# Patient Record
Sex: FEMALE | Race: OTHER | NOT HISPANIC OR LATINO | Employment: OTHER | URBAN - METROPOLITAN AREA
[De-identification: names, ages, dates, MRNs, and addresses within clinical notes are randomized per-mention and may not be internally consistent; named-entity substitution may affect disease eponyms.]

---

## 2018-05-04 LAB
ABSOL LYMPHOCYTES (HISTORICAL): 1.9 K/UL (ref 0.5–4)
ALBUMIN SERPL BCP-MCNC: 3.7 G/DL (ref 3–5.2)
ALP SERPL-CCNC: 59 U/L (ref 43–122)
ALT SERPL W P-5'-P-CCNC: 22 U/L (ref 9–52)
ANION GAP SERPL CALCULATED.3IONS-SCNC: 10 MMOL/L (ref 5–14)
AST SERPL W P-5'-P-CCNC: 21 U/L (ref 14–36)
BASOPHILS # BLD AUTO: 0 K/UL (ref 0–0.1)
BASOPHILS # BLD AUTO: 1 % (ref 0–1)
BILIRUB SERPL-MCNC: 0.4 MG/DL
BUN SERPL-MCNC: 28 MG/DL (ref 5–25)
CALCIUM SERPL-MCNC: 9.3 MG/DL (ref 8.4–10.2)
CHLORIDE SERPL-SCNC: 106 MEQ/L (ref 97–108)
CO2 SERPL-SCNC: 27 MMOL/L (ref 22–30)
CREATINE, SERUM (HISTORICAL): 1.06 MG/DL (ref 0.6–1.2)
DEPRECATED RDW RBC AUTO: 14.8 %
EGFR (HISTORICAL): 51 ML/MIN/1.73 M2
EOSINOPHIL # BLD AUTO: 0.2 K/UL (ref 0–0.4)
EOSINOPHIL NFR BLD AUTO: 3 % (ref 0–6)
GLUCOSE FASTING (HISTORICAL): 78 MG/DL (ref 70–99)
HCT VFR BLD AUTO: 35 % (ref 36–46)
HGB BLD-MCNC: 11.6 G/DL (ref 12–16)
LYMPHOCYTES NFR BLD AUTO: 32 % (ref 25–45)
MAGNESIUM SERPL-MCNC: 1.6 MG/DL (ref 1.6–2.3)
MCH RBC QN AUTO: 30.2 PG (ref 26–34)
MCHC RBC AUTO-ENTMCNC: 33.1 % (ref 31–36)
MCV RBC AUTO: 91 FL (ref 80–100)
MONOCYTES # BLD AUTO: 0.5 K/UL (ref 0.2–0.9)
MONOCYTES NFR BLD AUTO: 8 % (ref 1–10)
NEUTROPHILS ABS COUNT (HISTORICAL): 3.5 K/UL (ref 1.8–7.8)
NEUTS SEG NFR BLD AUTO: 56 % (ref 45–65)
PLATELET # BLD AUTO: 291 K/MCL (ref 150–450)
POTASSIUM SERPL-SCNC: 4.3 MEQ/L (ref 3.6–5)
RBC # BLD AUTO: 3.83 M/MCL (ref 4–5.2)
SODIUM SERPL-SCNC: 143 MEQ/L (ref 137–147)
TOTAL PROTEIN (HISTORICAL): 6.8 G/DL (ref 5.9–8.4)
WBC # BLD AUTO: 6.1 K/MCL (ref 4.5–11)

## 2018-05-05 LAB — CA 27.29 (HISTORICAL): 64.5

## 2018-05-06 LAB — CA 15-3 (HISTORICAL): 38

## 2018-05-14 LAB — GLUCOSE SERPL-MCNC: 78 MG/DL (ref 70–99)

## 2018-06-18 LAB
ABSOL LYMPHOCYTES (HISTORICAL): 1.6 K/UL (ref 0.5–4)
ALBUMIN SERPL BCP-MCNC: 3.6 G/DL (ref 3–5.2)
ALP SERPL-CCNC: 74 U/L (ref 43–122)
ALT SERPL W P-5'-P-CCNC: 20 U/L (ref 9–52)
ANION GAP SERPL CALCULATED.3IONS-SCNC: 8 MMOL/L (ref 5–14)
AST SERPL W P-5'-P-CCNC: 23 U/L (ref 14–36)
BASOPHILS # BLD AUTO: 0 K/UL (ref 0–0.1)
BASOPHILS # BLD AUTO: 1 % (ref 0–1)
BILIRUB SERPL-MCNC: 0.3 MG/DL
BUN SERPL-MCNC: 24 MG/DL (ref 5–25)
CALCIUM SERPL-MCNC: 8.4 MG/DL (ref 8.4–10.2)
CHLORIDE SERPL-SCNC: 105 MEQ/L (ref 97–108)
CO2 SERPL-SCNC: 28 MMOL/L (ref 22–30)
CREATINE, SERUM (HISTORICAL): 0.9 MG/DL (ref 0.6–1.2)
DEPRECATED RDW RBC AUTO: 14.3 %
EGFR (HISTORICAL): >60 ML/MIN/1.73 M2
EOSINOPHIL # BLD AUTO: 0.1 K/UL (ref 0–0.4)
EOSINOPHIL NFR BLD AUTO: 2 % (ref 0–6)
GLUCOSE FASTING (HISTORICAL): 92 MG/DL (ref 70–99)
HCT VFR BLD AUTO: 34.1 % (ref 36–46)
HGB BLD-MCNC: 11.2 G/DL (ref 12–16)
LYMPHOCYTES NFR BLD AUTO: 33 % (ref 25–45)
MCH RBC QN AUTO: 30.5 PG (ref 26–34)
MCHC RBC AUTO-ENTMCNC: 32.9 % (ref 31–36)
MCV RBC AUTO: 93 FL (ref 80–100)
MONOCYTES # BLD AUTO: 0.5 K/UL (ref 0.2–0.9)
MONOCYTES NFR BLD AUTO: 11 % (ref 1–10)
NEUTROPHILS ABS COUNT (HISTORICAL): 2.6 K/UL (ref 1.8–7.8)
NEUTS SEG NFR BLD AUTO: 53 % (ref 45–65)
PLATELET # BLD AUTO: 324 K/MCL (ref 150–450)
POTASSIUM SERPL-SCNC: 5.4 MEQ/L (ref 3.6–5)
RBC # BLD AUTO: 3.67 M/MCL (ref 4–5.2)
SODIUM SERPL-SCNC: 141 MEQ/L (ref 137–147)
TOTAL PROTEIN (HISTORICAL): 7.1 G/DL (ref 5.9–8.4)
WBC # BLD AUTO: 4.8 K/MCL (ref 4.5–11)

## 2018-07-02 ENCOUNTER — APPOINTMENT (OUTPATIENT)
Dept: LAB | Facility: HOSPITAL | Age: 73
End: 2018-07-02
Payer: MEDICARE

## 2018-07-02 ENCOUNTER — TRANSCRIBE ORDERS (OUTPATIENT)
Dept: ADMINISTRATIVE | Facility: HOSPITAL | Age: 73
End: 2018-07-02

## 2018-07-02 DIAGNOSIS — C50.911 MALIGNANT NEOPLASM OF RIGHT FEMALE BREAST, UNSPECIFIED ESTROGEN RECEPTOR STATUS, UNSPECIFIED SITE OF BREAST (HCC): ICD-10-CM

## 2018-07-02 DIAGNOSIS — C50.911 MALIGNANT NEOPLASM OF RIGHT FEMALE BREAST, UNSPECIFIED ESTROGEN RECEPTOR STATUS, UNSPECIFIED SITE OF BREAST (HCC): Primary | ICD-10-CM

## 2018-07-02 LAB
ALBUMIN SERPL BCP-MCNC: 3.5 G/DL (ref 3–5.2)
ALP SERPL-CCNC: 85 U/L (ref 43–122)
ALT SERPL W P-5'-P-CCNC: 15 U/L (ref 9–52)
ANION GAP SERPL CALCULATED.3IONS-SCNC: 8 MMOL/L (ref 5–14)
AST SERPL W P-5'-P-CCNC: 20 U/L (ref 14–36)
BASOPHILS # BLD AUTO: 0 THOUSANDS/ΜL (ref 0–0.1)
BASOPHILS NFR BLD AUTO: 1 % (ref 0–1)
BILIRUB SERPL-MCNC: 0.4 MG/DL
BUN SERPL-MCNC: 28 MG/DL (ref 5–25)
CALCIUM SERPL-MCNC: 8 MG/DL (ref 8.4–10.2)
CHLORIDE SERPL-SCNC: 105 MMOL/L (ref 97–108)
CO2 SERPL-SCNC: 28 MMOL/L (ref 22–30)
CREAT SERPL-MCNC: 0.92 MG/DL (ref 0.6–1.2)
EOSINOPHIL # BLD AUTO: 0.1 THOUSAND/ΜL (ref 0–0.4)
EOSINOPHIL NFR BLD AUTO: 2 % (ref 0–6)
ERYTHROCYTE [DISTWIDTH] IN BLOOD BY AUTOMATED COUNT: 15.1 %
GFR SERPL CREATININE-BSD FRML MDRD: 62 ML/MIN/1.73SQ M
GLUCOSE P FAST SERPL-MCNC: 82 MG/DL (ref 70–99)
HCT VFR BLD AUTO: 35.4 % (ref 36–46)
HGB BLD-MCNC: 11.5 G/DL (ref 12–16)
LYMPHOCYTES # BLD AUTO: 1.3 THOUSANDS/ΜL (ref 0.5–4)
LYMPHOCYTES NFR BLD AUTO: 27 % (ref 20–50)
MCH RBC QN AUTO: 30.4 PG (ref 26–34)
MCHC RBC AUTO-ENTMCNC: 32.5 G/DL (ref 31–36)
MCV RBC AUTO: 94 FL (ref 80–100)
MONOCYTES # BLD AUTO: 0.5 THOUSAND/ΜL (ref 0.2–0.9)
MONOCYTES NFR BLD AUTO: 10 % (ref 1–10)
NEUTROPHILS # BLD AUTO: 3 THOUSANDS/ΜL (ref 1.8–7.8)
NEUTS SEG NFR BLD AUTO: 60 % (ref 45–65)
PLATELET # BLD AUTO: 308 THOUSANDS/UL (ref 150–450)
PMV BLD AUTO: 7.3 FL (ref 8.9–12.7)
POTASSIUM SERPL-SCNC: 4.5 MMOL/L (ref 3.6–5)
PROT SERPL-MCNC: 7.2 G/DL (ref 5.9–8.4)
RBC # BLD AUTO: 3.79 MILLION/UL (ref 4–5.2)
SODIUM SERPL-SCNC: 141 MMOL/L (ref 137–147)
WBC # BLD AUTO: 4.9 THOUSAND/UL (ref 4.5–11)

## 2018-07-02 PROCEDURE — 85025 COMPLETE CBC W/AUTO DIFF WBC: CPT

## 2018-07-02 PROCEDURE — 80053 COMPREHEN METABOLIC PANEL: CPT

## 2018-07-02 PROCEDURE — 36415 COLL VENOUS BLD VENIPUNCTURE: CPT

## 2018-07-03 ENCOUNTER — HOSPITAL ENCOUNTER (OUTPATIENT)
Dept: INFUSION CENTER | Facility: HOSPITAL | Age: 73
Discharge: HOME | End: 2018-07-03
Payer: MEDICARE

## 2018-07-03 VITALS
SYSTOLIC BLOOD PRESSURE: 122 MMHG | HEART RATE: 68 BPM | DIASTOLIC BLOOD PRESSURE: 61 MMHG | WEIGHT: 169.31 LBS | TEMPERATURE: 97 F | HEIGHT: 63 IN | RESPIRATION RATE: 16 BRPM | BODY MASS INDEX: 30 KG/M2

## 2018-07-03 PROCEDURE — 96413 CHEMO IV INFUSION 1 HR: CPT

## 2018-07-03 PROCEDURE — 96367 TX/PROPH/DG ADDL SEQ IV INF: CPT

## 2018-07-03 RX ORDER — ANASTROZOLE 1 MG/1
TABLET ORAL EVERY 24 HOURS
COMMUNITY
End: 2019-01-31 | Stop reason: SDUPTHER

## 2018-07-03 RX ORDER — SODIUM CHLORIDE 9 MG/ML
20 INJECTION, SOLUTION INTRAVENOUS CONTINUOUS
Status: DISCONTINUED | OUTPATIENT
Start: 2018-07-03 | End: 2018-07-07 | Stop reason: HOSPADM

## 2018-07-03 RX ORDER — NEBIVOLOL 5 MG/1
TABLET ORAL
COMMUNITY
End: 2020-01-17 | Stop reason: SDUPTHER

## 2018-07-03 RX ORDER — HEPARIN SODIUM (PORCINE) LOCK FLUSH IV SOLN 100 UNIT/ML 100 UNIT/ML
300 SOLUTION INTRAVENOUS ONCE
Status: COMPLETED | OUTPATIENT
Start: 2018-07-03 | End: 2018-07-03

## 2018-07-03 RX ADMIN — FAMOTIDINE 20 MG: 10 INJECTION INTRAVENOUS at 11:12

## 2018-07-03 RX ADMIN — SODIUM CHLORIDE 20 ML/HR: 9 INJECTION, SOLUTION INTRAVENOUS at 10:09

## 2018-07-03 RX ADMIN — Medication 300 UNITS: at 12:58

## 2018-07-03 RX ADMIN — ONDANSETRON HYDROCHLORIDE: 2 INJECTION, SOLUTION INTRAMUSCULAR; INTRAVENOUS at 10:46

## 2018-07-03 RX ADMIN — PACLITAXEL 144 MG: 6 INJECTION, SOLUTION, CONCENTRATE INTRAVENOUS at 11:49

## 2018-07-03 NOTE — PLAN OF CARE
Problem: Potential for Falls  Goal: Patient will remain free of falls  INTERVENTIONS:  - Assess patient frequently for physical needs  -  Identify cognitive and physical deficits and behaviors that affect risk of falls    -  [place] fall precautions as indicated by assessment   - Educate patient/family on patient safety including physical limitations  - Instruct patient to call for assistance with activity based on assessment  - Modify environment to reduce risk of injury  - Consider OT/PT consult to assist with strengthening/mobility   Outcome: Progressing

## 2018-07-03 NOTE — PROGRESS NOTES
Patient arrives with no complaints  Tolerated chemo today well without complications  Confirmed next appts and AVS provided

## 2018-07-06 ENCOUNTER — APPOINTMENT (EMERGENCY)
Dept: CT IMAGING | Facility: HOSPITAL | Age: 73
End: 2018-07-06
Payer: MEDICARE

## 2018-07-06 ENCOUNTER — HOSPITAL ENCOUNTER (EMERGENCY)
Facility: HOSPITAL | Age: 73
Discharge: HOME | End: 2018-07-06
Attending: EMERGENCY MEDICINE | Admitting: EMERGENCY MEDICINE
Payer: MEDICARE

## 2018-07-06 VITALS
BODY MASS INDEX: 29.61 KG/M2 | HEART RATE: 82 BPM | DIASTOLIC BLOOD PRESSURE: 73 MMHG | WEIGHT: 167.11 LBS | OXYGEN SATURATION: 97 % | TEMPERATURE: 97.4 F | RESPIRATION RATE: 15 BRPM | SYSTOLIC BLOOD PRESSURE: 133 MMHG

## 2018-07-06 DIAGNOSIS — R93.89 ABNORMAL FINDING ON CT SCAN: ICD-10-CM

## 2018-07-06 DIAGNOSIS — N12 PYELONEPHRITIS: Primary | ICD-10-CM

## 2018-07-06 LAB
ALBUMIN SERPL BCP-MCNC: 4 G/DL (ref 3–5.2)
ALP SERPL-CCNC: 77 U/L (ref 43–122)
ALT SERPL W P-5'-P-CCNC: 21 U/L (ref 9–52)
ANION GAP SERPL CALCULATED.3IONS-SCNC: 8 MMOL/L (ref 5–14)
AST SERPL W P-5'-P-CCNC: 22 U/L (ref 14–36)
BACTERIA UR QL AUTO: ABNORMAL /HPF
BASOPHILS # BLD AUTO: 0 THOUSANDS/ΜL (ref 0–0.1)
BASOPHILS NFR BLD AUTO: 0 % (ref 0–1)
BILIRUB SERPL-MCNC: 1 MG/DL
BILIRUB UR QL STRIP: NEGATIVE
BUN SERPL-MCNC: 22 MG/DL (ref 5–25)
CALCIUM SERPL-MCNC: 8.5 MG/DL (ref 8.4–10.2)
CHLORIDE SERPL-SCNC: 102 MMOL/L (ref 97–108)
CLARITY UR: ABNORMAL
CO2 SERPL-SCNC: 28 MMOL/L (ref 22–30)
COLOR UR: ABNORMAL
CREAT SERPL-MCNC: 0.83 MG/DL (ref 0.6–1.2)
EOSINOPHIL # BLD AUTO: 0 THOUSAND/ΜL (ref 0–0.4)
EOSINOPHIL NFR BLD AUTO: 0 % (ref 0–6)
ERYTHROCYTE [DISTWIDTH] IN BLOOD BY AUTOMATED COUNT: 15 %
GFR SERPL CREATININE-BSD FRML MDRD: 71 ML/MIN/1.73SQ M
GLUCOSE SERPL-MCNC: 98 MG/DL (ref 70–99)
GLUCOSE UR STRIP-MCNC: NEGATIVE MG/DL
HCT VFR BLD AUTO: 36.8 % (ref 36–46)
HGB BLD-MCNC: 12.3 G/DL (ref 12–16)
HGB UR QL STRIP.AUTO: 150
KETONES UR STRIP-MCNC: NEGATIVE MG/DL
LACTATE SERPL-SCNC: 1.3 MMOL/L (ref 0.7–2)
LEUKOCYTE ESTERASE UR QL STRIP: 500
LIPASE SERPL-CCNC: 86 U/L (ref 23–300)
LYMPHOCYTES # BLD AUTO: 1.4 THOUSANDS/ΜL (ref 0.5–4)
LYMPHOCYTES NFR BLD AUTO: 13 % (ref 20–50)
MCH RBC QN AUTO: 30.6 PG (ref 26–34)
MCHC RBC AUTO-ENTMCNC: 33.5 G/DL (ref 31–36)
MCV RBC AUTO: 91 FL (ref 80–100)
MONOCYTES # BLD AUTO: 0.4 THOUSAND/ΜL (ref 0.2–0.9)
MONOCYTES NFR BLD AUTO: 3 % (ref 1–10)
NEUTROPHILS # BLD AUTO: 8.9 THOUSANDS/ΜL (ref 1.8–7.8)
NEUTS SEG NFR BLD AUTO: 83 % (ref 45–65)
NITRITE UR QL STRIP: POSITIVE
NON-SQ EPI CELLS URNS QL MICRO: ABNORMAL /HPF
PH UR STRIP.AUTO: 7 [PH] (ref 4.5–8)
PLATELET # BLD AUTO: 288 THOUSANDS/UL (ref 150–450)
PMV BLD AUTO: 7.9 FL (ref 8.9–12.7)
POTASSIUM SERPL-SCNC: 4 MMOL/L (ref 3.6–5)
PROT SERPL-MCNC: 8 G/DL (ref 5.9–8.4)
PROT UR STRIP-MCNC: ABNORMAL MG/DL
RBC # BLD AUTO: 4.02 MILLION/UL (ref 4–5.2)
RBC #/AREA URNS AUTO: ABNORMAL /HPF
SODIUM SERPL-SCNC: 138 MMOL/L (ref 137–147)
SP GR UR STRIP.AUTO: 1.01 (ref 1–1.04)
TROPONIN I SERPL-MCNC: <0.01 NG/ML (ref 0–0.03)
UROBILINOGEN UA: NEGATIVE MG/DL
WBC # BLD AUTO: 10.7 THOUSAND/UL (ref 4.5–11)
WBC #/AREA URNS AUTO: ABNORMAL /HPF

## 2018-07-06 PROCEDURE — 83605 ASSAY OF LACTIC ACID: CPT | Performed by: EMERGENCY MEDICINE

## 2018-07-06 PROCEDURE — 81001 URINALYSIS AUTO W/SCOPE: CPT | Performed by: EMERGENCY MEDICINE

## 2018-07-06 PROCEDURE — 84484 ASSAY OF TROPONIN QUANT: CPT | Performed by: EMERGENCY MEDICINE

## 2018-07-06 PROCEDURE — 87086 URINE CULTURE/COLONY COUNT: CPT | Performed by: EMERGENCY MEDICINE

## 2018-07-06 PROCEDURE — 96375 TX/PRO/DX INJ NEW DRUG ADDON: CPT

## 2018-07-06 PROCEDURE — 74177 CT ABD & PELVIS W/CONTRAST: CPT

## 2018-07-06 PROCEDURE — 36415 COLL VENOUS BLD VENIPUNCTURE: CPT | Performed by: EMERGENCY MEDICINE

## 2018-07-06 PROCEDURE — 81003 URINALYSIS AUTO W/O SCOPE: CPT | Performed by: EMERGENCY MEDICINE

## 2018-07-06 PROCEDURE — 96361 HYDRATE IV INFUSION ADD-ON: CPT

## 2018-07-06 PROCEDURE — 85025 COMPLETE CBC W/AUTO DIFF WBC: CPT | Performed by: EMERGENCY MEDICINE

## 2018-07-06 PROCEDURE — 99284 EMERGENCY DEPT VISIT MOD MDM: CPT

## 2018-07-06 PROCEDURE — 80053 COMPREHEN METABOLIC PANEL: CPT | Performed by: EMERGENCY MEDICINE

## 2018-07-06 PROCEDURE — 96365 THER/PROPH/DIAG IV INF INIT: CPT

## 2018-07-06 PROCEDURE — 83690 ASSAY OF LIPASE: CPT | Performed by: EMERGENCY MEDICINE

## 2018-07-06 RX ORDER — CEPHALEXIN 250 MG/1
250 CAPSULE ORAL EVERY 6 HOURS SCHEDULED
Qty: 28 CAPSULE | Refills: 0 | Status: SHIPPED | OUTPATIENT
Start: 2018-07-06 | End: 2018-07-13

## 2018-07-06 RX ORDER — ONDANSETRON 4 MG/1
4 TABLET, FILM COATED ORAL EVERY 6 HOURS
Qty: 12 TABLET | Refills: 0 | Status: ON HOLD | OUTPATIENT
Start: 2018-07-06 | End: 2020-06-05 | Stop reason: SDUPTHER

## 2018-07-06 RX ORDER — MORPHINE SULFATE 4 MG/ML
4 INJECTION, SOLUTION INTRAMUSCULAR; INTRAVENOUS ONCE
Status: DISCONTINUED | OUTPATIENT
Start: 2018-07-06 | End: 2018-07-06 | Stop reason: HOSPADM

## 2018-07-06 RX ORDER — MORPHINE SULFATE 4 MG/ML
INJECTION, SOLUTION INTRAMUSCULAR; INTRAVENOUS
Status: DISPENSED
Start: 2018-07-06 | End: 2018-07-07

## 2018-07-06 RX ORDER — ONDANSETRON 2 MG/ML
4 INJECTION INTRAMUSCULAR; INTRAVENOUS ONCE
Status: COMPLETED | OUTPATIENT
Start: 2018-07-06 | End: 2018-07-06

## 2018-07-06 RX ORDER — ONDANSETRON 2 MG/ML
INJECTION INTRAMUSCULAR; INTRAVENOUS
Status: DISPENSED
Start: 2018-07-06 | End: 2018-07-07

## 2018-07-06 RX ADMIN — IOHEXOL 100 ML: 350 INJECTION, SOLUTION INTRAVENOUS at 16:17

## 2018-07-06 RX ADMIN — ONDANSETRON 4 MG: 2 INJECTION, SOLUTION INTRAMUSCULAR; INTRAVENOUS at 15:55

## 2018-07-06 RX ADMIN — CEFTRIAXONE 1000 MG: 1 INJECTION, SOLUTION INTRAVENOUS at 17:41

## 2018-07-06 RX ADMIN — SODIUM CHLORIDE 500 ML: 9 INJECTION, SOLUTION INTRAVENOUS at 15:38

## 2018-07-06 NOTE — ED PROVIDER NOTES
Pt Name: Dayami Foss  MRN: [id number]  [place] 1945  Age/Sex: 68 y o  female  Date of evaluation: 7/6/2018  PCP: Griselda Shipman MD    [address] [place] [address]    Chief Complaint   Patient presents with    Abdominal Pain     pt states that she has been having lower bad pain since Wed night  pt denies N/V/D and denies urinary symptoms  HPI    68 y o  female presenting with 3 days of abdominal pain  Pain is dull, moderate-to-severe in the left lower abdomen and radiating to the right lower abdomen, better at rest and worse with movement  Patient notes that she has had a decreased appetite over this time, only able to drink a little bit of Tea today not able to eat anything  She complains of nausea and constipation but denies vomiting or urinary symptoms  Of note, patient is currently being treated for breast cancer with chemotherapy, patient states the cancer has not spread outside of the breast at this time  HPI      Past Medical and Surgical History    Past Medical History:   Diagnosis Date    Breast cancer ([facility] [address] )     Hypertension     Lymphedema of right arm     Vitamin B12 deficiency        Past Surgical History:   Procedure Laterality Date    APPENDECTOMY      BREAST SURGERY      bilat mastectomy    KNEE SURGERY      MASTECTOMY Bilateral        History reviewed  No pertinent family history  Social History   Substance Use Topics    Smoking status: Never Smoker    Smokeless tobacco: Never Used    Alcohol use No           Allergies    Allergies   Allergen Reactions    Penicillins Rash       Home Medications    Prior to Admission medications    Medication Sig Start Date End Date Taking?  Authorizing Provider   anastrozole (ARIMIDEX) 1 mg tablet every 24 hours    Historical Provider, MD   Cyanocobalamin 1000 MCG/ML KIT Inject as directed every 6 (six) months    Historical Provider, MD   nebivolol (BYSTOLIC) 5 mg tablet take 1/2 tablet  tablet by oral route  qhs    Historical Provider, MD Review of Systems    Review of Systems   Constitutional: Positive for appetite change  Negative for activity change, chills and fever  HENT: Negative for drooling and facial swelling  Eyes: Negative for pain, discharge and visual disturbance  Respiratory: Negative for apnea, cough, chest tightness, shortness of breath and wheezing  Cardiovascular: Negative for chest pain and leg swelling  Gastrointestinal: Positive for abdominal distention, abdominal pain, constipation and nausea  Negative for diarrhea and vomiting  Genitourinary: Negative for difficulty urinating, dysuria and urgency  Musculoskeletal: Negative for arthralgias, back pain and gait problem  Skin: Negative for color change and rash  Neurological: Negative for dizziness, speech difficulty, weakness and headaches  Psychiatric/Behavioral: Negative for agitation, behavioral problems and confusion  All other systems reviewed and negative  Physical Exam      ED Triage Vitals [07/06/18 1448]   Temperature Pulse Respirations Blood Pressure SpO2   (!) 96 9 °F (36 1 °C) 102 20 140/90 97 %      Temp Source Heart Rate Source Patient Position - Orthostatic VS BP Location FiO2 (%)   Temporal Monitor Sitting Left arm --      Pain Score       --               Physical Exam   Constitutional: She is oriented to person, place, and time  She appears well-developed and well-nourished  HENT:   Head: Normocephalic and atraumatic  Eyes: Conjunctivae and EOM are normal  Pupils are equal, round, and reactive to light  Neck: Normal range of motion  Neck supple  Cardiovascular: Normal rate, regular rhythm, normal heart sounds and intact distal pulses  Pulmonary/Chest: Effort normal and breath sounds normal  No respiratory distress  She has no wheezes  She has no rales  Abdominal: Soft  She exhibits distension  There is tenderness  There is no rebound and no guarding     Maximal tenderness palpation the left lower quadrant, no rebound or guarding  Also tender palpation the right lower quadrant although not as severe is left  Negative Leon sign  Musculoskeletal: Normal range of motion  She exhibits no edema or deformity  Neurological: She is alert and oriented to person, place, and time  Skin: Skin is warm and dry  No rash noted  No erythema  Psychiatric: She has a normal mood and affect   Her behavior is normal  Judgment and thought content normal             Diagnostic Results      Labs:    Results for orders placed or performed during the hospital encounter of 07/06/18   CBC and differential   Result Value Ref Range    WBC 10 70 4 50 - 11 00 Thousand/uL    RBC 4 02 4 00 - 5 20 Million/uL    Hemoglobin 12 3 12 0 - 16 0 g/dL    Hematocrit 36 8 36 0 - 46 0 %    MCV 91 80 - 100 fL    MCH 30 6 26 0 - 34 0 pg    MCHC 33 5 31 0 - 36 0 g/dL    RDW 15 0 <15 3 %    MPV 7 9 (L) 8 9 - 12 7 fL    Platelets 302 531 - 033 Thousands/uL    Neutrophils Relative 83 (H) 45 - 65 %    Lymphocytes Relative 13 (L) 20 - 50 %    Monocytes Relative 3 1 - 10 %    Eosinophils Relative 0 0 - 6 %    Basophils Relative 0 0 - 1 %    Neutrophils Absolute 8 90 (H) 1 80 - 7 80 Thousands/µL    Lymphocytes Absolute 1 40 0 50 - 4 00 Thousands/µL    Monocytes Absolute 0 40 0 20 - 0 90 Thousand/µL    Eosinophils Absolute 0 00 0 00 - 0 40 Thousand/µL    Basophils Absolute 0 00 0 00 - 0 10 Thousands/µL   CMP   Result Value Ref Range    Sodium 138 137 - 147 mmol/L    Potassium 4 0 3 6 - 5 0 mmol/L    Chloride 102 97 - 108 mmol/L    CO2 28 22 - 30 mmol/L    Anion Gap 8 5 - 14 mmol/L    BUN 22 5 - 25 mg/dL    Creatinine 0 83 0 60 - 1 20 mg/dL    Glucose 98 70 - 99 mg/dL    Calcium 8 5 8 4 - 10 2 mg/dL    AST 22 14 - 36 U/L    ALT 21 9 - 52 U/L    Alkaline Phosphatase 77 43 - 122 U/L    Total Protein 8 0 5 9 - 8 4 g/dL    Albumin 4 0 3 0 - 5 2 g/dL    Total Bilirubin 1 00 <1 30 mg/dL    eGFR 71 >60 ml/min/1 73sq m   Lipase   Result Value Ref Range    Lipase 86 23 - 300 u/L   UA w Reflex to Microscopic w Reflex to Culture   Result Value Ref Range    Color, UA Straw Straw, Yellow, Pale Yellow    Clarity, UA Slightly Cloudy (A) Clear, Other    Specific [place], UA 1 010 1 003 - 1 040    pH, UA 7 0 4 5 - 8 0    Leukocytes,  0 (A) Negative    Nitrite, UA Positive (A) Negative    Protein, UA 15 (Trace) (A) Negative mg/dl    Glucose, UA Negative Negative mg/dl    Ketones, UA Negative Negative mg/dl    Bilirubin, UA Negative Negative    Blood,  0 (A) Negative    UROBILINOGEN UA Negative 1 0, Negative mg/dL   Troponin I   Result Value Ref Range    Troponin I <0 01 0 00 - 0 03 ng/mL   Lactic acid, plasma   Result Value Ref Range    LACTIC ACID 1 3 0 7 - 2 0 mmol/L   Urine Microscopic   Result Value Ref Range    RBC, UA 4-10 (A) None Seen, 0-5 /hpf    WBC, UA 30-50 (A) None Seen, 0-5, 5-55, 5-65 /hpf    Epithelial Cells Moderate (A) None Seen, Occasional /hpf    Bacteria, UA Occasional None Seen, Occasional /hpf       All labs reviewed and utilized in the medical decision making process    Radiology:    CT abdomen pelvis with contrast   Final Result      1  Mild enhancement of the bilateral ureters and collecting systems without hydronephrosis or obstructing lesion identified  Given the irregularity of the bladder with perivesical stranding, this is suspicious for ascending urinary tract infection with    ureteritis/pyelitis  2   Abnormal appearing uterus likely secondary to replacement by multiple fibroids  However, this is not well visualized and the possibility of an endometrial mass is not excluded  Follow-up ultrasound is recommended for further evaluation  3   Diverticulosis coli  Workstation performed: [id number]             All radiology studies independently viewed by me and interpreted by the radiologist     Procedure    Procedures    CritCare Time      ED Course of Care and Re-Assessments      Patient offered pain medication but declined  Given Zofran, IV fluids  Ceftriaxone administered ER as well to treat urinary tract infection with concern for pyelonephritis  Medications   morphine (PF) 4 mg/mL injection 4 mg (4 mg Intravenous Not Given 7/6/18 1557)   cefTRIAXone (ROCEPHIN) IVPB (premix) 1,000 mg (1,000 mg Intravenous New Bag 7/6/18 1741)   sodium chloride 0 9 % bolus 500 mL (0 mL Intravenous Stopped 7/6/18 1717)   ondansetron (ZOFRAN) injection 4 mg (4 mg Intravenous Given 7/6/18 1555)   iohexol (OMNIPAQUE) 350 MG/ML injection (MULTI-DOSE) 100 mL (100 mL Intravenous Given 7/6/18 1617)           FINAL IMPRESSION    Final diagnoses:   Pyelonephritis   Abnormal finding on CT scan         DISPOSITION/PLAN    81-year-old female with history and symptoms above  Vital signs initially remarkable for tachycardia that improved with fluids, examination nonspecific but her concerning for abdominal pain in lower quadrants  Lab sent returned reassuring for the most part with mild anemia likely at baseline and urinalysis concerning for urinary tract infection due to leukocytes and nitrite positive  CT scan performed with concern for possible diverticulitis based on left lower quadrant pain versus possible small bowel obstruction or partial small bowel obstruction given history of decreased eating and having only fluids today  Scanreturned as above, most consistent with pyelonephritis but no evidence of stone or other intra-abdominal infection at this time  Incidental findings of left renal cyst and likely but not confirmed fibroids needing further follow-up noted and conveyed patient and patient's daughter, noted in discharge paperwork  Discharged with strict return precautions, Keflex and, Zofran, follow up with primary care doctor  Patient hemodynamically stable and comfortable at time of discharge    Time reflects when diagnosis was documented in both MDM as applicable and the Disposition within this note     Time User Action Codes Description Comment    7/6/2018  5:40 PM Maninder John Add [N12] Pyelonephritis     7/6/2018  5:40 PM Claudia NORMAN Add [R93 [id number]] Abnormal finding on CT scan       ED Disposition     ED Disposition Condition Comment    Discharge  [address] [facility] discharge to home/self care  Condition at discharge: Good        Follow-up Information     Follow up With Specialties Details Why Contact Info    Chanell Jansen MD Family Medicine Schedule an appointment as soon as possible for a visit in 3 days To recheck your symptoms and discuss the abnormal findings on your CT scan [address] [facility]  [address]  [facility]              PATIENT REFERRED TO:    Chanell Jansen MD  [address] [facility]  [address]  [facility]  [address] [place]    Schedule an appointment as soon as possible for a visit in 3 days  To recheck your symptoms and discuss the abnormal findings on your CT scan      DISCHARGE MEDICATIONS:    Patient's Medications   Discharge Prescriptions    CEPHALEXIN (KEFLEX) 250 MG CAPSULE    Take 1 capsule (250 mg total) by mouth every 6 (six) hours for 7 days       Start Date: 7/6/2018  End Date: 7/13/2018       Order Dose: 250 mg       Quantity: 28 capsule    Refills: 0    ONDANSETRON (ZOFRAN) 4 MG TABLET    Take 1 tablet (4 mg total) by mouth every 6 (six) hours       Start Date: 7/6/2018  End Date: --       Order Dose: 4 mg       Quantity: 12 tablet    Refills: 0       No discharge procedures on file           MD Marta Cooper MD  07/06/18 Silvana Rowe

## 2018-07-06 NOTE — DISCHARGE INSTRUCTIONS
The most likely cause of your symptoms today is a kidney infection  Please see the instructions below for more information about that condition  On the CT scan, we also found a cyst (fluid collection) on your left kidney and your uterus appeared abnormal   The findings on the uterus are most likely due to fibroids, although the radiologist recommends getting an ultrasound of the uterus to get a better look as we cannot be sure what they are from the CT scan  Please follow up with your primary care doctor and discuss further follow-up to recheck the uterus  Kidney Infection   WHAT YOU NEED TO KNOW:   A kidney infection, or pyelonephritis, is a bacterial infection  The infection usually starts in your bladder or urethra and moves into your kidney  One or both kidneys may be infected  DISCHARGE INSTRUCTIONS:   Return to the emergency department if:   · You have a fever and chills  · You cannot stop vomiting  · You have severe pain in your abdomen, lower back, or sides  Contact your healthcare provider if:   · You continue to have a fever after you take antibiotics for 3 days  · You have pain when you urinate, even after treatment  · Your signs and symptoms return  · You have questions or concerns about your condition or care  Medicines: You may  need any of the following:  · Antibiotics  treat your bacterial infection  · Acetaminophen  decreases pain and fever  It is available without a doctor's order  Ask how much to take and how often to take it  Follow directions  Read the labels of all other medicines you are using to see if they also contain acetaminophen, or ask your doctor or pharmacist  Acetaminophen can cause liver damage if not taken correctly  Do not use more than 4 grams (4,000 milligrams) total of acetaminophen in one day  · NSAIDs , such as ibuprofen, help decrease swelling, pain, and fever  This medicine is available with or without a doctor's order   NSAIDs can cause stomach bleeding or kidney problems in certain people  If you take blood thinner medicine, always ask if NSAIDs are safe for you  Always read the medicine label and follow directions  Do not give these medicines to children under 10months of age without direction from your child's healthcare provider  · Prescription pain medicine  may be given  Ask how to take this medicine safely  · Take your medicine as directed  Contact your healthcare provider if you think your medicine is not helping or if you have side effects  Tell him of her if you are allergic to any medicine  Keep a list of the medicines, vitamins, and herbs you take  Include the amounts, and when and why you take them  Bring the list or the pill bottles to follow-up visits  Carry your medicine list with you in case of an emergency  Drink liquids as directed: You may need to drink extra liquids to help flush your kidneys and urinary system  Water is the best liquid to drink  Ask your healthcare provider how much liquid to drink each day and which liquids are best for you  Urinate as soon as you feel the urge: This will help flush bacteria from your urinary system  Do not wait or hold your urine for too long  Clean your genital area every day with soap and water:  Wipe from front to back after you urinate or have a bowel movement  Wear cotton underwear  Fabrics such as nylon and polyester can stay damp  This can increase your risk for infection  Urinate within 15 minutes after you have sex  Follow up with your healthcare provider as directed:  Write down your questions so you remember to ask them during your visits  © 2017 [address] [employer] Information is for End User's use only and may not be sold, redistributed or otherwise used for commercial purposes  All illustrations and images included in CareNotes® are the copyrighted property of A D A M , Inc  or [facility]  The above information is an  only  It is not intended as medical advice for individual conditions or treatments  Talk to your doctor, nurse or pharmacist before following any medical regimen to see if it is safe and effective for you

## 2018-07-07 LAB — BACTERIA UR CULT: NORMAL

## 2018-07-11 ENCOUNTER — TELEPHONE (OUTPATIENT)
Dept: HEMATOLOGY ONCOLOGY | Facility: CLINIC | Age: 73
End: 2018-07-11

## 2018-07-11 NOTE — TELEPHONE ENCOUNTER
Sister called Yolanda Angel will be having dental work she cx her appt and treatment she will call back to rs when she is ready to return

## 2018-07-13 ENCOUNTER — TELEPHONE (OUTPATIENT)
Dept: FAMILY MEDICINE CLINIC | Facility: CLINIC | Age: 73
End: 2018-07-13

## 2018-07-17 NOTE — TELEPHONE ENCOUNTER
Called pt and left message for her to give the office a call back when she gets a chance in regards to her Er visit   Need to know if she's still a pt of Dr Farhan Manrique

## 2019-01-21 DIAGNOSIS — C50.911 MALIGNANT NEOPLASM OF RIGHT FEMALE BREAST, UNSPECIFIED ESTROGEN RECEPTOR STATUS, UNSPECIFIED SITE OF BREAST (HCC): Primary | ICD-10-CM

## 2019-01-30 ENCOUNTER — APPOINTMENT (OUTPATIENT)
Dept: LAB | Facility: HOSPITAL | Age: 74
End: 2019-01-30
Attending: INTERNAL MEDICINE
Payer: MEDICARE

## 2019-01-30 LAB
ALBUMIN SERPL BCP-MCNC: 3.7 G/DL (ref 3–5.2)
ALP SERPL-CCNC: 56 U/L (ref 43–122)
ALT SERPL W P-5'-P-CCNC: 21 U/L (ref 9–52)
ANION GAP SERPL CALCULATED.3IONS-SCNC: 4 MMOL/L (ref 5–14)
AST SERPL W P-5'-P-CCNC: 24 U/L (ref 14–36)
BASOPHILS # BLD AUTO: 0 THOUSANDS/ΜL (ref 0–0.1)
BASOPHILS NFR BLD AUTO: 0 % (ref 0–1)
BILIRUB SERPL-MCNC: 0.4 MG/DL
BUN SERPL-MCNC: 22 MG/DL (ref 5–25)
CALCIUM SERPL-MCNC: 9.3 MG/DL (ref 8.4–10.2)
CHLORIDE SERPL-SCNC: 104 MMOL/L (ref 97–108)
CO2 SERPL-SCNC: 31 MMOL/L (ref 22–30)
CREAT SERPL-MCNC: 0.96 MG/DL (ref 0.6–1.2)
EOSINOPHIL # BLD AUTO: 0.1 THOUSAND/ΜL (ref 0–0.4)
EOSINOPHIL NFR BLD AUTO: 2 % (ref 0–6)
ERYTHROCYTE [DISTWIDTH] IN BLOOD BY AUTOMATED COUNT: 15.2 %
GFR SERPL CREATININE-BSD FRML MDRD: 59 ML/MIN/1.73SQ M
GLUCOSE P FAST SERPL-MCNC: 90 MG/DL (ref 70–99)
HCT VFR BLD AUTO: 35.6 % (ref 36–46)
HGB BLD-MCNC: 11.9 G/DL (ref 12–16)
LYMPHOCYTES # BLD AUTO: 1.7 THOUSANDS/ΜL (ref 0.5–4)
LYMPHOCYTES NFR BLD AUTO: 31 % (ref 25–45)
MCH RBC QN AUTO: 30.9 PG (ref 26–34)
MCHC RBC AUTO-ENTMCNC: 33.4 G/DL (ref 31–36)
MCV RBC AUTO: 93 FL (ref 80–100)
MONOCYTES # BLD AUTO: 0.5 THOUSAND/ΜL (ref 0.2–0.9)
MONOCYTES NFR BLD AUTO: 9 % (ref 1–10)
NEUTROPHILS # BLD AUTO: 3.1 THOUSANDS/ΜL (ref 1.8–7.8)
NEUTS SEG NFR BLD AUTO: 58 % (ref 45–65)
PLATELET # BLD AUTO: 297 THOUSANDS/UL (ref 150–450)
PMV BLD AUTO: 7.5 FL (ref 8.9–12.7)
POTASSIUM SERPL-SCNC: 4.3 MMOL/L (ref 3.6–5)
PROT SERPL-MCNC: 7.3 G/DL (ref 5.9–8.4)
RBC # BLD AUTO: 3.85 MILLION/UL (ref 4–5.2)
SODIUM SERPL-SCNC: 139 MMOL/L (ref 137–147)
WBC # BLD AUTO: 5.4 THOUSAND/UL (ref 4.5–11)

## 2019-01-30 PROCEDURE — 85025 COMPLETE CBC W/AUTO DIFF WBC: CPT

## 2019-01-30 PROCEDURE — 80053 COMPREHEN METABOLIC PANEL: CPT

## 2019-01-30 PROCEDURE — 36415 COLL VENOUS BLD VENIPUNCTURE: CPT

## 2019-01-31 ENCOUNTER — OFFICE VISIT (OUTPATIENT)
Dept: HEMATOLOGY ONCOLOGY | Facility: CLINIC | Age: 74
End: 2019-01-31
Payer: MEDICARE

## 2019-01-31 VITALS
RESPIRATION RATE: 18 BRPM | BODY MASS INDEX: 30.79 KG/M2 | DIASTOLIC BLOOD PRESSURE: 82 MMHG | SYSTOLIC BLOOD PRESSURE: 140 MMHG | HEIGHT: 63 IN | OXYGEN SATURATION: 96 % | TEMPERATURE: 98.1 F | WEIGHT: 173.8 LBS | HEART RATE: 83 BPM

## 2019-01-31 DIAGNOSIS — C50.919 METASTATIC BREAST CANCER (HCC): Primary | ICD-10-CM

## 2019-01-31 PROCEDURE — 99215 OFFICE O/P EST HI 40 MIN: CPT | Performed by: INTERNAL MEDICINE

## 2019-01-31 RX ORDER — ANASTROZOLE 1 MG/1
1 TABLET ORAL EVERY 24 HOURS
Qty: 90 TABLET | Refills: 5 | Status: SHIPPED | OUTPATIENT
Start: 2019-01-31 | End: 2019-11-06 | Stop reason: SDUPTHER

## 2019-01-31 NOTE — PROGRESS NOTES
Hematology/Oncology Outpatient Follow-up  Cece Chiu 69 y o  female 1945 [id number]    Date:  1/31/2019        Assessment and Plan:  1  Metastatic breast cancer [facility])  The patient is doing amazingly well on the current palliative chemotherapy with low dose Taxol on every other week basis  We will have to pursue imaging studies with a PET-CT scan to evaluate the status of her metastatic breast cancer  We will also check her echocardiogram to evaluate her ejection fraction in the strength of her heart since she was exposed to her Herceptin and Perjeta in the past   Will get her tumor markers checked and discuss treatment options accordingly     - CBC and differential; Future  - Comprehensive metabolic panel; Future  - NM PET CT skull base to mid thigh; Future  - Echo complete with contrast if indicated; Future  - Cancer antigen 15-3  - Cancer antigen 27 29  - anastrozole (ARIMIDEX) 1 mg tablet; Take 1 tablet (1 mg total) by mouth every 24 hours  Dispense: 90 tablet; Refill: 5        HPI:  The patient came in today for a follow-up visit  She came back from [facility] where she is from couple weeks ago  She stated that she continue the palliative low-dose Taxol on every other week basis during the time she was in liver known until last treatment on in the middle of January 2019  She stated that she is staying here for a while and would like to continue her oncological care in our clinic  The patient had no problems with the chemotherapy during the last 6-8 months  She did however for fall down in had trauma to the right elbow which increased the lymphedema in the right upper extremity  Otherwise, she has great energy without any constitutional symptoms      Oncology History    The patient has a remote history of right breast cancer diagnosed in [place] in February of 2005 with infiltrating ductal carcinoma grade 2 status post right total mastectomy and left prophylactic total mastectomy as well at that time   Her pathology revealed 8/17 positive lymph node for metastatic disease from the right axilla  ER status positive at 5-10%, progesterone negative, her 2 Gregory positive by FISH  She was then treated with 6 cycles of adjuvant chemotherapy with 5 fluorouracil, Adriamycin, and cyclophosphamide followed by tamoxifen and adjuvant radiation to the right axilla and chest wall  The patient was then switched from tamoxifen to Arimidex which was then stopped in 2012  She was then diagnosed with recurrence of her breast cancer with metastatic disease mainly involving the right anterior chest wall, skin, right axilla pectoralis muscle, left axilla and other areas of the body in May 2015  The biopsy from the scan showed triple positive malignant process compatible with recurrence of her breast cancer  She was then started on palliative chemotherapy and received 6 cycles of Taxotere along with trastuzumab and pertuzumab  After 6 cycles she was continued mainly on trastuzumab and pertuzumab alone  The patient then went to [facility] which she continued her oncological care with trastuzumab, pertuzumab and anastrozole  That treatment had to be put on hold after she developed decline of her ejection fraction to about 23% around June 2017  A PET scan in July of 2017 is showed mild progression of her disease and for that reason low-dose weekly Taxol was started on the 18th July 2017  The frequency of Taxol was decreased to 1 treatment every other week  She then received the rest of her treatment and live unknown from September 2017 until March 2018  Her echocardiogram in May 2018 showed ejection fraction of 43%  The patient then went to live alone again in July of 2018 where she continued her Taxol treatment on every other week basis until the middle of January 2019          Metastatic breast cancer ([facility] [address] )    5/28/2015 Initial Diagnosis     Metastatic breast cancer ([facility] [address] )         6/30/2015 -  Chemotherapy     1-Taxotere, Herceptin, Perjeta started in [place] 2015 x6 cycles  2-Herceptin and Perjeta alone were continued since the patient was not interested in continue the Taxotere  3-Herceptin and Perjeta were put on hold due to decline of her ejection fraction around May 2017  4-low dose weekly Taxol was started in July 2017  5-Taxol was switched to every other week basis            Interval history:    ROS: Review of Systems   Constitutional: Negative for activity change, appetite change, chills, diaphoresis, fatigue, fever and unexpected weight change  HENT: Negative for congestion, dental problem, ear discharge, ear pain, facial swelling, hearing loss, mouth sores, nosebleeds, postnasal drip, sore throat, tinnitus and trouble swallowing  Eyes: Negative for discharge, redness, itching and visual disturbance  Respiratory: Negative for cough, chest tightness, shortness of breath and wheezing  Cardiovascular: Negative for chest pain, palpitations and leg swelling  Gastrointestinal: Negative for abdominal distention, abdominal pain, anal bleeding, blood in stool, constipation, diarrhea, nausea and vomiting  Genitourinary: Negative for difficulty urinating, dysuria, flank pain, frequency, hematuria and urgency  Musculoskeletal: Positive for arthralgias (In the right elbow status post a fall)  Negative for back pain, gait problem, joint swelling, myalgias and neck pain  Lymphedema of the right upper extremity   Skin: Negative for color change, pallor, rash and wound  Neurological: Negative for dizziness, syncope, speech difficulty, weakness, light-headedness, numbness and headaches  Hematological: Negative for adenopathy  Does not bruise/bleed easily  Psychiatric/Behavioral: Negative for agitation, behavioral problems, confusion and sleep disturbance         Past Medical History:   Diagnosis Date    Breast cancer ([facility] [address] )     Hypertension     Lymphedema of right arm     Vitamin B12 deficiency        Past Surgical History:   Procedure Laterality Date    APPENDECTOMY      BREAST SURGERY      bilat mastectomy-right total mastectomy and prophylactic left total mastectomy-2005    KNEE SURGERY      right knee replacement 2014 in [address] Bilateral        Social History     Social History    Marital status: Single     Spouse name: N/A    Number of children: N/A    Years of education: N/A     Social History Main Topics    Smoking status: Never Smoker    Smokeless tobacco: Never Used      Comment: no passive smoke exposure    Alcohol use No    Drug use: No    Sexual activity: Not Asked     Other Topics Concern    None     Social History Narrative    None       Family History   Problem Relation Age of Onset    Asthma Mother     Osteoarthritis Father     Bone cancer Family        Allergies   Allergen Reactions    Penicillins Rash         Current Outpatient Prescriptions:     anastrozole (ARIMIDEX) 1 mg tablet, Take 1 tablet (1 mg total) by mouth every 24 hours, Disp: 90 tablet, Rfl: 5    nebivolol (BYSTOLIC) 5 mg tablet, take 1/2 tablet  tablet by oral route  qhs, Disp: , Rfl:     ondansetron (ZOFRAN) 4 mg tablet, Take 1 tablet (4 mg total) by mouth every 6 (six) hours, Disp: 12 tablet, Rfl: 0    Cyanocobalamin 1000 MCG/ML KIT, Inject as directed every 6 (six) months, Disp: , Rfl:       Physical Exam:  /82 (BP Location: Left arm, Cuff Size: Standard)   Pulse 83   Temp 98 1 °F (36 7 °C) (Tympanic)   Resp 18   Ht 5' 2 99" (1 6 m)   Wt 78 8 kg (173 lb 12 8 oz)   SpO2 96%   BMI 30 80 kg/m²     Physical Exam   Constitutional: She is oriented to person, place, and time  She appears well-developed and well-nourished  No distress  HENT:   Head: Normocephalic and atraumatic  Nose: Nose normal    Mouth/Throat: Oropharynx is clear and moist    Eyes: Pupils are equal, round, and reactive to light  Conjunctivae and EOM are normal  Right eye exhibits no discharge   Left eye exhibits no discharge  No scleral icterus  Neck: Normal range of motion  Neck supple  No JVD present  No tracheal deviation present  No thyromegaly present  Cardiovascular: Normal rate, regular rhythm and normal heart sounds  Exam reveals no friction rub  No murmur heard  Pulmonary/Chest: Effort normal and breath sounds normal  No stridor  No respiratory distress  She has no wheezes  She has no rales  She exhibits no tenderness  Abdominal: Soft  Bowel sounds are normal  She exhibits no distension and no mass  There is no hepatosplenomegaly, splenomegaly or hepatomegaly  There is no tenderness  There is no rebound and no guarding  Musculoskeletal: She exhibits edema (Lymphedema of the right upper extremity with decreased range of motion due to recent fall)  She exhibits no tenderness or deformity  Lymphadenopathy:     She has no cervical adenopathy  Neurological: She is alert and oriented to person, place, and time  She has normal reflexes  No cranial nerve deficit  Coordination normal    Skin: Skin is warm and dry  No rash noted  She is not diaphoretic  No erythema  No pallor  Psychiatric: She has a normal mood and affect  Her behavior is normal  Judgment and thought content normal          Labs:  Lab Results   Component Value Date    WBC 5 40 01/30/2019    HGB 11 9 (L) 01/30/2019    HCT 35 6 (L) 01/30/2019    MCV 93 01/30/2019     01/30/2019     Lab Results   Component Value Date     06/18/2018    K 4 3 01/30/2019     01/30/2019    CO2 31 (H) 01/30/2019    ANIONGAP 8 06/18/2018    BUN 22 01/30/2019    CREATININE 0 96 01/30/2019    GLUF 90 01/30/2019    CALCIUM 9 3 01/30/2019    AST 24 01/30/2019    ALT 21 01/30/2019    ALKPHOS 56 01/30/2019    PROT 7 1 06/18/2018    BILITOT 0 3 06/18/2018    EGFR 59 (L) 01/30/2019     No results found for: TSH    Patient voiced understanding and agreement in the above discussion  Aware to contact our office with questions/symptoms in the interim

## 2019-02-11 ENCOUNTER — HOSPITAL ENCOUNTER (OUTPATIENT)
Dept: NUCLEAR MEDICINE | Facility: HOSPITAL | Age: 74
Discharge: HOME | End: 2019-02-11
Attending: INTERNAL MEDICINE
Payer: MEDICARE

## 2019-02-11 DIAGNOSIS — C50.919 METASTATIC BREAST CANCER (HCC): ICD-10-CM

## 2019-02-11 LAB — GLUCOSE SERPL-MCNC: 79 MG/DL (ref 65–140)

## 2019-02-11 PROCEDURE — 82948 REAGENT STRIP/BLOOD GLUCOSE: CPT

## 2019-02-11 PROCEDURE — A9552 F18 FDG: HCPCS

## 2019-02-11 PROCEDURE — 78815 PET IMAGE W/CT SKULL-THIGH: CPT

## 2019-02-13 ENCOUNTER — HOSPITAL ENCOUNTER (OUTPATIENT)
Dept: NON INVASIVE DIAGNOSTICS | Facility: HOSPITAL | Age: 74
Discharge: HOME | End: 2019-02-13
Attending: INTERNAL MEDICINE
Payer: MEDICARE

## 2019-02-13 ENCOUNTER — APPOINTMENT (OUTPATIENT)
Dept: NUCLEAR MEDICINE | Facility: HOSPITAL | Age: 74
End: 2019-02-13
Attending: INTERNAL MEDICINE
Payer: MEDICARE

## 2019-02-13 DIAGNOSIS — C50.919 METASTATIC BREAST CANCER (HCC): ICD-10-CM

## 2019-02-13 PROCEDURE — 93306 TTE W/DOPPLER COMPLETE: CPT

## 2019-02-13 PROCEDURE — 93306 TTE W/DOPPLER COMPLETE: CPT | Performed by: INTERNAL MEDICINE

## 2019-02-14 ENCOUNTER — APPOINTMENT (OUTPATIENT)
Dept: LAB | Age: 74
End: 2019-02-14
Payer: MEDICARE

## 2019-02-14 DIAGNOSIS — C50.919 METASTATIC BREAST CANCER (HCC): ICD-10-CM

## 2019-02-14 LAB
ALBUMIN SERPL BCP-MCNC: 3.4 G/DL (ref 3.5–5)
ALP SERPL-CCNC: 65 U/L (ref 46–116)
ALT SERPL W P-5'-P-CCNC: 14 U/L (ref 12–78)
ANION GAP SERPL CALCULATED.3IONS-SCNC: 2 MMOL/L (ref 4–13)
AST SERPL W P-5'-P-CCNC: 18 U/L (ref 5–45)
BASOPHILS # BLD AUTO: 0.03 THOUSANDS/ΜL (ref 0–0.1)
BASOPHILS NFR BLD AUTO: 1 % (ref 0–1)
BILIRUB SERPL-MCNC: 0.65 MG/DL (ref 0.2–1)
BUN SERPL-MCNC: 16 MG/DL (ref 5–25)
CALCIUM SERPL-MCNC: 9.2 MG/DL (ref 8.3–10.1)
CANCER AG27-29 SERPL-ACNC: 66.6 U/ML (ref 0–42.3)
CHLORIDE SERPL-SCNC: 103 MMOL/L (ref 100–108)
CO2 SERPL-SCNC: 33 MMOL/L (ref 21–32)
CREAT SERPL-MCNC: 0.96 MG/DL (ref 0.6–1.3)
EOSINOPHIL # BLD AUTO: 0.16 THOUSAND/ΜL (ref 0–0.61)
EOSINOPHIL NFR BLD AUTO: 3 % (ref 0–6)
ERYTHROCYTE [DISTWIDTH] IN BLOOD BY AUTOMATED COUNT: 14.6 % (ref 11.6–15.1)
GFR SERPL CREATININE-BSD FRML MDRD: 59 ML/MIN/1.73SQ M
GLUCOSE P FAST SERPL-MCNC: 83 MG/DL (ref 65–99)
HCT VFR BLD AUTO: 39.8 % (ref 34.8–46.1)
HGB BLD-MCNC: 12.4 G/DL (ref 11.5–15.4)
IMM GRANULOCYTES # BLD AUTO: 0.02 THOUSAND/UL (ref 0–0.2)
IMM GRANULOCYTES NFR BLD AUTO: 0 % (ref 0–2)
LYMPHOCYTES # BLD AUTO: 1.56 THOUSANDS/ΜL (ref 0.6–4.47)
LYMPHOCYTES NFR BLD AUTO: 24 % (ref 14–44)
MCH RBC QN AUTO: 30.2 PG (ref 26.8–34.3)
MCHC RBC AUTO-ENTMCNC: 31.2 G/DL (ref 31.4–37.4)
MCV RBC AUTO: 97 FL (ref 82–98)
MONOCYTES # BLD AUTO: 0.52 THOUSAND/ΜL (ref 0.17–1.22)
MONOCYTES NFR BLD AUTO: 8 % (ref 4–12)
NEUTROPHILS # BLD AUTO: 4.14 THOUSANDS/ΜL (ref 1.85–7.62)
NEUTS SEG NFR BLD AUTO: 64 % (ref 43–75)
NRBC BLD AUTO-RTO: 0 /100 WBCS
PLATELET # BLD AUTO: 268 THOUSANDS/UL (ref 149–390)
PMV BLD AUTO: 10.2 FL (ref 8.9–12.7)
POTASSIUM SERPL-SCNC: 4.7 MMOL/L (ref 3.5–5.3)
PROT SERPL-MCNC: 7.9 G/DL (ref 6.4–8.2)
RBC # BLD AUTO: 4.1 MILLION/UL (ref 3.81–5.12)
SODIUM SERPL-SCNC: 138 MMOL/L (ref 136–145)
WBC # BLD AUTO: 6.43 THOUSAND/UL (ref 4.31–10.16)

## 2019-02-14 PROCEDURE — 80053 COMPREHEN METABOLIC PANEL: CPT

## 2019-02-14 PROCEDURE — 36415 COLL VENOUS BLD VENIPUNCTURE: CPT | Performed by: INTERNAL MEDICINE

## 2019-02-14 PROCEDURE — 85025 COMPLETE CBC W/AUTO DIFF WBC: CPT

## 2019-02-14 PROCEDURE — 86300 IMMUNOASSAY TUMOR CA 15-3: CPT | Performed by: INTERNAL MEDICINE

## 2019-02-15 ENCOUNTER — OFFICE VISIT (OUTPATIENT)
Dept: HEMATOLOGY ONCOLOGY | Facility: CLINIC | Age: 74
End: 2019-02-15
Payer: MEDICARE

## 2019-02-15 ENCOUNTER — TELEPHONE (OUTPATIENT)
Dept: HEMATOLOGY ONCOLOGY | Facility: CLINIC | Age: 74
End: 2019-02-15

## 2019-02-15 VITALS
HEART RATE: 75 BPM | BODY MASS INDEX: 30.65 KG/M2 | WEIGHT: 173 LBS | HEIGHT: 63 IN | SYSTOLIC BLOOD PRESSURE: 138 MMHG | TEMPERATURE: 97.3 F | OXYGEN SATURATION: 98 % | RESPIRATION RATE: 18 BRPM | DIASTOLIC BLOOD PRESSURE: 90 MMHG

## 2019-02-15 DIAGNOSIS — C50.919 METASTATIC BREAST CANCER (HCC): Primary | ICD-10-CM

## 2019-02-15 LAB — CANCER AG15-3 SERPL-ACNC: 44.7 U/ML (ref 0–25)

## 2019-02-15 PROCEDURE — 99215 OFFICE O/P EST HI 40 MIN: CPT | Performed by: INTERNAL MEDICINE

## 2019-02-15 NOTE — TELEPHONE ENCOUNTER
Madhu Lou spoke to Soha sched a appt for  Wed Feb 20th @ 1:30 at the [facility]   Faxed the last chart note to [phone]

## 2019-02-15 NOTE — PROGRESS NOTES
Hematology/Oncology Outpatient Follow-up  Tomer Gil 69 y o  female 1945 [id number]    Date:  2/15/2019        Assessment and Plan:  1  Metastatic breast cancer [facility])  The patient and her sister were both educated about the PET-CT scan findings and the tumor markers which show indicates stable disease with only hypermetabolic lymph node in the right axilla  The patient will be continued on the current palliative the line of chemotherapy with Taxol at 80 milligram/meter subcutaneously on every other week basis  We will continue to avoid at the Herceptin and Perjeta for now even though her ejection fraction and left ventricular function seems to be better  - CBC and differential; Future  - Comprehensive metabolic panel; Future  - Magnesium; Future        HPI:  The patient came in today for a follow-up visit  She had a PET-CT scan on the 11th of February which was compared to the PET-CT scan from May 2018 that revealed a hypermetabolic right axillary lymph node measuring 8 mm in greatest dimension which seems to be a larger by 1 or 2 mm only 5 in comparison to the prior PET scan  The activity in that lymph node seems to be slightly more intense  No other metastatic disease was reported in the PET scan  She does have new mild pneumobilia of unknown clinical significance  Her tumor marker seem to be also stable with CA 27-29 up from 64 to 66 her CA 15-3 went also up from 38 back in May to 44 7 on the 14th of February 2019  Her echocardiogram showed IMPRESSIONS:  1  Low-normal to mildly reduced left ventricular systolic function with global hypokinesis, EF around 50%  Normal diastolic function  2  No significant chamber hypertrophy or enlargement  3  No aortic valve stenosis or regurgitation  4  Trace mitral and tricuspid valve regurgitation  5  No obvious pulmonary hypertension  6  No pericardial effusion    The patient continues to do well with the chronic lymphedema of the right upper extremity  Oncology History    The patient has a remote history of right breast cancer diagnosed in [facility] in February of 2005 with infiltrating ductal carcinoma grade 2 status post right total mastectomy and left prophylactic total mastectomy as well at that time  Her pathology revealed 8/17 positive lymph node for metastatic disease from the right axilla  ER status positive at 5-10%, progesterone negative, her 2 Gregory positive by FISH  She was then treated with 6 cycles of adjuvant chemotherapy with 5 fluorouracil, Adriamycin, and cyclophosphamide followed by tamoxifen and adjuvant radiation to the right axilla and chest wall  The patient was then switched from tamoxifen to Arimidex which was then stopped in 2012  She was then diagnosed with recurrence of her breast cancer with metastatic disease mainly involving the right anterior chest wall, skin, right axilla pectoralis muscle, left axilla and other areas of the body in May 2015  The biopsy from the scan showed triple positive malignant process compatible with recurrence of her breast cancer  She was then started on palliative chemotherapy and received 6 cycles of Taxotere along with trastuzumab and pertuzumab  After 6 cycles she was continued mainly on trastuzumab and pertuzumab alone  The patient then went to [facility] which she continued her oncological care with trastuzumab, pertuzumab and anastrozole  That treatment had to be put on hold after she developed decline of her ejection fraction to about 23% around June 2017  A PET scan in July of 2017 is showed mild progression of her disease and for that reason low-dose weekly Taxol was started on the 18th July 2017  The frequency of Taxol was decreased to 1 treatment every other week  She then received the rest of her treatment and live unknown from September 2017 until March 2018  Her echocardiogram in May 2018 showed ejection fraction of 43%    The patient then went to live alone again in July of 2018 where she continued her Taxol treatment on every other week basis until the middle of January 2019  Metastatic breast cancer ([facility] [address] )    5/28/2015 Initial Diagnosis     Metastatic breast cancer ([facility] [address] )         6/30/2015 -  Chemotherapy     1-Taxotere, Herceptin, Perjeta started in [place] 2015 x6 cycles  2-Herceptin and Perjeta alone were continued since the patient was not interested in continue the Taxotere  3-Herceptin and Perjeta were put on hold due to decline of her ejection fraction around May 2017  4-low dose weekly Taxol was started in July 2017  5-Taxol was switched to every other week basis            Interval history:    ROS: Review of Systems   Constitutional: Negative for activity change, appetite change, chills, diaphoresis, fatigue, fever and unexpected weight change  HENT: Negative for congestion, dental problem, ear discharge, ear pain, facial swelling, hearing loss, mouth sores, nosebleeds, postnasal drip, sore throat, tinnitus and trouble swallowing  Eyes: Negative for discharge, redness, itching and visual disturbance  Respiratory: Negative for cough, chest tightness, shortness of breath and wheezing  Cardiovascular: Negative for chest pain, palpitations and leg swelling  Gastrointestinal: Negative for abdominal distention, abdominal pain, anal bleeding, blood in stool, constipation, diarrhea, nausea and vomiting  Genitourinary: Negative for difficulty urinating, dysuria, flank pain, frequency, hematuria and urgency  Musculoskeletal: Negative for arthralgias, back pain, gait problem, joint swelling, myalgias and neck pain  Swelling of the right upper extremity due to chronic lymphedema   Skin: Negative for color change, pallor, rash and wound  Neurological: Negative for dizziness, syncope, speech difficulty, weakness, light-headedness, numbness and headaches  Hematological: Negative for adenopathy  Does not bruise/bleed easily     Psychiatric/Behavioral: Negative for agitation, behavioral problems, confusion and sleep disturbance         Past Medical History:   Diagnosis Date    Breast cancer ([address] )     Hypertension     Lymphedema of right arm     Vitamin B12 deficiency        Past Surgical History:   Procedure Laterality Date    APPENDECTOMY      BREAST SURGERY      bilat mastectomy-right total mastectomy and prophylactic left total mastectomy-2005    KNEE SURGERY      right knee replacement 2014 in [address] Bilateral        Social History     Socioeconomic History    Marital status: Single     Spouse name: None    Number of children: None    Years of education: None    Highest education level: None   Occupational History    None   Social Needs    Financial resource strain: None    Food insecurity:     Worry: None     Inability: None    Transportation needs:     Medical: None     Non-medical: None   Tobacco Use    Smoking status: Never Smoker    Smokeless tobacco: Never Used    Tobacco comment: no passive smoke exposure   Substance and Sexual Activity    Alcohol use: No    Drug use: No    Sexual activity: None   Lifestyle    Physical activity:     Days per week: None     Minutes per session: None    Stress: None   Relationships    Social connections:     Talks on phone: None     Gets together: None     Attends Denominational service: None     Active member of club or organization: None     Attends meetings of clubs or organizations: None     Relationship status: None    Intimate partner violence:     Fear of current or ex partner: None     Emotionally abused: None     Physically abused: None     Forced sexual activity: None   Other Topics Concern    None   Social History Narrative    None       Family History   Problem Relation Age of Onset    Asthma Mother     Osteoarthritis Father     Bone cancer Family        Allergies   Allergen Reactions    Penicillins Rash         Current Outpatient Medications:     anastrozole (ARIMIDEX) 1 mg tablet, Take 1 tablet (1 mg total) by mouth every 24 hours, Disp: 90 tablet, Rfl: 5    Cyanocobalamin 1000 MCG/ML KIT, Inject as directed every 6 (six) months, Disp: , Rfl:     nebivolol (BYSTOLIC) 5 mg tablet, take 1/2 tablet  tablet by oral route  qhs, Disp: , Rfl:     ondansetron (ZOFRAN) 4 mg tablet, Take 1 tablet (4 mg total) by mouth every 6 (six) hours, Disp: 12 tablet, Rfl: 0      Physical Exam:  /90 (BP Location: Left arm, Cuff Size: Adult)   Pulse 75   Temp (!) 97 3 °F (36 3 °C) (Tympanic)   Resp 18   Ht 5' 2 99" (1 6 m)   Wt 78 5 kg (173 lb)   SpO2 98%   BMI 30 66 kg/m²     Physical Exam   Constitutional: She is oriented to person, place, and time  She appears well-developed and well-nourished  No distress  HENT:   Head: Normocephalic and atraumatic  Nose: Nose normal    Mouth/Throat: Oropharynx is clear and moist    Eyes: Pupils are equal, round, and reactive to light  Conjunctivae and EOM are normal  Right eye exhibits no discharge  Left eye exhibits no discharge  No scleral icterus  Neck: Normal range of motion  Neck supple  No JVD present  No tracheal deviation present  No thyromegaly present  Cardiovascular: Normal rate, regular rhythm and normal heart sounds  Exam reveals no friction rub  No murmur heard  Pulmonary/Chest: Effort normal and breath sounds normal  No stridor  No respiratory distress  She has no wheezes  She has no rales  She exhibits no tenderness  Abdominal: Soft  Bowel sounds are normal  She exhibits no distension and no mass  There is no hepatosplenomegaly, splenomegaly or hepatomegaly  There is no tenderness  There is no rebound and no guarding  Musculoskeletal: Normal range of motion  She exhibits edema (Chronic lymphedema of the right upper extremity)  She exhibits no tenderness or deformity  Lymphadenopathy:     She has no cervical adenopathy  Neurological: She is alert and oriented to person, place, and time  She has normal reflexes   No cranial nerve deficit  Coordination normal    Skin: Skin is warm and dry  No rash noted  She is not diaphoretic  No erythema  No pallor  Psychiatric: She has a normal mood and affect  Her behavior is normal  Judgment and thought content normal          Labs:  Lab Results   Component Value Date    WBC 6 43 02/14/2019    HGB 12 4 02/14/2019    HCT 39 8 02/14/2019    MCV 97 02/14/2019     02/14/2019     Lab Results   Component Value Date     06/18/2018    K 4 7 02/14/2019     02/14/2019    CO2 33 (H) 02/14/2019    ANIONGAP 8 06/18/2018    BUN 16 02/14/2019    CREATININE 0 96 02/14/2019    GLUF 83 02/14/2019    CALCIUM 9 2 02/14/2019    AST 18 02/14/2019    ALT 14 02/14/2019    ALKPHOS 65 02/14/2019    PROT 7 1 06/18/2018    BILITOT 0 3 06/18/2018    EGFR 59 02/14/2019     No results found for: TSH    Patient voiced understanding and agreement in the above discussion  Aware to contact our office with questions/symptoms in the interim

## 2019-02-18 RX ORDER — SODIUM CHLORIDE 9 MG/ML
20 INJECTION, SOLUTION INTRAVENOUS ONCE
Status: COMPLETED | OUTPATIENT
Start: 2019-02-19 | End: 2019-02-19

## 2019-02-18 RX ORDER — SODIUM CHLORIDE 9 MG/ML
20 INJECTION, SOLUTION INTRAVENOUS ONCE
Status: DISCONTINUED | OUTPATIENT
Start: 2019-02-18 | End: 2019-02-18

## 2019-02-19 ENCOUNTER — HOSPITAL ENCOUNTER (OUTPATIENT)
Dept: INFUSION CENTER | Facility: HOSPITAL | Age: 74
Discharge: HOME | End: 2019-02-19
Payer: MEDICARE

## 2019-02-19 VITALS
TEMPERATURE: 97 F | HEIGHT: 63 IN | WEIGHT: 173.72 LBS | DIASTOLIC BLOOD PRESSURE: 72 MMHG | BODY MASS INDEX: 30.78 KG/M2 | HEART RATE: 77 BPM | SYSTOLIC BLOOD PRESSURE: 152 MMHG | RESPIRATION RATE: 16 BRPM

## 2019-02-19 PROCEDURE — 96401 CHEMO ANTI-NEOPL SQ/IM: CPT

## 2019-02-19 PROCEDURE — 96367 TX/PROPH/DG ADDL SEQ IV INF: CPT

## 2019-02-19 PROCEDURE — 96413 CHEMO IV INFUSION 1 HR: CPT

## 2019-02-19 RX ADMIN — SODIUM CHLORIDE 20 ML/HR: 9 INJECTION, SOLUTION INTRAVENOUS at 09:02

## 2019-02-19 RX ADMIN — FAMOTIDINE 20 MG: 10 INJECTION, SOLUTION INTRAVENOUS at 09:33

## 2019-02-19 RX ADMIN — DEXAMETHASONE SODIUM PHOSPHATE: 10 INJECTION, SOLUTION INTRAMUSCULAR; INTRAVENOUS at 09:10

## 2019-02-19 RX ADMIN — DENOSUMAB 60 MG: 60 INJECTION SUBCUTANEOUS at 10:50

## 2019-02-19 RX ADMIN — PACLITAXEL 146 MG: 6 INJECTION, SOLUTION, CONCENTRATE INTRAVENOUS at 09:59

## 2019-02-19 NOTE — PLAN OF CARE
Problem: Potential for Falls  Goal: Patient will remain free of falls  Description  INTERVENTIONS:  - Assess patient frequently for physical needs  -  Identify cognitive and physical deficits and behaviors that affect risk of falls    -  [place] fall precautions as indicated by assessment   - Educate patient/family on patient safety including physical limitations  - Instruct patient to call for assistance with activity based on assessment  - Modify environment to reduce risk of injury  - Consider OT/PT consult to assist with strengthening/mobility  Outcome: Progressing

## 2019-02-19 NOTE — PROGRESS NOTES
Pt states she has been receiving Taxol in [place] and is back in the [place]  Patient offers no complaints today  Tolerated treatment well without complications  Also prolia given today too  AVS provided and confirmed next appts

## 2019-03-02 ENCOUNTER — APPOINTMENT (OUTPATIENT)
Dept: LAB | Age: 74
End: 2019-03-02
Payer: MEDICARE

## 2019-03-02 DIAGNOSIS — C50.919 METASTATIC BREAST CANCER (HCC): ICD-10-CM

## 2019-03-02 PROCEDURE — 80053 COMPREHEN METABOLIC PANEL: CPT

## 2019-03-02 PROCEDURE — 83735 ASSAY OF MAGNESIUM: CPT

## 2019-03-02 PROCEDURE — 36415 COLL VENOUS BLD VENIPUNCTURE: CPT

## 2019-03-02 PROCEDURE — 85025 COMPLETE CBC W/AUTO DIFF WBC: CPT

## 2019-03-03 LAB
ALBUMIN SERPL BCP-MCNC: 3.3 G/DL (ref 3.5–5)
ALP SERPL-CCNC: 66 U/L (ref 46–116)
ALT SERPL W P-5'-P-CCNC: 13 U/L (ref 12–78)
ANION GAP SERPL CALCULATED.3IONS-SCNC: 5 MMOL/L (ref 4–13)
AST SERPL W P-5'-P-CCNC: 17 U/L (ref 5–45)
BASOPHILS # BLD AUTO: 0.03 THOUSANDS/ΜL (ref 0–0.1)
BASOPHILS NFR BLD AUTO: 1 % (ref 0–1)
BILIRUB SERPL-MCNC: 0.44 MG/DL (ref 0.2–1)
BUN SERPL-MCNC: 24 MG/DL (ref 5–25)
CALCIUM SERPL-MCNC: 8.1 MG/DL (ref 8.3–10.1)
CHLORIDE SERPL-SCNC: 106 MMOL/L (ref 100–108)
CO2 SERPL-SCNC: 28 MMOL/L (ref 21–32)
CREAT SERPL-MCNC: 0.85 MG/DL (ref 0.6–1.3)
EOSINOPHIL # BLD AUTO: 0.1 THOUSAND/ΜL (ref 0–0.61)
EOSINOPHIL NFR BLD AUTO: 2 % (ref 0–6)
ERYTHROCYTE [DISTWIDTH] IN BLOOD BY AUTOMATED COUNT: 13.9 % (ref 11.6–15.1)
GFR SERPL CREATININE-BSD FRML MDRD: 68 ML/MIN/1.73SQ M
GLUCOSE P FAST SERPL-MCNC: 84 MG/DL (ref 65–99)
HCT VFR BLD AUTO: 36.7 % (ref 34.8–46.1)
HGB BLD-MCNC: 11.4 G/DL (ref 11.5–15.4)
IMM GRANULOCYTES # BLD AUTO: 0.02 THOUSAND/UL (ref 0–0.2)
IMM GRANULOCYTES NFR BLD AUTO: 0 % (ref 0–2)
LYMPHOCYTES # BLD AUTO: 1.84 THOUSANDS/ΜL (ref 0.6–4.47)
LYMPHOCYTES NFR BLD AUTO: 38 % (ref 14–44)
MAGNESIUM SERPL-MCNC: 2.1 MG/DL (ref 1.6–2.6)
MCH RBC QN AUTO: 30.3 PG (ref 26.8–34.3)
MCHC RBC AUTO-ENTMCNC: 31.1 G/DL (ref 31.4–37.4)
MCV RBC AUTO: 98 FL (ref 82–98)
MONOCYTES # BLD AUTO: 0.38 THOUSAND/ΜL (ref 0.17–1.22)
MONOCYTES NFR BLD AUTO: 8 % (ref 4–12)
NEUTROPHILS # BLD AUTO: 2.43 THOUSANDS/ΜL (ref 1.85–7.62)
NEUTS SEG NFR BLD AUTO: 51 % (ref 43–75)
NRBC BLD AUTO-RTO: 0 /100 WBCS
PLATELET # BLD AUTO: 333 THOUSANDS/UL (ref 149–390)
PMV BLD AUTO: 9.8 FL (ref 8.9–12.7)
POTASSIUM SERPL-SCNC: 4.8 MMOL/L (ref 3.5–5.3)
PROT SERPL-MCNC: 7.5 G/DL (ref 6.4–8.2)
RBC # BLD AUTO: 3.76 MILLION/UL (ref 3.81–5.12)
SODIUM SERPL-SCNC: 139 MMOL/L (ref 136–145)
WBC # BLD AUTO: 4.8 THOUSAND/UL (ref 4.31–10.16)

## 2019-03-04 ENCOUNTER — HOSPITAL ENCOUNTER (OUTPATIENT)
Dept: INFUSION CENTER | Facility: HOSPITAL | Age: 74
Discharge: HOME | End: 2019-03-04
Payer: MEDICARE

## 2019-03-04 ENCOUNTER — OFFICE VISIT (OUTPATIENT)
Dept: HEMATOLOGY ONCOLOGY | Facility: CLINIC | Age: 74
End: 2019-03-04
Payer: MEDICARE

## 2019-03-04 VITALS
HEIGHT: 63 IN | WEIGHT: 170 LBS | SYSTOLIC BLOOD PRESSURE: 138 MMHG | TEMPERATURE: 98 F | BODY MASS INDEX: 30.12 KG/M2 | OXYGEN SATURATION: 97 % | DIASTOLIC BLOOD PRESSURE: 88 MMHG | RESPIRATION RATE: 18 BRPM | HEART RATE: 76 BPM

## 2019-03-04 VITALS — HEIGHT: 63 IN | WEIGHT: 169.97 LBS | BODY MASS INDEX: 30.12 KG/M2

## 2019-03-04 DIAGNOSIS — Z79.811 USE OF AROMATASE INHIBITORS: ICD-10-CM

## 2019-03-04 DIAGNOSIS — C50.919 METASTATIC BREAST CANCER (HCC): Primary | ICD-10-CM

## 2019-03-04 PROCEDURE — 96367 TX/PROPH/DG ADDL SEQ IV INF: CPT

## 2019-03-04 PROCEDURE — 96413 CHEMO IV INFUSION 1 HR: CPT

## 2019-03-04 PROCEDURE — 99214 OFFICE O/P EST MOD 30 MIN: CPT | Performed by: NURSE PRACTITIONER

## 2019-03-04 RX ORDER — SODIUM CHLORIDE 9 MG/ML
20 INJECTION, SOLUTION INTRAVENOUS ONCE
Status: COMPLETED | OUTPATIENT
Start: 2019-03-04 | End: 2019-03-04

## 2019-03-04 RX ADMIN — FAMOTIDINE 20 MG: 10 INJECTION, SOLUTION INTRAVENOUS at 10:22

## 2019-03-04 RX ADMIN — DEXAMETHASONE SODIUM PHOSPHATE: 10 INJECTION, SOLUTION INTRAMUSCULAR; INTRAVENOUS at 10:03

## 2019-03-04 RX ADMIN — SODIUM CHLORIDE 20 ML/HR: 9 INJECTION, SOLUTION INTRAVENOUS at 09:52

## 2019-03-04 RX ADMIN — PACLITAXEL 146 MG: 6 INJECTION, SOLUTION, CONCENTRATE INTRAVENOUS at 10:44

## 2019-03-04 NOTE — PROGRESS NOTES
Hematology/Oncology Outpatient Follow-up  Carmel Torres 69 y o  female 1945 [id number]    Date:  3/4/2019      Assessment and Plan:  1  Metastatic breast cancer [facility])  Patient is tolerating her Taxol on an every other week basis well  She is also taking her anastrozole on a daily basis  She will proceed with treatment as scheduled  Patient and her sister are requesting to switch chemo and follow-up appointments to Mondays due to transportation issues  Infusions is able to accommodate patient today so she will get her treatment today  Patient will be back in 2 weeks for follow-up with lab work prior     - CBC and differential; Future  - Comprehensive metabolic panel; Future  - Cancer antigen 15-3; Future  - Cancer antigen 27 29; Future    2  Use of aromatase inhibitors  Patient will continue her anastrozole on a daily basis  She did resume her Prolia injections 2 weeks ago  She will be due for her next 1 August 2019  Patient is also past due for DEXA scan her last DEXA scan was January of 2017  We will order a repeat DEXA scan to be done prior to her next appointment  - DXA bone density spine hip and pelvis; Future    HPI:  Patient presents today for a 2 week follow-up accompanied by her sister Jeannine William  She is tolerating her Taxol treatment on an every other week basis well  She reports no side effects, good appetite, and denies fatigue  Patient's labs drawn 03/02/2019 are within normal limits  Oncology History    The patient has a remote history of right breast cancer diagnosed in [place] in February of 2005 with infiltrating ductal carcinoma grade 2 status post right total mastectomy and left prophylactic total mastectomy as well at that time  Her pathology revealed 8/17 positive lymph node for metastatic disease from the right axilla  ER status positive at 5-10%, progesterone negative, her 2 Gregory positive by FISH    She was then treated with 6 cycles of adjuvant chemotherapy with 5 fluorouracil, Adriamycin, and cyclophosphamide followed by tamoxifen and adjuvant radiation to the right axilla and chest wall  The patient was then switched from tamoxifen to Arimidex which was then stopped in 2012  She was then diagnosed with recurrence of her breast cancer with metastatic disease mainly involving the right anterior chest wall, skin, right axilla pectoralis muscle, left axilla and other areas of the body in May 2015  The biopsy from the scan showed triple positive malignant process compatible with recurrence of her breast cancer  She was then started on palliative chemotherapy and received 6 cycles of Taxotere along with trastuzumab and pertuzumab  After 6 cycles she was continued mainly on trastuzumab and pertuzumab alone  The patient then went to [facility] which she continued her oncological care with trastuzumab, pertuzumab and anastrozole  That treatment had to be put on hold after she developed decline of her ejection fraction to about 23% around June 2017  A PET scan in July of 2017 is showed mild progression of her disease and for that reason low-dose weekly Taxol was started on the 18th July 2017  The frequency of Taxol was decreased to 1 treatment every other week  She then received the rest of her treatment and live unknown from September 2017 until March 2018  Her echocardiogram in May 2018 showed ejection fraction of 43%  The patient then went to live alone again in July of 2018 where she continued her Taxol treatment on every other week basis until the middle of January 2019  Metastatic breast cancer ([facility] [address] )    5/28/2015 Initial Diagnosis     Metastatic breast cancer ([facility] [address] )  (recurrence)         2015 -  Hormone Therapy     Anastrozole         6/30/2015 -  Chemotherapy     1-Taxotere, Herceptin, Perjeta started in [place] 2015 x6 cycles  2-Herceptin and Perjeta alone were continued since the patient was not interested in continue the Taxotere    3-Herceptin and Perjeta were put on hold due to decline of her ejection fraction around May 2017  4-low dose weekly Taxol was started in July 2017  5-Taxol was switched to every other week basis            Interval history:    ROS: Review of Systems   Constitutional: Negative for activity change, appetite change, chills, fatigue, fever and unexpected weight change  HENT: Negative for congestion, mouth sores, nosebleeds, sore throat and trouble swallowing  Eyes: Negative  Respiratory: Negative for cough, chest tightness and shortness of breath  Cardiovascular: Negative for chest pain, palpitations and leg swelling  Gastrointestinal: Negative for abdominal distention, abdominal pain, blood in stool, constipation, diarrhea, nausea and vomiting  Genitourinary: Negative for difficulty urinating, dysuria, frequency, hematuria and urgency  Musculoskeletal: Negative for arthralgias, back pain, gait problem, joint swelling and myalgias  Skin: Negative for color change, pallor and rash  Neurological: Negative for dizziness, weakness, light-headedness, numbness and headaches  Hematological: Negative for adenopathy  Does not bruise/bleed easily  Psychiatric/Behavioral: Negative for dysphoric mood and sleep disturbance  The patient is not nervous/anxious          Past Medical History:   Diagnosis Date    Breast cancer ([facility] [address] )     Hypertension     Lymphedema of right arm     Vitamin B12 deficiency        Past Surgical History:   Procedure Laterality Date    APPENDECTOMY      BREAST SURGERY      bilat mastectomy-right total mastectomy and prophylactic left total mastectomy-2005    KNEE SURGERY      right knee replacement 2014 in [address] [place] [address] Bilateral        Social History     Socioeconomic History    Marital status: Single     Spouse name: None    Number of children: None    Years of education: None    Highest education level: None   Occupational History    None   Social Needs    Financial resource strain: None  Food insecurity:     Worry: None     Inability: None    Transportation needs:     Medical: None     Non-medical: None   Tobacco Use    Smoking status: Never Smoker    Smokeless tobacco: Never Used    Tobacco comment: no passive smoke exposure   Substance and Sexual Activity    Alcohol use: No    Drug use: No    Sexual activity: None   Lifestyle    Physical activity:     Days per week: None     Minutes per session: None    Stress: None   Relationships    Social connections:     Talks on phone: None     Gets together: None     Attends [employer] service: None     Active member of club or organization: None     Attends meetings of clubs or organizations: None     Relationship status: None    Intimate partner violence:     Fear of current or ex partner: None     Emotionally abused: None     Physically abused: None     Forced sexual activity: None   Other Topics Concern    None   Social History Narrative    None       Family History   Problem Relation Age of Onset    Asthma Mother     Osteoarthritis Father     Bone cancer Family        Allergies   Allergen Reactions    Penicillins Rash         Current Outpatient Medications:     anastrozole (ARIMIDEX) 1 mg tablet, Take 1 tablet (1 mg total) by mouth every 24 hours, Disp: 90 tablet, Rfl: 5    Cyanocobalamin 1000 MCG/ML KIT, Inject as directed every 6 (six) months, Disp: , Rfl:     nebivolol (BYSTOLIC) 5 mg tablet, take 1/2 tablet  tablet by oral route  qhs, Disp: , Rfl:     ondansetron (ZOFRAN) 4 mg tablet, Take 1 tablet (4 mg total) by mouth every 6 (six) hours, Disp: 12 tablet, Rfl: 0  No current facility-administered medications for this visit       Facility-Administered Medications Ordered in Other Visits:     famotidine (PEPCID) 20 mg in sodium chloride 0 9 % 52 mL IVPB, 20 mg, Intravenous, Once, Luz Richey MD    ondansetron (ZOFRAN) 16 mg, dexamethasone (DECADRON) 8 mg in sodium chloride 0 9 % 50 mL IVPB, , Intravenous, Once, Jinx Barr, MD    PACLitaxel (TAXOL) 146 mg in sodium chloride 0 9 % 250 mL chemo IVPB, 146 mg, Intravenous, Once, Mariella Guadarrama MD    sodium chloride 0 9 % infusion, 20 mL/hr, Intravenous, Once, Mariella Guadarrama MD      Physical Exam:  /88 (BP Location: Left arm, Cuff Size: Adult)   Pulse 76   Temp 98 °F (36 7 °C) (Tympanic)   Resp 18   Ht 5' 2 6" (1 59 m)   Wt 77 1 kg (170 lb)   SpO2 97%   BMI 30 50 kg/m²     Physical Exam   Constitutional: She is oriented to person, place, and time  She appears well-developed and well-nourished  No distress  HENT:   Head: Normocephalic and atraumatic  Mouth/Throat: Oropharynx is clear and moist  No oropharyngeal exudate  Eyes: Pupils are equal, round, and reactive to light  Conjunctivae are normal  No scleral icterus  Neck: Normal range of motion  Neck supple  No thyromegaly present  Cardiovascular: Normal rate, regular rhythm, normal heart sounds and intact distal pulses  No murmur heard  Pulmonary/Chest: Effort normal and breath sounds normal  No respiratory distress  Right total mastectomy   Abdominal: Soft  Bowel sounds are normal  She exhibits no distension  There is no hepatosplenomegaly  There is no tenderness  Musculoskeletal: Normal range of motion  She exhibits edema (Right upper extremity lymphedema)  Lymphadenopathy:     She has no cervical adenopathy  Neurological: She is alert and oriented to person, place, and time  Skin: Skin is warm and dry  No rash noted  She is not diaphoretic  No erythema  No pallor  Psychiatric: She has a normal mood and affect  Her behavior is normal  Judgment and thought content normal    Vitals reviewed          Labs:  Lab Results   Component Value Date    WBC 4 80 03/02/2019    HGB 11 4 (L) 03/02/2019    HCT 36 7 03/02/2019    MCV 98 03/02/2019     03/02/2019     Lab Results   Component Value Date     06/18/2018    K 4 8 03/02/2019     03/02/2019    CO2 28 03/02/2019    ANIONGAP 8 06/18/2018    BUN 24 03/02/2019    CREATININE 0 85 03/02/2019    GLUF 84 03/02/2019    CALCIUM 8 1 (L) 03/02/2019    AST 17 03/02/2019    ALT 13 03/02/2019    ALKPHOS 66 03/02/2019    PROT 7 1 06/18/2018    BILITOT 0 3 06/18/2018    EGFR 68 03/02/2019         Patient voiced understanding and agreement in the above discussion  Aware to contact our office with questions/symptoms in the interim

## 2019-03-04 NOTE — PLAN OF CARE
Problem: Potential for Falls  Goal: Patient will remain free of falls  Description  INTERVENTIONS:  - Assess patient frequently for physical needs  -  Identify cognitive and physical deficits and behaviors that affect risk of falls    -  [place] fall precautions as indicated by assessment   - Educate patient/family on patient safety including physical limitations  - Instruct patient to call for assistance with activity based on assessment  - Modify environment to reduce risk of injury  - Consider OT/PT consult to assist with strengthening/mobility  Outcome: Progressing

## 2019-03-05 ENCOUNTER — HOSPITAL ENCOUNTER (OUTPATIENT)
Dept: INFUSION CENTER | Facility: HOSPITAL | Age: 74
Discharge: HOME | End: 2019-03-05

## 2019-03-07 PROBLEM — C50.812 MALIGNANT NEOPLASM OF OVERLAPPING SITES OF LEFT BREAST IN FEMALE, ESTROGEN RECEPTOR POSITIVE (HCC): Status: ACTIVE | Noted: 2019-01-31

## 2019-03-07 PROBLEM — C50.811 MALIGNANT NEOPLASM OF OVERLAPPING SITES OF RIGHT BREAST IN FEMALE, ESTROGEN RECEPTOR POSITIVE (HCC): Status: ACTIVE | Noted: 2019-01-31

## 2019-03-07 PROBLEM — Z17.0 MALIGNANT NEOPLASM OF OVERLAPPING SITES OF LEFT BREAST IN FEMALE, ESTROGEN RECEPTOR POSITIVE (HCC): Status: ACTIVE | Noted: 2019-01-31

## 2019-03-14 ENCOUNTER — APPOINTMENT (OUTPATIENT)
Dept: LAB | Age: 74
End: 2019-03-14
Payer: MEDICARE

## 2019-03-14 DIAGNOSIS — C50.919 METASTATIC BREAST CANCER (HCC): ICD-10-CM

## 2019-03-14 LAB
ALBUMIN SERPL BCP-MCNC: 3.3 G/DL (ref 3.5–5)
ALP SERPL-CCNC: 67 U/L (ref 46–116)
ALT SERPL W P-5'-P-CCNC: 14 U/L (ref 12–78)
ANION GAP SERPL CALCULATED.3IONS-SCNC: 3 MMOL/L (ref 4–13)
AST SERPL W P-5'-P-CCNC: 16 U/L (ref 5–45)
BASOPHILS # BLD AUTO: 0.02 THOUSANDS/ΜL (ref 0–0.1)
BASOPHILS NFR BLD AUTO: 0 % (ref 0–1)
BILIRUB SERPL-MCNC: 0.39 MG/DL (ref 0.2–1)
BUN SERPL-MCNC: 27 MG/DL (ref 5–25)
CALCIUM SERPL-MCNC: 8.6 MG/DL (ref 8.3–10.1)
CANCER AG27-29 SERPL-ACNC: 68.5 U/ML (ref 0–42.3)
CHLORIDE SERPL-SCNC: 105 MMOL/L (ref 100–108)
CO2 SERPL-SCNC: 30 MMOL/L (ref 21–32)
CREAT SERPL-MCNC: 1.01 MG/DL (ref 0.6–1.3)
EOSINOPHIL # BLD AUTO: 0.05 THOUSAND/ΜL (ref 0–0.61)
EOSINOPHIL NFR BLD AUTO: 1 % (ref 0–6)
ERYTHROCYTE [DISTWIDTH] IN BLOOD BY AUTOMATED COUNT: 14.3 % (ref 11.6–15.1)
GFR SERPL CREATININE-BSD FRML MDRD: 55 ML/MIN/1.73SQ M
GLUCOSE P FAST SERPL-MCNC: 87 MG/DL (ref 65–99)
HCT VFR BLD AUTO: 36.9 % (ref 34.8–46.1)
HGB BLD-MCNC: 11.5 G/DL (ref 11.5–15.4)
IMM GRANULOCYTES # BLD AUTO: 0.04 THOUSAND/UL (ref 0–0.2)
IMM GRANULOCYTES NFR BLD AUTO: 1 % (ref 0–2)
LYMPHOCYTES # BLD AUTO: 1.46 THOUSANDS/ΜL (ref 0.6–4.47)
LYMPHOCYTES NFR BLD AUTO: 27 % (ref 14–44)
MCH RBC QN AUTO: 30.3 PG (ref 26.8–34.3)
MCHC RBC AUTO-ENTMCNC: 31.2 G/DL (ref 31.4–37.4)
MCV RBC AUTO: 97 FL (ref 82–98)
MONOCYTES # BLD AUTO: 0.35 THOUSAND/ΜL (ref 0.17–1.22)
MONOCYTES NFR BLD AUTO: 6 % (ref 4–12)
NEUTROPHILS # BLD AUTO: 3.51 THOUSANDS/ΜL (ref 1.85–7.62)
NEUTS SEG NFR BLD AUTO: 65 % (ref 43–75)
NRBC BLD AUTO-RTO: 0 /100 WBCS
PLATELET # BLD AUTO: 303 THOUSANDS/UL (ref 149–390)
PMV BLD AUTO: 9.9 FL (ref 8.9–12.7)
POTASSIUM SERPL-SCNC: 4.5 MMOL/L (ref 3.5–5.3)
PROT SERPL-MCNC: 7.5 G/DL (ref 6.4–8.2)
RBC # BLD AUTO: 3.79 MILLION/UL (ref 3.81–5.12)
SODIUM SERPL-SCNC: 138 MMOL/L (ref 136–145)
WBC # BLD AUTO: 5.43 THOUSAND/UL (ref 4.31–10.16)

## 2019-03-14 PROCEDURE — 80053 COMPREHEN METABOLIC PANEL: CPT

## 2019-03-14 PROCEDURE — 85025 COMPLETE CBC W/AUTO DIFF WBC: CPT

## 2019-03-14 PROCEDURE — 86300 IMMUNOASSAY TUMOR CA 15-3: CPT

## 2019-03-14 PROCEDURE — 36415 COLL VENOUS BLD VENIPUNCTURE: CPT

## 2019-03-15 LAB — CANCER AG15-3 SERPL-ACNC: 48.4 U/ML (ref 0–25)

## 2019-03-15 RX ORDER — SODIUM CHLORIDE 9 MG/ML
20 INJECTION, SOLUTION INTRAVENOUS ONCE
Status: COMPLETED | OUTPATIENT
Start: 2019-03-18 | End: 2019-03-18

## 2019-03-18 ENCOUNTER — OFFICE VISIT (OUTPATIENT)
Dept: HEMATOLOGY ONCOLOGY | Facility: CLINIC | Age: 74
End: 2019-03-18
Payer: MEDICARE

## 2019-03-18 ENCOUNTER — HOSPITAL ENCOUNTER (OUTPATIENT)
Dept: INFUSION CENTER | Facility: HOSPITAL | Age: 74
Discharge: HOME | End: 2019-03-18
Payer: MEDICARE

## 2019-03-18 ENCOUNTER — DOCUMENTATION (OUTPATIENT)
Dept: HEMATOLOGY ONCOLOGY | Facility: CLINIC | Age: 74
End: 2019-03-18

## 2019-03-18 VITALS
HEART RATE: 76 BPM | BODY MASS INDEX: 30.16 KG/M2 | HEIGHT: 63 IN | RESPIRATION RATE: 18 BRPM | OXYGEN SATURATION: 97 % | TEMPERATURE: 98.5 F | WEIGHT: 170.2 LBS | SYSTOLIC BLOOD PRESSURE: 122 MMHG | DIASTOLIC BLOOD PRESSURE: 78 MMHG

## 2019-03-18 VITALS
TEMPERATURE: 97.2 F | HEIGHT: 63 IN | SYSTOLIC BLOOD PRESSURE: 127 MMHG | RESPIRATION RATE: 18 BRPM | DIASTOLIC BLOOD PRESSURE: 69 MMHG | BODY MASS INDEX: 30.16 KG/M2 | WEIGHT: 170.19 LBS | HEART RATE: 77 BPM

## 2019-03-18 DIAGNOSIS — C50.811 MALIGNANT NEOPLASM OF OVERLAPPING SITES OF RIGHT BREAST IN FEMALE, ESTROGEN RECEPTOR POSITIVE (HCC): Primary | ICD-10-CM

## 2019-03-18 DIAGNOSIS — Z79.811 USE OF AROMATASE INHIBITORS: ICD-10-CM

## 2019-03-18 DIAGNOSIS — Z17.0 MALIGNANT NEOPLASM OF OVERLAPPING SITES OF RIGHT BREAST IN FEMALE, ESTROGEN RECEPTOR POSITIVE (HCC): Primary | ICD-10-CM

## 2019-03-18 PROCEDURE — 96367 TX/PROPH/DG ADDL SEQ IV INF: CPT

## 2019-03-18 PROCEDURE — 99214 OFFICE O/P EST MOD 30 MIN: CPT | Performed by: INTERNAL MEDICINE

## 2019-03-18 PROCEDURE — 96413 CHEMO IV INFUSION 1 HR: CPT

## 2019-03-18 RX ADMIN — SODIUM CHLORIDE 20 ML/HR: 9 INJECTION, SOLUTION INTRAVENOUS at 13:01

## 2019-03-18 RX ADMIN — DEXAMETHASONE SODIUM PHOSPHATE: 10 INJECTION, SOLUTION INTRAMUSCULAR; INTRAVENOUS at 13:01

## 2019-03-18 RX ADMIN — PACLITAXEL 144 MG: 6 INJECTION, SOLUTION, CONCENTRATE INTRAVENOUS at 13:53

## 2019-03-18 RX ADMIN — FAMOTIDINE 20 MG: 10 INJECTION, SOLUTION INTRAVENOUS at 13:28

## 2019-03-18 NOTE — PROGRESS NOTES
Phoned Val Torres in infusion to instruct them that IV decadron being decreased to 4 mg , new order sent

## 2019-03-18 NOTE — PROGRESS NOTES
Hematology/Oncology Outpatient Follow-up  Rachael Ag 69 y o  female 1945 [id number]    Date:  3/18/2019        Assessment and Plan:  1  Malignant neoplasm of overlapping 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, estrogen receptor positive ([facility] [address] )  The patient will be continue on the current line of palliative chemotherapy with Taxol on every other week basis for her triple positive metastatic breast cancer  She did have significant decline of her ejection fraction while she was on Herceptin and Perjeta  Her most recent ejection fraction was around 45-50%  We will continue to monitor her closely on every other week before her each treatment  - CBC and differential; Future  - Comprehensive metabolic panel; Future  - LD,Blood; Future  - Vitamin D 25 hydroxy; Future    2  Use of aromatase inhibitors  She has been on the anastrozole without interruption  She will get a bone density scan before her next visit  I did discuss with her vitamin D supplements and the need for exercising on a daily basis  - CBC and differential; Future  - Comprehensive metabolic panel; Future  - LD,Blood; Future  - Vitamin D 25 hydroxy; Future        HPI:  The patient came in today for a follow-up visit  She is doing relatively well on the current palliative chemotherapy with Taxol on every other week basis  She is also on anastrozole as endocrine therapy which she is taking on a daily basis  Her tumor markers seem to be stable  Oncology History    The patient has a remote history of right breast cancer diagnosed in [place] in February of 2005 with infiltrating ductal carcinoma grade 2 status post right total mastectomy and left prophylactic total mastectomy as well at that time  Her pathology revealed 8/17 positive lymph node for metastatic disease from the right axilla  ER status positive at 5-10%, progesterone negative, her 2 Gregory positive by FISH    She was then treated with 6 cycles of adjuvant chemotherapy with 5 fluorouracil, Adriamycin, and cyclophosphamide followed by tamoxifen and adjuvant radiation to the right axilla and chest wall  The patient was then switched from tamoxifen to Arimidex which was then stopped in 2012  She was then diagnosed with recurrence of her breast cancer with metastatic disease mainly involving the right anterior chest wall, skin, right axilla pectoralis muscle, left axilla and other areas of the body in May 2015  The biopsy from the scan showed triple positive malignant process compatible with recurrence of her breast cancer  She was then started on palliative chemotherapy and received 6 cycles of Taxotere along with trastuzumab and pertuzumab  After 6 cycles she was continued mainly on trastuzumab and pertuzumab alone  The patient then went to [facility] which she continued her oncological care with trastuzumab, pertuzumab and anastrozole  That treatment had to be put on hold after she developed decline of her ejection fraction to about 23% around June 2017  A PET scan in July of 2017 is showed mild progression of her disease and for that reason low-dose weekly Taxol was started on the 18th July 2017  The frequency of Taxol was decreased to 1 treatment every other week  She then received the rest of her treatment and live unknown from September 2017 until March 2018  Her echocardiogram in May 2018 showed ejection fraction of 43%  The patient then went to live alone again in July of 2018 where she continued her Taxol treatment on every other week basis until the middle of January 2019          Malignant neoplasm of overlapping 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, estrogen receptor positive ([facility] [address] )    5/28/2015 Initial Diagnosis     Metastatic breast cancer ([facility] [address] )  (recurrence)         2015 -  Hormone Therapy     Anastrozole         6/30/2015 -  Chemotherapy     1-Taxotere, Herceptin, Perjeta started in [place] 2015 x6 cycles  2-Herceptin and Perjeta alone were continued since the patient was not interested in continue the Taxotere  3-Herceptin and Perjeta were put on hold due to decline of her ejection fraction around May 2017  4-low dose weekly Taxol was started in July 2017  5-Taxol was switched to every other week basis            Interval history:    ROS: Review of Systems   Constitutional: Negative for activity change, appetite change, chills, diaphoresis, fatigue, fever and unexpected weight change  HENT: Negative for congestion, dental problem, ear discharge, ear pain, facial swelling, hearing loss, mouth sores, nosebleeds, postnasal drip, sore throat, tinnitus and trouble swallowing  Eyes: Negative for discharge, redness, itching and visual disturbance  Respiratory: Negative for cough, chest tightness, shortness of breath and wheezing  Cardiovascular: Negative for chest pain, palpitations and leg swelling  Gastrointestinal: Negative for abdominal distention, abdominal pain, anal bleeding, blood in stool, constipation, diarrhea, nausea and vomiting  Genitourinary: Negative for difficulty urinating, dysuria, flank pain, frequency, hematuria and urgency  Musculoskeletal: Negative for arthralgias, back pain, gait problem, joint swelling, myalgias and neck pain  Skin: Negative for color change, pallor, rash and wound  Neurological: Negative for dizziness, syncope, speech difficulty, weakness, light-headedness, numbness and headaches  Hematological: Negative for adenopathy  Does not bruise/bleed easily  Psychiatric/Behavioral: Negative for agitation, behavioral problems, confusion and sleep disturbance         Past Medical History:   Diagnosis Date    Breast cancer ([facility] [address] )     Hypertension     Lymphedema of right arm     Vitamin B12 deficiency        Past Surgical History:   Procedure Laterality Date    APPENDECTOMY      BREAST SURGERY      bilat mastectomy-right total mastectomy and prophylactic left total mastectomy-2005    KNEE SURGERY      right knee replacement 2014 in [facility] MASTECTOMY Bilateral        Social History     Socioeconomic History    Marital status: Single     Spouse name: None    Number of children: None    Years of education: None    Highest education level: None   Occupational History    None   Social Needs    Financial resource strain: None    Food insecurity:     Worry: None     Inability: None    Transportation needs:     Medical: None     Non-medical: None   Tobacco Use    Smoking status: Never Smoker    Smokeless tobacco: Never Used    Tobacco comment: no passive smoke exposure   Substance and Sexual Activity    Alcohol use: No    Drug use: No    Sexual activity: None   Lifestyle    Physical activity:     Days per week: None     Minutes per session: None    Stress: None   Relationships    Social connections:     Talks on phone: None     Gets together: None     Attends Jew service: None     Active member of club or organization: None     Attends meetings of clubs or organizations: None     Relationship status: None    Intimate partner violence:     Fear of current or ex partner: None     Emotionally abused: None     Physically abused: None     Forced sexual activity: None   Other Topics Concern    None   Social History Narrative    None       Family History   Problem Relation Age of Onset    Asthma Mother     Osteoarthritis Father     Bone cancer Family        Allergies   Allergen Reactions    Penicillins Rash         Current Outpatient Medications:     anastrozole (ARIMIDEX) 1 mg tablet, Take 1 tablet (1 mg total) by mouth every 24 hours, Disp: 90 tablet, Rfl: 5    Cyanocobalamin 1000 MCG/ML KIT, Inject as directed every 6 (six) months, Disp: , Rfl:     nebivolol (BYSTOLIC) 5 mg tablet, take 1/2 tablet  tablet by oral route  qhs, Disp: , Rfl:     ondansetron (ZOFRAN) 4 mg tablet, Take 1 tablet (4 mg total) by mouth every 6 (six) hours, Disp: 12 tablet, Rfl: 0  No current facility-administered medications for this visit  Facility-Administered Medications Ordered in Other Visits:     famotidine (PEPCID) 20 mg in sodium chloride 0 9 % 52 mL IVPB, 20 mg, Intravenous, Once, Bella Potts MD    ondansetron (ZOFRAN) 16 mg, dexamethasone (DECADRON) 8 mg in sodium chloride 0 9 % 50 mL IVPB, , Intravenous, Once, Bella Potts MD    PACLitaxel (TAXOL) 144 mg in sodium chloride 0 9 % 250 mL chemo IVPB, 80 mg/m2, Intravenous, Once, Bella Potts MD    sodium chloride 0 9 % infusion, 20 mL/hr, Intravenous, Once, Bella Potts MD      Physical Exam:  /78 (BP Location: Left arm, Patient Position: Sitting, Cuff Size: Adult)   Pulse 76   Temp 98 5 °F (36 9 °C)   Resp 18   Ht 5' 2 6" (1 59 m)   Wt 77 2 kg (170 lb 3 2 oz)   SpO2 97%   BMI 30 54 kg/m²     Physical Exam   Constitutional: She is oriented to person, place, and time  She appears well-developed and well-nourished  No distress  HENT:   Head: Normocephalic and atraumatic  Nose: Nose normal    Mouth/Throat: Oropharynx is clear and moist    Eyes: Pupils are equal, round, and reactive to light  Conjunctivae and EOM are normal  Right eye exhibits no discharge  Left eye exhibits no discharge  No scleral icterus  Neck: Normal range of motion  Neck supple  No JVD present  No tracheal deviation present  No thyromegaly present  Cardiovascular: Normal rate, regular rhythm and normal heart sounds  Exam reveals no friction rub  No murmur heard  Pulmonary/Chest: Effort normal and breath sounds normal  No stridor  No respiratory distress  She has no wheezes  She has no rales  She exhibits no tenderness  Abdominal: Soft  Bowel sounds are normal  She exhibits no distension and no mass  There is no hepatosplenomegaly, splenomegaly or hepatomegaly  There is no tenderness  There is no rebound and no guarding  Musculoskeletal: Normal range of motion  She exhibits no edema, tenderness or deformity     Lymphedema of the right upper extremity   Lymphadenopathy:     She has no cervical adenopathy  Neurological: She is alert and oriented to person, place, and time  She has normal reflexes  No cranial nerve deficit  Coordination normal    Skin: Skin is warm and dry  No rash noted  She is not diaphoretic  No erythema  No pallor  Psychiatric: She has a normal mood and affect  Her behavior is normal  Judgment and thought content normal          Labs:  Lab Results   Component Value Date    WBC 5 43 03/14/2019    HGB 11 5 03/14/2019    HCT 36 9 03/14/2019    MCV 97 03/14/2019     03/14/2019     Lab Results   Component Value Date     06/18/2018    K 4 5 03/14/2019     03/14/2019    CO2 30 03/14/2019    ANIONGAP 8 06/18/2018    BUN 27 (H) 03/14/2019    CREATININE 1 01 03/14/2019    GLUF 87 03/14/2019    CALCIUM 8 6 03/14/2019    AST 16 03/14/2019    ALT 14 03/14/2019    ALKPHOS 67 03/14/2019    PROT 7 1 06/18/2018    BILITOT 0 3 06/18/2018    EGFR 55 03/14/2019     No results found for: TSH    Patient voiced understanding and agreement in the above discussion  Aware to contact our office with questions/symptoms in the interim

## 2019-03-18 NOTE — PROGRESS NOTES
Time Out  Time out from Immanuel Cheung RN to decrease decadron to 4mg iv for todays chemo tx, new order received and faxed to pharmacy  [place]ake [place] in Pharmacy made aware of change

## 2019-03-18 NOTE — PROGRESS NOTES
Pt tolerated taxol without adverse reaction  Port flushed and de-accessed per routine   discharged ambulatory with avs

## 2019-03-25 ENCOUNTER — HOSPITAL ENCOUNTER (OUTPATIENT)
Dept: BONE DENSITY | Facility: CLINIC | Age: 74
Discharge: HOME | End: 2019-03-25
Payer: MEDICARE

## 2019-03-25 DIAGNOSIS — Z79.811 USE OF AROMATASE INHIBITORS: ICD-10-CM

## 2019-03-25 PROCEDURE — 77080 DXA BONE DENSITY AXIAL: CPT

## 2019-03-29 RX ORDER — SODIUM CHLORIDE 9 MG/ML
20 INJECTION, SOLUTION INTRAVENOUS ONCE
Status: COMPLETED | OUTPATIENT
Start: 2019-04-01 | End: 2019-04-01

## 2019-03-30 ENCOUNTER — APPOINTMENT (OUTPATIENT)
Dept: LAB | Age: 74
End: 2019-03-30
Payer: MEDICARE

## 2019-03-30 DIAGNOSIS — Z79.811 USE OF AROMATASE INHIBITORS: ICD-10-CM

## 2019-03-30 DIAGNOSIS — Z17.0 MALIGNANT NEOPLASM OF OVERLAPPING SITES OF RIGHT BREAST IN FEMALE, ESTROGEN RECEPTOR POSITIVE (HCC): ICD-10-CM

## 2019-03-30 DIAGNOSIS — C50.811 MALIGNANT NEOPLASM OF OVERLAPPING SITES OF RIGHT BREAST IN FEMALE, ESTROGEN RECEPTOR POSITIVE (HCC): ICD-10-CM

## 2019-03-30 LAB
25(OH)D3 SERPL-MCNC: 21.4 NG/ML (ref 30–100)
ALBUMIN SERPL BCP-MCNC: 3.3 G/DL (ref 3.5–5)
ALP SERPL-CCNC: 57 U/L (ref 46–116)
ALT SERPL W P-5'-P-CCNC: 16 U/L (ref 12–78)
ANION GAP SERPL CALCULATED.3IONS-SCNC: 1 MMOL/L (ref 4–13)
AST SERPL W P-5'-P-CCNC: 14 U/L (ref 5–45)
BASOPHILS # BLD AUTO: 0.04 THOUSANDS/ΜL (ref 0–0.1)
BASOPHILS NFR BLD AUTO: 1 % (ref 0–1)
BILIRUB SERPL-MCNC: 0.42 MG/DL (ref 0.2–1)
BUN SERPL-MCNC: 23 MG/DL (ref 5–25)
CALCIUM SERPL-MCNC: 8.5 MG/DL (ref 8.3–10.1)
CHLORIDE SERPL-SCNC: 106 MMOL/L (ref 100–108)
CO2 SERPL-SCNC: 29 MMOL/L (ref 21–32)
CREAT SERPL-MCNC: 0.87 MG/DL (ref 0.6–1.3)
EOSINOPHIL # BLD AUTO: 0.1 THOUSAND/ΜL (ref 0–0.61)
EOSINOPHIL NFR BLD AUTO: 2 % (ref 0–6)
ERYTHROCYTE [DISTWIDTH] IN BLOOD BY AUTOMATED COUNT: 14.9 % (ref 11.6–15.1)
GFR SERPL CREATININE-BSD FRML MDRD: 66 ML/MIN/1.73SQ M
GLUCOSE P FAST SERPL-MCNC: 83 MG/DL (ref 65–99)
HCT VFR BLD AUTO: 35.6 % (ref 34.8–46.1)
HGB BLD-MCNC: 10.9 G/DL (ref 11.5–15.4)
IMM GRANULOCYTES # BLD AUTO: 0.03 THOUSAND/UL (ref 0–0.2)
IMM GRANULOCYTES NFR BLD AUTO: 1 % (ref 0–2)
LDH SERPL-CCNC: 179 U/L (ref 81–234)
LYMPHOCYTES # BLD AUTO: 1.57 THOUSANDS/ΜL (ref 0.6–4.47)
LYMPHOCYTES NFR BLD AUTO: 32 % (ref 14–44)
MCH RBC QN AUTO: 29.7 PG (ref 26.8–34.3)
MCHC RBC AUTO-ENTMCNC: 30.6 G/DL (ref 31.4–37.4)
MCV RBC AUTO: 97 FL (ref 82–98)
MONOCYTES # BLD AUTO: 0.47 THOUSAND/ΜL (ref 0.17–1.22)
MONOCYTES NFR BLD AUTO: 10 % (ref 4–12)
NEUTROPHILS # BLD AUTO: 2.64 THOUSANDS/ΜL (ref 1.85–7.62)
NEUTS SEG NFR BLD AUTO: 54 % (ref 43–75)
NRBC BLD AUTO-RTO: 0 /100 WBCS
PLATELET # BLD AUTO: 312 THOUSANDS/UL (ref 149–390)
PMV BLD AUTO: 9.8 FL (ref 8.9–12.7)
POTASSIUM SERPL-SCNC: 4.1 MMOL/L (ref 3.5–5.3)
PROT SERPL-MCNC: 7.3 G/DL (ref 6.4–8.2)
RBC # BLD AUTO: 3.67 MILLION/UL (ref 3.81–5.12)
SODIUM SERPL-SCNC: 136 MMOL/L (ref 136–145)
WBC # BLD AUTO: 4.85 THOUSAND/UL (ref 4.31–10.16)

## 2019-03-30 PROCEDURE — 36415 COLL VENOUS BLD VENIPUNCTURE: CPT

## 2019-03-30 PROCEDURE — 83615 LACTATE (LD) (LDH) ENZYME: CPT

## 2019-03-30 PROCEDURE — 82306 VITAMIN D 25 HYDROXY: CPT

## 2019-03-30 PROCEDURE — 85025 COMPLETE CBC W/AUTO DIFF WBC: CPT

## 2019-03-30 PROCEDURE — 80053 COMPREHEN METABOLIC PANEL: CPT

## 2019-04-01 ENCOUNTER — OFFICE VISIT (OUTPATIENT)
Dept: HEMATOLOGY ONCOLOGY | Facility: CLINIC | Age: 74
End: 2019-04-01
Payer: MEDICARE

## 2019-04-01 ENCOUNTER — HOSPITAL ENCOUNTER (OUTPATIENT)
Dept: INFUSION CENTER | Facility: HOSPITAL | Age: 74
Discharge: HOME | End: 2019-04-01
Payer: MEDICARE

## 2019-04-01 VITALS
TEMPERATURE: 98.4 F | SYSTOLIC BLOOD PRESSURE: 118 MMHG | BODY MASS INDEX: 30.76 KG/M2 | OXYGEN SATURATION: 96 % | HEART RATE: 79 BPM | DIASTOLIC BLOOD PRESSURE: 78 MMHG | WEIGHT: 173.6 LBS | HEIGHT: 63 IN | RESPIRATION RATE: 16 BRPM

## 2019-04-01 VITALS
HEIGHT: 63 IN | DIASTOLIC BLOOD PRESSURE: 78 MMHG | SYSTOLIC BLOOD PRESSURE: 118 MMHG | RESPIRATION RATE: 16 BRPM | HEART RATE: 79 BPM | BODY MASS INDEX: 30.74 KG/M2 | WEIGHT: 173.5 LBS | TEMPERATURE: 98.4 F

## 2019-04-01 DIAGNOSIS — C50.811 MALIGNANT NEOPLASM OF OVERLAPPING SITES OF RIGHT BREAST IN FEMALE, ESTROGEN RECEPTOR POSITIVE (HCC): Primary | ICD-10-CM

## 2019-04-01 DIAGNOSIS — Z17.0 MALIGNANT NEOPLASM OF OVERLAPPING SITES OF RIGHT BREAST IN FEMALE, ESTROGEN RECEPTOR POSITIVE (HCC): Primary | ICD-10-CM

## 2019-04-01 DIAGNOSIS — E55.9 VITAMIN D DEFICIENCY: ICD-10-CM

## 2019-04-01 DIAGNOSIS — Z79.811 USE OF AROMATASE INHIBITORS: ICD-10-CM

## 2019-04-01 PROCEDURE — 96367 TX/PROPH/DG ADDL SEQ IV INF: CPT

## 2019-04-01 PROCEDURE — 96413 CHEMO IV INFUSION 1 HR: CPT

## 2019-04-01 PROCEDURE — 99214 OFFICE O/P EST MOD 30 MIN: CPT | Performed by: NURSE PRACTITIONER

## 2019-04-01 RX ORDER — ERGOCALCIFEROL 1.25 MG/1
50000 CAPSULE ORAL WEEKLY
Qty: 8 CAPSULE | Refills: 0 | Status: SHIPPED | OUTPATIENT
Start: 2019-04-01 | End: 2020-03-26

## 2019-04-01 RX ADMIN — SODIUM CHLORIDE 20 ML/HR: 9 INJECTION, SOLUTION INTRAVENOUS at 09:45

## 2019-04-01 RX ADMIN — FAMOTIDINE 20 MG: 10 INJECTION, SOLUTION INTRAVENOUS at 10:15

## 2019-04-01 RX ADMIN — PACLITAXEL 150 MG: 6 INJECTION, SOLUTION, CONCENTRATE INTRAVENOUS at 10:48

## 2019-04-01 RX ADMIN — DEXAMETHASONE SODIUM PHOSPHATE: 10 INJECTION, SOLUTION INTRAMUSCULAR; INTRAVENOUS at 09:52

## 2019-04-01 NOTE — PROGRESS NOTES
Pt here for taxol  Tolerated well without complications  Patient and sister went down to Dr Whit Diaz office to make next chemo and Dr Mady Salazar  AVS will be given by them

## 2019-04-01 NOTE — PLAN OF CARE
Problem: Potential for Falls  Goal: Patient will remain free of falls  Description  INTERVENTIONS:  - Assess patient frequently for physical needs  -  Identify cognitive and physical deficits and behaviors that affect risk of falls    -  [place] fall precautions as indicated by assessment   - Educate patient/family on patient safety including physical limitations  - Instruct patient to call for assistance with activity based on assessment  - Modify environment to reduce risk of injury  - Consider OT/PT consult to assist with strengthening/mobility  Outcome: Progressing

## 2019-04-22 DIAGNOSIS — Z79.811 USE OF AROMATASE INHIBITORS: ICD-10-CM

## 2019-04-22 DIAGNOSIS — C50.811 MALIGNANT NEOPLASM OF OVERLAPPING SITES OF RIGHT BREAST IN FEMALE, ESTROGEN RECEPTOR POSITIVE (HCC): ICD-10-CM

## 2019-04-22 DIAGNOSIS — Z17.0 MALIGNANT NEOPLASM OF OVERLAPPING SITES OF RIGHT BREAST IN FEMALE, ESTROGEN RECEPTOR POSITIVE (HCC): ICD-10-CM

## 2019-04-22 RX ORDER — SODIUM CHLORIDE 9 MG/ML
20 INJECTION, SOLUTION INTRAVENOUS ONCE
Status: CANCELLED | OUTPATIENT
Start: 2019-06-24

## 2019-04-22 RX ORDER — SODIUM CHLORIDE 9 MG/ML
20 INJECTION, SOLUTION INTRAVENOUS ONCE
Status: CANCELLED | OUTPATIENT
Start: 2019-07-08

## 2019-04-24 DIAGNOSIS — C50.811 MALIGNANT NEOPLASM OF OVERLAPPING SITES OF RIGHT BREAST IN FEMALE, ESTROGEN RECEPTOR POSITIVE (HCC): Primary | ICD-10-CM

## 2019-04-24 DIAGNOSIS — Z17.0 MALIGNANT NEOPLASM OF OVERLAPPING SITES OF RIGHT BREAST IN FEMALE, ESTROGEN RECEPTOR POSITIVE (HCC): Primary | ICD-10-CM

## 2019-06-10 ENCOUNTER — HOSPITAL ENCOUNTER (OUTPATIENT)
Dept: INFUSION CENTER | Facility: HOSPITAL | Age: 74
Discharge: HOME | End: 2019-06-10

## 2019-06-24 ENCOUNTER — HOSPITAL ENCOUNTER (OUTPATIENT)
Dept: INFUSION CENTER | Facility: HOSPITAL | Age: 74
Discharge: HOME | End: 2019-06-24
Attending: INTERNAL MEDICINE

## 2019-08-06 ENCOUNTER — HOSPITAL ENCOUNTER (OUTPATIENT)
Dept: INFUSION CENTER | Facility: HOSPITAL | Age: 74
Discharge: HOME | End: 2019-08-06
Attending: INTERNAL MEDICINE

## 2019-10-30 ENCOUNTER — TELEPHONE (OUTPATIENT)
Dept: HEMATOLOGY ONCOLOGY | Facility: CLINIC | Age: 74
End: 2019-10-30

## 2019-10-30 NOTE — TELEPHONE ENCOUNTER
Left message with patients sister reminding her to have her lab work done prior to her appt on 11/6/19

## 2019-11-01 ENCOUNTER — APPOINTMENT (OUTPATIENT)
Dept: LAB | Age: 74
End: 2019-11-01
Payer: MEDICARE

## 2019-11-01 DIAGNOSIS — Z17.0 MALIGNANT NEOPLASM OF OVERLAPPING SITES OF RIGHT BREAST IN FEMALE, ESTROGEN RECEPTOR POSITIVE (HCC): ICD-10-CM

## 2019-11-01 DIAGNOSIS — C50.811 MALIGNANT NEOPLASM OF OVERLAPPING SITES OF RIGHT BREAST IN FEMALE, ESTROGEN RECEPTOR POSITIVE (HCC): ICD-10-CM

## 2019-11-01 LAB
ALBUMIN SERPL BCP-MCNC: 3.2 G/DL (ref 3.5–5)
ALP SERPL-CCNC: 49 U/L (ref 46–116)
ALT SERPL W P-5'-P-CCNC: 18 U/L (ref 12–78)
ANION GAP SERPL CALCULATED.3IONS-SCNC: 4 MMOL/L (ref 4–13)
AST SERPL W P-5'-P-CCNC: 19 U/L (ref 5–45)
BASOPHILS # BLD AUTO: 0.04 THOUSANDS/ΜL (ref 0–0.1)
BASOPHILS NFR BLD AUTO: 1 % (ref 0–1)
BILIRUB SERPL-MCNC: 0.49 MG/DL (ref 0.2–1)
BUN SERPL-MCNC: 15 MG/DL (ref 5–25)
CALCIUM SERPL-MCNC: 9 MG/DL (ref 8.3–10.1)
CHLORIDE SERPL-SCNC: 108 MMOL/L (ref 100–108)
CO2 SERPL-SCNC: 28 MMOL/L (ref 21–32)
CREAT SERPL-MCNC: 0.94 MG/DL (ref 0.6–1.3)
EOSINOPHIL # BLD AUTO: 0.19 THOUSAND/ΜL (ref 0–0.61)
EOSINOPHIL NFR BLD AUTO: 4 % (ref 0–6)
ERYTHROCYTE [DISTWIDTH] IN BLOOD BY AUTOMATED COUNT: 15.4 % (ref 11.6–15.1)
GFR SERPL CREATININE-BSD FRML MDRD: 60 ML/MIN/1.73SQ M
GLUCOSE P FAST SERPL-MCNC: 78 MG/DL (ref 65–99)
HCT VFR BLD AUTO: 35.1 % (ref 34.8–46.1)
HGB BLD-MCNC: 11.1 G/DL (ref 11.5–15.4)
IMM GRANULOCYTES # BLD AUTO: 0.03 THOUSAND/UL (ref 0–0.2)
IMM GRANULOCYTES NFR BLD AUTO: 1 % (ref 0–2)
LYMPHOCYTES # BLD AUTO: 1.78 THOUSANDS/ΜL (ref 0.6–4.47)
LYMPHOCYTES NFR BLD AUTO: 36 % (ref 14–44)
MCH RBC QN AUTO: 30.4 PG (ref 26.8–34.3)
MCHC RBC AUTO-ENTMCNC: 31.6 G/DL (ref 31.4–37.4)
MCV RBC AUTO: 96 FL (ref 82–98)
MONOCYTES # BLD AUTO: 0.49 THOUSAND/ΜL (ref 0.17–1.22)
MONOCYTES NFR BLD AUTO: 10 % (ref 4–12)
NEUTROPHILS # BLD AUTO: 2.43 THOUSANDS/ΜL (ref 1.85–7.62)
NEUTS SEG NFR BLD AUTO: 48 % (ref 43–75)
NRBC BLD AUTO-RTO: 0 /100 WBCS
PLATELET # BLD AUTO: 297 THOUSANDS/UL (ref 149–390)
PMV BLD AUTO: 10.1 FL (ref 8.9–12.7)
POTASSIUM SERPL-SCNC: 3.4 MMOL/L (ref 3.5–5.3)
PROT SERPL-MCNC: 7.3 G/DL (ref 6.4–8.2)
RBC # BLD AUTO: 3.65 MILLION/UL (ref 3.81–5.12)
SODIUM SERPL-SCNC: 140 MMOL/L (ref 136–145)
WBC # BLD AUTO: 4.96 THOUSAND/UL (ref 4.31–10.16)

## 2019-11-01 PROCEDURE — 85025 COMPLETE CBC W/AUTO DIFF WBC: CPT

## 2019-11-01 PROCEDURE — 36415 COLL VENOUS BLD VENIPUNCTURE: CPT

## 2019-11-01 PROCEDURE — 80053 COMPREHEN METABOLIC PANEL: CPT

## 2019-11-06 ENCOUNTER — OFFICE VISIT (OUTPATIENT)
Dept: HEMATOLOGY ONCOLOGY | Facility: CLINIC | Age: 74
End: 2019-11-06
Payer: MEDICARE

## 2019-11-06 VITALS
RESPIRATION RATE: 14 BRPM | BODY MASS INDEX: 29.62 KG/M2 | WEIGHT: 167.2 LBS | SYSTOLIC BLOOD PRESSURE: 160 MMHG | OXYGEN SATURATION: 98 % | DIASTOLIC BLOOD PRESSURE: 98 MMHG | HEIGHT: 63 IN | TEMPERATURE: 99.1 F | HEART RATE: 72 BPM

## 2019-11-06 DIAGNOSIS — Z17.0 MALIGNANT NEOPLASM OF OVERLAPPING SITES OF RIGHT BREAST IN FEMALE, ESTROGEN RECEPTOR POSITIVE (HCC): Primary | ICD-10-CM

## 2019-11-06 DIAGNOSIS — R05.8 PRODUCTIVE COUGH: ICD-10-CM

## 2019-11-06 DIAGNOSIS — C50.811 MALIGNANT NEOPLASM OF OVERLAPPING SITES OF RIGHT BREAST IN FEMALE, ESTROGEN RECEPTOR POSITIVE (HCC): Primary | ICD-10-CM

## 2019-11-06 DIAGNOSIS — C50.919 METASTATIC BREAST CANCER (HCC): ICD-10-CM

## 2019-11-06 DIAGNOSIS — Z79.811 USE OF AROMATASE INHIBITORS: ICD-10-CM

## 2019-11-06 DIAGNOSIS — E55.9 VITAMIN D DEFICIENCY: ICD-10-CM

## 2019-11-06 PROCEDURE — 99214 OFFICE O/P EST MOD 30 MIN: CPT | Performed by: INTERNAL MEDICINE

## 2019-11-06 RX ORDER — ESOMEPRAZOLE MAGNESIUM 40 MG/1
40 CAPSULE, DELAYED RELEASE ORAL DAILY
Qty: 30 CAPSULE | Refills: 5 | Status: SHIPPED | OUTPATIENT
Start: 2019-11-06 | End: 2020-01-29 | Stop reason: SDUPTHER

## 2019-11-06 RX ORDER — LEVOFLOXACIN 500 MG/1
500 TABLET, FILM COATED ORAL EVERY 24 HOURS
Qty: 7 TABLET | Refills: 0 | Status: SHIPPED | OUTPATIENT
Start: 2019-11-06 | End: 2019-11-13

## 2019-11-06 RX ORDER — ANASTROZOLE 1 MG/1
1 TABLET ORAL EVERY 24 HOURS
Qty: 90 TABLET | Refills: 4 | Status: SHIPPED | OUTPATIENT
Start: 2019-11-06 | End: 2020-11-16

## 2019-11-06 RX ORDER — ANASTROZOLE 1 MG/1
1 TABLET ORAL EVERY 24 HOURS
Qty: 90 TABLET | Refills: 5 | Status: SHIPPED | OUTPATIENT
Start: 2019-11-06 | End: 2019-11-06 | Stop reason: SDUPTHER

## 2019-11-06 NOTE — PROGRESS NOTES
Hematology/Oncology Outpatient Follow-up  Susannah Pulliam 69 y o  female 1945 [id number]    Date:  11/6/2019        Assessment and Plan:  1  Malignant neoplasm of overlapping 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, estrogen receptor positive ([facility] [address] )  The patient stated that she is interested in continuing her palliative chemotherapy [place]   She was told that we will need to check her echocardiogram since she did have low ejection fraction in the past most likely related to the previous treated with Herceptin and Perjeta  The patient was told that we will check her tumor markers in the near future and resume her palliative chemotherapy after she has recovered from the upper respiratory airway infection     - Echo complete with contrast if indicated; Future  - esomeprazole (NexIUM) 40 MG capsule; Take 1 capsule (40 mg total) by mouth daily  Dispense: 30 capsule; Refill: 5    2  Use of aromatase inhibitors  She was advised to continue with the anastrozole on a daily basis as endocrine therapy  3  Vitamin D deficiency  We will check her vitamin-D  Level in the near future  4  Productive cough  She will be started on Levaquin on a daily basis for a week will then see her again about 2 weeks from now for close follow-up  - levofloxacin (LEVAQUIN) 500 mg tablet; Take 1 tablet (500 mg total) by mouth every 24 hours for 7 days  Dispense: 7 tablet; Refill: 0    5  Metastatic breast cancer ([facility] [address] )  As above  - anastrozole (ARIMIDEX) 1 mg tablet; Take 1 tablet (1 mg total) by mouth every 24 hours  Dispense: 90 tablet; Refill: 4        HPI:  The patient came today for follow-up visit  She just came back from [facility] about 2 weeks ago where she spent the lost 6 months or more  The patient stated that she has been getting adjusted dose Taxol on every other week basis [place]  She seems to have stable condition and normal tumor markers    The patient stated that she started to notice significant upper respiratory airway infection with productive cough about a week ago  She denied any other symptoms  Oncology History    The patient has a remote history of right breast cancer diagnosed in [facility] in February of 2005 with infiltrating ductal carcinoma grade 2 status post right total mastectomy and left prophylactic total mastectomy as well at that time  Her pathology revealed 8/17 positive lymph node for metastatic disease from the right axilla  ER status positive at 5-10%, progesterone negative, her 2 Gregory positive by FISH  She was then treated with 6 cycles of adjuvant chemotherapy with 5 fluorouracil, Adriamycin, and cyclophosphamide followed by tamoxifen and adjuvant radiation to the right axilla and chest wall  The patient was then switched from tamoxifen to Arimidex which was then stopped in 2012  She was then diagnosed with recurrence of her breast cancer with metastatic disease mainly involving the right anterior chest wall, skin, right axilla pectoralis muscle, left axilla and other areas of the body in May 2015  The biopsy from the scan showed triple positive malignant process compatible with recurrence of her breast cancer  She was then started on palliative chemotherapy and received 6 cycles of Taxotere along with trastuzumab and pertuzumab  After 6 cycles she was continued mainly on trastuzumab and pertuzumab alone  The patient then went to [facility] which she continued her oncological care with trastuzumab, pertuzumab and anastrozole  That treatment had to be put on hold after she developed decline of her ejection fraction to about 23% around June 2017  A PET scan in July of 2017 is showed mild progression of her disease and for that reason low-dose weekly Taxol was started on the 18th July 2017  The frequency of Taxol was decreased to 1 treatment every other week    She then received the rest of her treatment and live unknown from September 2017 until March 2018     Her echocardiogram in May 2018 showed ejection fraction of 43%  The patient then went to live alone again in July of 2018 where she continued her Taxol treatment on every other week basis until the middle of January 2019  Malignant neoplasm of overlapping 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, estrogen receptor positive ([facility] [address] )    2015 -  Hormone Therapy     Anastrozole      5/28/2015 Initial Diagnosis     Metastatic breast cancer ([facility] [address] )  (recurrence)      6/30/2015 -  Chemotherapy     1-Taxotere, Herceptin, Perjeta started in [place] 2015 x6 cycles  2-Herceptin and Perjeta alone were continued since the patient was not interested in continue the Taxotere  3-Herceptin and Perjeta were put on hold due to decline of her ejection fraction around May 2017  4-low dose weekly Taxol was started in July 2017  5-Taxol was switched to every other week basis      4/14/2019 - 6/9/2019 Chemotherapy     PACLitaxel (TAXOL) 144 6 mg in sodium chloride 0 9 % 250 mL chemo IVPB, 80 mg/m2, Intravenous, Once, 2 of 6 cycles         Interval history:    ROS: Review of Systems   Constitutional: Negative for activity change, appetite change, chills, diaphoresis, fatigue, fever and unexpected weight change  HENT: Negative for congestion, dental problem, ear discharge, ear pain, facial swelling, hearing loss, mouth sores, nosebleeds, postnasal drip, sore throat, tinnitus and trouble swallowing  Eyes: Negative for discharge, redness, itching and visual disturbance  Respiratory: Negative for cough, chest tightness, shortness of breath and wheezing  Cardiovascular: Negative for chest pain, palpitations and leg swelling  Gastrointestinal: Negative for abdominal distention, abdominal pain, anal bleeding, blood in stool, constipation, diarrhea, nausea and vomiting  Genitourinary: Negative for difficulty urinating, dysuria, flank pain, frequency, hematuria and urgency     Musculoskeletal: Negative for arthralgias, back pain, gait problem, joint swelling, myalgias and neck pain  Skin: Negative for color change, pallor, rash and wound  Neurological: Negative for dizziness, syncope, speech difficulty, weakness, light-headedness, numbness and headaches  Hematological: Negative for adenopathy  Does not bruise/bleed easily  Psychiatric/Behavioral: Negative for agitation, behavioral problems, confusion and sleep disturbance         Past Medical History:   Diagnosis Date    Breast cancer ([facility] [address] )     Hypertension     Lymphedema of right arm     Vitamin B12 deficiency        Past Surgical History:   Procedure Laterality Date    APPENDECTOMY      BREAST SURGERY      bilat mastectomy-right total mastectomy and prophylactic left total mastectomy-2005    KNEE SURGERY      right knee replacement 2014 in [facility] Bilateral        Social History     Socioeconomic History    Marital status: Single     Spouse name: None    Number of children: None    Years of education: None    Highest education level: None   Occupational History    None   Social Needs    Financial resource strain: None    Food insecurity:     Worry: None     Inability: None    Transportation needs:     Medical: None     Non-medical: None   Tobacco Use    Smoking status: Never Smoker    Smokeless tobacco: Never Used    Tobacco comment: no passive smoke exposure   Substance and Sexual Activity    Alcohol use: No    Drug use: No    Sexual activity: None   Lifestyle    Physical activity:     Days per week: None     Minutes per session: None    Stress: None   Relationships    Social connections:     Talks on phone: None     Gets together: None     Attends Protestant service: None     Active member of club or organization: None     Attends meetings of clubs or organizations: None     Relationship status: None    Intimate partner violence:     Fear of current or ex partner: None     Emotionally abused: None     Physically abused: None     Forced sexual activity: None   Other Topics Concern    None   Social History Narrative    None       Family History   Problem Relation Age of Onset    Asthma Mother     Osteoarthritis Father     Bone cancer Family        Allergies   Allergen Reactions    Penicillins Rash         Current Outpatient Medications:     anastrozole (ARIMIDEX) 1 mg tablet, Take 1 tablet (1 mg total) by mouth every 24 hours, Disp: 90 tablet, Rfl: 4    Cyanocobalamin 1000 MCG/ML KIT, Inject as directed every 6 (six) months, Disp: , Rfl:     nebivolol (BYSTOLIC) 5 mg tablet, take 1/2 tablet  tablet by oral route  qhs, Disp: , Rfl:     ondansetron (ZOFRAN) 4 mg tablet, Take 1 tablet (4 mg total) by mouth every 6 (six) hours, Disp: 12 tablet, Rfl: 0    ergocalciferol (VITAMIN D2) 50,000 units, Take 1 capsule (50,000 Units total) by mouth once a week for 8 doses, Disp: 8 capsule, Rfl: 0    esomeprazole (NexIUM) 40 MG capsule, Take 1 capsule (40 mg total) by mouth daily, Disp: 30 capsule, Rfl: 5    levofloxacin (LEVAQUIN) 500 mg tablet, Take 1 tablet (500 mg total) by mouth every 24 hours for 7 days, Disp: 7 tablet, Rfl: 0      Physical Exam:  /98 (BP Location: Left arm, Patient Position: Sitting, Cuff Size: Adult)   Pulse 72   Temp 99 1 °F (37 3 °C) (Tympanic)   Resp 14   Ht 5' 2 6" (1 59 m)   Wt 75 8 kg (167 lb 3 2 oz)   SpO2 98%   BMI 30 00 kg/m²     Physical Exam   Constitutional: She is oriented to person, place, and time  She appears well-developed and well-nourished  No distress  HENT:   Head: Normocephalic and atraumatic  Nose: Nose normal    Mouth/Throat: Oropharynx is clear and moist    Eyes: Pupils are equal, round, and reactive to light  Conjunctivae and EOM are normal  Right eye exhibits no discharge  Left eye exhibits no discharge  No scleral icterus  Neck: Normal range of motion  Neck supple  No JVD present  No tracheal deviation present  No thyromegaly present     Cardiovascular: Normal rate, regular rhythm and normal heart sounds  Exam reveals no friction rub  No murmur heard  Pulmonary/Chest: Effort normal and breath sounds normal  No stridor  No respiratory distress  She has no wheezes  She has no rales  She exhibits no tenderness  Abdominal: Soft  Bowel sounds are normal  She exhibits no distension and no mass  There is no hepatosplenomegaly, splenomegaly or hepatomegaly  There is no tenderness  There is no rebound and no guarding  Musculoskeletal: Normal range of motion  She exhibits no edema, tenderness or deformity  Lymphadenopathy:     She has no cervical adenopathy  Neurological: She is alert and oriented to person, place, and time  She has normal reflexes  No cranial nerve deficit  Coordination normal    Skin: Skin is warm and dry  No rash noted  She is not diaphoretic  No erythema  No pallor  Psychiatric: She has a normal mood and affect  Her behavior is normal  Judgment and thought content normal          Labs:  Lab Results   Component Value Date    WBC 4 96 11/01/2019    HGB 11 1 (L) 11/01/2019    HCT 35 1 11/01/2019    MCV 96 11/01/2019     11/01/2019     Lab Results   Component Value Date     06/18/2018    K 3 4 (L) 11/01/2019     11/01/2019    CO2 28 11/01/2019    ANIONGAP 8 06/18/2018    BUN 15 11/01/2019    CREATININE 0 94 11/01/2019    GLUF 78 11/01/2019    CALCIUM 9 0 11/01/2019    AST 19 11/01/2019    ALT 18 11/01/2019    ALKPHOS 49 11/01/2019    PROT 7 1 06/18/2018    BILITOT 0 3 06/18/2018    EGFR 60 11/01/2019     No results found for: TSH    Patient voiced understanding and agreement in the above discussion  Aware to contact our office with questions/symptoms in the interim

## 2019-11-07 ENCOUNTER — HOSPITAL ENCOUNTER (OUTPATIENT)
Dept: NON INVASIVE DIAGNOSTICS | Facility: HOSPITAL | Age: 74
Discharge: HOME | End: 2019-11-07
Attending: INTERNAL MEDICINE
Payer: MEDICARE

## 2019-11-07 DIAGNOSIS — C50.811 MALIGNANT NEOPLASM OF OVERLAPPING SITES OF RIGHT BREAST IN FEMALE, ESTROGEN RECEPTOR POSITIVE (HCC): ICD-10-CM

## 2019-11-07 DIAGNOSIS — Z17.0 MALIGNANT NEOPLASM OF OVERLAPPING SITES OF RIGHT BREAST IN FEMALE, ESTROGEN RECEPTOR POSITIVE (HCC): ICD-10-CM

## 2019-11-07 PROCEDURE — 93306 TTE W/DOPPLER COMPLETE: CPT

## 2019-11-08 PROCEDURE — 93306 TTE W/DOPPLER COMPLETE: CPT | Performed by: INTERNAL MEDICINE

## 2019-11-20 ENCOUNTER — OFFICE VISIT (OUTPATIENT)
Dept: HEMATOLOGY ONCOLOGY | Facility: CLINIC | Age: 74
End: 2019-11-20
Payer: MEDICARE

## 2019-11-20 VITALS
HEIGHT: 62 IN | RESPIRATION RATE: 16 BRPM | OXYGEN SATURATION: 98 % | WEIGHT: 168.8 LBS | SYSTOLIC BLOOD PRESSURE: 155 MMHG | BODY MASS INDEX: 31.06 KG/M2 | TEMPERATURE: 98.6 F | HEART RATE: 88 BPM | DIASTOLIC BLOOD PRESSURE: 92 MMHG

## 2019-11-20 DIAGNOSIS — C50.811 MALIGNANT NEOPLASM OF OVERLAPPING SITES OF RIGHT BREAST IN FEMALE, ESTROGEN RECEPTOR POSITIVE (HCC): Primary | ICD-10-CM

## 2019-11-20 DIAGNOSIS — Z17.0 MALIGNANT NEOPLASM OF OVERLAPPING SITES OF RIGHT BREAST IN FEMALE, ESTROGEN RECEPTOR POSITIVE (HCC): Primary | ICD-10-CM

## 2019-11-20 PROCEDURE — 99215 OFFICE O/P EST HI 40 MIN: CPT | Performed by: INTERNAL MEDICINE

## 2019-11-20 RX ORDER — SODIUM CHLORIDE 9 MG/ML
20 INJECTION, SOLUTION INTRAVENOUS ONCE
Status: CANCELLED | OUTPATIENT
Start: 2019-12-04

## 2019-11-20 RX ORDER — SODIUM CHLORIDE 9 MG/ML
20 INJECTION, SOLUTION INTRAVENOUS ONCE
Status: CANCELLED | OUTPATIENT
Start: 2020-01-15

## 2019-11-20 RX ORDER — SODIUM CHLORIDE 9 MG/ML
20 INJECTION, SOLUTION INTRAVENOUS ONCE
Status: CANCELLED | OUTPATIENT
Start: 2019-12-18

## 2019-11-20 RX ORDER — SODIUM CHLORIDE 9 MG/ML
20 INJECTION, SOLUTION INTRAVENOUS ONCE
Status: CANCELLED | OUTPATIENT
Start: 2020-01-01

## 2019-11-20 NOTE — PROGRESS NOTES
Hematology/Oncology Outpatient Follow-up  Dave Isidro 69 y o  female 1945 [id number]    Date:  11/20/2019        Assessment and Plan:  1  Malignant neoplasm of overlapping 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, estrogen receptor positive ([facility] [address] )  The patient seems to be interested in getting her back on the palliative chemotherapy which she was getting on regular basis while she was in [place]  She will be restarted on Taxol 80 mg/m2 on every other week basis  We will also restart Prolia on every 6 months basis  She will be continuing the anastrozole as endocrine therapy on a daily basis  The patient will get a PET-CT scan done in the next couple of weeks and tumor markers prior to initiating the chemotherapy  - NM PET CT skull base to mid thigh; Future  - CBC and differential; Future  - Comprehensive metabolic panel; Future  - Cancer antigen 15-3; Future  - Cancer antigen 27 29; Future  - C-reactive protein; Future  - Sedimentation rate, automated; Future        HPI:  The patient came today for a follow-up visit  She is doing much better after 1 week worth of the Levaquin  Her breathing has improved and the coughing also is getting much better  He also had an echocardiogram on the 7th of November which showed further improvement of her ejection fraction up to 57%  Oncology History    The patient has a remote history of right breast cancer diagnosed in [place] in February of 2005 with infiltrating ductal carcinoma grade 2 status post right total mastectomy and left prophylactic total mastectomy as well at that time  Her pathology revealed 8/17 positive lymph node for metastatic disease from the right axilla  ER status positive at 5-10%, progesterone negative, her 2 Gregory positive by FISH  She was then treated with 6 cycles of adjuvant chemotherapy with 5 fluorouracil, Adriamycin, and cyclophosphamide followed by tamoxifen and adjuvant radiation to the right axilla and chest wall    The patient was then switched from tamoxifen to Arimidex which was then stopped in 2012  She was then diagnosed with recurrence of her breast cancer with metastatic disease mainly involving the right anterior chest wall, skin, right axilla pectoralis muscle, left axilla and other areas of the body in May 2015  The biopsy from the scan showed triple positive malignant process compatible with recurrence of her breast cancer  She was then started on palliative chemotherapy and received 6 cycles of Taxotere along with trastuzumab and pertuzumab  After 6 cycles she was continued mainly on trastuzumab and pertuzumab alone  The patient then went to [facility] which she continued her oncological care with trastuzumab, pertuzumab and anastrozole  That treatment had to be put on hold after she developed decline of her ejection fraction to about 23% around June 2017  A PET scan in July of 2017 is showed mild progression of her disease and for that reason low-dose weekly Taxol was started on the 18th July 2017  The frequency of Taxol was decreased to 1 treatment every other week  She then received the rest of her treatment and live unknown from September 2017 until March 2018  Her echocardiogram in May 2018 showed ejection fraction of 43%  The patient then went to live alone again in July of 2018 where she continued her Taxol treatment on every other week basis until the middle of January 2019  Malignant neoplasm of overlapping 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, estrogen receptor positive ([address] )    2015 -  Hormone Therapy     Anastrozole      5/28/2015 Initial Diagnosis     Metastatic breast cancer ([address] )  (recurrence)      6/30/2015 -  Chemotherapy     1-Taxotere, Herceptin, Perjeta started in [place] 2015 x6 cycles  2-Herceptin and Perjeta alone were continued since the patient was not interested in continue the Taxotere  3-Herceptin and Perjeta were put on hold due to decline of her ejection fraction around May 2017    4-low dose weekly Taxol was started in July 2017  5-Taxol was switched to every other week basis      4/14/2019 - 6/9/2019 Chemotherapy     PACLitaxel (TAXOL) 144 6 mg in sodium chloride 0 9 % 250 mL chemo IVPB, 80 mg/m2, Intravenous, Once, 2 of 6 cycles         Interval history:    ROS: Review of Systems   Constitutional: Negative for activity change, appetite change, chills, diaphoresis, fatigue, fever and unexpected weight change  HENT: Negative for congestion, dental problem, ear discharge, ear pain, facial swelling, hearing loss, mouth sores, nosebleeds, postnasal drip, sore throat, tinnitus and trouble swallowing  Eyes: Negative for discharge, redness, itching and visual disturbance  Respiratory: Negative for cough, chest tightness, shortness of breath and wheezing  Cardiovascular: Negative for chest pain, palpitations and leg swelling  Gastrointestinal: Negative for abdominal distention, abdominal pain, anal bleeding, blood in stool, constipation, diarrhea, nausea and vomiting  Genitourinary: Negative for difficulty urinating, dysuria, flank pain, frequency, hematuria and urgency  Musculoskeletal: Negative for arthralgias, back pain, gait problem, joint swelling, myalgias and neck pain  Skin: Negative for color change, pallor, rash and wound  Neurological: Negative for dizziness, syncope, speech difficulty, weakness, light-headedness, numbness and headaches  Hematological: Negative for adenopathy  Does not bruise/bleed easily  Psychiatric/Behavioral: Negative for agitation, behavioral problems, confusion and sleep disturbance         Past Medical History:   Diagnosis Date    Breast cancer ([facility] [address] )     Hypertension     Lymphedema of right arm     Vitamin B12 deficiency        Past Surgical History:   Procedure Laterality Date    APPENDECTOMY      BREAST SURGERY      bilat mastectomy-right total mastectomy and prophylactic left total mastectomy-2005    KNEE SURGERY      right knee replacement 2014 in [facility] Bilateral        Social History     Socioeconomic History    Marital status: Single     Spouse name: None    Number of children: None    Years of education: None    Highest education level: None   Occupational History    None   Social Needs    Financial resource strain: None    Food insecurity:     Worry: None     Inability: None    Transportation needs:     Medical: None     Non-medical: None   Tobacco Use    Smoking status: Never Smoker    Smokeless tobacco: Never Used    Tobacco comment: no passive smoke exposure   Substance and Sexual Activity    Alcohol use: No    Drug use: No    Sexual activity: None   Lifestyle    Physical activity:     Days per week: None     Minutes per session: None    Stress: None   Relationships    Social connections:     Talks on phone: None     Gets together: None     Attends Hoahaoism service: None     Active member of club or organization: None     Attends meetings of clubs or organizations: None     Relationship status: None    Intimate partner violence:     Fear of current or ex partner: None     Emotionally abused: None     Physically abused: None     Forced sexual activity: None   Other Topics Concern    None   Social History Narrative    None       Family History   Problem Relation Age of Onset    Asthma Mother     Osteoarthritis Father     Bone cancer Family        Allergies   Allergen Reactions    Penicillins Rash         Current Outpatient Medications:     anastrozole (ARIMIDEX) 1 mg tablet, Take 1 tablet (1 mg total) by mouth every 24 hours, Disp: 90 tablet, Rfl: 4    Cyanocobalamin 1000 MCG/ML KIT, Inject as directed every 6 (six) months, Disp: , Rfl:     esomeprazole (NexIUM) 40 MG capsule, Take 1 capsule (40 mg total) by mouth daily, Disp: 30 capsule, Rfl: 5    nebivolol (BYSTOLIC) 5 mg tablet, take 1/2 tablet  tablet by oral route  qhs, Disp: , Rfl:     ondansetron (ZOFRAN) 4 mg tablet, Take 1 tablet (4 mg total) by mouth every 6 (six) hours, Disp: 12 tablet, Rfl: 0    ergocalciferol (VITAMIN D2) 50,000 units, Take 1 capsule (50,000 Units total) by mouth once a week for 8 doses, Disp: 8 capsule, Rfl: 0      Physical Exam:  /92 (BP Location: Left arm, Cuff Size: Adult)   Pulse 88   Temp 98 6 °F (37 °C) (Tympanic)   Resp 16   Ht 5' 2" (1 575 m)   Wt 76 6 kg (168 lb 12 8 oz)   SpO2 98%   BMI 30 87 kg/m²     Physical Exam   Constitutional: She is oriented to person, place, and time  She appears well-developed and well-nourished  No distress  HENT:   Head: Normocephalic and atraumatic  Nose: Nose normal    Mouth/Throat: Oropharynx is clear and moist    Eyes: Pupils are equal, round, and reactive to light  Conjunctivae and EOM are normal  Right eye exhibits no discharge  Left eye exhibits no discharge  No scleral icterus  Neck: Normal range of motion  Neck supple  No JVD present  No tracheal deviation present  No thyromegaly present  Cardiovascular: Normal rate, regular rhythm and normal heart sounds  Exam reveals no friction rub  No murmur heard  Pulmonary/Chest: Effort normal and breath sounds normal  No stridor  No respiratory distress  She has no wheezes  She has no rales  She exhibits no tenderness  Abdominal: Soft  Bowel sounds are normal  She exhibits no distension and no mass  There is no hepatosplenomegaly, splenomegaly or hepatomegaly  There is no tenderness  There is no rebound and no guarding  Musculoskeletal: Normal range of motion  She exhibits no edema, tenderness or deformity  Lymphadenopathy:     She has no cervical adenopathy  Neurological: She is alert and oriented to person, place, and time  She has normal reflexes  No cranial nerve deficit  Coordination normal    Skin: Skin is warm and dry  No rash noted  She is not diaphoretic  No erythema  No pallor  Psychiatric: She has a normal mood and affect   Her behavior is normal  Judgment and thought content normal  Labs:  Lab Results   Component Value Date    WBC 4 96 11/01/2019    HGB 11 1 (L) 11/01/2019    HCT 35 1 11/01/2019    MCV 96 11/01/2019     11/01/2019     Lab Results   Component Value Date     06/18/2018    K 3 4 (L) 11/01/2019     11/01/2019    CO2 28 11/01/2019    ANIONGAP 8 06/18/2018    BUN 15 11/01/2019    CREATININE 0 94 11/01/2019    GLUF 78 11/01/2019    CALCIUM 9 0 11/01/2019    AST 19 11/01/2019    ALT 18 11/01/2019    ALKPHOS 49 11/01/2019    PROT 7 1 06/18/2018    BILITOT 0 3 06/18/2018    EGFR 60 11/01/2019     No results found for: TSH    Patient voiced understanding and agreement in the above discussion  Aware to contact our office with questions/symptoms in the interim

## 2019-11-29 ENCOUNTER — TELEPHONE (OUTPATIENT)
Dept: HEMATOLOGY ONCOLOGY | Facility: CLINIC | Age: 74
End: 2019-11-29

## 2019-11-30 ENCOUNTER — APPOINTMENT (OUTPATIENT)
Dept: LAB | Age: 74
End: 2019-11-30
Payer: MEDICARE

## 2019-11-30 DIAGNOSIS — Z17.0 MALIGNANT NEOPLASM OF OVERLAPPING SITES OF RIGHT BREAST IN FEMALE, ESTROGEN RECEPTOR POSITIVE (HCC): ICD-10-CM

## 2019-11-30 DIAGNOSIS — C50.811 MALIGNANT NEOPLASM OF OVERLAPPING SITES OF RIGHT BREAST IN FEMALE, ESTROGEN RECEPTOR POSITIVE (HCC): ICD-10-CM

## 2019-11-30 LAB
ALBUMIN SERPL BCP-MCNC: 3.6 G/DL (ref 3.5–5)
ALP SERPL-CCNC: 63 U/L (ref 46–116)
ALT SERPL W P-5'-P-CCNC: 14 U/L (ref 12–78)
ANION GAP SERPL CALCULATED.3IONS-SCNC: 1 MMOL/L (ref 4–13)
AST SERPL W P-5'-P-CCNC: 16 U/L (ref 5–45)
BASOPHILS # BLD AUTO: 0.03 THOUSANDS/ΜL (ref 0–0.1)
BASOPHILS NFR BLD AUTO: 1 % (ref 0–1)
BILIRUB SERPL-MCNC: 0.56 MG/DL (ref 0.2–1)
BUN SERPL-MCNC: 20 MG/DL (ref 5–25)
CALCIUM SERPL-MCNC: 9.6 MG/DL (ref 8.3–10.1)
CANCER AG27-29 SERPL-ACNC: 66.7 U/ML (ref 0–42.3)
CHLORIDE SERPL-SCNC: 107 MMOL/L (ref 100–108)
CO2 SERPL-SCNC: 32 MMOL/L (ref 21–32)
CREAT SERPL-MCNC: 0.93 MG/DL (ref 0.6–1.3)
CRP SERPL QL: <3 MG/L
EOSINOPHIL # BLD AUTO: 0.26 THOUSAND/ΜL (ref 0–0.61)
EOSINOPHIL NFR BLD AUTO: 4 % (ref 0–6)
ERYTHROCYTE [DISTWIDTH] IN BLOOD BY AUTOMATED COUNT: 14.6 % (ref 11.6–15.1)
ERYTHROCYTE [SEDIMENTATION RATE] IN BLOOD: 49 MM/HOUR (ref 0–20)
GFR SERPL CREATININE-BSD FRML MDRD: 61 ML/MIN/1.73SQ M
GLUCOSE P FAST SERPL-MCNC: 85 MG/DL (ref 65–99)
HCT VFR BLD AUTO: 39.2 % (ref 34.8–46.1)
HGB BLD-MCNC: 11.9 G/DL (ref 11.5–15.4)
IMM GRANULOCYTES # BLD AUTO: 0.02 THOUSAND/UL (ref 0–0.2)
IMM GRANULOCYTES NFR BLD AUTO: 0 % (ref 0–2)
LYMPHOCYTES # BLD AUTO: 1.79 THOUSANDS/ΜL (ref 0.6–4.47)
LYMPHOCYTES NFR BLD AUTO: 30 % (ref 14–44)
MCH RBC QN AUTO: 29.7 PG (ref 26.8–34.3)
MCHC RBC AUTO-ENTMCNC: 30.4 G/DL (ref 31.4–37.4)
MCV RBC AUTO: 98 FL (ref 82–98)
MONOCYTES # BLD AUTO: 0.45 THOUSAND/ΜL (ref 0.17–1.22)
MONOCYTES NFR BLD AUTO: 7 % (ref 4–12)
NEUTROPHILS # BLD AUTO: 3.52 THOUSANDS/ΜL (ref 1.85–7.62)
NEUTS SEG NFR BLD AUTO: 58 % (ref 43–75)
NRBC BLD AUTO-RTO: 0 /100 WBCS
PLATELET # BLD AUTO: 297 THOUSANDS/UL (ref 149–390)
PMV BLD AUTO: 10 FL (ref 8.9–12.7)
POTASSIUM SERPL-SCNC: 4.2 MMOL/L (ref 3.5–5.3)
PROT SERPL-MCNC: 8 G/DL (ref 6.4–8.2)
RBC # BLD AUTO: 4.01 MILLION/UL (ref 3.81–5.12)
SODIUM SERPL-SCNC: 140 MMOL/L (ref 136–145)
WBC # BLD AUTO: 6.07 THOUSAND/UL (ref 4.31–10.16)

## 2019-11-30 PROCEDURE — 86300 IMMUNOASSAY TUMOR CA 15-3: CPT

## 2019-11-30 PROCEDURE — 85652 RBC SED RATE AUTOMATED: CPT

## 2019-11-30 PROCEDURE — 86140 C-REACTIVE PROTEIN: CPT

## 2019-11-30 PROCEDURE — 85025 COMPLETE CBC W/AUTO DIFF WBC: CPT

## 2019-11-30 PROCEDURE — 80053 COMPREHEN METABOLIC PANEL: CPT

## 2019-11-30 PROCEDURE — 36415 COLL VENOUS BLD VENIPUNCTURE: CPT

## 2019-12-03 LAB — CANCER AG15-3 SERPL-ACNC: 44.8 U/ML (ref 0–25)

## 2019-12-04 ENCOUNTER — HOSPITAL ENCOUNTER (OUTPATIENT)
Dept: INFUSION CENTER | Facility: HOSPITAL | Age: 74
Discharge: HOME | End: 2019-12-04
Attending: INTERNAL MEDICINE
Payer: MEDICARE

## 2019-12-04 ENCOUNTER — TELEPHONE (OUTPATIENT)
Dept: HEMATOLOGY ONCOLOGY | Facility: CLINIC | Age: 74
End: 2019-12-04

## 2019-12-04 ENCOUNTER — OFFICE VISIT (OUTPATIENT)
Dept: HEMATOLOGY ONCOLOGY | Facility: CLINIC | Age: 74
End: 2019-12-04
Payer: MEDICARE

## 2019-12-04 VITALS
BODY MASS INDEX: 31 KG/M2 | DIASTOLIC BLOOD PRESSURE: 67 MMHG | HEART RATE: 74 BPM | HEIGHT: 62 IN | RESPIRATION RATE: 16 BRPM | WEIGHT: 168.43 LBS | TEMPERATURE: 97.2 F | SYSTOLIC BLOOD PRESSURE: 134 MMHG

## 2019-12-04 VITALS
RESPIRATION RATE: 16 BRPM | WEIGHT: 168.6 LBS | OXYGEN SATURATION: 98 % | TEMPERATURE: 97.2 F | SYSTOLIC BLOOD PRESSURE: 162 MMHG | BODY MASS INDEX: 31.03 KG/M2 | HEIGHT: 62 IN | DIASTOLIC BLOOD PRESSURE: 67 MMHG

## 2019-12-04 DIAGNOSIS — C50.811 MALIGNANT NEOPLASM OF OVERLAPPING SITES OF RIGHT BREAST IN FEMALE, ESTROGEN RECEPTOR POSITIVE (HCC): Primary | ICD-10-CM

## 2019-12-04 DIAGNOSIS — Z79.811 USE OF AROMATASE INHIBITORS: ICD-10-CM

## 2019-12-04 DIAGNOSIS — Z17.0 MALIGNANT NEOPLASM OF OVERLAPPING SITES OF RIGHT BREAST IN FEMALE, ESTROGEN RECEPTOR POSITIVE (HCC): ICD-10-CM

## 2019-12-04 DIAGNOSIS — C50.811 MALIGNANT NEOPLASM OF OVERLAPPING SITES OF RIGHT BREAST IN FEMALE, ESTROGEN RECEPTOR POSITIVE (HCC): ICD-10-CM

## 2019-12-04 DIAGNOSIS — Z79.811 USE OF AROMATASE INHIBITORS: Primary | ICD-10-CM

## 2019-12-04 DIAGNOSIS — Z17.0 MALIGNANT NEOPLASM OF OVERLAPPING SITES OF RIGHT BREAST IN FEMALE, ESTROGEN RECEPTOR POSITIVE (HCC): Primary | ICD-10-CM

## 2019-12-04 PROCEDURE — 96367 TX/PROPH/DG ADDL SEQ IV INF: CPT

## 2019-12-04 PROCEDURE — 99214 OFFICE O/P EST MOD 30 MIN: CPT | Performed by: INTERNAL MEDICINE

## 2019-12-04 PROCEDURE — 96375 TX/PRO/DX INJ NEW DRUG ADDON: CPT

## 2019-12-04 PROCEDURE — 96401 CHEMO ANTI-NEOPL SQ/IM: CPT

## 2019-12-04 PROCEDURE — 96413 CHEMO IV INFUSION 1 HR: CPT

## 2019-12-04 RX ORDER — SODIUM CHLORIDE 9 MG/ML
20 INJECTION, SOLUTION INTRAVENOUS ONCE
Status: COMPLETED | OUTPATIENT
Start: 2019-12-04 | End: 2019-12-04

## 2019-12-04 RX ADMIN — DEXAMETHASONE SODIUM PHOSPHATE 4 MG: 4 INJECTION, SOLUTION INTRAMUSCULAR; INTRAVENOUS at 11:11

## 2019-12-04 RX ADMIN — FAMOTIDINE 20 MG: 10 INJECTION, SOLUTION INTRAVENOUS at 10:50

## 2019-12-04 RX ADMIN — SODIUM CHLORIDE 20 ML/HR: 0.9 INJECTION, SOLUTION INTRAVENOUS at 10:47

## 2019-12-04 RX ADMIN — ONDANSETRON HYDROCHLORIDE 16 MG: 2 INJECTION, SOLUTION INTRAMUSCULAR; INTRAVENOUS at 11:33

## 2019-12-04 RX ADMIN — DENOSUMAB 60 MG: 60 INJECTION SUBCUTANEOUS at 12:24

## 2019-12-04 RX ADMIN — PACLITAXEL 142.2 MG: 6 INJECTION, SOLUTION, CONCENTRATE INTRAVENOUS at 11:59

## 2019-12-04 NOTE — PROGRESS NOTES
Pt here for taxol today  Tolerated well withou complications  Confirmed next appts and AVS declined

## 2019-12-04 NOTE — TELEPHONE ENCOUNTER
Sukumar Oleary from infusion called  She wanted to let you aware that patient does not want to take her benadryl today with treatment

## 2019-12-04 NOTE — PLAN OF CARE
Problem: Potential for Falls  Goal: Patient will remain free of falls  Description  INTERVENTIONS:  - Assess patient frequently for physical needs  -  Identify cognitive and physical deficits and behaviors that affect risk of falls    -  [place] fall precautions as indicated by assessment   - Educate patient/family on patient safety including physical limitations  - Instruct patient to call for assistance with activity based on assessment  - Modify environment to reduce risk of injury  - Consider OT/PT consult to assist with strengthening/mobility  Outcome: Progressing

## 2019-12-04 NOTE — PROGRESS NOTES
Hematology/Oncology Outpatient Follow-up  Rayne Ashby 69 y o  female 1945 [id number]    Date:  12/4/2019        Assessment and Plan:  1  Malignant neoplasm of overlapping 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, estrogen receptor positive ([address] )  The patient will be restarted on the palliative chemotherapy with Taxol 80 mg/m2 on every other week basis  She also was encouraged to continue with the anastrozole as endocrine therapy on a daily basis without interruption  We will aim to pursue a PET-CT scan next week for close monitoring     - CBC and differential; Future  - Comprehensive metabolic panel; Future  - Magnesium; Future    2  Use of aromatase inhibitors  She was encouraged to take calcium vitamin-D supplements on a daily basis  We will check her vitamin-D level prior to her next visit  She will be restarted on Prolia injection every 6 months basis  - Vitamin D 25 hydroxy; Future        HPI:  The patient came today for a follow-up visit  She is scheduled to get restarted on Taxol as palliative chemotherapy on every other week basis  She is also taking anastrozole as endocrine therapy on a daily basis without interruption  Her most recent tumor markers continues to show stable values in comparison to the prior multiple measurements from this year and last year  Her white cell count was 6 0 with hemoglobin of 11 9 and platelet count of 280  Creatinine was 0 9 and calcium of 9 6 with normal liver enzymes on 11/30/2019  Oncology History    The patient has a remote history of right breast cancer diagnosed in [place] in February of 2005 with infiltrating ductal carcinoma grade 2 status post right total mastectomy and left prophylactic total mastectomy as well at that time  Her pathology revealed 8/17 positive lymph node for metastatic disease from the right axilla  ER status positive at 5-10%, progesterone negative, her 2 Gregory positive by FISH    She was then treated with 6 cycles of adjuvant chemotherapy with 5 fluorouracil, Adriamycin, and cyclophosphamide followed by tamoxifen and adjuvant radiation to the right axilla and chest wall  The patient was then switched from tamoxifen to Arimidex which was then stopped in 2012  She was then diagnosed with recurrence of her breast cancer with metastatic disease mainly involving the right anterior chest wall, skin, right axilla pectoralis muscle, left axilla and other areas of the body in May 2015  The biopsy from the scan showed triple positive malignant process compatible with recurrence of her breast cancer  She was then started on palliative chemotherapy and received 6 cycles of Taxotere along with trastuzumab and pertuzumab  After 6 cycles she was continued mainly on trastuzumab and pertuzumab alone  The patient then went to [facility] which she continued her oncological care with trastuzumab, pertuzumab and anastrozole  That treatment had to be put on hold after she developed decline of her ejection fraction to about 23% around June 2017  A PET scan in July of 2017 is showed mild progression of her disease and for that reason low-dose weekly Taxol was started on the 18th July 2017  The frequency of Taxol was decreased to 1 treatment every other week  She then received the rest of her treatment and live unknown from September 2017 until March 2018  Her echocardiogram in May 2018 showed ejection fraction of 43%  The patient then went to live alone again in July of 2018 where she continued her Taxol treatment on every other week basis until the middle of January 2019          Malignant neoplasm of overlapping 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, estrogen receptor positive ([address] )    2015 -  Hormone Therapy     Anastrozole      5/28/2015 Initial Diagnosis     Metastatic breast cancer ([address] )  (recurrence)      6/30/2015 -  Chemotherapy     1-Taxotere, Herceptin, Perjeta started in [place] 2015 x6 cycles  2-Herceptin and Perjeta alone were continued since the patient was not interested in continue the Taxotere  3-Herceptin and Perjeta were put on hold due to decline of her ejection fraction around May 2017  4-low dose weekly Taxol was started in July 2017  5-Taxol was switched to every other week basis      4/14/2019 - 6/9/2019 Chemotherapy     PACLitaxel (TAXOL) 144 6 mg in sodium chloride 0 9 % 250 mL chemo IVPB, 80 mg/m2, Intravenous, Once, 2 of 6 cycles      12/4/2019 -  Chemotherapy     PACLitaxel (TAXOL) 142 2 mg in sodium chloride 0 9 % 250 mL chemo IVPB, 80 mg/m2 = 142 2 mg, Intravenous, Once, 0 of 6 cycles         Interval history:    ROS: Review of Systems   Constitutional: Negative for activity change, appetite change, chills, diaphoresis, fatigue, fever and unexpected weight change  HENT: Negative for congestion, dental problem, ear discharge, ear pain, facial swelling, hearing loss, mouth sores, nosebleeds, postnasal drip, sore throat, tinnitus and trouble swallowing  Eyes: Negative for discharge, redness, itching and visual disturbance  Respiratory: Negative for cough, chest tightness, shortness of breath and wheezing  Cardiovascular: Negative for chest pain, palpitations and leg swelling  Gastrointestinal: Negative for abdominal distention, abdominal pain, anal bleeding, blood in stool, constipation, diarrhea, nausea and vomiting  Genitourinary: Negative for difficulty urinating, dysuria, flank pain, frequency, hematuria and urgency  Musculoskeletal: Negative for arthralgias, back pain, gait problem, joint swelling, myalgias and neck pain  Skin: Negative for color change, pallor, rash and wound  Neurological: Negative for dizziness, syncope, speech difficulty, weakness, light-headedness, numbness and headaches  Hematological: Negative for adenopathy  Does not bruise/bleed easily  Psychiatric/Behavioral: Negative for agitation, behavioral problems, confusion and sleep disturbance         Past Medical History:   Diagnosis Date    [facility] )     Hypertension     Lymphedema of right arm     Vitamin B12 deficiency        Past Surgical History:   Procedure Laterality Date    APPENDECTOMY      BREAST SURGERY      bilat mastectomy-right total mastectomy and prophylactic left total mastectomy-2005    KNEE SURGERY      right knee replacement 2014 in [address] Bilateral        Social History     Socioeconomic History    Marital status: Single     Spouse name: Not on file    Number of children: Not on file    Years of education: Not on file    Highest education level: Not on file   Occupational History    Not on file   Social Needs    Financial resource strain: Not on file    Food insecurity:     Worry: Not on file     Inability: Not on file    Transportation needs:     Medical: Not on file     Non-medical: Not on file   Tobacco Use    Smoking status: Never Smoker    Smokeless tobacco: Never Used    Tobacco comment: no passive smoke exposure   Substance and Sexual Activity    Alcohol use: No    Drug use: No    Sexual activity: Not on file   Lifestyle    Physical activity:     Days per week: Not on file     Minutes per session: Not on file    Stress: Not on file   Relationships    Social connections:     Talks on phone: Not on file     Gets together: Not on file     Attends Church service: Not on file     Active member of club or organization: Not on file     Attends meetings of clubs or organizations: Not on file     Relationship status: Not on file    Intimate partner violence:     Fear of current or ex partner: Not on file     Emotionally abused: Not on file     Physically abused: Not on file     Forced sexual activity: Not on file   Other Topics Concern    Not on file   Social History Narrative    Not on file       Family History   Problem Relation Age of Onset    Asthma Mother     Osteoarthritis Father     Bone cancer Family        Allergies   Allergen Reactions    Penicillins Rash         Current Outpatient Medications:     anastrozole (ARIMIDEX) 1 mg tablet, Take 1 tablet (1 mg total) by mouth every 24 hours, Disp: 90 tablet, Rfl: 4    Cyanocobalamin 1000 MCG/ML KIT, Inject as directed every 6 (six) months, Disp: , Rfl:     esomeprazole (NexIUM) 40 MG capsule, Take 1 capsule (40 mg total) by mouth daily, Disp: 30 capsule, Rfl: 5    nebivolol (BYSTOLIC) 5 mg tablet, take 1/2 tablet  tablet by oral route  qhs, Disp: , Rfl:     ondansetron (ZOFRAN) 4 mg tablet, Take 1 tablet (4 mg total) by mouth every 6 (six) hours, Disp: 12 tablet, Rfl: 0    ergocalciferol (VITAMIN D2) 50,000 units, Take 1 capsule (50,000 Units total) by mouth once a week for 8 doses, Disp: 8 capsule, Rfl: 0      Physical Exam:  /67 (BP Location: Left arm, Cuff Size: Adult)   Temp (!) 97 2 °F (36 2 °C) (Tympanic)   Resp 16   Ht 5' 2" (1 575 m)   Wt 76 5 kg (168 lb 9 6 oz)   SpO2 98%   BMI 30 84 kg/m²     Physical Exam   Constitutional: She is oriented to person, place, and time  She appears well-developed and well-nourished  No distress  HENT:   Head: Normocephalic and atraumatic  Nose: Nose normal    Mouth/Throat: Oropharynx is clear and moist    Eyes: Pupils are equal, round, and reactive to light  Conjunctivae and EOM are normal  Right eye exhibits no discharge  Left eye exhibits no discharge  No scleral icterus  Neck: Normal range of motion  Neck supple  No JVD present  No tracheal deviation present  No thyromegaly present  Cardiovascular: Normal rate, regular rhythm and normal heart sounds  Exam reveals no friction rub  No murmur heard  Pulmonary/Chest: Effort normal and breath sounds normal  No stridor  No respiratory distress  She has no wheezes  She has no rales  She exhibits no tenderness  Abdominal: Soft  Bowel sounds are normal  She exhibits no distension and no mass  There is no hepatosplenomegaly, splenomegaly or hepatomegaly  There is no tenderness  There is no rebound and no guarding  Musculoskeletal: Normal range of motion  She exhibits no edema, tenderness or deformity  Lymphadenopathy:     She has no cervical adenopathy  Neurological: She is alert and oriented to person, place, and time  She has normal reflexes  No cranial nerve deficit  Coordination normal    Skin: Skin is warm and dry  No rash noted  She is not diaphoretic  No erythema  No pallor  Psychiatric: She has a normal mood and affect  Her behavior is normal  Judgment and thought content normal          Labs:  Lab Results   Component Value Date    WBC 6 07 11/30/2019    HGB 11 9 11/30/2019    HCT 39 2 11/30/2019    MCV 98 11/30/2019     11/30/2019     Lab Results   Component Value Date     06/18/2018    K 4 2 11/30/2019     11/30/2019    CO2 32 11/30/2019    ANIONGAP 8 06/18/2018    BUN 20 11/30/2019    CREATININE 0 93 11/30/2019    GLUF 85 11/30/2019    CALCIUM 9 6 11/30/2019    AST 16 11/30/2019    ALT 14 11/30/2019    ALKPHOS 63 11/30/2019    PROT 7 1 06/18/2018    BILITOT 0 3 06/18/2018    EGFR 61 11/30/2019     No results found for: TSH    Patient voiced understanding and agreement in the above discussion  Aware to contact our office with questions/symptoms in the interim

## 2019-12-14 ENCOUNTER — APPOINTMENT (OUTPATIENT)
Dept: LAB | Age: 74
End: 2019-12-14
Payer: MEDICARE

## 2019-12-14 DIAGNOSIS — Z17.0 MALIGNANT NEOPLASM OF OVERLAPPING SITES OF RIGHT BREAST IN FEMALE, ESTROGEN RECEPTOR POSITIVE (HCC): ICD-10-CM

## 2019-12-14 DIAGNOSIS — Z79.811 USE OF AROMATASE INHIBITORS: ICD-10-CM

## 2019-12-14 DIAGNOSIS — C50.811 MALIGNANT NEOPLASM OF OVERLAPPING SITES OF RIGHT BREAST IN FEMALE, ESTROGEN RECEPTOR POSITIVE (HCC): ICD-10-CM

## 2019-12-14 LAB
25(OH)D3 SERPL-MCNC: 39.8 NG/ML (ref 30–100)
ALBUMIN SERPL BCP-MCNC: 3.8 G/DL (ref 3.5–5)
ALP SERPL-CCNC: 59 U/L (ref 46–116)
ALT SERPL W P-5'-P-CCNC: 17 U/L (ref 12–78)
ANION GAP SERPL CALCULATED.3IONS-SCNC: 2 MMOL/L (ref 4–13)
AST SERPL W P-5'-P-CCNC: 15 U/L (ref 5–45)
BASOPHILS # BLD AUTO: 0.03 THOUSANDS/ΜL (ref 0–0.1)
BASOPHILS NFR BLD AUTO: 1 % (ref 0–1)
BILIRUB SERPL-MCNC: 0.36 MG/DL (ref 0.2–1)
BUN SERPL-MCNC: 24 MG/DL (ref 5–25)
CALCIUM SERPL-MCNC: 9.2 MG/DL (ref 8.3–10.1)
CHLORIDE SERPL-SCNC: 105 MMOL/L (ref 100–108)
CO2 SERPL-SCNC: 31 MMOL/L (ref 21–32)
CREAT SERPL-MCNC: 0.99 MG/DL (ref 0.6–1.3)
EOSINOPHIL # BLD AUTO: 0.09 THOUSAND/ΜL (ref 0–0.61)
EOSINOPHIL NFR BLD AUTO: 2 % (ref 0–6)
ERYTHROCYTE [DISTWIDTH] IN BLOOD BY AUTOMATED COUNT: 14.5 % (ref 11.6–15.1)
GFR SERPL CREATININE-BSD FRML MDRD: 57 ML/MIN/1.73SQ M
GLUCOSE P FAST SERPL-MCNC: 77 MG/DL (ref 65–99)
HCT VFR BLD AUTO: 37.7 % (ref 34.8–46.1)
HGB BLD-MCNC: 12 G/DL (ref 11.5–15.4)
IMM GRANULOCYTES # BLD AUTO: 0.01 THOUSAND/UL (ref 0–0.2)
IMM GRANULOCYTES NFR BLD AUTO: 0 % (ref 0–2)
LYMPHOCYTES # BLD AUTO: 1.86 THOUSANDS/ΜL (ref 0.6–4.47)
LYMPHOCYTES NFR BLD AUTO: 38 % (ref 14–44)
MAGNESIUM SERPL-MCNC: 1.9 MG/DL (ref 1.6–2.6)
MCH RBC QN AUTO: 31.2 PG (ref 26.8–34.3)
MCHC RBC AUTO-ENTMCNC: 31.8 G/DL (ref 31.4–37.4)
MCV RBC AUTO: 98 FL (ref 82–98)
MONOCYTES # BLD AUTO: 0.31 THOUSAND/ΜL (ref 0.17–1.22)
MONOCYTES NFR BLD AUTO: 6 % (ref 4–12)
NEUTROPHILS # BLD AUTO: 2.59 THOUSANDS/ΜL (ref 1.85–7.62)
NEUTS SEG NFR BLD AUTO: 53 % (ref 43–75)
NRBC BLD AUTO-RTO: 0 /100 WBCS
PLATELET # BLD AUTO: 307 THOUSANDS/UL (ref 149–390)
PMV BLD AUTO: 10.1 FL (ref 8.9–12.7)
POTASSIUM SERPL-SCNC: 4.5 MMOL/L (ref 3.5–5.3)
PROT SERPL-MCNC: 7.5 G/DL (ref 6.4–8.2)
RBC # BLD AUTO: 3.85 MILLION/UL (ref 3.81–5.12)
SODIUM SERPL-SCNC: 138 MMOL/L (ref 136–145)
WBC # BLD AUTO: 4.89 THOUSAND/UL (ref 4.31–10.16)

## 2019-12-14 PROCEDURE — 85025 COMPLETE CBC W/AUTO DIFF WBC: CPT

## 2019-12-14 PROCEDURE — 80053 COMPREHEN METABOLIC PANEL: CPT

## 2019-12-14 PROCEDURE — 82306 VITAMIN D 25 HYDROXY: CPT

## 2019-12-14 PROCEDURE — 83735 ASSAY OF MAGNESIUM: CPT

## 2019-12-14 PROCEDURE — 36415 COLL VENOUS BLD VENIPUNCTURE: CPT

## 2019-12-16 ENCOUNTER — TELEPHONE (OUTPATIENT)
Dept: HEMATOLOGY ONCOLOGY | Facility: CLINIC | Age: 74
End: 2019-12-16

## 2019-12-16 ENCOUNTER — HOSPITAL ENCOUNTER (OUTPATIENT)
Dept: NUCLEAR MEDICINE | Facility: HOSPITAL | Age: 74
Discharge: HOME | End: 2019-12-16
Attending: INTERNAL MEDICINE
Payer: MEDICARE

## 2019-12-16 DIAGNOSIS — Z17.0 MALIGNANT NEOPLASM OF OVERLAPPING SITES OF RIGHT BREAST IN FEMALE, ESTROGEN RECEPTOR POSITIVE (HCC): ICD-10-CM

## 2019-12-16 DIAGNOSIS — C50.811 MALIGNANT NEOPLASM OF OVERLAPPING SITES OF RIGHT BREAST IN FEMALE, ESTROGEN RECEPTOR POSITIVE (HCC): ICD-10-CM

## 2019-12-16 LAB — GLUCOSE SERPL-MCNC: 81 MG/DL (ref 65–140)

## 2019-12-16 PROCEDURE — A9552 F18 FDG: HCPCS

## 2019-12-16 PROCEDURE — 82948 REAGENT STRIP/BLOOD GLUCOSE: CPT

## 2019-12-16 PROCEDURE — 78815 PET IMAGE W/CT SKULL-THIGH: CPT

## 2019-12-16 NOTE — TELEPHONE ENCOUNTER
Sunitha from [facility] called stating there is a significant finding on pt's PET CT

## 2019-12-18 ENCOUNTER — HOSPITAL ENCOUNTER (OUTPATIENT)
Dept: INFUSION CENTER | Facility: HOSPITAL | Age: 74
Discharge: HOME | End: 2019-12-18
Attending: INTERNAL MEDICINE
Payer: MEDICARE

## 2019-12-18 ENCOUNTER — OFFICE VISIT (OUTPATIENT)
Dept: HEMATOLOGY ONCOLOGY | Facility: CLINIC | Age: 74
End: 2019-12-18
Payer: MEDICARE

## 2019-12-18 VITALS
HEIGHT: 62 IN | WEIGHT: 167.8 LBS | RESPIRATION RATE: 16 BRPM | BODY MASS INDEX: 30.88 KG/M2 | TEMPERATURE: 98.2 F | HEART RATE: 86 BPM | OXYGEN SATURATION: 95 % | DIASTOLIC BLOOD PRESSURE: 90 MMHG | SYSTOLIC BLOOD PRESSURE: 162 MMHG

## 2019-12-18 VITALS — HEIGHT: 62 IN | WEIGHT: 167.77 LBS | BODY MASS INDEX: 30.87 KG/M2

## 2019-12-18 DIAGNOSIS — C50.811 MALIGNANT NEOPLASM OF OVERLAPPING SITES OF RIGHT BREAST IN FEMALE, ESTROGEN RECEPTOR POSITIVE (HCC): Primary | ICD-10-CM

## 2019-12-18 DIAGNOSIS — Z17.0 MALIGNANT NEOPLASM OF OVERLAPPING SITES OF RIGHT BREAST IN FEMALE, ESTROGEN RECEPTOR POSITIVE (HCC): Primary | ICD-10-CM

## 2019-12-18 DIAGNOSIS — Z79.811 USE OF AROMATASE INHIBITORS: ICD-10-CM

## 2019-12-18 PROCEDURE — 96413 CHEMO IV INFUSION 1 HR: CPT

## 2019-12-18 PROCEDURE — 99215 OFFICE O/P EST HI 40 MIN: CPT | Performed by: INTERNAL MEDICINE

## 2019-12-18 PROCEDURE — 96367 TX/PROPH/DG ADDL SEQ IV INF: CPT

## 2019-12-18 RX ORDER — SODIUM CHLORIDE 9 MG/ML
20 INJECTION, SOLUTION INTRAVENOUS ONCE
Status: CANCELLED | OUTPATIENT
Start: 2020-01-08

## 2019-12-18 RX ORDER — SODIUM CHLORIDE 9 MG/ML
20 INJECTION, SOLUTION INTRAVENOUS ONCE
Status: COMPLETED | OUTPATIENT
Start: 2019-12-18 | End: 2019-12-18

## 2019-12-18 RX ADMIN — FAMOTIDINE 20 MG: 10 INJECTION, SOLUTION INTRAVENOUS at 12:05

## 2019-12-18 RX ADMIN — DEXAMETHASONE SODIUM PHOSPHATE 4 MG: 10 INJECTION, SOLUTION INTRAMUSCULAR; INTRAVENOUS at 11:13

## 2019-12-18 RX ADMIN — ONDANSETRON HYDROCHLORIDE 16 MG: 2 INJECTION, SOLUTION INTRAMUSCULAR; INTRAVENOUS at 11:36

## 2019-12-18 RX ADMIN — PACLITAXEL 142.2 MG: 6 INJECTION, SOLUTION, CONCENTRATE INTRAVENOUS at 12:23

## 2019-12-18 RX ADMIN — SODIUM CHLORIDE 20 ML/HR: 9 INJECTION, SOLUTION INTRAVENOUS at 11:13

## 2019-12-18 NOTE — PROGRESS NOTES
Pt toelrated taxol well today  No adverse reactions noted  Port flushed and deaccessed per routine  Pt will have a new treatment plan next cycle  Awaiting appt request for same  Pt is aware   discahrged ambulatory with avs

## 2019-12-18 NOTE — PROGRESS NOTES
Hematology/Oncology Outpatient Follow-up  Conor Rush 69 y o  female 1945 [id number]    Date:  12/18/2019        Assessment and Plan:  1  Malignant neoplasm of overlapping 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, estrogen receptor positive ([address] )  I had a lengthy discussion with the patient and her sister regarding the PET-CT scan findings which showing progression of her metastatic breast cancer  We discussed different treatment strategies  The decision was to pursue a biopsy of the enlarged lymph node from the right axilla and to add Herceptin to the current chemotherapy with Taxol  The treatment will be given on every 3 week basis  She will be also continue on the anastrozole  We will then adjust the treatment according to the final new pathology from the right axilla  2  Use of aromatase inhibitors  As above          HPI:  The patient came today for a follow-up visit  She continues to be on the anastrozole as endocrine therapy on a daily basis without interruption  She was recently restarted on the Taxol which she has been on for a long time as a palliative chemotherapy on every other week basis  She also had a PET-CT scan on 12/16/2019 which showed:  IMPRESSION:     1  Further increased FDG uptake in the enlarging right axillary lymph node compatible with disease progression  2   Now noted is mild focal FDG uptake in the right perihilar region, metastasis is not excluded  This can be reassessed on follow-up  3  New focal FDG uptake suggested at the left adrenal gland suspicious for metastasis  This could also be reassessed on follow-up  Oncology History    The patient has a remote history of right breast cancer diagnosed in [place] in February of 2005 with infiltrating ductal carcinoma grade 2 status post right total mastectomy and left prophylactic total mastectomy as well at that time  Her pathology revealed 8/17 positive lymph node for metastatic disease from the right axilla    ER status positive at 5-10%, progesterone negative, her 2 Gregory positive by FISH  She was then treated with 6 cycles of adjuvant chemotherapy with 5 fluorouracil, Adriamycin, and cyclophosphamide followed by tamoxifen and adjuvant radiation to the right axilla and chest wall  The patient was then switched from tamoxifen to Arimidex which was then stopped in 2012  She was then diagnosed with recurrence of her breast cancer with metastatic disease mainly involving the right anterior chest wall, skin, right axilla pectoralis muscle, left axilla and other areas of the body in May 2015  The biopsy from the scan showed triple positive malignant process compatible with recurrence of her breast cancer  She was then started on palliative chemotherapy and received 6 cycles of Taxotere along with trastuzumab and pertuzumab  After 6 cycles she was continued mainly on trastuzumab and pertuzumab alone  The patient then went to [facility] which she continued her oncological care with trastuzumab, pertuzumab and anastrozole  That treatment had to be put on hold after she developed decline of her ejection fraction to about 23% around June 2017  A PET scan in July of 2017 is showed mild progression of her disease and for that reason low-dose weekly Taxol was started on the 18th July 2017  The frequency of Taxol was decreased to 1 treatment every other week  She then received the rest of her treatment and live unknown from September 2017 until March 2018  Her echocardiogram in May 2018 showed ejection fraction of 43%  The patient then went to live alone again in July of 2018 where she continued her Taxol treatment on every other week basis until the middle of January 2019          Malignant neoplasm of overlapping 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, estrogen receptor positive ([address] )    2015 -  Hormone Therapy     Anastrozole      5/28/2015 Initial Diagnosis     Metastatic breast cancer ([address] )  (recurrence)      6/30/2015 -  Chemotherapy 1-Taxotere, Herceptin, Perjeta started in [place] 2015 x6 cycles  2-Herceptin and Perjeta alone were continued since the patient was not interested in continue the Taxotere  3-Herceptin and Perjeta were put on hold due to decline of her ejection fraction around May 2017  4-low dose weekly Taxol was started in July 2017  5-Taxol was switched to every other week basis      4/14/2019 - 6/9/2019 Chemotherapy     PACLitaxel (TAXOL) 144 6 mg in sodium chloride 0 9 % 250 mL chemo IVPB, 80 mg/m2, Intravenous, Once, 2 of 6 cycles      12/4/2019 -  Chemotherapy     PACLitaxel (TAXOL) 142 2 mg in sodium chloride 0 9 % 250 mL chemo IVPB, 80 mg/m2 = 142 2 mg, Intravenous, Once, 1 of 6 cycles  Administration: 142 2 mg (12/4/2019)         Interval history:    ROS: Review of Systems   Constitutional: Negative for activity change, appetite change, chills, diaphoresis, fatigue, fever and unexpected weight change  HENT: Negative for congestion, dental problem, ear discharge, ear pain, facial swelling, hearing loss, mouth sores, nosebleeds, postnasal drip, sore throat, tinnitus and trouble swallowing  Eyes: Negative for discharge, redness, itching and visual disturbance  Respiratory: Negative for cough, chest tightness, shortness of breath and wheezing  Cardiovascular: Negative for chest pain, palpitations and leg swelling  Gastrointestinal: Negative for abdominal distention, abdominal pain, anal bleeding, blood in stool, constipation, diarrhea, nausea and vomiting  Genitourinary: Negative for difficulty urinating, dysuria, flank pain, frequency, hematuria and urgency  Musculoskeletal: Negative for arthralgias, back pain, gait problem, joint swelling, myalgias and neck pain  Skin: Negative for color change, pallor, rash and wound  Neurological: Negative for dizziness, syncope, speech difficulty, weakness, light-headedness, numbness and headaches  Hematological: Negative for adenopathy  Does not bruise/bleed easily  Psychiatric/Behavioral: Negative for agitation, behavioral problems, confusion and sleep disturbance         Past Medical History:   Diagnosis Date    [facility] [address] [address] )     Hypertension     Lymphedema of right arm     Vitamin B12 deficiency        Past Surgical History:   Procedure Laterality Date    APPENDECTOMY      BREAST SURGERY      bilat mastectomy-right total mastectomy and prophylactic left total mastectomy-2005    KNEE SURGERY      right knee replacement 2014 in [facility] Bilateral        Social History     Socioeconomic History    Marital status: Single     Spouse name: None    Number of children: None    Years of education: None    Highest education level: None   Occupational History    None   Social Needs    Financial resource strain: None    Food insecurity:     Worry: None     Inability: None    Transportation needs:     Medical: None     Non-medical: None   Tobacco Use    Smoking status: Never Smoker    Smokeless tobacco: Never Used    Tobacco comment: no passive smoke exposure   Substance and Sexual Activity    Alcohol use: No    Drug use: No    Sexual activity: None   Lifestyle    Physical activity:     Days per week: None     Minutes per session: None    Stress: None   Relationships    Social connections:     Talks on phone: None     Gets together: None     Attends Jew service: None     Active member of club or organization: None     Attends meetings of clubs or organizations: None     Relationship status: None    Intimate partner violence:     Fear of current or ex partner: None     Emotionally abused: None     Physically abused: None     Forced sexual activity: None   Other Topics Concern    None   Social History Narrative    None       Family History   Problem Relation Age of Onset    Asthma Mother     Osteoarthritis Father     Bone cancer Family        Allergies   Allergen Reactions    Penicillins Rash         Current Outpatient Medications:   anastrozole (ARIMIDEX) 1 mg tablet, Take 1 tablet (1 mg total) by mouth every 24 hours, Disp: 90 tablet, Rfl: 4    Cyanocobalamin 1000 MCG/ML KIT, Inject as directed every 6 (six) months, Disp: , Rfl:     esomeprazole (NexIUM) 40 MG capsule, Take 1 capsule (40 mg total) by mouth daily, Disp: 30 capsule, Rfl: 5    nebivolol (BYSTOLIC) 5 mg tablet, take 1/2 tablet  tablet by oral route  qhs, Disp: , Rfl:     ondansetron (ZOFRAN) 4 mg tablet, Take 1 tablet (4 mg total) by mouth every 6 (six) hours, Disp: 12 tablet, Rfl: 0    ergocalciferol (VITAMIN D2) 50,000 units, Take 1 capsule (50,000 Units total) by mouth once a week for 8 doses, Disp: 8 capsule, Rfl: 0  No current facility-administered medications for this visit  Facility-Administered Medications Ordered in Other Visits:     diphenhydrAMINE (BENADRYL) 25 mg in sodium chloride 0 9 % 50 mL IVPB, 25 mg, Intravenous, Once, Bibiana Pulido MD    famotidine (PEPCID) 20 mg in sodium chloride 0 9 % 52 mL IVPB, 20 mg, Intravenous, Once, Bibiana Pulido MD, Last Rate: 156 mL/hr at 12/18/19 1205, 20 mg at 12/18/19 1205    PACLitaxel (TAXOL) 142 2 mg in sodium chloride 0 9 % 250 mL chemo IVPB, 80 mg/m2 (Treatment Plan Recorded), Intravenous, Once, Bibiana Pulido MD      Physical Exam:  /90 (BP Location: Left arm, Cuff Size: Adult)   Pulse 86   Temp 98 2 °F (36 8 °C) (Tympanic)   Resp 16   Ht 5' 2" (1 575 m)   Wt 76 1 kg (167 lb 12 8 oz)   SpO2 95%   BMI 30 69 kg/m²     Physical Exam   Constitutional: She is oriented to person, place, and time  She appears well-developed and well-nourished  No distress  HENT:   Head: Normocephalic and atraumatic  Nose: Nose normal    Mouth/Throat: Oropharynx is clear and moist    Eyes: Pupils are equal, round, and reactive to light  Conjunctivae and EOM are normal  Right eye exhibits no discharge  Left eye exhibits no discharge  No scleral icterus  Neck: Normal range of motion  Neck supple  No JVD present   No tracheal deviation present  No thyromegaly present  Cardiovascular: Normal rate, regular rhythm and normal heart sounds  Exam reveals no friction rub  No murmur heard  Pulmonary/Chest: Effort normal and breath sounds normal  No stridor  No respiratory distress  She has no wheezes  She has no rales  She exhibits no tenderness  Palpable lymph node in the right axilla   Abdominal: Soft  Bowel sounds are normal  She exhibits no distension and no mass  There is no hepatosplenomegaly, splenomegaly or hepatomegaly  There is no tenderness  There is no rebound and no guarding  Musculoskeletal: Normal range of motion  She exhibits no edema, tenderness or deformity  Lymphadenopathy:     She has no cervical adenopathy  Neurological: She is alert and oriented to person, place, and time  She has normal reflexes  No cranial nerve deficit  Coordination normal    Skin: Skin is warm and dry  No rash noted  She is not diaphoretic  No erythema  No pallor  Psychiatric: She has a normal mood and affect  Her behavior is normal  Judgment and thought content normal          Labs:  Lab Results   Component Value Date    WBC 4 89 12/14/2019    HGB 12 0 12/14/2019    HCT 37 7 12/14/2019    MCV 98 12/14/2019     12/14/2019     Lab Results   Component Value Date     06/18/2018    K 4 5 12/14/2019     12/14/2019    CO2 31 12/14/2019    ANIONGAP 8 06/18/2018    BUN 24 12/14/2019    CREATININE 0 99 12/14/2019    GLUF 77 12/14/2019    CALCIUM 9 2 12/14/2019    AST 15 12/14/2019    ALT 17 12/14/2019    ALKPHOS 59 12/14/2019    PROT 7 1 06/18/2018    BILITOT 0 3 06/18/2018    EGFR 57 12/14/2019     No results found for: TSH    Patient voiced understanding and agreement in the above discussion  Aware to contact our office with questions/symptoms in the interim

## 2019-12-23 DIAGNOSIS — C50.811 MALIGNANT NEOPLASM OF OVERLAPPING SITES OF RIGHT BREAST IN FEMALE, ESTROGEN RECEPTOR POSITIVE (HCC): ICD-10-CM

## 2019-12-23 DIAGNOSIS — R92.8 ABNORMAL MAMMOGRAM: Primary | ICD-10-CM

## 2019-12-23 DIAGNOSIS — Z17.0 MALIGNANT NEOPLASM OF OVERLAPPING SITES OF RIGHT BREAST IN FEMALE, ESTROGEN RECEPTOR POSITIVE (HCC): ICD-10-CM

## 2019-12-26 ENCOUNTER — APPOINTMENT (OUTPATIENT)
Dept: LAB | Age: 74
End: 2019-12-26
Payer: MEDICARE

## 2019-12-26 ENCOUNTER — TELEPHONE (OUTPATIENT)
Dept: HEMATOLOGY ONCOLOGY | Facility: CLINIC | Age: 74
End: 2019-12-26

## 2019-12-26 DIAGNOSIS — R30.0 DYSURIA: Primary | ICD-10-CM

## 2019-12-26 DIAGNOSIS — R30.0 DYSURIA: ICD-10-CM

## 2019-12-26 LAB
BACTERIA UR QL AUTO: ABNORMAL /HPF
BILIRUB UR QL STRIP: NEGATIVE
CLARITY UR: CLEAR
COLOR UR: YELLOW
GLUCOSE UR STRIP-MCNC: NEGATIVE MG/DL
HGB UR QL STRIP.AUTO: NEGATIVE
HYALINE CASTS #/AREA URNS LPF: ABNORMAL /LPF
KETONES UR STRIP-MCNC: NEGATIVE MG/DL
LEUKOCYTE ESTERASE UR QL STRIP: ABNORMAL
NITRITE UR QL STRIP: NEGATIVE
NON-SQ EPI CELLS URNS QL MICRO: ABNORMAL /HPF
PH UR STRIP.AUTO: 5.5 [PH]
PROT UR STRIP-MCNC: NEGATIVE MG/DL
RBC #/AREA URNS AUTO: ABNORMAL /HPF
SP GR UR STRIP.AUTO: 1.02 (ref 1–1.03)
UROBILINOGEN UR QL STRIP.AUTO: 0.2 E.U./DL
WBC #/AREA URNS AUTO: ABNORMAL /HPF

## 2019-12-26 PROCEDURE — 81001 URINALYSIS AUTO W/SCOPE: CPT

## 2019-12-26 RX ORDER — CIPROFLOXACIN 500 MG/1
500 TABLET, FILM COATED ORAL EVERY 12 HOURS SCHEDULED
Qty: 6 TABLET | Refills: 0 | Status: SHIPPED | OUTPATIENT
Start: 2019-12-26 | End: 2019-12-29

## 2019-12-26 RX ORDER — CIPROFLOXACIN 500 MG/1
500 TABLET, FILM COATED ORAL EVERY 12 HOURS SCHEDULED
Qty: 6 TABLET | Refills: 0 | Status: SHIPPED | OUTPATIENT
Start: 2019-12-26 | End: 2019-12-26 | Stop reason: SDUPTHER

## 2019-12-26 NOTE — TELEPHONE ENCOUNTER
Received a phone call from patient's sister to report that patient was having urinary frequency and burning and she was requesting some medication to treat this  I explained to sister that I would speak to Vicente Valiente in between patients and phone her back

## 2020-01-02 DIAGNOSIS — Z17.0 MALIGNANT NEOPLASM OF OVERLAPPING SITES OF RIGHT BREAST IN FEMALE, ESTROGEN RECEPTOR POSITIVE (HCC): Primary | ICD-10-CM

## 2020-01-02 DIAGNOSIS — C50.811 MALIGNANT NEOPLASM OF OVERLAPPING SITES OF RIGHT BREAST IN FEMALE, ESTROGEN RECEPTOR POSITIVE (HCC): Primary | ICD-10-CM

## 2020-01-06 ENCOUNTER — APPOINTMENT (OUTPATIENT)
Dept: LAB | Age: 75
End: 2020-01-06
Payer: MEDICARE

## 2020-01-06 DIAGNOSIS — C50.811 MALIGNANT NEOPLASM OF OVERLAPPING SITES OF RIGHT BREAST IN FEMALE, ESTROGEN RECEPTOR POSITIVE (HCC): ICD-10-CM

## 2020-01-06 DIAGNOSIS — Z17.0 MALIGNANT NEOPLASM OF OVERLAPPING SITES OF RIGHT BREAST IN FEMALE, ESTROGEN RECEPTOR POSITIVE (HCC): ICD-10-CM

## 2020-01-06 LAB
25(OH)D3 SERPL-MCNC: 42.6 NG/ML (ref 30–100)
ALBUMIN SERPL BCP-MCNC: 3.4 G/DL (ref 3.5–5)
ALP SERPL-CCNC: 59 U/L (ref 46–116)
ALT SERPL W P-5'-P-CCNC: 16 U/L (ref 12–78)
ANION GAP SERPL CALCULATED.3IONS-SCNC: 2 MMOL/L (ref 4–13)
AST SERPL W P-5'-P-CCNC: 15 U/L (ref 5–45)
BASOPHILS # BLD AUTO: 0.04 THOUSANDS/ΜL (ref 0–0.1)
BASOPHILS NFR BLD AUTO: 1 % (ref 0–1)
BILIRUB SERPL-MCNC: 0.5 MG/DL (ref 0.2–1)
BUN SERPL-MCNC: 22 MG/DL (ref 5–25)
CALCIUM SERPL-MCNC: 8.9 MG/DL (ref 8.3–10.1)
CHLORIDE SERPL-SCNC: 108 MMOL/L (ref 100–108)
CO2 SERPL-SCNC: 31 MMOL/L (ref 21–32)
CREAT SERPL-MCNC: 0.9 MG/DL (ref 0.6–1.3)
EOSINOPHIL # BLD AUTO: 0.11 THOUSAND/ΜL (ref 0–0.61)
EOSINOPHIL NFR BLD AUTO: 2 % (ref 0–6)
ERYTHROCYTE [DISTWIDTH] IN BLOOD BY AUTOMATED COUNT: 14.5 % (ref 11.6–15.1)
GFR SERPL CREATININE-BSD FRML MDRD: 64 ML/MIN/1.73SQ M
GLUCOSE P FAST SERPL-MCNC: 76 MG/DL (ref 65–99)
HCT VFR BLD AUTO: 36.5 % (ref 34.8–46.1)
HGB BLD-MCNC: 11.5 G/DL (ref 11.5–15.4)
IMM GRANULOCYTES # BLD AUTO: 0.03 THOUSAND/UL (ref 0–0.2)
IMM GRANULOCYTES NFR BLD AUTO: 1 % (ref 0–2)
LYMPHOCYTES # BLD AUTO: 1.82 THOUSANDS/ΜL (ref 0.6–4.47)
LYMPHOCYTES NFR BLD AUTO: 32 % (ref 14–44)
MAGNESIUM SERPL-MCNC: 1.7 MG/DL (ref 1.6–2.6)
MCH RBC QN AUTO: 30.5 PG (ref 26.8–34.3)
MCHC RBC AUTO-ENTMCNC: 31.5 G/DL (ref 31.4–37.4)
MCV RBC AUTO: 97 FL (ref 82–98)
MONOCYTES # BLD AUTO: 0.52 THOUSAND/ΜL (ref 0.17–1.22)
MONOCYTES NFR BLD AUTO: 9 % (ref 4–12)
NEUTROPHILS # BLD AUTO: 3.13 THOUSANDS/ΜL (ref 1.85–7.62)
NEUTS SEG NFR BLD AUTO: 55 % (ref 43–75)
NRBC BLD AUTO-RTO: 0 /100 WBCS
PLATELET # BLD AUTO: 281 THOUSANDS/UL (ref 149–390)
PMV BLD AUTO: 9.5 FL (ref 8.9–12.7)
POTASSIUM SERPL-SCNC: 4.2 MMOL/L (ref 3.5–5.3)
PROT SERPL-MCNC: 7.4 G/DL (ref 6.4–8.2)
RBC # BLD AUTO: 3.77 MILLION/UL (ref 3.81–5.12)
SODIUM SERPL-SCNC: 141 MMOL/L (ref 136–145)
WBC # BLD AUTO: 5.65 THOUSAND/UL (ref 4.31–10.16)

## 2020-01-06 PROCEDURE — 85025 COMPLETE CBC W/AUTO DIFF WBC: CPT

## 2020-01-06 PROCEDURE — 82306 VITAMIN D 25 HYDROXY: CPT

## 2020-01-06 PROCEDURE — 36415 COLL VENOUS BLD VENIPUNCTURE: CPT

## 2020-01-06 PROCEDURE — 80053 COMPREHEN METABOLIC PANEL: CPT

## 2020-01-06 PROCEDURE — 83735 ASSAY OF MAGNESIUM: CPT

## 2020-01-08 ENCOUNTER — OFFICE VISIT (OUTPATIENT)
Dept: HEMATOLOGY ONCOLOGY | Facility: CLINIC | Age: 75
End: 2020-01-08
Payer: MEDICARE

## 2020-01-08 ENCOUNTER — HOSPITAL ENCOUNTER (OUTPATIENT)
Dept: INFUSION CENTER | Facility: HOSPITAL | Age: 75
Discharge: HOME | End: 2020-01-08
Attending: INTERNAL MEDICINE
Payer: MEDICARE

## 2020-01-08 VITALS
SYSTOLIC BLOOD PRESSURE: 150 MMHG | TEMPERATURE: 97.8 F | BODY MASS INDEX: 31.39 KG/M2 | RESPIRATION RATE: 18 BRPM | HEIGHT: 62 IN | HEART RATE: 86 BPM | DIASTOLIC BLOOD PRESSURE: 94 MMHG | WEIGHT: 170.6 LBS | OXYGEN SATURATION: 96 %

## 2020-01-08 VITALS — BODY MASS INDEX: 31.4 KG/M2 | WEIGHT: 170.64 LBS | HEIGHT: 62 IN

## 2020-01-08 DIAGNOSIS — E55.9 VITAMIN D DEFICIENCY: ICD-10-CM

## 2020-01-08 DIAGNOSIS — C50.811 MALIGNANT NEOPLASM OF OVERLAPPING SITES OF RIGHT BREAST IN FEMALE, ESTROGEN RECEPTOR POSITIVE (HCC): Primary | ICD-10-CM

## 2020-01-08 DIAGNOSIS — Z79.811 USE OF AROMATASE INHIBITORS: ICD-10-CM

## 2020-01-08 DIAGNOSIS — Z17.0 MALIGNANT NEOPLASM OF OVERLAPPING SITES OF RIGHT BREAST IN FEMALE, ESTROGEN RECEPTOR POSITIVE (HCC): Primary | ICD-10-CM

## 2020-01-08 PROCEDURE — 96413 CHEMO IV INFUSION 1 HR: CPT

## 2020-01-08 PROCEDURE — 96367 TX/PROPH/DG ADDL SEQ IV INF: CPT

## 2020-01-08 PROCEDURE — 99214 OFFICE O/P EST MOD 30 MIN: CPT | Performed by: INTERNAL MEDICINE

## 2020-01-08 PROCEDURE — 96417 CHEMO IV INFUS EACH ADDL SEQ: CPT

## 2020-01-08 RX ORDER — SODIUM CHLORIDE 9 MG/ML
20 INJECTION, SOLUTION INTRAVENOUS ONCE
Status: CANCELLED | OUTPATIENT
Start: 2020-01-29

## 2020-01-08 RX ORDER — SODIUM CHLORIDE 9 MG/ML
20 INJECTION, SOLUTION INTRAVENOUS ONCE
Status: COMPLETED | OUTPATIENT
Start: 2020-01-08 | End: 2020-01-08

## 2020-01-08 RX ADMIN — SODIUM CHLORIDE 20 ML/HR: 0.9 INJECTION, SOLUTION INTRAVENOUS at 12:05

## 2020-01-08 RX ADMIN — PACLITAXEL 141.6 MG: 6 INJECTION, SOLUTION, CONCENTRATE INTRAVENOUS at 12:23

## 2020-01-08 RX ADMIN — TRASTUZUMAB 600 MG: 150 INJECTION, POWDER, LYOPHILIZED, FOR SOLUTION INTRAVENOUS at 13:23

## 2020-01-08 RX ADMIN — ONDANSETRON HYDROCHLORIDE: 2 INJECTION, SOLUTION INTRAMUSCULAR; INTRAVENOUS at 12:06

## 2020-01-08 NOTE — PROGRESS NOTES
Hematology/Oncology Outpatient Follow-up  Melissa Monk 69 y o  female 1945 [id number]    Date:  1/8/2020        Assessment and Plan:  1  Malignant neoplasm of overlapping 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, estrogen receptor positive ([facility] [address] )  The patient has expected has the same pathology from the recent right axillary biopsy  We discussed the results of the biopsy and then made a decision of getting her is started on Taxol and Herceptin today  She will be continued on the current treatment on every 3 week basis  I did also encouraged her to continue with the anastrozole as endocrine therapy  - Infusion Calculated Appointment Request; Future  - CBC and differential; Future  - Comprehensive metabolic panel; Future  - Magnesium; Future    2  Use of aromatase inhibitors  She is on Prolia injections every 6 months  3  Vitamin D deficiency  She does not seem to be vitamin-D deficient at least at this point in time  HPI:  The patient came today for a follow-up visit  She was recently seen by Dr Judge Riojas who did a biopsy from the right axilla the biopsy result on the 20/6 of December 2019 revealed invasive ductal carcinoma ER 90% positive IL 0% negative and her 2 Gregory positive 3+  The patient had urine tract infection which was recently treated  She denied any urinary symptoms  Her blood work on 01/06/2020 showed normal vitamin-D level of 42 her magnesium was normal   Her white cell count was 5 6 with hemoglobin of 11 5 and platelet count of 613  Creatinine of 0 9 with normal liver enzymes  Oncology History    The patient has a remote history of right breast cancer diagnosed in [place] in February of 2005 with infiltrating ductal carcinoma grade 2 status post right total mastectomy and left prophylactic total mastectomy as well at that time  Her pathology revealed 8/17 positive lymph node for metastatic disease from the right axilla    ER status positive at 5-10%, progesterone negative, her 2 Gregory positive by FISH  She was then treated with 6 cycles of adjuvant chemotherapy with 5 fluorouracil, Adriamycin, and cyclophosphamide followed by tamoxifen and adjuvant radiation to the right axilla and chest wall  The patient was then switched from tamoxifen to Arimidex which was then stopped in 2012  She was then diagnosed with recurrence of her breast cancer with metastatic disease mainly involving the right anterior chest wall, skin, right axilla pectoralis muscle, left axilla and other areas of the body in May 2015  The biopsy from the scan showed triple positive malignant process compatible with recurrence of her breast cancer  She was then started on palliative chemotherapy and received 6 cycles of Taxotere along with trastuzumab and pertuzumab  After 6 cycles she was continued mainly on trastuzumab and pertuzumab alone  The patient then went to [facility] which she continued her oncological care with trastuzumab, pertuzumab and anastrozole  That treatment had to be put on hold after she developed decline of her ejection fraction to about 23% around June 2017  A PET scan in July of 2017 is showed mild progression of her disease and for that reason low-dose weekly Taxol was started on the 18th July 2017  The frequency of Taxol was decreased to 1 treatment every other week  She then received the rest of her treatment and live unknown from September 2017 until March 2018  Her echocardiogram in May 2018 showed ejection fraction of 43%  The patient then went to live alone again in July of 2018 where she continued her Taxol treatment on every other week basis until the middle of January 2019          Malignant neoplasm of overlapping 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, estrogen receptor positive ([facility] [address] )    2015 -  Hormone Therapy     Anastrozole      5/28/2015 Initial Diagnosis     Metastatic breast cancer ([facility] [address] )  (recurrence)      6/30/2015 -  Chemotherapy     1-Taxotere, Herceptin, Perjeta started in June 2015 x6 cycles  2-Herceptin and Perjeta alone were continued since the patient was not interested in continue the Taxotere  3-Herceptin and Perjeta were put on hold due to decline of her ejection fraction around May 2017  4-low dose weekly Taxol was started in July 2017  5-Taxol was switched to every other week basis      4/14/2019 - 6/9/2019 Chemotherapy     PACLitaxel (TAXOL) 144 6 mg in sodium chloride 0 9 % 250 mL chemo IVPB, 80 mg/m2, Intravenous, Once, 2 of 6 cycles      12/4/2019 - 12/31/2019 Chemotherapy     PACLitaxel (TAXOL) 142 2 mg in sodium chloride 0 9 % 250 mL chemo IVPB, 80 mg/m2 = 142 2 mg, Intravenous, Once, 1 of 6 cycles  Administration: 142 2 mg (12/4/2019), 142 2 mg (12/18/2019)      1/8/2020 -  Chemotherapy     PACLitaxel (TAXOL) 141 6 mg in sodium chloride 0 9 % 250 mL chemo IVPB, 80 mg/m2 = 141 6 mg, Intravenous, Once, 0 of 6 cycles  trastuzumab (HERCEPTIN) 600 mg in sodium chloride 0 9 % 250 mL chemo infusion, 609 mg, Intravenous, Once, 0 of 6 cycles         Interval history:    ROS: Review of Systems   Constitutional: Negative for activity change, appetite change, chills, diaphoresis, fatigue, fever and unexpected weight change  HENT: Negative for congestion, dental problem, ear discharge, ear pain, facial swelling, hearing loss, mouth sores, nosebleeds, postnasal drip, sore throat, tinnitus and trouble swallowing  Eyes: Negative for discharge, redness, itching and visual disturbance  Respiratory: Negative for cough, chest tightness, shortness of breath and wheezing  Cardiovascular: Negative for chest pain, palpitations and leg swelling  Gastrointestinal: Negative for abdominal distention, abdominal pain, anal bleeding, blood in stool, constipation, diarrhea, nausea and vomiting  Genitourinary: Positive for dysuria  Negative for difficulty urinating, flank pain, frequency, hematuria and urgency     Musculoskeletal: Negative for arthralgias, back pain, gait problem, joint swelling, myalgias and neck pain  Skin: Negative for color change, pallor, rash and wound  Neurological: Positive for headaches  Negative for dizziness, syncope, speech difficulty, weakness, light-headedness and numbness  Hematological: Negative for adenopathy  Does not bruise/bleed easily  Psychiatric/Behavioral: Negative for agitation, behavioral problems, confusion and sleep disturbance         Past Medical History:   Diagnosis Date    Breast cancer ([address] [address] )     Hypertension     Lymphedema of right arm     Vitamin B12 deficiency        Past Surgical History:   Procedure Laterality Date    APPENDECTOMY      BREAST SURGERY      bilat mastectomy-right total mastectomy and prophylactic left total mastectomy-2005    KNEE SURGERY      right knee replacement 2014 in [facility] Bilateral        Social History     Socioeconomic History    Marital status: Single     Spouse name: None    Number of children: None    Years of education: None    Highest education level: None   Occupational History    None   Social Needs    Financial resource strain: None    Food insecurity:     Worry: None     Inability: None    Transportation needs:     Medical: None     Non-medical: None   Tobacco Use    Smoking status: Never Smoker    Smokeless tobacco: Never Used    Tobacco comment: no passive smoke exposure   Substance and Sexual Activity    Alcohol use: No    Drug use: No    Sexual activity: None   Lifestyle    Physical activity:     Days per week: None     Minutes per session: None    Stress: None   Relationships    Social connections:     Talks on phone: None     Gets together: None     Attends Mandaen service: None     Active member of club or organization: None     Attends meetings of clubs or organizations: None     Relationship status: None    Intimate partner violence:     Fear of current or ex partner: None     Emotionally abused: None     Physically abused: None     Forced sexual activity: None Other Topics Concern    None   Social History Narrative    None       Family History   Problem Relation Age of Onset    Asthma Mother     Osteoarthritis Father     Bone cancer Family        Allergies   Allergen Reactions    Penicillins Rash         Current Outpatient Medications:     anastrozole (ARIMIDEX) 1 mg tablet, Take 1 tablet (1 mg total) by mouth every 24 hours, Disp: 90 tablet, Rfl: 4    Cyanocobalamin 1000 MCG/ML KIT, Inject as directed every 6 (six) months, Disp: , Rfl:     esomeprazole (NexIUM) 40 MG capsule, Take 1 capsule (40 mg total) by mouth daily, Disp: 30 capsule, Rfl: 5    nebivolol (BYSTOLIC) 5 mg tablet, take 1/2 tablet  tablet by oral route  qhs, Disp: , Rfl:     ondansetron (ZOFRAN) 4 mg tablet, Take 1 tablet (4 mg total) by mouth every 6 (six) hours, Disp: 12 tablet, Rfl: 0    ergocalciferol (VITAMIN D2) 50,000 units, Take 1 capsule (50,000 Units total) by mouth once a week for 8 doses, Disp: 8 capsule, Rfl: 0      Physical Exam:  /94 (BP Location: Left arm, Cuff Size: Adult)   Pulse 86   Temp 97 8 °F (36 6 °C) (Tympanic)   Resp 18   Ht 5' 2" (1 575 m)   Wt 77 4 kg (170 lb 9 6 oz)   SpO2 96%   BMI 31 20 kg/m²     Physical Exam   Constitutional: She is oriented to person, place, and time  She appears well-developed and well-nourished  No distress  HENT:   Head: Normocephalic and atraumatic  Nose: Nose normal    Mouth/Throat: Oropharynx is clear and moist    Eyes: Pupils are equal, round, and reactive to light  Conjunctivae and EOM are normal  Right eye exhibits no discharge  Left eye exhibits no discharge  No scleral icterus  Neck: Normal range of motion  Neck supple  No JVD present  No tracheal deviation present  No thyromegaly present  Cardiovascular: Normal rate, regular rhythm and normal heart sounds  Exam reveals no friction rub  No murmur heard  Pulmonary/Chest: Effort normal and breath sounds normal  No stridor  No respiratory distress   She has no wheezes  She has no rales  She exhibits no tenderness  Abdominal: Soft  Bowel sounds are normal  She exhibits no distension and no mass  There is no hepatosplenomegaly, splenomegaly or hepatomegaly  There is no tenderness  There is no rebound and no guarding  Musculoskeletal: Normal range of motion  She exhibits no edema, tenderness or deformity  Lymphadenopathy:     She has no cervical adenopathy  Neurological: She is alert and oriented to person, place, and time  She has normal reflexes  No cranial nerve deficit  Coordination normal    Skin: Skin is warm and dry  No rash noted  She is not diaphoretic  No erythema  No pallor  Psychiatric: She has a normal mood and affect  Her behavior is normal  Judgment and thought content normal          Labs:  Lab Results   Component Value Date    WBC 5 65 01/06/2020    HGB 11 5 01/06/2020    HCT 36 5 01/06/2020    MCV 97 01/06/2020     01/06/2020     Lab Results   Component Value Date     06/18/2018    K 4 2 01/06/2020     01/06/2020    CO2 31 01/06/2020    ANIONGAP 8 06/18/2018    BUN 22 01/06/2020    CREATININE 0 90 01/06/2020    GLUF 76 01/06/2020    CALCIUM 8 9 01/06/2020    AST 15 01/06/2020    ALT 16 01/06/2020    ALKPHOS 59 01/06/2020    PROT 7 1 06/18/2018    BILITOT 0 3 06/18/2018    EGFR 64 01/06/2020     No results found for: TSH    Patient voiced understanding and agreement in the above discussion  Aware to contact our office with questions/symptoms in the interim

## 2020-01-08 NOTE — PLAN OF CARE
Problem: PAIN - ADULT  Goal: Verbalizes/displays adequate comfort level or baseline comfort level  Description  Interventions:  - Encourage patient to monitor pain and request assistance  - Assess pain using appropriate pain scale  - Administer analgesics based on type and severity of pain and evaluate response  - Implement non-pharmacological measures as appropriate and evaluate response  - Consider cultural and social influences on pain and pain management  - Notify physician/advanced practitioner if interventions unsuccessful or patient reports new pain  Outcome: Progressing     Problem: INFECTION - ADULT  Goal: Absence or prevention of progression during hospitalization  Description  INTERVENTIONS:  - Assess and monitor for signs and symptoms of infection  - Monitor lab/diagnostic results  - Monitor all insertion sites, i e  indwelling lines, tubes, and drains  - Monitor endotracheal if appropriate and nasal secretions for changes in amount and color  - [place] appropriate cooling/warming therapies per order  - Administer medications as ordered  - Instruct and encourage patient and family to use good hand hygiene technique  - Identify and instruct in appropriate isolation precautions for identified infection/condition  Outcome: Progressing  Goal: Absence of fever/infection during neutropenic period  Description  INTERVENTIONS:  - Monitor WBC    Outcome: Progressing     Problem: Knowledge Deficit  Goal: Patient/family/caregiver demonstrates understanding of disease process, treatment plan, medications, and discharge instructions  Description  Complete learning assessment and assess knowledge base    Interventions:  - Provide teaching at level of understanding  - Provide teaching via preferred learning methods  Outcome: Progressing

## 2020-01-08 NOTE — PROGRESS NOTES
Pt  Denied new symptoms or concerns  Labs reviewed  Taxol and Herceptin completed without adverse reaction  Future appointments reviewed    Declined AVS

## 2020-01-17 ENCOUNTER — OFFICE VISIT (OUTPATIENT)
Dept: FAMILY MEDICINE CLINIC | Facility: CLINIC | Age: 75
End: 2020-01-17
Payer: MEDICARE

## 2020-01-17 VITALS
HEIGHT: 62 IN | DIASTOLIC BLOOD PRESSURE: 78 MMHG | WEIGHT: 169.6 LBS | SYSTOLIC BLOOD PRESSURE: 120 MMHG | BODY MASS INDEX: 31.21 KG/M2 | TEMPERATURE: 97.9 F | OXYGEN SATURATION: 99 % | HEART RATE: 75 BPM

## 2020-01-17 DIAGNOSIS — R94.8 ABNORMAL GASTROINTESTINAL PET SCAN: ICD-10-CM

## 2020-01-17 DIAGNOSIS — I10 ESSENTIAL HYPERTENSION: ICD-10-CM

## 2020-01-17 DIAGNOSIS — Z00.00 MEDICARE ANNUAL WELLNESS VISIT, INITIAL: Primary | ICD-10-CM

## 2020-01-17 DIAGNOSIS — R12 HEART BURN: ICD-10-CM

## 2020-01-17 DIAGNOSIS — E55.9 VITAMIN D DEFICIENCY: ICD-10-CM

## 2020-01-17 DIAGNOSIS — C50.811 MALIGNANT NEOPLASM OF OVERLAPPING SITES OF RIGHT BREAST IN FEMALE, ESTROGEN RECEPTOR POSITIVE (HCC): ICD-10-CM

## 2020-01-17 DIAGNOSIS — Z17.0 MALIGNANT NEOPLASM OF OVERLAPPING SITES OF RIGHT BREAST IN FEMALE, ESTROGEN RECEPTOR POSITIVE (HCC): ICD-10-CM

## 2020-01-17 DIAGNOSIS — M85.80 OSTEOPENIA, UNSPECIFIED LOCATION: ICD-10-CM

## 2020-01-17 DIAGNOSIS — Z11.59 NEED FOR HEPATITIS C SCREENING TEST: ICD-10-CM

## 2020-01-17 DIAGNOSIS — Z53.20 COLONOSCOPY REFUSED: ICD-10-CM

## 2020-01-17 DIAGNOSIS — Z28.20 IMMUNIZATION NOT CARRIED OUT BECAUSE OF PATIENT DECISION: ICD-10-CM

## 2020-01-17 DIAGNOSIS — R79.9 ABNORMAL BLOOD CHEMISTRY: ICD-10-CM

## 2020-01-17 DIAGNOSIS — I51.9 LEFT VENTRICULAR SYSTOLIC DYSFUNCTION: ICD-10-CM

## 2020-01-17 DIAGNOSIS — R30.0 DYSURIA: ICD-10-CM

## 2020-01-17 PROBLEM — R93.1 ABNORMAL ECHOCARDIOGRAPHY: Status: ACTIVE | Noted: 2017-02-16

## 2020-01-17 PROBLEM — C50.919 MALIGNANT NEOPLASM OF BREAST (HCC): Status: ACTIVE | Noted: 2017-02-16

## 2020-01-17 PROCEDURE — G0438 PPPS, INITIAL VISIT: HCPCS | Performed by: FAMILY MEDICINE

## 2020-01-17 PROCEDURE — 99204 OFFICE O/P NEW MOD 45 MIN: CPT | Performed by: FAMILY MEDICINE

## 2020-01-17 RX ORDER — LISINOPRIL 5 MG/1
5 TABLET ORAL DAILY
Qty: 30 TABLET | Refills: 3 | Status: SHIPPED | OUTPATIENT
Start: 2020-01-17 | End: 2020-01-17 | Stop reason: SDUPTHER

## 2020-01-17 RX ORDER — NEBIVOLOL 5 MG/1
2.5 TABLET ORAL DAILY
Qty: 90 TABLET | Refills: 0 | Status: SHIPPED | OUTPATIENT
Start: 2020-01-17 | End: 2020-04-09 | Stop reason: SDUPTHER

## 2020-01-17 RX ORDER — LISINOPRIL 5 MG/1
5 TABLET ORAL DAILY
Qty: 90 TABLET | Refills: 0 | Status: SHIPPED | OUTPATIENT
Start: 2020-01-17 | End: 2020-04-09 | Stop reason: SDUPTHER

## 2020-01-17 NOTE — PROGRESS NOTES
Assessment and Plan:     Problem List Items Addressed This Visit        Cardiovascular and Mediastinum    Essential hypertension    Relevant Medications    nebivolol (BYSTOLIC) 5 mg tablet    lisinopril (ZESTRIL) 5 mg tablet    Other Relevant Orders    Lipid Panel with Direct LDL reflex    Left ventricular systolic dysfunction    Relevant Medications    nebivolol (BYSTOLIC) 5 mg tablet       Musculoskeletal and Integument    Osteopenia    Relevant Orders    Protein electrophoresis, urine    Protein electrophoresis, serum    Cortisol Level, AM Specimen    PTH, intact       Other    Abnormal gastrointestinal PET scan    Colonoscopy refused    Heart burn    Immunization not carried out because of patient decision    Malignant neoplasm of overlapping 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, estrogen receptor positive ([facility] [address] )    Vitamin D deficiency      Other Visit Diagnoses     Medicare annual wellness visit, initial    -  Primary    Dysuria        Recommend for patient to return gyn exam    Relevant Orders    Urine culture    UA (URINE) with reflex to Scope    Abnormal blood chemistry        Low anion gap    Relevant Orders    Protein electrophoresis, urine    Protein electrophoresis, serum    Need for hepatitis C screening test        Relevant Orders    Hepatitis C antibody        BMI Counseling: Body mass index is 31 01 kg/m²  The BMI is above normal  Nutrition recommendations include decreasing portion sizes  Preventive health issues were discussed with patient, and age appropriate screening tests were ordered as noted in patient's After Visit Summary  Personalized health advice and appropriate referrals for health education or preventive services given if needed, as noted in patient's After Visit Summary       History of Present Illness:     Patient presents for Medicare Annual Wellness visit    Patient Care Team:  Mago Gardner MD as PCP - General (Family Medicine)  Eliza Bhakta MD (Hematology and Oncology) Problem List:     Patient Active Problem List   Diagnosis    Malignant neoplasm of overlapping 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, estrogen receptor positive ([facility] )    Use of aromatase inhibitors    Vitamin D deficiency    Benign essential hypertension    Abnormal echocardiography    Left ventricular systolic dysfunction    [facility]    Colonoscopy refused    Immunization not carried out because of patient decision    Heart burn    Abnormal gastrointestinal PET scan    Osteopenia    Essential hypertension      Past Medical and Surgical History:     Past Medical History:   Diagnosis Date    Breast cancer ([facility] )     Hypertension     Lymphedema of right arm     Vitamin B12 deficiency      Past Surgical History:   Procedure Laterality Date    APPENDECTOMY      BREAST SURGERY      bilat mastectomy-right total mastectomy and prophylactic left total mastectomy-2005    KNEE SURGERY      right knee replacement 2014 in [address] [place] [address] Bilateral       Family History:     Family History   Problem Relation Age of Onset    Asthma Mother     Osteoarthritis Father     Bone cancer Family       Social History:     Social History     Socioeconomic History    Marital status: Single     Spouse name: None    Number of children: None    Years of education: None    Highest education level: None   Occupational History    None   Social Needs    Financial resource strain: None    Food insecurity:     Worry: None     Inability: None    Transportation needs:     Medical: None     Non-medical: None   Tobacco Use    Smoking status: Never Smoker    Smokeless tobacco: Never Used    Tobacco comment: no passive smoke exposure   Substance and Sexual Activity    Alcohol use: No    Drug use: No    Sexual activity: None   Lifestyle    Physical activity:     Days per week: None     Minutes per session: None    Stress: None   Relationships    Social connections:     Talks on phone: None Gets together: None     Attends [employer] service: None     Active member of club or organization: None     Attends meetings of clubs or organizations: None     Relationship status: None    Intimate partner violence:     Fear of current or ex partner: None     Emotionally abused: None     Physically abused: None     Forced sexual activity: None   Other Topics Concern    None   Social History Narrative    None       Medications and Allergies:     Current Outpatient Medications   Medication Sig Dispense Refill    anastrozole (ARIMIDEX) 1 mg tablet Take 1 tablet (1 mg total) by mouth every 24 hours 90 tablet 4    Cyanocobalamin 1000 MCG/ML KIT Inject as directed every 6 (six) months      esomeprazole (NexIUM) 40 MG capsule Take 1 capsule (40 mg total) by mouth daily 30 capsule 5    ergocalciferol (VITAMIN D2) 50,000 units Take 1 capsule (50,000 Units total) by mouth once a week for 8 doses (Patient not taking: Reported on 1/17/2020) 8 capsule 0    lisinopril (ZESTRIL) 5 mg tablet Take 1 tablet (5 mg total) by mouth daily 90 tablet 0    nebivolol (BYSTOLIC) 5 mg tablet Take 0 5 tablets (2 5 mg total) by mouth daily 90 tablet 0    ondansetron (ZOFRAN) 4 mg tablet Take 1 tablet (4 mg total) by mouth every 6 (six) hours (Patient not taking: Reported on 1/17/2020) 12 tablet 0     No current facility-administered medications for this visit  Allergies   Allergen Reactions    Penicillins Rash      Immunizations: There is no immunization history on file for this patient     Health Maintenance:         Topic Date Due    Hepatitis C Screening  1945    CRC Screening: Colonoscopy  1945         Topic Date Due    Pneumococcal Vaccine: 65+ Years (1 of 2 - PCV13) 01/13/2011      Medicare Health Risk Assessment:     /78 (BP Location: Left arm, Patient Position: Sitting, Cuff Size: Standard)   Pulse 75   Temp 97 9 °F (36 6 °C) (Tympanic)   Ht 5' 2 01" (1 575 m)   Wt 76 9 kg (169 lb 9 6 oz) LMP  (Approximate)   SpO2 99%   BMI 31 01 kg/m²      Glenda Weston is here for her Initial Wellness visit  Health Risk Assessment:   Patient rates overall health as good  Patient feels that their physical health rating is slightly better  Eyesight was rated as same  Hearing was rated as same  Patient feels that their emotional and mental health rating is much better  Pain experienced in the last 7 days has been none  Patient states that she has experienced no weight loss or gain in last 6 months  Depression Screening:   PHQ-2 Score: 0      Fall Risk Screening: In the past year, patient has experienced: no history of falling in past year      Urinary Incontinence Screening:   Patient has not leaked urine accidently in the last six months  Home Safety:  Patient does not have trouble with stairs inside or outside of their home  Patient has working smoke alarms and has working carbon monoxide detector  Home safety hazards include: none  Nutrition:   Current diet is Regular and No Added Salt  Medications:   Patient is currently taking over-the-counter supplements  OTC medications include: see medication list  Patient is able to manage medications  Activities of Daily Living (ADLs)/Instrumental Activities of Daily Living (IADLs):   Walk and transfer into and out of bed and chair?: Yes  Dress and groom yourself?: Yes    Bathe or shower yourself?: Yes    Feed yourself?  Yes  Do your laundry/housekeeping?: Yes  Manage your money, pay your bills and track your expenses?: Yes  Make your own meals?: Yes    Do your own shopping?: Yes    Previous Hospitalizations:   Any hospitalizations or ED visits within the last 12 months?: No      Advance Care Planning:   Living will: No    Durable POA for healthcare: No    Advanced directive: No    Advanced directive counseling given: Yes      Cognitive Screening:   Provider or family/friend/caregiver concerned regarding cognition?: No    PREVENTIVE SCREENINGS Cardiovascular Screening:    General: Risks and Benefits Discussed    Due for: Lipid Panel      Diabetes Screening:     General: Screening Current      Colorectal Cancer Screening:     General: Risks and Benefits Discussed and Patient Declines      Breast Cancer Screening:     General: History Breast Cancer      Cervical Cancer Screening:    General: Screening Not Indicated      Osteoporosis Screening:    General: Screening Current      Abdominal Aortic Aneurysm (AAA) Screening:        General: Risks and Benefits Discussed and Patient Declines      Lung Cancer Screening:     General: Screening Not Indicated      Hepatitis C Screening:    General: Risks and Benefits Discussed    Hep C Screening Accepted: Yes      Other Counseling Topics:   Alcohol use counseling, car/seat belt/driving safety, skin self-exam, sunscreen and regular weightbearing exercise and calcium and vitamin D intake         Gloria Loza MD

## 2020-01-17 NOTE — PROGRESS NOTES
Assessment/Plan:       No problem-specific Assessment & Plan notes found for this encounter  Diagnoses and all orders for this visit:    Medicare annual wellness visit, initial    Dysuria  Comments:  Recommend for patient to return gyn exam  Orders:  -     Urine culture; Future  -     UA (URINE) with reflex to Scope    Vitamin D deficiency  Comments:  Compensated    Abnormal blood chemistry  Comments:  Low anion gap  Orders:  -     Protein electrophoresis, urine  -     Protein electrophoresis, serum; Future    Essential hypertension  Comments:  Controlled  To follow with the dash diet  Patient is going to have CMP  Ordered by Hematology  Orders:  -     Discontinue: lisinopril (ZESTRIL) 5 mg tablet; Take 1 tablet (5 mg total) by mouth daily  -     Lipid Panel with Direct LDL reflex; Future  -     nebivolol (BYSTOLIC) 5 mg tablet; Take 0 5 tablets (2 5 mg total) by mouth daily  -     lisinopril (ZESTRIL) 5 mg tablet; Take 1 tablet (5 mg total) by mouth daily    Osteopenia, unspecified location  Comments:  Patient taking Prolia injection  Fall prevention , calcium and vitamin-D d discussed  Orders:  -     Protein electrophoresis, urine  -     Protein electrophoresis, serum; Future  -     Cortisol Level, AM Specimen; Future  -     PTH, intact; Future    Abnormal gastrointestinal PET scan  Comments:  abnormal pet scan of esophagus , recommend UGI  or EGD   pt declined     Malignant neoplasm of overlapping 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, estrogen receptor positive ([facility] [address] )  Comments:  hem ov on 1-8-20 noted    Left ventricular systolic dysfunction  Comments:  improved     Heart burn  Comments:  check UGI , pt declined     Immunization not carried out because of patient decision    Colonoscopy refused    Need for hepatitis C screening test  -     Hepatitis C antibody;  Future        Patient Instructions       Medicare Preventive Visit Patient Instructions  Thank you for completing your Welcome to Medicare Visit or Medicare Annual Wellness Visit today  Your next wellness visit will be due in one year (1/17/2021)  The screening/preventive services that you may require over the next 5-10 years are detailed below  Some tests may not apply to you based off risk factors and/or age  Screening tests ordered at today's visit but not completed yet may show as past due  Also, please note that scanned in results may not display below  Preventive Screenings:  Service Recommendations Previous Testing/Comments   Colorectal Cancer Screening  * Colonoscopy    * Fecal Occult Blood Test (FOBT)/Fecal Immunochemical Test (FIT)  * Fecal DNA/Cologuard Test  * Flexible Sigmoidoscopy Age: 54-65 years old   Colonoscopy: every 10 years (may be performed more frequently if at higher risk)  OR  FOBT/FIT: every 1 year  OR  Cologuard: every 3 years  OR  Sigmoidoscopy: every 5 years  Screening may be recommended earlier than age 48 if at higher risk for colorectal cancer  Also, an individualized decision between you and your healthcare provider will decide whether screening between the ages of 74-80 would be appropriate  Colonoscopy: Not on file  FOBT/FIT: Not on file  Cologuard: Not on file  Sigmoidoscopy: Not on file         Breast Cancer Screening Age: 36 years old  Frequency: every 1-2 years  Not required if history of left and right mastectomy Mammogram: Not on file       Cervical Cancer Screening Between the ages of 21-29, pap smear recommended once every 3 years  Between the ages of 33-67, can perform pap smear with HPV co-testing every 5 years     Recommendations may differ for women with a history of total hysterectomy, cervical cancer, or abnormal pap smears in past  Pap Smear: Not on file       Hepatitis C Screening Once for adults born between [facility]  More frequently in patients at high risk for Hepatitis C Hep C Antibody: Not on file       Diabetes Screening 1-2 times per year if you're at risk for diabetes or have pre-diabetes Fasting glucose: 76 mg/dL   A1C: No results in last 5 years       Cholesterol Screening Once every 5 years if you don't have a lipid disorder  May order more often based on risk factors  Lipid panel: Not on file         Other Preventive Screenings Covered by Medicare:  1  Abdominal Aortic Aneurysm (AAA) Screening: covered once if your at risk  You're considered to be at risk if you have a family history of AAA  2  Lung Cancer Screening: covers low dose CT scan once per year if you meet all of the following conditions: (1) Age 50-69; (2) No signs or symptoms of lung cancer; (3) Current smoker or have quit smoking within the last 15 years; (4) You have a tobacco smoking history of at least 30 pack years (packs per day multiplied by number of years you smoked); (5) You get a written order from a healthcare provider  3  Glaucoma Screening: covered annually if you're considered high risk: (1) You have diabetes OR (2) Family history of glaucoma OR (3)  aged 48 and older OR (3)  American aged 72 and older  3  Osteoporosis Screening: covered every 2 years if you meet one of the following conditions: (1) You're estrogen deficient and at risk for osteoporosis based off medical history and other findings; (2) Have a vertebral abnormality; (3) On glucocorticoid therapy for more than 3 months; (4) Have primary hyperparathyroidism; (5) On osteoporosis medications and need to assess response to drug therapy  · Last bone density test (DXA Scan): 03/25/2019   5  HIV Screening: covered annually if you're between the age of 15-65  Also covered annually if you are younger than 13 and older than 72 with risk factors for HIV infection  For pregnant patients, it is covered up to 3 times per pregnancy      Immunizations:  Immunization Recommendations   Influenza Vaccine Annual influenza vaccination during flu season is recommended for all persons aged >= 6 months who do not have contraindications   Pneumococcal Vaccine (Prevnar and Pneumovax)  * Prevnar = PCV13  * Pneumovax = PPSV23   Adults 25-60 years old: 1-3 doses may be recommended based on certain risk factors  Adults 72 years old: Prevnar (PCV13) vaccine recommended followed by Pneumovax (PPSV23) vaccine  If already received PPSV23 since turning 65, then PCV13 recommended at least one year after PPSV23 dose  Hepatitis B Vaccine 3 dose series if at intermediate or high risk (ex: diabetes, end stage renal disease, liver disease)   Tetanus (Td) Vaccine - COST NOT COVERED BY MEDICARE PART B Following completion of primary series, a booster dose should be given every 10 years to maintain immunity against tetanus  Td may also be given as tetanus wound prophylaxis  Tdap Vaccine - COST NOT COVERED BY MEDICARE PART B Recommended at least once for all adults  For pregnant patients, recommended with each pregnancy  Shingles Vaccine (Shingrix) - COST NOT COVERED BY MEDICARE PART B  2 shot series recommended in those aged 48 and above     Health Maintenance Due:      Topic Date Due    Hepatitis C Screening  1945    CRC Screening: Colonoscopy  1945     Immunizations Due:      Topic Date Due    Pneumococcal Vaccine: 65+ Years (1 of 2 - PCV13) 01/13/2011     Advance Directives   What are advance directives? Advance directives are legal documents that state your wishes and plans for medical care  These plans are made ahead of time in case you lose your ability to make decisions for yourself  Advance directives can apply to any medical decision, such as the treatments you want, and if you want to donate organs  What are the types of advance directives? There are many types of advance directives, and each state has rules about how to use them  You may choose a combination of any of the following:  · Living will: This is a written record of the treatment you want   You can also choose which treatments you do not want, which to limit, and which to stop at a certain time  This includes surgery, medicine, IV fluid, and tube feedings  · Durable power of  for healthcare [facility]): This is a written record that states who you want to make healthcare choices for you when you are unable to make them for yourself  This person, called a proxy, is usually a family member or a friend  You may choose more than 1 proxy  · Do not resuscitate (DNR) order:  A DNR order is used in case your heart stops beating or you stop breathing  It is a request not to have certain forms of treatment, such as CPR  A DNR order may be included in other types of advance directives  · Medical directive: This covers the care that you want if you are in a coma, near death, or unable to make decisions for yourself  You can list the treatments you want for each condition  Treatment may include pain medicine, surgery, blood transfusions, dialysis, IV or tube feedings, and a ventilator (breathing machine)  · Values history: This document has questions about your views, beliefs, and how you feel and think about life  This information can help others choose the care that you would choose  Why are advance directives important? An advance directive helps you control your care  Although spoken wishes may be used, it is better to have your wishes written down  Spoken wishes can be misunderstood, or not followed  Treatments may be given even if you do not want them  An advance directive may make it easier for your family to make difficult choices about your care  Weight Management   Why it is important to manage your weight:  Being overweight increases your risk of health conditions such as heart disease, high blood pressure, type 2 diabetes, and certain types of cancer  It can also increase your risk for osteoarthritis, sleep apnea, and other respiratory problems  Aim for a slow, steady weight loss  Even a small amount of weight loss can lower your risk of health problems    How to lose weight safely:  A safe and healthy way to lose weight is to eat fewer calories and get regular exercise  You can lose up about 1 pound a week by decreasing the number of calories you eat by 500 calories each day  Healthy meal plan for weight management:  A healthy meal plan includes a variety of foods, contains fewer calories, and helps you stay healthy  A healthy meal plan includes the following:  · Eat whole-grain foods more often  A healthy meal plan should contain fiber  Fiber is the part of grains, fruits, and vegetables that is not broken down by your body  Whole-grain foods are healthy and provide extra fiber in your diet  Some examples of whole-grain foods are whole-wheat breads and pastas, oatmeal, brown rice, and bulgur  · Eat a variety of vegetables every day  Include dark, leafy greens such as spinach, kale, sadia greens, and mustard greens  Eat yellow and orange vegetables such as carrots, sweet potatoes, and winter squash  · Eat a variety of fruits every day  Choose fresh or canned fruit (canned in its own juice or light syrup) instead of juice  Fruit juice has very little or no fiber  · Eat low-fat dairy foods  Drink fat-free (skim) milk or 1% milk  Eat fat-free yogurt and low-fat cottage cheese  Try low-fat cheeses such as mozzarella and other reduced-fat cheeses  · Choose meat and other protein foods that are low in fat  Choose beans or other legumes such as split peas or lentils  Choose fish, skinless poultry (chicken or turkey), or lean cuts of red meat (beef or pork)  Before you cook meat or poultry, cut off any visible fat  · Use less fat and oil  Try baking foods instead of frying them  Add less fat, such as margarine, sour cream, regular salad dressing and mayonnaise to foods  Eat fewer high-fat foods  Some examples of high-fat foods include french fries, doughnuts, ice cream, and cakes  · Eat fewer sweets  Limit foods and drinks that are high in sugar   This includes candy, cookies, regular soda, and sweetened drinks  Exercise:  Exercise at least 30 minutes per day on most days of the week  Some examples of exercise include walking, biking, dancing, and swimming  You can also fit in more physical activity by taking the stairs instead of the elevator or parking farther away from stores  Ask your healthcare provider about the best exercise plan for you  © Copyright [employer] 2018 Information is for End User's use only and may not be sold, redistributed or otherwise used for commercial purposes  All illustrations and images included in CareNotes® are the copyrighted property of [employer]  or [employer]   Follow up with test results      Orders Placed This Encounter   Procedures    Urine culture     Standing Status:   Future     Standing Expiration Date:   1/17/2021    Lipid Panel with Direct LDL reflex     This is a patient instruction: This test requires patient fasting for 10-12 hours or longer  Drinking of black coffee or black tea is acceptable  Standing Status:   Future     Standing Expiration Date:   1/17/2021    UA (URINE) with reflex to Scope    Protein electrophoresis, urine    Protein electrophoresis, serum     Standing Status:   Future     Standing Expiration Date:   1/17/2021    Cortisol Level, AM Specimen     This is a patient instruction: This test must be collected between 7-9 AM  This test may exhibit interference when sample is collected from a person who is consuming a supplement with a high dose of biotin (also termed as vitamin B7 or B8, vitamin H or coenzyme R)  Patient should stop biotin consumption at least 72 hours prior to the collection of the sample       Standing Status:   Future     Standing Expiration Date:   1/17/2021    PTH, intact     Standing Status:   Future     Standing Expiration Date:   1/17/2021    Hepatitis C antibody     Standing Status:   Future     Standing Expiration Date:   1/19/2021         Subjective: Patient ID: Aurelio Mueller is a 77 y o  female      New complaint   Dysuria  Started 1 month ago  Burning with urination  Mild  Intermittent  Patient stated Dr Doak Peabody give her a course of Cipro for 3 days  Did not help much  Denied vaginal discharge or external genitalia lesion  Denied hematuria, denied pelvic pain, denied fever or chills  Chronic medical problems  Breast cancer  Recently had a PET scan  Is following with Dr Doak Peabody  Doing well  Low vitamin-D  Denied myalgia, depression or falling  Hypertension patient stated she has been taking Bystolic  And a medication a combination of ACE and lozol  ( perindopril/indapamide 2,5mg /0 625 mg   Patient refill on her medication  Heart burn , patient admit to having heartburn with certain foods  She takes Nexium p r n  Husam Hurtado Denied epigastric pain  Test results  PET scan done recent  Echo-doppler   Lab done on January 1020  Discussed result with patient        Review of Systems   Constitutional: Negative for activity change, appetite change, chills, diaphoresis, fatigue, fever and unexpected weight change  HENT: Negative for congestion, ear discharge, ear pain, hearing loss, nosebleeds, rhinorrhea, sinus pressure, sore throat, tinnitus, trouble swallowing and voice change  Eyes: Negative for photophobia, pain and visual disturbance  Respiratory: Negative for cough, chest tightness, shortness of breath and wheezing  Cardiovascular: Negative for chest pain, palpitations and leg swelling  Gastrointestinal: Negative for abdominal distention, abdominal pain, anal bleeding, blood in stool, constipation, diarrhea, nausea, rectal pain and vomiting  Endocrine: Negative for cold intolerance, heat intolerance, polydipsia and polyuria  Genitourinary: Positive for dysuria  Negative for decreased urine volume, difficulty urinating, flank pain, frequency, hematuria, urgency, vaginal bleeding, vaginal discharge and vaginal pain  Musculoskeletal: Negative for arthralgias, back pain, gait problem, joint swelling, myalgias and neck pain  Skin: Negative for rash  Neurological: Negative for dizziness, tremors, seizures, syncope, facial asymmetry, speech difficulty, weakness, light-headedness, numbness and headaches  Hematological: Negative for adenopathy  Does not bruise/bleed easily  Psychiatric/Behavioral: Negative for agitation, behavioral problems, confusion, dysphoric mood, hallucinations, self-injury, sleep disturbance and suicidal ideas  The patient is not nervous/anxious and is not hyperactive  Objective:     Physical Exam   Constitutional: She is oriented to person, place, and time  She appears well-developed and well-nourished  No distress  HENT:   Head: Normocephalic and atraumatic  Eyes: Pupils are equal, round, and reactive to light  Conjunctivae and EOM are normal  Right eye exhibits no discharge  Left eye exhibits no discharge  No scleral icterus  Neck: No JVD present  No thyromegaly present  Cardiovascular: Normal rate, regular rhythm and normal heart sounds  No murmur heard  Pulses:       Carotid pulses are 3+ on the right side, and 3+ on the left side  Extremities  No edema   Pulmonary/Chest: Effort normal and breath sounds normal    Abdominal: Soft  Bowel sounds are normal  She exhibits no distension and no mass  There is no tenderness  There is no rebound and no guarding  No hernia  Genitourinary:   Genitourinary Comments: Advised patient to return for gyn exam soon   Musculoskeletal: She exhibits no edema, tenderness or deformity  Lymphadenopathy:     She has no cervical adenopathy  Neurological: She is alert and oriented to person, place, and time  She displays normal reflexes  No cranial nerve deficit or sensory deficit  She exhibits normal muscle tone  Coordination normal    Skin: No rash noted  She is not diaphoretic  No pallor  Psychiatric: She has a normal mood and affect   Her behavior is normal  Judgment and thought content normal

## 2020-01-17 NOTE — PATIENT INSTRUCTIONS
Medicare Preventive Visit Patient Instructions  Thank you for completing your Welcome to Medicare Visit or Medicare Annual Wellness Visit today  Your next wellness visit will be due in one year (1/17/2021)  The screening/preventive services that you may require over the next 5-10 years are detailed below  Some tests may not apply to you based off risk factors and/or age  Screening tests ordered at today's visit but not completed yet may show as past due  Also, please note that scanned in results may not display below  Preventive Screenings:  Service Recommendations Previous Testing/Comments   Colorectal Cancer Screening  * Colonoscopy    * Fecal Occult Blood Test (FOBT)/Fecal Immunochemical Test (FIT)  * Fecal DNA/Cologuard Test  * Flexible Sigmoidoscopy Age: 54-65 years old   Colonoscopy: every 10 years (may be performed more frequently if at higher risk)  OR  FOBT/FIT: every 1 year  OR  Cologuard: every 3 years  OR  Sigmoidoscopy: every 5 years  Screening may be recommended earlier than age 48 if at higher risk for colorectal cancer  Also, an individualized decision between you and your healthcare provider will decide whether screening between the ages of 74-80 would be appropriate  Colonoscopy: Not on file  FOBT/FIT: Not on file  Cologuard: Not on file  Sigmoidoscopy: Not on file         Breast Cancer Screening Age: 36 years old  Frequency: every 1-2 years  Not required if history of left and right mastectomy Mammogram: Not on file       Cervical Cancer Screening Between the ages of 21-29, pap smear recommended once every 3 years  Between the ages of 33-67, can perform pap smear with HPV co-testing every 5 years     Recommendations may differ for women with a history of total hysterectomy, cervical cancer, or abnormal pap smears in past  Pap Smear: Not on file       Hepatitis C Screening Once for adults born between [facility]  More frequently in patients at high risk for Hepatitis C Hep C Antibody: Not on file       Diabetes Screening 1-2 times per year if you're at risk for diabetes or have pre-diabetes Fasting glucose: 76 mg/dL   A1C: No results in last 5 years       Cholesterol Screening Once every 5 years if you don't have a lipid disorder  May order more often based on risk factors  Lipid panel: Not on file         Other Preventive Screenings Covered by Medicare:  1  Abdominal Aortic Aneurysm (AAA) Screening: covered once if your at risk  You're considered to be at risk if you have a family history of AAA  2  Lung Cancer Screening: covers low dose CT scan once per year if you meet all of the following conditions: (1) Age 50-69; (2) No signs or symptoms of lung cancer; (3) Current smoker or have quit smoking within the last 15 years; (4) You have a tobacco smoking history of at least 30 pack years (packs per day multiplied by number of years you smoked); (5) You get a written order from a healthcare provider  3  Glaucoma Screening: covered annually if you're considered high risk: (1) You have diabetes OR (2) Family history of glaucoma OR (3)  aged 48 and older OR (3)  American aged 72 and older  3  Osteoporosis Screening: covered every 2 years if you meet one of the following conditions: (1) You're estrogen deficient and at risk for osteoporosis based off medical history and other findings; (2) Have a vertebral abnormality; (3) On glucocorticoid therapy for more than 3 months; (4) Have primary hyperparathyroidism; (5) On osteoporosis medications and need to assess response to drug therapy  · Last bone density test (DXA Scan): 03/25/2019   5  HIV Screening: covered annually if you're between the age of 15-65  Also covered annually if you are younger than 13 and older than 72 with risk factors for HIV infection  For pregnant patients, it is covered up to 3 times per pregnancy      Immunizations:  Immunization Recommendations   Influenza Vaccine Annual influenza vaccination during flu season is recommended for all persons aged >= 6 months who do not have contraindications   Pneumococcal Vaccine (Prevnar and Pneumovax)  * Prevnar = PCV13  * Pneumovax = PPSV23   Adults 25-60 years old: 1-3 doses may be recommended based on certain risk factors  Adults 72 years old: Prevnar (PCV13) vaccine recommended followed by Pneumovax (PPSV23) vaccine  If already received PPSV23 since turning 65, then PCV13 recommended at least one year after PPSV23 dose  Hepatitis B Vaccine 3 dose series if at intermediate or high risk (ex: diabetes, end stage renal disease, liver disease)   Tetanus (Td) Vaccine - COST NOT COVERED BY MEDICARE PART B Following completion of primary series, a booster dose should be given every 10 years to maintain immunity against tetanus  Td may also be given as tetanus wound prophylaxis  Tdap Vaccine - COST NOT COVERED BY MEDICARE PART B Recommended at least once for all adults  For pregnant patients, recommended with each pregnancy  Shingles Vaccine (Shingrix) - COST NOT COVERED BY MEDICARE PART B  2 shot series recommended in those aged 48 and above     Health Maintenance Due:      Topic Date Due    Hepatitis C Screening  1945    CRC Screening: Colonoscopy  1945     Immunizations Due:      Topic Date Due    Pneumococcal Vaccine: 65+ Years (1 of 2 - PCV13) 01/13/2011     Advance Directives   What are advance directives? Advance directives are legal documents that state your wishes and plans for medical care  These plans are made ahead of time in case you lose your ability to make decisions for yourself  Advance directives can apply to any medical decision, such as the treatments you want, and if you want to donate organs  What are the types of advance directives? There are many types of advance directives, and each state has rules about how to use them  You may choose a combination of any of the following:  · Living will:   This is a written record of the treatment you want  You can also choose which treatments you do not want, which to limit, and which to stop at a certain time  This includes surgery, medicine, IV fluid, and tube feedings  · Durable power of  for healthcare [place] [facility]): This is a written record that states who you want to make healthcare choices for you when you are unable to make them for yourself  This person, called a proxy, is usually a family member or a friend  You may choose more than 1 proxy  · Do not resuscitate (DNR) order:  A DNR order is used in case your heart stops beating or you stop breathing  It is a request not to have certain forms of treatment, such as CPR  A DNR order may be included in other types of advance directives  · Medical directive: This covers the care that you want if you are in a coma, near death, or unable to make decisions for yourself  You can list the treatments you want for each condition  Treatment may include pain medicine, surgery, blood transfusions, dialysis, IV or tube feedings, and a ventilator (breathing machine)  · Values history: This document has questions about your views, beliefs, and how you feel and think about life  This information can help others choose the care that you would choose  Why are advance directives important? An advance directive helps you control your care  Although spoken wishes may be used, it is better to have your wishes written down  Spoken wishes can be misunderstood, or not followed  Treatments may be given even if you do not want them  An advance directive may make it easier for your family to make difficult choices about your care  Weight Management   Why it is important to manage your weight:  Being overweight increases your risk of health conditions such as heart disease, high blood pressure, type 2 diabetes, and certain types of cancer  It can also increase your risk for osteoarthritis, sleep apnea, and other respiratory problems   Aim for a slow, steady weight loss  Even a small amount of weight loss can lower your risk of health problems  How to lose weight safely:  A safe and healthy way to lose weight is to eat fewer calories and get regular exercise  You can lose up about 1 pound a week by decreasing the number of calories you eat by 500 calories each day  Healthy meal plan for weight management:  A healthy meal plan includes a variety of foods, contains fewer calories, and helps you stay healthy  A healthy meal plan includes the following:  · Eat whole-grain foods more often  A healthy meal plan should contain fiber  Fiber is the part of grains, fruits, and vegetables that is not broken down by your body  Whole-grain foods are healthy and provide extra fiber in your diet  Some examples of whole-grain foods are whole-wheat breads and pastas, oatmeal, brown rice, and bulgur  · Eat a variety of vegetables every day  Include dark, leafy greens such as spinach, kale, sadia greens, and mustard greens  Eat yellow and orange vegetables such as carrots, sweet potatoes, and winter squash  · Eat a variety of fruits every day  Choose fresh or canned fruit (canned in its own juice or light syrup) instead of juice  Fruit juice has very little or no fiber  · Eat low-fat dairy foods  Drink fat-free (skim) milk or 1% milk  Eat fat-free yogurt and low-fat cottage cheese  Try low-fat cheeses such as mozzarella and other reduced-fat cheeses  · Choose meat and other protein foods that are low in fat  Choose beans or other legumes such as split peas or lentils  Choose fish, skinless poultry (chicken or turkey), or lean cuts of red meat (beef or pork)  Before you cook meat or poultry, cut off any visible fat  · Use less fat and oil  Try baking foods instead of frying them  Add less fat, such as margarine, sour cream, regular salad dressing and mayonnaise to foods  Eat fewer high-fat foods   Some examples of high-fat foods include french fries, doughnuts, ice cream, and cakes   · Eat fewer sweets  Limit foods and drinks that are high in sugar  This includes candy, cookies, regular soda, and sweetened drinks  Exercise:  Exercise at least 30 minutes per day on most days of the week  Some examples of exercise include walking, biking, dancing, and swimming  You can also fit in more physical activity by taking the stairs instead of the elevator or parking farther away from stores  Ask your healthcare provider about the best exercise plan for you  © Copyright [employer] 2018 Information is for End User's use only and may not be sold, redistributed or otherwise used for commercial purposes   All illustrations and images included in CareNotes® are the copyrighted property of [employer]  or [facility] Linda Avila  Follow up with test results

## 2020-01-19 PROBLEM — I10 ESSENTIAL HYPERTENSION: Status: ACTIVE | Noted: 2020-01-19

## 2020-01-19 PROBLEM — R12 HEART BURN: Status: ACTIVE | Noted: 2020-01-19

## 2020-01-19 PROBLEM — Z53.20 COLONOSCOPY REFUSED: Status: ACTIVE | Noted: 2020-01-19

## 2020-01-19 PROBLEM — M85.80 OSTEOPENIA: Status: ACTIVE | Noted: 2020-01-19

## 2020-01-19 PROBLEM — Z28.20 IMMUNIZATION NOT CARRIED OUT BECAUSE OF PATIENT DECISION: Status: ACTIVE | Noted: 2020-01-19

## 2020-01-19 PROBLEM — R94.8 ABNORMAL GASTROINTESTINAL PET SCAN: Status: ACTIVE | Noted: 2020-01-19

## 2020-01-27 ENCOUNTER — APPOINTMENT (OUTPATIENT)
Dept: LAB | Age: 75
End: 2020-01-27
Payer: MEDICARE

## 2020-01-27 DIAGNOSIS — R79.9 ABNORMAL BLOOD CHEMISTRY: ICD-10-CM

## 2020-01-27 DIAGNOSIS — R30.0 DYSURIA: ICD-10-CM

## 2020-01-27 DIAGNOSIS — M85.80 OSTEOPENIA, UNSPECIFIED LOCATION: ICD-10-CM

## 2020-01-27 DIAGNOSIS — C50.811 MALIGNANT NEOPLASM OF OVERLAPPING SITES OF RIGHT BREAST IN FEMALE, ESTROGEN RECEPTOR POSITIVE (HCC): ICD-10-CM

## 2020-01-27 DIAGNOSIS — I10 ESSENTIAL HYPERTENSION: ICD-10-CM

## 2020-01-27 DIAGNOSIS — Z11.59 NEED FOR HEPATITIS C SCREENING TEST: ICD-10-CM

## 2020-01-27 DIAGNOSIS — Z17.0 MALIGNANT NEOPLASM OF OVERLAPPING SITES OF RIGHT BREAST IN FEMALE, ESTROGEN RECEPTOR POSITIVE (HCC): ICD-10-CM

## 2020-01-27 LAB
ALBUMIN SERPL BCP-MCNC: 3.4 G/DL (ref 3.5–5)
ALP SERPL-CCNC: 59 U/L (ref 46–116)
ALT SERPL W P-5'-P-CCNC: 15 U/L (ref 12–78)
ANION GAP SERPL CALCULATED.3IONS-SCNC: 3 MMOL/L (ref 4–13)
AST SERPL W P-5'-P-CCNC: 15 U/L (ref 5–45)
BACTERIA UR QL AUTO: ABNORMAL /HPF
BASOPHILS # BLD AUTO: 0.04 THOUSANDS/ΜL (ref 0–0.1)
BASOPHILS NFR BLD AUTO: 1 % (ref 0–1)
BILIRUB SERPL-MCNC: 0.51 MG/DL (ref 0.2–1)
BILIRUB UR QL STRIP: NEGATIVE
BUN SERPL-MCNC: 22 MG/DL (ref 5–25)
CALCIUM SERPL-MCNC: 9.1 MG/DL (ref 8.3–10.1)
CHLORIDE SERPL-SCNC: 106 MMOL/L (ref 100–108)
CHOLEST SERPL-MCNC: 175 MG/DL (ref 50–200)
CLARITY UR: CLEAR
CO2 SERPL-SCNC: 30 MMOL/L (ref 21–32)
COLOR UR: YELLOW
CORTIS AM PEAK SERPL-MCNC: 10.8 UG/DL (ref 4.2–22.4)
CREAT SERPL-MCNC: 0.98 MG/DL (ref 0.6–1.3)
EOSINOPHIL # BLD AUTO: 0.16 THOUSAND/ΜL (ref 0–0.61)
EOSINOPHIL NFR BLD AUTO: 3 % (ref 0–6)
ERYTHROCYTE [DISTWIDTH] IN BLOOD BY AUTOMATED COUNT: 14.6 % (ref 11.6–15.1)
GFR SERPL CREATININE-BSD FRML MDRD: 57 ML/MIN/1.73SQ M
GLUCOSE P FAST SERPL-MCNC: 79 MG/DL (ref 65–99)
GLUCOSE UR STRIP-MCNC: NEGATIVE MG/DL
HCT VFR BLD AUTO: 36.9 % (ref 34.8–46.1)
HCV AB SER QL: NORMAL
HDLC SERPL-MCNC: 41 MG/DL
HGB BLD-MCNC: 11.6 G/DL (ref 11.5–15.4)
HGB UR QL STRIP.AUTO: ABNORMAL
HYALINE CASTS #/AREA URNS LPF: ABNORMAL /LPF
IMM GRANULOCYTES # BLD AUTO: 0.04 THOUSAND/UL (ref 0–0.2)
IMM GRANULOCYTES NFR BLD AUTO: 1 % (ref 0–2)
KETONES UR STRIP-MCNC: NEGATIVE MG/DL
LDLC SERPL CALC-MCNC: 108 MG/DL (ref 0–100)
LEUKOCYTE ESTERASE UR QL STRIP: ABNORMAL
LYMPHOCYTES # BLD AUTO: 1.69 THOUSANDS/ΜL (ref 0.6–4.47)
LYMPHOCYTES NFR BLD AUTO: 29 % (ref 14–44)
MAGNESIUM SERPL-MCNC: 2 MG/DL (ref 1.6–2.6)
MCH RBC QN AUTO: 30.5 PG (ref 26.8–34.3)
MCHC RBC AUTO-ENTMCNC: 31.4 G/DL (ref 31.4–37.4)
MCV RBC AUTO: 97 FL (ref 82–98)
MONOCYTES # BLD AUTO: 0.51 THOUSAND/ΜL (ref 0.17–1.22)
MONOCYTES NFR BLD AUTO: 9 % (ref 4–12)
NEUTROPHILS # BLD AUTO: 3.47 THOUSANDS/ΜL (ref 1.85–7.62)
NEUTS SEG NFR BLD AUTO: 57 % (ref 43–75)
NITRITE UR QL STRIP: POSITIVE
NON-SQ EPI CELLS URNS QL MICRO: ABNORMAL /HPF
NRBC BLD AUTO-RTO: 0 /100 WBCS
PH UR STRIP.AUTO: 6 [PH]
PLATELET # BLD AUTO: 331 THOUSANDS/UL (ref 149–390)
PMV BLD AUTO: 9.9 FL (ref 8.9–12.7)
POTASSIUM SERPL-SCNC: 4.4 MMOL/L (ref 3.5–5.3)
PROT SERPL-MCNC: 7.6 G/DL (ref 6.4–8.2)
PROT UR STRIP-MCNC: NEGATIVE MG/DL
PTH-INTACT SERPL-MCNC: 99.9 PG/ML (ref 18.4–80.1)
RBC # BLD AUTO: 3.8 MILLION/UL (ref 3.81–5.12)
RBC #/AREA URNS AUTO: ABNORMAL /HPF
SODIUM SERPL-SCNC: 139 MMOL/L (ref 136–145)
SP GR UR STRIP.AUTO: 1.01 (ref 1–1.03)
TRIGL SERPL-MCNC: 132 MG/DL
UROBILINOGEN UR QL STRIP.AUTO: 0.2 E.U./DL
WBC # BLD AUTO: 5.91 THOUSAND/UL (ref 4.31–10.16)
WBC #/AREA URNS AUTO: ABNORMAL /HPF

## 2020-01-27 PROCEDURE — 85025 COMPLETE CBC W/AUTO DIFF WBC: CPT

## 2020-01-27 PROCEDURE — 80053 COMPREHEN METABOLIC PANEL: CPT

## 2020-01-27 PROCEDURE — 87181 SC STD AGAR DILUTION PER AGT: CPT

## 2020-01-27 PROCEDURE — 82533 TOTAL CORTISOL: CPT

## 2020-01-27 PROCEDURE — 83970 ASSAY OF PARATHORMONE: CPT

## 2020-01-27 PROCEDURE — 86334 IMMUNOFIX E-PHORESIS SERUM: CPT

## 2020-01-27 PROCEDURE — 80061 LIPID PANEL: CPT

## 2020-01-27 PROCEDURE — 36415 COLL VENOUS BLD VENIPUNCTURE: CPT

## 2020-01-27 PROCEDURE — 81001 URINALYSIS AUTO W/SCOPE: CPT | Performed by: FAMILY MEDICINE

## 2020-01-27 PROCEDURE — 87086 URINE CULTURE/COLONY COUNT: CPT

## 2020-01-27 PROCEDURE — 87077 CULTURE AEROBIC IDENTIFY: CPT

## 2020-01-27 PROCEDURE — 84166 PROTEIN E-PHORESIS/URINE/CSF: CPT | Performed by: PATHOLOGY

## 2020-01-27 PROCEDURE — 86803 HEPATITIS C AB TEST: CPT

## 2020-01-27 PROCEDURE — 87186 SC STD MICRODIL/AGAR DIL: CPT

## 2020-01-27 PROCEDURE — 84166 PROTEIN E-PHORESIS/URINE/CSF: CPT | Performed by: FAMILY MEDICINE

## 2020-01-27 PROCEDURE — 84165 PROTEIN E-PHORESIS SERUM: CPT

## 2020-01-27 PROCEDURE — 86334 IMMUNOFIX E-PHORESIS SERUM: CPT | Performed by: PATHOLOGY

## 2020-01-27 PROCEDURE — 84165 PROTEIN E-PHORESIS SERUM: CPT | Performed by: PATHOLOGY

## 2020-01-27 PROCEDURE — 83735 ASSAY OF MAGNESIUM: CPT

## 2020-01-28 LAB
ALBUMIN SERPL ELPH-MCNC: 3.82 G/DL (ref 3.5–5)
ALBUMIN SERPL ELPH-MCNC: 53.8 % (ref 52–65)
ALBUMIN UR ELPH-MCNC: 100 %
ALPHA1 GLOB MFR UR ELPH: 0 %
ALPHA1 GLOB SERPL ELPH-MCNC: 0.31 G/DL (ref 0.1–0.4)
ALPHA1 GLOB SERPL ELPH-MCNC: 4.3 % (ref 2.5–5)
ALPHA2 GLOB MFR UR ELPH: 0 %
ALPHA2 GLOB SERPL ELPH-MCNC: 0.76 G/DL (ref 0.4–1.2)
ALPHA2 GLOB SERPL ELPH-MCNC: 10.7 % (ref 7–13)
B-GLOBULIN MFR UR ELPH: 0 %
BETA GLOB ABNORMAL SERPL ELPH-MCNC: 0.44 G/DL (ref 0.4–0.8)
BETA1 GLOB SERPL ELPH-MCNC: 6.2 % (ref 5–13)
BETA2 GLOB SERPL ELPH-MCNC: 5.3 % (ref 2–8)
BETA2+GAMMA GLOB SERPL ELPH-MCNC: 0.38 G/DL (ref 0.2–0.5)
GAMMA GLOB ABNORMAL SERPL ELPH-MCNC: 1.4 G/DL (ref 0.5–1.6)
GAMMA GLOB MFR UR ELPH: 0 %
GAMMA GLOB SERPL ELPH-MCNC: 19.7 % (ref 12–22)
IGG/ALB SER: 1.16 {RATIO} (ref 1.1–1.8)
INTERPRETATION UR IFE-IMP: NORMAL
PROT PATTERN SERPL ELPH-IMP: NORMAL
PROT PATTERN UR ELPH-IMP: NORMAL
PROT SERPL-MCNC: 7.1 G/DL (ref 6.4–8.2)
PROT UR-MCNC: 8 MG/DL

## 2020-01-29 ENCOUNTER — HOSPITAL ENCOUNTER (OUTPATIENT)
Dept: INFUSION CENTER | Facility: HOSPITAL | Age: 75
Discharge: HOME | End: 2020-01-29
Attending: INTERNAL MEDICINE
Payer: MEDICARE

## 2020-01-29 ENCOUNTER — OFFICE VISIT (OUTPATIENT)
Dept: HEMATOLOGY ONCOLOGY | Facility: CLINIC | Age: 75
End: 2020-01-29
Payer: MEDICARE

## 2020-01-29 VITALS
TEMPERATURE: 96.9 F | HEIGHT: 62 IN | RESPIRATION RATE: 18 BRPM | HEART RATE: 76 BPM | SYSTOLIC BLOOD PRESSURE: 141 MMHG | WEIGHT: 170.19 LBS | DIASTOLIC BLOOD PRESSURE: 71 MMHG | BODY MASS INDEX: 31.32 KG/M2

## 2020-01-29 VITALS
HEART RATE: 83 BPM | DIASTOLIC BLOOD PRESSURE: 84 MMHG | SYSTOLIC BLOOD PRESSURE: 158 MMHG | BODY MASS INDEX: 31.03 KG/M2 | WEIGHT: 168.6 LBS | HEIGHT: 62 IN | TEMPERATURE: 96.8 F | RESPIRATION RATE: 18 BRPM | OXYGEN SATURATION: 97 %

## 2020-01-29 DIAGNOSIS — C50.811 MALIGNANT NEOPLASM OF OVERLAPPING SITES OF RIGHT BREAST IN FEMALE, ESTROGEN RECEPTOR POSITIVE (HCC): ICD-10-CM

## 2020-01-29 DIAGNOSIS — Z17.0 MALIGNANT NEOPLASM OF OVERLAPPING SITES OF RIGHT BREAST IN FEMALE, ESTROGEN RECEPTOR POSITIVE (HCC): ICD-10-CM

## 2020-01-29 DIAGNOSIS — Z17.0 MALIGNANT NEOPLASM OF OVERLAPPING SITES OF RIGHT BREAST IN FEMALE, ESTROGEN RECEPTOR POSITIVE (HCC): Primary | ICD-10-CM

## 2020-01-29 DIAGNOSIS — N39.0 URINARY TRACT INFECTION WITHOUT HEMATURIA, SITE UNSPECIFIED: Primary | ICD-10-CM

## 2020-01-29 DIAGNOSIS — C50.811 MALIGNANT NEOPLASM OF OVERLAPPING SITES OF RIGHT BREAST IN FEMALE, ESTROGEN RECEPTOR POSITIVE (HCC): Primary | ICD-10-CM

## 2020-01-29 PROCEDURE — 96367 TX/PROPH/DG ADDL SEQ IV INF: CPT

## 2020-01-29 PROCEDURE — 96413 CHEMO IV INFUSION 1 HR: CPT

## 2020-01-29 PROCEDURE — 99214 OFFICE O/P EST MOD 30 MIN: CPT | Performed by: INTERNAL MEDICINE

## 2020-01-29 PROCEDURE — 96417 CHEMO IV INFUS EACH ADDL SEQ: CPT

## 2020-01-29 RX ORDER — CIPROFLOXACIN 500 MG/1
500 TABLET, FILM COATED ORAL EVERY 12 HOURS SCHEDULED
Qty: 10 TABLET | Refills: 1 | Status: SHIPPED | OUTPATIENT
Start: 2020-01-29 | End: 2020-01-31

## 2020-01-29 RX ORDER — SODIUM CHLORIDE 9 MG/ML
20 INJECTION, SOLUTION INTRAVENOUS ONCE
Status: COMPLETED | OUTPATIENT
Start: 2020-01-29 | End: 2020-01-29

## 2020-01-29 RX ORDER — SODIUM CHLORIDE 9 MG/ML
20 INJECTION, SOLUTION INTRAVENOUS ONCE
Status: CANCELLED | OUTPATIENT
Start: 2020-02-19

## 2020-01-29 RX ORDER — ESOMEPRAZOLE MAGNESIUM 40 MG/1
40 CAPSULE, DELAYED RELEASE ORAL DAILY
Qty: 90 CAPSULE | Refills: 3 | Status: ON HOLD | OUTPATIENT
Start: 2020-01-29 | End: 2020-06-05 | Stop reason: SDUPTHER

## 2020-01-29 RX ADMIN — PACLITAXEL 141.6 MG: 6 INJECTION, SOLUTION INTRAVENOUS at 11:14

## 2020-01-29 RX ADMIN — SODIUM CHLORIDE 20 ML/HR: 0.9 INJECTION, SOLUTION INTRAVENOUS at 10:45

## 2020-01-29 RX ADMIN — TRASTUZUMAB 450 MG: 150 INJECTION, POWDER, LYOPHILIZED, FOR SOLUTION INTRAVENOUS at 12:19

## 2020-01-29 RX ADMIN — ONDANSETRON HYDROCHLORIDE: 2 INJECTION, SOLUTION INTRAMUSCULAR; INTRAVENOUS at 10:46

## 2020-01-29 NOTE — PLAN OF CARE
Problem: Potential for Falls  Goal: Patient will remain free of falls  Description  INTERVENTIONS:  - Assess patient frequently for physical needs  -  Identify cognitive and physical deficits and behaviors that affect risk of falls  -  [place] fall precautions as indicated by assessment   - Educate patient/family on patient safety including physical limitations  - Instruct patient to call for assistance with activity based on assessment  - Modify environment to reduce risk of injury  - Consider OT/PT consult to assist with strengthening/mobility  Outcome: Progressing     Problem: Knowledge Deficit  Goal: Patient/family/caregiver demonstrates understanding of disease process, treatment plan, medications, and discharge instructions  Description  Complete learning assessment and assess knowledge base    Interventions:  - Provide teaching at level of understanding  - Provide teaching via preferred learning methods  Outcome: Progressing

## 2020-01-29 NOTE — PROGRESS NOTES
Hematology/Oncology Outpatient Follow-up  [facility] 77 y o  female 1945 [id number]    Date:  1/29/2020        Assessment and Plan:  1  Malignant neoplasm of overlapping 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, estrogen receptor positive ([facility] [address] )  Patient will be continue on the current line of palliative treatment with the Taxol and Herceptin on every 3 week basis  She is also taking the anastrozole as endocrine therapy on a daily basis without interruption  We will see her again about 3 weeks from now for her next treatment  We will also check the tumor markers prior to the next visit  - Infusion Calculated Appointment Request; Future  - CBC and differential; Future  - Comprehensive metabolic panel; Future  - Magnesium; Future  - Cancer antigen 15-3; Future  - Cancer antigen 27 29; Future  - CBC and differential; Future  - Comprehensive metabolic panel; Future  - Magnesium; Future  - esomeprazole (NexIUM) 40 MG capsule; Take 1 capsule (40 mg total) by mouth daily  Dispense: 90 capsule; Refill: 3    2  Urinary tract infection without hematuria, site unspecified  She seems to have a urinary tract infection will which will be treated with the Cipro for about 3-5 days  - ciprofloxacin (CIPRO) 500 mg tablet; Take 1 tablet (500 mg total) by mouth every 12 (twelve) hours for 5 days  Dispense: 10 tablet; Refill: 1        HPI:  Patient came today for a follow-up visit  She was recently seen by her primary care physician who pursued workup including urine analysis which showed E coli  She complains about dysuria  She tolerated the Herceptin and Taxol  on the 8th of January relatively well  Her most recent blood work on 01/27/2020 showed normal hemoglobin of 11 6 with normal white cells and platelets    Her CMP was entirely normal   Oncology History    The patient has a remote history of right breast cancer diagnosed in [place] in February of 2005 with infiltrating ductal carcinoma grade 2 status post right total mastectomy and left prophylactic total mastectomy as well at that time  Her pathology revealed 8/17 positive lymph node for metastatic disease from the right axilla  ER status positive at 5-10%, progesterone negative, her 2 Gregory positive by FISH  She was then treated with 6 cycles of adjuvant chemotherapy with 5 fluorouracil, Adriamycin, and cyclophosphamide followed by tamoxifen and adjuvant radiation to the right axilla and chest wall  The patient was then switched from tamoxifen to Arimidex which was then stopped in 2012  She was then diagnosed with recurrence of her breast cancer with metastatic disease mainly involving the right anterior chest wall, skin, right axilla pectoralis muscle, left axilla and other areas of the body in May 2015  The biopsy from the scan showed triple positive malignant process compatible with recurrence of her breast cancer  She was then started on palliative chemotherapy and received 6 cycles of Taxotere along with trastuzumab and pertuzumab  After 6 cycles she was continued mainly on trastuzumab and pertuzumab alone  The patient then went to [facility] which she continued her oncological care with trastuzumab, pertuzumab and anastrozole  That treatment had to be put on hold after she developed decline of her ejection fraction to about 23% around June 2017  A PET scan in July of 2017 is showed mild progression of her disease and for that reason low-dose weekly Taxol was started on the 18th July 2017  The frequency of Taxol was decreased to 1 treatment every other week  She then received the rest of her treatment and live unknown from September 2017 until March 2018  Her echocardiogram in May 2018 showed ejection fraction of 43%  The patient then went to live alone again in July of 2018 where she continued her Taxol treatment on every other week basis until the middle of January 2019          Malignant neoplasm of overlapping 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, estrogen receptor positive ([facility] [address] )    2015 -  Hormone Therapy     Anastrozole      5/28/2015 Initial Diagnosis     Metastatic breast cancer ([facility] [address] )  (recurrence)      6/30/2015 -  Chemotherapy     1-Taxotere, Herceptin, Perjeta started in Shay[place] 2015 x6 cycles  2-Herceptin and Perjeta alone were continued since the patient was not interested in continue the Taxotere  3-Herceptin and Perjeta were put on hold due to decline of her ejection fraction around May 2017  4-low dose weekly Taxol was started in July 2017  5-Taxol was switched to every other week basis      4/14/2019 - 6/9/2019 Chemotherapy     PACLitaxel (TAXOL) 144 6 mg in sodium chloride 0 9 % 250 mL chemo IVPB, 80 mg/m2, Intravenous, Once, 2 of 6 cycles      12/4/2019 - 12/31/2019 Chemotherapy     PACLitaxel (TAXOL) 142 2 mg in sodium chloride 0 9 % 250 mL chemo IVPB, 80 mg/m2 = 142 2 mg, Intravenous, Once, 1 of 6 cycles  Administration: 142 2 mg (12/4/2019), 142 2 mg (12/18/2019)      1/8/2020 -  Chemotherapy     PACLitaxel (TAXOL) 141 6 mg in sodium chloride 0 9 % 250 mL chemo IVPB, 80 mg/m2 = 141 6 mg, Intravenous, Once, 1 of 6 cycles  Administration: 141 6 mg (1/8/2020)  trastuzumab (HERCEPTIN) 600 mg in sodium chloride 0 9 % 250 mL chemo infusion, 609 mg, Intravenous, Once, 1 of 6 cycles  Administration: 600 mg (1/8/2020)         Interval history:    ROS: Review of Systems   Constitutional: Negative for activity change, appetite change, chills, diaphoresis, fatigue, fever and unexpected weight change  HENT: Negative for congestion, dental problem, ear discharge, ear pain, facial swelling, hearing loss, mouth sores, nosebleeds, postnasal drip, sore throat, tinnitus and trouble swallowing  Eyes: Negative for discharge, redness, itching and visual disturbance  Respiratory: Negative for cough, chest tightness, shortness of breath and wheezing  Cardiovascular: Negative for chest pain, palpitations and leg swelling     Gastrointestinal: Negative for abdominal distention, abdominal pain, anal bleeding, blood in stool, constipation, diarrhea, nausea and vomiting  Genitourinary: Positive for dysuria  Negative for difficulty urinating, flank pain, frequency, hematuria and urgency  Musculoskeletal: Negative for arthralgias, back pain, gait problem, joint swelling, myalgias and neck pain  Skin: Negative for color change, pallor, rash and wound  Neurological: Negative for dizziness, syncope, speech difficulty, weakness, light-headedness, numbness and headaches  Hematological: Negative for adenopathy  Does not bruise/bleed easily  Psychiatric/Behavioral: Negative for agitation, behavioral problems, confusion and sleep disturbance         Past Medical History:   Diagnosis Date    Breast cancer ([facility] [address] )     Hypertension     Lymphedema of right arm     Vitamin B12 deficiency        Past Surgical History:   Procedure Laterality Date    APPENDECTOMY      BREAST SURGERY      bilat mastectomy-right total mastectomy and prophylactic left total mastectomy-2005    KNEE SURGERY      right knee replacement 2014 in [address] [place] [address] Bilateral        Social History     Socioeconomic History    Marital status: Single     Spouse name: None    Number of children: None    Years of education: None    Highest education level: None   Occupational History    None   Social Needs    Financial resource strain: None    Food insecurity:     Worry: None     Inability: None    Transportation needs:     Medical: None     Non-medical: None   Tobacco Use    Smoking status: Never Smoker    Smokeless tobacco: Never Used    Tobacco comment: no passive smoke exposure   Substance and Sexual Activity    Alcohol use: No    Drug use: No    Sexual activity: None   Lifestyle    Physical activity:     Days per week: None     Minutes per session: None    Stress: None   Relationships    Social connections:     Talks on phone: None     Gets together: None     Attends Hindu service: None     Active member of club or organization: None     Attends meetings of clubs or organizations: None     Relationship status: None    Intimate partner violence:     Fear of current or ex partner: None     Emotionally abused: None     Physically abused: None     Forced sexual activity: None   Other Topics Concern    None   Social History Narrative    None       Family History   Problem Relation Age of Onset    Asthma Mother     Osteoarthritis Father     Bone cancer Family        Allergies   Allergen Reactions    Penicillins Rash         Current Outpatient Medications:     anastrozole (ARIMIDEX) 1 mg tablet, Take 1 tablet (1 mg total) by mouth every 24 hours, Disp: 90 tablet, Rfl: 4    Cyanocobalamin 1000 MCG/ML KIT, Inject as directed every 6 (six) months, Disp: , Rfl:     esomeprazole (NexIUM) 40 MG capsule, Take 1 capsule (40 mg total) by mouth daily, Disp: 90 capsule, Rfl: 3    lisinopril (ZESTRIL) 5 mg tablet, Take 1 tablet (5 mg total) by mouth daily, Disp: 90 tablet, Rfl: 0    nebivolol (BYSTOLIC) 5 mg tablet, Take 0 5 tablets (2 5 mg total) by mouth daily, Disp: 90 tablet, Rfl: 0    ciprofloxacin (CIPRO) 500 mg tablet, Take 1 tablet (500 mg total) by mouth every 12 (twelve) hours for 5 days, Disp: 10 tablet, Rfl: 1    ergocalciferol (VITAMIN D2) 50,000 units, Take 1 capsule (50,000 Units total) by mouth once a week for 8 doses (Patient not taking: Reported on 1/17/2020), Disp: 8 capsule, Rfl: 0    ondansetron (ZOFRAN) 4 mg tablet, Take 1 tablet (4 mg total) by mouth every 6 (six) hours (Patient not taking: Reported on 1/17/2020), Disp: 12 tablet, Rfl: 0      Physical Exam:  /84 (BP Location: Left arm, Cuff Size: Adult)   Pulse 83   Temp (!) 96 8 °F (36 °C) (Tympanic)   Resp 18   Ht 5' 2" (1 575 m)   Wt 76 5 kg (168 lb 9 6 oz)   SpO2 97%   BMI 30 84 kg/m²     Physical Exam   Constitutional: She is oriented to person, place, and time   She appears well-developed and well-nourished  No distress  HENT:   Head: Normocephalic and atraumatic  Nose: Nose normal    Mouth/Throat: Oropharynx is clear and moist    Eyes: Pupils are equal, round, and reactive to light  Conjunctivae and EOM are normal  Right eye exhibits no discharge  Left eye exhibits no discharge  No scleral icterus  Neck: Normal range of motion  Neck supple  No JVD present  No tracheal deviation present  No thyromegaly present  Cardiovascular: Normal rate, regular rhythm and normal heart sounds  Exam reveals no friction rub  No murmur heard  Pulmonary/Chest: Effort normal and breath sounds normal  No stridor  No respiratory distress  She has no wheezes  She has no rales  She exhibits no tenderness  Abdominal: Soft  Bowel sounds are normal  She exhibits no distension and no mass  There is no hepatosplenomegaly, splenomegaly or hepatomegaly  There is no tenderness  There is no rebound and no guarding  Musculoskeletal: Normal range of motion  She exhibits no edema, tenderness or deformity  Lymphadenopathy:     She has no cervical adenopathy  Neurological: She is alert and oriented to person, place, and time  She has normal reflexes  No cranial nerve deficit  Coordination normal    Skin: Skin is warm and dry  No rash noted  She is not diaphoretic  No erythema  No pallor  Psychiatric: She has a normal mood and affect   Her behavior is normal  Judgment and thought content normal          Labs:  Lab Results   Component Value Date    WBC 5 91 01/27/2020    HGB 11 6 01/27/2020    HCT 36 9 01/27/2020    MCV 97 01/27/2020     01/27/2020     Lab Results   Component Value Date     06/18/2018    K 4 4 01/27/2020     01/27/2020    CO2 30 01/27/2020    ANIONGAP 8 06/18/2018    BUN 22 01/27/2020    CREATININE 0 98 01/27/2020    GLUF 79 01/27/2020    CALCIUM 9 1 01/27/2020    AST 15 01/27/2020    ALT 15 01/27/2020    ALKPHOS 59 01/27/2020    PROT 7 1 06/18/2018    BILITOT 0 3 06/18/2018    EGFR 57 01/27/2020     No results found for: TSH    Patient voiced understanding and agreement in the above discussion  Aware to contact our office with questions/symptoms in the interim

## 2020-01-31 ENCOUNTER — TELEPHONE (OUTPATIENT)
Dept: FAMILY MEDICINE CLINIC | Facility: CLINIC | Age: 75
End: 2020-01-31

## 2020-01-31 DIAGNOSIS — N39.0 URINARY TRACT INFECTION WITHOUT HEMATURIA, SITE UNSPECIFIED: Primary | ICD-10-CM

## 2020-01-31 RX ORDER — LEVOFLOXACIN 250 MG/1
250 TABLET ORAL DAILY
Qty: 7 TABLET | Refills: 0 | Status: SHIPPED | OUTPATIENT
Start: 2020-01-31 | End: 2020-02-07

## 2020-01-31 NOTE — TELEPHONE ENCOUNTER
Please call patient advised her her urine culture is positive will start Levaquin 250 mg 1 daily for 7 days recheck UA and urine culture in 2 weeks    Patient to call if she still has dysuria

## 2020-02-04 LAB — BACTERIA UR CULT: ABNORMAL

## 2020-02-11 ENCOUNTER — DOCUMENTATION (OUTPATIENT)
Dept: HEMATOLOGY ONCOLOGY | Facility: CLINIC | Age: 75
End: 2020-02-11

## 2020-02-11 ENCOUNTER — TELEPHONE (OUTPATIENT)
Dept: HEMATOLOGY ONCOLOGY | Facility: CLINIC | Age: 75
End: 2020-02-11

## 2020-02-11 DIAGNOSIS — Z17.0 MALIGNANT NEOPLASM OF OVERLAPPING SITES OF RIGHT BREAST IN FEMALE, ESTROGEN RECEPTOR POSITIVE (HCC): Primary | ICD-10-CM

## 2020-02-11 DIAGNOSIS — C50.811 MALIGNANT NEOPLASM OF OVERLAPPING SITES OF RIGHT BREAST IN FEMALE, ESTROGEN RECEPTOR POSITIVE (HCC): Primary | ICD-10-CM

## 2020-02-11 NOTE — TELEPHONE ENCOUNTER
Alex Leal from [address] [address] called for a prescription for 8 Mastectomy bras and 1 prosthesis and last office note to be faxed to [phone]

## 2020-02-13 RX ORDER — PANTOPRAZOLE SODIUM 40 MG/1
TABLET, DELAYED RELEASE ORAL
COMMUNITY
Start: 2020-02-04 | End: 2020-06-05 | Stop reason: HOSPADM

## 2020-02-15 ENCOUNTER — APPOINTMENT (OUTPATIENT)
Dept: LAB | Age: 75
End: 2020-02-15
Payer: MEDICARE

## 2020-02-15 DIAGNOSIS — Z79.811 USE OF AROMATASE INHIBITORS: ICD-10-CM

## 2020-02-15 DIAGNOSIS — C50.811 MALIGNANT NEOPLASM OF OVERLAPPING SITES OF RIGHT BREAST IN FEMALE, ESTROGEN RECEPTOR POSITIVE (HCC): ICD-10-CM

## 2020-02-15 DIAGNOSIS — Z17.0 MALIGNANT NEOPLASM OF OVERLAPPING SITES OF RIGHT BREAST IN FEMALE, ESTROGEN RECEPTOR POSITIVE (HCC): ICD-10-CM

## 2020-02-15 LAB
ALBUMIN SERPL BCP-MCNC: 3.4 G/DL (ref 3.5–5)
ALP SERPL-CCNC: 54 U/L (ref 46–116)
ALT SERPL W P-5'-P-CCNC: 15 U/L (ref 12–78)
ANION GAP SERPL CALCULATED.3IONS-SCNC: 4 MMOL/L (ref 4–13)
AST SERPL W P-5'-P-CCNC: 17 U/L (ref 5–45)
BASOPHILS # BLD AUTO: 0.04 THOUSANDS/ΜL (ref 0–0.1)
BASOPHILS NFR BLD AUTO: 1 % (ref 0–1)
BILIRUB SERPL-MCNC: 0.55 MG/DL (ref 0.2–1)
BUN SERPL-MCNC: 15 MG/DL (ref 5–25)
CALCIUM SERPL-MCNC: 9 MG/DL (ref 8.3–10.1)
CHLORIDE SERPL-SCNC: 106 MMOL/L (ref 100–108)
CO2 SERPL-SCNC: 30 MMOL/L (ref 21–32)
CREAT SERPL-MCNC: 0.86 MG/DL (ref 0.6–1.3)
EOSINOPHIL # BLD AUTO: 0.21 THOUSAND/ΜL (ref 0–0.61)
EOSINOPHIL NFR BLD AUTO: 4 % (ref 0–6)
ERYTHROCYTE [DISTWIDTH] IN BLOOD BY AUTOMATED COUNT: 14.9 % (ref 11.6–15.1)
GFR SERPL CREATININE-BSD FRML MDRD: 67 ML/MIN/1.73SQ M
GLUCOSE P FAST SERPL-MCNC: 86 MG/DL (ref 65–99)
HCT VFR BLD AUTO: 38.9 % (ref 34.8–46.1)
HGB BLD-MCNC: 12.2 G/DL (ref 11.5–15.4)
IMM GRANULOCYTES # BLD AUTO: 0.01 THOUSAND/UL (ref 0–0.2)
IMM GRANULOCYTES NFR BLD AUTO: 0 % (ref 0–2)
LYMPHOCYTES # BLD AUTO: 1.51 THOUSANDS/ΜL (ref 0.6–4.47)
LYMPHOCYTES NFR BLD AUTO: 29 % (ref 14–44)
MAGNESIUM SERPL-MCNC: 1.9 MG/DL (ref 1.6–2.6)
MCH RBC QN AUTO: 30.4 PG (ref 26.8–34.3)
MCHC RBC AUTO-ENTMCNC: 31.4 G/DL (ref 31.4–37.4)
MCV RBC AUTO: 97 FL (ref 82–98)
MONOCYTES # BLD AUTO: 0.47 THOUSAND/ΜL (ref 0.17–1.22)
MONOCYTES NFR BLD AUTO: 9 % (ref 4–12)
NEUTROPHILS # BLD AUTO: 3.01 THOUSANDS/ΜL (ref 1.85–7.62)
NEUTS SEG NFR BLD AUTO: 57 % (ref 43–75)
NRBC BLD AUTO-RTO: 0 /100 WBCS
PLATELET # BLD AUTO: 329 THOUSANDS/UL (ref 149–390)
PMV BLD AUTO: 10 FL (ref 8.9–12.7)
POTASSIUM SERPL-SCNC: 4.1 MMOL/L (ref 3.5–5.3)
PROT SERPL-MCNC: 8 G/DL (ref 6.4–8.2)
RBC # BLD AUTO: 4.01 MILLION/UL (ref 3.81–5.12)
SODIUM SERPL-SCNC: 140 MMOL/L (ref 136–145)
WBC # BLD AUTO: 5.25 THOUSAND/UL (ref 4.31–10.16)

## 2020-02-15 PROCEDURE — 85025 COMPLETE CBC W/AUTO DIFF WBC: CPT

## 2020-02-15 PROCEDURE — 86300 IMMUNOASSAY TUMOR CA 15-3: CPT

## 2020-02-15 PROCEDURE — 36415 COLL VENOUS BLD VENIPUNCTURE: CPT

## 2020-02-15 PROCEDURE — 83735 ASSAY OF MAGNESIUM: CPT

## 2020-02-15 PROCEDURE — 80053 COMPREHEN METABOLIC PANEL: CPT

## 2020-02-16 LAB — CANCER AG27-29 SERPL-ACNC: 65 U/ML (ref 0–42.3)

## 2020-02-17 ENCOUNTER — OFFICE VISIT (OUTPATIENT)
Dept: FAMILY MEDICINE CLINIC | Facility: CLINIC | Age: 75
End: 2020-02-17
Payer: MEDICARE

## 2020-02-17 VITALS
WEIGHT: 169.2 LBS | TEMPERATURE: 97.2 F | DIASTOLIC BLOOD PRESSURE: 84 MMHG | SYSTOLIC BLOOD PRESSURE: 130 MMHG | HEIGHT: 63 IN | BODY MASS INDEX: 29.98 KG/M2 | HEART RATE: 72 BPM

## 2020-02-17 DIAGNOSIS — Z71.85 IMMUNIZATION COUNSELING: ICD-10-CM

## 2020-02-17 DIAGNOSIS — R79.89 HIGH SERUM PARATHYROID HORMONE (PTH): Primary | ICD-10-CM

## 2020-02-17 DIAGNOSIS — R12 HEART BURN: ICD-10-CM

## 2020-02-17 DIAGNOSIS — N39.0 URINARY TRACT INFECTION WITHOUT HEMATURIA, SITE UNSPECIFIED: ICD-10-CM

## 2020-02-17 DIAGNOSIS — M85.80 OSTEOPENIA, UNSPECIFIED LOCATION: ICD-10-CM

## 2020-02-17 DIAGNOSIS — I51.9 LEFT VENTRICULAR SYSTOLIC DYSFUNCTION: ICD-10-CM

## 2020-02-17 DIAGNOSIS — R79.9 ABNORMAL BLOOD CHEMISTRY: ICD-10-CM

## 2020-02-17 PROCEDURE — 1160F RVW MEDS BY RX/DR IN RCRD: CPT | Performed by: FAMILY MEDICINE

## 2020-02-17 PROCEDURE — 1036F TOBACCO NON-USER: CPT | Performed by: FAMILY MEDICINE

## 2020-02-17 PROCEDURE — 3075F SYST BP GE 130 - 139MM HG: CPT | Performed by: FAMILY MEDICINE

## 2020-02-17 PROCEDURE — 3079F DIAST BP 80-89 MM HG: CPT | Performed by: FAMILY MEDICINE

## 2020-02-17 PROCEDURE — 3008F BODY MASS INDEX DOCD: CPT | Performed by: FAMILY MEDICINE

## 2020-02-17 PROCEDURE — 99214 OFFICE O/P EST MOD 30 MIN: CPT | Performed by: FAMILY MEDICINE

## 2020-02-17 NOTE — PROGRESS NOTES
Assessment/Plan:       No problem-specific Assessment & Plan notes found for this encounter  Diagnoses and all orders for this visit:    High serum parathyroid hormone (PTH)  -     PTH, intact; Future  -     PTH, intact; Future    Urinary tract infection without hematuria, site unspecified  Comments:  Clinically improved  Orders:  -     Urine culture; Future  -     UA (URINE) with reflex to Scope    Left ventricular systolic dysfunction  Comments:  Advised to follow with Cardiology    Osteopenia, unspecified location  Comments:    PTH is abnormal await repeat    Abnormal blood chemistry  Comments:  Low anion gap  Improved    Heart burn  Comments:  Improved  Patient decline upper GI    Immunization counseling  Comments:  Advised patient to check with Dr Ciara Roblero if it is okay to take flu shot and pneumonia vaccine  If it is okay patient to return for them    Other orders  -     Cholecalciferol 50 MCG (2000 UT) CAPS; take 1 capsule by oral route  every week        Patient Instructions   Patient to follow up with test results      Orders Placed This Encounter   Procedures    Urine culture     Standing Status:   Future     Standing Expiration Date:   2/17/2021    PTH, intact     Standing Status:   Future     Standing Expiration Date:   2/17/2021    UA (URINE) with reflex to Scope    PTH, intact     Standing Status:   Future     Standing Expiration Date:   2/17/2021         Subjective:     Patient ID: Gwynne Cheadle is a 77 y o  female      Patient is here for follow-up  Dysuria  Patient stated her dysuria resolved  She took Cipro for only 4 days  Denied urinary frequency, hematuria  Or abdominal pain  Osteopenia  Denied bone fracture  Patient does not smoke  Patient does not exercise  Breast cancer still following with Dr Caira Roblero had blood work recently  Heartburn resolved  Denied nausea, vomiting or epigastric pain  Left ventricular dysfunction  Denied shortness of breath, fatigue, chest pain  Did not follow with Cardiology        Test results  Lab done on January 27, February 25, 2020   discussed result with patient      Review of Systems   Constitutional: Negative for appetite change and fatigue  HENT: Negative for ear pain, tinnitus, trouble swallowing and voice change  Eyes: Negative for photophobia, pain and visual disturbance  Respiratory: Negative for cough, chest tightness and wheezing  Cardiovascular: Negative for chest pain, palpitations and leg swelling  Gastrointestinal: Negative for abdominal distention, abdominal pain, anal bleeding, constipation, diarrhea, nausea and rectal pain  Endocrine: Negative for cold intolerance, heat intolerance, polydipsia and polyuria  Genitourinary: Negative for decreased urine volume, difficulty urinating, dysuria, flank pain, frequency, hematuria and urgency  Musculoskeletal: Negative for arthralgias, back pain, gait problem, myalgias and neck pain  Skin: Negative for pallor and rash  Allergic/Immunologic: Negative for immunocompromised state  Neurological: Negative for dizziness, seizures, syncope and speech difficulty  Hematological: Negative for adenopathy  Does not bruise/bleed easily  Psychiatric/Behavioral: Negative for agitation, confusion and hallucinations  The patient is not nervous/anxious  Objective:     Physical Exam   Constitutional: She is oriented to person, place, and time  She appears well-developed and well-nourished  No distress  HENT:   Head: Normocephalic and atraumatic  Eyes: Pupils are equal, round, and reactive to light  Conjunctivae and EOM are normal  No scleral icterus  Neck: No JVD present  No thyromegaly present  Cardiovascular: Normal rate, regular rhythm and normal heart sounds  No murmur heard  Extremities  No edema   Pulmonary/Chest: Effort normal and breath sounds normal    Abdominal: Soft  She exhibits no distension and no mass  There is no tenderness   There is no rebound and no guarding  No hernia  Lymphadenopathy:     She has no cervical adenopathy  Neurological: She is alert and oriented to person, place, and time  No cranial nerve deficit  She exhibits normal muscle tone  Coordination normal    Gait is normal   Skin: No rash noted  Psychiatric: She has a normal mood and affect   Her behavior is normal  Judgment and thought content normal

## 2020-02-18 ENCOUNTER — TELEPHONE (OUTPATIENT)
Dept: HEMATOLOGY ONCOLOGY | Facility: CLINIC | Age: 75
End: 2020-02-18

## 2020-02-18 LAB — CANCER AG15-3 SERPL-ACNC: 44.3 U/ML (ref 0–25)

## 2020-02-18 NOTE — TELEPHONE ENCOUNTER
Patient called to confirm their appointment  Appointment confirmed for Dr Roland               Appointment date and time:2-19-20 @ 9:40am                [place] location: [facility]                Patient verbalized understanding of above

## 2020-02-19 ENCOUNTER — HOSPITAL ENCOUNTER (OUTPATIENT)
Dept: INFUSION CENTER | Facility: HOSPITAL | Age: 75
Discharge: HOME | End: 2020-02-19
Attending: INTERNAL MEDICINE
Payer: MEDICARE

## 2020-02-19 ENCOUNTER — OFFICE VISIT (OUTPATIENT)
Dept: HEMATOLOGY ONCOLOGY | Facility: CLINIC | Age: 75
End: 2020-02-19
Payer: MEDICARE

## 2020-02-19 VITALS
WEIGHT: 171.52 LBS | DIASTOLIC BLOOD PRESSURE: 85 MMHG | BODY MASS INDEX: 31.56 KG/M2 | SYSTOLIC BLOOD PRESSURE: 158 MMHG | HEART RATE: 74 BPM | RESPIRATION RATE: 16 BRPM | TEMPERATURE: 97 F | HEIGHT: 62 IN

## 2020-02-19 VITALS
HEIGHT: 62 IN | HEART RATE: 99 BPM | OXYGEN SATURATION: 96 % | WEIGHT: 171.6 LBS | BODY MASS INDEX: 31.58 KG/M2 | DIASTOLIC BLOOD PRESSURE: 92 MMHG | SYSTOLIC BLOOD PRESSURE: 145 MMHG | RESPIRATION RATE: 18 BRPM | TEMPERATURE: 97.3 F

## 2020-02-19 DIAGNOSIS — C50.811 MALIGNANT NEOPLASM OF OVERLAPPING SITES OF RIGHT BREAST IN FEMALE, ESTROGEN RECEPTOR POSITIVE (HCC): Primary | ICD-10-CM

## 2020-02-19 DIAGNOSIS — Z79.811 USE OF AROMATASE INHIBITORS: ICD-10-CM

## 2020-02-19 DIAGNOSIS — Z17.0 MALIGNANT NEOPLASM OF OVERLAPPING SITES OF RIGHT BREAST IN FEMALE, ESTROGEN RECEPTOR POSITIVE (HCC): Primary | ICD-10-CM

## 2020-02-19 PROCEDURE — 1160F RVW MEDS BY RX/DR IN RCRD: CPT | Performed by: INTERNAL MEDICINE

## 2020-02-19 PROCEDURE — 96367 TX/PROPH/DG ADDL SEQ IV INF: CPT

## 2020-02-19 PROCEDURE — 3077F SYST BP >= 140 MM HG: CPT | Performed by: INTERNAL MEDICINE

## 2020-02-19 PROCEDURE — 3079F DIAST BP 80-89 MM HG: CPT | Performed by: INTERNAL MEDICINE

## 2020-02-19 PROCEDURE — 96417 CHEMO IV INFUS EACH ADDL SEQ: CPT

## 2020-02-19 PROCEDURE — 3008F BODY MASS INDEX DOCD: CPT | Performed by: INTERNAL MEDICINE

## 2020-02-19 PROCEDURE — 96413 CHEMO IV INFUSION 1 HR: CPT

## 2020-02-19 PROCEDURE — 1036F TOBACCO NON-USER: CPT | Performed by: INTERNAL MEDICINE

## 2020-02-19 PROCEDURE — 99214 OFFICE O/P EST MOD 30 MIN: CPT | Performed by: INTERNAL MEDICINE

## 2020-02-19 RX ORDER — SODIUM CHLORIDE 9 MG/ML
20 INJECTION, SOLUTION INTRAVENOUS ONCE
Status: COMPLETED | OUTPATIENT
Start: 2020-02-19 | End: 2020-02-19

## 2020-02-19 RX ADMIN — SODIUM CHLORIDE 20 ML/HR: 9 INJECTION, SOLUTION INTRAVENOUS at 10:41

## 2020-02-19 RX ADMIN — TRASTUZUMAB 450 MG: 150 INJECTION, POWDER, LYOPHILIZED, FOR SOLUTION INTRAVENOUS at 11:08

## 2020-02-19 RX ADMIN — PACLITAXEL 141.6 MG: 6 INJECTION, SOLUTION INTRAVENOUS at 11:47

## 2020-02-19 RX ADMIN — ONDANSETRON HYDROCHLORIDE: 2 INJECTION, SOLUTION INTRAMUSCULAR; INTRAVENOUS at 10:43

## 2020-02-19 NOTE — PROGRESS NOTES
Hematology/Oncology Outpatient Follow-up  Catrachito Hebert 77 y o  female 1945 [id number]    Date:  2/19/2020        Assessment and Plan:  1  Malignant neoplasm of overlapping 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, estrogen receptor positive ([facility] [address] )  The patient will be continue on the current treatment with Herceptin and Taxol on every 3 week basis along with the anastrozole as endocrine therapy  We will aim to see her again about 3 weeks from now  She will get a PET-CT scan the end of March for further evaluation of her metastatic breast cancer on the current treatment  She will be also due for an echocardiogram in March  - CBC and differential; Future  - Comprehensive metabolic panel; Future  - Magnesium; Future    2  Use of aromatase inhibitors  She is encouraged to continue with the anastrozole as endocrine therapy on a daily basis along with calcium vitamin-D supplements daily  She is also on Prolia injections every 6 months  HPI:  The patient came today for a follow-up visit  She continues to be on the current line of treatment with the Herceptin and Taxol on every 3 week basis which she is tolerating relatively well  Her most recent blood work on 02/15/2020 showed a stable tumor markers  Her white cell count was 5 2 with normal hemoglobin hematocrit and platelet count  Her CMP was normal   The patient continues to complain about the lymphedema of the right upper extremity  Oncology History    The patient has a remote history of right breast cancer diagnosed in [place] in February of 2005 with infiltrating ductal carcinoma grade 2 status post right total mastectomy and left prophylactic total mastectomy as well at that time  Her pathology revealed 8/17 positive lymph node for metastatic disease from the right axilla  ER status positive at 5-10%, progesterone negative, her 2 Gregory positive by FISH    She was then treated with 6 cycles of adjuvant chemotherapy with 5 fluorouracil, Adriamycin, and cyclophosphamide followed by tamoxifen and adjuvant radiation to the right axilla and chest wall  The patient was then switched from tamoxifen to Arimidex which was then stopped in 2012  She was then diagnosed with recurrence of her breast cancer with metastatic disease mainly involving the right anterior chest wall, skin, right axilla pectoralis muscle, left axilla and other areas of the body in May 2015  The biopsy from the scan showed triple positive malignant process compatible with recurrence of her breast cancer  She was then started on palliative chemotherapy and received 6 cycles of Taxotere along with trastuzumab and pertuzumab  After 6 cycles she was continued mainly on trastuzumab and pertuzumab alone  The patient then went to [facility] which she continued her oncological care with trastuzumab, pertuzumab and anastrozole  That treatment had to be put on hold after she developed decline of her ejection fraction to about 23% around June 2017  A PET scan in July of 2017 is showed mild progression of her disease and for that reason low-dose weekly Taxol was started on the 18th July 2017  The frequency of Taxol was decreased to 1 treatment every other week  She then received the rest of her treatment and live unknown from September 2017 until March 2018  Her echocardiogram in May 2018 showed ejection fraction of 43%  The patient then went to live alone again in July of 2018 where she continued her Taxol treatment on every other week basis until the middle of January 2019          Malignant neoplasm of overlapping 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, estrogen receptor positive ([address] )    2015 -  Hormone Therapy     Anastrozole      5/28/2015 Initial Diagnosis     Metastatic breast cancer ([address] )  (recurrence)      6/30/2015 -  Chemotherapy     1-Taxotere, Herceptin, Perjeta started in Shay[place] 2015 x6 cycles  2-Herceptin and Perjeta alone were continued since the patient was not interested in continue the Taxotere  3-Herceptin and Perjeta were put on hold due to decline of her ejection fraction around May 2017  4-low dose weekly Taxol was started in July 2017  5-Taxol was switched to every other week basis      4/14/2019 - 6/9/2019 Chemotherapy     PACLitaxel (TAXOL) 144 6 mg in sodium chloride 0 9 % 250 mL chemo IVPB, 80 mg/m2, Intravenous, Once, 2 of 6 cycles      12/4/2019 - 12/31/2019 Chemotherapy     PACLitaxel (TAXOL) 142 2 mg in sodium chloride 0 9 % 250 mL chemo IVPB, 80 mg/m2 = 142 2 mg, Intravenous, Once, 1 of 6 cycles  Administration: 142 2 mg (12/4/2019), 142 2 mg (12/18/2019)      1/8/2020 -  Chemotherapy     PACLitaxel (TAXOL) 141 6 mg in sodium chloride 0 9 % 250 mL chemo IVPB, 80 mg/m2 = 141 6 mg, Intravenous, Once, 2 of 6 cycles  Administration: 141 6 mg (1/8/2020), 141 6 mg (1/29/2020)  trastuzumab (HERCEPTIN) 600 mg in sodium chloride 0 9 % 250 mL chemo infusion, 609 mg, Intravenous, Once, 2 of 6 cycles  Administration: 600 mg (1/8/2020), 450 mg (1/29/2020)         Interval history:    ROS: Review of Systems   Constitutional: Negative for activity change, appetite change, chills, diaphoresis, fatigue, fever and unexpected weight change  HENT: Negative for congestion, dental problem, ear discharge, ear pain, facial swelling, hearing loss, mouth sores, nosebleeds, postnasal drip, sore throat, tinnitus and trouble swallowing  Eyes: Negative for discharge, redness, itching and visual disturbance  Respiratory: Negative for cough, chest tightness, shortness of breath and wheezing  Cardiovascular: Negative for chest pain, palpitations and leg swelling  Gastrointestinal: Negative for abdominal distention, abdominal pain, anal bleeding, blood in stool, constipation, diarrhea, nausea and vomiting  Genitourinary: Negative for difficulty urinating, dysuria, flank pain, frequency, hematuria and urgency     Musculoskeletal: Negative for arthralgias, back pain, gait problem, joint swelling, myalgias and neck pain  Skin: Negative for color change, pallor, rash and wound  Neurological: Negative for dizziness, syncope, speech difficulty, weakness, light-headedness, numbness and headaches  Hematological: Negative for adenopathy  Does not bruise/bleed easily  Psychiatric/Behavioral: Negative for agitation, behavioral problems, confusion and sleep disturbance         Past Medical History:   Diagnosis Date    Breast cancer ([address] )     Hypertension     Lymphedema of right arm     Vitamin B12 deficiency        Past Surgical History:   Procedure Laterality Date    APPENDECTOMY      BREAST SURGERY      bilat mastectomy-right total mastectomy and prophylactic left total mastectomy-2005    KNEE SURGERY      right knee replacement 2014 in [facility] Bilateral        Social History     Socioeconomic History    Marital status: Single     Spouse name: None    Number of children: None    Years of education: None    Highest education level: None   Occupational History    None   Social Needs    Financial resource strain: None    Food insecurity:     Worry: None     Inability: None    Transportation needs:     Medical: None     Non-medical: None   Tobacco Use    Smoking status: Never Smoker    Smokeless tobacco: Never Used    Tobacco comment: no passive smoke exposure   Substance and Sexual Activity    Alcohol use: No    Drug use: No    Sexual activity: None   Lifestyle    Physical activity:     Days per week: None     Minutes per session: None    Stress: None   Relationships    Social connections:     Talks on phone: None     Gets together: None     Attends Sikh service: None     Active member of club or organization: None     Attends meetings of clubs or organizations: None     Relationship status: None    Intimate partner violence:     Fear of current or ex partner: None     Emotionally abused: None     Physically abused: None     Forced sexual activity: None   Other Topics Concern    None   Social History Narrative    None       Family History   Problem Relation Age of Onset    Asthma Mother     Osteoarthritis Father     Bone cancer Family        Allergies   Allergen Reactions    Penicillins Rash         Current Outpatient Medications:     anastrozole (ARIMIDEX) 1 mg tablet, Take 1 tablet (1 mg total) by mouth every 24 hours, Disp: 90 tablet, Rfl: 4    Cholecalciferol 50 MCG (2000 UT) CAPS, take 1 capsule by oral route  every week, Disp: , Rfl:     lisinopril (ZESTRIL) 5 mg tablet, Take 1 tablet (5 mg total) by mouth daily, Disp: 90 tablet, Rfl: 0    Misc  Devices (ILLUSIONS C BREAST PROSTHESIS) MISC, 1 breast prosthesis, Disp: 1 each, Rfl: 0    Misc  Devices (REFLECTIONS C BREAST PROSTHES) MISC, Dispense 1 breast prosthesis, Disp: 1 each, Rfl: 0    nebivolol (BYSTOLIC) 5 mg tablet, Take 0 5 tablets (2 5 mg total) by mouth daily, Disp: 90 tablet, Rfl: 0    ondansetron (ZOFRAN) 4 mg tablet, Take 1 tablet (4 mg total) by mouth every 6 (six) hours, Disp: 12 tablet, Rfl: 0    pantoprazole (PROTONIX) 40 mg tablet, , Disp: , Rfl:     Cyanocobalamin 1000 MCG/ML KIT, Inject as directed every 6 (six) months, Disp: , Rfl:     ergocalciferol (VITAMIN D2) 50,000 units, Take 1 capsule (50,000 Units total) by mouth once a week for 8 doses (Patient not taking: Reported on 1/17/2020), Disp: 8 capsule, Rfl: 0    esomeprazole (NexIUM) 40 MG capsule, Take 1 capsule (40 mg total) by mouth daily (Patient not taking: Reported on 2/17/2020), Disp: 90 capsule, Rfl: 3      Physical Exam:  /92 (BP Location: Left arm, Cuff Size: Adult)   Pulse 99   Temp (!) 97 3 °F (36 3 °C) (Tympanic)   Resp 18   Ht 5' 2" (1 575 m)   Wt 77 8 kg (171 lb 9 6 oz)   SpO2 96%   BMI 31 39 kg/m²     Physical Exam   Constitutional: She is oriented to person, place, and time  She appears well-developed and well-nourished  No distress  HENT:   Head: Normocephalic and atraumatic     Nose: Nose normal  Mouth/Throat: Oropharynx is clear and moist    Eyes: Pupils are equal, round, and reactive to light  Conjunctivae and EOM are normal  Right eye exhibits no discharge  Left eye exhibits no discharge  No scleral icterus  Neck: Normal range of motion  Neck supple  No JVD present  No tracheal deviation present  No thyromegaly present  Cardiovascular: Normal rate, regular rhythm and normal heart sounds  Exam reveals no friction rub  No murmur heard  Pulmonary/Chest: Effort normal and breath sounds normal  No stridor  No respiratory distress  She has no wheezes  She has no rales  She exhibits no tenderness  Abdominal: Soft  Bowel sounds are normal  She exhibits no distension and no mass  There is no hepatosplenomegaly, splenomegaly or hepatomegaly  There is no tenderness  There is no rebound and no guarding  Musculoskeletal: Normal range of motion  She exhibits edema (Lymphedema of the right upper extremity)  She exhibits no tenderness or deformity  Lymphadenopathy:     She has no cervical adenopathy  Neurological: She is alert and oriented to person, place, and time  She has normal reflexes  No cranial nerve deficit  Coordination normal    Skin: Skin is warm and dry  No rash noted  She is not diaphoretic  No erythema  No pallor  Psychiatric: She has a normal mood and affect   Her behavior is normal  Judgment and thought content normal          Labs:  Lab Results   Component Value Date    WBC 5 25 02/15/2020    HGB 12 2 02/15/2020    HCT 38 9 02/15/2020    MCV 97 02/15/2020     02/15/2020     Lab Results   Component Value Date     06/18/2018    K 4 1 02/15/2020     02/15/2020    CO2 30 02/15/2020    ANIONGAP 8 06/18/2018    BUN 15 02/15/2020    CREATININE 0 86 02/15/2020    GLUF 86 02/15/2020    CALCIUM 9 0 02/15/2020    AST 17 02/15/2020    ALT 15 02/15/2020    ALKPHOS 54 02/15/2020    PROT 7 1 06/18/2018    BILITOT 0 3 06/18/2018    EGFR 67 02/15/2020     No results found for: TSH    Patient voiced understanding and agreement in the above discussion  Aware to contact our office with questions/symptoms in the interim

## 2020-02-19 NOTE — PROGRESS NOTES
Pt tolerated treatment today with no adverse reactions  Declined AVS  Left unit ambulatory with a steady gait

## 2020-03-03 ENCOUNTER — TELEPHONE (OUTPATIENT)
Dept: HEMATOLOGY ONCOLOGY | Facility: CLINIC | Age: 75
End: 2020-03-03

## 2020-03-03 NOTE — TELEPHONE ENCOUNTER
Patient called to confirm their appointment  Appointment confirmed for Dr Roland               Appointment date and time:  3-11-20 @ 9:20am              [place] location: [facility]                Patient verbalized understanding of above

## 2020-03-06 RX ORDER — SODIUM CHLORIDE 9 MG/ML
20 INJECTION, SOLUTION INTRAVENOUS ONCE
Status: CANCELLED | OUTPATIENT
Start: 2020-03-18

## 2020-03-07 ENCOUNTER — APPOINTMENT (OUTPATIENT)
Dept: LAB | Age: 75
End: 2020-03-07
Payer: MEDICARE

## 2020-03-07 DIAGNOSIS — R79.89 HIGH SERUM PARATHYROID HORMONE (PTH): ICD-10-CM

## 2020-03-07 DIAGNOSIS — C50.811 MALIGNANT NEOPLASM OF OVERLAPPING SITES OF RIGHT BREAST IN FEMALE, ESTROGEN RECEPTOR POSITIVE (HCC): ICD-10-CM

## 2020-03-07 DIAGNOSIS — N39.0 URINARY TRACT INFECTION WITHOUT HEMATURIA, SITE UNSPECIFIED: ICD-10-CM

## 2020-03-07 DIAGNOSIS — Z17.0 MALIGNANT NEOPLASM OF OVERLAPPING SITES OF RIGHT BREAST IN FEMALE, ESTROGEN RECEPTOR POSITIVE (HCC): ICD-10-CM

## 2020-03-07 LAB
ALBUMIN SERPL BCP-MCNC: 3.5 G/DL (ref 3.5–5)
ALP SERPL-CCNC: 50 U/L (ref 46–116)
ALT SERPL W P-5'-P-CCNC: 15 U/L (ref 12–78)
ANION GAP SERPL CALCULATED.3IONS-SCNC: 3 MMOL/L (ref 4–13)
AST SERPL W P-5'-P-CCNC: 14 U/L (ref 5–45)
BACTERIA UR QL AUTO: NORMAL /HPF
BASOPHILS # BLD AUTO: 0.04 THOUSANDS/ΜL (ref 0–0.1)
BASOPHILS NFR BLD AUTO: 1 % (ref 0–1)
BILIRUB SERPL-MCNC: 0.49 MG/DL (ref 0.2–1)
BILIRUB UR QL STRIP: NEGATIVE
BUN SERPL-MCNC: 15 MG/DL (ref 5–25)
CALCIUM SERPL-MCNC: 8.6 MG/DL (ref 8.3–10.1)
CHLORIDE SERPL-SCNC: 107 MMOL/L (ref 100–108)
CLARITY UR: CLEAR
CO2 SERPL-SCNC: 29 MMOL/L (ref 21–32)
COLOR UR: YELLOW
CREAT SERPL-MCNC: 0.82 MG/DL (ref 0.6–1.3)
EOSINOPHIL # BLD AUTO: 0.16 THOUSAND/ΜL (ref 0–0.61)
EOSINOPHIL NFR BLD AUTO: 3 % (ref 0–6)
ERYTHROCYTE [DISTWIDTH] IN BLOOD BY AUTOMATED COUNT: 15 % (ref 11.6–15.1)
GFR SERPL CREATININE-BSD FRML MDRD: 71 ML/MIN/1.73SQ M
GLUCOSE P FAST SERPL-MCNC: 97 MG/DL (ref 65–99)
GLUCOSE UR STRIP-MCNC: NEGATIVE MG/DL
HCT VFR BLD AUTO: 38.4 % (ref 34.8–46.1)
HGB BLD-MCNC: 11.9 G/DL (ref 11.5–15.4)
HGB UR QL STRIP.AUTO: ABNORMAL
HYALINE CASTS #/AREA URNS LPF: NORMAL /LPF
IMM GRANULOCYTES # BLD AUTO: 0.01 THOUSAND/UL (ref 0–0.2)
IMM GRANULOCYTES NFR BLD AUTO: 0 % (ref 0–2)
KETONES UR STRIP-MCNC: NEGATIVE MG/DL
LEUKOCYTE ESTERASE UR QL STRIP: NEGATIVE
LYMPHOCYTES # BLD AUTO: 1.61 THOUSANDS/ΜL (ref 0.6–4.47)
LYMPHOCYTES NFR BLD AUTO: 31 % (ref 14–44)
MAGNESIUM SERPL-MCNC: 2.1 MG/DL (ref 1.6–2.6)
MCH RBC QN AUTO: 30.6 PG (ref 26.8–34.3)
MCHC RBC AUTO-ENTMCNC: 31 G/DL (ref 31.4–37.4)
MCV RBC AUTO: 99 FL (ref 82–98)
MONOCYTES # BLD AUTO: 0.53 THOUSAND/ΜL (ref 0.17–1.22)
MONOCYTES NFR BLD AUTO: 10 % (ref 4–12)
NEUTROPHILS # BLD AUTO: 2.81 THOUSANDS/ΜL (ref 1.85–7.62)
NEUTS SEG NFR BLD AUTO: 55 % (ref 43–75)
NITRITE UR QL STRIP: NEGATIVE
NON-SQ EPI CELLS URNS QL MICRO: NORMAL /HPF
NRBC BLD AUTO-RTO: 0 /100 WBCS
PH UR STRIP.AUTO: 7 [PH]
PLATELET # BLD AUTO: 288 THOUSANDS/UL (ref 149–390)
PMV BLD AUTO: 10 FL (ref 8.9–12.7)
POTASSIUM SERPL-SCNC: 4.4 MMOL/L (ref 3.5–5.3)
PROT SERPL-MCNC: 7.5 G/DL (ref 6.4–8.2)
PROT UR STRIP-MCNC: NEGATIVE MG/DL
PTH-INTACT SERPL-MCNC: 87.6 PG/ML (ref 18.4–80.1)
RBC # BLD AUTO: 3.89 MILLION/UL (ref 3.81–5.12)
RBC #/AREA URNS AUTO: NORMAL /HPF
SODIUM SERPL-SCNC: 139 MMOL/L (ref 136–145)
SP GR UR STRIP.AUTO: 1.01 (ref 1–1.03)
UROBILINOGEN UR QL STRIP.AUTO: 0.2 E.U./DL
WBC # BLD AUTO: 5.16 THOUSAND/UL (ref 4.31–10.16)
WBC #/AREA URNS AUTO: NORMAL /HPF

## 2020-03-07 PROCEDURE — 83735 ASSAY OF MAGNESIUM: CPT

## 2020-03-07 PROCEDURE — 36415 COLL VENOUS BLD VENIPUNCTURE: CPT

## 2020-03-07 PROCEDURE — 80053 COMPREHEN METABOLIC PANEL: CPT

## 2020-03-07 PROCEDURE — 87086 URINE CULTURE/COLONY COUNT: CPT

## 2020-03-07 PROCEDURE — 85025 COMPLETE CBC W/AUTO DIFF WBC: CPT

## 2020-03-07 PROCEDURE — 81001 URINALYSIS AUTO W/SCOPE: CPT | Performed by: FAMILY MEDICINE

## 2020-03-07 PROCEDURE — 83970 ASSAY OF PARATHORMONE: CPT

## 2020-03-08 LAB — BACTERIA UR CULT: NORMAL

## 2020-03-10 ENCOUNTER — TELEPHONE (OUTPATIENT)
Dept: FAMILY MEDICINE CLINIC | Facility: CLINIC | Age: 75
End: 2020-03-10

## 2020-03-10 DIAGNOSIS — N39.0 URINARY TRACT INFECTION WITHOUT HEMATURIA, SITE UNSPECIFIED: Primary | ICD-10-CM

## 2020-03-10 NOTE — TELEPHONE ENCOUNTER
Patients sister called and states that they have a question on the patients blood pressure medication because her right hand is swollen and she thinks that she needs a water pill [phone]

## 2020-03-10 NOTE — TELEPHONE ENCOUNTER
----- Message from Cookie Thomas MD sent at 3/9/2020  1:17 PM EDT -----  Urine culture is okay and urinalysis is improving, only has a trace of blood    Recheck urinalysis in 2 week

## 2020-03-11 ENCOUNTER — TELEPHONE (OUTPATIENT)
Dept: FAMILY MEDICINE CLINIC | Facility: CLINIC | Age: 75
End: 2020-03-11

## 2020-03-11 ENCOUNTER — HOSPITAL ENCOUNTER (OUTPATIENT)
Dept: INFUSION CENTER | Facility: HOSPITAL | Age: 75
Discharge: HOME | End: 2020-03-11
Attending: INTERNAL MEDICINE

## 2020-03-11 ENCOUNTER — TELEPHONE (OUTPATIENT)
Dept: HEMATOLOGY ONCOLOGY | Facility: CLINIC | Age: 75
End: 2020-03-11

## 2020-03-11 ENCOUNTER — DOCUMENTATION (OUTPATIENT)
Dept: INFUSION CENTER | Facility: HOSPITAL | Age: 75
End: 2020-03-11

## 2020-03-11 ENCOUNTER — OFFICE VISIT (OUTPATIENT)
Dept: HEMATOLOGY ONCOLOGY | Facility: CLINIC | Age: 75
End: 2020-03-11
Payer: MEDICARE

## 2020-03-11 VITALS
DIASTOLIC BLOOD PRESSURE: 86 MMHG | BODY MASS INDEX: 31.28 KG/M2 | OXYGEN SATURATION: 98 % | HEART RATE: 82 BPM | RESPIRATION RATE: 16 BRPM | WEIGHT: 170 LBS | SYSTOLIC BLOOD PRESSURE: 140 MMHG | TEMPERATURE: 97.6 F | HEIGHT: 62 IN

## 2020-03-11 DIAGNOSIS — C50.811 MALIGNANT NEOPLASM OF OVERLAPPING SITES OF RIGHT BREAST IN FEMALE, ESTROGEN RECEPTOR POSITIVE (HCC): Primary | ICD-10-CM

## 2020-03-11 DIAGNOSIS — C50.811 MALIGNANT NEOPLASM OF OVERLAPPING SITES OF RIGHT BREAST IN FEMALE, ESTROGEN RECEPTOR POSITIVE (HCC): ICD-10-CM

## 2020-03-11 DIAGNOSIS — I10 ESSENTIAL HYPERTENSION: Primary | ICD-10-CM

## 2020-03-11 DIAGNOSIS — Z17.0 MALIGNANT NEOPLASM OF OVERLAPPING SITES OF RIGHT BREAST IN FEMALE, ESTROGEN RECEPTOR POSITIVE (HCC): ICD-10-CM

## 2020-03-11 DIAGNOSIS — Z17.0 MALIGNANT NEOPLASM OF OVERLAPPING SITES OF RIGHT BREAST IN FEMALE, ESTROGEN RECEPTOR POSITIVE (HCC): Primary | ICD-10-CM

## 2020-03-11 PROCEDURE — 99214 OFFICE O/P EST MOD 30 MIN: CPT | Performed by: INTERNAL MEDICINE

## 2020-03-11 PROCEDURE — 1036F TOBACCO NON-USER: CPT | Performed by: INTERNAL MEDICINE

## 2020-03-11 PROCEDURE — 3008F BODY MASS INDEX DOCD: CPT | Performed by: INTERNAL MEDICINE

## 2020-03-11 PROCEDURE — 1160F RVW MEDS BY RX/DR IN RCRD: CPT | Performed by: INTERNAL MEDICINE

## 2020-03-11 PROCEDURE — 3077F SYST BP >= 140 MM HG: CPT | Performed by: INTERNAL MEDICINE

## 2020-03-11 PROCEDURE — 3079F DIAST BP 80-89 MM HG: CPT | Performed by: INTERNAL MEDICINE

## 2020-03-11 RX ORDER — INDAPAMIDE 1.25 MG/1
1.25 TABLET, FILM COATED ORAL EVERY MORNING
Qty: 30 TABLET | Refills: 1 | Status: SHIPPED | OUTPATIENT
Start: 2020-03-11 | End: 2022-03-08 | Stop reason: HOSPADM

## 2020-03-11 NOTE — TELEPHONE ENCOUNTER
I spoke to patient patient stated when she was 12 on she was taking blood pressure medication has diuretic  She would like to go back on diuretic because when she stopped her diuretic the edema of her arm got worse secondary to her mastectomy  Patient was taking Lozol in [place]  Will restart Lozol advised patient to check blood pressure at home and to call if it is worse lower than 110/60  Presently her blood pressure is 140 over 80s  Check BMP in 1 week

## 2020-03-11 NOTE — TELEPHONE ENCOUNTER
Phoned Carmelita RN at [address] [place] [address] to notify that treatment being delayed one week  Patient is to have Echocardiogram and doppler or RUE before next treatment

## 2020-03-11 NOTE — TELEPHONE ENCOUNTER
Please call the patient she is requesting to speak to you, it is in regards to her blood pressure medication

## 2020-03-11 NOTE — PROGRESS NOTES
Hematology/Oncology Outpatient Follow-up  Melissa Monk 77 y o  female 1945 [id number]    Date:  3/11/2020        Assessment and Plan:  1  Malignant neoplasm of overlapping 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, estrogen receptor positive ([facility] [address] )  The patient seems to be concerned about the right upper extremity worsening lymphedema  I did explain to the patient that this could be to the fact that she is putting a lot of pressure on the right arm  However, other etiologies need to be considered  We will pursue a Doppler study of the right upper extremity to rule out DVT  She will also get an echocardiogram to rule out worsening of her ejection fraction since she was restarted on Herceptin with previous history of decline of ejection fraction on treatment  We will delay the chemotherapy treatment by a week re-evaluate her next week  We will also send her for evaluation by the lymphedema clinic once we rule out deep vein thrombosis  We will aim for a PET-CT scan around the beginning of April   - Echo complete with contrast if indicated; Future  - VAS upper limb venous duplex scan, unilateral/limited; Future        HPI:  The patient came in today for a follow-up visit  She tolerated her last treatment with Herceptin and Taxol which is being given on every 3 week basis  She is also taking the anastrozole daily as endocrine therapy  The patient stated that she started to notice significant swelling of the right upper extremity with tenderness in the shoulder area  The swelling seems to be intermittent  Her most recent blood work from 03/07/2020 showed normal CBC and CMP  Her tumor markers from 02/15/2020 showed stable level slightly above normal   Oncology History    The patient has a remote history of right breast cancer diagnosed in [place] in February of 2005 with infiltrating ductal carcinoma grade 2 status post right total mastectomy and left prophylactic total mastectomy as well at that time    Her pathology revealed 8/17 positive lymph node for metastatic disease from the right axilla  ER status positive at 5-10%, progesterone negative, her 2 Gregory positive by FISH  She was then treated with 6 cycles of adjuvant chemotherapy with 5 fluorouracil, Adriamycin, and cyclophosphamide followed by tamoxifen and adjuvant radiation to the right axilla and chest wall  The patient was then switched from tamoxifen to Arimidex which was then stopped in 2012  She was then diagnosed with recurrence of her breast cancer with metastatic disease mainly involving the right anterior chest wall, skin, right axilla pectoralis muscle, left axilla and other areas of the body in May 2015  The biopsy from the scan showed triple positive malignant process compatible with recurrence of her breast cancer  She was then started on palliative chemotherapy and received 6 cycles of Taxotere along with trastuzumab and pertuzumab  After 6 cycles she was continued mainly on trastuzumab and pertuzumab alone  The patient then went to [facility] which she continued her oncological care with trastuzumab, pertuzumab and anastrozole  That treatment had to be put on hold after she developed decline of her ejection fraction to about 23% around June 2017  A PET scan in July of 2017 is showed mild progression of her disease and for that reason low-dose weekly Taxol was started on the 18th July 2017  The frequency of Taxol was decreased to 1 treatment every other week  She then received the rest of her treatment and live unknown from September 2017 until March 2018  Her echocardiogram in May 2018 showed ejection fraction of 43%  The patient then went to live alone again in July of 2018 where she continued her Taxol treatment on every other week basis until the middle of January 2019          Malignant neoplasm of overlapping 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, estrogen receptor positive ([facility] [address] )    2015 -  Hormone Therapy     Anastrozole      5/28/2015 distention, abdominal pain, anal bleeding, blood in stool, constipation, diarrhea, nausea and vomiting  Genitourinary: Negative for difficulty urinating, dysuria, flank pain, frequency, hematuria and urgency  Musculoskeletal: Negative for arthralgias, back pain, gait problem, joint swelling, myalgias and neck pain  She has significant swelling and mild pain in the right upper extremity   Skin: Negative for color change, pallor, rash and wound  Neurological: Negative for dizziness, syncope, speech difficulty, weakness, light-headedness, numbness and headaches  Hematological: Negative for adenopathy  Does not bruise/bleed easily  Psychiatric/Behavioral: Negative for agitation, behavioral problems, confusion and sleep disturbance         Past Medical History:   Diagnosis Date    Breast cancer ([facility] [address] )     Hypertension     Lymphedema of right arm     Vitamin B12 deficiency        Past Surgical History:   Procedure Laterality Date    APPENDECTOMY      BREAST SURGERY      bilat mastectomy-right total mastectomy and prophylactic left total mastectomy-2005    KNEE SURGERY      right knee replacement 2014 in [address] [place] [address] Bilateral        Social History     Socioeconomic History    Marital status: Single     Spouse name: None    Number of children: None    Years of education: None    Highest education level: None   Occupational History    None   Social Needs    Financial resource strain: None    Food insecurity:     Worry: None     Inability: None    Transportation needs:     Medical: None     Non-medical: None   Tobacco Use    Smoking status: Never Smoker    Smokeless tobacco: Never Used    Tobacco comment: no passive smoke exposure   Substance and Sexual Activity    Alcohol use: No    Drug use: No    Sexual activity: None   Lifestyle    Physical activity:     Days per week: None     Minutes per session: None    Stress: None   Relationships    Social connections:     Talks on phone: None     Gets together: None     Attends Sikh service: None     Active member of club or organization: None     Attends meetings of clubs or organizations: None     Relationship status: None    Intimate partner violence:     Fear of current or ex partner: None     Emotionally abused: None     Physically abused: None     Forced sexual activity: None   Other Topics Concern    None   Social History Narrative    None       Family History   Problem Relation Age of Onset    Asthma Mother     Osteoarthritis Father     Bone cancer Family        Allergies   Allergen Reactions    Penicillins Rash         Current Outpatient Medications:     anastrozole (ARIMIDEX) 1 mg tablet, Take 1 tablet (1 mg total) by mouth every 24 hours, Disp: 90 tablet, Rfl: 4    Cholecalciferol 50 MCG (2000 UT) CAPS, take 1 capsule by oral route  every week, Disp: , Rfl:     Cyanocobalamin 1000 MCG/ML KIT, Inject as directed every 6 (six) months, Disp: , Rfl:     lisinopril (ZESTRIL) 5 mg tablet, Take 1 tablet (5 mg total) by mouth daily, Disp: 90 tablet, Rfl: 0    Misc  Devices (ILLUSIONS C BREAST PROSTHESIS) MISC, 1 breast prosthesis, Disp: 1 each, Rfl: 0    Misc   Devices (REFLECTIONS C BREAST PROSTHES) MISC, Dispense 1 breast prosthesis, Disp: 1 each, Rfl: 0    nebivolol (BYSTOLIC) 5 mg tablet, Take 0 5 tablets (2 5 mg total) by mouth daily, Disp: 90 tablet, Rfl: 0    ondansetron (ZOFRAN) 4 mg tablet, Take 1 tablet (4 mg total) by mouth every 6 (six) hours, Disp: 12 tablet, Rfl: 0    pantoprazole (PROTONIX) 40 mg tablet, , Disp: , Rfl:     ergocalciferol (VITAMIN D2) 50,000 units, Take 1 capsule (50,000 Units total) by mouth once a week for 8 doses (Patient not taking: Reported on 1/17/2020), Disp: 8 capsule, Rfl: 0    esomeprazole (NexIUM) 40 MG capsule, Take 1 capsule (40 mg total) by mouth daily (Patient not taking: Reported on 2/17/2020), Disp: 90 capsule, Rfl: 3      Physical Exam:  /86 (BP Location: Left arm, Patient Position: Sitting, Cuff Size: Adult)   Pulse 82   Temp 97 6 °F (36 4 °C) (Tympanic)   Resp 16   Ht 5' 2" (1 575 m)   Wt 77 1 kg (170 lb)   SpO2 98%   BMI 31 09 kg/m²     Physical Exam   Constitutional: She is oriented to person, place, and time  She appears well-developed and well-nourished  No distress  HENT:   Head: Normocephalic and atraumatic  Nose: Nose normal    Mouth/Throat: Oropharynx is clear and moist    Eyes: Pupils are equal, round, and reactive to light  Conjunctivae and EOM are normal  Right eye exhibits no discharge  Left eye exhibits no discharge  No scleral icterus  Neck: Normal range of motion  Neck supple  No JVD present  No tracheal deviation present  No thyromegaly present  Cardiovascular: Normal rate, regular rhythm and normal heart sounds  Exam reveals no friction rub  No murmur heard  Pulmonary/Chest: Effort normal and breath sounds normal  No stridor  No respiratory distress  She has no wheezes  She has no rales  She exhibits no tenderness  Abdominal: Soft  Bowel sounds are normal  She exhibits no distension and no mass  There is no hepatosplenomegaly, splenomegaly or hepatomegaly  There is no tenderness  There is no rebound and no guarding  Musculoskeletal: Normal range of motion  She exhibits edema (Worsening of the lymphedema in the right upper extremity with tenderness) and tenderness ( right upper extremity)  She exhibits no deformity  Lymphadenopathy:     She has no cervical adenopathy  Neurological: She is alert and oriented to person, place, and time  She has normal reflexes  No cranial nerve deficit  Coordination normal    Skin: Skin is warm and dry  No rash noted  She is not diaphoretic  No erythema  No pallor  Psychiatric: She has a normal mood and affect   Her behavior is normal  Judgment and thought content normal          Labs:  Lab Results   Component Value Date    WBC 5 16 03/07/2020    HGB 11 9 03/07/2020    HCT 38 4 03/07/2020    MCV 99 (H) 03/07/2020     03/07/2020     Lab Results   Component Value Date     06/18/2018    K 4 4 03/07/2020     03/07/2020    CO2 29 03/07/2020    ANIONGAP 8 06/18/2018    BUN 15 03/07/2020    CREATININE 0 82 03/07/2020    GLUF 97 03/07/2020    CALCIUM 8 6 03/07/2020    AST 14 03/07/2020    ALT 15 03/07/2020    ALKPHOS 50 03/07/2020    PROT 7 1 06/18/2018    BILITOT 0 3 06/18/2018    EGFR 71 03/07/2020     No results found for: TSH    Patient voiced understanding and agreement in the above discussion  Aware to contact our office with questions/symptoms in the interim

## 2020-03-12 ENCOUNTER — TELEPHONE (OUTPATIENT)
Dept: FAMILY MEDICINE CLINIC | Facility: CLINIC | Age: 75
End: 2020-03-12

## 2020-03-13 ENCOUNTER — HOSPITAL ENCOUNTER (OUTPATIENT)
Dept: NON INVASIVE DIAGNOSTICS | Facility: HOSPITAL | Age: 75
Discharge: HOME | End: 2020-03-13
Attending: INTERNAL MEDICINE
Payer: MEDICARE

## 2020-03-13 DIAGNOSIS — C50.811 MALIGNANT NEOPLASM OF OVERLAPPING SITES OF RIGHT BREAST IN FEMALE, ESTROGEN RECEPTOR POSITIVE (HCC): ICD-10-CM

## 2020-03-13 DIAGNOSIS — Z17.0 MALIGNANT NEOPLASM OF OVERLAPPING SITES OF RIGHT BREAST IN FEMALE, ESTROGEN RECEPTOR POSITIVE (HCC): ICD-10-CM

## 2020-03-13 PROCEDURE — 93306 TTE W/DOPPLER COMPLETE: CPT

## 2020-03-16 ENCOUNTER — HOSPITAL ENCOUNTER (OUTPATIENT)
Dept: NON INVASIVE DIAGNOSTICS | Facility: HOSPITAL | Age: 75
Discharge: HOME | End: 2020-03-16
Attending: INTERNAL MEDICINE
Payer: MEDICARE

## 2020-03-16 DIAGNOSIS — Z17.0 MALIGNANT NEOPLASM OF OVERLAPPING SITES OF RIGHT BREAST IN FEMALE, ESTROGEN RECEPTOR POSITIVE (HCC): ICD-10-CM

## 2020-03-16 DIAGNOSIS — C50.811 MALIGNANT NEOPLASM OF OVERLAPPING SITES OF RIGHT BREAST IN FEMALE, ESTROGEN RECEPTOR POSITIVE (HCC): ICD-10-CM

## 2020-03-16 PROCEDURE — 93971 EXTREMITY STUDY: CPT

## 2020-03-16 PROCEDURE — 93971 EXTREMITY STUDY: CPT | Performed by: SURGERY

## 2020-03-17 ENCOUNTER — TELEPHONE (OUTPATIENT)
Dept: HEMATOLOGY ONCOLOGY | Facility: CLINIC | Age: 75
End: 2020-03-17

## 2020-03-17 NOTE — TELEPHONE ENCOUNTER
The patient was called to complete the blood work prior to the visit on 3/18/2020  the patient was informed if there is any symptoms including coughing, shortness of breath or fever to make the office aware  The limit of visitors was mentioned

## 2020-03-18 ENCOUNTER — APPOINTMENT (EMERGENCY)
Dept: RADIOLOGY | Facility: HOSPITAL | Age: 75
End: 2020-03-18
Payer: MEDICARE

## 2020-03-18 ENCOUNTER — HOSPITAL ENCOUNTER (OUTPATIENT)
Dept: INFUSION CENTER | Facility: HOSPITAL | Age: 75
Discharge: HOME | End: 2020-03-18
Attending: INTERNAL MEDICINE

## 2020-03-18 ENCOUNTER — APPOINTMENT (EMERGENCY)
Dept: CT IMAGING | Facility: HOSPITAL | Age: 75
End: 2020-03-18
Payer: MEDICARE

## 2020-03-18 ENCOUNTER — OFFICE VISIT (OUTPATIENT)
Dept: HEMATOLOGY ONCOLOGY | Facility: CLINIC | Age: 75
End: 2020-03-18
Payer: MEDICARE

## 2020-03-18 ENCOUNTER — HOSPITAL ENCOUNTER (EMERGENCY)
Facility: HOSPITAL | Age: 75
Discharge: HOME | End: 2020-03-19
Attending: EMERGENCY MEDICINE
Payer: MEDICARE

## 2020-03-18 VITALS
WEIGHT: 171 LBS | HEART RATE: 86 BPM | TEMPERATURE: 97.2 F | RESPIRATION RATE: 18 BRPM | OXYGEN SATURATION: 99 % | HEIGHT: 62 IN | SYSTOLIC BLOOD PRESSURE: 172 MMHG | DIASTOLIC BLOOD PRESSURE: 94 MMHG | BODY MASS INDEX: 31.47 KG/M2

## 2020-03-18 DIAGNOSIS — I10 HYPERTENSION: Primary | ICD-10-CM

## 2020-03-18 DIAGNOSIS — R90.89 ABNORMAL CT OF BRAIN: ICD-10-CM

## 2020-03-18 DIAGNOSIS — R11.2 NAUSEA AND VOMITING, INTRACTABILITY OF VOMITING NOT SPECIFIED, UNSPECIFIED VOMITING TYPE: ICD-10-CM

## 2020-03-18 DIAGNOSIS — C50.811 MALIGNANT NEOPLASM OF OVERLAPPING SITES OF RIGHT BREAST IN FEMALE, ESTROGEN RECEPTOR POSITIVE (HCC): Primary | ICD-10-CM

## 2020-03-18 DIAGNOSIS — Z17.0 MALIGNANT NEOPLASM OF OVERLAPPING SITES OF RIGHT BREAST IN FEMALE, ESTROGEN RECEPTOR POSITIVE (HCC): Primary | ICD-10-CM

## 2020-03-18 LAB
BASOPHILS # BLD AUTO: 0 THOUSANDS/ΜL (ref 0–0.1)
BASOPHILS NFR BLD AUTO: 0 % (ref 0–1)
EOSINOPHIL # BLD AUTO: 0.1 THOUSAND/ΜL (ref 0–0.4)
EOSINOPHIL NFR BLD AUTO: 1 % (ref 0–6)
ERYTHROCYTE [DISTWIDTH] IN BLOOD BY AUTOMATED COUNT: 15.7 %
HCT VFR BLD AUTO: 35 % (ref 36–46)
HGB BLD-MCNC: 11.8 G/DL (ref 12–16)
LYMPHOCYTES # BLD AUTO: 1.4 THOUSANDS/ΜL (ref 0.5–4)
LYMPHOCYTES NFR BLD AUTO: 14 % (ref 25–45)
MCH RBC QN AUTO: 31.2 PG (ref 26–34)
MCHC RBC AUTO-ENTMCNC: 33.7 G/DL (ref 31–36)
MCV RBC AUTO: 93 FL (ref 80–100)
MONOCYTES # BLD AUTO: 0.5 THOUSAND/ΜL (ref 0.2–0.9)
MONOCYTES NFR BLD AUTO: 5 % (ref 1–10)
NEUTROPHILS # BLD AUTO: 7.6 THOUSANDS/ΜL (ref 1.8–7.8)
NEUTS SEG NFR BLD AUTO: 79 % (ref 45–65)
PLATELET # BLD AUTO: 298 THOUSANDS/UL (ref 150–450)
PMV BLD AUTO: 7.7 FL (ref 8.9–12.7)
RBC # BLD AUTO: 3.77 MILLION/UL (ref 4–5.2)
WBC # BLD AUTO: 9.6 THOUSAND/UL (ref 4.5–11)

## 2020-03-18 PROCEDURE — 93005 ELECTROCARDIOGRAM TRACING: CPT

## 2020-03-18 PROCEDURE — 99284 EMERGENCY DEPT VISIT MOD MDM: CPT

## 2020-03-18 PROCEDURE — 80053 COMPREHEN METABOLIC PANEL: CPT | Performed by: EMERGENCY MEDICINE

## 2020-03-18 PROCEDURE — 3008F BODY MASS INDEX DOCD: CPT | Performed by: INTERNAL MEDICINE

## 2020-03-18 PROCEDURE — 99285 EMERGENCY DEPT VISIT HI MDM: CPT | Performed by: EMERGENCY MEDICINE

## 2020-03-18 PROCEDURE — NC001 PR NO CHARGE: Performed by: EMERGENCY MEDICINE

## 2020-03-18 PROCEDURE — 84484 ASSAY OF TROPONIN QUANT: CPT | Performed by: EMERGENCY MEDICINE

## 2020-03-18 PROCEDURE — 3077F SYST BP >= 140 MM HG: CPT | Performed by: INTERNAL MEDICINE

## 2020-03-18 PROCEDURE — 3080F DIAST BP >= 90 MM HG: CPT | Performed by: INTERNAL MEDICINE

## 2020-03-18 PROCEDURE — 81001 URINALYSIS AUTO W/SCOPE: CPT | Performed by: EMERGENCY MEDICINE

## 2020-03-18 PROCEDURE — 71046 X-RAY EXAM CHEST 2 VIEWS: CPT

## 2020-03-18 PROCEDURE — 36415 COLL VENOUS BLD VENIPUNCTURE: CPT | Performed by: EMERGENCY MEDICINE

## 2020-03-18 PROCEDURE — 99215 OFFICE O/P EST HI 40 MIN: CPT | Performed by: INTERNAL MEDICINE

## 2020-03-18 PROCEDURE — 1160F RVW MEDS BY RX/DR IN RCRD: CPT | Performed by: INTERNAL MEDICINE

## 2020-03-18 PROCEDURE — 1036F TOBACCO NON-USER: CPT | Performed by: INTERNAL MEDICINE

## 2020-03-18 PROCEDURE — 85025 COMPLETE CBC W/AUTO DIFF WBC: CPT | Performed by: EMERGENCY MEDICINE

## 2020-03-18 PROCEDURE — 70450 CT HEAD/BRAIN W/O DYE: CPT

## 2020-03-18 RX ORDER — ONDANSETRON 2 MG/ML
4 INJECTION INTRAMUSCULAR; INTRAVENOUS ONCE
Status: DISCONTINUED | OUTPATIENT
Start: 2020-03-18 | End: 2020-03-19 | Stop reason: HOSPADM

## 2020-03-18 RX ORDER — HYDRALAZINE HYDROCHLORIDE 20 MG/ML
10 INJECTION INTRAMUSCULAR; INTRAVENOUS ONCE
Status: DISCONTINUED | OUTPATIENT
Start: 2020-03-18 | End: 2020-03-19 | Stop reason: HOSPADM

## 2020-03-18 RX ORDER — ONDANSETRON 4 MG/1
4 TABLET, ORALLY DISINTEGRATING ORAL ONCE
Status: COMPLETED | OUTPATIENT
Start: 2020-03-19 | End: 2020-03-19

## 2020-03-18 NOTE — PROGRESS NOTES
Hematology/Oncology Outpatient Follow-up  Katelynn Perez 77 y o  female 1945 [id number]    Date:  3/18/2020        Assessment and Plan:  1  Malignant neoplasm of overlapping 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, estrogen receptor positive ([facility] [address] )  I did discuss with the patient and her sister the echocardiogram results which showed significant decline of the ejection fraction by at least 14 percentage point  The patient was told that this could be due to the fact that she was restarted on Herceptin recently  She also seems to have fluid retention with significant swelling of the right upper extremity  After lengthy discussion the decision was made to get a PET-CT scan for to re-evaluate the status of her metastatic breast cancer  We will also repeat the echocardiogram in 4 weeks from now and ask the Cardiology team to adjust her blood pressure medication which hopefully would improve her ejection fraction  We will also continue to hold the Herceptin and Taxol but continue with the anastrozole as endocrine therapy  The patient may be a candidate for lapatinib or TDM1 since they have less potential risks of cardiotoxicity in comparison to Herceptin  We will see the patient again about 3-4 weeks from now to finalize the treatment plan accordingly   - NM PET CT skull base to mid thigh; Future  - Echo complete with contrast if indicated; Future        HPI:  The patient came today for a follow-up visit  She continues to have some swelling of the right upper extremity  We did pursue an echocardiogram on 03/13/2020 which showed:  SUMMARY     LEFT VENTRICLE:  Systolic function was moderately reduced by visual assessment  Ejection fraction was estimated to be 40 %  Abnormal global longitudinal strain, -14 8% with markedly abnormal strain in the basal anterior anteroseptal and basal septal walls    Would recommend repeating the echocardiogram with a 3D probe to obtain 3D ejection fraction for more accurate and precise measurement of LV systolic function if clinically indicated  There was moderate diffuse hypokinesis with regional variations  Doppler parameters were consistent with abnormal left ventricular relaxation (grade 1 diastolic dysfunction)  A Doppler ultrasound of the right upper extremity on 03/16/2020 was negative for deep vein thrombosis of either arm  Her blood pressure today was pretty high at 172/94  The patient otherwise denies any significant symptoms  Oncology History    The patient has a remote history of right breast cancer diagnosed in [facility] in February of 2005 with infiltrating ductal carcinoma grade 2 status post right total mastectomy and left prophylactic total mastectomy as well at that time  Her pathology revealed 8/17 positive lymph node for metastatic disease from the right axilla  ER status positive at 5-10%, progesterone negative, her 2 Gregory positive by FISH  She was then treated with 6 cycles of adjuvant chemotherapy with 5 fluorouracil, Adriamycin, and cyclophosphamide followed by tamoxifen and adjuvant radiation to the right axilla and chest wall  The patient was then switched from tamoxifen to Arimidex which was then stopped in 2012  She was then diagnosed with recurrence of her breast cancer with metastatic disease mainly involving the right anterior chest wall, skin, right axilla pectoralis muscle, left axilla and other areas of the body in May 2015  The biopsy from the scan showed triple positive malignant process compatible with recurrence of her breast cancer  She was then started on palliative chemotherapy and received 6 cycles of Taxotere along with trastuzumab and pertuzumab  After 6 cycles she was continued mainly on trastuzumab and pertuzumab alone  The patient then went to [facility] which she continued her oncological care with trastuzumab, pertuzumab and anastrozole    That treatment had to be put on hold after she developed decline of her ejection fraction to about 23% around June 2017  A PET scan in July of 2017 is showed mild progression of her disease and for that reason low-dose weekly Taxol was started on the 18th July 2017  The frequency of Taxol was decreased to 1 treatment every other week  She then received the rest of her treatment and live unknown from September 2017 until March 2018  Her echocardiogram in May 2018 showed ejection fraction of 43%  The patient then went to live alone again in July of 2018 where she continued her Taxol treatment on every other week basis until the middle of January 2019  Malignant neoplasm of overlapping 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, estrogen receptor positive ([facility] [address] )    2015 -  Hormone Therapy     Anastrozole      5/28/2015 Initial Diagnosis     Metastatic breast cancer ([facility] [address] )  (recurrence)      6/30/2015 -  Chemotherapy     1-Taxotere, Herceptin, Perjeta started in [place] 2015 x6 cycles  2-Herceptin and Perjeta alone were continued since the patient was not interested in continue the Taxotere  3-Herceptin and Perjeta were put on hold due to decline of her ejection fraction around May 2017    4-low dose weekly Taxol was started in July 2017  5-Taxol was switched to every other week basis      4/14/2019 - 6/9/2019 Chemotherapy     PACLitaxel (TAXOL) 144 6 mg in sodium chloride 0 9 % 250 mL chemo IVPB, 80 mg/m2, Intravenous, Once, 2 of 6 cycles      12/4/2019 - 12/31/2019 Chemotherapy     PACLitaxel (TAXOL) 142 2 mg in sodium chloride 0 9 % 250 mL chemo IVPB, 80 mg/m2 = 142 2 mg, Intravenous, Once, 1 of 6 cycles  Administration: 142 2 mg (12/4/2019), 142 2 mg (12/18/2019)      1/8/2020 -  Chemotherapy     PACLitaxel (TAXOL) 141 6 mg in sodium chloride 0 9 % 250 mL chemo IVPB, 80 mg/m2 = 141 6 mg, Intravenous, Once, 4 of 6 cycles  Administration: 141 6 mg (1/8/2020), 141 6 mg (1/29/2020), 141 6 mg (2/19/2020)  trastuzumab (HERCEPTIN) 600 mg in sodium chloride 0 9 % 250 mL chemo infusion, 609 mg, Intravenous, Once, 4 of 6 cycles  Administration: 600 mg (1/8/2020), 450 mg (1/29/2020), 450 mg (2/19/2020)         Interval history:    ROS: Review of Systems   Constitutional: Positive for fatigue  Negative for activity change, appetite change, chills, diaphoresis, fever and unexpected weight change  HENT: Negative for congestion, dental problem, ear discharge, ear pain, facial swelling, hearing loss, mouth sores, nosebleeds, postnasal drip, sore throat, tinnitus and trouble swallowing  Eyes: Negative for discharge, redness, itching and visual disturbance  Respiratory: Negative for cough, chest tightness, shortness of breath and wheezing  Cardiovascular: Negative for chest pain, palpitations and leg swelling  Gastrointestinal: Negative for abdominal distention, abdominal pain, anal bleeding, blood in stool, constipation, diarrhea, nausea and vomiting  Genitourinary: Negative for difficulty urinating, dysuria, flank pain, frequency, hematuria and urgency  Musculoskeletal: Negative for arthralgias, back pain, gait problem, joint swelling, myalgias and neck pain  Skin: Negative for color change, pallor, rash and wound  Neurological: Negative for dizziness, syncope, speech difficulty, weakness, light-headedness, numbness and headaches  Hematological: Negative for adenopathy  Does not bruise/bleed easily  Psychiatric/Behavioral: Negative for agitation, behavioral problems, confusion and sleep disturbance         Past Medical History:   Diagnosis Date    Breast cancer ([facility] [address] )     Hypertension     Lymphedema of right arm     Vitamin B12 deficiency        Past Surgical History:   Procedure Laterality Date    APPENDECTOMY      BREAST SURGERY      bilat mastectomy-right total mastectomy and prophylactic left total mastectomy-2005    KNEE SURGERY      right knee replacement 2014 in [address] [place] [address] Bilateral        Social History     Socioeconomic History    Marital status: Single     Spouse name: None    Number of children: None    Years of education: None    Highest education level: None   Occupational History    None   Social Needs    Financial resource strain: None    Food insecurity:     Worry: None     Inability: None    Transportation needs:     Medical: None     Non-medical: None   Tobacco Use    Smoking status: Never Smoker    Smokeless tobacco: Never Used    Tobacco comment: no passive smoke exposure   Substance and Sexual Activity    Alcohol use: No    Drug use: No    Sexual activity: None   Lifestyle    Physical activity:     Days per week: None     Minutes per session: None    Stress: None   Relationships    Social connections:     Talks on phone: None     Gets together: None     Attends Hindu service: None     Active member of club or organization: None     Attends meetings of clubs or organizations: None     Relationship status: None    Intimate partner violence:     Fear of current or ex partner: None     Emotionally abused: None     Physically abused: None     Forced sexual activity: None   Other Topics Concern    None   Social History Narrative    None       Family History   Problem Relation Age of Onset    Asthma Mother     Osteoarthritis Father     Bone cancer Family        Allergies   Allergen Reactions    Penicillins Rash         Current Outpatient Medications:     anastrozole (ARIMIDEX) 1 mg tablet, Take 1 tablet (1 mg total) by mouth every 24 hours, Disp: 90 tablet, Rfl: 4    Cholecalciferol 50 MCG (2000 UT) CAPS, take 1 capsule by oral route  every week, Disp: , Rfl:     Cyanocobalamin 1000 MCG/ML KIT, Inject as directed every 6 (six) months, Disp: , Rfl:     indapamide (LOZOL) 1 25 mg tablet, Take 1 tablet (1 25 mg total) by mouth every morning, Disp: 30 tablet, Rfl: 1    lisinopril (ZESTRIL) 5 mg tablet, Take 1 tablet (5 mg total) by mouth daily, Disp: 90 tablet, Rfl: 0    Misc   Devices (ILLUSIONS C BREAST PROSTHESIS) MISC, 1 breast prosthesis, Disp: 1 each, Rfl: 0   Misc  Devices (REFLECTIONS C BREAST PROSTHES) MISC, Dispense 1 breast prosthesis, Disp: 1 each, Rfl: 0    nebivolol (BYSTOLIC) 5 mg tablet, Take 0 5 tablets (2 5 mg total) by mouth daily, Disp: 90 tablet, Rfl: 0    ondansetron (ZOFRAN) 4 mg tablet, Take 1 tablet (4 mg total) by mouth every 6 (six) hours, Disp: 12 tablet, Rfl: 0    pantoprazole (PROTONIX) 40 mg tablet, , Disp: , Rfl:     ergocalciferol (VITAMIN D2) 50,000 units, Take 1 capsule (50,000 Units total) by mouth once a week for 8 doses (Patient not taking: Reported on 1/17/2020), Disp: 8 capsule, Rfl: 0    esomeprazole (NexIUM) 40 MG capsule, Take 1 capsule (40 mg total) by mouth daily (Patient not taking: Reported on 2/17/2020), Disp: 90 capsule, Rfl: 3      Physical Exam:  BP (!) 172/94 (BP Location: Left arm, Cuff Size: Adult)   Pulse 86   Temp (!) 97 2 °F (36 2 °C) (Tympanic)   Resp 18   Ht 5' 2" (1 575 m)   Wt 77 6 kg (171 lb)   SpO2 99%   BMI 31 28 kg/m²     Physical Exam   Constitutional: She is oriented to person, place, and time  She appears well-developed and well-nourished  No distress  HENT:   Head: Normocephalic and atraumatic  Nose: Nose normal    Mouth/Throat: Oropharynx is clear and moist    Eyes: Pupils are equal, round, and reactive to light  Conjunctivae and EOM are normal  Right eye exhibits no discharge  Left eye exhibits no discharge  No scleral icterus  Neck: Normal range of motion  Neck supple  No JVD present  No tracheal deviation present  No thyromegaly present  Cardiovascular: Normal rate, regular rhythm and normal heart sounds  Exam reveals no friction rub  No murmur heard  Pulmonary/Chest: Effort normal and breath sounds normal  No stridor  No respiratory distress  She has no wheezes  She has no rales  She exhibits no tenderness  Abdominal: Soft  Bowel sounds are normal  She exhibits no distension and no mass  There is no hepatosplenomegaly, splenomegaly or hepatomegaly  There is no tenderness   There is no rebound and no guarding  Musculoskeletal: Normal range of motion  She exhibits edema (Of the right upper extremity)  She exhibits no tenderness or deformity  Lymphadenopathy:     She has no cervical adenopathy  Neurological: She is alert and oriented to person, place, and time  She has normal reflexes  No cranial nerve deficit  Coordination normal    Skin: Skin is warm and dry  No rash noted  She is not diaphoretic  No erythema  No pallor  Psychiatric: She has a normal mood and affect  Her behavior is normal  Judgment and thought content normal          Labs:  Lab Results   Component Value Date    WBC 5 16 03/07/2020    HGB 11 9 03/07/2020    HCT 38 4 03/07/2020    MCV 99 (H) 03/07/2020     03/07/2020     Lab Results   Component Value Date     06/18/2018    K 4 4 03/07/2020     03/07/2020    CO2 29 03/07/2020    ANIONGAP 8 06/18/2018    BUN 15 03/07/2020    CREATININE 0 82 03/07/2020    GLUF 97 03/07/2020    CALCIUM 8 6 03/07/2020    AST 14 03/07/2020    ALT 15 03/07/2020    ALKPHOS 50 03/07/2020    PROT 7 1 06/18/2018    BILITOT 0 3 06/18/2018    EGFR 71 03/07/2020     No results found for: TSH    Patient voiced understanding and agreement in the above discussion  Aware to contact our office with questions/symptoms in the interim

## 2020-03-18 NOTE — LETTER
March 18, 2020     MD Mikal Whittington [address]    Patient: Aurelio Mueller   YOB: 1945   Date of Visit: 3/18/2020       Dear Dr William Anderson: Thank you for referring Garfield Pires to me for evaluation  Below are my notes for this consultation  If you have questions, please do not hesitate to call me  I look forward to following your patient along with you  Sincerely,        Maurice Bustillo MD        CC: No Recipients  Maurice Bustillo MD  3/18/2020 11:51 AM  Sign at close encounter  Hematology/Oncology Outpatient Follow-up  Aurelio Mueller 77 y o  female 1945 [id number]    Date:  3/18/2020        Assessment and Plan:  1  Malignant neoplasm of overlapping 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, estrogen receptor positive ([facility] [address] )  I did discuss with the patient and her sister the echocardiogram results which showed significant decline of the ejection fraction by at least 14 percentage point  The patient was told that this could be due to the fact that she was restarted on Herceptin recently  She also seems to have fluid retention with significant swelling of the right upper extremity  After lengthy discussion the decision was made to get a PET-CT scan for to re-evaluate the status of her metastatic breast cancer  We will also repeat the echocardiogram in 4 weeks from now and ask the Cardiology team to adjust her blood pressure medication which hopefully would improve her ejection fraction  We will also continue to hold the Herceptin and Taxol but continue with the anastrozole as endocrine therapy  The patient may be a candidate for lapatinib or TDM1 since they have less potential risks of cardiotoxicity in comparison to Herceptin  We will see the patient again about 3-4 weeks from now to finalize the treatment plan accordingly   - NM PET CT skull base to mid thigh; Future  - Echo complete with contrast if indicated;  Future        HPI:  The patient came today for a follow-up visit  She continues to have some swelling of the right upper extremity  We did pursue an echocardiogram on 03/13/2020 which showed:  SUMMARY     LEFT VENTRICLE:  Systolic function was moderately reduced by visual assessment  Ejection fraction was estimated to be 40 %  Abnormal global longitudinal strain, -14 8% with markedly abnormal strain in the basal anterior anteroseptal and basal septal walls  Would recommend repeating the echocardiogram with a 3D probe to obtain 3D ejection fraction for more accurate and precise measurement of LV systolic function if clinically indicated  There was moderate diffuse hypokinesis with regional variations  Doppler parameters were consistent with abnormal left ventricular relaxation (grade 1 diastolic dysfunction)  A Doppler ultrasound of the right upper extremity on 03/16/2020 was negative for deep vein thrombosis of either arm  Her blood pressure today was pretty high at 172/94  The patient otherwise denies any significant symptoms  Oncology History    The patient has a remote history of right breast cancer diagnosed in [place] in February of 2005 with infiltrating ductal carcinoma grade 2 status post right total mastectomy and left prophylactic total mastectomy as well at that time  Her pathology revealed 8/17 positive lymph node for metastatic disease from the right axilla  ER status positive at 5-10%, progesterone negative, her 2 Gregory positive by FISH  She was then treated with 6 cycles of adjuvant chemotherapy with 5 fluorouracil, Adriamycin, and cyclophosphamide followed by tamoxifen and adjuvant radiation to the right axilla and chest wall  The patient was then switched from tamoxifen to Arimidex which was then stopped in 2012      She was then diagnosed with recurrence of her breast cancer with metastatic disease mainly involving the right anterior chest wall, skin, right axilla pectoralis muscle, left axilla and other areas of the body in May 2015   The biopsy from the scan showed triple positive malignant process compatible with recurrence of her breast cancer  She was then started on palliative chemotherapy and received 6 cycles of Taxotere along with trastuzumab and pertuzumab  After 6 cycles she was continued mainly on trastuzumab and pertuzumab alone  The patient then went to [facility] which she continued her oncological care with trastuzumab, pertuzumab and anastrozole  That treatment had to be put on hold after she developed decline of her ejection fraction to about 23% around June 2017  A PET scan in July of 2017 is showed mild progression of her disease and for that reason low-dose weekly Taxol was started on the 18th July 2017  The frequency of Taxol was decreased to 1 treatment every other week  She then received the rest of her treatment and live unknown from September 2017 until March 2018  Her echocardiogram in May 2018 showed ejection fraction of 43%  The patient then went to live alone again in July of 2018 where she continued her Taxol treatment on every other week basis until the middle of January 2019  Malignant neoplasm of overlapping 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, estrogen receptor positive ([facility] [address] )    2015 -  Hormone Therapy     Anastrozole      5/28/2015 Initial Diagnosis     Metastatic breast cancer ([facility] [address] )  (recurrence)      6/30/2015 -  Chemotherapy     1-Taxotere, Herceptin, Perjeta started in [place] 2015 x6 cycles  2-Herceptin and Perjeta alone were continued since the patient was not interested in continue the Taxotere  3-Herceptin and Perjeta were put on hold due to decline of her ejection fraction around May 2017    4-low dose weekly Taxol was started in July 2017  5-Taxol was switched to every other week basis      4/14/2019 - 6/9/2019 Chemotherapy     PACLitaxel (TAXOL) 144 6 mg in sodium chloride 0 9 % 250 mL chemo IVPB, 80 mg/m2, Intravenous, Once, 2 of 6 cycles      12/4/2019 - 12/31/2019 Chemotherapy PACLitaxel (TAXOL) 142 2 mg in sodium chloride 0 9 % 250 mL chemo IVPB, 80 mg/m2 = 142 2 mg, Intravenous, Once, 1 of 6 cycles  Administration: 142 2 mg (12/4/2019), 142 2 mg (12/18/2019)      1/8/2020 -  Chemotherapy     PACLitaxel (TAXOL) 141 6 mg in sodium chloride 0 9 % 250 mL chemo IVPB, 80 mg/m2 = 141 6 mg, Intravenous, Once, 4 of 6 cycles  Administration: 141 6 mg (1/8/2020), 141 6 mg (1/29/2020), 141 6 mg (2/19/2020)  trastuzumab (HERCEPTIN) 600 mg in sodium chloride 0 9 % 250 mL chemo infusion, 609 mg, Intravenous, Once, 4 of 6 cycles  Administration: 600 mg (1/8/2020), 450 mg (1/29/2020), 450 mg (2/19/2020)         Interval history:    ROS: Review of Systems   Constitutional: Positive for fatigue  Negative for activity change, appetite change, chills, diaphoresis, fever and unexpected weight change  HENT: Negative for congestion, dental problem, ear discharge, ear pain, facial swelling, hearing loss, mouth sores, nosebleeds, postnasal drip, sore throat, tinnitus and trouble swallowing  Eyes: Negative for discharge, redness, itching and visual disturbance  Respiratory: Negative for cough, chest tightness, shortness of breath and wheezing  Cardiovascular: Negative for chest pain, palpitations and leg swelling  Gastrointestinal: Negative for abdominal distention, abdominal pain, anal bleeding, blood in stool, constipation, diarrhea, nausea and vomiting  Genitourinary: Negative for difficulty urinating, dysuria, flank pain, frequency, hematuria and urgency  Musculoskeletal: Negative for arthralgias, back pain, gait problem, joint swelling, myalgias and neck pain  Skin: Negative for color change, pallor, rash and wound  Neurological: Negative for dizziness, syncope, speech difficulty, weakness, light-headedness, numbness and headaches  Hematological: Negative for adenopathy  Does not bruise/bleed easily     Psychiatric/Behavioral: Negative for agitation, behavioral problems, confusion and sleep disturbance         Past Medical History:   Diagnosis Date    Breast cancer ([address] )     Hypertension     Lymphedema of right arm     Vitamin B12 deficiency        Past Surgical History:   Procedure Laterality Date    APPENDECTOMY      BREAST SURGERY      bilat mastectomy-right total mastectomy and prophylactic left total mastectomy-2005    KNEE SURGERY      right knee replacement 2014 in [address] Bilateral        Social History     Socioeconomic History    Marital status: Single     Spouse name: None    Number of children: None    Years of education: None    Highest education level: None   Occupational History    None   Social Needs    Financial resource strain: None    Food insecurity:     Worry: None     Inability: None    Transportation needs:     Medical: None     Non-medical: None   Tobacco Use    Smoking status: Never Smoker    Smokeless tobacco: Never Used    Tobacco comment: no passive smoke exposure   Substance and Sexual Activity    Alcohol use: No    Drug use: No    Sexual activity: None   Lifestyle    Physical activity:     Days per week: None     Minutes per session: None    Stress: None   Relationships    Social connections:     Talks on phone: None     Gets together: None     Attends L[employer] day service: None     Active member of club or organization: None     Attends meetings of clubs or organizations: None     Relationship status: None    Intimate partner violence:     Fear of current or ex partner: None     Emotionally abused: None     Physically abused: None     Forced sexual activity: None   Other Topics Concern    None   Social History Narrative    None       Family History   Problem Relation Age of Onset    Asthma Mother     Osteoarthritis Father     Bone cancer Family        Allergies   Allergen Reactions    Penicillins Rash         Current Outpatient Medications:     anastrozole (ARIMIDEX) 1 mg tablet, Take 1 tablet (1 mg total) by mouth every 24 hours, Disp: 90 tablet, Rfl: 4    Cholecalciferol 50 MCG (2000 UT) CAPS, take 1 capsule by oral route  every week, Disp: , Rfl:     Cyanocobalamin 1000 MCG/ML KIT, Inject as directed every 6 (six) months, Disp: , Rfl:     indapamide (LOZOL) 1 25 mg tablet, Take 1 tablet (1 25 mg total) by mouth every morning, Disp: 30 tablet, Rfl: 1    lisinopril (ZESTRIL) 5 mg tablet, Take 1 tablet (5 mg total) by mouth daily, Disp: 90 tablet, Rfl: 0    Misc  Devices (ILLUSIONS C BREAST PROSTHESIS) MISC, 1 breast prosthesis, Disp: 1 each, Rfl: 0    Misc  Devices (REFLECTIONS C BREAST PROSTHES) MISC, Dispense 1 breast prosthesis, Disp: 1 each, Rfl: 0    nebivolol (BYSTOLIC) 5 mg tablet, Take 0 5 tablets (2 5 mg total) by mouth daily, Disp: 90 tablet, Rfl: 0    ondansetron (ZOFRAN) 4 mg tablet, Take 1 tablet (4 mg total) by mouth every 6 (six) hours, Disp: 12 tablet, Rfl: 0    pantoprazole (PROTONIX) 40 mg tablet, , Disp: , Rfl:     ergocalciferol (VITAMIN D2) 50,000 units, Take 1 capsule (50,000 Units total) by mouth once a week for 8 doses (Patient not taking: Reported on 1/17/2020), Disp: 8 capsule, Rfl: 0    esomeprazole (NexIUM) 40 MG capsule, Take 1 capsule (40 mg total) by mouth daily (Patient not taking: Reported on 2/17/2020), Disp: 90 capsule, Rfl: 3      Physical Exam:  BP (!) 172/94 (BP Location: Left arm, Cuff Size: Adult)   Pulse 86   Temp (!) 97 2 °F (36 2 °C) (Tympanic)   Resp 18   Ht 5' 2" (1 575 m)   Wt 77 6 kg (171 lb)   SpO2 99%   BMI 31 28 kg/m²      Physical Exam   Constitutional: She is oriented to person, place, and time  She appears well-developed and well-nourished  No distress  HENT:   Head: Normocephalic and atraumatic  Nose: Nose normal    Mouth/Throat: Oropharynx is clear and moist    Eyes: Pupils are equal, round, and reactive to light  Conjunctivae and EOM are normal  Right eye exhibits no discharge  Left eye exhibits no discharge  No scleral icterus     Neck: Normal range of motion  Neck supple  No JVD present  No tracheal deviation present  No thyromegaly present  Cardiovascular: Normal rate, regular rhythm and normal heart sounds  Exam reveals no friction rub  No murmur heard  Pulmonary/Chest: Effort normal and breath sounds normal  No stridor  No respiratory distress  She has no wheezes  She has no rales  She exhibits no tenderness  Abdominal: Soft  Bowel sounds are normal  She exhibits no distension and no mass  There is no hepatosplenomegaly, splenomegaly or hepatomegaly  There is no tenderness  There is no rebound and no guarding  Musculoskeletal: Normal range of motion  She exhibits edema (Of the right upper extremity)  She exhibits no tenderness or deformity  Lymphadenopathy:     She has no cervical adenopathy  Neurological: She is alert and oriented to person, place, and time  She has normal reflexes  No cranial nerve deficit  Coordination normal    Skin: Skin is warm and dry  No rash noted  She is not diaphoretic  No erythema  No pallor  Psychiatric: She has a normal mood and affect  Her behavior is normal  Judgment and thought content normal          Labs:  Lab Results   Component Value Date    WBC 5 16 03/07/2020    HGB 11 9 03/07/2020    HCT 38 4 03/07/2020    MCV 99 (H) 03/07/2020     03/07/2020     Lab Results   Component Value Date     06/18/2018    K 4 4 03/07/2020     03/07/2020    CO2 29 03/07/2020    ANIONGAP 8 06/18/2018    BUN 15 03/07/2020    CREATININE 0 82 03/07/2020    GLUF 97 03/07/2020    CALCIUM 8 6 03/07/2020    AST 14 03/07/2020    ALT 15 03/07/2020    ALKPHOS 50 03/07/2020    PROT 7 1 06/18/2018    BILITOT 0 3 06/18/2018    EGFR 71 03/07/2020     No results found for: TSH    Patient voiced understanding and agreement in the above discussion  Aware to contact our office with questions/symptoms in the interim

## 2020-03-18 NOTE — LETTER
March 18, 2020     MD Mikal Curry [address]    Patient: Ramakrishna Eller   YOB: 1945   Date of Visit: 3/18/2020       Dear Dr Alexander Oliveira: Thank you for referring Brady Barrera to me for evaluation  Below are the relevant portions of my assessment and plan of care  If you have questions, please do not hesitate to call me  I look forward to following Cee Higuera along with you           Sincerely,        Darren Moraes MD        CC: No Recipients

## 2020-03-19 ENCOUNTER — TELEPHONE (OUTPATIENT)
Dept: FAMILY MEDICINE CLINIC | Facility: CLINIC | Age: 75
End: 2020-03-19

## 2020-03-19 ENCOUNTER — TELEPHONE (OUTPATIENT)
Dept: HEMATOLOGY ONCOLOGY | Facility: CLINIC | Age: 75
End: 2020-03-19

## 2020-03-19 VITALS
TEMPERATURE: 96.3 F | OXYGEN SATURATION: 100 % | SYSTOLIC BLOOD PRESSURE: 140 MMHG | BODY MASS INDEX: 31.05 KG/M2 | DIASTOLIC BLOOD PRESSURE: 83 MMHG | RESPIRATION RATE: 18 BRPM | HEART RATE: 74 BPM | WEIGHT: 169.75 LBS

## 2020-03-19 DIAGNOSIS — C50.811 MALIGNANT NEOPLASM OF OVERLAPPING SITES OF RIGHT BREAST IN FEMALE, ESTROGEN RECEPTOR POSITIVE (HCC): Primary | ICD-10-CM

## 2020-03-19 DIAGNOSIS — I10 ESSENTIAL HYPERTENSION: Primary | ICD-10-CM

## 2020-03-19 DIAGNOSIS — R82.90 ABNORMAL URINALYSIS: ICD-10-CM

## 2020-03-19 DIAGNOSIS — Z17.0 MALIGNANT NEOPLASM OF OVERLAPPING SITES OF RIGHT BREAST IN FEMALE, ESTROGEN RECEPTOR POSITIVE (HCC): Primary | ICD-10-CM

## 2020-03-19 LAB
ALBUMIN SERPL BCP-MCNC: 3.8 G/DL (ref 3–5.2)
ALP SERPL-CCNC: 59 U/L (ref 43–122)
ALT SERPL W P-5'-P-CCNC: 25 U/L (ref 9–52)
ANION GAP SERPL CALCULATED.3IONS-SCNC: 8 MMOL/L (ref 5–14)
AST SERPL W P-5'-P-CCNC: 22 U/L (ref 14–36)
ATRIAL RATE: 70 BPM
BACTERIA UR QL AUTO: ABNORMAL /HPF
BILIRUB SERPL-MCNC: 0.4 MG/DL
BILIRUB UR QL STRIP: NEGATIVE
BUN SERPL-MCNC: 21 MG/DL (ref 5–25)
CALCIUM SERPL-MCNC: 8.8 MG/DL (ref 8.4–10.2)
CHLORIDE SERPL-SCNC: 104 MMOL/L (ref 97–108)
CLARITY UR: CLEAR
CO2 SERPL-SCNC: 26 MMOL/L (ref 22–30)
COLOR UR: ABNORMAL
CREAT SERPL-MCNC: 0.76 MG/DL (ref 0.6–1.2)
GFR SERPL CREATININE-BSD FRML MDRD: 78 ML/MIN/1.73SQ M
GLUCOSE SERPL-MCNC: 116 MG/DL (ref 70–99)
GLUCOSE UR STRIP-MCNC: NEGATIVE MG/DL
HGB UR QL STRIP.AUTO: 25
KETONES UR STRIP-MCNC: NEGATIVE MG/DL
LEUKOCYTE ESTERASE UR QL STRIP: 25
NITRITE UR QL STRIP: NEGATIVE
NON-SQ EPI CELLS URNS QL MICRO: ABNORMAL /HPF
P AXIS: 30 DEGREES
PH UR STRIP.AUTO: 6 [PH]
POTASSIUM SERPL-SCNC: 3.9 MMOL/L (ref 3.6–5)
PR INTERVAL: 178 MS
PROT SERPL-MCNC: 7.4 G/DL (ref 5.9–8.4)
PROT UR STRIP-MCNC: NEGATIVE MG/DL
QRS AXIS: 25 DEGREES
QRSD INTERVAL: 80 MS
QT INTERVAL: 414 MS
QTC INTERVAL: 447 MS
RBC #/AREA URNS AUTO: ABNORMAL /HPF
SODIUM SERPL-SCNC: 138 MMOL/L (ref 137–147)
SP GR UR STRIP.AUTO: 1.01 (ref 1–1.04)
T WAVE AXIS: 103 DEGREES
TROPONIN I SERPL-MCNC: <0.01 NG/ML (ref 0–0.03)
UROBILINOGEN UA: NEGATIVE MG/DL
VENTRICULAR RATE: 70 BPM
WBC #/AREA URNS AUTO: ABNORMAL /HPF

## 2020-03-19 PROCEDURE — 93010 ELECTROCARDIOGRAM REPORT: CPT | Performed by: INTERNAL MEDICINE

## 2020-03-19 RX ORDER — ONDANSETRON 4 MG/1
4 TABLET, ORALLY DISINTEGRATING ORAL EVERY 6 HOURS PRN
Qty: 16 TABLET | Refills: 0 | Status: SHIPPED | OUTPATIENT
Start: 2020-03-19 | End: 2020-04-01

## 2020-03-19 RX ADMIN — ONDANSETRON 4 MG: 4 TABLET, ORALLY DISINTEGRATING ORAL at 00:07

## 2020-03-19 NOTE — TELEPHONE ENCOUNTER
I was notified that the patient had significant worsening of her symptoms yesterday after the office visit  In the morning she had a high blood pressure of 170/90  I did ask her to get in touch with her primary care doctor for further adjustment of her blood pressure  The patient was apparently seen then later on in the emergency room last night after she vomited once  In the ER she had a CT scan of the brain which showed  IMPRESSION:     Indeterminate heterogeneous focus at the left occipital white matter/juxtacortical region  Given clinical history MR brain with contrast recommended for further evaluation  I called her sister today at 11:40 a m  To discuss the CT scan and the symptoms of the patient  The decision was made to pursue an MRI of the brain to rule out metastatic disease  She also was told to monitor her blood pressure and go back to the emergency room for admission with any worsening of her symptoms  We will try to get in touch with her up primary care doctor for further adjustment of her blood pressure medication as soon as possible  The patient was also scheduled to be seen by her cardiologist in 2 weeks from now

## 2020-03-19 NOTE — TELEPHONE ENCOUNTER
Sister called stated Sterling rCoss ended   Up in the ED yesterday for  High blood pressure she would   Like to know what to do  She has  A appt with a cardiologist on  April 8th  Can you please call  Her

## 2020-03-19 NOTE — ED CARE HANDOFF
Emergency Department Sign Out Note        Sign out and transfer of care from Dr Ashwin Young  See Separate Emergency Department note  The patient, Nasim Sheridan, was evaluated by the previous provider for hypertension and vomiting  Workup Completed:  orders    ED Course / Workup Pending (followup):  Pending re-eval                             Procedures  MDM    Disposition  Final diagnoses:   Hypertension     Time reflects when diagnosis was documented in both MDM as applicable and the Disposition within this note     Time User Action Codes Description Comment    3/18/2020 11:09 PM Myrna, [address] Hypertension       ED Disposition     None      Follow-up Information    None       Patient's Medications   Discharge Prescriptions    No medications on file     No discharge procedures on file         ED Provider  Electronically Signed by     Osman Anand MD  03/18/20 [id number]
16-Jun-2018

## 2020-03-19 NOTE — ED NOTES
Findings of cat scan and blood work explained to patient and to family  Discharge instructions reviewed  Patient with no complaints upon discharge       Alejandro Garcia RN  03/19/20 1402

## 2020-03-19 NOTE — ED PROVIDER NOTES
History  Chief Complaint   Patient presents with    Vomiting     pt c/o vomiting, high blood pressure  pt has been checking at home and states she has been running aroudn 170/90  HPI    Prior to Admission Medications   Prescriptions Last Dose Informant Patient Reported? Taking? Cholecalciferol 50 MCG (2000) CAPS  Self Yes No   Sig: take 1 capsule by oral route  every week   Cyanocobalamin 1000 MCG/ML KIT  Self Yes No   Sig: Inject as directed every 6 (six) months   Misc  Devices (ILLUSIONS C BREAST PROSTHESIS) MISC  Self No No   Si breast prosthesis   Misc   Devices (REFLECTIONS C BREAST PROSTHES) MISC  Self No No   Sig: Dispense 1 breast prosthesis   anastrozole (ARIMIDEX) 1 mg tablet 3/18/2020 at Unknown time Self No Yes   Sig: Take 1 tablet (1 mg total) by mouth every 24 hours   ergocalciferol (VITAMIN D2) 50,000 units   No No   Sig: Take 1 capsule (50,000 Units total) by mouth once a week for 8 doses   Patient not taking: Reported on 2020   esomeprazole (NexIUM) 40 MG capsule  Self No No   Sig: Take 1 capsule (40 mg total) by mouth daily   Patient not taking: Reported on 2020   indapamide (LOZOL) 1 25 mg tablet   No No   Sig: Take 1 tablet (1 25 mg total) by mouth every morning   lisinopril (ZESTRIL) 5 mg tablet 3/18/2020 at Unknown time Self No Yes   Sig: Take 1 tablet (5 mg total) by mouth daily   nebivolol (BYSTOLIC) 5 mg tablet  at Unknown time Self No Yes   Sig: Take 0 5 tablets (2 5 mg total) by mouth daily   ondansetron (ZOFRAN) 4 mg tablet  Self No No   Sig: Take 1 tablet (4 mg total) by mouth every 6 (six) hours   pantoprazole (PROTONIX) 40 mg tablet  Self Yes No      Facility-Administered Medications: None       Past Medical History:   Diagnosis Date    Breast cancer ([facility] [address]     Hypertension     Lymphedema of right arm     Vitamin B12 deficiency        Past Surgical History:   Procedure Laterality Date    APPENDECTOMY      BREAST SURGERY      bilat mastectomy-right total mastectomy and prophylactic left total mastectomy-2005    KNEE SURGERY      right knee replacement 2014 in [facility] Bilateral        Family History   Problem Relation Age of Onset    Asthma Mother     Osteoarthritis Father     Bone cancer Family      I have reviewed and agree with the history as documented      E-Cigarette/Vaping    E-Cigarette Use Never User      E-Cigarette/Vaping Substances    Nicotine No     THC No     CBD No     Flavoring No     Other No     Unknown No      Social History     Tobacco Use    Smoking status: Never Smoker    Smokeless tobacco: Never Used    Tobacco comment: no passive smoke exposure   Substance Use Topics    Alcohol use: No    Drug use: No       Review of Systems    Physical Exam  Physical Exam    Vital Signs  ED Triage Vitals [03/18/20 2305]   Temperature Pulse Respirations Blood Pressure SpO2   (!) 96 3 °F (35 7 °C) 88 19 (!) 196/114 99 %      Temp Source Heart Rate Source Patient Position - Orthostatic VS BP Location FiO2 (%)   Tympanic Monitor Sitting Left arm --      Pain Score       No Pain           Vitals:    03/18/20 2305 03/18/20 2325 03/19/20 0011 03/19/20 0030   BP: (!) 196/114 157/98 161/95 137/90   Pulse: 88 69 74 74   Patient Position - Orthostatic VS: Sitting Lying           Visual Acuity  Visual Acuity      Most Recent Value   L Pupil Size (mm)  3   R Pupil Size (mm)  3          ED Medications  Medications   hydrALAZINE (APRESOLINE) injection 10 mg (has no administration in time range)   ondansetron (ZOFRAN) injection 4 mg (has no administration in time range)   ondansetron (ZOFRAN-ODT) dispersible tablet 4 mg (4 mg Oral Given 3/19/20 0007)       Diagnostic Studies  Results Reviewed     Procedure Component Value Units Date/Time    Troponin I [[id number]]  (Normal) Collected:  03/18/20 2338    Lab Status:  Final result Specimen:  Blood from Arm, Left Updated:  03/19/20 0010     Troponin I <0 01 ng/mL     Urine Microscopic [[id number]]  (Abnormal) Collected:  03/18/20 2345    Lab Status:  Final result Specimen:  Urine, Clean Catch Updated:  03/19/20 0003     RBC, UA 2-4 /hpf      WBC, UA 2-4 /hpf      Epithelial Cells Occasional /hpf      Bacteria, UA None Seen /hpf     UA (URINE) with reflex to Scope [[id number]]  (Abnormal) Collected:  03/18/20 2345    Lab Status:  Final result Specimen:  Urine, Clean Catch Updated:  03/19/20 0001     Color, UA Straw     Clarity, UA Clear     Specific Gravity, UA 1 010     pH, UA 6 0     Leukocytes, UA 25 0     Nitrite, UA Negative     Protein, UA Negative mg/dl      Glucose, UA Negative mg/dl      Ketones, UA Negative mg/dl      Bilirubin, UA Negative     Blood, UA 25 0     UROBILINOGEN UA Negative mg/dL     Comprehensive metabolic panel [[id number]]  (Abnormal) Collected:  03/18/20 2338    Lab Status:  Final result Specimen:  Blood from Arm, Left Updated:  03/19/20 0001     Sodium 138 mmol/L      Potassium 3 9 mmol/L      Chloride 104 mmol/L      CO2 26 mmol/L      ANION GAP 8 mmol/L      BUN 21 mg/dL      Creatinine 0 76 mg/dL      Glucose 116 mg/dL      Calcium 8 8 mg/dL      AST 22 U/L      ALT 25 U/L      Alkaline Phosphatase 59 U/L      Total Protein 7 4 g/dL      Albumin 3 8 g/dL      Total Bilirubin 0 40 mg/dL      eGFR 78 ml/min/1 73sq m     Narrative:       [place] guidelines for Chronic Kidney Disease (CKD):     Stage 1 with normal or high GFR (GFR > 90 mL/min/1 73 square meters)    Stage 2 Mild CKD (GFR = 60-89 mL/min/1 73 square meters)    Stage 3A Moderate CKD (GFR = 45-59 mL/min/1 73 square meters)    Stage 3B Moderate CKD (GFR = 30-44 mL/min/1 73 square meters)    Stage 4 Severe CKD (GFR = 15-29 mL/min/1 73 square meters)    Stage 5 End Stage CKD (GFR <15 mL/min/1 73 square meters)  Note: GFR calculation is accurate only with a steady state creatinine    CBC and differential [[id number]]  (Abnormal) Collected:  03/18/20 2338    Lab Status:  Final result Specimen:  Blood from Arm, Left Updated:  03/18/20 2355     WBC 9 60 Thousand/uL      RBC 3 77 Million/uL      Hemoglobin 11 8 g/dL      Hematocrit 35 0 %      MCV 93 fL      MCH 31 2 pg      MCHC 33 7 g/dL      RDW 15 7 %      MPV 7 7 fL      Platelets 035 Thousands/uL      Neutrophils Relative 79 %      Lymphocytes Relative 14 %      Monocytes Relative 5 %      Eosinophils Relative 1 %      Basophils Relative 0 %      Neutrophils Absolute 7 60 Thousands/µL      Lymphocytes Absolute 1 40 Thousands/µL      Monocytes Absolute 0 50 Thousand/µL      Eosinophils Absolute 0 10 Thousand/µL      Basophils Absolute 0 00 Thousands/µL                  XR chest 2 views   Final Result by Marcela Saba MD (03/19 0033)      No focal consolidation, pleural effusion, or pneumothorax  Workstation performed: [id number]         CT head without contrast   Final Result by Marcela Saba MD (03/19 0011)      Indeterminate heterogeneous focus at the left occipital white matter/juxtacortical region  Given clinical history MR brain with contrast recommended for further evaluation  The study was marked in EPIC for significant notification                     Workstation performed: [id number]                    Procedures  Procedures         ED Course  ED Course as of Mar 19 0053   Wed Mar 18, 2020   2346 Staff unable to obtain IV, refusing any further sticks  Has PET scan scheduled for tomorrow  Thu Mar 19, 2020   0050 Spoke with patient and daughter about CT report and finding  Explained cannot exactly notify what the lesion is but is scheduled for a PET scan in the morning and recommended follow-up with Dr Mukherjee Section be but  Will also discharge with a script for Zofran  Return to the ER for any concerns  0050 Currently patient has no complaints no nausea blood pressure has resolved 137/90 without any medication                                      MDM  Number of Diagnoses or Management Options  Abnormal CT of brain:   Hypertension:   Nausea and vomiting, intractability of vomiting not specified, unspecified vomiting type:      Amount and/or Complexity of Data Reviewed  Clinical lab tests: reviewed  Tests in the radiology section of CPT®: reviewed  Tests in the medicine section of CPT®: reviewed  Obtain history from someone other than the patient: yes  Review and summarize past medical records: yes  Discuss the patient with other providers: yes  Independent visualization of images, tracings, or specimens: yes          Disposition  Final diagnoses:   Hypertension   Nausea and vomiting, intractability of vomiting not specified, unspecified vomiting type   Abnormal CT of brain     Time reflects when diagnosis was documented in both MDM as applicable and the Disposition within this note     Time User Action Codes Description Comment    3/18/2020 11:09 PM Julious Duane Add [I10] Hypertension     3/19/2020 12:50 AM Clarke Devries Add [R11 2] Nausea and vomiting, intractability of vomiting not specified, unspecified vomiting type     3/19/2020 12:50 AM Fco Barkley Add [R90 89] Abnormal CT of brain       ED Disposition     ED Disposition Condition Date/Time Comment    Discharge Stable u Mar 19, 2020 12:52 AM Stephanie Chapa discharge to home/self care  Follow-up Information     Follow up With Specialties Details Why Contact Info    Delilah Dacosta MD [address]  Natalia Marley MD Hematology and Oncology, Hematology, Oncology  Please follow up tomorrow with your PET scan as scheduled  Please follow up with Dr Gretta Epley to discuss further evaluation of your CT Brain that was done [address]  [phone]            Patient's Medications   Discharge Prescriptions    ONDANSETRON (ZOFRAN-ODT) 4 MG DISINTEGRATING TABLET    Take 1 tablet (4 mg total) by mouth every 6 (six) hours as needed for nausea or vomiting       Start Date: 3/19/2020 End Date: --       Order Dose: 4 mg       Quantity: 16 tablet    Refills: 0     No discharge procedures on file      PDMP Review     None          ED Provider  Electronically Signed by           Angela Das MD  03/19/20 2955

## 2020-03-19 NOTE — ED PROVIDER NOTES
History  Chief Complaint   Patient presents with    Vomiting     pt c/o vomiting, high blood pressure  pt has been checking at home and states she has been running [place] [employer] 170/90       76-year-old female presents with complaint of hypertension over the past 24 hours or so  She reports that she has been taking her medications which include lisinopril and Bystolic  She complains of mild dizziness and headache but denies any chest pain, shortness of breath, neurological deficits, or other acute issues or concerns  She also reports having upset stomach and one episode of vomiting prior to arrival       Hypertension   Severity:  Moderate  Onset quality:  Gradual  Timing:  Intermittent  Progression:  Unchanged  Chronicity:  Recurrent  Relieved by:  Nothing  Worsened by:  Nothing  Ineffective treatments:  ACE inhibitors  Associated symptoms: dizziness, headaches, nausea and vomiting    Associated symptoms: no abdominal pain, no anxiety, no blurred vision, no chest pain, no confusion, no ear pain, no epistaxis, no fatigue, no fever, no hematuria, no hypokalemia, no loss of consciousness, no neck pain, no palpitations, no peripheral edema, no shortness of breath, no syncope, no tinnitus and no weakness        Prior to Admission Medications   Prescriptions Last Dose Informant Patient Reported? Taking? Cholecalciferol 50 MCG ( [place]) CAPS  Self Yes No   Sig: take 1 capsule by oral route  every week   Cyanocobalamin 1000 MCG/ML KIT  Self Yes No   Sig: Inject as directed every 6 (six) months   Misc  Devices (ILLUSIONS C BREAST PROSTHESIS) MISC  Self No No   Si breast prosthesis   Misc   Devices (REFLECTIONS C BREAST PROSTHES) MISC  Self No No   Sig: Dispense 1 breast prosthesis   anastrozole (ARIMIDEX) 1 mg tablet 3/18/2020 at Unknown time Self No Yes   Sig: Take 1 tablet (1 mg total) by mouth every 24 hours   ergocalciferol (VITAMIN D2) 50,000 units   No No   Sig: Take 1 capsule (50,000 Units total) by mouth once a week for 8 doses   Patient not taking: Reported on 1/17/2020   esomeprazole (NexIUM) 40 MG capsule  Self No No   Sig: Take 1 capsule (40 mg total) by mouth daily   Patient not taking: Reported on 2/17/2020   indapamide (LOZOL) 1 25 mg tablet   No No   Sig: Take 1 tablet (1 25 mg total) by mouth every morning   lisinopril (ZESTRIL) 5 mg tablet 3/18/2020 at Unknown time Self No Yes   Sig: Take 1 tablet (5 mg total) by mouth daily   nebivolol (BYSTOLIC) 5 mg tablet 0/79/1415 at Unknown time Self No Yes   Sig: Take 0 5 tablets (2 5 mg total) by mouth daily   ondansetron (ZOFRAN) 4 mg tablet  Self No No   Sig: Take 1 tablet (4 mg total) by mouth every 6 (six) hours   pantoprazole (PROTONIX) 40 mg tablet  Self Yes No      Facility-Administered Medications: None       Past Medical History:   Diagnosis Date    [facility] [address]     Hypertension     Lymphedema of right arm     Vitamin B12 deficiency        Past Surgical History:   Procedure Laterality Date    APPENDECTOMY      BREAST SURGERY      bilat mastectomy-right total mastectomy and prophylactic left total mastectomy-2005    KNEE SURGERY      right knee replacement 2014 in [address] [place] [address] Bilateral        Family History   Problem Relation Age of Onset    Asthma Mother     Osteoarthritis Father     Bone cancer Family      I have reviewed and agree with the history as documented  E-Cigarette/Vaping    E-Cigarette Use Never User      E-Cigarette/Vaping Substances    Nicotine No     THC No     CBD No     Flavoring No     Other No     Unknown No      Social History     Tobacco Use    Smoking status: Never Smoker    Smokeless tobacco: Never Used    Tobacco comment: no passive smoke exposure   Substance Use Topics    Alcohol use: No    Drug use: No       Review of Systems   Constitutional: Negative for appetite change, chills, fatigue and fever     HENT: Negative for ear pain, nosebleeds, postnasal drip, sinus pain, tinnitus and trouble swallowing  Eyes: Negative for blurred vision, redness and itching  Respiratory: Negative for chest tightness, shortness of breath and wheezing  Cardiovascular: Negative for chest pain, palpitations, leg swelling and syncope  Gastrointestinal: Positive for nausea and vomiting  Negative for abdominal pain, constipation and diarrhea  Endocrine: Negative  Genitourinary: Negative for difficulty urinating, dysuria and hematuria  Musculoskeletal: Negative for back pain, myalgias and neck pain  Skin: Negative for rash  Allergic/Immunologic: Negative  Neurological: Positive for dizziness and headaches  Negative for loss of consciousness, weakness and numbness  Hematological: Negative  Psychiatric/Behavioral: Negative  Negative for confusion  The patient is not nervous/anxious  Physical Exam  Physical Exam   Constitutional: She is oriented to person, place, and time  She appears well-developed and well-nourished  HENT:   Head: Normocephalic and atraumatic  Nose: Nose normal    Mouth/Throat: Oropharynx is clear and moist    Eyes: Pupils are equal, round, and reactive to light  Conjunctivae and EOM are normal    Neck: Normal range of motion  Neck supple  Cardiovascular: Normal rate, regular rhythm and normal heart sounds  Pulmonary/Chest: Effort normal and breath sounds normal  No respiratory distress  She has no wheezes  Abdominal: Soft  Bowel sounds are normal  There is no tenderness  There is no guarding  Musculoskeletal: She exhibits no edema, tenderness or deformity  Neurological: She is alert and oriented to person, place, and time  No cranial nerve deficit or sensory deficit  She exhibits normal muscle tone  Coordination normal    Skin: Skin is warm and dry  Capillary refill takes less than 2 seconds  No rash noted  Psychiatric: She has a normal mood and affect  Her behavior is normal    Nursing note and vitals reviewed        Vital Signs  ED Triage Vitals [03/18/20 2305]   Temperature Pulse Respirations Blood Pressure SpO2   (!) 96 3 °F (35 7 °C) 88 19 (!) 196/114 99 %      Temp Source Heart Rate Source Patient Position - Orthostatic VS BP Location FiO2 (%)   Tympanic Monitor Sitting Left arm --      Pain Score       No Pain           Vitals:    03/18/20 2305   BP: (!) 196/114   Pulse: 88   Patient Position - Orthostatic VS: Sitting         Visual Acuity      ED Medications  Medications   hydrALAZINE (APRESOLINE) injection 10 mg (has no administration in time range)   ondansetron (ZOFRAN) injection 4 mg (has no administration in time range)       Diagnostic Studies  Results Reviewed     Procedure Component Value Units Date/Time    CBC and differential [[id number]]     Lab Status:  No result Specimen:  Blood     Comprehensive metabolic panel [[id number]]     Lab Status:  No result Specimen:  Blood     Troponin I [[id number]]     Lab Status:  No result Specimen:  Blood     UA (URINE) with reflex to Scope [[id number]]     Lab Status:  No result Specimen:  Urine                  CT head without contrast    (Results Pending)   XR chest 2 views    (Results Pending)              Procedures  Procedures         ED Course                                 MDM      Disposition  Final diagnoses:   Hypertension     Time reflects when diagnosis was documented in both MDM as applicable and the Disposition within this note     Time User Action Codes Description Comment    3/18/2020 11:09 PM Myrna, Daniel De Leon Hypertension       ED Disposition     None      Follow-up Information    None         Patient's Medications   Discharge Prescriptions    No medications on file     No discharge procedures on file      PDMP Review     None          ED Provider  Electronically Signed by           Yakov Coffman,   03/18/20 1621

## 2020-03-19 NOTE — ED NOTES
Bp 267 systolic - Dr Julieth Kraus made aware and hydralzine on hold for now  Unable to obtain IV site  Dr Julieth Kraus made aware and zofran to be given po       Lawyer Ok RN  03/18/20 7597

## 2020-03-19 NOTE — TELEPHONE ENCOUNTER
I spoke to the patient sister  She said yesterday when she woke up she did not feel well  But she kept her appointment with Dr Kohler Speaker  He did not give her treattment   Because patient was not feeling well  Her blood pressure was 160/100  Patient went home symptoms get worse start vomiting went to the ER     CT scan of the head was abnormal   Patient is not vomiting today  But she still feel tired  Blood pressure this morning was 150/100  When she left the ER yesterday her sister stated her systolic blood pressure was 130  Patient taking lisinopril and Bystolic for blood pressure  Did not start Lozol  Her sister stated her echo was abnormal she has muscle weakness  Had venous duplex of the right arm was negative, because of edema of the right arm  Advised to start Lozol(patient was taking it when she was in [place] )  Check BMP in 1 week  Labs from the ER reviewed results with her sister  Will check urine culture  And advise her to take her back to the ER if she is not feeling any better  Dr Kohler Speaker order brain MRI

## 2020-03-19 NOTE — TELEPHONE ENCOUNTER
Dr Earl Mack returned phone call to sister and MRI of brain ordered to be done either today or tomorrow  The results of CT head to be forwarded to PCP

## 2020-03-19 NOTE — ED PROCEDURE NOTE
PROCEDURE  ECG 12 Lead Documentation Only  Date/Time: 3/18/2020 11:14 PM  Performed by: Brenna Rome MD  Authorized by: Brenna Rome MD     Indications / Diagnosis:  Nausea vomiting, htn  ECG reviewed by me, the ED Provider: yes    Patient location:  ED  Interpretation:     Interpretation: normal    Quality:     Tracing quality:  Limited by artifact  Rate:     ECG rate:  70    ECG rate assessment: normal    Rhythm:     Rhythm: sinus rhythm    Ectopy:     Ectopy: none    QRS:     QRS axis:  Normal    QRS intervals:  Normal  Conduction:     Conduction: normal    ST segments:     ST segments:  Normal  T waves:     T waves: normal           Brenna Rome MD  03/18/20 2319

## 2020-03-20 ENCOUNTER — TELEPHONE (OUTPATIENT)
Dept: HEMATOLOGY ONCOLOGY | Facility: CLINIC | Age: 75
End: 2020-03-20

## 2020-03-20 ENCOUNTER — HOSPITAL ENCOUNTER (OUTPATIENT)
Dept: MRI IMAGING | Facility: HOSPITAL | Age: 75
Discharge: HOME | End: 2020-03-20
Attending: INTERNAL MEDICINE
Payer: MEDICARE

## 2020-03-20 DIAGNOSIS — C50.811 MALIGNANT NEOPLASM OF OVERLAPPING SITES OF RIGHT BREAST IN FEMALE, ESTROGEN RECEPTOR POSITIVE (HCC): Primary | ICD-10-CM

## 2020-03-20 DIAGNOSIS — C50.811 MALIGNANT NEOPLASM OF OVERLAPPING SITES OF RIGHT BREAST IN FEMALE, ESTROGEN RECEPTOR POSITIVE (HCC): ICD-10-CM

## 2020-03-20 DIAGNOSIS — Z17.0 MALIGNANT NEOPLASM OF OVERLAPPING SITES OF RIGHT BREAST IN FEMALE, ESTROGEN RECEPTOR POSITIVE (HCC): ICD-10-CM

## 2020-03-20 DIAGNOSIS — Z17.0 MALIGNANT NEOPLASM OF OVERLAPPING SITES OF RIGHT BREAST IN FEMALE, ESTROGEN RECEPTOR POSITIVE (HCC): Primary | ICD-10-CM

## 2020-03-20 PROCEDURE — A9585 GADOBUTROL INJECTION: HCPCS | Performed by: INTERNAL MEDICINE

## 2020-03-20 PROCEDURE — 70553 MRI BRAIN STEM W/O & W/DYE: CPT

## 2020-03-20 RX ADMIN — GADOBUTROL 7 ML: 604.72 INJECTION INTRAVENOUS at 09:41

## 2020-03-20 NOTE — TELEPHONE ENCOUNTER
The patient and her sister were both notified about the MRI of the brain over the phone at 12:00 p m  Today  The brain MRI was done on 03/20/2020 this morning which showed:  IMPRESSION:     4 discrete intracranial lesions identified as elucidated above, consistent with metastatic disease  Only the largest lesion, 24 mm in greatest linear dimension, which is located in the undersurface of the left occipital lobe associated with any edema  No significant mass effect  The patient was educated about the findings and was told that we will send her to the radiation oncology team to consider whole-brain radiation  She will also need a PET-CT scan next week to further evaluate the status of her metastatic breast cancer  I did advise the patient to follow up with her primary care doctor and cardiologist to regulate her blood pressure

## 2020-03-25 ENCOUNTER — TELEPHONE (OUTPATIENT)
Dept: OTHER | Facility: HOSPITAL | Age: 75
End: 2020-03-25

## 2020-03-26 ENCOUNTER — RADIATION ONCOLOGY CONSULT (OUTPATIENT)
Dept: RADIATION ONCOLOGY | Facility: CLINIC | Age: 75
End: 2020-03-26
Attending: RADIOLOGY
Payer: MEDICARE

## 2020-03-26 ENCOUNTER — TELEMEDICINE (OUTPATIENT)
Dept: FAMILY MEDICINE CLINIC | Facility: CLINIC | Age: 75
End: 2020-03-26
Payer: MEDICARE

## 2020-03-26 ENCOUNTER — CLINICAL SUPPORT (OUTPATIENT)
Dept: RADIATION ONCOLOGY | Facility: CLINIC | Age: 75
End: 2020-03-26
Attending: RADIOLOGY

## 2020-03-26 VITALS
DIASTOLIC BLOOD PRESSURE: 86 MMHG | HEART RATE: 97 BPM | OXYGEN SATURATION: 98 % | BODY MASS INDEX: 30.63 KG/M2 | RESPIRATION RATE: 18 BRPM | SYSTOLIC BLOOD PRESSURE: 146 MMHG | WEIGHT: 166.45 LBS | TEMPERATURE: 98.1 F | HEIGHT: 62 IN

## 2020-03-26 DIAGNOSIS — Z17.0 MALIGNANT NEOPLASM OF OVERLAPPING SITES OF RIGHT BREAST IN FEMALE, ESTROGEN RECEPTOR POSITIVE (HCC): Primary | ICD-10-CM

## 2020-03-26 DIAGNOSIS — I35.1 NONRHEUMATIC AORTIC VALVE INSUFFICIENCY: ICD-10-CM

## 2020-03-26 DIAGNOSIS — I36.1 NONRHEUMATIC TRICUSPID VALVE REGURGITATION: ICD-10-CM

## 2020-03-26 DIAGNOSIS — Z12.11 SCREENING FOR COLON CANCER: ICD-10-CM

## 2020-03-26 DIAGNOSIS — C50.811 MALIGNANT NEOPLASM OF OVERLAPPING SITES OF RIGHT BREAST IN FEMALE, ESTROGEN RECEPTOR POSITIVE (HCC): ICD-10-CM

## 2020-03-26 DIAGNOSIS — C79.31 BRAIN METASTASES (HCC): ICD-10-CM

## 2020-03-26 DIAGNOSIS — Z17.0 MALIGNANT NEOPLASM OF OVERLAPPING SITES OF RIGHT BREAST IN FEMALE, ESTROGEN RECEPTOR POSITIVE (HCC): ICD-10-CM

## 2020-03-26 DIAGNOSIS — D64.9 ANEMIA, UNSPECIFIED TYPE: ICD-10-CM

## 2020-03-26 DIAGNOSIS — I34.0 NONRHEUMATIC MITRAL VALVE REGURGITATION: ICD-10-CM

## 2020-03-26 DIAGNOSIS — E34.9 INCREASED PTH LEVEL: ICD-10-CM

## 2020-03-26 DIAGNOSIS — R82.90 ABNORMAL URINALYSIS: ICD-10-CM

## 2020-03-26 DIAGNOSIS — I10 ESSENTIAL HYPERTENSION: Primary | ICD-10-CM

## 2020-03-26 DIAGNOSIS — I37.1 NONRHEUMATIC PULMONARY VALVE INSUFFICIENCY: ICD-10-CM

## 2020-03-26 DIAGNOSIS — I51.9 LEFT VENTRICULAR SYSTOLIC DYSFUNCTION: ICD-10-CM

## 2020-03-26 DIAGNOSIS — E55.9 VITAMIN D DEFICIENCY: ICD-10-CM

## 2020-03-26 DIAGNOSIS — C79.31 BRAIN METASTASES (HCC): Primary | ICD-10-CM

## 2020-03-26 DIAGNOSIS — R53.83 FATIGUE, UNSPECIFIED TYPE: ICD-10-CM

## 2020-03-26 DIAGNOSIS — Z79.811 USE OF AROMATASE INHIBITORS: ICD-10-CM

## 2020-03-26 DIAGNOSIS — C50.811 MALIGNANT NEOPLASM OF OVERLAPPING SITES OF RIGHT BREAST IN FEMALE, ESTROGEN RECEPTOR POSITIVE (HCC): Primary | ICD-10-CM

## 2020-03-26 PROCEDURE — 99211 OFF/OP EST MAY X REQ PHY/QHP: CPT | Performed by: RADIOLOGY

## 2020-03-26 PROCEDURE — 77290 THER RAD SIMULAJ FIELD CPLX: CPT | Performed by: RADIOLOGY

## 2020-03-26 PROCEDURE — 77334 RADIATION TREATMENT AID(S): CPT | Performed by: RADIOLOGY

## 2020-03-26 PROCEDURE — 99214 OFFICE O/P EST MOD 30 MIN: CPT | Performed by: FAMILY MEDICINE

## 2020-03-26 RX ORDER — MELATONIN
1000 DAILY
Qty: 30 TABLET | Refills: 0
Start: 2020-03-26

## 2020-03-26 NOTE — PROGRESS NOTES
GARCÍA Barrios  Virtual Regular Visit    Problem List Items Addressed This Visit        Cardiovascular and Mediastinum    Essential hypertension - Primary    Relevant Orders    Basic metabolic panel    Left ventricular systolic dysfunction       Other    Malignant neoplasm of overlapping 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, estrogen receptor positive (HCC)    Vitamin D deficiency    Relevant Medications    cholecalciferol (VITAMIN D3) 1,000 units tablet    Other Relevant Orders    Vitamin D 25 hydroxy      Other Visit Diagnoses     Brain metastases ([facility] )        Patient is going to follow with oncology Radiology today    Abnormal urinalysis        Relevant Orders    Urine culture    Anemia, unspecified type        To follow with Dr Kevin Canales tricuspid valve regurgitation        Increased PTH level        Rule out parathyroid adenoma discussed nuclear scan  Patient is going to have a PET scan soon await PET scan results    Fatigue, unspecified type        Resolved    Nonrheumatic aortic valve insufficiency        Recheck echo Doppler in 1 year    Nonrheumatic mitral valve regurgitation        Recheck echo Doppler in 1 year    Nonrheumatic pulmonary valve insufficiency        Recheck echo Doppler in 1 year    Screening for colon cancer                Diagnoses and all orders for this visit:    Essential hypertension  Comments:  Okay control continue with the medication follow with 2 g salt a day  Orders:  -     Basic metabolic panel; Future    Malignant neoplasm of overlapping 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, estrogen receptor positive ([facility] )  Comments: To follow with Dr Kiana Headley    Brain metastases [facility])  Comments:  Patient is going to follow with oncology Radiology today    Abnormal urinalysis  -     Urine culture; Future    Anemia, unspecified type  Comments: To follow with Dr Kiana Headley    Left ventricular systolic dysfunction  Comments:  Patient has an office visit with Cardiology    Advised to keep it    Nonrheumatic tricuspid valve regurgitation    Increased PTH level  Comments:  Rule out parathyroid adenoma discussed nuclear scan  Patient is going to have a PET scan soon await PET scan results    Vitamin D deficiency  Comments:  Take vitamin-D 1000 unit daily  Orders:  -     Discontinue: Cholecalciferol 50 MCG (2000 UT) CAPS; Take 1 capsule (2,000 Units total) by mouth once a week  -     Vitamin D 25 hydroxy; Future  -     cholecalciferol (VITAMIN D3) 1,000 units tablet; Take 1 tablet (1,000 Units total) by mouth daily    Fatigue, unspecified type  Comments:  Resolved    Nonrheumatic aortic valve insufficiency  Comments:  Recheck echo Doppler in 1 year    Nonrheumatic mitral valve regurgitation  Comments:  Recheck echo Doppler in 1 year    Nonrheumatic pulmonary valve insufficiency  Comments:  Recheck echo Doppler in 1 year    Screening for colon cancer  -     Cancel: Cologuard; Future        Reason for visit is  Follow-up post ER    Encounter provider Lamar Gerard MD    Provider located at [address] [place] [address]  [address]ia [address] [id number]      Recent Visits  Date Type Provider Dept   03/19/20 Telephone Lamar Gerard MD [facility]   Showing recent visits within past 7 days and meeting all other requirements     Today's Visits  Date Type Provider Dept   03/26/20 Telemedicine Lamar Gerard MD [facility]   Showing today's visits and meeting all other requirements     Future Appointments  No visits were found meeting these conditions  Showing future appointments within next 150 days and meeting all other requirements        After connecting through [employer], the patient was identified by name and date of birth  eTddy Guevara was informed that this is a telemedicine visit and that the visit is being conducted through [employer] and patient was informed that this is not a secure, HIPAA-complaint platform  she agrees to proceed   which may not be secure and therefore, might not be HIPAA-compliant  My office door was closed  No one else was in the room  She acknowledged consent and understanding of privacy and security of the video platform  The patient has agreed to participate and understands they can discontinue the visit at any time  Subjective  Katelynn Perez is a 77 y o  female   Patient was seen in the emergency room on March 18 20 because she felt dizzy and fatigue  Her symptoms had improved  CT scan of the head done at the ER  Showed brain lesion  Patient had an MRI  Which proved she has 4 lesions  Dr Gaby Araujo referred her to Radiology Oncology which he is going to see today  Patient denied headache, slurred speech, weakness or numbness  Hypertension  Has been taking lisinopril Bystolic and Lozol  Denied any side effect  Denied headache or dizziness  Abnormal urinalysis  Denied dysuria, hematuria, or urinary frequency  Breast cancer  Patient is following with Dr Gaby Araujo  Er 3-18-20 report noted test done at the ER reviewed  echo echo Doppler March 18 2020  Discussed result with patient    Past Medical History:   Diagnosis Date    [facility] )     Hypertension     Lymphedema of right arm     Vitamin B12 deficiency        Past Surgical History:   Procedure Laterality Date    APPENDECTOMY      BREAST SURGERY      bilat mastectomy-right total mastectomy and prophylactic left total mastectomy-2005    KNEE SURGERY      right knee replacement 2014 in [address] [place] [address] Bilateral        Current Outpatient Medications   Medication Sig Dispense Refill    anastrozole (ARIMIDEX) 1 mg tablet Take 1 tablet (1 mg total) by mouth every 24 hours 90 tablet 4    indapamide (LOZOL) 1 25 mg tablet Take 1 tablet (1 25 mg total) by mouth every morning 30 tablet 1    lisinopril (ZESTRIL) 5 mg tablet Take 1 tablet (5 mg total) by mouth daily 90 tablet 0    Misc   Devices (ILLUSIONS C BREAST PROSTHESIS) MISC 1 breast prosthesis 1 each 0    Misc  Devices (REFLECTIONS C BREAST PROSTHES) MISC Dispense 1 breast prosthesis 1 each 0    nebivolol (BYSTOLIC) 5 mg tablet Take 0 5 tablets (2 5 mg total) by mouth daily 90 tablet 0    pantoprazole (PROTONIX) 40 mg tablet       cholecalciferol (VITAMIN D3) 1,000 units tablet Take 1 tablet (1,000 Units total) by mouth daily 30 tablet 0    Cyanocobalamin 1000 MCG/ML KIT Inject as directed every 6 (six) months      esomeprazole (NexIUM) 40 MG capsule Take 1 capsule (40 mg total) by mouth daily 90 capsule 3    ondansetron (ZOFRAN) 4 mg tablet Take 1 tablet (4 mg total) by mouth every 6 (six) hours 12 tablet 0    ondansetron (ZOFRAN-ODT) 4 mg disintegrating tablet Take 1 tablet (4 mg total) by mouth every 6 (six) hours as needed for nausea or vomiting 16 tablet 0     No current facility-administered medications for this visit  Allergies   Allergen Reactions    Penicillins Rash       Review of Systems   Constitutional: Negative for activity change, appetite change, chills, fatigue, fever and unexpected weight change  HENT: Negative for congestion, ear discharge, ear pain, hearing loss, nosebleeds, rhinorrhea, sinus pressure, sore throat, tinnitus, trouble swallowing and voice change  Eyes: Negative for photophobia, pain and visual disturbance  Respiratory: Negative for cough, chest tightness, shortness of breath and wheezing  Cardiovascular: Negative for chest pain, palpitations and leg swelling  Gastrointestinal: Negative for abdominal pain, anal bleeding, blood in stool, constipation, diarrhea, nausea and vomiting  Endocrine: Negative for cold intolerance, heat intolerance, polydipsia and polyuria  Genitourinary: Negative for dysuria, frequency, hematuria and urgency  Musculoskeletal: Negative for arthralgias, back pain, gait problem, joint swelling, myalgias and neck pain  Skin: Negative for rash     Neurological: Negative for dizziness, tremors, seizures, syncope, weakness, light-headedness and headaches  Hematological: Negative for adenopathy  Does not bruise/bleed easily  Psychiatric/Behavioral: Negative for agitation, behavioral problems, confusion, dysphoric mood, hallucinations and sleep disturbance  The patient is not nervous/anxious  Low vitamin-D patient not taking any vitamin-D supplement    Blood pressure done on 03/19/2020 was 140/83  I spent 20  minutes with the patient during this visit    Not occluding dictation time  Health maintenance not reviewed today due to Covid crisis

## 2020-03-26 NOTE — PROGRESS NOTES
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  History of Present Illness   Muriel Delacruz is a 77y o  year old female who presents with a history of right breast cancer diagnosed in   She presents today to discuss radiation for brain metastasis      Right breast cancer diagnosed in [facility] in 2005 with infiltrating ductal carcinoma grade 2 status post right total mastectomy and left prophylactic total mastectomy as well at that time  She actually reported having a mass for about 1 year in the right breast but was told by a doctor in  that this was nothing to worry about  Her pathology revealed  positive lymph node for metastatic disease from the right axilla  ER status positive at 5-10%, progesterone negative, her 2 Gregory positive by FISH  She was then treated with 6 cycles of adjuvant chemotherapy with 5 fluorouracil, Adriamycin, and cyclophosphamide followed by tamoxifen and adjuvant radiation to the right axilla and chest wall in the   The patient was then switched from tamoxifen to Arimidex which was then stopped in       She was then diagnosed with recurrence of her breast cancer with metastatic disease mainly involving the right anterior chest wall, skin, right axilla pectoralis muscle, left axilla and other areas of the body in May 2015  The biopsy showed triple positive malignant process compatible with recurrence of her breast cancer  She was then started on palliative chemotherapy by Dr Darin Victoria and received 6 cycles of Taxotere along with trastuzumab and pertuzumab  After 6 cycles she was continued mainly on trastuzumab and pertuzumab alone    The patient then went to [facility] which she continued her oncological care with trastuzumab, pertuzumab and anastrozole  That treatment had to be put on hold after she developed decline of her ejection fraction to about 23% around June 2017  A PET scan in July of 2017 showed mild progression of her disease and for that reason low-dose weekly Taxol was started on the 18th July 2017  The frequency of Taxol was decreased to 1 treatment every other week  She then received the rest of her treatment in [place] from September 2017 until March 2018      Her echocardiogram in May 2018 showed ejection fraction of 43%  The patient then went to [facility] again in July of 2018 where she continued her Taxol treatment on every other week basis until the middle of January 2019      She resumed care with Dr Eli Salazar January 2019  PET scan 2/2019 and tumor markers indicated stable disease with only hypermetabolic lymph node in the right axilla  She was continued on palliative Taxol every other week  She also takes Anastrozole daily       She then spent 6 months in [place] where she reported receiving her palliative chemo regimen and returned in November 2019  She resumed Taxol every other week  She had a PET scan 12/2019 which showed progression  It was decided to pursue biopsy of the enlarged lymph node from the right axilla and add Herceptin to treatment      12/16/19 PET scan- 1   Further increased FDG uptake in the enlarging right axillary lymph node compatible with disease progression  2   Now noted is mild focal FDG uptake in the right perihilar region, metastasis is not excluded   This can be reassessed on follow-up     3   New focal FDG uptake suggested at the left adrenal gland suspicious for metastasis   This could also be reassessed on follow-up      12/26/19 Dr Amando Chakraborty performed biopsy of right axilla which revealed invasive ductal carcinoma 90% ER positive, less than 1% progesterone positive and HER-2 positive (previous biopsies showed 40% estrogen positive, 5% progesterone positive, and HER-2 positive)     Treatment changed to Herceptin and Taxol every 3 weeks     3/16/20 Dr Alexa Dey- 3/13/20 echo revealed EF of 40%, decline by at least 14%  Herceptin and Taxol placed on hold  The patient may be a candidate for lapatinib or TDM1 since they have less potential risks of cardiotoxicity in comparison to Herceptin  Plan for PET  F/u in about 3-4 weeks from now to finalize the treatment plan accordingly      3/18/20 Pt presented to ED with vomiting and elevated BP  CT head showed- Indeterminate heterogeneous focus at the left occipital white matter/juxtacortical region  Esther Baas clinical history MR brain with contrast recommended for further evaluation      3/20/20 MRI brain- 4 discrete intracranial lesions identified as elucidated above, consistent with metastatic disease   Only the largest lesion, 24 mm in greatest linear dimension, which is located in the undersurface of the left occipital lobe associated with any edema     No significant mass effect      3/20/20 Dr Alexa Dey called patient with MRI results  She was told we will send her to rad onc to consider whole brain radiation  PET scan next week  Advised follow-up for pcp and cardiologist to regulate BP    Patient seen for consultation with her sister with whom she lives  Patient is single and has no children  She denies any neurologic symptoms  She has no headaches, dizziness, nor confusion  She denies any recent weight loss or weight gain  She does not smoke any tobacco   She has no erythema or nodularity in the right chest wall  She has no palpable right axillary lymphadenopathy  Her mother  of natural causes at the age of 80 and had no history of breast cancer  Her father  of natural causes at the age of 80  She has 2 sisters that are healthy and without breast cancer    She also has 2 brothers that are healthy       3/27/20 PET scan  4/10/20 Dr Rajwinder Carcamo neoplasm of overlapping 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, estrogen receptor positive ([facility] [address] )    2015 -  Hormone Therapy     Anastrozole      5/28/2015 Initial Diagnosis     Metastatic breast cancer ([facility] [address] )  (recurrence)      6/30/2015 -  Chemotherapy     1-Taxotere, Herceptin, Perjeta started in [place] 2015 x6 cycles  2-Herceptin and Perjeta alone were continued since the patient was not interested in continue the Taxotere  3-Herceptin and Perjeta were put on hold due to decline of her ejection fraction around May 2017    4-low dose weekly Taxol was started in July 2017  5-Taxol was switched to every other week basis      4/14/2019 - 6/9/2019 Chemotherapy     PACLitaxel (TAXOL) 144 6 mg in sodium chloride 0 9 % 250 mL chemo IVPB, 80 mg/m2, Intravenous, Once, 2 of 6 cycles      12/4/2019 - 12/31/2019 Chemotherapy     PACLitaxel (TAXOL) 142 2 mg in sodium chloride 0 9 % 250 mL chemo IVPB, 80 mg/m2 = 142 2 mg, Intravenous, Once, 1 of 6 cycles  Administration: 142 2 mg (12/4/2019), 142 2 mg (12/18/2019)      1/8/2020 -  Chemotherapy     PACLitaxel (TAXOL) 141 6 mg in sodium chloride 0 9 % 250 mL chemo IVPB, 80 mg/m2 = 141 6 mg, Intravenous, Once, 4 of 6 cycles  Administration: 141 6 mg (1/8/2020), 141 6 mg (1/29/2020), 141 6 mg (2/19/2020)  trastuzumab (HERCEPTIN) 600 mg in sodium chloride 0 9 % 250 mL chemo infusion, 609 mg, Intravenous, Once, 4 of 6 cycles  Administration: 600 mg (1/8/2020), 450 mg (1/29/2020), 450 mg (2/19/2020)       Past Medical History:   Diagnosis Date    Breast cancer ([facility] [address] )     Hypertension     Lymphedema of right arm     Vitamin B12 deficiency      Past Surgical History:   Procedure Laterality Date    APPENDECTOMY      BREAST SURGERY      bilat mastectomy-right total mastectomy and prophylactic left total mastectomy-2005    KNEE SURGERY      right knee replacement 2014 in [address] [place] [address] Bilateral      Family History   Problem Relation Age of Onset    Asthma Mother     Osteoarthritis Father     Bone cancer Paternal Aunt bone cancer       Social History   Social History     Substance and Sexual Activity   Alcohol Use No     Social History     Substance and Sexual Activity   Drug Use No     Social History     Tobacco Use   Smoking Status Never Smoker   Smokeless Tobacco Never Used   Tobacco Comment    no passive smoke exposure     Meds/Allergies     Current Outpatient Medications:     anastrozole (ARIMIDEX) 1 mg tablet, Take 1 tablet (1 mg total) by mouth every 24 hours, Disp: 90 tablet, Rfl: 4    Cyanocobalamin 1000 MCG/ML KIT, Inject as directed every 6 (six) months, Disp: , Rfl:     esomeprazole (NexIUM) 40 MG capsule, Take 1 capsule (40 mg total) by mouth daily, Disp: 90 capsule, Rfl: 3    indapamide (LOZOL) 1 25 mg tablet, Take 1 tablet (1 25 mg total) by mouth every morning, Disp: 30 tablet, Rfl: 1    lisinopril (ZESTRIL) 5 mg tablet, Take 1 tablet (5 mg total) by mouth daily, Disp: 90 tablet, Rfl: 0    nebivolol (BYSTOLIC) 5 mg tablet, Take 0 5 tablets (2 5 mg total) by mouth daily, Disp: 90 tablet, Rfl: 0    ondansetron (ZOFRAN) 4 mg tablet, Take 1 tablet (4 mg total) by mouth every 6 (six) hours, Disp: 12 tablet, Rfl: 0    ondansetron (ZOFRAN-ODT) 4 mg disintegrating tablet, Take 1 tablet (4 mg total) by mouth every 6 (six) hours as needed for nausea or vomiting, Disp: 16 tablet, Rfl: 0    cholecalciferol (VITAMIN D3) 1,000 units tablet, Take 1 tablet (1,000 Units total) by mouth daily, Disp: 30 tablet, Rfl: 0    Misc  Devices (ILLUSIONS C BREAST PROSTHESIS) MISC, 1 breast prosthesis, Disp: 1 each, Rfl: 0    Misc  Devices (REFLECTIONS C BREAST PROSTHES) MISC, Dispense 1 breast prosthesis, Disp: 1 each, Rfl: 0    pantoprazole (PROTONIX) 40 mg tablet, , Disp: , Rfl:   Allergies   Allergen Reactions    Penicillins Rash     Review of Systems  Constitutional: Negative  HENT: Negative  Eyes: Negative  Respiratory: Negative  Cardiovascular: Negative  Gastrointestinal: Negative      Endocrine: Negative  Genitourinary: Negative  Musculoskeletal: Negative  R arm lymphedema, uses sleeve, will get PT eventually   Skin: Negative  Allergic/Immunologic: Negative  Neurological: Negative  Hematological: Negative  Psychiatric/Behavioral: Negative        OBJECTIVE:   /86   Pulse 97   Temp 98 1 °F (36 7 °C)   Resp 18   Ht 5' 2" (1 575 m)   Wt 75 5 kg (166 lb 7 2 oz)   SpO2 98%   BMI 30 44 kg/m²   Pain Assessment:  0  Performance Status: ECOG/Zubrod/WHO: 1 - Symptomatic but completely ambulatory    Physical Exam   Constitutional: She is oriented to person, place, and time  She appears well-developed and well-nourished  No distress  HENT:   Head: Normocephalic and atraumatic  Mouth/Throat: No oropharyngeal exudate  Eyes: Pupils are equal, round, and reactive to light  Conjunctivae and EOM are normal  No scleral icterus  Neck: Normal range of motion  Neck supple  No tracheal deviation present  No thyromegaly present  Cardiovascular: Normal rate, regular rhythm and normal heart sounds  Pulmonary/Chest: Effort normal and breath sounds normal  No respiratory distress  She has no wheezes  She has no rales  She exhibits no mass, no tenderness, no laceration, no crepitus, no edema, no deformity, no swelling and no retraction  There is evidence of bilateral mastectomies  There well surgical incisions bilaterally without any nodularity  There is no skin erythema in the chest wall  Abdominal: Soft  Bowel sounds are normal  She exhibits no distension and no mass  There is no tenderness  Musculoskeletal: Normal range of motion  She exhibits no edema or tenderness  Lymphadenopathy:     She has no cervical adenopathy  She has no axillary adenopathy  Right: No inguinal and no supraclavicular adenopathy present  Left: No inguinal and no supraclavicular adenopathy present  Neurological: She is alert and oriented to person, place, and time   No cranial nerve deficit  Coordination normal    Skin: Skin is warm and dry  No rash noted  She is not diaphoretic  No erythema  No pallor  Psychiatric: She has a normal mood and affect  Her behavior is normal  Judgment and thought content normal    Nursing note and vitals reviewed  RESULTS  Lab Results    Chemistry        Component Value Date/Time     06/18/2018 0931    K 3 9 03/18/2020 2338    K 5 4 (H) 06/18/2018 0931     03/18/2020 2338     06/18/2018 0931    CO2 26 03/18/2020 2338    CO2 28 06/18/2018 0931    BUN 21 03/18/2020 2338    BUN 24 06/18/2018 0931    CREATININE 0 76 03/18/2020 2338        Component Value Date/Time    CALCIUM 8 8 03/18/2020 2338    CALCIUM 8 4 06/18/2018 0931    ALKPHOS 59 03/18/2020 2338    ALKPHOS 74 06/18/2018 0931    AST 22 03/18/2020 2338    AST 23 06/18/2018 0931    ALT 25 03/18/2020 2338    ALT 20 06/18/2018 0931    BILITOT 0 3 06/18/2018 0931        Lab Results   Component Value Date    WBC 9 60 03/18/2020    HGB 11 8 (L) 03/18/2020    HCT 35 0 (L) 03/18/2020    MCV 93 03/18/2020     03/18/2020         Imaging Studies  Xr Chest 2 Views    Result Date: 3/19/2020  Narrative: CHEST INDICATION:   htn  COMPARISON:  None EXAM PERFORMED/VIEWS:  XR CHEST PA & LATERAL FINDINGS: Cardiomediastinal silhouette appears unremarkable  The lungs are clear  No pneumothorax or pleural effusion  Osseous structures appear within normal limits for patient age  Impression: No focal consolidation, pleural effusion, or pneumothorax  Workstation performed: [id number]     Ct Head Without Contrast    Result Date: 3/19/2020  Narrative: CT BRAIN - WITHOUT CONTRAST INDICATION:   h/a htn  COMPARISON:  None  TECHNIQUE:  CT examination of the brain was performed  In addition to axial images, coronal 2D reformatted images were created and submitted for interpretation  Radiation dose length product (DLP) for this visit:  735 mGy-cm     This examination, like all CT scans performed in the [address] [place] [address]  [address], was performed utilizing techniques to minimize radiation dose exposure, including the use of iterative reconstruction and automated exposure control  IMAGE QUALITY:  Diagnostic  FINDINGS: PARENCHYMA:  There is a 1 9 cm heterogeneous focus in the left occipital white matter/juxta cortical cortex (series 2, image 20)  No CT signs of acute infarction  No acute parenchymal hemorrhage  VENTRICLES AND EXTRA-AXIAL SPACES:  Normal for the patient's age  VISUALIZED ORBITS AND PARANASAL SINUSES:  Unremarkable  CALVARIUM AND EXTRACRANIAL SOFT TISSUES:  Normal      Impression: Indeterminate heterogeneous focus at the left occipital white matter/juxtacortical region  Given clinical history MR brain with contrast recommended for further evaluation  The study was marked in EPIC for significant notification    Workstation performed: [id number]     Mri Brain W Wo Contrast    Result Date: 3/20/2020  Narrative: MRI BRAIN WITH AND WITHOUT CONTRAST INDICATION: C50 811: Malignant neoplasm of overlapping sites of right female breast Z17 0: Estrogen receptor positive status (ER+)  COMPARISON:  CT 3/18/2020 TECHNIQUE: Sagittal T1, axial T2, axial FLAIR, axial T1, axial [place], axial diffusion  Sagittal, axial T1 postcontrast   Axial bravo postcontrast with coronal reconstructions  IV Contrast:  7 mL of gadobutrol injection (MULTI-DOSE)  IMAGE QUALITY:   Diagnostic  FINDINGS: BRAIN PARENCHYMA:  4 discrete intracranial lesions are identified  The largest, arising within the undersurface of the lateral left occipital lobe, approximately 15 mm in cephalocaudad, 24 mm in transverse and 15 mm in AP dimension  A very minimal vasogenic edema    3 smaller lesions, not associated with edema occur within the right inferior temporal lobe, measuring 1 3 x 0 8 x 1 cm, and smaller lesions at the posterior right parietal lobe, approximately a centimeter in greatest linear dimension with a small thin anterior parietal gyral focus of enhancement  Findings consistent with metastatic disease  Age-appropriate volume loss, minor degree of chronic small vessel disease  Postcontrast imaging of the brain demonstrates no abnormal enhancement  VENTRICLES:  Normal for the patient's age  SELLA AND PITUITARY GLAND:  Normal  ORBITS:  Normal  PARANASAL SINUSES:  Advanced chronic bilateral sphenoid sinus disease  Hypoplasia of the maxillary sinuses  Left maxillary retention cyst or polyp  VASCULATURE:  Evaluation of the major intracranial vasculature demonstrates appropriate flow voids  CALVARIUM AND SKULL BASE:  Normal  EXTRACRANIAL SOFT TISSUES:  Normal      Impression: 4 discrete intracranial lesions identified as elucidated above, consistent with metastatic disease  Only the largest lesion, 24 mm in greatest linear dimension, which is located in the undersurface of the left occipital lobe associated with any edema  No significant mass effect  The study was marked in EPIC for significant notification  Workstation performed: [id number]     Vas Upper Limb Venous Duplex Scan, Unilateral/limited    Result Date: 3/16/2020  Narrative:  [facility] REPORT CLINICAL: Indications: Patient presents with right upper extremity edema for 4 weeks  CONCLUSION:  Impression RIGHT UPPER LIMB: No evidence of acute or chronic deep vein thrombosis  No evidence of superficial thrombophlebitis noted  Doppler evaluation shows a normal response to augmentation maneuvers  LEFT UPPER LIMB LIMITED: Evaluation shows no evidence of thrombus in the internal jugular vein, subclavian vein, and the brachiocephalic vein  SIGNATURE: Electronically Signed by: Mitch Toussaint MD on 2020-03-16 05:27:52 PM    Pathology: See Above    ASSESSMENT  1  Brain metastases [facility])  Radiation Simulation Treatment   2  Malignant neoplasm of overlapping 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, estrogen receptor positive ([facility] [address] )  Radiation Simulation Treatment   3   Use of aromatase inhibitors Radiation Simulation Treatment     Cancer Staging  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  PLAN/DISCUSSION  Orders Placed This Encounter   Procedures    Radiation Simulation Treatment          Katelynn Perez is a 77y o  year old female with a history of a right breast infiltrating ductal carcinoma grade 2 diagnosed in February of 2005 status post right mastectomy with left prophylactic mastectomy  Pathology confirmed 8/17 positive axillary lymph nodes from the right axilla  Estrogen receptors positive with negative progesterone receptors and Her 2 Gregory was also positive  She was treated with 6 cycles chemotherapy followed by tamoxifen and adjuvant radiation therapy to the right axilla and chest wall regions in [place]  Her tamoxifen was switched to Arimidex that she continued up until 2012  She was diagnosed with local chest wall recurrence on the right side with metastatic disease involving the right axilla, pectoralis muscle, left axilla that was confirmed with biopsy admitted 2015  She was started on palliative chemotherapy and had a good response and then has been on maintenance treatment since that time  She has had to have a hold on her therapy at times due to low ejection fraction  Her PET-CT scan December 16, 2019 showed increase uptake in an enlarging right axillary lymph node with some mild increased uptake in the right perihilar region and a new area in the left adrenal gland that was suspicious  She had right axillary biopsy December 26, 2019 that confirmed invasive ductal carcinoma  Her treatment was changed to Herceptin and Taxol every 3 weeks  Her treatment had to be held the on March 16, 2020 due to reduced ejection fraction  She had CT scan of the head on March 18, 2020 an MRI of the brain March 20, 2020 which confirms 4 discrete intracranial metastasis with the largest measuring 24 mm in size in the left occipital lobe with some associated edema    She is having no neurologic symptoms  We discussed that she has multiple brain metastasis and we would recommend whole-brain radiation therapy  Should she developed progression in the future then she could be considered for stereotactic radio surgery  We discussed  whole-brain radiation therapy including the acute side effects and the potential chronic complications  We discussed that she likely has progression of other sites and is scheduled tomorrow for a restaging PET-CT scan  She will then be returning to see Dr Tim Muro April 10, 2020 to discuss changing her systemic treatment regimen  She reports she is not enthusiastic about received additional chemotherapy but would be willing to consider immunotherapy  She had simulation for whole-brain treatment performed today  She will return here for the start of treatment on March 30, 2020  Jonathan Goodman MD  3/26/2020,2:09 PM      Portions of the record may have been created with voice recognition software   Occasional wrong word or "sound a like" substitutions may have occurred due to the inherent limitations of voice recognition software   Read the chart carefully and recognize, using context, where substitutions have occurred

## 2020-03-26 NOTE — PROGRESS NOTES
Endy Johnson 1945 is a 77 y o  female     Patient is a 77 y o female with a h/o right breast cancer diagnosed in 2005  She presents today to discuss radiation for brain metastasis  Right breast cancer diagnosed in [place] in February of 2005 with infiltrating ductal carcinoma grade 2 status post right total mastectomy and left prophylactic total mastectomy as well at that time  Her pathology revealed 8/17 positive lymph node for metastatic disease from the right axilla  ER status positive at 5-10%, progesterone negative, her 2 Gregory positive by FISH  She was then treated with 6 cycles of adjuvant chemotherapy with 5 fluorouracil, Adriamycin, and cyclophosphamide followed by tamoxifen and adjuvant radiation to the right axilla and chest wall  The patient was then switched from tamoxifen to Arimidex which was then stopped in 2012  She was then diagnosed with recurrence of her breast cancer with metastatic disease mainly involving the right anterior chest wall, skin, right axilla pectoralis muscle, left axilla and other areas of the body in May 2015  The biopsy showed triple positive malignant process compatible with recurrence of her breast cancer  She was then started on palliative chemotherapy and received 6 cycles of Taxotere along with trastuzumab and pertuzumab  After 6 cycles she was continued mainly on trastuzumab and pertuzumab alone  The patient then went to [facility] which she continued her oncological care with trastuzumab, pertuzumab and anastrozole  That treatment had to be put on hold after she developed decline of her ejection fraction to about 23% around June 2017  A PET scan in July of 2017 showed mild progression of her disease and for that reason low-dose weekly Taxol was started on the 18th July 2017  The frequency of Taxol was decreased to 1 treatment every other week  She then received the rest of her treatment in [facility] from September 2017 until March 2018      Her echocardiogram in May 2018 showed ejection fraction of 43%  The patient then went to [facility] again in July of 2018 where she continued her Taxol treatment on every other week basis until the middle of January 2019  She resumed care with Dr Moises Raygoza January 2019  PET scan 2/2019 and tumor markers indicated stable disease with only hypermetabolic lymph node in the right axilla  She was continued on palliative Taxol every other week  She also takes Anastrozole daily  She then spent 6 months in [facility] where she reported receiving her palliative chemo regimen and returned November 2019  She resumed Taxol every other week  She had a PET scan 12/2019 which showed progression  It was decided to pursue biopsy of the enlarged lymph node from the right axilla and add Herceptin to treatment  12/16/19 PET scan- 1  Further increased FDG uptake in the enlarging right axillary lymph node compatible with disease progression  2   Now noted is mild focal FDG uptake in the right perihilar region, metastasis is not excluded  This can be reassessed on follow-up  3  New focal FDG uptake suggested at the left adrenal gland suspicious for metastasis  This could also be reassessed on follow-up     12/26/19 Dr Lavelle Lockhart performed biopsy of right axilla which revealed invasive ductal carcinoma 90% ER positive, less than 1% progesterone positive and HER-2 positive (previous biopsies showed 40% estrogen positive, 5% progesterone positive, and HER-2 positive)    Treatment changed to Herceptin and Taxol every 3 weeks    3/16/20 Dr Moises Raygoza- 3/13/20 echo revealed EF of 40%, decline by at least 14%  Herceptin and Taxol placed on hold  The patient may be a candidate for lapatinib or TDM1 since they have less potential risks of cardiotoxicity in comparison to Herceptin  Plan for PET  F/u in about 3-4 weeks from now to finalize the treatment plan accordingly  3/18/20 Pt presented to ED with vomiting and elevated BP    CT head showed- Indeterminate heterogeneous focus at the left occipital white matter/juxtacortical region  Given clinical history MR brain with contrast recommended for further evaluation  3/20/20 MRI brain- 4 discrete intracranial lesions identified as elucidated above, consistent with metastatic disease  Only the largest lesion, 24 mm in greatest linear dimension, which is located in the undersurface of the left occipital lobe associated with any edema  No significant mass effect  3/20/20 Dr Burt Irving called patient with MRI results  She was told we will send her to rad onc to consider whole brain radiation  PET scan next week  Advised follow-up for pcp and cardiologist to regulate BP    3/27/20 PET scan  4/10/20 Dr Burt Irving         Malignant neoplasm of overlapping 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, estrogen receptor positive ([facility] [address] )    2015 -  Hormone Therapy     Anastrozole      5/28/2015 Initial Diagnosis     Metastatic breast cancer ([facility] [address] )  (recurrence)      6/30/2015 -  Chemotherapy     1-Taxotere, Herceptin, Perjeta started in [place] 2015 x6 cycles  2-Herceptin and Perjeta alone were continued since the patient was not interested in continue the Taxotere  3-Herceptin and Perjeta were put on hold due to decline of her ejection fraction around May 2017    4-low dose weekly Taxol was started in July 2017  5-Taxol was switched to every other week basis      4/14/2019 - 6/9/2019 Chemotherapy     PACLitaxel (TAXOL) 144 6 mg in sodium chloride 0 9 % 250 mL chemo IVPB, 80 mg/m2, Intravenous, Once, 2 of 6 cycles      12/4/2019 - 12/31/2019 Chemotherapy     PACLitaxel (TAXOL) 142 2 mg in sodium chloride 0 9 % 250 mL chemo IVPB, 80 mg/m2 = 142 2 mg, Intravenous, Once, 1 of 6 cycles  Administration: 142 2 mg (12/4/2019), 142 2 mg (12/18/2019)      1/8/2020 -  Chemotherapy     PACLitaxel (TAXOL) 141 6 mg in sodium chloride 0 9 % 250 mL chemo IVPB, 80 mg/m2 = 141 6 mg, Intravenous, Once, 4 of 6 cycles  Administration: 141 6 mg (2020), 141 6 mg (2020), 141 6 mg (2020)  trastuzumab (HERCEPTIN) 600 mg in sodium chloride 0 9 % 250 mL chemo infusion, 609 mg, Intravenous, Once, 4 of 6 cycles  Administration: 600 mg (2020), 450 mg (2020), 450 mg (2020)           Clinical Trial: no      OB/GYN History:  The patient underwent menarche at 12 years  Menopause Status post  No LMP recorded  Patient is postmenopausal   Menopause at 49 years    Menopause Reason natural  Hormone replacement therapy: no     0   Para 0   Nursing: no    Birth control pills: no     Pregnancy test needed:  no      Health Maintenance   Topic Date Due    CRC Screening: Colonoscopy  1945    Pneumococcal Vaccine: 65+ Years (1 of 2 - PCV13) 2011    Influenza Vaccine  10/01/2020 (Originally 2019)    DTaP,Tdap,and Td Vaccines (1 - Tdap) 2021 (Originally 1956)    Fall Risk  2021    Depression Screening PHQ  2021    Medicare Annual Wellness Visit (AWV)  2021    BMI: Followup Plan  2021    BMI: Adult  2021    Hepatitis C Screening  Completed    Pneumococcal Vaccine: Pediatrics (0 to 5 Years) and At-Risk Patients (6 to 59 Years)  Aged Out    HIB Vaccine  Aged Out    Hepatitis B Vaccine  Aged Out    IPV Vaccine  Aged Out    Hepatitis A Vaccine  Aged Out    Meningococcal ACWY Vaccine  Aged Out    HPV Vaccine  Aged Out       Past Medical History:   Diagnosis Date    Breast cancer ([facility] [address] )     Hypertension     Lymphedema of right arm     Vitamin B12 deficiency        Past Surgical History:   Procedure Laterality Date    APPENDECTOMY      BREAST SURGERY      bilat mastectomy-right total mastectomy and prophylactic left total mastectomy-    KNEE SURGERY      right knee replacement  in [facility] Bilateral        Family History   Problem Relation Age of Onset    Asthma Mother     Osteoarthritis Father     Bone cancer Paternal Aunt         bone cancer Social History     Tobacco Use    Smoking status: Never Smoker    Smokeless tobacco: Never Used    Tobacco comment: no passive smoke exposure   Substance Use Topics    Alcohol use: No    Drug use: No          Current Outpatient Medications:     anastrozole (ARIMIDEX) 1 mg tablet, Take 1 tablet (1 mg total) by mouth every 24 hours, Disp: 90 tablet, Rfl: 4    Cholecalciferol 50 MCG (2000 UT) CAPS, Take 1 capsule (2,000 Units total) by mouth once a week, Disp: 30 capsule, Rfl: 0    Cyanocobalamin 1000 MCG/ML KIT, Inject as directed every 6 (six) months, Disp: , Rfl:     esomeprazole (NexIUM) 40 MG capsule, Take 1 capsule (40 mg total) by mouth daily, Disp: 90 capsule, Rfl: 3    indapamide (LOZOL) 1 25 mg tablet, Take 1 tablet (1 25 mg total) by mouth every morning, Disp: 30 tablet, Rfl: 1    lisinopril (ZESTRIL) 5 mg tablet, Take 1 tablet (5 mg total) by mouth daily, Disp: 90 tablet, Rfl: 0    nebivolol (BYSTOLIC) 5 mg tablet, Take 0 5 tablets (2 5 mg total) by mouth daily, Disp: 90 tablet, Rfl: 0    ondansetron (ZOFRAN) 4 mg tablet, Take 1 tablet (4 mg total) by mouth every 6 (six) hours, Disp: 12 tablet, Rfl: 0    ondansetron (ZOFRAN-ODT) 4 mg disintegrating tablet, Take 1 tablet (4 mg total) by mouth every 6 (six) hours as needed for nausea or vomiting, Disp: 16 tablet, Rfl: 0    Misc  Devices (ILLUSIONS C BREAST PROSTHESIS) MISC, 1 breast prosthesis, Disp: 1 each, Rfl: 0    Misc  Devices (REFLECTIONS C BREAST PROSTHES) MISC, Dispense 1 breast prosthesis, Disp: 1 each, Rfl: 0    pantoprazole (PROTONIX) 40 mg tablet, , Disp: , Rfl:     Allergies   Allergen Reactions    Penicillins Rash        Review of Systems:  Review of Systems   Constitutional: Negative  HENT: Negative  Eyes: Negative  Respiratory: Negative  Cardiovascular: Negative  Gastrointestinal: Negative  Endocrine: Negative  Genitourinary: Negative  Musculoskeletal: Negative           R arm lymphedema, uses sleeve, will get PT eventually   Skin: Negative  Allergic/Immunologic: Negative  Neurological: Negative  Hematological: Negative  Psychiatric/Behavioral: Negative  Vitals:    03/26/20 1255   BP: 146/86   Pulse: 97   Resp: 18   Temp: 98 1 °F (36 7 °C)   SpO2: 98%   Weight: 75 5 kg (166 lb 7 2 oz)   Height: 5' 2" (1 575 m)       Pain Score: 0-No pain    Imaging:No images are attached to the encounter       Teaching NCI RT packet given

## 2020-03-27 ENCOUNTER — HOSPITAL ENCOUNTER (OUTPATIENT)
Dept: NUCLEAR MEDICINE | Facility: HOSPITAL | Age: 75
Discharge: HOME | End: 2020-03-27
Attending: INTERNAL MEDICINE
Payer: MEDICARE

## 2020-03-27 DIAGNOSIS — Z17.0 MALIGNANT NEOPLASM OF OVERLAPPING SITES OF RIGHT BREAST IN FEMALE, ESTROGEN RECEPTOR POSITIVE (HCC): ICD-10-CM

## 2020-03-27 DIAGNOSIS — C50.811 MALIGNANT NEOPLASM OF OVERLAPPING SITES OF RIGHT BREAST IN FEMALE, ESTROGEN RECEPTOR POSITIVE (HCC): ICD-10-CM

## 2020-03-27 LAB — GLUCOSE SERPL-MCNC: 90 MG/DL (ref 65–140)

## 2020-03-27 PROCEDURE — 82948 REAGENT STRIP/BLOOD GLUCOSE: CPT

## 2020-03-27 PROCEDURE — A9552 F18 FDG: HCPCS

## 2020-03-27 PROCEDURE — 77334 RADIATION TREATMENT AID(S): CPT | Performed by: RADIOLOGY

## 2020-03-27 PROCEDURE — 77300 RADIATION THERAPY DOSE PLAN: CPT | Performed by: RADIOLOGY

## 2020-03-27 PROCEDURE — 78815 PET IMAGE W/CT SKULL-THIGH: CPT

## 2020-03-27 PROCEDURE — 77295 3-D RADIOTHERAPY PLAN: CPT | Performed by: RADIOLOGY

## 2020-03-30 ENCOUNTER — APPOINTMENT (OUTPATIENT)
Dept: RADIATION ONCOLOGY | Facility: CLINIC | Age: 75
End: 2020-03-30
Attending: RADIOLOGY
Payer: MEDICARE

## 2020-03-30 DIAGNOSIS — C79.31 BRAIN METASTASES (HCC): Primary | ICD-10-CM

## 2020-03-30 PROCEDURE — 77412 RADIATION TX DELIVERY LVL 3: CPT | Performed by: RADIOLOGY

## 2020-03-30 PROCEDURE — 77280 THER RAD SIMULAJ FIELD SMPL: CPT | Performed by: RADIOLOGY

## 2020-03-31 ENCOUNTER — APPOINTMENT (OUTPATIENT)
Dept: RADIATION ONCOLOGY | Facility: CLINIC | Age: 75
End: 2020-03-31
Attending: RADIOLOGY
Payer: MEDICARE

## 2020-03-31 PROCEDURE — 77331 SPECIAL RADIATION DOSIMETRY: CPT | Performed by: RADIOLOGY

## 2020-03-31 PROCEDURE — 77412 RADIATION TX DELIVERY LVL 3: CPT | Performed by: RADIOLOGY

## 2020-04-01 ENCOUNTER — APPOINTMENT (OUTPATIENT)
Dept: RADIATION ONCOLOGY | Facility: CLINIC | Age: 75
End: 2020-04-01
Attending: RADIOLOGY
Payer: MEDICARE

## 2020-04-01 DIAGNOSIS — R11.2 NAUSEA AND VOMITING, INTRACTABILITY OF VOMITING NOT SPECIFIED, UNSPECIFIED VOMITING TYPE: ICD-10-CM

## 2020-04-01 PROCEDURE — 77412 RADIATION TX DELIVERY LVL 3: CPT | Performed by: RADIOLOGY

## 2020-04-01 RX ORDER — ONDANSETRON 4 MG/1
4 TABLET, ORALLY DISINTEGRATING ORAL EVERY 6 HOURS PRN
Qty: 24 TABLET | Refills: 1 | Status: SHIPPED | OUTPATIENT
Start: 2020-04-01 | End: 2020-06-05 | Stop reason: HOSPADM

## 2020-04-02 ENCOUNTER — APPOINTMENT (OUTPATIENT)
Dept: RADIATION ONCOLOGY | Facility: CLINIC | Age: 75
End: 2020-04-02
Attending: RADIOLOGY
Payer: MEDICARE

## 2020-04-02 PROCEDURE — 77412 RADIATION TX DELIVERY LVL 3: CPT | Performed by: RADIOLOGY

## 2020-04-03 ENCOUNTER — APPOINTMENT (OUTPATIENT)
Dept: RADIATION ONCOLOGY | Facility: CLINIC | Age: 75
End: 2020-04-03
Attending: RADIOLOGY
Payer: MEDICARE

## 2020-04-03 ENCOUNTER — TELEPHONE (OUTPATIENT)
Dept: HEMATOLOGY ONCOLOGY | Facility: CLINIC | Age: 75
End: 2020-04-03

## 2020-04-03 PROCEDURE — 77412 RADIATION TX DELIVERY LVL 3: CPT | Performed by: RADIOLOGY

## 2020-04-03 PROCEDURE — 77336 RADIATION PHYSICS CONSULT: CPT | Performed by: RADIOLOGY

## 2020-04-06 ENCOUNTER — TELEPHONE (OUTPATIENT)
Dept: CARDIOLOGY CLINIC | Facility: CLINIC | Age: 75
End: 2020-04-06

## 2020-04-06 ENCOUNTER — APPOINTMENT (OUTPATIENT)
Dept: LAB | Facility: CLINIC | Age: 75
End: 2020-04-06
Attending: RADIOLOGY
Payer: MEDICARE

## 2020-04-06 ENCOUNTER — APPOINTMENT (OUTPATIENT)
Dept: RADIATION ONCOLOGY | Facility: CLINIC | Age: 75
End: 2020-04-06
Attending: RADIOLOGY
Payer: MEDICARE

## 2020-04-06 DIAGNOSIS — Z17.0 MALIGNANT NEOPLASM OF OVERLAPPING SITES OF RIGHT BREAST IN FEMALE, ESTROGEN RECEPTOR POSITIVE (HCC): Primary | ICD-10-CM

## 2020-04-06 DIAGNOSIS — C79.31 BRAIN METASTASES (HCC): ICD-10-CM

## 2020-04-06 DIAGNOSIS — C50.811 MALIGNANT NEOPLASM OF OVERLAPPING SITES OF RIGHT BREAST IN FEMALE, ESTROGEN RECEPTOR POSITIVE (HCC): Primary | ICD-10-CM

## 2020-04-06 LAB
BASOPHILS # BLD AUTO: 0.02 THOUSANDS/ΜL (ref 0–0.1)
BASOPHILS NFR BLD AUTO: 0 % (ref 0–1)
EOSINOPHIL # BLD AUTO: 0.16 THOUSAND/ΜL (ref 0–0.61)
EOSINOPHIL NFR BLD AUTO: 3 % (ref 0–6)
ERYTHROCYTE [DISTWIDTH] IN BLOOD BY AUTOMATED COUNT: 14.7 % (ref 11.6–15.1)
HCT VFR BLD AUTO: 39.5 % (ref 34.8–46.1)
HGB BLD-MCNC: 12.6 G/DL (ref 11.5–15.4)
LYMPHOCYTES # BLD AUTO: 1.48 THOUSANDS/ΜL (ref 0.6–4.47)
LYMPHOCYTES NFR BLD AUTO: 29 % (ref 14–44)
MCH RBC QN AUTO: 30.5 PG (ref 26.8–34.3)
MCHC RBC AUTO-ENTMCNC: 31.9 G/DL (ref 31.4–37.4)
MCV RBC AUTO: 96 FL (ref 82–98)
MONOCYTES # BLD AUTO: 0.38 THOUSAND/ΜL (ref 0.17–1.22)
MONOCYTES NFR BLD AUTO: 7 % (ref 4–12)
NEUTROPHILS # BLD AUTO: 3.14 THOUSANDS/ΜL (ref 1.85–7.62)
NEUTS SEG NFR BLD AUTO: 61 % (ref 43–75)
PLATELET # BLD AUTO: 227 THOUSANDS/UL (ref 149–390)
PMV BLD AUTO: 9.5 FL (ref 8.9–12.7)
RBC # BLD AUTO: 4.13 MILLION/UL (ref 3.81–5.12)
WBC # BLD AUTO: 5.18 THOUSAND/UL (ref 4.31–10.16)

## 2020-04-06 PROCEDURE — 85025 COMPLETE CBC W/AUTO DIFF WBC: CPT

## 2020-04-06 PROCEDURE — 36415 COLL VENOUS BLD VENIPUNCTURE: CPT

## 2020-04-06 PROCEDURE — 77412 RADIATION TX DELIVERY LVL 3: CPT | Performed by: RADIOLOGY

## 2020-04-06 PROCEDURE — 77417 THER RADIOLOGY PORT IMAGE(S): CPT | Performed by: RADIOLOGY

## 2020-04-07 ENCOUNTER — APPOINTMENT (OUTPATIENT)
Dept: RADIATION ONCOLOGY | Facility: CLINIC | Age: 75
End: 2020-04-07
Attending: RADIOLOGY
Payer: MEDICARE

## 2020-04-07 PROCEDURE — 77412 RADIATION TX DELIVERY LVL 3: CPT | Performed by: RADIOLOGY

## 2020-04-08 ENCOUNTER — APPOINTMENT (OUTPATIENT)
Dept: RADIATION ONCOLOGY | Facility: CLINIC | Age: 75
End: 2020-04-08
Attending: RADIOLOGY
Payer: MEDICARE

## 2020-04-08 ENCOUNTER — TELEPHONE (OUTPATIENT)
Dept: CARDIOLOGY CLINIC | Facility: CLINIC | Age: 75
End: 2020-04-08

## 2020-04-08 PROCEDURE — 77412 RADIATION TX DELIVERY LVL 3: CPT | Performed by: RADIOLOGY

## 2020-04-09 ENCOUNTER — TELEMEDICINE (OUTPATIENT)
Dept: HEMATOLOGY ONCOLOGY | Facility: CLINIC | Age: 75
End: 2020-04-09
Payer: MEDICARE

## 2020-04-09 ENCOUNTER — HOSPITAL ENCOUNTER (OUTPATIENT)
Dept: NON INVASIVE DIAGNOSTICS | Facility: HOSPITAL | Age: 75
Discharge: HOME | End: 2020-04-09
Attending: INTERNAL MEDICINE
Payer: MEDICARE

## 2020-04-09 ENCOUNTER — APPOINTMENT (OUTPATIENT)
Dept: RADIATION ONCOLOGY | Facility: CLINIC | Age: 75
End: 2020-04-09
Attending: RADIOLOGY
Payer: MEDICARE

## 2020-04-09 ENCOUNTER — TELEMEDICINE (OUTPATIENT)
Dept: CARDIOLOGY CLINIC | Facility: CLINIC | Age: 75
End: 2020-04-09
Payer: MEDICARE

## 2020-04-09 DIAGNOSIS — I10 BENIGN ESSENTIAL HYPERTENSION: ICD-10-CM

## 2020-04-09 DIAGNOSIS — I51.9 LEFT VENTRICULAR SYSTOLIC DYSFUNCTION: Primary | ICD-10-CM

## 2020-04-09 DIAGNOSIS — Z92.21 S/P CHEMOTHERAPY, TIME SINCE 4-12 WEEKS: ICD-10-CM

## 2020-04-09 DIAGNOSIS — C50.811 MALIGNANT NEOPLASM OF OVERLAPPING SITES OF RIGHT BREAST IN FEMALE, ESTROGEN RECEPTOR POSITIVE (HCC): ICD-10-CM

## 2020-04-09 DIAGNOSIS — I10 ESSENTIAL HYPERTENSION: ICD-10-CM

## 2020-04-09 DIAGNOSIS — I42.8 NONISCHEMIC CARDIOMYOPATHY (HCC): Chronic | ICD-10-CM

## 2020-04-09 DIAGNOSIS — C79.31 BRAIN METASTASIS (HCC): ICD-10-CM

## 2020-04-09 DIAGNOSIS — Z17.0 MALIGNANT NEOPLASM OF OVERLAPPING SITES OF RIGHT BREAST IN FEMALE, ESTROGEN RECEPTOR POSITIVE (HCC): ICD-10-CM

## 2020-04-09 DIAGNOSIS — C50.811 MALIGNANT NEOPLASM OF OVERLAPPING SITES OF RIGHT BREAST IN FEMALE, ESTROGEN RECEPTOR POSITIVE (HCC): Primary | ICD-10-CM

## 2020-04-09 DIAGNOSIS — Z92.21 STATUS POST CHEMORADIATION: ICD-10-CM

## 2020-04-09 DIAGNOSIS — Z17.0 MALIGNANT NEOPLASM OF OVERLAPPING SITES OF RIGHT BREAST IN FEMALE, ESTROGEN RECEPTOR POSITIVE (HCC): Primary | ICD-10-CM

## 2020-04-09 DIAGNOSIS — Z92.3 STATUS POST CHEMORADIATION: ICD-10-CM

## 2020-04-09 PROCEDURE — 77412 RADIATION TX DELIVERY LVL 3: CPT | Performed by: RADIOLOGY

## 2020-04-09 PROCEDURE — 93308 TTE F-UP OR LMTD: CPT

## 2020-04-09 PROCEDURE — 99213 OFFICE O/P EST LOW 20 MIN: CPT | Performed by: INTERNAL MEDICINE

## 2020-04-09 PROCEDURE — 93356 MYOCRD STRAIN IMG SPCKL TRCK: CPT

## 2020-04-09 PROCEDURE — 99215 OFFICE O/P EST HI 40 MIN: CPT | Performed by: INTERNAL MEDICINE

## 2020-04-09 PROCEDURE — 93321 DOPPLER ECHO F-UP/LMTD STD: CPT

## 2020-04-09 PROCEDURE — 93325 DOPPLER ECHO COLOR FLOW MAPG: CPT

## 2020-04-09 RX ORDER — NEBIVOLOL 5 MG/1
5 TABLET ORAL DAILY
Qty: 90 TABLET | Refills: 3 | Status: SHIPPED | OUTPATIENT
Start: 2020-04-09 | End: 2022-03-08 | Stop reason: HOSPADM

## 2020-04-09 RX ORDER — LISINOPRIL 10 MG/1
10 TABLET ORAL DAILY
Qty: 90 TABLET | Refills: 3 | Status: SHIPPED | OUTPATIENT
Start: 2020-04-09 | End: 2022-01-25

## 2020-04-10 ENCOUNTER — APPOINTMENT (OUTPATIENT)
Dept: RADIATION ONCOLOGY | Facility: CLINIC | Age: 75
End: 2020-04-10
Attending: RADIOLOGY
Payer: MEDICARE

## 2020-04-10 PROCEDURE — 77336 RADIATION PHYSICS CONSULT: CPT | Performed by: RADIOLOGY

## 2020-04-10 PROCEDURE — 77412 RADIATION TX DELIVERY LVL 3: CPT | Performed by: RADIOLOGY

## 2020-04-13 ENCOUNTER — DOCUMENTATION (OUTPATIENT)
Dept: HEMATOLOGY ONCOLOGY | Facility: CLINIC | Age: 75
End: 2020-04-13

## 2020-04-14 DIAGNOSIS — Z17.0 MALIGNANT NEOPLASM OF OVERLAPPING SITES OF RIGHT BREAST IN FEMALE, ESTROGEN RECEPTOR POSITIVE (HCC): Primary | ICD-10-CM

## 2020-04-14 DIAGNOSIS — C50.811 MALIGNANT NEOPLASM OF OVERLAPPING SITES OF RIGHT BREAST IN FEMALE, ESTROGEN RECEPTOR POSITIVE (HCC): Primary | ICD-10-CM

## 2020-04-14 RX ORDER — LAPATINIB 250 MG/1
1500 TABLET ORAL DAILY
Qty: 180 TABLET | Refills: 11 | Status: SHIPPED | OUTPATIENT
Start: 2020-04-14 | End: 2020-06-05 | Stop reason: HOSPADM

## 2020-04-16 ENCOUNTER — TELEMEDICINE (OUTPATIENT)
Dept: FAMILY MEDICINE CLINIC | Facility: CLINIC | Age: 75
End: 2020-04-16
Payer: MEDICARE

## 2020-04-16 VITALS — RESPIRATION RATE: 16 BRPM | DIASTOLIC BLOOD PRESSURE: 82 MMHG | SYSTOLIC BLOOD PRESSURE: 140 MMHG

## 2020-04-16 DIAGNOSIS — C50.811 MALIGNANT NEOPLASM OF OVERLAPPING SITES OF RIGHT BREAST IN FEMALE, ESTROGEN RECEPTOR POSITIVE (HCC): ICD-10-CM

## 2020-04-16 DIAGNOSIS — Z17.0 MALIGNANT NEOPLASM OF OVERLAPPING SITES OF RIGHT BREAST IN FEMALE, ESTROGEN RECEPTOR POSITIVE (HCC): ICD-10-CM

## 2020-04-16 DIAGNOSIS — D64.9 ANEMIA, UNSPECIFIED TYPE: ICD-10-CM

## 2020-04-16 DIAGNOSIS — R82.90 ABNORMAL URINALYSIS: ICD-10-CM

## 2020-04-16 DIAGNOSIS — I51.9 LEFT VENTRICULAR SYSTOLIC DYSFUNCTION: ICD-10-CM

## 2020-04-16 DIAGNOSIS — F41.9 ANXIETY: ICD-10-CM

## 2020-04-16 DIAGNOSIS — R94.8 ABNORMAL POSITRON EMISSION TOMOGRAPHY (PET) SCAN: ICD-10-CM

## 2020-04-16 DIAGNOSIS — C79.31 BRAIN METASTASES (HCC): ICD-10-CM

## 2020-04-16 DIAGNOSIS — E34.9 INCREASED PTH LEVEL: ICD-10-CM

## 2020-04-16 DIAGNOSIS — I10 ESSENTIAL HYPERTENSION: Primary | ICD-10-CM

## 2020-04-16 DIAGNOSIS — E55.9 VITAMIN D DEFICIENCY: ICD-10-CM

## 2020-04-16 PROCEDURE — 99214 OFFICE O/P EST MOD 30 MIN: CPT | Performed by: FAMILY MEDICINE

## 2020-04-16 RX ORDER — ESCITALOPRAM OXALATE 5 MG/1
5 TABLET ORAL DAILY
Qty: 30 TABLET | Refills: 5 | Status: SHIPPED | OUTPATIENT
Start: 2020-04-16 | End: 2022-03-08 | Stop reason: HOSPADM

## 2020-04-17 ENCOUNTER — TELEPHONE (OUTPATIENT)
Dept: HEMATOLOGY ONCOLOGY | Facility: CLINIC | Age: 75
End: 2020-04-17

## 2020-04-20 ENCOUNTER — TELEPHONE (OUTPATIENT)
Dept: HEMATOLOGY ONCOLOGY | Facility: CLINIC | Age: 75
End: 2020-04-20

## 2020-04-20 DIAGNOSIS — C50.811 MALIGNANT NEOPLASM OF OVERLAPPING SITES OF RIGHT BREAST IN FEMALE, ESTROGEN RECEPTOR POSITIVE (HCC): ICD-10-CM

## 2020-04-20 DIAGNOSIS — Z17.0 MALIGNANT NEOPLASM OF OVERLAPPING SITES OF RIGHT BREAST IN FEMALE, ESTROGEN RECEPTOR POSITIVE (HCC): ICD-10-CM

## 2020-04-27 ENCOUNTER — APPOINTMENT (OUTPATIENT)
Dept: LAB | Age: 75
End: 2020-04-27
Payer: MEDICARE

## 2020-04-27 DIAGNOSIS — Z17.0 MALIGNANT NEOPLASM OF OVERLAPPING SITES OF RIGHT BREAST IN FEMALE, ESTROGEN RECEPTOR POSITIVE (HCC): ICD-10-CM

## 2020-04-27 DIAGNOSIS — C50.811 MALIGNANT NEOPLASM OF OVERLAPPING SITES OF RIGHT BREAST IN FEMALE, ESTROGEN RECEPTOR POSITIVE (HCC): ICD-10-CM

## 2020-04-27 LAB
ALBUMIN SERPL BCP-MCNC: 3.5 G/DL (ref 3.5–5)
ALP SERPL-CCNC: 51 U/L (ref 46–116)
ALT SERPL W P-5'-P-CCNC: 16 U/L (ref 12–78)
ANION GAP SERPL CALCULATED.3IONS-SCNC: 7 MMOL/L (ref 4–13)
AST SERPL W P-5'-P-CCNC: 16 U/L (ref 5–45)
BASOPHILS # BLD AUTO: 0.02 THOUSANDS/ΜL (ref 0–0.1)
BASOPHILS NFR BLD AUTO: 0 % (ref 0–1)
BILIRUB SERPL-MCNC: 1.04 MG/DL (ref 0.2–1)
BUN SERPL-MCNC: 16 MG/DL (ref 5–25)
CALCIUM SERPL-MCNC: 9.2 MG/DL (ref 8.3–10.1)
CANCER AG27-29 SERPL-ACNC: 54.8 U/ML (ref 0–42.3)
CHLORIDE SERPL-SCNC: 108 MMOL/L (ref 100–108)
CO2 SERPL-SCNC: 26 MMOL/L (ref 21–32)
CREAT SERPL-MCNC: 1.01 MG/DL (ref 0.6–1.3)
EOSINOPHIL # BLD AUTO: 0.1 THOUSAND/ΜL (ref 0–0.61)
EOSINOPHIL NFR BLD AUTO: 2 % (ref 0–6)
ERYTHROCYTE [DISTWIDTH] IN BLOOD BY AUTOMATED COUNT: 14.6 % (ref 11.6–15.1)
GFR SERPL CREATININE-BSD FRML MDRD: 55 ML/MIN/1.73SQ M
GLUCOSE SERPL-MCNC: 95 MG/DL (ref 65–140)
HCT VFR BLD AUTO: 41.4 % (ref 34.8–46.1)
HGB BLD-MCNC: 13.1 G/DL (ref 11.5–15.4)
IMM GRANULOCYTES # BLD AUTO: 0.02 THOUSAND/UL (ref 0–0.2)
IMM GRANULOCYTES NFR BLD AUTO: 0 % (ref 0–2)
LDH SERPL-CCNC: 178 U/L (ref 81–234)
LYMPHOCYTES # BLD AUTO: 1.43 THOUSANDS/ΜL (ref 0.6–4.47)
LYMPHOCYTES NFR BLD AUTO: 24 % (ref 14–44)
MCH RBC QN AUTO: 30.5 PG (ref 26.8–34.3)
MCHC RBC AUTO-ENTMCNC: 31.6 G/DL (ref 31.4–37.4)
MCV RBC AUTO: 96 FL (ref 82–98)
MONOCYTES # BLD AUTO: 0.37 THOUSAND/ΜL (ref 0.17–1.22)
MONOCYTES NFR BLD AUTO: 6 % (ref 4–12)
NEUTROPHILS # BLD AUTO: 4.13 THOUSANDS/ΜL (ref 1.85–7.62)
NEUTS SEG NFR BLD AUTO: 68 % (ref 43–75)
NRBC BLD AUTO-RTO: 0 /100 WBCS
PLATELET # BLD AUTO: 263 THOUSANDS/UL (ref 149–390)
PMV BLD AUTO: 10.1 FL (ref 8.9–12.7)
POTASSIUM SERPL-SCNC: 4.3 MMOL/L (ref 3.5–5.3)
PROT SERPL-MCNC: 8 G/DL (ref 6.4–8.2)
RBC # BLD AUTO: 4.3 MILLION/UL (ref 3.81–5.12)
SODIUM SERPL-SCNC: 141 MMOL/L (ref 136–145)
WBC # BLD AUTO: 6.07 THOUSAND/UL (ref 4.31–10.16)

## 2020-04-27 PROCEDURE — 83615 LACTATE (LD) (LDH) ENZYME: CPT

## 2020-04-27 PROCEDURE — 80053 COMPREHEN METABOLIC PANEL: CPT

## 2020-04-27 PROCEDURE — 86300 IMMUNOASSAY TUMOR CA 15-3: CPT

## 2020-04-27 PROCEDURE — 85025 COMPLETE CBC W/AUTO DIFF WBC: CPT

## 2020-04-27 PROCEDURE — 36415 COLL VENOUS BLD VENIPUNCTURE: CPT

## 2020-04-28 ENCOUNTER — TELEPHONE (OUTPATIENT)
Dept: HEMATOLOGY ONCOLOGY | Facility: CLINIC | Age: 75
End: 2020-04-28

## 2020-04-28 LAB — CANCER AG15-3 SERPL-ACNC: 35 U/ML (ref 0–25)

## 2020-04-29 ENCOUNTER — TELEPHONE (OUTPATIENT)
Dept: HEMATOLOGY ONCOLOGY | Facility: CLINIC | Age: 75
End: 2020-04-29

## 2020-04-29 ENCOUNTER — TELEMEDICINE (OUTPATIENT)
Dept: HEMATOLOGY ONCOLOGY | Facility: CLINIC | Age: 75
End: 2020-04-29
Payer: MEDICARE

## 2020-04-29 DIAGNOSIS — Z79.811 USE OF AROMATASE INHIBITORS: ICD-10-CM

## 2020-04-29 DIAGNOSIS — C79.31 BRAIN METASTASIS (HCC): ICD-10-CM

## 2020-04-29 DIAGNOSIS — Z17.0 MALIGNANT NEOPLASM OF OVERLAPPING SITES OF RIGHT BREAST IN FEMALE, ESTROGEN RECEPTOR POSITIVE (HCC): Primary | ICD-10-CM

## 2020-04-29 DIAGNOSIS — C50.811 MALIGNANT NEOPLASM OF OVERLAPPING SITES OF RIGHT BREAST IN FEMALE, ESTROGEN RECEPTOR POSITIVE (HCC): Primary | ICD-10-CM

## 2020-04-29 PROCEDURE — 3079F DIAST BP 80-89 MM HG: CPT | Performed by: INTERNAL MEDICINE

## 2020-04-29 PROCEDURE — 3077F SYST BP >= 140 MM HG: CPT | Performed by: INTERNAL MEDICINE

## 2020-04-29 PROCEDURE — 99214 OFFICE O/P EST MOD 30 MIN: CPT | Performed by: INTERNAL MEDICINE

## 2020-04-29 PROCEDURE — 1160F RVW MEDS BY RX/DR IN RCRD: CPT | Performed by: INTERNAL MEDICINE

## 2020-04-29 PROCEDURE — 1036F TOBACCO NON-USER: CPT | Performed by: INTERNAL MEDICINE

## 2020-05-19 ENCOUNTER — APPOINTMENT (OUTPATIENT)
Dept: LAB | Age: 75
End: 2020-05-19
Payer: MEDICARE

## 2020-05-19 ENCOUNTER — TELEPHONE (OUTPATIENT)
Dept: HEMATOLOGY ONCOLOGY | Facility: CLINIC | Age: 75
End: 2020-05-19

## 2020-05-19 DIAGNOSIS — C50.811 MALIGNANT NEOPLASM OF OVERLAPPING SITES OF RIGHT BREAST IN FEMALE, ESTROGEN RECEPTOR POSITIVE (HCC): ICD-10-CM

## 2020-05-19 DIAGNOSIS — Z17.0 MALIGNANT NEOPLASM OF OVERLAPPING SITES OF RIGHT BREAST IN FEMALE, ESTROGEN RECEPTOR POSITIVE (HCC): ICD-10-CM

## 2020-05-19 LAB
ALBUMIN SERPL BCP-MCNC: 3.2 G/DL (ref 3.5–5)
ALP SERPL-CCNC: 45 U/L (ref 46–116)
ALT SERPL W P-5'-P-CCNC: 16 U/L (ref 12–78)
ANION GAP SERPL CALCULATED.3IONS-SCNC: 5 MMOL/L (ref 4–13)
AST SERPL W P-5'-P-CCNC: 20 U/L (ref 5–45)
BASOPHILS # BLD AUTO: 0.02 THOUSANDS/ΜL (ref 0–0.1)
BASOPHILS NFR BLD AUTO: 0 % (ref 0–1)
BILIRUB SERPL-MCNC: 0.84 MG/DL (ref 0.2–1)
BUN SERPL-MCNC: 12 MG/DL (ref 5–25)
CALCIUM SERPL-MCNC: 8.8 MG/DL (ref 8.3–10.1)
CHLORIDE SERPL-SCNC: 106 MMOL/L (ref 100–108)
CO2 SERPL-SCNC: 27 MMOL/L (ref 21–32)
CREAT SERPL-MCNC: 1.02 MG/DL (ref 0.6–1.3)
EOSINOPHIL # BLD AUTO: 0.13 THOUSAND/ΜL (ref 0–0.61)
EOSINOPHIL NFR BLD AUTO: 3 % (ref 0–6)
ERYTHROCYTE [DISTWIDTH] IN BLOOD BY AUTOMATED COUNT: 14.5 % (ref 11.6–15.1)
GFR SERPL CREATININE-BSD FRML MDRD: 54 ML/MIN/1.73SQ M
GLUCOSE SERPL-MCNC: 85 MG/DL (ref 65–140)
HCT VFR BLD AUTO: 40.1 % (ref 34.8–46.1)
HGB BLD-MCNC: 12.6 G/DL (ref 11.5–15.4)
IMM GRANULOCYTES # BLD AUTO: 0.01 THOUSAND/UL (ref 0–0.2)
IMM GRANULOCYTES NFR BLD AUTO: 0 % (ref 0–2)
LYMPHOCYTES # BLD AUTO: 1.34 THOUSANDS/ΜL (ref 0.6–4.47)
LYMPHOCYTES NFR BLD AUTO: 29 % (ref 14–44)
MAGNESIUM SERPL-MCNC: 2 MG/DL (ref 1.6–2.6)
MCH RBC QN AUTO: 30.3 PG (ref 26.8–34.3)
MCHC RBC AUTO-ENTMCNC: 31.4 G/DL (ref 31.4–37.4)
MCV RBC AUTO: 96 FL (ref 82–98)
MONOCYTES # BLD AUTO: 0.39 THOUSAND/ΜL (ref 0.17–1.22)
MONOCYTES NFR BLD AUTO: 8 % (ref 4–12)
NEUTROPHILS # BLD AUTO: 2.82 THOUSANDS/ΜL (ref 1.85–7.62)
NEUTS SEG NFR BLD AUTO: 60 % (ref 43–75)
NRBC BLD AUTO-RTO: 0 /100 WBCS
PLATELET # BLD AUTO: 257 THOUSANDS/UL (ref 149–390)
PMV BLD AUTO: 10 FL (ref 8.9–12.7)
POTASSIUM SERPL-SCNC: 4.6 MMOL/L (ref 3.5–5.3)
PROT SERPL-MCNC: 7.4 G/DL (ref 6.4–8.2)
RBC # BLD AUTO: 4.16 MILLION/UL (ref 3.81–5.12)
SODIUM SERPL-SCNC: 138 MMOL/L (ref 136–145)
WBC # BLD AUTO: 4.71 THOUSAND/UL (ref 4.31–10.16)

## 2020-05-19 PROCEDURE — 85025 COMPLETE CBC W/AUTO DIFF WBC: CPT

## 2020-05-19 PROCEDURE — 36415 COLL VENOUS BLD VENIPUNCTURE: CPT

## 2020-05-19 PROCEDURE — 83735 ASSAY OF MAGNESIUM: CPT

## 2020-05-19 PROCEDURE — 80053 COMPREHEN METABOLIC PANEL: CPT

## 2020-05-20 ENCOUNTER — TELEMEDICINE (OUTPATIENT)
Dept: HEMATOLOGY ONCOLOGY | Facility: CLINIC | Age: 75
End: 2020-05-20
Payer: MEDICARE

## 2020-05-20 ENCOUNTER — TELEPHONE (OUTPATIENT)
Dept: HEMATOLOGY ONCOLOGY | Facility: CLINIC | Age: 75
End: 2020-05-20

## 2020-05-20 DIAGNOSIS — Z17.0 MALIGNANT NEOPLASM OF OVERLAPPING SITES OF RIGHT BREAST IN FEMALE, ESTROGEN RECEPTOR POSITIVE (HCC): Primary | ICD-10-CM

## 2020-05-20 DIAGNOSIS — Z79.811 USE OF AROMATASE INHIBITORS: ICD-10-CM

## 2020-05-20 DIAGNOSIS — C79.31 BRAIN METASTASIS (HCC): ICD-10-CM

## 2020-05-20 DIAGNOSIS — C50.811 MALIGNANT NEOPLASM OF OVERLAPPING SITES OF RIGHT BREAST IN FEMALE, ESTROGEN RECEPTOR POSITIVE (HCC): Primary | ICD-10-CM

## 2020-05-20 PROCEDURE — 99214 OFFICE O/P EST MOD 30 MIN: CPT | Performed by: INTERNAL MEDICINE

## 2020-05-22 ENCOUNTER — HOSPITAL ENCOUNTER (OUTPATIENT)
Dept: MRI IMAGING | Facility: HOSPITAL | Age: 75
Discharge: HOME | End: 2020-05-22
Payer: MEDICARE

## 2020-05-22 DIAGNOSIS — C79.31 BRAIN METASTASES (HCC): ICD-10-CM

## 2020-05-22 DIAGNOSIS — C50.811 MALIGNANT NEOPLASM OF OVERLAPPING SITES OF RIGHT BREAST IN FEMALE, ESTROGEN RECEPTOR POSITIVE (HCC): ICD-10-CM

## 2020-05-22 DIAGNOSIS — Z17.0 MALIGNANT NEOPLASM OF OVERLAPPING SITES OF RIGHT BREAST IN FEMALE, ESTROGEN RECEPTOR POSITIVE (HCC): ICD-10-CM

## 2020-05-22 PROCEDURE — 70553 MRI BRAIN STEM W/O & W/DYE: CPT

## 2020-05-22 PROCEDURE — A9585 GADOBUTROL INJECTION: HCPCS | Performed by: RADIOLOGY

## 2020-05-22 RX ADMIN — GADOBUTROL 8 ML: 604.72 INJECTION INTRAVENOUS at 10:08

## 2020-05-28 ENCOUNTER — TELEMEDICINE (OUTPATIENT)
Dept: RADIATION ONCOLOGY | Facility: CLINIC | Age: 75
End: 2020-05-28
Attending: RADIOLOGY

## 2020-05-28 DIAGNOSIS — C50.811 MALIGNANT NEOPLASM OF OVERLAPPING SITES OF RIGHT BREAST IN FEMALE, ESTROGEN RECEPTOR POSITIVE (HCC): ICD-10-CM

## 2020-05-28 DIAGNOSIS — C79.31 BRAIN METASTASIS (HCC): Primary | ICD-10-CM

## 2020-05-28 DIAGNOSIS — Z79.811 USE OF AROMATASE INHIBITORS: ICD-10-CM

## 2020-05-28 DIAGNOSIS — Z17.0 MALIGNANT NEOPLASM OF OVERLAPPING SITES OF RIGHT BREAST IN FEMALE, ESTROGEN RECEPTOR POSITIVE (HCC): ICD-10-CM

## 2020-05-29 ENCOUNTER — TELEPHONE (OUTPATIENT)
Dept: FAMILY MEDICINE CLINIC | Facility: CLINIC | Age: 75
End: 2020-05-29

## 2020-05-30 ENCOUNTER — HOSPITAL ENCOUNTER (EMERGENCY)
Facility: HOSPITAL | Age: 75
Discharge: HOME | DRG: 391 | End: 2020-05-30
Attending: EMERGENCY MEDICINE | Admitting: EMERGENCY MEDICINE
Payer: MEDICARE

## 2020-05-30 ENCOUNTER — TELEPHONE (OUTPATIENT)
Dept: OTHER | Facility: OTHER | Age: 75
End: 2020-05-30

## 2020-05-30 ENCOUNTER — APPOINTMENT (EMERGENCY)
Dept: CT IMAGING | Facility: HOSPITAL | Age: 75
DRG: 391 | End: 2020-05-30
Payer: MEDICARE

## 2020-05-30 VITALS
BODY MASS INDEX: 27.8 KG/M2 | SYSTOLIC BLOOD PRESSURE: 146 MMHG | DIASTOLIC BLOOD PRESSURE: 78 MMHG | RESPIRATION RATE: 16 BRPM | HEART RATE: 74 BPM | WEIGHT: 152 LBS | TEMPERATURE: 96.9 F | OXYGEN SATURATION: 100 %

## 2020-05-30 DIAGNOSIS — R11.10 VOMITING: ICD-10-CM

## 2020-05-30 DIAGNOSIS — R11.0 NAUSEA: Primary | ICD-10-CM

## 2020-05-30 DIAGNOSIS — R10.9 ABDOMINAL PAIN: ICD-10-CM

## 2020-05-30 LAB
ALBUMIN SERPL BCP-MCNC: 4.1 G/DL (ref 3–5.2)
ALP SERPL-CCNC: 50 U/L (ref 43–122)
ALT SERPL W P-5'-P-CCNC: 17 U/L (ref 9–52)
ANION GAP SERPL CALCULATED.3IONS-SCNC: 8 MMOL/L (ref 5–14)
AST SERPL W P-5'-P-CCNC: 26 U/L (ref 14–36)
ATRIAL RATE: 90 BPM
BACTERIA UR QL AUTO: ABNORMAL /HPF
BASOPHILS # BLD AUTO: 0.1 THOUSANDS/ΜL (ref 0–0.1)
BASOPHILS NFR BLD AUTO: 2 % (ref 0–1)
BILIRUB SERPL-MCNC: 1.1 MG/DL
BILIRUB UR QL STRIP: NEGATIVE
BUN SERPL-MCNC: 16 MG/DL (ref 5–25)
CALCIUM SERPL-MCNC: 9.5 MG/DL (ref 8.4–10.2)
CHLORIDE SERPL-SCNC: 101 MMOL/L (ref 97–108)
CLARITY UR: CLEAR
CO2 SERPL-SCNC: 28 MMOL/L (ref 22–30)
COLOR UR: ABNORMAL
CREAT SERPL-MCNC: 0.93 MG/DL (ref 0.6–1.2)
EOSINOPHIL # BLD AUTO: 0.1 THOUSAND/ΜL (ref 0–0.4)
EOSINOPHIL NFR BLD AUTO: 1 % (ref 0–6)
ERYTHROCYTE [DISTWIDTH] IN BLOOD BY AUTOMATED COUNT: 14.8 %
GFR SERPL CREATININE-BSD FRML MDRD: 61 ML/MIN/1.73SQ M
GLUCOSE SERPL-MCNC: 106 MG/DL (ref 70–99)
GLUCOSE UR STRIP-MCNC: NEGATIVE MG/DL
HCT VFR BLD AUTO: 40.6 % (ref 36–46)
HGB BLD-MCNC: 14 G/DL (ref 12–16)
HGB UR QL STRIP.AUTO: 25
KETONES UR STRIP-MCNC: ABNORMAL MG/DL
LEUKOCYTE ESTERASE UR QL STRIP: 100
LIPASE SERPL-CCNC: 103 U/L (ref 23–300)
LYMPHOCYTES # BLD AUTO: 0.9 THOUSANDS/ΜL (ref 0.5–4)
LYMPHOCYTES NFR BLD AUTO: 17 % (ref 25–45)
MCH RBC QN AUTO: 31.9 PG (ref 26–34)
MCHC RBC AUTO-ENTMCNC: 34.5 G/DL (ref 31–36)
MCV RBC AUTO: 92 FL (ref 80–100)
MONOCYTES # BLD AUTO: 0.4 THOUSAND/ΜL (ref 0.2–0.9)
MONOCYTES NFR BLD AUTO: 7 % (ref 1–10)
MUCOUS THREADS UR QL AUTO: ABNORMAL
NEUTROPHILS # BLD AUTO: 4.1 THOUSANDS/ΜL (ref 1.8–7.8)
NEUTS SEG NFR BLD AUTO: 73 % (ref 45–65)
NITRITE UR QL STRIP: NEGATIVE
NON-SQ EPI CELLS URNS QL MICRO: ABNORMAL /HPF
P AXIS: 31 DEGREES
PH UR STRIP.AUTO: 7 [PH]
PLATELET # BLD AUTO: 236 THOUSANDS/UL (ref 150–450)
PMV BLD AUTO: 7.5 FL (ref 8.9–12.7)
POTASSIUM SERPL-SCNC: 3.9 MMOL/L (ref 3.6–5)
PR INTERVAL: 172 MS
PROT SERPL-MCNC: 8.3 G/DL (ref 5.9–8.4)
PROT UR STRIP-MCNC: NEGATIVE MG/DL
QRS AXIS: -4 DEGREES
QRSD INTERVAL: 84 MS
QT INTERVAL: 366 MS
QTC INTERVAL: 447 MS
RBC # BLD AUTO: 4.39 MILLION/UL (ref 4–5.2)
RBC #/AREA URNS AUTO: ABNORMAL /HPF
SODIUM SERPL-SCNC: 137 MMOL/L (ref 137–147)
SP GR UR STRIP.AUTO: 1.01 (ref 1–1.04)
T WAVE AXIS: 24 DEGREES
TROPONIN I SERPL-MCNC: <0.01 NG/ML (ref 0–0.03)
UROBILINOGEN UA: NEGATIVE MG/DL
VENTRICULAR RATE: 90 BPM
WBC # BLD AUTO: 5.6 THOUSAND/UL (ref 4.5–11)
WBC #/AREA URNS AUTO: ABNORMAL /HPF

## 2020-05-30 PROCEDURE — 93005 ELECTROCARDIOGRAM TRACING: CPT

## 2020-05-30 PROCEDURE — 96375 TX/PRO/DX INJ NEW DRUG ADDON: CPT

## 2020-05-30 PROCEDURE — 96361 HYDRATE IV INFUSION ADD-ON: CPT

## 2020-05-30 PROCEDURE — 99284 EMERGENCY DEPT VISIT MOD MDM: CPT

## 2020-05-30 PROCEDURE — 93010 ELECTROCARDIOGRAM REPORT: CPT | Performed by: INTERNAL MEDICINE

## 2020-05-30 PROCEDURE — 81003 URINALYSIS AUTO W/O SCOPE: CPT | Performed by: PHYSICIAN ASSISTANT

## 2020-05-30 PROCEDURE — 81001 URINALYSIS AUTO W/SCOPE: CPT | Performed by: PHYSICIAN ASSISTANT

## 2020-05-30 PROCEDURE — 96376 TX/PRO/DX INJ SAME DRUG ADON: CPT

## 2020-05-30 PROCEDURE — 74177 CT ABD & PELVIS W/CONTRAST: CPT

## 2020-05-30 PROCEDURE — 83690 ASSAY OF LIPASE: CPT | Performed by: PHYSICIAN ASSISTANT

## 2020-05-30 PROCEDURE — 85025 COMPLETE CBC W/AUTO DIFF WBC: CPT | Performed by: PHYSICIAN ASSISTANT

## 2020-05-30 PROCEDURE — 96374 THER/PROPH/DIAG INJ IV PUSH: CPT

## 2020-05-30 PROCEDURE — 84484 ASSAY OF TROPONIN QUANT: CPT | Performed by: PHYSICIAN ASSISTANT

## 2020-05-30 PROCEDURE — 36415 COLL VENOUS BLD VENIPUNCTURE: CPT | Performed by: PHYSICIAN ASSISTANT

## 2020-05-30 PROCEDURE — 80053 COMPREHEN METABOLIC PANEL: CPT | Performed by: PHYSICIAN ASSISTANT

## 2020-05-30 PROCEDURE — 99284 EMERGENCY DEPT VISIT MOD MDM: CPT | Performed by: PHYSICIAN ASSISTANT

## 2020-05-30 RX ORDER — ONDANSETRON 4 MG/1
4 TABLET, FILM COATED ORAL EVERY 12 HOURS PRN
Qty: 12 TABLET | Refills: 0 | Status: SHIPPED | OUTPATIENT
Start: 2020-05-30 | End: 2020-06-05 | Stop reason: HOSPADM

## 2020-05-30 RX ORDER — ONDANSETRON 2 MG/ML
4 INJECTION INTRAMUSCULAR; INTRAVENOUS ONCE
Status: COMPLETED | OUTPATIENT
Start: 2020-05-30 | End: 2020-05-30

## 2020-05-30 RX ORDER — METOCLOPRAMIDE HYDROCHLORIDE 5 MG/ML
5 INJECTION INTRAMUSCULAR; INTRAVENOUS ONCE
Status: COMPLETED | OUTPATIENT
Start: 2020-05-30 | End: 2020-05-30

## 2020-05-30 RX ORDER — SODIUM CHLORIDE 9 MG/ML
250 INJECTION, SOLUTION INTRAVENOUS CONTINUOUS
Status: DISCONTINUED | OUTPATIENT
Start: 2020-05-30 | End: 2020-05-30 | Stop reason: HOSPADM

## 2020-05-30 RX ORDER — DIPHENHYDRAMINE HYDROCHLORIDE 50 MG/ML
12.5 INJECTION INTRAMUSCULAR; INTRAVENOUS ONCE
Status: COMPLETED | OUTPATIENT
Start: 2020-05-30 | End: 2020-05-30

## 2020-05-30 RX ADMIN — DIPHENHYDRAMINE HYDROCHLORIDE 12.5 MG: 50 INJECTION, SOLUTION INTRAMUSCULAR; INTRAVENOUS at 15:01

## 2020-05-30 RX ADMIN — METOCLOPRAMIDE 5 MG: 5 INJECTION, SOLUTION INTRAMUSCULAR; INTRAVENOUS at 15:04

## 2020-05-30 RX ADMIN — ONDANSETRON 4 MG: 2 INJECTION INTRAMUSCULAR; INTRAVENOUS at 13:39

## 2020-05-30 RX ADMIN — SODIUM CHLORIDE 250 ML/HR: 0.9 INJECTION, SOLUTION INTRAVENOUS at 14:57

## 2020-05-30 RX ADMIN — IOHEXOL 75 ML: 350 INJECTION, SOLUTION INTRAVENOUS at 12:26

## 2020-05-30 RX ADMIN — ONDANSETRON 4 MG: 2 INJECTION INTRAMUSCULAR; INTRAVENOUS at 10:47

## 2020-05-30 RX ADMIN — SODIUM CHLORIDE 250 ML/HR: 0.9 INJECTION, SOLUTION INTRAVENOUS at 10:47

## 2020-05-30 NOTE — ED PROVIDER NOTES
History  Chief Complaint   Patient presents with    Abdominal Pain     Patient is actively recieving cancer treatment  Has been feeling nausea and epigastric pain for 4 days  Unable to keep down any food or liquids  Medical Problem   Location:  Pt with 4 days of nausea and vomiting and midepigastric tenderness   Severity:  Mild  Onset quality:  Gradual  Duration:  4 days  Timing:  Intermittent  Progression:  Waxing and waning  Chronicity:  Recurrent  Associated symptoms: abdominal pain, nausea and vomiting    Associated symptoms: no chest pain, no congestion, no cough, no diarrhea, no ear pain, no fatigue, no fever, no headaches, no loss of consciousness, no myalgias, no rash, no rhinorrhea, no shortness of breath, no sore throat and no wheezing        Prior to Admission Medications   Prescriptions Last Dose Informant Patient Reported? Taking? Cyanocobalamin 1000 MCG/ML KIT More than a month at Unknown time Self Yes No   Sig: Inject as directed every 6 (six) months   Misc  Devices (ILLUSIONS C BREAST PROSTHESIS) MISC  Self No No   Si breast prosthesis   Misc   Devices (REFLECTIONS C BREAST PROSTHES) MISC  Self No No   Sig: Dispense 1 breast prosthesis   anastrozole (ARIMIDEX) 1 mg tablet Past Week at Unknown time Self No Yes   Sig: Take 1 tablet (1 mg total) by mouth every 24 hours   cholecalciferol (VITAMIN D3) 1,000 units tablet Past Week at Unknown time  No Yes   Sig: Take 1 tablet (1,000 Units total) by mouth daily   escitalopram (LEXAPRO) 5 mg tablet Past Week at Unknown time  No Yes   Sig: Take 1 tablet (5 mg total) by mouth daily   esomeprazole (NexIUM) 40 MG capsule Past Week at Unknown time Self No Yes   Sig: Take 1 capsule (40 mg total) by mouth daily   indapamide (LOZOL) 1 25 mg tablet Past Week at Unknown time  No Yes   Sig: Take 1 tablet (1 25 mg total) by mouth every morning   lapatinib (TYKERB) 250 MG tablet Past Week at Unknown time  No Yes   Sig: Take 6 tablets (1,500 mg total) by mouth daily   lisinopril (ZESTRIL) 10 mg tablet Past Week at Unknown time  No Yes   Sig: Take 1 tablet (10 mg total) by mouth daily   nebivolol (Bystolic) 5 mg tablet Past Week at Unknown time  No Yes   Sig: Take 1 tablet (5 mg total) by mouth daily   ondansetron (ZOFRAN) 4 mg tablet Past Week at Unknown time Self No Yes   Sig: Take 1 tablet (4 mg total) by mouth every 6 (six) hours   ondansetron (ZOFRAN-ODT) 4 mg disintegrating tablet Past Week at Unknown time  No Yes   Sig: Take 1 tablet (4 mg total) by mouth every 6 (six) hours as needed for nausea or vomiting   pantoprazole (PROTONIX) 40 mg tablet Past Week at Unknown time Self Yes Yes      Facility-Administered Medications: None       Past Medical History:   Diagnosis Date    [facility] cancer [facility] [address] [facility]     Breast cancer ([facility] [address] )     Hypertension     Lymphedema of right arm     Vitamin B12 deficiency        Past Surgical History:   Procedure Laterality Date    APPENDECTOMY      BREAST SURGERY      bilat mastectomy-right total mastectomy and prophylactic left total mastectomy-2005    KNEE SURGERY      right knee replacement 2014 in [address] [place] [address] Bilateral        Family History   Problem Relation Age of Onset    Asthma Mother     Osteoarthritis Father     Bone cancer Paternal Aunt         bone cancer     I have reviewed and agree with the history as documented  E-Cigarette/Vaping    E-Cigarette Use Never User      E-Cigarette/Vaping Substances    Nicotine No     THC No     CBD No     Flavoring No     Other No     Unknown No      Social History     Tobacco Use    Smoking status: Never Smoker    Smokeless tobacco: Never Used    Tobacco comment: no passive smoke exposure   Substance Use Topics    Alcohol use: No    Drug use: No       Review of Systems   Constitutional: Negative  Negative for fatigue and fever  HENT: Negative  Negative for congestion, ear pain, rhinorrhea and sore throat  Eyes: Negative  Respiratory: Negative  Negative for cough, shortness of breath and wheezing  Cardiovascular: Negative  Negative for chest pain  Gastrointestinal: Positive for abdominal pain, nausea and vomiting  Negative for diarrhea  Endocrine: Negative  Genitourinary: Negative  Musculoskeletal: Negative  Negative for myalgias  Skin: Negative  Negative for rash  Allergic/Immunologic: Negative  Neurological: Negative  Negative for loss of consciousness and headaches  Hematological: Negative  Psychiatric/Behavioral: Negative  All other systems reviewed and are negative  Physical Exam  Physical Exam   Constitutional: She appears well-developed and well-nourished  345pm  Pt is pain free and nausea free and is able to tolerate po water challenge  and would like to go home   Will return if condition worsens   HENT:   Head: Normocephalic  Mouth/Throat: Oropharynx is clear and moist    Eyes: Pupils are equal, round, and reactive to light  EOM are normal    Cardiovascular: Normal rate, regular rhythm and normal heart sounds  Pulmonary/Chest: Effort normal and breath sounds normal    Abdominal: Soft  Normal appearance and bowel sounds are normal  There is tenderness in the epigastric area  Nursing note and vitals reviewed        Vital Signs  ED Triage Vitals [05/30/20 1005]   Temperature Pulse Respirations Blood Pressure SpO2   (!) 96 9 °F (36 1 °C) 100 20 153/93 100 %      Temp Source Heart Rate Source Patient Position - Orthostatic VS BP Location FiO2 (%)   Tympanic Monitor Sitting Left arm --      Pain Score       --           Vitals:    05/30/20 1200 05/30/20 1355 05/30/20 1455 05/30/20 1600   BP: 138/79 138/81 145/76 146/78   Pulse: 74 71 75 74   Patient Position - Orthostatic VS: Lying Lying Lying          Visual Acuity      ED Medications  Medications   ondansetron (ZOFRAN) injection 4 mg (4 mg Intravenous Given 5/30/20 1047)   iohexol (OMNIPAQUE) 350 MG/ML injection (SINGLE-DOSE) 75 mL (75 mL Intravenous Given 5/30/20 1226)   ondansetron (ZOFRAN) injection 4 mg (4 mg Intravenous Given 5/30/20 1339)   metoclopramide (REGLAN) injection 5 mg (5 mg Intravenous Given 5/30/20 1504)   diphenhydrAMINE (BENADRYL) injection 12 5 mg (12 5 mg Intravenous Given 5/30/20 1501)       Diagnostic Studies  Results Reviewed     Procedure Component Value Units Date/Time    Urine Microscopic [[id number]]  (Abnormal) Collected:  05/30/20 1137    Lab Status:  Final result Specimen:  Urine, Clean Catch Updated:  05/30/20 1205     RBC, UA None Seen /hpf      WBC, UA 4-10 /hpf      Epithelial Cells Occasional /hpf      Bacteria, UA Occasional /hpf      MUCUS THREADS Occasional    UA w Reflex to Microscopic w Reflex to Culture [[id number]]  (Abnormal) Collected:  05/30/20 1137    Lab Status:  Final result Specimen:  Urine, Clean Catch Updated:  05/30/20 1147     Color, UA Straw     Clarity, UA Clear     Specific Gravity, UA 1 015     pH, UA 7 0     Leukocytes,  0     Nitrite, UA Negative     Protein, UA Negative mg/dl      Glucose, UA Negative mg/dl      Ketones, UA 15 (1+) mg/dl      Bilirubin, UA Negative     Blood, UA 25 0     UROBILINOGEN UA Negative mg/dL     Troponin I [[id number]]  (Normal) Collected:  05/30/20 1047    Lab Status:  Final result Specimen:  Blood from Arm, Left Updated:  05/30/20 1130     Troponin I <0 01 ng/mL     Lipase [[id number]]  (Normal) Collected:  05/30/20 1047    Lab Status:  Final result Specimen:  Blood from Arm, Left Updated:  05/30/20 1116     Lipase 103 u/L     Comprehensive metabolic panel [[id number]]  (Abnormal) Collected:  05/30/20 1047    Lab Status:  Final result Specimen:  Blood from Arm, Left Updated:  05/30/20 1116     Sodium 137 mmol/L      Potassium 3 9 mmol/L      Chloride 101 mmol/L      CO2 28 mmol/L      ANION GAP 8 mmol/L      BUN 16 mg/dL      Creatinine 0 93 mg/dL      Glucose 106 mg/dL      Calcium 9 5 mg/dL      AST 26 U/L      ALT 17 U/L      Alkaline Phosphatase 50 U/L      Total Protein 8 3 g/dL      Albumin 4 1 g/dL      Total Bilirubin 1 10 mg/dL      eGFR 61 ml/min/1 73sq m     Narrative:       [place] guidelines for Chronic Kidney Disease (CKD):     Stage 1 with normal or high GFR (GFR > 90 mL/min/1 73 square meters)    Stage 2 Mild CKD (GFR = 60-89 mL/min/1 73 square meters)    Stage 3A Moderate CKD (GFR = 45-59 mL/min/1 73 square meters)    Stage 3B Moderate CKD (GFR = 30-44 mL/min/1 73 square meters)    Stage 4 Severe CKD (GFR = 15-29 mL/min/1 73 square meters)    Stage 5 End Stage CKD (GFR <15 mL/min/1 73 square meters)  Note: GFR calculation is accurate only with a steady state creatinine    CBC and differential [[id number]]  (Abnormal) Collected:  05/30/20 1047    Lab Status:  Final result Specimen:  Blood from Arm, Left Updated:  05/30/20 1102     WBC 5 60 Thousand/uL      RBC 4 39 Million/uL      Hemoglobin 14 0 g/dL      Hematocrit 40 6 %      MCV 92 fL      MCH 31 9 pg      MCHC 34 5 g/dL      RDW 14 8 %      MPV 7 5 fL      Platelets 627 Thousands/uL      Neutrophils Relative 73 %      Lymphocytes Relative 17 %      Monocytes Relative 7 %      Eosinophils Relative 1 %      Basophils Relative 2 %      Neutrophils Absolute 4 10 Thousands/µL      Lymphocytes Absolute 0 90 Thousands/µL      Monocytes Absolute 0 40 Thousand/µL      Eosinophils Absolute 0 10 Thousand/µL      Basophils Absolute 0 10 Thousands/µL                  CT abdomen pelvis with contrast   Final Result by Gabriela Aguila MD (05/30 1310)      1  No acute abnormality in the abdomen or pelvis  2   Diverticulosis coli  3   Mild peripheral reticular interstitial thickening at the lung bases, or pronounced than on the prior study though without bronchiectasis or honeycombing   Based on current literature consensus, the CT lung findings represent CT pattern indeterminant    for UIP (usual interstitial pneumonia )      Reference: Diagnostic Criteria for Idiopathic Pulmonary Fibrosis: a Fleischner Society White Paper  Lancet Respir Med 2018 Feb;6(2):138-153  doi: 10  1016/-[id number])93890-5  Epub 2017 Nov 15  Workstation performed: [id number]                    Procedures  Procedures         ED Course                                       MDM      Disposition  Final diagnoses:   Nausea   Vomiting   Abdominal pain     Time reflects when diagnosis was documented in both MDM as applicable and the Disposition within this note     Time User Action Codes Description Comment    5/30/2020  3:50 PM Delgado Rink  Add [R11 0] Nausea     5/30/2020  3:50 PM Delgado Rink  Add [R11 10] Vomiting     5/30/2020  3:50 PM Delgado Rink  Add [R10 9] Abdominal pain       ED Disposition     ED Disposition Condition Date/Time Comment    Discharge Stable Sat May 30, 2020  3:50 PM [address] discharge to home/self care  Follow-up Information     Follow up With Specialties Details Why Contact Info    Lenny Peirre MD Family Medicine  return if condition worsens [address]  [phone]            Discharge Medication List as of 5/30/2020  3:52 PM      START taking these medications    Details   !! ondansetron (ZOFRAN) 4 mg tablet Take 1 tablet (4 mg total) by mouth every 12 (twelve) hours as needed for nausea or vomiting, Starting Sat 5/30/2020, Print       !! - Potential duplicate medications found  Please discuss with provider        CONTINUE these medications which have NOT CHANGED    Details   anastrozole (ARIMIDEX) 1 mg tablet Take 1 tablet (1 mg total) by mouth every 24 hours, Starting Wed 11/6/2019, Normal      cholecalciferol (VITAMIN D3) 1,000 units tablet Take 1 tablet (1,000 Units total) by mouth daily, Starting Thu 3/26/2020, No Print      escitalopram (LEXAPRO) 5 mg tablet Take 1 tablet (5 mg total) by mouth daily, Starting Thu 4/16/2020, Normal      esomeprazole (NexIUM) 40 MG capsule Take 1 capsule (40 mg total) by mouth daily, Starting Wed 1/29/2020, Normal      indapamide (LOZOL) 1 25 mg tablet Take 1 tablet (1 25 mg total) by mouth every morning, Starting Wed 3/11/2020, Normal      lapatinib (TYKERB) 250 MG tablet Take 6 tablets (1,500 mg total) by mouth daily, Starting Tue 4/14/2020, Normal      lisinopril (ZESTRIL) 10 mg tablet Take 1 tablet (10 mg total) by mouth daily, Starting Thu 4/9/2020, Normal      nebivolol (Bystolic) 5 mg tablet Take 1 tablet (5 mg total) by mouth daily, Starting Thu 4/9/2020, Normal      !! ondansetron (ZOFRAN) 4 mg tablet Take 1 tablet (4 mg total) by mouth every 6 (six) hours, Starting Fri 7/6/2018, Print      ondansetron (ZOFRAN-ODT) 4 mg disintegrating tablet Take 1 tablet (4 mg total) by mouth every 6 (six) hours as needed for nausea or vomiting, Starting Wed 4/1/2020, Normal      pantoprazole (PROTONIX) 40 mg tablet Starting Tue 2/4/2020, Historical Med      Cyanocobalamin 1000 MCG/ML KIT Inject as directed every 6 (six) months, Historical Med      !! Misc  Devices (ILLUSIONS C BREAST PROSTHESIS) MISC 1 breast prosthesis, Print      !! Misc  Devices (REFLECTIONS C BREAST PROSTHES) MISC Dispense 1 breast prosthesis, Print       !! - Potential duplicate medications found  Please discuss with provider  No discharge procedures on file      PDMP Review     None          ED Provider  Electronically Signed by           Cortes Diaz PA-C  05/31/20 3558

## 2020-06-01 ENCOUNTER — TELEPHONE (OUTPATIENT)
Dept: HEMATOLOGY ONCOLOGY | Facility: CLINIC | Age: 75
End: 2020-06-01

## 2020-06-01 ENCOUNTER — HOSPITAL ENCOUNTER (INPATIENT)
Facility: HOSPITAL | Age: 75
LOS: 4 days | Discharge: HOME | DRG: 391 | End: 2020-06-05
Attending: EMERGENCY MEDICINE | Admitting: FAMILY MEDICINE
Payer: MEDICARE

## 2020-06-01 DIAGNOSIS — C50.811 MALIGNANT NEOPLASM OF OVERLAPPING SITES OF RIGHT BREAST IN FEMALE, ESTROGEN RECEPTOR POSITIVE (HCC): ICD-10-CM

## 2020-06-01 DIAGNOSIS — R11.2 CHEMOTHERAPY INDUCED NAUSEA AND VOMITING: ICD-10-CM

## 2020-06-01 DIAGNOSIS — N12 PYELONEPHRITIS: ICD-10-CM

## 2020-06-01 DIAGNOSIS — N30.00 ACUTE CYSTITIS WITHOUT HEMATURIA: ICD-10-CM

## 2020-06-01 DIAGNOSIS — T45.1X5A CHEMOTHERAPY INDUCED NAUSEA AND VOMITING: ICD-10-CM

## 2020-06-01 DIAGNOSIS — Z17.0 MALIGNANT NEOPLASM OF OVERLAPPING SITES OF RIGHT BREAST IN FEMALE, ESTROGEN RECEPTOR POSITIVE (HCC): ICD-10-CM

## 2020-06-01 DIAGNOSIS — E86.0 DEHYDRATION: ICD-10-CM

## 2020-06-01 DIAGNOSIS — R11.2 INTRACTABLE NAUSEA AND VOMITING: Primary | ICD-10-CM

## 2020-06-01 DIAGNOSIS — E87.6 HYPOKALEMIA: ICD-10-CM

## 2020-06-01 LAB
BASOPHILS # BLD AUTO: 0 THOUSANDS/ΜL (ref 0–0.1)
BASOPHILS NFR BLD AUTO: 0 % (ref 0–1)
EOSINOPHIL # BLD AUTO: 0 THOUSAND/ΜL (ref 0–0.4)
EOSINOPHIL NFR BLD AUTO: 1 % (ref 0–6)
ERYTHROCYTE [DISTWIDTH] IN BLOOD BY AUTOMATED COUNT: 14.2 %
HCT VFR BLD AUTO: 41.4 % (ref 36–46)
HGB BLD-MCNC: 14 G/DL (ref 12–16)
LYMPHOCYTES # BLD AUTO: 1.1 THOUSANDS/ΜL (ref 0.5–4)
LYMPHOCYTES NFR BLD AUTO: 18 % (ref 25–45)
MAGNESIUM SERPL-MCNC: 1.8 MG/DL (ref 1.6–2.3)
MCH RBC QN AUTO: 31 PG (ref 26–34)
MCHC RBC AUTO-ENTMCNC: 33.7 G/DL (ref 31–36)
MCV RBC AUTO: 92 FL (ref 80–100)
MONOCYTES # BLD AUTO: 0.4 THOUSAND/ΜL (ref 0.2–0.9)
MONOCYTES NFR BLD AUTO: 7 % (ref 1–10)
NEUTROPHILS # BLD AUTO: 4.6 THOUSANDS/ΜL (ref 1.8–7.8)
NEUTS SEG NFR BLD AUTO: 75 % (ref 45–65)
PHOSPHATE SERPL-MCNC: 2.6 MG/DL (ref 2.5–4.8)
PLATELET # BLD AUTO: 272 THOUSANDS/UL (ref 150–450)
PMV BLD AUTO: 7.6 FL (ref 8.9–12.7)
RBC # BLD AUTO: 4.5 MILLION/UL (ref 4–5.2)
WBC # BLD AUTO: 6.1 THOUSAND/UL (ref 4.5–11)

## 2020-06-01 PROCEDURE — 83735 ASSAY OF MAGNESIUM: CPT | Performed by: FAMILY MEDICINE

## 2020-06-01 PROCEDURE — 99285 EMERGENCY DEPT VISIT HI MDM: CPT

## 2020-06-01 PROCEDURE — 84100 ASSAY OF PHOSPHORUS: CPT | Performed by: FAMILY MEDICINE

## 2020-06-01 PROCEDURE — 99223 1ST HOSP IP/OBS HIGH 75: CPT | Performed by: FAMILY MEDICINE

## 2020-06-01 PROCEDURE — 85025 COMPLETE CBC W/AUTO DIFF WBC: CPT | Performed by: EMERGENCY MEDICINE

## 2020-06-01 PROCEDURE — 36415 COLL VENOUS BLD VENIPUNCTURE: CPT | Performed by: EMERGENCY MEDICINE

## 2020-06-01 PROCEDURE — 99284 EMERGENCY DEPT VISIT MOD MDM: CPT | Performed by: EMERGENCY MEDICINE

## 2020-06-01 RX ORDER — SODIUM CHLORIDE, SODIUM GLUCONATE, SODIUM ACETATE, POTASSIUM CHLORIDE, MAGNESIUM CHLORIDE, SODIUM PHOSPHATE, DIBASIC, AND POTASSIUM PHOSPHATE .53; .5; .37; .037; .03; .012; .00082 G/100ML; G/100ML; G/100ML; G/100ML; G/100ML; G/100ML; G/100ML
75 INJECTION, SOLUTION INTRAVENOUS CONTINUOUS
Status: DISCONTINUED | OUTPATIENT
Start: 2020-06-01 | End: 2020-06-03

## 2020-06-01 RX ORDER — MELATONIN
1000 DAILY
Status: DISCONTINUED | OUTPATIENT
Start: 2020-06-01 | End: 2020-06-02

## 2020-06-01 RX ORDER — ONDANSETRON 2 MG/ML
4 INJECTION INTRAMUSCULAR; INTRAVENOUS EVERY 6 HOURS PRN
Status: DISCONTINUED | OUTPATIENT
Start: 2020-06-01 | End: 2020-06-03

## 2020-06-01 RX ORDER — ANASTROZOLE 1 MG/1
1 TABLET ORAL EVERY 24 HOURS
Status: DISCONTINUED | OUTPATIENT
Start: 2020-06-01 | End: 2020-06-02

## 2020-06-01 RX ORDER — PANTOPRAZOLE SODIUM 40 MG/1
40 TABLET, DELAYED RELEASE ORAL
Status: DISCONTINUED | OUTPATIENT
Start: 2020-06-02 | End: 2020-06-02

## 2020-06-01 RX ORDER — NEBIVOLOL 5 MG/1
5 TABLET ORAL DAILY
Status: DISCONTINUED | OUTPATIENT
Start: 2020-06-01 | End: 2020-06-02

## 2020-06-01 RX ORDER — METOCLOPRAMIDE 5 MG/1
5 TABLET ORAL
Status: DISCONTINUED | OUTPATIENT
Start: 2020-06-01 | End: 2020-06-02

## 2020-06-01 RX ORDER — ONDANSETRON 4 MG/1
4 TABLET, ORALLY DISINTEGRATING ORAL EVERY 6 HOURS PRN
Status: DISCONTINUED | OUTPATIENT
Start: 2020-06-01 | End: 2020-06-01

## 2020-06-01 RX ORDER — SCOLOPAMINE TRANSDERMAL SYSTEM 1 MG/1
1 PATCH, EXTENDED RELEASE TRANSDERMAL
Status: DISCONTINUED | OUTPATIENT
Start: 2020-06-01 | End: 2020-06-05 | Stop reason: HOSPADM

## 2020-06-01 RX ORDER — INDAPAMIDE 1.25 MG/1
1.25 TABLET, FILM COATED ORAL EVERY MORNING
Status: DISCONTINUED | OUTPATIENT
Start: 2020-06-01 | End: 2020-06-02

## 2020-06-01 RX ORDER — ESCITALOPRAM OXALATE 5 MG/1
5 TABLET ORAL DAILY
Status: DISCONTINUED | OUTPATIENT
Start: 2020-06-01 | End: 2020-06-02

## 2020-06-01 RX ORDER — LISINOPRIL 10 MG/1
10 TABLET ORAL DAILY
Status: DISCONTINUED | OUTPATIENT
Start: 2020-06-01 | End: 2020-06-02

## 2020-06-01 RX ADMIN — ENOXAPARIN SODIUM 40 MG: 100 INJECTION SUBCUTANEOUS at 14:49

## 2020-06-01 RX ADMIN — ONDANSETRON 4 MG: 4 TABLET, ORALLY DISINTEGRATING ORAL at 14:37

## 2020-06-01 RX ADMIN — SODIUM CHLORIDE, SODIUM GLUCONATE, SODIUM ACETATE, POTASSIUM CHLORIDE, MAGNESIUM CHLORIDE, SODIUM PHOSPHATE, DIBASIC, AND POTASSIUM PHOSPHATE 100 ML/HR: .53; .5; .37; .037; .03; .012; .00082 INJECTION, SOLUTION INTRAVENOUS at 14:50

## 2020-06-01 RX ADMIN — SCOPALAMINE 1 PATCH: 1 PATCH, EXTENDED RELEASE TRANSDERMAL at 14:49

## 2020-06-01 RX ADMIN — SODIUM CHLORIDE 1000 ML: 0.9 INJECTION, SOLUTION INTRAVENOUS at 12:24

## 2020-06-01 NOTE — ASSESSMENT & PLAN NOTE
Currently patient is not fluid overloaded  Last echocardiogram was April 6187: Systolic function was moderately reduced  Ejection fraction was estimated to be 35 %    Continue Bystolic 5 mg and indapamide 1 25 mg  Daily weight

## 2020-06-01 NOTE — ASSESSMENT & PLAN NOTE
Patient presented to ER with complaint of intractable nausea and vomiting that is ongoing for 1 month since she started Tykerb  Patient was in the ER 2 days ago, initially symptoms were improved with Zofran, and Reglan  However she returns again today because symptoms did not improve in last 2 days and she has intractable nausea and vomiting  Patient had a CT scan of abdomen and pelvis done in the ER 2 days ago which showed no acute abnormal finding    Patient received 1 L fluid in ER, will continue fluid infusion with isolyte 100 cc/hour  Will place her on clear liquid diet for now, and administer Reglan prior to eating  I will also place a scopolamine patch  Order Zofran as needed

## 2020-06-01 NOTE — ED PROVIDER NOTES
History  Chief Complaint   Patient presents with    Vomiting     abd  pain and n/v x6 days     Patient is a 46-year-old female who currently undergoing chemotherapy for cancer, has a port, has had abdominal pain with nausea vomiting x6 days  She was seen 2 days prior per medical records, it for similar symptoms and was able tolerate p o  Challenge and wanted to try going home for oral hydration  She was unable to do so and now returns by EMS for continued symptoms  Denies fevers chills chest pain shortness of breath, diarrhea dysuria frequency  Prior to Admission Medications   Prescriptions Last Dose Informant Patient Reported? Taking? Cyanocobalamin 1000 MCG/ML KIT  Self Yes Yes   Sig: Inject as directed every 6 (six) months   Misc  Devices (ILLUSIONS C BREAST PROSTHESIS) MISC  Self No Yes   Si breast prosthesis   Misc   Devices (REFLECTIONS C BREAST PROSTHES) MISC  Self No Yes   Sig: Dispense 1 breast prosthesis   anastrozole (ARIMIDEX) 1 mg tablet  Self No Yes   Sig: Take 1 tablet (1 mg total) by mouth every 24 hours   cholecalciferol (VITAMIN D3) 1,000 units tablet   No Yes   Sig: Take 1 tablet (1,000 Units total) by mouth daily   escitalopram (LEXAPRO) 5 mg tablet   No Yes   Sig: Take 1 tablet (5 mg total) by mouth daily   esomeprazole (NexIUM) 40 MG capsule  Self No Yes   Sig: Take 1 capsule (40 mg total) by mouth daily   indapamide (LOZOL) 1 25 mg tablet   No Yes   Sig: Take 1 tablet (1 25 mg total) by mouth every morning   lapatinib (TYKERB) 250 MG tablet   No Yes   Sig: Take 6 tablets (1,500 mg total) by mouth daily   lisinopril (ZESTRIL) 10 mg tablet   No Yes   Sig: Take 1 tablet (10 mg total) by mouth daily   nebivolol (Bystolic) 5 mg tablet   No Yes   Sig: Take 1 tablet (5 mg total) by mouth daily   ondansetron (ZOFRAN) 4 mg tablet  Self No Yes   Sig: Take 1 tablet (4 mg total) by mouth every 6 (six) hours   ondansetron (ZOFRAN) 4 mg tablet   No Yes   Sig: Take 1 tablet (4 mg total) by mouth every 12 (twelve) hours as needed for nausea or vomiting   ondansetron (ZOFRAN-ODT) 4 mg disintegrating tablet   No Yes   Sig: Take 1 tablet (4 mg total) by mouth every 6 (six) hours as needed for nausea or vomiting   pantoprazole (PROTONIX) 40 mg tablet  Self Yes Yes      Facility-Administered Medications: None       Past Medical History:   Diagnosis Date    [facility] [address] [facility]     [facility] [address] [facility]     Hypertension     Lymphedema of right arm     Vitamin B12 deficiency        Past Surgical History:   Procedure Laterality Date    APPENDECTOMY      BREAST SURGERY      bilat mastectomy-right total mastectomy and prophylactic left total mastectomy-2005    KNEE SURGERY      right knee replacement 2014 in [address] [place] [address] Bilateral        Family History   Problem Relation Age of Onset    Asthma Mother     Osteoarthritis Father     Bone cancer Paternal Aunt         bone cancer     I have reviewed and agree with the history as documented  E-Cigarette/Vaping    E-Cigarette Use Never User      E-Cigarette/Vaping Substances    Nicotine No     THC No     CBD No     Flavoring No     Other No     Unknown No      Social History     Tobacco Use    Smoking status: Never Smoker    Smokeless tobacco: Never Used    Tobacco comment: no passive smoke exposure   Substance Use Topics    Alcohol use: No     Frequency: Never     Binge frequency: Never    Drug use: No       Review of Systems   Constitutional: Negative  Negative for chills and fever  HENT: Negative  Negative for rhinorrhea, sore throat, trouble swallowing and voice change  Eyes: Negative  Negative for pain and visual disturbance  Respiratory: Negative  Negative for cough, shortness of breath and wheezing  Cardiovascular: Negative  Negative for chest pain and palpitations  Gastrointestinal: Positive for abdominal pain, nausea and vomiting  Negative for diarrhea  Genitourinary: Negative    Negative for dysuria and frequency  Musculoskeletal: Negative  Negative for neck pain and neck stiffness  Skin: Negative  Negative for rash  Neurological: Negative  Negative for dizziness, speech difficulty, weakness, light-headedness and numbness  Physical Exam  Physical Exam   Constitutional: She is oriented to person, place, and time  She appears well-developed  No distress  HENT:   Head: Normocephalic and atraumatic  Mouth/Throat: Oropharynx is clear and moist    Eyes: Pupils are equal, round, and reactive to light  Conjunctivae and EOM are normal    Neck: Normal range of motion  Neck supple  No tracheal deviation present  Cardiovascular: Normal rate, regular rhythm and intact distal pulses  Pulmonary/Chest: Effort normal and breath sounds normal  No respiratory distress  She has no wheezes  She has no rales  Abdominal: Soft  Bowel sounds are normal  She exhibits no distension  There is no tenderness  There is no rebound and no guarding  Musculoskeletal: Normal range of motion  She exhibits no tenderness or deformity  Neurological: She is alert and oriented to person, place, and time  Skin: Skin is warm and dry  Capillary refill takes less than 2 seconds  No rash noted  Psychiatric: She has a normal mood and affect  Her behavior is normal    Nursing note and vitals reviewed        Vital Signs  ED Triage Vitals   Temperature Pulse Respirations Blood Pressure SpO2   06/01/20 1300 06/01/20 1122 06/01/20 1122 06/01/20 1122 06/01/20 1122   (!) 97 1 °F (36 2 °C) 84 18 143/88 100 %      Temp Source Heart Rate Source Patient Position - Orthostatic VS BP Location FiO2 (%)   06/01/20 1300 06/01/20 1122 06/01/20 1122 06/01/20 1122 --   Temporal Monitor Lying Left arm       Pain Score       06/01/20 1317       3           Vitals:    06/01/20 1122 06/01/20 1300   BP: 143/88 148/93   Pulse: 84 83   Patient Position - Orthostatic VS: Lying Lying         Visual Acuity      ED Medications  Medications   anastrozole (ARIMIDEX) tablet 1 mg (has no administration in time range)   cholecalciferol (VITAMIN D3) tablet 1,000 Units (has no administration in time range)   escitalopram (LEXAPRO) tablet 5 mg (has no administration in time range)   pantoprazole (PROTONIX) EC tablet 40 mg (has no administration in time range)   indapamide (LOZOL) tablet 1 25 mg (has no administration in time range)   lisinopril (ZESTRIL) tablet 10 mg (has no administration in time range)   nebivolol (BYSTOLIC) tablet 5 mg (has no administration in time range)   enoxaparin (LOVENOX) subcutaneous injection 40 mg (has no administration in time range)   metoclopramide (REGLAN) tablet 5 mg (has no administration in time range)   scopolamine (TRANSDERM-SCOP) 1 5 mg/3 days TD 72 hr patch 1 patch (has no administration in time range)   ondansetron (ZOFRAN-ODT) dispersible tablet 4 mg (has no administration in time range)   multi-electrolyte (PLASMALYTE-A/ISOLYTE-S PH 7 4) IV solution (has no administration in time range)   sodium chloride 0 9 % bolus 1,000 mL (1,000 mL Intravenous New Bag 6/1/20 1224)       Diagnostic Studies  Results Reviewed     Procedure Component Value Units Date/Time    Lipase [[id number]] Updated:  06/01/20 1239    Lab Status:  No result Specimen:  Blood from Arm, Left     Comprehensive metabolic panel [[id number]] Updated:  06/01/20 1238    Lab Status:  No result Specimen:  Blood from Arm, Left     CBC and differential [[id number]]  (Abnormal) Collected:  06/01/20 1215    Lab Status:  Final result Specimen:  Blood from Arm, Left Updated:  06/01/20 1231     WBC 6 10 Thousand/uL      RBC 4 50 Million/uL      Hemoglobin 14 0 g/dL      Hematocrit 41 4 %      MCV 92 fL      MCH 31 0 pg      MCHC 33 7 g/dL      RDW 14 2 %      MPV 7 6 fL      Platelets 286 Thousands/uL      Neutrophils Relative 75 %      Lymphocytes Relative 18 %      Monocytes Relative 7 %      Eosinophils Relative 1 %      Basophils Relative 0 %      Neutrophils Absolute 4 60 Thousands/µL      Lymphocytes Absolute 1 10 Thousands/µL      Monocytes Absolute 0 40 Thousand/µL      Eosinophils Absolute 0 00 Thousand/µL      Basophils Absolute 0 00 Thousands/µL     UA (URINE) with reflex to Scope [[id number]]     Lab Status:  No result Specimen:  Urine                  No orders to display              Procedures  Procedures         ED Course                                       MDM  Number of Diagnoses or Management Options  Dehydration:   Intractable nausea and vomiting:   Diagnosis management comments: Patient returns for persistent nausea and vomiting  Patient appears dehydrated on examination although she is not tachycardic  Will order repeat labs for monitoring, start IV fluids, patient will be admitted for intractable nausea vomiting  She received 4 mg of intramuscular Zofran prior to arrival and states that her nausea is better control but still does not feel like she can eat or drink  Patient is agreeable to plan for admission and further monitoring  Amount and/or Complexity of Data Reviewed  Clinical lab tests: ordered          Disposition  Final diagnoses:   Intractable nausea and vomiting   Dehydration     Time reflects when diagnosis was documented in both MDM as applicable and the Disposition within this note     Time User Action Codes Description Comment    6/1/2020 12:07 PM Germaine Bergeron Add [R11 2] Intractable nausea and vomiting     6/1/2020 12:07 PM Germaine Bergeron Add [E86 0] Dehydration       ED Disposition     ED Disposition Condition Date/Time Comment    Admit Stable Mon Jun 1, 2020 12:07 PM Case was discussed with FABIÁN and the patient's admission status was agreed to be Admission Status: inpatient status to the service of Dr Idalia Roman           Follow-up Information    None         Current Discharge Medication List      CONTINUE these medications which have NOT CHANGED    Details   anastrozole (ARIMIDEX) 1 mg tablet Take 1 tablet (1 mg total) by mouth every 24 hours  Qty: 90 tablet, Refills: 4    Associated Diagnoses: Metastatic breast cancer (HCC)      cholecalciferol (VITAMIN D3) 1,000 units tablet Take 1 tablet (1,000 Units total) by mouth daily  Qty: 30 tablet, Refills: 0    Associated Diagnoses: Vitamin D deficiency      Cyanocobalamin 1000 MCG/ML KIT Inject as directed every 6 (six) months      escitalopram (LEXAPRO) 5 mg tablet Take 1 tablet (5 mg total) by mouth daily  Qty: 30 tablet, Refills: 5    Associated Diagnoses: Anxiety      esomeprazole (NexIUM) 40 MG capsule Take 1 capsule (40 mg total) by mouth daily  Qty: 90 capsule, Refills: 3    Associated Diagnoses: Malignant neoplasm of overlapping 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, estrogen receptor positive (HCC)      indapamide (LOZOL) 1 25 mg tablet Take 1 tablet (1 25 mg total) by mouth every morning  Qty: 30 tablet, Refills: 1    Associated Diagnoses: Essential hypertension      lapatinib (TYKERB) 250 MG tablet Take 6 tablets (1,500 mg total) by mouth daily  Qty: 180 tablet, Refills: 11    Associated Diagnoses: Malignant neoplasm of overlapping 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, estrogen receptor positive (HCC)      lisinopril (ZESTRIL) 10 mg tablet Take 1 tablet (10 mg total) by mouth daily  Qty: 90 tablet, Refills: 3    Associated Diagnoses: Essential hypertension      !! Misc  Devices (ILLUSIONS C BREAST PROSTHESIS) MISC 1 breast prosthesis  Qty: 1 each, Refills: 0    Associated Diagnoses: Malignant neoplasm of overlapping 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, estrogen receptor positive ([address] )      ! ! Misc   Devices (REFLECTIONS C BREAST PROSTHES) MISC Dispense 1 breast prosthesis  Qty: 1 each, Refills: 0    Associated Diagnoses: Malignant neoplasm of overlapping 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, estrogen receptor positive (HCC)      nebivolol (Bystolic) 5 mg tablet Take 1 tablet (5 mg total) by mouth daily  Qty: 90 tablet, Refills: 3    Associated Diagnoses: Essential hypertension      !! ondansetron (ZOFRAN) 4 mg tablet Take 1 tablet (4 mg total) by mouth every 6 (six) hours  Qty: 12 tablet, Refills: 0    Associated Diagnoses: Pyelonephritis      !! ondansetron (ZOFRAN) 4 mg tablet Take 1 tablet (4 mg total) by mouth every 12 (twelve) hours as needed for nausea or vomiting  Qty: 12 tablet, Refills: 0    Associated Diagnoses: Nausea; Vomiting; Abdominal pain      ondansetron (ZOFRAN-ODT) 4 mg disintegrating tablet Take 1 tablet (4 mg total) by mouth every 6 (six) hours as needed for nausea or vomiting  Qty: 24 tablet, Refills: 1    Associated Diagnoses: Nausea and vomiting, intractability of vomiting not specified, unspecified vomiting type      pantoprazole (PROTONIX) 40 mg tablet        !! - Potential duplicate medications found  Please discuss with provider  No discharge procedures on file      PDMP Review     None          ED Provider  Electronically Signed by           Nora Velazquez DO  06/01/20 5748

## 2020-06-01 NOTE — NURSING NOTE
Nausea and vomiting earlier  Pt medicated with zofran  Patient is awake, alert, and oriented to person, place, and time  Denies any pain or shortness of breath  Vital signs are stable  Family is in visiting  No change from initial assessment  Call bell in reach  Will continue to monitor

## 2020-06-01 NOTE — ASSESSMENT & PLAN NOTE
Patient was diagnosed with breast cancer in 2015, brain metastases in 2020 in a PET scan    Patient is currently undergoing chemotherapy, follows up with Dr Emile pineda

## 2020-06-01 NOTE — H&P
H&P- Basia Putnam 1945, 77 y o  female MRN: [id number]    Unit/Bed#: 7T [facility] 704-02 Encounter: [id number]    Primary Care Provider: Kathie Car MD   Date and time admitted to hospital: 6/1/2020 11:19 AM        * Chemotherapy induced nausea and vomiting  Assessment & Plan  Patient presented to ER with complaint of intractable nausea and vomiting that is ongoing for 1 month since she started Tykerb  Patient was in the ER 2 days ago, initially symptoms were improved with Zofran, and Reglan  However she returns again today because symptoms did not improve in last 2 days and she has intractable nausea and vomiting  Patient had a CT scan of abdomen and pelvis done in the ER 2 days ago which showed no acute abnormal finding  Patient received 1 L fluid in ER, will continue fluid infusion with isolyte 100 cc/hour  Will place her on clear liquid diet for now, and administer Reglan prior to eating  I will also place a scopolamine patch  Order Zofran as needed      Left ventricular systolic dysfunction  Assessment & Plan  Currently patient is not fluid overloaded  Last echocardiogram was April 9625: Systolic function was moderately reduced  Ejection fraction was estimated to be 35 %  Continue Bystolic 5 mg and indapamide 1 25 mg  Daily weight      Benign essential hypertension  Assessment & Plan  I will resume lisinopril and Bystolic 5 mg and indapamide 1 25 mg    Malignant neoplasm of overlapping 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, estrogen receptor positive [facility])  Assessment & Plan  Patient was diagnosed with breast cancer in 2015, brain metastases in 2020 in a PET scan  Patient is currently undergoing chemotherapy, follows up with Dr Rubio Lan outpatient      VTE Prophylaxis: Enoxaparin (Lovenox)  / sequential compression device   Code Status: full code       Anticipated Length of Stay:  Patient will be admitted on an Inpatient basis with an anticipated length of stay of  > 2 midnights     Justification for Hospital Stay: intractable n/v, unable to tolerate PO    Total Time for Visit, including Counseling / Coordination of Care: 1 hour  Greater than 50% of this total time spent on direct patient counseling and coordination of care  Chief Complaint:   Nausea and vomiting    History of Present Illness:    Valentin Martino is a 77 y o  female with history of metastatic breast cancer, metastasis to brain, currently undergoing chemotherapy presented to ER with complaint of intractable nausea above on vomiting that started since new medication TYKERB was started  Patient had multiple ER visits due to same problem, unable to tolerate any oral diet  In ER, patient had CT scan of abdomen done 2 days ago which was negative for acute findings  Labs are unremarkable  Troponin was negative  On my exam, patient is lying on the bed however has belching occasionally  She also complains of abdominal pain from intractable nausea and vomiting  Review of Systems:    Review of Systems   Constitutional: Negative for activity change, appetite change and fever  HENT: Negative for congestion and sore throat  Eyes: Negative for pain and visual disturbance  Respiratory: Negative for cough, shortness of breath and wheezing  Cardiovascular: Negative for chest pain, palpitations and leg swelling  Gastrointestinal: Positive for abdominal pain, nausea and vomiting  Negative for abdominal distention and diarrhea  Endocrine: Negative for polyuria  Genitourinary: Negative for difficulty urinating and dysuria  Musculoskeletal: Negative for arthralgias and back pain  Skin: Negative for rash and wound  Allergic/Immunologic: Negative for immunocompromised state  Neurological: Negative for dizziness, speech difficulty and headaches  Hematological: Negative for adenopathy  Psychiatric/Behavioral: Negative for sleep disturbance  The patient is not hyperactive          Past Medical and Surgical History:     Past Medical History:   Diagnosis Date    [facility] cancer ([facility] [address] [facility]     Breast cancer ([facility] [address] [facility]     Hypertension     Lymphedema of right arm     Vitamin B12 deficiency        Past Surgical History:   Procedure Laterality Date    APPENDECTOMY      BREAST SURGERY      bilat mastectomy-right total mastectomy and prophylactic left total mastectomy-2005    KNEE SURGERY      right knee replacement 2014 in [address] [place] [address] Bilateral        Meds/Allergies:    Prior to Admission medications    Medication Sig Start Date End Date Taking? Authorizing Provider   anastrozole (ARIMIDEX) 1 mg tablet Take 1 tablet (1 mg total) by mouth every 24 hours 11/6/19  Yes MALA Ivey   cholecalciferol (VITAMIN D3) 1,000 units tablet Take 1 tablet (1,000 Units total) by mouth daily 3/26/20  Yes David Cook MD   Cyanocobalamin 1000 MCG/ML KIT Inject as directed every 6 (six) months   Yes Historical Provider, MD   escitalopram (LEXAPRO) 5 mg tablet Take 1 tablet (5 mg total) by mouth daily 4/16/20  Yes David Cook MD   esomeprazole (NexIUM) 40 MG capsule Take 1 capsule (40 mg total) by mouth daily 1/29/20  Yes William White MD   indapamide (LOZOL) 1 25 mg tablet Take 1 tablet (1 25 mg total) by mouth every morning 3/11/20  Yes David Cook MD   lapatinib (TYKERB) 250 MG tablet Take 6 tablets (1,500 mg total) by mouth daily 4/14/20  Yes MALA Ivey   lisinopril (ZESTRIL) 10 mg tablet Take 1 tablet (10 mg total) by mouth daily 4/9/20  Yes Liam Rajput MD   [facility]  Devices (ILLUSIONS C BREAST PROSTHESIS) MISC 1 breast prosthesis 2/11/20  Yes William White MD   Misc   Devices (REFLECTIONS C BREAST PROSTHES) MISC Dispense 1 breast prosthesis 2/11/20  Yes William White MD   nebivolol (Bystolic) 5 mg tablet Take 1 tablet (5 mg total) by mouth daily 4/9/20  Yes Liam Rajput MD   ondansetron (ZOFRAN) 4 mg tablet Take 1 tablet (4 mg total) by mouth every 6 (six) hours 7/6/18  Yes John Shine MD   ondansetron [facility]) 4 mg tablet Take 1 tablet (4 mg total) by mouth every 12 (twelve) hours as needed for nausea or vomiting 5/30/20  Yes Fouzia Valdovinos PA-C   ondansetron (ZOFRAN-ODT) 4 mg disintegrating tablet Take 1 tablet (4 mg total) by mouth every 6 (six) hours as needed for nausea or vomiting 4/1/20  Yes Susanne Gupta MD   pantoprazole (PROTONIX) 40 mg tablet  2/4/20  Yes Historical Provider, MD     I have reviewed home medications with patient personally  Allergies: Allergies   Allergen Reactions    Penicillins Rash       Social History:     Marital Status: Single      Substance Use History:   Social History     Substance and Sexual Activity   Alcohol Use No    Frequency: Never    Binge frequency: Never     Social History     Tobacco Use   Smoking Status Never Smoker   Smokeless Tobacco Never Used   Tobacco Comment    no passive smoke exposure     Social History     Substance and Sexual Activity   Drug Use No       Family History:    Family History   Problem Relation Age of Onset    Asthma Mother     Osteoarthritis Father     Bone cancer Paternal Aunt         bone cancer       Physical Exam:     Vitals:   Blood Pressure: 148/93 (06/01/20 1300)  Pulse: 83 (06/01/20 1300)  Temperature: (!) 97 1 °F (36 2 °C) (06/01/20 1300)  Temp Source: Temporal (06/01/20 1300)  Respirations: 18 (06/01/20 1300)  Height: 5' 3" (160 cm) (06/01/20 1300)  Weight - Scale: 68 kg (149 lb 14 6 oz) (06/01/20 1300)  SpO2: 100 % (06/01/20 1300)    Physical Exam   Constitutional: She appears well-developed and well-nourished  HENT:   Head: Normocephalic and atraumatic  Right Ear: External ear normal    Left Ear: External ear normal    Nose: Nose normal    Cardiovascular: Normal rate and normal heart sounds  Pulmonary/Chest: Effort normal and breath sounds normal    Abdominal: Soft  She exhibits no distension  Generalized tenderness, no guarding, rigidity   Neurological: She is alert  Skin: Skin is warm and dry     Psychiatric: She has a normal mood and affect  Additional Data:     Lab Results: I have personally reviewed pertinent reports  Results from last 7 days   Lab Units 06/01/20  1215   WBC Thousand/uL 6 10   HEMOGLOBIN g/dL 14 0   HEMATOCRIT % 41 4   PLATELETS Thousands/uL 272   NEUTROS PCT % 75*   LYMPHS PCT % 18*   MONOS PCT % 7   EOS PCT % 1     Results from last 7 days   Lab Units 05/30/20  1047   SODIUM mmol/L 137   POTASSIUM mmol/L 3 9   CHLORIDE mmol/L 101   CO2 mmol/L 28   BUN mg/dL 16   CREATININE mg/dL 0 93   ANION GAP mmol/L 8   CALCIUM mg/dL 9 5   ALBUMIN g/dL 4 1   TOTAL BILIRUBIN mg/dL 1 10   ALK PHOS U/L 50   ALT U/L 17   AST U/L 26   GLUCOSE RANDOM mg/dL 106*                       Imaging: I have personally reviewed pertinent reports  No orders to display       EKG, Pathology, and Other Studies Reviewed on Admission:   · EKG: reviewed    Allscripts / Epic Records Reviewed: Yes     ** Please Note: This note has been constructed using a voice recognition system   **

## 2020-06-02 ENCOUNTER — TELEPHONE (OUTPATIENT)
Dept: FAMILY MEDICINE CLINIC | Facility: CLINIC | Age: 75
End: 2020-06-02

## 2020-06-02 ENCOUNTER — TELEPHONE (OUTPATIENT)
Dept: HEMATOLOGY ONCOLOGY | Facility: CLINIC | Age: 75
End: 2020-06-02

## 2020-06-02 PROBLEM — I50.22 CHRONIC SYSTOLIC HEART FAILURE (HCC): Status: ACTIVE | Noted: 2017-02-16

## 2020-06-02 LAB
ANION GAP SERPL CALCULATED.3IONS-SCNC: 8 MMOL/L (ref 5–14)
BUN SERPL-MCNC: 10 MG/DL (ref 5–25)
CALCIUM SERPL-MCNC: 8.4 MG/DL (ref 8.4–10.2)
CHLORIDE SERPL-SCNC: 104 MMOL/L (ref 97–108)
CO2 SERPL-SCNC: 25 MMOL/L (ref 22–30)
CREAT SERPL-MCNC: 0.76 MG/DL (ref 0.6–1.2)
ERYTHROCYTE [DISTWIDTH] IN BLOOD BY AUTOMATED COUNT: 14.4 %
GFR SERPL CREATININE-BSD FRML MDRD: 78 ML/MIN/1.73SQ M
GLUCOSE SERPL-MCNC: 73 MG/DL (ref 70–99)
HCT VFR BLD AUTO: 37.5 % (ref 36–46)
HGB BLD-MCNC: 12.6 G/DL (ref 12–16)
MCH RBC QN AUTO: 31.4 PG (ref 26–34)
MCHC RBC AUTO-ENTMCNC: 33.6 G/DL (ref 31–36)
MCV RBC AUTO: 93 FL (ref 80–100)
PLATELET # BLD AUTO: 213 THOUSANDS/UL (ref 150–450)
PMV BLD AUTO: 8.5 FL (ref 8.9–12.7)
POTASSIUM SERPL-SCNC: 4.2 MMOL/L (ref 3.6–5)
RBC # BLD AUTO: 4.02 MILLION/UL (ref 4–5.2)
SODIUM SERPL-SCNC: 137 MMOL/L (ref 137–147)
WBC # BLD AUTO: 5.3 THOUSAND/UL (ref 4.5–11)

## 2020-06-02 PROCEDURE — 85027 COMPLETE CBC AUTOMATED: CPT | Performed by: FAMILY MEDICINE

## 2020-06-02 PROCEDURE — 99232 SBSQ HOSP IP/OBS MODERATE 35: CPT | Performed by: FAMILY MEDICINE

## 2020-06-02 PROCEDURE — C9113 INJ PANTOPRAZOLE SODIUM, VIA: HCPCS | Performed by: FAMILY MEDICINE

## 2020-06-02 PROCEDURE — 93005 ELECTROCARDIOGRAM TRACING: CPT

## 2020-06-02 PROCEDURE — 80048 BASIC METABOLIC PNL TOTAL CA: CPT | Performed by: FAMILY MEDICINE

## 2020-06-02 RX ORDER — PANTOPRAZOLE SODIUM 40 MG/1
40 INJECTION, POWDER, FOR SOLUTION INTRAVENOUS
Status: DISCONTINUED | OUTPATIENT
Start: 2020-06-02 | End: 2020-06-04

## 2020-06-02 RX ORDER — METOCLOPRAMIDE HYDROCHLORIDE 5 MG/ML
5 INJECTION INTRAMUSCULAR; INTRAVENOUS EVERY 6 HOURS SCHEDULED
Status: DISCONTINUED | OUTPATIENT
Start: 2020-06-02 | End: 2020-06-03

## 2020-06-02 RX ADMIN — ONDANSETRON 4 MG: 2 INJECTION, SOLUTION INTRAMUSCULAR; INTRAVENOUS at 01:43

## 2020-06-02 RX ADMIN — BISACODYL 10 MG: 5 TABLET, COATED ORAL at 08:53

## 2020-06-02 RX ADMIN — METOCLOPRAMIDE 5 MG: 5 INJECTION, SOLUTION INTRAMUSCULAR; INTRAVENOUS at 23:43

## 2020-06-02 RX ADMIN — MELATONIN 1000 UNITS: at 08:54

## 2020-06-02 RX ADMIN — NEBIVOLOL HYDROCHLORIDE 5 MG: 5 TABLET ORAL at 08:56

## 2020-06-02 RX ADMIN — LISINOPRIL 10 MG: 10 TABLET ORAL at 08:54

## 2020-06-02 RX ADMIN — ESCITALOPRAM 5 MG: 5 TABLET, FILM COATED ORAL at 08:54

## 2020-06-02 RX ADMIN — PANTOPRAZOLE SODIUM 40 MG: 40 INJECTION, POWDER, LYOPHILIZED, FOR SOLUTION INTRAVENOUS at 11:29

## 2020-06-02 RX ADMIN — METOCLOPRAMIDE 5 MG: 5 INJECTION, SOLUTION INTRAMUSCULAR; INTRAVENOUS at 17:37

## 2020-06-02 RX ADMIN — INDAPAMIDE 1.25 MG: 1.25 TABLET, FILM COATED ORAL at 08:56

## 2020-06-02 RX ADMIN — SODIUM CHLORIDE, SODIUM GLUCONATE, SODIUM ACETATE, POTASSIUM CHLORIDE, MAGNESIUM CHLORIDE, SODIUM PHOSPHATE, DIBASIC, AND POTASSIUM PHOSPHATE 100 ML/HR: .53; .5; .37; .037; .03; .012; .00082 INJECTION, SOLUTION INTRAVENOUS at 01:35

## 2020-06-02 RX ADMIN — SODIUM CHLORIDE, SODIUM GLUCONATE, SODIUM ACETATE, POTASSIUM CHLORIDE, MAGNESIUM CHLORIDE, SODIUM PHOSPHATE, DIBASIC, AND POTASSIUM PHOSPHATE 75 ML/HR: .53; .5; .37; .037; .03; .012; .00082 INJECTION, SOLUTION INTRAVENOUS at 23:43

## 2020-06-02 RX ADMIN — ENOXAPARIN SODIUM 40 MG: 100 INJECTION SUBCUTANEOUS at 08:54

## 2020-06-02 RX ADMIN — METOCLOPRAMIDE: 5 INJECTION, SOLUTION INTRAMUSCULAR; INTRAVENOUS at 11:28

## 2020-06-02 NOTE — ASSESSMENT & PLAN NOTE
Currently patient is not fluid overloaded  Last echocardiogram was April 9293: Systolic function was moderately reduced  Ejection fraction was estimated to be 35 %    Continue Bystolic 5 mg and indapamide 1 25 mg  Daily weight

## 2020-06-02 NOTE — NURSING NOTE
Patient remain in stable condition  No change in condition from this am  Verbalized understanding of NPO  All safety maintained  Will continue to monitor

## 2020-06-02 NOTE — ASSESSMENT & PLAN NOTE
Patient presented to ER with complaint of intractable nausea and vomiting that is ongoing for 9 days since she started Tykerb states she had 9 days ago  Patient was in the ER 2 days ago, initially symptoms were improved with Zofran, and Reglan  However she returns again today because symptoms did not improve in last 2 days and she has intractable nausea and vomiting  Patient had a CT scan of abdomen and pelvis done in the ER 2 days ago which showed no acute abnormal finding  With her ischemic cardiomyopathy and QTC is 447 will avoid daily Reglan  For now continue Zofran p r n  She stated her nausea some improvement but she still has no appetite  She is started on scopolamine patch  I will also give her Dulcolax as I did review the CT of the abdomen pelvis there is some stool burden  Continue isolated but decreased to 50 cc/hour    Electrolytes are normal

## 2020-06-02 NOTE — PROGRESS NOTES
Progress Note Kateryna Powell 1945, 77 y o  female MRN: [id number]    Unit/Bed#: 7T [facility] 704-02 Encounter: [id number]    Primary Care Provider: Piedad Mckinney MD   Date and time admitted to hospital: 6/1/2020 11:19 AM        Chronic systolic heart failure [facility])  Assessment & Plan  Currently patient is not fluid overloaded  Last echocardiogram was April 4678: Systolic function was moderately reduced  Ejection fraction was estimated to be 35 %  Continue Bystolic 5 mg and indapamide 1 25 mg  Daily weight      Benign essential hypertension  Assessment & Plan  Controlled lisinopril and Bystolic 5 mg and indapamide 1 25 mg    Malignant neoplasm of overlapping 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, estrogen receptor positive [facility])  Assessment & Plan  Patient was diagnosed with breast cancer in 2015, brain metastases in 2020 in a PET scan  Patient is currently undergoing chemotherapy, follows up with Dr Karie Sullivan outpatient she had a last pill 9 days ago    * Chemotherapy induced nausea and vomiting  Assessment & Plan  Patient presented to ER with complaint of intractable nausea and vomiting that is ongoing for 9 days since she started Tykerb states she had 9 days ago  Patient was in the ER 2 days ago, initially symptoms were improved with Zofran, and Reglan  However she returns again today because symptoms did not improve in last 2 days and she has intractable nausea and vomiting  Patient had a CT scan of abdomen and pelvis done in the ER 2 days ago which showed no acute abnormal finding  With her ischemic cardiomyopathy and QTC is 447 will avoid daily Reglan  For now continue Zofran p r n  She stated her nausea some improvement but she still has no appetite  She is started on scopolamine patch  I will also give her Dulcolax as I did review the CT of the abdomen pelvis there is some stool burden  Continue isolated but decreased to 50 cc/hour    Electrolytes are normal          VTE Pharmacologic Prophylaxis: Pharmacologic: Enoxaparin (Lovenox)  Mechanical VTE Prophylaxis in Place: Yes    Patient Centered Rounds: I have performed bedside rounds with nursing staff today  Discussions with Specialists or Other Care Team Provider:  I discussed with the nursing    Education and Discussions with Family / Patient:  Patient    Time Spent for Care: 30 minutes  More than 50% of total time spent on counseling and coordination of care as described above  Current Length of Stay: 1 day(s)    Current Patient Status: Inpatient   Certification Statement: The patient will continue to require additional inpatient hospital stay due to Persistent nausea poor appetite    Discharge Plan:  When medically cleared    Code Status: Level 1 - Full Code      Subjective:   Patient seen and examined denies any vomiting some nausea still poor appetite nausea has improved some abdominal discomfort but no dysuria she has not had a bowel movement  Objective:     Vitals:   Temp (24hrs), Av 8 °F (31 °C), Min:50 °F (10 °C), Max:97 4 °F (36 3 °C)    Temp:  [50 °F (10 °C)-97 4 °F (36 3 °C)] 97 4 °F (36 3 °C)  HR:  [75-84] 75  Resp:  [18] 18  BP: (139-148)/(80-93) 139/80  SpO2:  [96 %-100 %] 96 %  Body mass index is 26 59 kg/m²  Input and Output Summary (last 24 hours): Intake/Output Summary (Last 24 hours) at 2020 0841  Last data filed at 2020 1554  Gross per 24 hour   Intake 2555 ml   Output 1050 ml   Net 1505 ml       Physical Exam:     Physical Exam   Constitutional: She is oriented to person, place, and time  She appears well-developed and well-nourished  HENT:   Head: Normocephalic and atraumatic  Eyes: Pupils are equal, round, and reactive to light  EOM are normal    Neck: Normal range of motion  Cardiovascular: Normal rate, regular rhythm and normal heart sounds  Pulmonary/Chest: Effort normal and breath sounds normal  No stridor  No respiratory distress  She has no wheezes  She has no rales  Abdominal: Soft  Bowel sounds are normal  She exhibits no distension  There is tenderness (Some right lower light upper)  Musculoskeletal: Normal range of motion  Neurological: She is alert and oriented to person, place, and time  She has normal reflexes  cranial nerve 2-12 are normal   Normal neurological exam   Skin: Skin is warm  Psychiatric: She has a normal mood and affect  Additional Data:     Labs:    Results from last 7 days   Lab Units 06/02/20  0504 06/01/20  1215   WBC Thousand/uL 5 30 6 10   HEMOGLOBIN g/dL 12 6 14 0   HEMATOCRIT % 37 5 41 4   PLATELETS Thousands/uL 213 272   NEUTROS PCT %  --  75*   LYMPHS PCT %  --  18*   MONOS PCT %  --  7   EOS PCT %  --  1     Results from last 7 days   Lab Units 06/02/20  0504 05/30/20  1047   SODIUM mmol/L 137 137   POTASSIUM mmol/L 4 2 3 9   CHLORIDE mmol/L 104 101   CO2 mmol/L 25 28   BUN mg/dL 10 16   CREATININE mg/dL 0 76 0 93   ANION GAP mmol/L 8 8   CALCIUM mg/dL 8 4 9 5   ALBUMIN g/dL  --  4 1   TOTAL BILIRUBIN mg/dL  --  1 10   ALK PHOS U/L  --  50   ALT U/L  --  17   AST U/L  --  26   GLUCOSE RANDOM mg/dL 73 106*                           * I Have Reviewed All Lab Data Listed Above  * Additional Pertinent Lab Tests Reviewed:  All Labs Within Last 24 Hours Reviewed    Imaging:    Imaging Reports Reviewed Today Include:  None today  Imaging Personally Reviewed by Myself Includes:      Recent Cultures (last 7 days):           Last 24 Hours Medication List:     Current Facility-Administered Medications:  anastrozole 1 mg Oral Q24H Marcia Olmedo MD    bisacodyl 10 mg Oral Once Shelby Bennett MD    cholecalciferol 1,000 Units Oral Daily Marcia Olmedo MD    enoxaparin 40 mg Subcutaneous Daily Marcia Olmedo MD    escitalopram 5 mg Oral Daily Marcia Olmedo MD    indapamide 1 25 mg Oral QAM Marcia Olmedo MD    lisinopril 10 mg Oral Daily Marcia Olmedo MD    metoclopramide 5 mg Oral TID AC Marcia Olmedo MD    multi-electrolyte 50 mL/hr Intravenous Continuous Abhay Jackson MD Juwan Last Rate: 100 mL/hr (06/02/20 0135)   nebivolol 5 mg Oral Daily Magda Ricketts MD    ondansetron 4 mg Intravenous Q6H PRN Jessica Garcia MD    pantoprazole 40 mg Oral Early Morning Jessica Garcia MD    scopolamine 1 patch Transdermal Daksha Negro MD         Today, Patient Was Seen By: Chivo Samano MD    ** Please Note: Dictation voice to text software may have been used in the creation of this document   **

## 2020-06-02 NOTE — PROGRESS NOTES
Persistent nausea and actually dry heaving  Supple p o  Medication placed on p o  Protonix 40 mg IV daily switch Reglan to 5 mg IV q 6 hours QTC is 470 will monitor with the a m  EKG  No other abnormalities notice in the CT abdomen and pelvis increased isolated 75 cc/hour

## 2020-06-02 NOTE — ASSESSMENT & PLAN NOTE
Patient was diagnosed with breast cancer in 2015, brain metastases in 2020 in a PET scan    Patient is currently undergoing chemotherapy, follows up with Dr Marcos Waldron outpatient she had a last pill 9 days ago

## 2020-06-02 NOTE — PLAN OF CARE
Problem: Potential for Falls  Goal: Patient will remain free of falls  Description  INTERVENTIONS:  - Assess patient frequently for physical needs  -  Identify cognitive and physical deficits and behaviors that affect risk of falls    -  [place] fall precautions as indicated by assessment   - Educate patient/family on patient safety including physical limitations  - Instruct patient to call for assistance with activity based on assessment  - Modify environment to reduce risk of injury  - Consider OT/PT consult to assist with strengthening/mobility  Outcome: Progressing     Problem: PAIN - ADULT  Goal: Verbalizes/displays adequate comfort level or baseline comfort level  Description  Interventions:  - Encourage patient to monitor pain and request assistance  - Assess pain using appropriate pain scale  - Administer analgesics based on type and severity of pain and evaluate response  - Implement non-pharmacological measures as appropriate and evaluate response  - Consider cultural and social influences on pain and pain management  - Notify physician/advanced practitioner if interventions unsuccessful or patient reports new pain  Outcome: Progressing     Problem: INFECTION - ADULT  Goal: Absence or prevention of progression during hospitalization  Description  INTERVENTIONS:  - Assess and monitor for signs and symptoms of infection  - Monitor lab/diagnostic results  - Monitor all insertion sites, i e  indwelling lines, tubes, and drains  - Monitor endotracheal if appropriate and nasal secretions for changes in amount and color  - [place] appropriate cooling/warming therapies per order  - Administer medications as ordered  - Instruct and encourage patient and family to use good hand hygiene technique  - Identify and instruct in appropriate isolation precautions for identified infection/condition  Outcome: Progressing  Goal: Absence of fever/infection during neutropenic period  Description  INTERVENTIONS:  - Monitor WBC    Outcome: Progressing     Problem: SAFETY ADULT  Goal: Maintain or return to baseline ADL function  Description  INTERVENTIONS:  -  Assess patient's ability to carry out ADLs; assess patient's baseline for ADL function and identify physical deficits which impact ability to perform ADLs (bathing, care of mouth/teeth, toileting, grooming, dressing, etc )  - Assess/evaluate cause of self-care deficits   - Assess range of motion  - Assess patient's mobility; develop plan if impaired  - Assess patient's need for assistive devices and provide as appropriate  - Encourage maximum independence but intervene and supervise when necessary  - Involve family in performance of ADLs  - Assess for home care needs following discharge   - Consider OT consult to assist with ADL evaluation and planning for discharge  - Provide patient education as appropriate  Outcome: Progressing  Goal: Maintain or return mobility status to optimal level  Description  INTERVENTIONS:  - Assess patient's baseline mobility status (ambulation, transfers, stairs, etc )    - Identify cognitive and physical deficits and behaviors that affect mobility  - Identify mobility aids required to assist with transfers and/or ambulation (gait belt, sit-to-stand, lift, walker, cane, etc )  - [place] fall precautions as indicated by assessment  - Record patient progress and toleration of activity level on Mobility SBAR; progress patient to next Phase/Stage  - Instruct patient to call for assistance with activity based on assessment  - Consider rehabilitation consult to assist with strengthening/weightbearing, etc   Outcome: Progressing     Problem: DISCHARGE PLANNING  Goal: Discharge to home or other facility with appropriate resources  Description  INTERVENTIONS:  - Identify barriers to discharge w/patient and caregiver  - Arrange for needed discharge resources and transportation as appropriate  - Identify discharge learning needs (meds, wound care, etc )  - Arrange for interpretive services to assist at discharge as needed  - Refer to Case Management Department for coordinating discharge planning if the patient needs post-hospital services based on physician/advanced practitioner order or complex needs related to functional status, cognitive ability, or social support system  Outcome: Progressing     Problem: Knowledge Deficit  Goal: Patient/family/caregiver demonstrates understanding of disease process, treatment plan, medications, and discharge instructions  Description  Complete learning assessment and assess knowledge base  Interventions:  - Provide teaching at level of understanding  - Provide teaching via preferred learning methods  Outcome: Progressing     Problem: Prexisting or High Potential for Compromised Skin Integrity  Goal: Skin integrity is maintained or improved  Description  INTERVENTIONS:  - Identify patients at risk for skin breakdown  - Assess and monitor skin integrity  - Assess and monitor nutrition and hydration status  - Monitor labs   - Assess for incontinence   - Turn and reposition patient  - Assist with mobility/ambulation  - Relieve pressure over bony prominences  - Avoid friction and shearing  - Provide appropriate hygiene as needed including keeping skin clean and dry  - Evaluate need for skin moisturizer/barrier cream  - Collaborate with interdisciplinary team   - Patient/family teaching  - Consider wound care consult   Outcome: Progressing     Problem: Nutrition/Hydration-ADULT  Goal: Nutrient/Hydration intake appropriate for improving, restoring or maintaining nutritional needs  Description  Monitor and assess patient's nutrition/hydration status for malnutrition  Collaborate with interdisciplinary team and initiate plan and interventions as ordered  Monitor patient's weight and dietary intake as ordered or per policy  Utilize nutrition screening tool and intervene as necessary   Determine patient's food preferences and provide high-protein, high-caloric foods as appropriate       INTERVENTIONS:  - Monitor oral intake, urinary output, labs, and treatment plans  - Assess nutrition and hydration status and recommend course of action  - Evaluate amount of meals eaten  - Assist patient with eating if necessary   - Allow adequate time for meals  - Recommend/ encourage appropriate diets, oral nutritional supplements, and vitamin/mineral supplements  - Order, calculate, and assess calorie counts as needed  - Recommend, monitor, and adjust tube feedings and TPN/PPN based on assessed needs  - Assess need for intravenous fluids  - Provide specific nutrition/hydration education as appropriate  - Include patient/family/caregiver in decisions related to nutrition  Outcome: Progressing

## 2020-06-02 NOTE — MALNUTRITION/BMI
This medical record reflects one or more clinical indicators suggestive of malnutrition     Malnutrition Findings:   Malnutrition type: Chronic illness  Degree of Malnutrition: Malnutrition of moderate degree  Malnutrition Characteristics: Fat loss, Muscle loss, Inadequate energy, Weight loss     Pt presents with severe malnutrition r/t prolonged inadequate intake with nausea and poor appetite 2/2 chemotherapy treatments as evidenced by 11 7% loss x 3mo (170# 1/8/20, 3/11/20, 166# 3/26/20, 150# 6/2/20), intake indicative of meeting <50% of estimated nutrient needs, moderate temporal wasting, clavicle protrusion and buccal fat pad depletion; to be treated with nutrition via appropriate route; recommend advancing diet to regular with Ensure Enlive TID  Will continue to monitor status and potential for nutrition support  BMI Findings: Body mass index is 26 59 kg/m²  See Nutrition note dated 06/02/2020 for additional details  Completed nutrition assessment is viewable in the nutrition documentation

## 2020-06-03 PROBLEM — R30.0 DYSURIA: Status: ACTIVE | Noted: 2020-06-03

## 2020-06-03 LAB
ANION GAP SERPL CALCULATED.3IONS-SCNC: 10 MMOL/L (ref 5–14)
ATRIAL RATE: 68 BPM
ATRIAL RATE: 72 BPM
BACTERIA UR QL AUTO: ABNORMAL /HPF
BILIRUB UR QL STRIP: ABNORMAL
BUN SERPL-MCNC: 9 MG/DL (ref 5–25)
CALCIUM SERPL-MCNC: 8.1 MG/DL (ref 8.4–10.2)
CHLORIDE SERPL-SCNC: 103 MMOL/L (ref 97–108)
CLARITY UR: CLEAR
CO2 SERPL-SCNC: 21 MMOL/L (ref 22–30)
COLOR UR: ABNORMAL
CREAT SERPL-MCNC: 0.71 MG/DL (ref 0.6–1.2)
GFR SERPL CREATININE-BSD FRML MDRD: 84 ML/MIN/1.73SQ M
GLUCOSE SERPL-MCNC: 63 MG/DL (ref 70–99)
GLUCOSE UR STRIP-MCNC: NEGATIVE MG/DL
HGB UR QL STRIP.AUTO: 25
KETONES UR STRIP-MCNC: ABNORMAL MG/DL
LEUKOCYTE ESTERASE UR QL STRIP: 100
NITRITE UR QL STRIP: POSITIVE
NON-SQ EPI CELLS URNS QL MICRO: ABNORMAL /HPF
P AXIS: 28 DEGREES
P AXIS: 39 DEGREES
PH UR STRIP.AUTO: 6 [PH]
POTASSIUM SERPL-SCNC: 3.2 MMOL/L (ref 3.6–5)
PR INTERVAL: 162 MS
PR INTERVAL: 164 MS
PROT UR STRIP-MCNC: NEGATIVE MG/DL
QRS AXIS: 14 DEGREES
QRS AXIS: 18 DEGREES
QRSD INTERVAL: 76 MS
QRSD INTERVAL: 82 MS
QT INTERVAL: 430 MS
QT INTERVAL: 442 MS
QTC INTERVAL: 469 MS
QTC INTERVAL: 470 MS
RBC #/AREA URNS AUTO: ABNORMAL /HPF
SODIUM SERPL-SCNC: 134 MMOL/L (ref 137–147)
SP GR UR STRIP.AUTO: 1.01 (ref 1–1.04)
T WAVE AXIS: 32 DEGREES
T WAVE AXIS: 33 DEGREES
UROBILINOGEN UA: 4 MG/DL
VENTRICULAR RATE: 68 BPM
VENTRICULAR RATE: 72 BPM
WBC #/AREA URNS AUTO: ABNORMAL /HPF

## 2020-06-03 PROCEDURE — C9113 INJ PANTOPRAZOLE SODIUM, VIA: HCPCS | Performed by: FAMILY MEDICINE

## 2020-06-03 PROCEDURE — 93005 ELECTROCARDIOGRAM TRACING: CPT

## 2020-06-03 PROCEDURE — 93010 ELECTROCARDIOGRAM REPORT: CPT | Performed by: INTERNAL MEDICINE

## 2020-06-03 PROCEDURE — 81001 URINALYSIS AUTO W/SCOPE: CPT | Performed by: FAMILY MEDICINE

## 2020-06-03 PROCEDURE — 80048 BASIC METABOLIC PNL TOTAL CA: CPT | Performed by: FAMILY MEDICINE

## 2020-06-03 PROCEDURE — 87086 URINE CULTURE/COLONY COUNT: CPT | Performed by: FAMILY MEDICINE

## 2020-06-03 PROCEDURE — 81003 URINALYSIS AUTO W/O SCOPE: CPT | Performed by: FAMILY MEDICINE

## 2020-06-03 PROCEDURE — 87186 SC STD MICRODIL/AGAR DIL: CPT | Performed by: FAMILY MEDICINE

## 2020-06-03 PROCEDURE — 99232 SBSQ HOSP IP/OBS MODERATE 35: CPT | Performed by: FAMILY MEDICINE

## 2020-06-03 PROCEDURE — 87077 CULTURE AEROBIC IDENTIFY: CPT | Performed by: FAMILY MEDICINE

## 2020-06-03 RX ORDER — METOCLOPRAMIDE HYDROCHLORIDE 5 MG/ML
5 INJECTION INTRAMUSCULAR; INTRAVENOUS EVERY 6 HOURS PRN
Status: DISCONTINUED | OUTPATIENT
Start: 2020-06-03 | End: 2020-06-05 | Stop reason: HOSPADM

## 2020-06-03 RX ORDER — ONDANSETRON 2 MG/ML
4 INJECTION INTRAMUSCULAR; INTRAVENOUS 4 TIMES DAILY
Status: DISCONTINUED | OUTPATIENT
Start: 2020-06-03 | End: 2020-06-05 | Stop reason: HOSPADM

## 2020-06-03 RX ORDER — PHENAZOPYRIDINE HYDROCHLORIDE 100 MG/1
100 TABLET, FILM COATED ORAL
Status: DISCONTINUED | OUTPATIENT
Start: 2020-06-03 | End: 2020-06-04

## 2020-06-03 RX ORDER — POTASSIUM CHLORIDE 14.9 MG/ML
20 INJECTION INTRAVENOUS ONCE
Status: COMPLETED | OUTPATIENT
Start: 2020-06-03 | End: 2020-06-03

## 2020-06-03 RX ORDER — DEXTROSE, SODIUM CHLORIDE, AND POTASSIUM CHLORIDE 5; .9; .15 G/100ML; G/100ML; G/100ML
75 INJECTION INTRAVENOUS CONTINUOUS
Status: DISCONTINUED | OUTPATIENT
Start: 2020-06-03 | End: 2020-06-03

## 2020-06-03 RX ORDER — LORAZEPAM 2 MG/ML
0.25 INJECTION INTRAMUSCULAR ONCE
Status: COMPLETED | OUTPATIENT
Start: 2020-06-03 | End: 2020-06-03

## 2020-06-03 RX ADMIN — PHENAZOPYRIDINE HYDROCHLORIDE 100 MG: 100 TABLET ORAL at 08:21

## 2020-06-03 RX ADMIN — ONDANSETRON 4 MG: 2 INJECTION INTRAMUSCULAR; INTRAVENOUS at 17:13

## 2020-06-03 RX ADMIN — POTASSIUM CHLORIDE 20 MEQ: 14.9 INJECTION, SOLUTION INTRAVENOUS at 10:50

## 2020-06-03 RX ADMIN — ONDANSETRON 4 MG: 2 INJECTION INTRAMUSCULAR; INTRAVENOUS at 22:00

## 2020-06-03 RX ADMIN — PANTOPRAZOLE SODIUM 40 MG: 40 INJECTION, POWDER, LYOPHILIZED, FOR SOLUTION INTRAVENOUS at 08:21

## 2020-06-03 RX ADMIN — LORAZEPAM 0.25 MG: 2 INJECTION INTRAMUSCULAR; INTRAVENOUS at 10:48

## 2020-06-03 RX ADMIN — PHENAZOPYRIDINE HYDROCHLORIDE 100 MG: 100 TABLET ORAL at 13:53

## 2020-06-03 RX ADMIN — ONDANSETRON 4 MG: 2 INJECTION INTRAMUSCULAR; INTRAVENOUS at 13:53

## 2020-06-03 RX ADMIN — PHENAZOPYRIDINE HYDROCHLORIDE 100 MG: 100 TABLET ORAL at 17:12

## 2020-06-03 RX ADMIN — DEXTROSE, SODIUM CHLORIDE, AND POTASSIUM CHLORIDE 75 ML/HR: 5; .9; .15 INJECTION INTRAVENOUS at 08:20

## 2020-06-03 RX ADMIN — DEXTROSE, SODIUM CHLORIDE, AND POTASSIUM CHLORIDE 75 ML/HR: 5; .9; .15 INJECTION INTRAVENOUS at 09:00

## 2020-06-03 RX ADMIN — METOCLOPRAMIDE 5 MG: 5 INJECTION, SOLUTION INTRAMUSCULAR; INTRAVENOUS at 05:50

## 2020-06-03 RX ADMIN — ERTAPENEM SODIUM 1000 MG: 1 INJECTION, POWDER, LYOPHILIZED, FOR SOLUTION INTRAMUSCULAR; INTRAVENOUS at 12:19

## 2020-06-03 RX ADMIN — ENOXAPARIN SODIUM 40 MG: 100 INJECTION SUBCUTANEOUS at 08:21

## 2020-06-03 NOTE — NURSING NOTE
Patient remain in stable condition  No change in condition from this am  Decrease appetite persists  All safety maintained  Will continue to monitor

## 2020-06-03 NOTE — ASSESSMENT & PLAN NOTE
· With suprapubic cane suspect UTI  She does have a history of ESBL couple of months back  Will place her on ertapenem she is allergic to penicillin with a rash but it small percentage of cross-reactivity    Her UA on admission was never reflected to culture she did have some leukocytosis in it and wbc 4-10-> will provide peridium for 3 days

## 2020-06-03 NOTE — ASSESSMENT & PLAN NOTE
Controlled lisinopril and Bystolic 5 mg and indapamide 1 25 mg- all on hold as the patient is refusing to take all the p o  Medications for now secondary to feeling nauseous although it has improved

## 2020-06-03 NOTE — PLAN OF CARE
Problem: Potential for Falls  Goal: Patient will remain free of falls  Description  INTERVENTIONS:  - Assess patient frequently for physical needs  -  Identify cognitive and physical deficits and behaviors that affect risk of falls    -  [place] fall precautions as indicated by assessment   - Educate patient/family on patient safety including physical limitations  - Instruct patient to call for assistance with activity based on assessment  - Modify environment to reduce risk of injury  - Consider OT/PT consult to assist with strengthening/mobility  Outcome: Progressing     Problem: PAIN - ADULT  Goal: Verbalizes/displays adequate comfort level or baseline comfort level  Description  Interventions:  - Encourage patient to monitor pain and request assistance  - Assess pain using appropriate pain scale  - Administer analgesics based on type and severity of pain and evaluate response  - Implement non-pharmacological measures as appropriate and evaluate response  - Consider cultural and social influences on pain and pain management  - Notify physician/advanced practitioner if interventions unsuccessful or patient reports new pain  Outcome: Progressing     Problem: INFECTION - ADULT  Goal: Absence or prevention of progression during hospitalization  Description  INTERVENTIONS:  - Assess and monitor for signs and symptoms of infection  - Monitor lab/diagnostic results  - Monitor all insertion sites, i e  indwelling lines, tubes, and drains  - Monitor endotracheal if appropriate and nasal secretions for changes in amount and color  - [place] appropriate cooling/warming therapies per order  - Administer medications as ordered  - Instruct and encourage patient and family to use good hand hygiene technique  - Identify and instruct in appropriate isolation precautions for identified infection/condition  Outcome: Progressing  Goal: Absence of fever/infection during neutropenic period  Description  INTERVENTIONS:  - Monitor WBC    Outcome: Progressing     Problem: SAFETY ADULT  Goal: Maintain or return to baseline ADL function  Description  INTERVENTIONS:  -  Assess patient's ability to carry out ADLs; assess patient's baseline for ADL function and identify physical deficits which impact ability to perform ADLs (bathing, care of mouth/teeth, toileting, grooming, dressing, etc )  - Assess/evaluate cause of self-care deficits   - Assess range of motion  - Assess patient's mobility; develop plan if impaired  - Assess patient's need for assistive devices and provide as appropriate  - Encourage maximum independence but intervene and supervise when necessary  - Involve family in performance of ADLs  - Assess for home care needs following discharge   - Consider OT consult to assist with ADL evaluation and planning for discharge  - Provide patient education as appropriate  Outcome: Progressing  Goal: Maintain or return mobility status to optimal level  Description  INTERVENTIONS:  - Assess patient's baseline mobility status (ambulation, transfers, stairs, etc )    - Identify cognitive and physical deficits and behaviors that affect mobility  - Identify mobility aids required to assist with transfers and/or ambulation (gait belt, sit-to-stand, lift, walker, cane, etc )  - [place] fall precautions as indicated by assessment  - Record patient progress and toleration of activity level on Mobility SBAR; progress patient to next Phase/Stage  - Instruct patient to call for assistance with activity based on assessment  - Consider rehabilitation consult to assist with strengthening/weightbearing, etc   Outcome: Progressing     Problem: DISCHARGE PLANNING  Goal: Discharge to home or other facility with appropriate resources  Description  INTERVENTIONS:  - Identify barriers to discharge w/patient and caregiver  - Arrange for needed discharge resources and transportation as appropriate  - Identify discharge learning needs (meds, wound care, etc )  - Arrange for interpretive services to assist at discharge as needed  - Refer to Case Management Department for coordinating discharge planning if the patient needs post-hospital services based on physician/advanced practitioner order or complex needs related to functional status, cognitive ability, or social support system  Outcome: Progressing     Problem: Knowledge Deficit  Goal: Patient/family/caregiver demonstrates understanding of disease process, treatment plan, medications, and discharge instructions  Description  Complete learning assessment and assess knowledge base  Interventions:  - Provide teaching at level of understanding  - Provide teaching via preferred learning methods  Outcome: Progressing     Problem: Prexisting or High Potential for Compromised Skin Integrity  Goal: Skin integrity is maintained or improved  Description  INTERVENTIONS:  - Identify patients at risk for skin breakdown  - Assess and monitor skin integrity  - Assess and monitor nutrition and hydration status  - Monitor labs   - Assess for incontinence   - Turn and reposition patient  - Assist with mobility/ambulation  - Relieve pressure over bony prominences  - Avoid friction and shearing  - Provide appropriate hygiene as needed including keeping skin clean and dry  - Evaluate need for skin moisturizer/barrier cream  - Collaborate with interdisciplinary team   - Patient/family teaching  - Consider wound care consult   Outcome: Progressing     Problem: Nutrition/Hydration-ADULT  Goal: Nutrient/Hydration intake appropriate for improving, restoring or maintaining nutritional needs  Description  Monitor and assess patient's nutrition/hydration status for malnutrition  Collaborate with interdisciplinary team and initiate plan and interventions as ordered  Monitor patient's weight and dietary intake as ordered or per policy  Utilize nutrition screening tool and intervene as necessary   Determine patient's food preferences and provide high-protein, high-caloric foods as appropriate       INTERVENTIONS:  - Monitor oral intake, urinary output, labs, and treatment plans  - Assess nutrition and hydration status and recommend course of action  - Evaluate amount of meals eaten  - Assist patient with eating if necessary   - Allow adequate time for meals  - Recommend/ encourage appropriate diets, oral nutritional supplements, and vitamin/mineral supplements  - Order, calculate, and assess calorie counts as needed  - Recommend, monitor, and adjust tube feedings and TPN/PPN based on assessed needs  - Assess need for intravenous fluids  - Provide specific nutrition/hydration education as appropriate  - Include patient/family/caregiver in decisions related to nutrition  Outcome: Progressing

## 2020-06-03 NOTE — ASSESSMENT & PLAN NOTE
Currently patient is not fluid overloaded  Last echocardiogram was April 3439: Systolic function was moderately reduced  Ejection fraction was estimated to be 35 %  Hold Bystolic 5 mg and indapamide 1 25 mg till able to take full p o    Daily weight

## 2020-06-03 NOTE — PROGRESS NOTES
Progress Note Deepa Hardy 1945, 77 y o  female MRN: [id number]    Unit/Bed#: 7T [facility] 702-02 Encounter: [id number]    Primary Care Provider: Salvatore Erickson MD   Date and time admitted to hospital: 6/1/2020 11:19 AM        Dysuria  Assessment & Plan  · With suprapubic cane suspect UTI  She does have a history of ESBL couple of months back  Will place her on ertapenem she is allergic to penicillin with a rash but it small percentage of cross-reactivity  Her UA on admission was never reflected to culture she did have some leukocytosis in it and wbc 4-10-> will provide peridium for 3 days     Chronic systolic heart failure [facility])  Assessment & Plan  Currently patient is not fluid overloaded  Last echocardiogram was April 3310: Systolic function was moderately reduced  Ejection fraction was estimated to be 35 %  Hold Bystolic 5 mg and indapamide 1 25 mg till able to take full p o  Daily weight      Benign essential hypertension  Assessment & Plan  Controlled lisinopril and Bystolic 5 mg and indapamide 1 25 mg- all on hold as the patient is refusing to take all the p o  Medications for now secondary to feeling nauseous although it has improved  Malignant neoplasm of overlapping 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, estrogen receptor positive [facility])  Assessment & Plan  Patient was diagnosed with breast cancer in 2015, brain metastases in 2020 in a PET scan  Patient is currently undergoing chemotherapy, follows up with Dr Jorja Hamman outpatient she had a last pill 9 days ago    * Chemotherapy induced nausea and vomiting  Assessment & Plan  Patient presented to ER with complaint of intractable nausea and vomiting that is ongoing for 9 days since she started Tykerb states she had 9 days ago  Patient was in the ER 2 days ago, initially symptoms were improved with Zofran, and Reglan  However she returns again today because symptoms did not improve in last 2 days and she has intractable nausea and vomiting    Patient had a CT scan of abdomen and pelvis done in the ER 2 days ago which showed no acute abnormal finding  Patient's symptoms have improved she has vomited she did have a bowel movement although she still feels nauseous although she wants to try some clears today she still wants to avoid any pills  I will switch her to D5 normal saline as she has some electrolyte disbalance with potassium also will place on Zofran 4 times a day standing a Reglan p r n     Continue scopolamine patch  Protonix  Patient does have some form of anxiety and she was told by her oncologist previously to maybe blade placed on Xanax but she has refused she is actually crying when I tell her about the pelvis I will give her a dose of Ativan 0 25 mg IV x1 and see how she responds          VTE Pharmacologic Prophylaxis:   Pharmacologic: Enoxaparin (Lovenox)  Mechanical VTE Prophylaxis in Place: Yes    Patient Centered Rounds: I have performed bedside rounds with nursing staff today  Discussions with Specialists or Other Care Team Provider:  I discussed with nursing    Education and Discussions with Family / Patient:  Patient    Time Spent for Care: 30 minutes  More than 50% of total time spent on counseling and coordination of care as described above      Current Length of Stay: 2 day(s)    Current Patient Status: Inpatient   Certification Statement: The patient will continue to require additional inpatient hospital stay due to Continued nausea    Discharge Plan:  Home when medically cleared    Code Status: Level 1 - Full Code      Subjective:   Patient seen and examined she does have some dysuria some suprapubic tenderness but no more vomiting she feels a little better she wants to try some liquids still has some nausea but has been improved no chest pain or shortness of breath    Objective:     Vitals:   Temp (24hrs), Av 2 °F (36 2 °C), Min:96 °F (35 6 °C), Max:98 1 °F (36 7 °C)    Temp:  [96 °F (35 6 °C)-98 1 °F (36 7 °C)] 96 °F (35 6 °C)  HR:  [66-77] 66  Resp:  [18-20] 20  BP: (125-145)/(78-90) 145/79  SpO2:  [98 %-100 %] 98 %  Body mass index is 26 79 kg/m²  Input and Output Summary (last 24 hours): Intake/Output Summary (Last 24 hours) at 6/3/2020 0905  Last data filed at 6/3/2020 0245  Gross per 24 hour   Intake 1879 17 ml   Output 850 ml   Net 1029 17 ml       Physical Exam:     Physical Exam   Constitutional: She is oriented to person, place, and time  She appears well-developed and well-nourished  HENT:   Head: Normocephalic and atraumatic  Eyes: Pupils are equal, round, and reactive to light  EOM are normal    Neck: Normal range of motion  Cardiovascular: Normal rate, regular rhythm and normal heart sounds  Pulmonary/Chest: Effort normal and breath sounds normal  No stridor  No respiratory distress  She has no wheezes  Abdominal: Soft  Bowel sounds are normal  There is tenderness (Suprapubic)  Musculoskeletal: Normal range of motion  Neurological: She is alert and oriented to person, place, and time  She has normal reflexes  cranial nerve 2-12 are normal   Normal neurological exam   Skin: Skin is warm  Psychiatric: She has a normal mood and affect           Additional Data:     Labs:    Results from last 7 days   Lab Units 06/02/20  0504 06/01/20  1215   WBC Thousand/uL 5 30 6 10   HEMOGLOBIN g/dL 12 6 14 0   HEMATOCRIT % 37 5 41 4   PLATELETS Thousands/uL 213 272   NEUTROS PCT %  --  75*   LYMPHS PCT %  --  18*   MONOS PCT %  --  7   EOS PCT %  --  1     Results from last 7 days   Lab Units 06/03/20  0506  05/30/20  1047   SODIUM mmol/L 134*   < > 137   POTASSIUM mmol/L 3 2*   < > 3 9   CHLORIDE mmol/L 103   < > 101   CO2 mmol/L 21*   < > 28   BUN mg/dL 9   < > 16   CREATININE mg/dL 0 71   < > 0 93   ANION GAP mmol/L 10   < > 8   CALCIUM mg/dL 8 1*   < > 9 5   ALBUMIN g/dL  --   --  4 1   TOTAL BILIRUBIN mg/dL  --   --  1 10   ALK PHOS U/L  --   --  50   ALT U/L  --   --  17   AST U/L  --   --  26   GLUCOSE RANDOM mg/dL 63*   < > 106*    < > = values in this interval not displayed  * I Have Reviewed All Lab Data Listed Above  * Additional Pertinent Lab Tests Reviewed: All Labs Within Last 24 Hours Reviewed    Imaging:    Imaging Reports Reviewed Today Include:  None today  Imaging Personally Reviewed by Myself Includes:      Recent Cultures (last 7 days):           Last 24 Hours Medication List:     Current Facility-Administered Medications:  dextrose 5 % and sodium chloride 0 9 % with KCl 20 mEq/L 75 mL/hr Intravenous Continuous Franck Haynes MD   enoxaparin 40 mg Subcutaneous Daily Deniz Vasquez MD   ertapenem 1,000 mg Intravenous Q24H Franck Haynes MD   LORazepam 0 25 mg Intravenous Once Franck Haynes MD   metoclopramide 5 mg Intravenous Q6H PRN Franck Haynes MD   ondansetron 4 mg Intravenous 4x Daily Franck Haynes MD   pantoprazole 40 mg Intravenous Q24H [address] Franck Haynes MD   phenazopyridine 100 mg Oral TID With Meals Franck Haynes MD   potassium chloride 20 mEq Intravenous Once Franck Haynes MD   scopolamine 1 patch Transdermal Calixto Francois MD        Today, Patient Was Seen By: Franck Haynes MD    ** Please Note: Dictation voice to text software may have been used in the creation of this document   **

## 2020-06-03 NOTE — ASSESSMENT & PLAN NOTE
Patient presented to ER with complaint of intractable nausea and vomiting that is ongoing for 9 days since she started Tykerb states she had 9 days ago  Patient was in the ER 2 days ago, initially symptoms were improved with Zofran, and Reglan  However she returns again today because symptoms did not improve in last 2 days and she has intractable nausea and vomiting  Patient had a CT scan of abdomen and pelvis done in the ER 2 days ago which showed no acute abnormal finding  Patient's symptoms have improved she has vomited she did have a bowel movement although she still feels nauseous although she wants to try some clears today she still wants to avoid any pills  I will switch her to D5 normal saline as she has some electrolyte disbalance with potassium also will place on Zofran 4 times a day standing a Reglan p r n     Continue scopolamine patch     Protonix  Patient does have some form of anxiety and she was told by her oncologist previously to maybe blade placed on Xanax but she has refused she is actually crying when I tell her about the pelvis I will give her a dose of Ativan 0 25 mg IV x1 and see how she responds

## 2020-06-03 NOTE — ASSESSMENT & PLAN NOTE
Patient was diagnosed with breast cancer in 2015, brain metastases in 2020 in a PET scan    Patient is currently undergoing chemotherapy, follows up with Dr Fitz Carrera outpatient she had a last pill 9 days ago

## 2020-06-03 NOTE — NURSING NOTE
On initial rounds patient in no apparent distress  Skin warm and dry to touch  Pleasant and cooperative  Remain NPO and understanding verbalized  Ambulates well by self with stand by assist  Denies nausea or vomiting, none noted  States burning with urination will inform MD  All safety maintained  Will continue top monitor

## 2020-06-04 PROBLEM — N30.00 ACUTE CYSTITIS WITHOUT HEMATURIA: Status: ACTIVE | Noted: 2020-06-03

## 2020-06-04 LAB
ANION GAP SERPL CALCULATED.3IONS-SCNC: 10 MMOL/L (ref 5–14)
BUN SERPL-MCNC: 6 MG/DL (ref 5–25)
CALCIUM SERPL-MCNC: 8.4 MG/DL (ref 8.4–10.2)
CHLORIDE SERPL-SCNC: 104 MMOL/L (ref 97–108)
CO2 SERPL-SCNC: 23 MMOL/L (ref 22–30)
CREAT SERPL-MCNC: 0.77 MG/DL (ref 0.6–1.2)
GFR SERPL CREATININE-BSD FRML MDRD: 76 ML/MIN/1.73SQ M
GLUCOSE SERPL-MCNC: 76 MG/DL (ref 70–99)
POTASSIUM SERPL-SCNC: 3.3 MMOL/L (ref 3.6–5)
SODIUM SERPL-SCNC: 137 MMOL/L (ref 137–147)

## 2020-06-04 PROCEDURE — C9113 INJ PANTOPRAZOLE SODIUM, VIA: HCPCS | Performed by: FAMILY MEDICINE

## 2020-06-04 PROCEDURE — 80048 BASIC METABOLIC PNL TOTAL CA: CPT | Performed by: FAMILY MEDICINE

## 2020-06-04 PROCEDURE — 93005 ELECTROCARDIOGRAM TRACING: CPT

## 2020-06-04 PROCEDURE — 99232 SBSQ HOSP IP/OBS MODERATE 35: CPT | Performed by: FAMILY MEDICINE

## 2020-06-04 RX ORDER — PANTOPRAZOLE SODIUM 40 MG/1
40 TABLET, DELAYED RELEASE ORAL
Status: DISCONTINUED | OUTPATIENT
Start: 2020-06-05 | End: 2020-06-05 | Stop reason: HOSPADM

## 2020-06-04 RX ORDER — ANASTROZOLE 1 MG/1
1 TABLET ORAL EVERY 24 HOURS
Status: DISCONTINUED | OUTPATIENT
Start: 2020-06-04 | End: 2020-06-05 | Stop reason: HOSPADM

## 2020-06-04 RX ORDER — NEBIVOLOL 5 MG/1
5 TABLET ORAL DAILY
Status: DISCONTINUED | OUTPATIENT
Start: 2020-06-04 | End: 2020-06-05 | Stop reason: HOSPADM

## 2020-06-04 RX ORDER — ESCITALOPRAM OXALATE 5 MG/1
5 TABLET ORAL DAILY
Status: DISCONTINUED | OUTPATIENT
Start: 2020-06-04 | End: 2020-06-05 | Stop reason: HOSPADM

## 2020-06-04 RX ORDER — POTASSIUM CHLORIDE 14.9 MG/ML
20 INJECTION INTRAVENOUS 2 TIMES DAILY
Status: DISCONTINUED | OUTPATIENT
Start: 2020-06-04 | End: 2020-06-05

## 2020-06-04 RX ORDER — LISINOPRIL 10 MG/1
10 TABLET ORAL DAILY
Status: DISCONTINUED | OUTPATIENT
Start: 2020-06-04 | End: 2020-06-05 | Stop reason: HOSPADM

## 2020-06-04 RX ADMIN — ONDANSETRON 4 MG: 2 INJECTION INTRAMUSCULAR; INTRAVENOUS at 08:52

## 2020-06-04 RX ADMIN — PHENAZOPYRIDINE HYDROCHLORIDE 100 MG: 100 TABLET ORAL at 08:52

## 2020-06-04 RX ADMIN — SCOPALAMINE 1 PATCH: 1 PATCH, EXTENDED RELEASE TRANSDERMAL at 14:00

## 2020-06-04 RX ADMIN — ENOXAPARIN SODIUM 40 MG: 100 INJECTION SUBCUTANEOUS at 08:53

## 2020-06-04 RX ADMIN — ERTAPENEM SODIUM 1000 MG: 1 INJECTION, POWDER, LYOPHILIZED, FOR SOLUTION INTRAMUSCULAR; INTRAVENOUS at 08:53

## 2020-06-04 RX ADMIN — PANTOPRAZOLE SODIUM 40 MG: 40 INJECTION, POWDER, LYOPHILIZED, FOR SOLUTION INTRAVENOUS at 08:52

## 2020-06-04 RX ADMIN — POTASSIUM CHLORIDE 20 MEQ: 14.9 INJECTION, SOLUTION INTRAVENOUS at 11:12

## 2020-06-04 RX ADMIN — LISINOPRIL 10 MG: 10 TABLET ORAL at 11:24

## 2020-06-04 RX ADMIN — ANASTROZOLE 1 MG: 1 TABLET ORAL at 11:12

## 2020-06-04 RX ADMIN — ESCITALOPRAM OXALATE 5 MG: 5 TABLET, FILM COATED ORAL at 11:12

## 2020-06-04 NOTE — PROGRESS NOTES
Progress Note Jeanne Fried 1945, 77 y o  female MRN: [id number]    Unit/Bed#: 7T [facility] 702-02 Encounter: [id number]    Primary Care Provider: Brayden Valentine MD   Date and time admitted to hospital: 6/1/2020 11:19 AM        Acute cystitis without hematuria  Assessment & Plan  · UA came back yesterday positive for nitrates leukocytes and pyuria  She did have symptoms of dysuria and suprapubic pain which has resolved now , will DC Pyridium  Continue ertapenem she has no rash she did have a history of ESBL back in January  Awaiting urine culture to switch antibiotics  Chronic systolic heart failure ([facility] [address] )  Assessment & Plan  Currently patient is not fluid overloaded  Last echocardiogram was April 2906: Systolic function was moderately reduced  Ejection fraction was estimated to be 35 %  Resume Bystolic and lisinopril  Daily weight      Benign essential hypertension  Assessment & Plan  Controlled lisinopril and Bystolic 5 mg and indapamide 1 25 mg-patient is able to take p o  Continue lisinopril and Bystolic but I will hold Lozol secondary to hypokalemia still  Malignant neoplasm of overlapping 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, estrogen receptor positive [facility])  Assessment & Plan  Patient was diagnosed with breast cancer in 2015, brain metastases in 2020 in a PET scan  Patient is currently undergoing chemotherapy, follows up with Dr Cody Molina outpatient she had a last pill 9 days ago- discussed with dr ariza to hold tykerb until she sees him  As having gi side effects  * Chemotherapy induced nausea and vomiting  Assessment & Plan  Patient presented to ER with complaint of intractable nausea and vomiting that is ongoing for 9 days since she started Tykerb states she had 9 days ago  Patient was in the ER 2 days ago, initially symptoms were improved with Zofran, and Reglan  However she returns again today because symptoms did not improve in last 2 days and she has intractable nausea and vomiting  Patient had a CT scan of abdomen and pelvis done in the ER 2 days ago which showed no acute abnormal finding  Patient's nausea has resolved she has been eating little by little  Patient's appetite has not been good any way for a while and she is not a big eater she states  Advanced her diet  Fluids have been discontinued I discussed with her oncologist will hold her chemotherapy medication when she is discharged till he evaluates her as an outpatient she does have GI symptoms will check her EKG today as she has been on Zofran for the QTC  VTE Pharmacologic Prophylaxis:   Pharmacologic: Enoxaparin (Lovenox)  Mechanical VTE Prophylaxis in Place: Yes    Patient Centered Rounds: I have performed bedside rounds with nursing staff today  Discussions with Specialists or Other Care Team Provider:  I discussed with the nursing    Education and Discussions with Family / Patient:  Patient and I did update the sister yesterday    Time Spent for Care: 1 hour  More than 50% of total time spent on counseling and coordination of care as described above  Current Length of Stay: 3 day(s)    Current Patient Status: Inpatient   Certification Statement: The patient will continue to require additional inpatient hospital stay due to Awaiting urine culture    Discharge Plan: anticipate discharge home 24 hours    Code Status: Level 1 - Full Code      Subjective:   Patient seen and examined denies any nausea vomiting denies any chest pain or shortness of breath denies any dysuria or suprapubic tenderness    Objective:     Vitals:   Temp (24hrs), Av 9 °F (36 6 °C), Min:97 °F (36 1 °C), Max:99 6 °F (37 6 °C)    Temp:  [97 °F (36 1 °C)-99 6 °F (37 6 °C)] 99 6 °F (37 6 °C)  HR:  [76-83] 76  Resp:  [18-20] 18  BP: (133-153)/(87-95) 133/87  SpO2:  [97 %-98 %] 97 %  Body mass index is 26 32 kg/m²  Input and Output Summary (last 24 hours):        Intake/Output Summary (Last 24 hours) at 2020 7928  Last data filed at 6/4/2020 0641  Gross per 24 hour   Intake 255 ml   Output 1850 ml   Net -1595 ml       Physical Exam:     Physical Exam   Constitutional: She is oriented to person, place, and time  She appears well-developed and well-nourished  HENT:   Head: Normocephalic and atraumatic  Eyes: Pupils are equal, round, and reactive to light  EOM are normal    Neck: Normal range of motion  Cardiovascular: Normal rate, regular rhythm and normal heart sounds  Pulmonary/Chest: Effort normal and breath sounds normal    Abdominal: Soft  Bowel sounds are normal    Musculoskeletal: Normal range of motion  She exhibits no edema  Neurological: She is alert and oriented to person, place, and time  She has normal reflexes  cranial nerve 2-12 are normal   Normal neurological exam   Skin: Skin is warm  Psychiatric: She has a normal mood and affect  Additional Data:     Labs:    Results from last 7 days   Lab Units 06/02/20  0504 06/01/20  1215   WBC Thousand/uL 5 30 6 10   HEMOGLOBIN g/dL 12 6 14 0   HEMATOCRIT % 37 5 41 4   PLATELETS Thousands/uL 213 272   NEUTROS PCT %  --  75*   LYMPHS PCT %  --  18*   MONOS PCT %  --  7   EOS PCT %  --  1     Results from last 7 days   Lab Units 06/04/20  0616  05/30/20  1047   SODIUM mmol/L 137   < > 137   POTASSIUM mmol/L 3 3*   < > 3 9   CHLORIDE mmol/L 104   < > 101   CO2 mmol/L 23   < > 28   BUN mg/dL 6   < > 16   CREATININE mg/dL 0 77   < > 0 93   ANION GAP mmol/L 10   < > 8   CALCIUM mg/dL 8 4   < > 9 5   ALBUMIN g/dL  --   --  4 1   TOTAL BILIRUBIN mg/dL  --   --  1 10   ALK PHOS U/L  --   --  50   ALT U/L  --   --  17   AST U/L  --   --  26   GLUCOSE RANDOM mg/dL 76   < > 106*    < > = values in this interval not displayed  * I Have Reviewed All Lab Data Listed Above  * Additional Pertinent Lab Tests Reviewed:  All Labs Within Last 24 Hours Reviewed    Imaging:    Imaging Reports Reviewed Today Include:  None today  Imaging Personally Reviewed by Myself Includes:      Recent Cultures (last 7 days):           Last 24 Hours Medication List:     Current Facility-Administered Medications:  anastrozole 1 mg Oral Q24H Angel Mackenzie MD    enoxaparin 40 mg Subcutaneous Daily Rickey Frausto MD    ertapenem 1,000 mg Intravenous Q24H Angel Mackenzie MD Last Rate: 1,000 mg (06/04/20 0853)   escitalopram 5 mg Oral Daily Angel Mackenzie MD    lisinopril 10 mg Oral Daily Angel Mackenzie MD    metoclopramide 5 mg Intravenous Q6H PRN Angel Mackenzie MD    nebivolol 5 mg Oral Daily Angel Mackenzie MD    ondansetron 4 mg Intravenous 4x Daily Angel Mackenzie MD    [facility] pantoprazole 40 mg Oral Early Morning Angel Mackenzie MD    potassium chloride 20 mEq Intravenous BID Angel Mackenzie MD    scopolamine 1 patch Transdermal Juwan Pino MD         Today, Patient Was Seen By: Angel Mackenzie MD    ** Please Note: Dictation voice to text software may have been used in the creation of this document   **

## 2020-06-04 NOTE — ASSESSMENT & PLAN NOTE
Currently patient is not fluid overloaded  Last echocardiogram was April 5111: Systolic function was moderately reduced  Ejection fraction was estimated to be 35 %    Resume Bystolic and lisinopril  Daily weight

## 2020-06-04 NOTE — ASSESSMENT & PLAN NOTE
· UA came back yesterday positive for nitrates leukocytes and pyuria  She did have symptoms of dysuria and suprapubic pain which has resolved now , will DC Pyridium  Continue ertapenem she has no rash she did have a history of ESBL back in January  Awaiting urine culture to switch antibiotics

## 2020-06-04 NOTE — PLAN OF CARE
Problem: Potential for Falls  Goal: Patient will remain free of falls  Description  INTERVENTIONS:  - Assess patient frequently for physical needs  -  Identify cognitive and physical deficits and behaviors that affect risk of falls    -  [place] fall precautions as indicated by assessment   - Educate patient/family on patient safety including physical limitations  - Instruct patient to call for assistance with activity based on assessment  - Modify environment to reduce risk of injury  - Consider OT/PT consult to assist with strengthening/mobility  Outcome: Progressing     Problem: PAIN - ADULT  Goal: Verbalizes/displays adequate comfort level or baseline comfort level  Description  Interventions:  - Encourage patient to monitor pain and request assistance  - Assess pain using appropriate pain scale  - Administer analgesics based on type and severity of pain and evaluate response  - Implement non-pharmacological measures as appropriate and evaluate response  - Consider cultural and social influences on pain and pain management  - Notify physician/advanced practitioner if interventions unsuccessful or patient reports new pain  Outcome: Progressing     Problem: INFECTION - ADULT  Goal: Absence or prevention of progression during hospitalization  Description  INTERVENTIONS:  - Assess and monitor for signs and symptoms of infection  - Monitor lab/diagnostic results  - Monitor all insertion sites, i e  indwelling lines, tubes, and drains  - Monitor endotracheal if appropriate and nasal secretions for changes in amount and color  - [place] appropriate cooling/warming therapies per order  - Administer medications as ordered  - Instruct and encourage patient and family to use good hand hygiene technique  - Identify and instruct in appropriate isolation precautions for identified infection/condition  Outcome: Progressing  Goal: Absence of fever/infection during neutropenic period  Description  INTERVENTIONS:  - Monitor WBC    Outcome: Progressing     Problem: SAFETY ADULT  Goal: Maintain or return to baseline ADL function  Description  INTERVENTIONS:  -  Assess patient's ability to carry out ADLs; assess patient's baseline for ADL function and identify physical deficits which impact ability to perform ADLs (bathing, care of mouth/teeth, toileting, grooming, dressing, etc )  - Assess/evaluate cause of self-care deficits   - Assess range of motion  - Assess patient's mobility; develop plan if impaired  - Assess patient's need for assistive devices and provide as appropriate  - Encourage maximum independence but intervene and supervise when necessary  - Involve family in performance of ADLs  - Assess for home care needs following discharge   - Consider OT consult to assist with ADL evaluation and planning for discharge  - Provide patient education as appropriate  Outcome: Progressing  Goal: Maintain or return mobility status to optimal level  Description  INTERVENTIONS:  - Assess patient's baseline mobility status (ambulation, transfers, stairs, etc )    - Identify cognitive and physical deficits and behaviors that affect mobility  - Identify mobility aids required to assist with transfers and/or ambulation (gait belt, sit-to-stand, lift, walker, cane, etc )  - [place] fall precautions as indicated by assessment  - Record patient progress and toleration of activity level on Mobility SBAR; progress patient to next Phase/Stage  - Instruct patient to call for assistance with activity based on assessment  - Consider rehabilitation consult to assist with strengthening/weightbearing, etc   Outcome: Progressing     Problem: DISCHARGE PLANNING  Goal: Discharge to home or other facility with appropriate resources  Description  INTERVENTIONS:  - Identify barriers to discharge w/patient and caregiver  - Arrange for needed discharge resources and transportation as appropriate  - Identify discharge learning needs (meds, wound care, etc )  - Arrange for interpretive services to assist at discharge as needed  - Refer to Case Management Department for coordinating discharge planning if the patient needs post-hospital services based on physician/advanced practitioner order or complex needs related to functional status, cognitive ability, or social support system  Outcome: Progressing     Problem: Knowledge Deficit  Goal: Patient/family/caregiver demonstrates understanding of disease process, treatment plan, medications, and discharge instructions  Description  Complete learning assessment and assess knowledge base  Interventions:  - Provide teaching at level of understanding  - Provide teaching via preferred learning methods  Outcome: Progressing     Problem: Prexisting or High Potential for Compromised Skin Integrity  Goal: Skin integrity is maintained or improved  Description  INTERVENTIONS:  - Identify patients at risk for skin breakdown  - Assess and monitor skin integrity  - Assess and monitor nutrition and hydration status  - Monitor labs   - Assess for incontinence   - Turn and reposition patient  - Assist with mobility/ambulation  - Relieve pressure over bony prominences  - Avoid friction and shearing  - Provide appropriate hygiene as needed including keeping skin clean and dry  - Evaluate need for skin moisturizer/barrier cream  - Collaborate with interdisciplinary team   - Patient/family teaching  - Consider wound care consult   Outcome: Progressing     Problem: Nutrition/Hydration-ADULT  Goal: Nutrient/Hydration intake appropriate for improving, restoring or maintaining nutritional needs  Description  Monitor and assess patient's nutrition/hydration status for malnutrition  Collaborate with interdisciplinary team and initiate plan and interventions as ordered  Monitor patient's weight and dietary intake as ordered or per policy  Utilize nutrition screening tool and intervene as necessary   Determine patient's food preferences and provide high-protein, high-caloric foods as appropriate       INTERVENTIONS:  - Monitor oral intake, urinary output, labs, and treatment plans  - Assess nutrition and hydration status and recommend course of action  - Evaluate amount of meals eaten  - Assist patient with eating if necessary   - Allow adequate time for meals  - Recommend/ encourage appropriate diets, oral nutritional supplements, and vitamin/mineral supplements  - Order, calculate, and assess calorie counts as needed  - Recommend, monitor, and adjust tube feedings and TPN/PPN based on assessed needs  - Assess need for intravenous fluids  - Provide specific nutrition/hydration education as appropriate  - Include patient/family/caregiver in decisions related to nutrition  Outcome: Progressing

## 2020-06-04 NOTE — ASSESSMENT & PLAN NOTE
Controlled lisinopril and Bystolic 5 mg and indapamide 1 25 mg-patient is able to take p o  Continue lisinopril and Bystolic but I will hold Lozol secondary to hypokalemia still

## 2020-06-04 NOTE — ASSESSMENT & PLAN NOTE
Patient was diagnosed with breast cancer in 2015, brain metastases in 2020 in a PET scan  Patient is currently undergoing chemotherapy, follows up with Dr Raymundo Haddad outpatient she had a last pill 9 days ago- discussed with dr ariza to hold tykerb until she sees him  As having gi side effects

## 2020-06-04 NOTE — ASSESSMENT & PLAN NOTE
Patient presented to ER with complaint of intractable nausea and vomiting that is ongoing for 9 days since she started Tykerb states she had 9 days ago  Patient was in the ER 2 days ago, initially symptoms were improved with Zofran, and Reglan  However she returns again today because symptoms did not improve in last 2 days and she has intractable nausea and vomiting  Patient had a CT scan of abdomen and pelvis done in the ER 2 days ago which showed no acute abnormal finding  Patient's nausea has resolved she has been eating little by little  Patient's appetite has not been good any way for a while and she is not a big eater she states  Advanced her diet  Fluids have been discontinued I discussed with her oncologist will hold her chemotherapy medication when she is discharged till he evaluates her as an outpatient she does have GI symptoms will check her EKG today as she has been on Zofran for the QTC

## 2020-06-05 VITALS
HEART RATE: 84 BPM | TEMPERATURE: 98.3 F | DIASTOLIC BLOOD PRESSURE: 77 MMHG | WEIGHT: 148.81 LBS | RESPIRATION RATE: 18 BRPM | BODY MASS INDEX: 26.37 KG/M2 | SYSTOLIC BLOOD PRESSURE: 140 MMHG | HEIGHT: 63 IN | OXYGEN SATURATION: 96 %

## 2020-06-05 PROBLEM — E43 SEVERE PROTEIN-CALORIE MALNUTRITION (GOMEZ: LESS THAN 60% OF STANDARD WEIGHT) (HCC): Status: ACTIVE | Noted: 2020-06-05

## 2020-06-05 PROBLEM — E87.6 HYPOKALEMIA: Status: ACTIVE | Noted: 2020-06-05

## 2020-06-05 LAB
ANION GAP SERPL CALCULATED.3IONS-SCNC: 8 MMOL/L (ref 5–14)
ATRIAL RATE: 83 BPM
BACTERIA UR CULT: ABNORMAL
BUN SERPL-MCNC: 9 MG/DL (ref 5–25)
CALCIUM SERPL-MCNC: 8.6 MG/DL (ref 8.4–10.2)
CHLORIDE SERPL-SCNC: 103 MMOL/L (ref 97–108)
CO2 SERPL-SCNC: 24 MMOL/L (ref 22–30)
CREAT SERPL-MCNC: 0.84 MG/DL (ref 0.6–1.2)
GFR SERPL CREATININE-BSD FRML MDRD: 69 ML/MIN/1.73SQ M
GLUCOSE SERPL-MCNC: 75 MG/DL (ref 70–99)
P AXIS: 7 DEGREES
POTASSIUM SERPL-SCNC: 3.3 MMOL/L (ref 3.6–5)
PR INTERVAL: 140 MS
QRS AXIS: 5 DEGREES
QRSD INTERVAL: 74 MS
QT INTERVAL: 396 MS
QTC INTERVAL: 465 MS
SODIUM SERPL-SCNC: 135 MMOL/L (ref 137–147)
T WAVE AXIS: 20 DEGREES
VENTRICULAR RATE: 83 BPM

## 2020-06-05 PROCEDURE — 80048 BASIC METABOLIC PNL TOTAL CA: CPT | Performed by: FAMILY MEDICINE

## 2020-06-05 PROCEDURE — 99239 HOSP IP/OBS DSCHRG MGMT >30: CPT | Performed by: FAMILY MEDICINE

## 2020-06-05 PROCEDURE — 93010 ELECTROCARDIOGRAM REPORT: CPT | Performed by: INTERNAL MEDICINE

## 2020-06-05 RX ORDER — MEGESTROL ACETATE 40 MG/ML
400 SUSPENSION ORAL DAILY
Qty: 600 ML | Refills: 0 | Status: SHIPPED | OUTPATIENT
Start: 2020-06-06 | End: 2022-03-08 | Stop reason: HOSPADM

## 2020-06-05 RX ORDER — POTASSIUM CHLORIDE 20 MEQ/1
20 TABLET, EXTENDED RELEASE ORAL DAILY
Status: DISCONTINUED | OUTPATIENT
Start: 2020-06-05 | End: 2020-06-05 | Stop reason: HOSPADM

## 2020-06-05 RX ORDER — ONDANSETRON 4 MG/1
4 TABLET, FILM COATED ORAL EVERY 6 HOURS
Qty: 20 TABLET | Refills: 0 | Status: SHIPPED | OUTPATIENT
Start: 2020-06-05 | End: 2020-09-11 | Stop reason: ALTCHOICE

## 2020-06-05 RX ORDER — CIPROFLOXACIN 500 MG/1
500 TABLET, FILM COATED ORAL EVERY 12 HOURS SCHEDULED
Qty: 14 TABLET | Refills: 0 | Status: SHIPPED | OUTPATIENT
Start: 2020-06-05 | End: 2020-06-12

## 2020-06-05 RX ORDER — POTASSIUM CHLORIDE 1.5 G/1.77G
20 POWDER, FOR SOLUTION ORAL DAILY
Qty: 30 PACKET | Refills: 0 | Status: SHIPPED | OUTPATIENT
Start: 2020-06-05 | End: 2020-09-11 | Stop reason: ALTCHOICE

## 2020-06-05 RX ORDER — ESOMEPRAZOLE MAGNESIUM 40 MG/1
40 CAPSULE, DELAYED RELEASE ORAL DAILY
Qty: 90 CAPSULE | Refills: 0 | Status: SHIPPED | OUTPATIENT
Start: 2020-06-05 | End: 2020-07-22

## 2020-06-05 RX ORDER — SCOLOPAMINE TRANSDERMAL SYSTEM 1 MG/1
1 PATCH, EXTENDED RELEASE TRANSDERMAL
Qty: 10 PATCH | Refills: 0 | Status: SHIPPED | OUTPATIENT
Start: 2020-06-07 | End: 2020-09-11 | Stop reason: ALTCHOICE

## 2020-06-05 RX ORDER — MEGESTROL ACETATE 40 MG/ML
400 SUSPENSION ORAL DAILY
Status: DISCONTINUED | OUTPATIENT
Start: 2020-06-05 | End: 2020-06-05 | Stop reason: HOSPADM

## 2020-06-05 RX ADMIN — ONDANSETRON 4 MG: 2 INJECTION INTRAMUSCULAR; INTRAVENOUS at 08:03

## 2020-06-05 RX ADMIN — ESCITALOPRAM OXALATE 5 MG: 5 TABLET, FILM COATED ORAL at 08:02

## 2020-06-05 RX ADMIN — POTASSIUM CHLORIDE 20 MEQ: 20 TABLET, EXTENDED RELEASE ORAL at 08:00

## 2020-06-05 RX ADMIN — ONDANSETRON 4 MG: 2 INJECTION INTRAMUSCULAR; INTRAVENOUS at 11:47

## 2020-06-05 RX ADMIN — ANASTROZOLE 1 MG: 1 TABLET ORAL at 08:00

## 2020-06-05 RX ADMIN — ENOXAPARIN SODIUM 40 MG: 100 INJECTION SUBCUTANEOUS at 08:01

## 2020-06-05 RX ADMIN — LISINOPRIL 10 MG: 10 TABLET ORAL at 08:02

## 2020-06-05 RX ADMIN — ERTAPENEM SODIUM 1000 MG: 1 INJECTION, POWDER, LYOPHILIZED, FOR SOLUTION INTRAMUSCULAR; INTRAVENOUS at 08:01

## 2020-06-05 RX ADMIN — NEBIVOLOL HYDROCHLORIDE 5 MG: 5 TABLET ORAL at 08:00

## 2020-06-05 RX ADMIN — MEGESTROL ACETATE 400 MG: 40 SUSPENSION ORAL at 08:06

## 2020-06-05 NOTE — ASSESSMENT & PLAN NOTE
Patient was diagnosed with breast cancer in 2015, brain metastases in 2020 in a PET scan  Patient is currently undergoing chemotherapy, follows up with Dr Stef Pedro outpatient she had a last pill 9 days ago- discussed with dr ariza to hold tykerb until she sees him  As having gi side effects

## 2020-06-05 NOTE — ASSESSMENT & PLAN NOTE
· I will replace it today and also place on KCl 20 mEq p o  Daily this is secondary to being on Lozol and adequate intake  Repeat a BMP in 1 week

## 2020-06-05 NOTE — PLAN OF CARE
Problem: Potential for Falls  Goal: Patient will remain free of falls  Description  INTERVENTIONS:  - Assess patient frequently for physical needs  -  Identify cognitive and physical deficits and behaviors that affect risk of falls    -  [place] fall precautions as indicated by assessment   - Educate patient/family on patient safety including physical limitations  - Instruct patient to call for assistance with activity based on assessment  - Modify environment to reduce risk of injury  - Consider OT/PT consult to assist with strengthening/mobility  Outcome: Progressing     Problem: PAIN - ADULT  Goal: Verbalizes/displays adequate comfort level or baseline comfort level  Description  Interventions:  - Encourage patient to monitor pain and request assistance  - Assess pain using appropriate pain scale  - Administer analgesics based on type and severity of pain and evaluate response  - Implement non-pharmacological measures as appropriate and evaluate response  - Consider cultural and social influences on pain and pain management  - Notify physician/advanced practitioner if interventions unsuccessful or patient reports new pain  Outcome: Progressing     Problem: INFECTION - ADULT  Goal: Absence or prevention of progression during hospitalization  Description  INTERVENTIONS:  - Assess and monitor for signs and symptoms of infection  - Monitor lab/diagnostic results  - Monitor all insertion sites, i e  indwelling lines, tubes, and drains  - Monitor endotracheal if appropriate and nasal secretions for changes in amount and color  - [place] appropriate cooling/warming therapies per order  - Administer medications as ordered  - Instruct and encourage patient and family to use good hand hygiene technique  - Identify and instruct in appropriate isolation precautions for identified infection/condition  Outcome: Progressing  Goal: Absence of fever/infection during neutropenic period  Description  INTERVENTIONS:  - Monitor WBC    Outcome: Progressing     Problem: SAFETY ADULT  Goal: Maintain or return to baseline ADL function  Description  INTERVENTIONS:  -  Assess patient's ability to carry out ADLs; assess patient's baseline for ADL function and identify physical deficits which impact ability to perform ADLs (bathing, care of mouth/teeth, toileting, grooming, dressing, etc )  - Assess/evaluate cause of self-care deficits   - Assess range of motion  - Assess patient's mobility; develop plan if impaired  - Assess patient's need for assistive devices and provide as appropriate  - Encourage maximum independence but intervene and supervise when necessary  - Involve family in performance of ADLs  - Assess for home care needs following discharge   - Consider OT consult to assist with ADL evaluation and planning for discharge  - Provide patient education as appropriate  Outcome: Progressing  Goal: Maintain or return mobility status to optimal level  Description  INTERVENTIONS:  - Assess patient's baseline mobility status (ambulation, transfers, stairs, etc )    - Identify cognitive and physical deficits and behaviors that affect mobility  - Identify mobility aids required to assist with transfers and/or ambulation (gait belt, sit-to-stand, lift, walker, cane, etc )  - [place] fall precautions as indicated by assessment  - Record patient progress and toleration of activity level on Mobility SBAR; progress patient to next Phase/Stage  - Instruct patient to call for assistance with activity based on assessment  - Consider rehabilitation consult to assist with strengthening/weightbearing, etc   Outcome: Progressing     Problem: DISCHARGE PLANNING  Goal: Discharge to home or other facility with appropriate resources  Description  INTERVENTIONS:  - Identify barriers to discharge w/patient and caregiver  - Arrange for needed discharge resources and transportation as appropriate  - Identify discharge learning needs (meds, wound care, etc )  - Arrange for interpretive services to assist at discharge as needed  - Refer to Case Management Department for coordinating discharge planning if the patient needs post-hospital services based on physician/advanced practitioner order or complex needs related to functional status, cognitive ability, or social support system  Outcome: Progressing     Problem: Knowledge Deficit  Goal: Patient/family/caregiver demonstrates understanding of disease process, treatment plan, medications, and discharge instructions  Description  Complete learning assessment and assess knowledge base  Interventions:  - Provide teaching at level of understanding  - Provide teaching via preferred learning methods  Outcome: Progressing     Problem: Prexisting or High Potential for Compromised Skin Integrity  Goal: Skin integrity is maintained or improved  Description  INTERVENTIONS:  - Identify patients at risk for skin breakdown  - Assess and monitor skin integrity  - Assess and monitor nutrition and hydration status  - Monitor labs   - Assess for incontinence   - Turn and reposition patient  - Assist with mobility/ambulation  - Relieve pressure over bony prominences  - Avoid friction and shearing  - Provide appropriate hygiene as needed including keeping skin clean and dry  - Evaluate need for skin moisturizer/barrier cream  - Collaborate with interdisciplinary team   - Patient/family teaching  - Consider wound care consult   Outcome: Progressing     Problem: Nutrition/Hydration-ADULT  Goal: Nutrient/Hydration intake appropriate for improving, restoring or maintaining nutritional needs  Description  Monitor and assess patient's nutrition/hydration status for malnutrition  Collaborate with interdisciplinary team and initiate plan and interventions as ordered  Monitor patient's weight and dietary intake as ordered or per policy  Utilize nutrition screening tool and intervene as necessary   Determine patient's food preferences and provide high-protein, high-caloric foods as appropriate       INTERVENTIONS:  - Monitor oral intake, urinary output, labs, and treatment plans  - Assess nutrition and hydration status and recommend course of action  - Evaluate amount of meals eaten  - Assist patient with eating if necessary   - Allow adequate time for meals  - Recommend/ encourage appropriate diets, oral nutritional supplements, and vitamin/mineral supplements  - Order, calculate, and assess calorie counts as needed  - Recommend, monitor, and adjust tube feedings and TPN/PPN based on assessed needs  - Assess need for intravenous fluids  - Provide specific nutrition/hydration education as appropriate  - Include patient/family/caregiver in decisions related to nutrition  Outcome: Progressing

## 2020-06-05 NOTE — ASSESSMENT & PLAN NOTE
Malnutrition Findings:   Malnutrition type: Chronic illness  Degree of Malnutrition: Malnutrition of moderate degree    BMI Findings: Body mass index is 26 36 kg/m²  in the setting of malignancy and persistent nausea and vomiting, as evidenced by 11 7% wt loss in 3 months, inadequate intake meeting <50% of nutrient needs, temporal wasting, fat loss, muscle loss, requiring dietician evaluation and treatment with Esure Enlive TID

## 2020-06-05 NOTE — PLAN OF CARE
Problem: Potential for Falls  Goal: Patient will remain free of falls  Description  INTERVENTIONS:  - Assess patient frequently for physical needs  -  Identify cognitive and physical deficits and behaviors that affect risk of falls    -  [place] fall precautions as indicated by assessment   - Educate patient/family on patient safety including physical limitations  - Instruct patient to call for assistance with activity based on assessment  - Modify environment to reduce risk of injury  - Consider OT/PT consult to assist with strengthening/mobility  6/5/2020 1401 by Morris Lane RN  Outcome: Adequate for Discharge  6/5/2020 0922 by Morris Lane RN  Outcome: Progressing     Problem: PAIN - ADULT  Goal: Verbalizes/displays adequate comfort level or baseline comfort level  Description  Interventions:  - Encourage patient to monitor pain and request assistance  - Assess pain using appropriate pain scale  - Administer analgesics based on type and severity of pain and evaluate response  - Implement non-pharmacological measures as appropriate and evaluate response  - Consider cultural and social influences on pain and pain management  - Notify physician/advanced practitioner if interventions unsuccessful or patient reports new pain  6/5/2020 1401 by Morris Lane RN  Outcome: Adequate for Discharge  6/5/2020 0554 by Morris Lane RN  Outcome: Progressing     Problem: INFECTION - ADULT  Goal: Absence or prevention of progression during hospitalization  Description  INTERVENTIONS:  - Assess and monitor for signs and symptoms of infection  - Monitor lab/diagnostic results  - Monitor all insertion sites, i e  indwelling lines, tubes, and drains  - Monitor endotracheal if appropriate and nasal secretions for changes in amount and color  - [place] appropriate cooling/warming therapies per order  - Administer medications as ordered  - Instruct and encourage patient and family to use good hand hygiene technique  - Identify and instruct in appropriate isolation precautions for identified infection/condition  6/5/2020 1401 by Damien Metcalf RN  Outcome: Adequate for Discharge  6/5/2020 7199 by Damien Metcalf RN  Outcome: Progressing  Goal: Absence of fever/infection during neutropenic period  Description  INTERVENTIONS:  - Monitor WBC    6/5/2020 1401 by Damien Metcalf RN  Outcome: Adequate for Discharge  6/5/2020 0922 by Damien Metcalf RN  Outcome: Progressing     Problem: SAFETY ADULT  Goal: Maintain or return to baseline ADL function  Description  INTERVENTIONS:  -  Assess patient's ability to carry out ADLs; assess patient's baseline for ADL function and identify physical deficits which impact ability to perform ADLs (bathing, care of mouth/teeth, toileting, grooming, dressing, etc )  - Assess/evaluate cause of self-care deficits   - Assess range of motion  - Assess patient's mobility; develop plan if impaired  - Assess patient's need for assistive devices and provide as appropriate  - Encourage maximum independence but intervene and supervise when necessary  - Involve family in performance of ADLs  - Assess for home care needs following discharge   - Consider OT consult to assist with ADL evaluation and planning for discharge  - Provide patient education as appropriate  6/5/2020 1401 by Damien Metcalf RN  Outcome: Adequate for Discharge  6/5/2020 3170 by Damien Metcalf RN  Outcome: Progressing  Goal: Maintain or return mobility status to optimal level  Description  INTERVENTIONS:  - Assess patient's baseline mobility status (ambulation, transfers, stairs, etc )    - Identify cognitive and physical deficits and behaviors that affect mobility  - Identify mobility aids required to assist with transfers and/or ambulation (gait belt, sit-to-stand, lift, walker, cane, etc )  - [place] fall precautions as indicated by assessment  - Record patient progress and toleration of activity level on Mobility SBAR; progress patient to next Phase/Stage  - Instruct patient to call for assistance with activity based on assessment  - Consider rehabilitation consult to assist with strengthening/weightbearing, etc   6/5/2020 1401 by Sandy Ferreira RN  Outcome: Adequate for Discharge  6/5/2020 0922 by Sandy Ferreira RN  Outcome: Progressing     Problem: DISCHARGE PLANNING  Goal: Discharge to home or other facility with appropriate resources  Description  INTERVENTIONS:  - Identify barriers to discharge w/patient and caregiver  - Arrange for needed discharge resources and transportation as appropriate  - Identify discharge learning needs (meds, wound care, etc )  - Arrange for interpretive services to assist at discharge as needed  - Refer to Case Management Department for coordinating discharge planning if the patient needs post-hospital services based on physician/advanced practitioner order or complex needs related to functional status, cognitive ability, or social support system  6/5/2020 1401 by Sandy Ferreira RN  Outcome: Adequate for Discharge  6/5/2020 4420 by Sandy Ferreira RN  Outcome: Progressing     Problem: Knowledge Deficit  Goal: Patient/family/caregiver demonstrates understanding of disease process, treatment plan, medications, and discharge instructions  Description  Complete learning assessment and assess knowledge base    Interventions:  - Provide teaching at level of understanding  - Provide teaching via preferred learning methods  6/5/2020 1401 by Sandy Ferreira RN  Outcome: Adequate for Discharge  6/5/2020 1821 by Sandy Ferreira RN  Outcome: Progressing     Problem: Prexisting or High Potential for Compromised Skin Integrity  Goal: Skin integrity is maintained or improved  Description  INTERVENTIONS:  - Identify patients at risk for skin breakdown  - Assess and monitor skin integrity  - Assess and monitor nutrition and hydration status  - Monitor labs   - Assess for incontinence   - Turn and reposition patient  - Assist with mobility/ambulation  - Relieve pressure over bony prominences  - Avoid friction and shearing  - Provide appropriate hygiene as needed including keeping skin clean and dry  - Evaluate need for skin moisturizer/barrier cream  - Collaborate with interdisciplinary team   - Patient/family teaching  - Consider wound care consult   6/5/2020 1401 by Genny Tierney RN  Outcome: Adequate for Discharge  6/5/2020 8821 by Genny Tierney RN  Outcome: Progressing     Problem: Nutrition/Hydration-ADULT  Goal: Nutrient/Hydration intake appropriate for improving, restoring or maintaining nutritional needs  Description  Monitor and assess patient's nutrition/hydration status for malnutrition  Collaborate with interdisciplinary team and initiate plan and interventions as ordered  Monitor patient's weight and dietary intake as ordered or per policy  Utilize nutrition screening tool and intervene as necessary  Determine patient's food preferences and provide high-protein, high-caloric foods as appropriate       INTERVENTIONS:  - Monitor oral intake, urinary output, labs, and treatment plans  - Assess nutrition and hydration status and recommend course of action  - Evaluate amount of meals eaten  - Assist patient with eating if necessary   - Allow adequate time for meals  - Recommend/ encourage appropriate diets, oral nutritional supplements, and vitamin/mineral supplements  - Order, calculate, and assess calorie counts as needed  - Recommend, monitor, and adjust tube feedings and TPN/PPN based on assessed needs  - Assess need for intravenous fluids  - Provide specific nutrition/hydration education as appropriate  - Include patient/family/caregiver in decisions related to nutrition  6/5/2020 1401 by Genny Tierney RN  Outcome: Adequate for Discharge  6/5/2020 0972 by Genny Tierney RN  Outcome: Progressing

## 2020-06-05 NOTE — ASSESSMENT & PLAN NOTE
Patient presented to ER with complaint of intractable nausea and vomiting that is ongoing for 9 days since she started Tykerb states she had 9 days ago  Patient was in the ER 2 days ago, initially symptoms were improved with Zofran, and Reglan  However she returns again today because symptoms did not improve in last 2 days and she has intractable nausea and vomiting  Patient had a CT scan of abdomen and pelvis done in the ER 2 days ago which showed no acute abnormal finding  Patient's nausea has resolved she has been eating little by little  Patient's appetite has not been good any way for a while and she is not a big eater she states  Advanced her diet  Fluids have been discontinued I discussed with her oncologist will hold her chemotherapy medication when she is discharged till he evaluates her as an outpatient she does have GI symptoms will check her EKG today as she has been on Zofran for the QTC - cc 465  She will be discharged on scopolamine nausea resolved  I did start her on Megace 400 mg p o  Daily to increase her appetite  Also given scopolamine  PPI

## 2020-06-05 NOTE — PLAN OF CARE
Pharmacy- Renal Dose Adjustment    Patient Active Problem List   Diagnosis     Chronic abdominal pain     Alpha-1-antitrypsin deficiency carrier     Arthritis of knee, right     Atherosclerosis of abdominal aorta (H)     Chronic radicular cervical pain     Constipation, chronic     Contact dermatitis     COPD, severe (H)     Decreased diffusion capacity of lung     Dyshidrotic hand dermatitis     GERD (gastroesophageal reflux disease)     Hiatal hernia     Status post balloon dilatation of esophageal stricture     History of tobacco abuse     Irritable bowel syndrome     Myalgia     Occipital neuralgia of left side     Osteopenia     Prediabetes     Psoriasis     Psoriatic arthritis (H)     Schatzki's ring of distal esophagus     Sinusitis, chronic     Vitamin D deficiency     S/P Nissen fundoplication (without gastrostomy tube) procedure     Mixed hyperlipidemia     PAD (peripheral artery disease) (H)     Vitamin B12 deficiency     Menopausal syndrome (hot flashes)     Ulcer of right cornea     Chest pain     Seasonal allergic rhinitis due to pollen     Nodule of right lung     Difficulty swallowing solids     Dental abscess        Relevant Labs:  Recent Labs   Lab Test 12/03/19  0538 12/02/19  1650   WBC 15.0* 17.6*   HGB 9.7* 11.5*   * 706*        CrCl: 76 ml/min      Intake/Output Summary (Last 24 hours) at 12/3/2019 0909  Last data filed at 12/3/2019 0902  Gross per 24 hour   Intake 2568 ml   Output 50 ml   Net 2518 ml          Estimated Cr cl is greater than 50 ml/min, Renal Dose Adjustment Protocol, will adjust to 1 g IVPB every 8 hours. (using sepsis dosing, to be aggressive, but no in CNS).    Will continue to follow and make adjustments accordingly. Thank You.    [facility] ....................  12/3/2019   9:09 AM     Problem: Potential for Falls  Goal: Patient will remain free of falls  Description  INTERVENTIONS:  - Assess patient frequently for physical needs  -  Identify cognitive and physical deficits and behaviors that affect risk of falls    -  [place] fall precautions as indicated by assessment   - Educate patient/family on patient safety including physical limitations  - Instruct patient to call for assistance with activity based on assessment  - Modify environment to reduce risk of injury  - Consider OT/PT consult to assist with strengthening/mobility  Outcome: Progressing     Problem: PAIN - ADULT  Goal: Verbalizes/displays adequate comfort level or baseline comfort level  Description  Interventions:  - Encourage patient to monitor pain and request assistance  - Assess pain using appropriate pain scale  - Administer analgesics based on type and severity of pain and evaluate response  - Implement non-pharmacological measures as appropriate and evaluate response  - Consider cultural and social influences on pain and pain management  - Notify physician/advanced practitioner if interventions unsuccessful or patient reports new pain  Outcome: Progressing     Problem: INFECTION - ADULT  Goal: Absence or prevention of progression during hospitalization  Description  INTERVENTIONS:  - Assess and monitor for signs and symptoms of infection  - Monitor lab/diagnostic results  - Monitor all insertion sites, i e  indwelling lines, tubes, and drains  - Monitor endotracheal if appropriate and nasal secretions for changes in amount and color  - [place] appropriate cooling/warming therapies per order  - Administer medications as ordered  - Instruct and encourage patient and family to use good hand hygiene technique  - Identify and instruct in appropriate isolation precautions for identified infection/condition  Outcome: Progressing  Goal: Absence of fever/infection during neutropenic period  Description  INTERVENTIONS:  - Monitor WBC    Outcome: Progressing     Problem: SAFETY ADULT  Goal: Maintain or return to baseline ADL function  Description  INTERVENTIONS:  -  Assess patient's ability to carry out ADLs; assess patient's baseline for ADL function and identify physical deficits which impact ability to perform ADLs (bathing, care of mouth/teeth, toileting, grooming, dressing, etc )  - Assess/evaluate cause of self-care deficits   - Assess range of motion  - Assess patient's mobility; develop plan if impaired  - Assess patient's need for assistive devices and provide as appropriate  - Encourage maximum independence but intervene and supervise when necessary  - Involve family in performance of ADLs  - Assess for home care needs following discharge   - Consider OT consult to assist with ADL evaluation and planning for discharge  - Provide patient education as appropriate  Outcome: Progressing  Goal: Maintain or return mobility status to optimal level  Description  INTERVENTIONS:  - Assess patient's baseline mobility status (ambulation, transfers, stairs, etc )    - Identify cognitive and physical deficits and behaviors that affect mobility  - Identify mobility aids required to assist with transfers and/or ambulation (gait belt, sit-to-stand, lift, walker, cane, etc )  - [place] fall precautions as indicated by assessment  - Record patient progress and toleration of activity level on Mobility SBAR; progress patient to next Phase/Stage  - Instruct patient to call for assistance with activity based on assessment  - Consider rehabilitation consult to assist with strengthening/weightbearing, etc   Outcome: Progressing     Problem: DISCHARGE PLANNING  Goal: Discharge to home or other facility with appropriate resources  Description  INTERVENTIONS:  - Identify barriers to discharge w/patient and caregiver  - Arrange for needed discharge resources and transportation as appropriate  - Identify discharge learning needs (meds, wound care, etc )  - Arrange for interpretive services to assist at discharge as needed  - Refer to Case Management Department for coordinating discharge planning if the patient needs post-hospital services based on physician/advanced practitioner order or complex needs related to functional status, cognitive ability, or social support system  Outcome: Progressing     Problem: Knowledge Deficit  Goal: Patient/family/caregiver demonstrates understanding of disease process, treatment plan, medications, and discharge instructions  Description  Complete learning assessment and assess knowledge base  Interventions:  - Provide teaching at level of understanding  - Provide teaching via preferred learning methods  Outcome: Progressing     Problem: Prexisting or High Potential for Compromised Skin Integrity  Goal: Skin integrity is maintained or improved  Description  INTERVENTIONS:  - Identify patients at risk for skin breakdown  - Assess and monitor skin integrity  - Assess and monitor nutrition and hydration status  - Monitor labs   - Assess for incontinence   - Turn and reposition patient  - Assist with mobility/ambulation  - Relieve pressure over bony prominences  - Avoid friction and shearing  - Provide appropriate hygiene as needed including keeping skin clean and dry  - Evaluate need for skin moisturizer/barrier cream  - Collaborate with interdisciplinary team   - Patient/family teaching  - Consider wound care consult   Outcome: Progressing     Problem: Nutrition/Hydration-ADULT  Goal: Nutrient/Hydration intake appropriate for improving, restoring or maintaining nutritional needs  Description  Monitor and assess patient's nutrition/hydration status for malnutrition  Collaborate with interdisciplinary team and initiate plan and interventions as ordered  Monitor patient's weight and dietary intake as ordered or per policy  Utilize nutrition screening tool and intervene as necessary   Determine patient's food preferences and provide high-protein, high-caloric foods as appropriate       INTERVENTIONS:  - Monitor oral intake, urinary output, labs, and treatment plans  - Assess nutrition and hydration status and recommend course of action  - Evaluate amount of meals eaten  - Assist patient with eating if necessary   - Allow adequate time for meals  - Recommend/ encourage appropriate diets, oral nutritional supplements, and vitamin/mineral supplements  - Order, calculate, and assess calorie counts as needed  - Recommend, monitor, and adjust tube feedings and TPN/PPN based on assessed needs  - Assess need for intravenous fluids  - Provide specific nutrition/hydration education as appropriate  - Include patient/family/caregiver in decisions related to nutrition  Outcome: Progressing

## 2020-06-05 NOTE — ASSESSMENT & PLAN NOTE
· Urine cultures back  Enterobacter cloaca complex susceptible to ciprofloxacin  Will DC on Cipro 500 mg p o  Q 12 hours time 7 days    · POA

## 2020-06-05 NOTE — ASSESSMENT & PLAN NOTE
Currently patient is not fluid overloaded  Last echocardiogram was April 8676: Systolic function was moderately reduced  Ejection fraction was estimated to be 35 %     Bystolic and lisinopril  RESTART LOZOL  Daily weight

## 2020-06-05 NOTE — DISCHARGE SUMMARY
Discharge- Gerald Hernandez 1945, 77 y o  female MRN: [id number]    Unit/Bed#: 7T [facility] 702-02 Encounter: [id number]    Primary Care Provider: Tsering Zambrano MD   Date and time admitted to hospital: 6/1/2020 11:19 AM        Severe protein-calorie malnutrition Bernerd Hove: less than 60% of standard weight) ([facility] )  Assessment & Plan  Malnutrition Findings:   Malnutrition type: Chronic illness  Degree of Malnutrition: Malnutrition of moderate degree    BMI Findings: Body mass index is 26 36 kg/m²  in the setting of malignancy and persistent nausea and vomiting, as evidenced by 11 7% wt loss in 3 months, inadequate intake meeting <50% of nutrient needs, temporal wasting, fat loss, muscle loss, requiring dietician evaluation and treatment with Esure Enlive TID  Acute cystitis without hematuria  Assessment & Plan  · Urine cultures back  Enterobacter cloaca complex susceptible to ciprofloxacin  Will DC on Cipro 500 mg p o  Q 12 hours time 7 days  · POA    Chronic systolic heart failure ([facility] )  Assessment & Plan  Currently patient is not fluid overloaded  Last echocardiogram was April 5396: Systolic function was moderately reduced  Ejection fraction was estimated to be 35 %  Bystolic and lisinopril  RESTART LOZOL  Daily weight      Benign essential hypertension  Assessment & Plan  Controlled lisinopril and Bystolic 5 mg and indapamide 1 25 mg-patient is able to take p o  Continue lisinopril and Bystolic but I will hold Lozol secondary to hypokalemia still  Malignant neoplasm of overlapping 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, estrogen receptor positive [facility])  Assessment & Plan  Patient was diagnosed with breast cancer in 2015, brain metastases in 2020 in a PET scan  Patient is currently undergoing chemotherapy, follows up with Dr Anayeli Hoffman outpatient she had a last pill 9 days ago- discussed with dr ariza to hold tykerb until she sees him  As having gi side effects      * Chemotherapy induced nausea and vomiting  Assessment & Plan  Patient presented to ER with complaint of intractable nausea and vomiting that is ongoing for 9 days since she started Tykerb states she had 9 days ago  Patient was in the ER 2 days ago, initially symptoms were improved with Zofran, and Reglan  However she returns again today because symptoms did not improve in last 2 days and she has intractable nausea and vomiting  Patient had a CT scan of abdomen and pelvis done in the ER 2 days ago which showed no acute abnormal finding  Patient's nausea has resolved she has been eating little by little  Patient's appetite has not been good any way for a while and she is not a big eater she states  Advanced her diet  Fluids have been discontinued I discussed with her oncologist will hold her chemotherapy medication when she is discharged till he evaluates her as an outpatient she does have GI symptoms will check her EKG today as she has been on Zofran for the QTC - cc 465  She will be discharged on scopolamine nausea resolved  I did start her on Megace 400 mg p o  Daily to increase her appetite  Also given scopolamine  PPI  Discharging Physician / Practitioner: Veto Wheat MD  PCP: Mitch Monroe MD  Admission Date:   Admission Orders (From admission, onward)     Ordered        06/01/20 1208  Inpatient Admission  Once                   Discharge Date: 06/05/20    Resolved Problems  Date Reviewed: 6/5/2020    None          Consultations During Hospital Stay:  · none    Procedures Performed:   · none    Significant Findings / Test Results:   · Urine culture positive for Enterobacter cloaca complex  · CT of the abdomen and pelvis-1  No acute abnormality in the abdomen or pelvis      2  Diverticulosis coli      3   Mild peripheral reticular interstitial thickening at the lung bases, or pronounced than on the prior study though without bronchiectasis or honeycombing   Based on current literature consensus, the CT lung findings represent CT pattern indeterminant   for UIP (usual interstitial pneumonia )    Incidental Findings:   · See above     Test Results Pending at Discharge (will require follow up): · None     Outpatient Tests Requested:  · BMP    Complications:  None     Reason for Admission: chemotherapy induced nausea    Hospital Course:     Raghav Vasquez is a 77 y o  female patient who originally presented to the hospital on 6/1/2020 due to chemotherapy induced nausea and vomiting  Patient said all started after she started chemo pills  Patient chemo tabs were stopped and discussed with her oncologist Dr Sarah Barksdale she will hold them till she is reevaluated in office   Also found to have uti started on iv antibiotics and then with final urine cx was dced on cipro  Patient gi symptoms resolved with antiemetics her appetite is poor , I placed he jocelin megace  Also added potassium , she will have bmp rechecked in one week  I also proceeded to do prior authorization for scopolamine patches  Patients vitals stable she is medically cleared to be discharged home  Please see above list of diagnoses and related plan for additional information  Condition at Discharge: stable     Discharge Day Visit / Exam:     Subjective:  Patient seen and examined, denies any n/v eating more   Vitals: Blood Pressure: 140/77 (06/05/20 0712)  Pulse: 84 (06/05/20 0712)  Temperature: 98 3 °F (36 8 °C) (06/05/20 0712)  Temp Source: Temporal (06/05/20 5475)  Respirations: 18 (06/05/20 0712)  Height: 5' 3" (160 cm) (06/02/20 1448)  Weight - Scale: 67 5 kg (148 lb 13 oz) (06/05/20 0600)  SpO2: 96 % (06/05/20 0276)  Exam:   Physical Exam   Constitutional: She is oriented to person, place, and time  She appears well-developed and well-nourished  HENT:   Head: Normocephalic and atraumatic  Eyes: Pupils are equal, round, and reactive to light  EOM are normal    Neck: Normal range of motion     Cardiovascular: Normal rate, regular rhythm and normal heart sounds  Pulmonary/Chest: Effort normal and breath sounds normal    Abdominal: Soft  Bowel sounds are normal    Musculoskeletal: Normal range of motion  Neurological: She is alert and oriented to person, place, and time  She has normal reflexes  cranial nerve 2-12 are normal   Normal neurological exam   Skin: Skin is warm  Psychiatric: She has a normal mood and affect  Discussion with Family: sister     Discharge instructions/Information to patient and family:   See after visit summary for information provided to patient and family  Provisions for Follow-Up Care:  See after visit summary for information related to follow-up care and any pertinent home health orders  Disposition:     Home    For Discharges to [facility] SNF:   · Not Applicable to this Patient - Not Applicable to this Patient    Planned Readmission: no      Discharge Statement:  I spent >35 minutes discharging the patient  This time was spent on the day of discharge  I had direct contact with the patient on the day of discharge  Greater than 50% of the total time was spent examining patient, answering all patient questions, arranging and discussing plan of care with patient as well as directly providing post-discharge instructions  Additional time then spent on discharge activities  Discharge Medications:  See after visit summary for reconciled discharge medications provided to patient and family        ** Please Note: This note has been constructed using a voice recognition system **

## 2020-06-08 ENCOUNTER — TELEPHONE (OUTPATIENT)
Dept: HEMATOLOGY ONCOLOGY | Facility: CLINIC | Age: 75
End: 2020-06-08

## 2020-06-08 LAB
ATRIAL RATE: 90 BPM
P AXIS: 31 DEGREES
PR INTERVAL: 172 MS
QRS AXIS: -4 DEGREES
QRSD INTERVAL: 84 MS
QT INTERVAL: 366 MS
QTC INTERVAL: 447 MS
T WAVE AXIS: 24 DEGREES
VENTRICULAR RATE: 90 BPM

## 2020-06-08 PROCEDURE — 93010 ELECTROCARDIOGRAM REPORT: CPT | Performed by: INTERNAL MEDICINE

## 2020-06-09 ENCOUNTER — APPOINTMENT (OUTPATIENT)
Dept: LAB | Age: 75
End: 2020-06-09
Payer: MEDICARE

## 2020-06-09 ENCOUNTER — TRANSITIONAL CARE MANAGEMENT (OUTPATIENT)
Dept: FAMILY MEDICINE CLINIC | Facility: CLINIC | Age: 75
End: 2020-06-09

## 2020-06-09 ENCOUNTER — TELEPHONE (OUTPATIENT)
Dept: HEMATOLOGY ONCOLOGY | Facility: CLINIC | Age: 75
End: 2020-06-09

## 2020-06-09 DIAGNOSIS — C50.811 MALIGNANT NEOPLASM OF OVERLAPPING SITES OF RIGHT BREAST IN FEMALE, ESTROGEN RECEPTOR POSITIVE (HCC): ICD-10-CM

## 2020-06-09 DIAGNOSIS — Z17.0 MALIGNANT NEOPLASM OF OVERLAPPING SITES OF RIGHT BREAST IN FEMALE, ESTROGEN RECEPTOR POSITIVE (HCC): ICD-10-CM

## 2020-06-09 LAB
ALBUMIN SERPL BCP-MCNC: 3.4 G/DL (ref 3.5–5)
ALP SERPL-CCNC: 45 U/L (ref 46–116)
ALT SERPL W P-5'-P-CCNC: 13 U/L (ref 12–78)
ANION GAP SERPL CALCULATED.3IONS-SCNC: 7 MMOL/L (ref 4–13)
AST SERPL W P-5'-P-CCNC: 19 U/L (ref 5–45)
BASOPHILS # BLD AUTO: 0.02 THOUSANDS/ΜL (ref 0–0.1)
BASOPHILS NFR BLD AUTO: 0 % (ref 0–1)
BILIRUB SERPL-MCNC: 0.62 MG/DL (ref 0.2–1)
BUN SERPL-MCNC: 11 MG/DL (ref 5–25)
CALCIUM SERPL-MCNC: 9.3 MG/DL (ref 8.3–10.1)
CANCER AG27-29 SERPL-ACNC: 48.4 U/ML (ref 0–42.3)
CHLORIDE SERPL-SCNC: 107 MMOL/L (ref 100–108)
CO2 SERPL-SCNC: 24 MMOL/L (ref 21–32)
CREAT SERPL-MCNC: 0.92 MG/DL (ref 0.6–1.3)
EOSINOPHIL # BLD AUTO: 0.07 THOUSAND/ΜL (ref 0–0.61)
EOSINOPHIL NFR BLD AUTO: 1 % (ref 0–6)
ERYTHROCYTE [DISTWIDTH] IN BLOOD BY AUTOMATED COUNT: 14.3 % (ref 11.6–15.1)
GFR SERPL CREATININE-BSD FRML MDRD: 62 ML/MIN/1.73SQ M
GLUCOSE SERPL-MCNC: 93 MG/DL (ref 65–140)
HCT VFR BLD AUTO: 41.6 % (ref 34.8–46.1)
HGB BLD-MCNC: 13.4 G/DL (ref 11.5–15.4)
IMM GRANULOCYTES # BLD AUTO: 0.01 THOUSAND/UL (ref 0–0.2)
IMM GRANULOCYTES NFR BLD AUTO: 0 % (ref 0–2)
LYMPHOCYTES # BLD AUTO: 1.52 THOUSANDS/ΜL (ref 0.6–4.47)
LYMPHOCYTES NFR BLD AUTO: 26 % (ref 14–44)
MAGNESIUM SERPL-MCNC: 2 MG/DL (ref 1.6–2.6)
MCH RBC QN AUTO: 30.2 PG (ref 26.8–34.3)
MCHC RBC AUTO-ENTMCNC: 32.2 G/DL (ref 31.4–37.4)
MCV RBC AUTO: 94 FL (ref 82–98)
MONOCYTES # BLD AUTO: 0.44 THOUSAND/ΜL (ref 0.17–1.22)
MONOCYTES NFR BLD AUTO: 8 % (ref 4–12)
NEUTROPHILS # BLD AUTO: 3.74 THOUSANDS/ΜL (ref 1.85–7.62)
NEUTS SEG NFR BLD AUTO: 65 % (ref 43–75)
NRBC BLD AUTO-RTO: 0 /100 WBCS
PLATELET # BLD AUTO: 277 THOUSANDS/UL (ref 149–390)
PMV BLD AUTO: 10.2 FL (ref 8.9–12.7)
POTASSIUM SERPL-SCNC: 3.6 MMOL/L (ref 3.5–5.3)
PROT SERPL-MCNC: 7.5 G/DL (ref 6.4–8.2)
RBC # BLD AUTO: 4.44 MILLION/UL (ref 3.81–5.12)
SODIUM SERPL-SCNC: 138 MMOL/L (ref 136–145)
WBC # BLD AUTO: 5.8 THOUSAND/UL (ref 4.31–10.16)

## 2020-06-09 PROCEDURE — 85025 COMPLETE CBC W/AUTO DIFF WBC: CPT

## 2020-06-09 PROCEDURE — 36415 COLL VENOUS BLD VENIPUNCTURE: CPT

## 2020-06-09 PROCEDURE — 86300 IMMUNOASSAY TUMOR CA 15-3: CPT

## 2020-06-09 PROCEDURE — 80053 COMPREHEN METABOLIC PANEL: CPT

## 2020-06-09 PROCEDURE — 83735 ASSAY OF MAGNESIUM: CPT

## 2020-06-10 ENCOUNTER — HOSPITAL ENCOUNTER (OUTPATIENT)
Dept: INFUSION CENTER | Facility: HOSPITAL | Age: 75
Discharge: HOME | End: 2020-06-10
Attending: INTERNAL MEDICINE
Payer: MEDICARE

## 2020-06-10 ENCOUNTER — TELEMEDICINE (OUTPATIENT)
Dept: HEMATOLOGY ONCOLOGY | Facility: CLINIC | Age: 75
End: 2020-06-10
Payer: MEDICARE

## 2020-06-10 ENCOUNTER — TELEPHONE (OUTPATIENT)
Dept: HEMATOLOGY ONCOLOGY | Facility: CLINIC | Age: 75
End: 2020-06-10

## 2020-06-10 VITALS
RESPIRATION RATE: 20 BRPM | DIASTOLIC BLOOD PRESSURE: 75 MMHG | HEART RATE: 92 BPM | SYSTOLIC BLOOD PRESSURE: 113 MMHG | TEMPERATURE: 97.5 F

## 2020-06-10 DIAGNOSIS — C50.919 METASTATIC BREAST CANCER (HCC): ICD-10-CM

## 2020-06-10 DIAGNOSIS — T45.1X5A CHEMOTHERAPY INDUCED NAUSEA AND VOMITING: ICD-10-CM

## 2020-06-10 DIAGNOSIS — C79.31 BRAIN METASTASIS (HCC): ICD-10-CM

## 2020-06-10 DIAGNOSIS — E86.0 DEHYDRATION: Primary | ICD-10-CM

## 2020-06-10 DIAGNOSIS — C50.811 MALIGNANT NEOPLASM OF OVERLAPPING SITES OF RIGHT BREAST IN FEMALE, ESTROGEN RECEPTOR POSITIVE (HCC): Primary | ICD-10-CM

## 2020-06-10 DIAGNOSIS — Z79.811 USE OF AROMATASE INHIBITORS: ICD-10-CM

## 2020-06-10 DIAGNOSIS — R11.0 NAUSEA: ICD-10-CM

## 2020-06-10 DIAGNOSIS — Z17.0 MALIGNANT NEOPLASM OF OVERLAPPING SITES OF RIGHT BREAST IN FEMALE, ESTROGEN RECEPTOR POSITIVE (HCC): Primary | ICD-10-CM

## 2020-06-10 DIAGNOSIS — R11.2 CHEMOTHERAPY INDUCED NAUSEA AND VOMITING: ICD-10-CM

## 2020-06-10 LAB — CANCER AG15-3 SERPL-ACNC: 32.4 U/ML (ref 0–25)

## 2020-06-10 PROCEDURE — 96365 THER/PROPH/DIAG IV INF INIT: CPT

## 2020-06-10 PROCEDURE — 99214 OFFICE O/P EST MOD 30 MIN: CPT | Performed by: INTERNAL MEDICINE

## 2020-06-10 PROCEDURE — 96361 HYDRATE IV INFUSION ADD-ON: CPT

## 2020-06-10 RX ORDER — POTASSIUM CHLORIDE 20 MEQ/1
TABLET, EXTENDED RELEASE ORAL
COMMUNITY
Start: 2020-06-05 | End: 2020-09-11 | Stop reason: ALTCHOICE

## 2020-06-10 RX ORDER — PREDNISONE 20 MG/1
20 TABLET ORAL DAILY
Qty: 60 TABLET | Refills: 2 | Status: SHIPPED | OUTPATIENT
Start: 2020-06-10 | End: 2020-09-11 | Stop reason: ALTCHOICE

## 2020-06-10 RX ADMIN — SODIUM CHLORIDE 1000 ML: 0.9 INJECTION, SOLUTION INTRAVENOUS at 14:07

## 2020-06-10 RX ADMIN — ONDANSETRON 8 MG: 2 INJECTION INTRAMUSCULAR; INTRAVENOUS at 14:34

## 2020-06-11 ENCOUNTER — TELEPHONE (OUTPATIENT)
Dept: FAMILY MEDICINE CLINIC | Facility: CLINIC | Age: 75
End: 2020-06-11

## 2020-06-11 ENCOUNTER — HOSPITAL ENCOUNTER (OUTPATIENT)
Dept: INFUSION CENTER | Facility: HOSPITAL | Age: 75
Discharge: HOME | End: 2020-06-11
Attending: INTERNAL MEDICINE
Payer: MEDICARE

## 2020-06-11 ENCOUNTER — OFFICE VISIT (OUTPATIENT)
Dept: FAMILY MEDICINE CLINIC | Facility: CLINIC | Age: 75
End: 2020-06-11
Payer: MEDICARE

## 2020-06-11 VITALS
HEART RATE: 94 BPM | RESPIRATION RATE: 18 BRPM | SYSTOLIC BLOOD PRESSURE: 125 MMHG | DIASTOLIC BLOOD PRESSURE: 75 MMHG | TEMPERATURE: 97.4 F

## 2020-06-11 VITALS
DIASTOLIC BLOOD PRESSURE: 80 MMHG | SYSTOLIC BLOOD PRESSURE: 128 MMHG | RESPIRATION RATE: 16 BRPM | WEIGHT: 148 LBS | BODY MASS INDEX: 26.22 KG/M2 | TEMPERATURE: 97 F

## 2020-06-11 DIAGNOSIS — I49.3 PVC (PREMATURE VENTRICULAR CONTRACTION): ICD-10-CM

## 2020-06-11 DIAGNOSIS — N30.00 ACUTE CYSTITIS WITHOUT HEMATURIA: ICD-10-CM

## 2020-06-11 DIAGNOSIS — T45.1X5A CHEMOTHERAPY INDUCED NAUSEA AND VOMITING: Primary | ICD-10-CM

## 2020-06-11 DIAGNOSIS — Z17.0 MALIGNANT NEOPLASM OF OVERLAPPING SITES OF RIGHT BREAST IN FEMALE, ESTROGEN RECEPTOR POSITIVE (HCC): ICD-10-CM

## 2020-06-11 DIAGNOSIS — I42.8 NONISCHEMIC CARDIOMYOPATHY (HCC): Chronic | ICD-10-CM

## 2020-06-11 DIAGNOSIS — R11.2 CHEMOTHERAPY INDUCED NAUSEA AND VOMITING: ICD-10-CM

## 2020-06-11 DIAGNOSIS — T45.1X5A CHEMOTHERAPY INDUCED NAUSEA AND VOMITING: ICD-10-CM

## 2020-06-11 DIAGNOSIS — R93.1 ABNORMAL ECHOCARDIOGRAPHY: ICD-10-CM

## 2020-06-11 DIAGNOSIS — R11.2 CHEMOTHERAPY INDUCED NAUSEA AND VOMITING: Primary | ICD-10-CM

## 2020-06-11 DIAGNOSIS — I50.22 CHRONIC SYSTOLIC HEART FAILURE (HCC): ICD-10-CM

## 2020-06-11 DIAGNOSIS — E87.6 HYPOKALEMIA: ICD-10-CM

## 2020-06-11 DIAGNOSIS — E87.1 HYPONATREMIA: ICD-10-CM

## 2020-06-11 DIAGNOSIS — E43 SEVERE PROTEIN-CALORIE MALNUTRITION (GOMEZ: LESS THAN 60% OF STANDARD WEIGHT) (HCC): ICD-10-CM

## 2020-06-11 DIAGNOSIS — E86.0 DEHYDRATION: Primary | ICD-10-CM

## 2020-06-11 DIAGNOSIS — I10 BENIGN ESSENTIAL HYPERTENSION: ICD-10-CM

## 2020-06-11 DIAGNOSIS — C50.811 MALIGNANT NEOPLASM OF OVERLAPPING SITES OF RIGHT BREAST IN FEMALE, ESTROGEN RECEPTOR POSITIVE (HCC): ICD-10-CM

## 2020-06-11 PROCEDURE — 96361 HYDRATE IV INFUSION ADD-ON: CPT

## 2020-06-11 PROCEDURE — 96365 THER/PROPH/DIAG IV INF INIT: CPT

## 2020-06-11 PROCEDURE — 99496 TRANSJ CARE MGMT HIGH F2F 7D: CPT | Performed by: FAMILY MEDICINE

## 2020-06-11 RX ADMIN — SODIUM CHLORIDE 1000 ML: 0.9 INJECTION, SOLUTION INTRAVENOUS at 11:19

## 2020-06-11 RX ADMIN — ONDANSETRON HYDROCHLORIDE 8 MG: 2 INJECTION, SOLUTION INTRAMUSCULAR; INTRAVENOUS at 11:21

## 2020-06-12 ENCOUNTER — HOSPITAL ENCOUNTER (OUTPATIENT)
Dept: INFUSION CENTER | Facility: HOSPITAL | Age: 75
Discharge: HOME | End: 2020-06-12
Attending: INTERNAL MEDICINE
Payer: MEDICARE

## 2020-06-12 VITALS
DIASTOLIC BLOOD PRESSURE: 74 MMHG | HEART RATE: 100 BPM | SYSTOLIC BLOOD PRESSURE: 120 MMHG | TEMPERATURE: 97.5 F | RESPIRATION RATE: 18 BRPM

## 2020-06-12 DIAGNOSIS — R11.2 CHEMOTHERAPY INDUCED NAUSEA AND VOMITING: ICD-10-CM

## 2020-06-12 DIAGNOSIS — T45.1X5A CHEMOTHERAPY INDUCED NAUSEA AND VOMITING: ICD-10-CM

## 2020-06-12 DIAGNOSIS — E86.0 DEHYDRATION: Primary | ICD-10-CM

## 2020-06-12 PROCEDURE — 96361 HYDRATE IV INFUSION ADD-ON: CPT

## 2020-06-12 PROCEDURE — 96365 THER/PROPH/DIAG IV INF INIT: CPT

## 2020-06-12 RX ADMIN — SODIUM CHLORIDE 1000 ML: 0.9 INJECTION, SOLUTION INTRAVENOUS at 11:10

## 2020-06-12 RX ADMIN — ONDANSETRON HYDROCHLORIDE 8 MG: 2 INJECTION, SOLUTION INTRAMUSCULAR; INTRAVENOUS at 11:11

## 2020-06-15 ENCOUNTER — HOSPITAL ENCOUNTER (OUTPATIENT)
Dept: INFUSION CENTER | Facility: HOSPITAL | Age: 75
Discharge: HOME | End: 2020-06-15
Attending: INTERNAL MEDICINE
Payer: MEDICARE

## 2020-06-15 VITALS
TEMPERATURE: 96.9 F | SYSTOLIC BLOOD PRESSURE: 118 MMHG | DIASTOLIC BLOOD PRESSURE: 87 MMHG | HEART RATE: 102 BPM | RESPIRATION RATE: 20 BRPM

## 2020-06-15 DIAGNOSIS — T45.1X5A CHEMOTHERAPY INDUCED NAUSEA AND VOMITING: ICD-10-CM

## 2020-06-15 DIAGNOSIS — R11.2 CHEMOTHERAPY INDUCED NAUSEA AND VOMITING: ICD-10-CM

## 2020-06-15 DIAGNOSIS — E86.0 DEHYDRATION: Primary | ICD-10-CM

## 2020-06-15 PROCEDURE — 96365 THER/PROPH/DIAG IV INF INIT: CPT

## 2020-06-15 PROCEDURE — 96361 HYDRATE IV INFUSION ADD-ON: CPT

## 2020-06-15 RX ADMIN — SODIUM CHLORIDE 1000 ML: 0.9 INJECTION, SOLUTION INTRAVENOUS at 11:40

## 2020-06-15 RX ADMIN — ONDANSETRON 8 MG: 2 INJECTION INTRAMUSCULAR; INTRAVENOUS at 12:19

## 2020-06-16 ENCOUNTER — HOSPITAL ENCOUNTER (OUTPATIENT)
Dept: INFUSION CENTER | Facility: HOSPITAL | Age: 75
Discharge: HOME | End: 2020-06-16
Attending: INTERNAL MEDICINE

## 2020-06-17 ENCOUNTER — HOSPITAL ENCOUNTER (OUTPATIENT)
Dept: INFUSION CENTER | Facility: HOSPITAL | Age: 75
Discharge: HOME | End: 2020-06-17
Attending: INTERNAL MEDICINE
Payer: MEDICARE

## 2020-06-17 VITALS
DIASTOLIC BLOOD PRESSURE: 71 MMHG | TEMPERATURE: 97 F | RESPIRATION RATE: 16 BRPM | SYSTOLIC BLOOD PRESSURE: 124 MMHG | HEART RATE: 101 BPM

## 2020-06-17 DIAGNOSIS — R11.2 CHEMOTHERAPY INDUCED NAUSEA AND VOMITING: ICD-10-CM

## 2020-06-17 DIAGNOSIS — T45.1X5A CHEMOTHERAPY INDUCED NAUSEA AND VOMITING: ICD-10-CM

## 2020-06-17 DIAGNOSIS — E86.0 DEHYDRATION: Primary | ICD-10-CM

## 2020-06-17 PROCEDURE — 96375 TX/PRO/DX INJ NEW DRUG ADDON: CPT

## 2020-06-17 PROCEDURE — 96361 HYDRATE IV INFUSION ADD-ON: CPT

## 2020-06-17 PROCEDURE — 96365 THER/PROPH/DIAG IV INF INIT: CPT

## 2020-06-17 RX ORDER — KETOROLAC TROMETHAMINE 30 MG/ML
15 INJECTION, SOLUTION INTRAMUSCULAR; INTRAVENOUS ONCE
Status: CANCELLED
Start: 2020-06-17

## 2020-06-17 RX ORDER — KETOROLAC TROMETHAMINE 30 MG/ML
15 INJECTION, SOLUTION INTRAMUSCULAR; INTRAVENOUS ONCE
Status: CANCELLED
Start: 2020-06-18

## 2020-06-17 RX ORDER — KETOROLAC TROMETHAMINE 30 MG/ML
15 INJECTION, SOLUTION INTRAMUSCULAR; INTRAVENOUS ONCE
Status: COMPLETED | OUTPATIENT
Start: 2020-06-17 | End: 2020-06-17

## 2020-06-17 RX ADMIN — KETOROLAC TROMETHAMINE 15 MG: 30 INJECTION, SOLUTION INTRAMUSCULAR at 12:00

## 2020-06-17 RX ADMIN — ONDANSETRON HYDROCHLORIDE 8 MG: 2 INJECTION, SOLUTION INTRAMUSCULAR; INTRAVENOUS at 11:19

## 2020-06-17 RX ADMIN — SODIUM CHLORIDE 1000 ML: 9 INJECTION, SOLUTION INTRAVENOUS at 11:01

## 2020-06-18 ENCOUNTER — HOSPITAL ENCOUNTER (OUTPATIENT)
Dept: INFUSION CENTER | Facility: HOSPITAL | Age: 75
Discharge: HOME | End: 2020-06-18
Attending: INTERNAL MEDICINE
Payer: MEDICARE

## 2020-06-18 ENCOUNTER — TELEPHONE (OUTPATIENT)
Dept: HEMATOLOGY ONCOLOGY | Facility: CLINIC | Age: 75
End: 2020-06-18

## 2020-06-18 VITALS
RESPIRATION RATE: 16 BRPM | DIASTOLIC BLOOD PRESSURE: 83 MMHG | HEART RATE: 84 BPM | SYSTOLIC BLOOD PRESSURE: 132 MMHG | TEMPERATURE: 97.4 F | OXYGEN SATURATION: 99 %

## 2020-06-18 DIAGNOSIS — R11.2 CHEMOTHERAPY INDUCED NAUSEA AND VOMITING: ICD-10-CM

## 2020-06-18 DIAGNOSIS — T45.1X5A CHEMOTHERAPY INDUCED NAUSEA AND VOMITING: ICD-10-CM

## 2020-06-18 DIAGNOSIS — E86.0 DEHYDRATION: Primary | ICD-10-CM

## 2020-06-18 PROCEDURE — 96374 THER/PROPH/DIAG INJ IV PUSH: CPT

## 2020-06-18 PROCEDURE — 96361 HYDRATE IV INFUSION ADD-ON: CPT

## 2020-06-18 RX ADMIN — SODIUM CHLORIDE 1000 ML: 0.9 INJECTION, SOLUTION INTRAVENOUS at 11:12

## 2020-06-18 RX ADMIN — ONDANSETRON HYDROCHLORIDE 8 MG: 2 INJECTION, SOLUTION INTRAMUSCULAR; INTRAVENOUS at 11:16

## 2020-06-19 ENCOUNTER — HOSPITAL ENCOUNTER (OUTPATIENT)
Dept: INFUSION CENTER | Facility: HOSPITAL | Age: 75
Discharge: HOME | End: 2020-06-19
Attending: INTERNAL MEDICINE
Payer: MEDICARE

## 2020-06-19 VITALS
SYSTOLIC BLOOD PRESSURE: 129 MMHG | RESPIRATION RATE: 20 BRPM | TEMPERATURE: 96.9 F | DIASTOLIC BLOOD PRESSURE: 82 MMHG | HEART RATE: 104 BPM

## 2020-06-19 DIAGNOSIS — E86.0 DEHYDRATION: Primary | ICD-10-CM

## 2020-06-19 DIAGNOSIS — R11.2 CHEMOTHERAPY INDUCED NAUSEA AND VOMITING: ICD-10-CM

## 2020-06-19 DIAGNOSIS — T45.1X5A CHEMOTHERAPY INDUCED NAUSEA AND VOMITING: ICD-10-CM

## 2020-06-19 PROCEDURE — 96365 THER/PROPH/DIAG IV INF INIT: CPT

## 2020-06-19 PROCEDURE — 96361 HYDRATE IV INFUSION ADD-ON: CPT

## 2020-06-19 PROCEDURE — 96375 TX/PRO/DX INJ NEW DRUG ADDON: CPT

## 2020-06-19 RX ORDER — KETOROLAC TROMETHAMINE 30 MG/ML
15 INJECTION, SOLUTION INTRAMUSCULAR; INTRAVENOUS ONCE
Status: CANCELLED
Start: 2020-06-19

## 2020-06-19 RX ORDER — KETOROLAC TROMETHAMINE 30 MG/ML
15 INJECTION, SOLUTION INTRAMUSCULAR; INTRAVENOUS ONCE
Status: COMPLETED | OUTPATIENT
Start: 2020-06-19 | End: 2020-06-19

## 2020-06-19 RX ORDER — KETOROLAC TROMETHAMINE 30 MG/ML
15 INJECTION, SOLUTION INTRAMUSCULAR; INTRAVENOUS ONCE
Status: CANCELLED
Start: 2020-06-20

## 2020-06-19 RX ADMIN — KETOROLAC TROMETHAMINE 15 MG: 30 INJECTION, SOLUTION INTRAMUSCULAR; INTRAVENOUS at 11:23

## 2020-06-19 RX ADMIN — SODIUM CHLORIDE 1000 ML: 0.9 INJECTION, SOLUTION INTRAVENOUS at 10:45

## 2020-06-19 RX ADMIN — ONDANSETRON 8 MG: 2 INJECTION INTRAMUSCULAR; INTRAVENOUS at 10:58

## 2020-06-20 ENCOUNTER — APPOINTMENT (OUTPATIENT)
Dept: LAB | Facility: MEDICAL CENTER | Age: 75
End: 2020-06-20
Payer: MEDICARE

## 2020-06-20 DIAGNOSIS — C50.919 METASTATIC BREAST CANCER (HCC): ICD-10-CM

## 2020-06-20 DIAGNOSIS — E87.6 HYPOKALEMIA: ICD-10-CM

## 2020-06-20 PROCEDURE — 36415 COLL VENOUS BLD VENIPUNCTURE: CPT

## 2020-06-20 PROCEDURE — 83615 LACTATE (LD) (LDH) ENZYME: CPT

## 2020-06-20 PROCEDURE — 85025 COMPLETE CBC W/AUTO DIFF WBC: CPT

## 2020-06-20 PROCEDURE — 80053 COMPREHEN METABOLIC PANEL: CPT

## 2020-06-21 LAB
ALBUMIN SERPL BCP-MCNC: 3.5 G/DL (ref 3.5–5)
ALP SERPL-CCNC: 39 U/L (ref 46–116)
ALT SERPL W P-5'-P-CCNC: 16 U/L (ref 12–78)
ANION GAP SERPL CALCULATED.3IONS-SCNC: 9 MMOL/L (ref 4–13)
AST SERPL W P-5'-P-CCNC: 21 U/L (ref 5–45)
BASOPHILS # BLD AUTO: 0.02 THOUSANDS/ΜL (ref 0–0.1)
BASOPHILS NFR BLD AUTO: 0 % (ref 0–1)
BILIRUB SERPL-MCNC: 1.04 MG/DL (ref 0.2–1)
BUN SERPL-MCNC: 7 MG/DL (ref 5–25)
CALCIUM SERPL-MCNC: 9.6 MG/DL (ref 8.3–10.1)
CHLORIDE SERPL-SCNC: 108 MMOL/L (ref 100–108)
CO2 SERPL-SCNC: 23 MMOL/L (ref 21–32)
CREAT SERPL-MCNC: 0.9 MG/DL (ref 0.6–1.3)
EOSINOPHIL # BLD AUTO: 0.1 THOUSAND/ΜL (ref 0–0.61)
EOSINOPHIL NFR BLD AUTO: 2 % (ref 0–6)
ERYTHROCYTE [DISTWIDTH] IN BLOOD BY AUTOMATED COUNT: 15.4 % (ref 11.6–15.1)
GFR SERPL CREATININE-BSD FRML MDRD: 63 ML/MIN/1.73SQ M
GLUCOSE P FAST SERPL-MCNC: 71 MG/DL (ref 65–99)
HCT VFR BLD AUTO: 44 % (ref 34.8–46.1)
HGB BLD-MCNC: 13.5 G/DL (ref 11.5–15.4)
IMM GRANULOCYTES # BLD AUTO: 0.02 THOUSAND/UL (ref 0–0.2)
IMM GRANULOCYTES NFR BLD AUTO: 0 % (ref 0–2)
LDH SERPL-CCNC: 233 U/L (ref 81–234)
LYMPHOCYTES # BLD AUTO: 1.31 THOUSANDS/ΜL (ref 0.6–4.47)
LYMPHOCYTES NFR BLD AUTO: 22 % (ref 14–44)
MCH RBC QN AUTO: 31.1 PG (ref 26.8–34.3)
MCHC RBC AUTO-ENTMCNC: 30.7 G/DL (ref 31.4–37.4)
MCV RBC AUTO: 101 FL (ref 82–98)
MONOCYTES # BLD AUTO: 0.39 THOUSAND/ΜL (ref 0.17–1.22)
MONOCYTES NFR BLD AUTO: 7 % (ref 4–12)
NEUTROPHILS # BLD AUTO: 4.18 THOUSANDS/ΜL (ref 1.85–7.62)
NEUTS SEG NFR BLD AUTO: 69 % (ref 43–75)
NRBC BLD AUTO-RTO: 0 /100 WBCS
PLATELET # BLD AUTO: 295 THOUSANDS/UL (ref 149–390)
PMV BLD AUTO: 10.6 FL (ref 8.9–12.7)
POTASSIUM SERPL-SCNC: 3.7 MMOL/L (ref 3.5–5.3)
PROT SERPL-MCNC: 7.4 G/DL (ref 6.4–8.2)
RBC # BLD AUTO: 4.34 MILLION/UL (ref 3.81–5.12)
SODIUM SERPL-SCNC: 140 MMOL/L (ref 136–145)
WBC # BLD AUTO: 6.02 THOUSAND/UL (ref 4.31–10.16)

## 2020-06-22 ENCOUNTER — TELEMEDICINE (OUTPATIENT)
Dept: HEMATOLOGY ONCOLOGY | Facility: CLINIC | Age: 75
End: 2020-06-22
Payer: MEDICARE

## 2020-06-22 ENCOUNTER — TELEPHONE (OUTPATIENT)
Dept: HEMATOLOGY ONCOLOGY | Facility: CLINIC | Age: 75
End: 2020-06-22

## 2020-06-22 DIAGNOSIS — Z17.0 MALIGNANT NEOPLASM OF OVERLAPPING SITES OF RIGHT BREAST IN FEMALE, ESTROGEN RECEPTOR POSITIVE (HCC): Primary | ICD-10-CM

## 2020-06-22 DIAGNOSIS — C50.811 MALIGNANT NEOPLASM OF OVERLAPPING SITES OF RIGHT BREAST IN FEMALE, ESTROGEN RECEPTOR POSITIVE (HCC): Primary | ICD-10-CM

## 2020-06-22 DIAGNOSIS — C79.31 BRAIN METASTASIS (HCC): ICD-10-CM

## 2020-06-22 PROCEDURE — 99215 OFFICE O/P EST HI 40 MIN: CPT | Performed by: INTERNAL MEDICINE

## 2020-06-23 ENCOUNTER — HOSPITAL ENCOUNTER (OUTPATIENT)
Dept: INFUSION CENTER | Facility: HOSPITAL | Age: 75
Discharge: HOME | End: 2020-06-23
Attending: INTERNAL MEDICINE
Payer: MEDICARE

## 2020-06-23 VITALS
TEMPERATURE: 97.6 F | RESPIRATION RATE: 20 BRPM | DIASTOLIC BLOOD PRESSURE: 86 MMHG | HEART RATE: 94 BPM | SYSTOLIC BLOOD PRESSURE: 149 MMHG

## 2020-06-23 DIAGNOSIS — E86.0 DEHYDRATION: Primary | ICD-10-CM

## 2020-06-23 DIAGNOSIS — R11.2 CHEMOTHERAPY INDUCED NAUSEA AND VOMITING: ICD-10-CM

## 2020-06-23 DIAGNOSIS — T45.1X5A CHEMOTHERAPY INDUCED NAUSEA AND VOMITING: ICD-10-CM

## 2020-06-23 PROCEDURE — 96361 HYDRATE IV INFUSION ADD-ON: CPT

## 2020-06-23 PROCEDURE — 96365 THER/PROPH/DIAG IV INF INIT: CPT

## 2020-06-23 RX ADMIN — SODIUM CHLORIDE 1000 ML: 0.9 INJECTION, SOLUTION INTRAVENOUS at 10:50

## 2020-06-23 RX ADMIN — ONDANSETRON 8 MG: 2 INJECTION INTRAMUSCULAR; INTRAVENOUS at 11:06

## 2020-06-24 ENCOUNTER — HOSPITAL ENCOUNTER (OUTPATIENT)
Dept: INFUSION CENTER | Facility: HOSPITAL | Age: 75
Discharge: HOME | End: 2020-06-24
Attending: INTERNAL MEDICINE
Payer: MEDICARE

## 2020-06-24 VITALS
RESPIRATION RATE: 16 BRPM | TEMPERATURE: 97.2 F | DIASTOLIC BLOOD PRESSURE: 82 MMHG | HEART RATE: 106 BPM | SYSTOLIC BLOOD PRESSURE: 154 MMHG

## 2020-06-24 DIAGNOSIS — R52 PAIN: ICD-10-CM

## 2020-06-24 DIAGNOSIS — E86.0 DEHYDRATION: Primary | ICD-10-CM

## 2020-06-24 DIAGNOSIS — T45.1X5A CHEMOTHERAPY INDUCED NAUSEA AND VOMITING: ICD-10-CM

## 2020-06-24 DIAGNOSIS — R11.2 CHEMOTHERAPY INDUCED NAUSEA AND VOMITING: ICD-10-CM

## 2020-06-24 PROCEDURE — 96361 HYDRATE IV INFUSION ADD-ON: CPT

## 2020-06-24 PROCEDURE — 96365 THER/PROPH/DIAG IV INF INIT: CPT

## 2020-06-24 PROCEDURE — 96375 TX/PRO/DX INJ NEW DRUG ADDON: CPT

## 2020-06-24 RX ORDER — KETOROLAC TROMETHAMINE 30 MG/ML
15 INJECTION, SOLUTION INTRAMUSCULAR; INTRAVENOUS ONCE
Status: COMPLETED | OUTPATIENT
Start: 2020-06-24 | End: 2020-06-24

## 2020-06-24 RX ORDER — KETOROLAC TROMETHAMINE 30 MG/ML
15 INJECTION, SOLUTION INTRAMUSCULAR; INTRAVENOUS ONCE
Status: CANCELLED
Start: 2020-06-25

## 2020-06-24 RX ORDER — KETOROLAC TROMETHAMINE 30 MG/ML
15 INJECTION, SOLUTION INTRAMUSCULAR; INTRAVENOUS ONCE
Status: CANCELLED
Start: 2020-06-24

## 2020-06-24 RX ADMIN — KETOROLAC TROMETHAMINE 15 MG: 30 INJECTION, SOLUTION INTRAMUSCULAR at 12:29

## 2020-06-24 RX ADMIN — SODIUM CHLORIDE 1000 ML: 0.9 INJECTION, SOLUTION INTRAVENOUS at 11:10

## 2020-06-24 RX ADMIN — ONDANSETRON 8 MG: 2 INJECTION INTRAMUSCULAR; INTRAVENOUS at 11:27

## 2020-06-25 ENCOUNTER — HOSPITAL ENCOUNTER (OUTPATIENT)
Dept: INFUSION CENTER | Facility: HOSPITAL | Age: 75
Discharge: HOME | End: 2020-06-25
Attending: INTERNAL MEDICINE
Payer: MEDICARE

## 2020-06-25 VITALS
SYSTOLIC BLOOD PRESSURE: 136 MMHG | DIASTOLIC BLOOD PRESSURE: 73 MMHG | TEMPERATURE: 97.1 F | RESPIRATION RATE: 16 BRPM | HEART RATE: 80 BPM

## 2020-06-25 DIAGNOSIS — E86.0 DEHYDRATION: Primary | ICD-10-CM

## 2020-06-25 DIAGNOSIS — T45.1X5A CHEMOTHERAPY INDUCED NAUSEA AND VOMITING: ICD-10-CM

## 2020-06-25 DIAGNOSIS — R52 PAIN: ICD-10-CM

## 2020-06-25 DIAGNOSIS — R11.2 CHEMOTHERAPY INDUCED NAUSEA AND VOMITING: ICD-10-CM

## 2020-06-25 PROCEDURE — 96361 HYDRATE IV INFUSION ADD-ON: CPT

## 2020-06-25 PROCEDURE — 96365 THER/PROPH/DIAG IV INF INIT: CPT

## 2020-06-25 RX ADMIN — SODIUM CHLORIDE 1000 ML: 0.9 INJECTION, SOLUTION INTRAVENOUS at 11:25

## 2020-06-25 RX ADMIN — ONDANSETRON 8 MG: 2 INJECTION INTRAMUSCULAR; INTRAVENOUS at 11:58

## 2020-06-25 NOTE — PLAN OF CARE
Problem: Potential for Falls  Goal: Patient will remain free of falls  Description  INTERVENTIONS:  - Assess patient frequently for physical needs  -  Identify cognitive and physical deficits and behaviors that affect risk of falls  -  [place] fall precautions as indicated by assessment   - Educate patient/family on patient safety including physical limitations  - Instruct patient to call for assistance with activity based on assessment  - Modify environment to reduce risk of injury  - Consider OT/PT consult to assist with strengthening/mobility  Outcome: Progressing     Problem: Knowledge Deficit  Goal: Patient/family/caregiver demonstrates understanding of disease process, treatment plan, medications, and discharge instructions  Description  Complete learning assessment and assess knowledge base    Interventions:  - Provide teaching at level of understanding  - Provide teaching via preferred learning methods  Outcome: Progressing

## 2020-06-25 NOTE — PROGRESS NOTES
Pt tolerated IV hydration and zofran without difficulty  Reports no pain in extremities today  Port flushed and left accessed for tomorrow's tx  AVS declined  Escorted to lobby in w/c by RN staff

## 2020-06-26 ENCOUNTER — HOSPITAL ENCOUNTER (OUTPATIENT)
Dept: INFUSION CENTER | Facility: HOSPITAL | Age: 75
Discharge: HOME | End: 2020-06-26
Attending: INTERNAL MEDICINE
Payer: MEDICARE

## 2020-06-26 VITALS
DIASTOLIC BLOOD PRESSURE: 89 MMHG | RESPIRATION RATE: 16 BRPM | HEART RATE: 76 BPM | TEMPERATURE: 98.6 F | SYSTOLIC BLOOD PRESSURE: 166 MMHG

## 2020-06-26 DIAGNOSIS — R52 PAIN: Primary | ICD-10-CM

## 2020-06-26 DIAGNOSIS — E86.0 DEHYDRATION: ICD-10-CM

## 2020-06-26 DIAGNOSIS — R11.2 CHEMOTHERAPY INDUCED NAUSEA AND VOMITING: ICD-10-CM

## 2020-06-26 DIAGNOSIS — T45.1X5A CHEMOTHERAPY INDUCED NAUSEA AND VOMITING: ICD-10-CM

## 2020-06-26 PROCEDURE — 96361 HYDRATE IV INFUSION ADD-ON: CPT

## 2020-06-26 PROCEDURE — 96365 THER/PROPH/DIAG IV INF INIT: CPT

## 2020-06-26 RX ADMIN — ONDANSETRON 8 MG: 2 INJECTION INTRAMUSCULAR; INTRAVENOUS at 11:12

## 2020-06-26 RX ADMIN — SODIUM CHLORIDE 1000 ML: 0.9 INJECTION, SOLUTION INTRAVENOUS at 11:12

## 2020-06-26 NOTE — PLAN OF CARE
Problem: Potential for Falls  Goal: Patient will remain free of falls  Description  INTERVENTIONS:  - Assess patient frequently for physical needs  -  Identify cognitive and physical deficits and behaviors that affect risk of falls  -  [place] fall precautions as indicated by assessment   - Educate patient/family on patient safety including physical limitations  - Instruct patient to call for assistance with activity based on assessment  - Modify environment to reduce risk of injury  - Consider OT/PT consult to assist with strengthening/mobility  Outcome: Progressing     Problem: Knowledge Deficit  Goal: Patient/family/caregiver demonstrates understanding of disease process, treatment plan, medications, and discharge instructions  Description  Complete learning assessment and assess knowledge base    Interventions:  - Provide teaching at level of understanding  - Provide teaching via preferred learning methods  Outcome: Progressing

## 2020-07-20 ENCOUNTER — APPOINTMENT (OUTPATIENT)
Dept: LAB | Age: 75
End: 2020-07-20
Payer: MEDICARE

## 2020-07-20 DIAGNOSIS — Z17.0 MALIGNANT NEOPLASM OF OVERLAPPING SITES OF RIGHT BREAST IN FEMALE, ESTROGEN RECEPTOR POSITIVE (HCC): ICD-10-CM

## 2020-07-20 DIAGNOSIS — C50.811 MALIGNANT NEOPLASM OF OVERLAPPING SITES OF RIGHT BREAST IN FEMALE, ESTROGEN RECEPTOR POSITIVE (HCC): ICD-10-CM

## 2020-07-20 LAB
ALBUMIN SERPL BCP-MCNC: 3.3 G/DL (ref 3.5–5)
ALP SERPL-CCNC: 42 U/L (ref 46–116)
ALT SERPL W P-5'-P-CCNC: 16 U/L (ref 12–78)
ANION GAP SERPL CALCULATED.3IONS-SCNC: 8 MMOL/L (ref 4–13)
AST SERPL W P-5'-P-CCNC: 19 U/L (ref 5–45)
BASOPHILS # BLD AUTO: 0.03 THOUSANDS/ΜL (ref 0–0.1)
BASOPHILS NFR BLD AUTO: 1 % (ref 0–1)
BILIRUB SERPL-MCNC: 1.13 MG/DL (ref 0.2–1)
BUN SERPL-MCNC: 9 MG/DL (ref 5–25)
CALCIUM SERPL-MCNC: 9.6 MG/DL (ref 8.3–10.1)
CHLORIDE SERPL-SCNC: 104 MMOL/L (ref 100–108)
CO2 SERPL-SCNC: 26 MMOL/L (ref 21–32)
CREAT SERPL-MCNC: 0.93 MG/DL (ref 0.6–1.3)
EOSINOPHIL # BLD AUTO: 0.09 THOUSAND/ΜL (ref 0–0.61)
EOSINOPHIL NFR BLD AUTO: 2 % (ref 0–6)
ERYTHROCYTE [DISTWIDTH] IN BLOOD BY AUTOMATED COUNT: 14.6 % (ref 11.6–15.1)
GFR SERPL CREATININE-BSD FRML MDRD: 61 ML/MIN/1.73SQ M
GLUCOSE P FAST SERPL-MCNC: 89 MG/DL (ref 65–99)
HCT VFR BLD AUTO: 39.8 % (ref 34.8–46.1)
HGB BLD-MCNC: 12.9 G/DL (ref 11.5–15.4)
IMM GRANULOCYTES # BLD AUTO: 0.01 THOUSAND/UL (ref 0–0.2)
IMM GRANULOCYTES NFR BLD AUTO: 0 % (ref 0–2)
LDH SERPL-CCNC: 151 U/L (ref 81–234)
LYMPHOCYTES # BLD AUTO: 1.43 THOUSANDS/ΜL (ref 0.6–4.47)
LYMPHOCYTES NFR BLD AUTO: 27 % (ref 14–44)
MAGNESIUM SERPL-MCNC: 1.9 MG/DL (ref 1.6–2.6)
MCH RBC QN AUTO: 30.7 PG (ref 26.8–34.3)
MCHC RBC AUTO-ENTMCNC: 32.4 G/DL (ref 31.4–37.4)
MCV RBC AUTO: 95 FL (ref 82–98)
MONOCYTES # BLD AUTO: 0.4 THOUSAND/ΜL (ref 0.17–1.22)
MONOCYTES NFR BLD AUTO: 8 % (ref 4–12)
NEUTROPHILS # BLD AUTO: 3.34 THOUSANDS/ΜL (ref 1.85–7.62)
NEUTS SEG NFR BLD AUTO: 62 % (ref 43–75)
NRBC BLD AUTO-RTO: 0 /100 WBCS
PLATELET # BLD AUTO: 258 THOUSANDS/UL (ref 149–390)
PMV BLD AUTO: 10.8 FL (ref 8.9–12.7)
POTASSIUM SERPL-SCNC: 4.2 MMOL/L (ref 3.5–5.3)
PROT SERPL-MCNC: 7.5 G/DL (ref 6.4–8.2)
RBC # BLD AUTO: 4.2 MILLION/UL (ref 3.81–5.12)
SODIUM SERPL-SCNC: 138 MMOL/L (ref 136–145)
WBC # BLD AUTO: 5.3 THOUSAND/UL (ref 4.31–10.16)

## 2020-07-20 PROCEDURE — 83735 ASSAY OF MAGNESIUM: CPT

## 2020-07-20 PROCEDURE — 83615 LACTATE (LD) (LDH) ENZYME: CPT

## 2020-07-20 PROCEDURE — 80053 COMPREHEN METABOLIC PANEL: CPT

## 2020-07-20 PROCEDURE — 36415 COLL VENOUS BLD VENIPUNCTURE: CPT

## 2020-07-20 PROCEDURE — 85025 COMPLETE CBC W/AUTO DIFF WBC: CPT

## 2020-07-22 ENCOUNTER — TELEPHONE (OUTPATIENT)
Dept: HEMATOLOGY ONCOLOGY | Facility: CLINIC | Age: 75
End: 2020-07-22

## 2020-07-22 ENCOUNTER — OFFICE VISIT (OUTPATIENT)
Dept: HEMATOLOGY ONCOLOGY | Facility: CLINIC | Age: 75
End: 2020-07-22
Payer: MEDICARE

## 2020-07-22 VITALS
RESPIRATION RATE: 18 BRPM | HEART RATE: 93 BPM | BODY MASS INDEX: 24.41 KG/M2 | TEMPERATURE: 98.2 F | SYSTOLIC BLOOD PRESSURE: 132 MMHG | DIASTOLIC BLOOD PRESSURE: 82 MMHG | OXYGEN SATURATION: 98 % | WEIGHT: 137.8 LBS

## 2020-07-22 DIAGNOSIS — Z17.0 MALIGNANT NEOPLASM OF OVERLAPPING SITES OF RIGHT BREAST IN FEMALE, ESTROGEN RECEPTOR POSITIVE (HCC): ICD-10-CM

## 2020-07-22 DIAGNOSIS — C50.811 MALIGNANT NEOPLASM OF OVERLAPPING SITES OF RIGHT BREAST IN FEMALE, ESTROGEN RECEPTOR POSITIVE (HCC): Primary | ICD-10-CM

## 2020-07-22 DIAGNOSIS — C79.31 BRAIN METASTASIS (HCC): ICD-10-CM

## 2020-07-22 DIAGNOSIS — C50.811 MALIGNANT NEOPLASM OF OVERLAPPING SITES OF RIGHT BREAST IN FEMALE, ESTROGEN RECEPTOR POSITIVE (HCC): ICD-10-CM

## 2020-07-22 DIAGNOSIS — R53.1 WEAKNESS GENERALIZED: Primary | ICD-10-CM

## 2020-07-22 DIAGNOSIS — Z17.0 MALIGNANT NEOPLASM OF OVERLAPPING SITES OF RIGHT BREAST IN FEMALE, ESTROGEN RECEPTOR POSITIVE (HCC): Primary | ICD-10-CM

## 2020-07-22 PROCEDURE — 1111F DSCHRG MED/CURRENT MED MERGE: CPT | Performed by: INTERNAL MEDICINE

## 2020-07-22 PROCEDURE — 3075F SYST BP GE 130 - 139MM HG: CPT | Performed by: INTERNAL MEDICINE

## 2020-07-22 PROCEDURE — 1036F TOBACCO NON-USER: CPT | Performed by: INTERNAL MEDICINE

## 2020-07-22 PROCEDURE — 99214 OFFICE O/P EST MOD 30 MIN: CPT | Performed by: INTERNAL MEDICINE

## 2020-07-22 PROCEDURE — 1160F RVW MEDS BY RX/DR IN RCRD: CPT | Performed by: INTERNAL MEDICINE

## 2020-07-22 PROCEDURE — 3079F DIAST BP 80-89 MM HG: CPT | Performed by: INTERNAL MEDICINE

## 2020-07-22 RX ORDER — ESOMEPRAZOLE MAGNESIUM 40 MG/1
CAPSULE, DELAYED RELEASE ORAL
Qty: 90 CAPSULE | Refills: 0 | Status: SHIPPED | OUTPATIENT
Start: 2020-07-22 | End: 2022-03-08 | Stop reason: HOSPADM

## 2020-07-22 NOTE — PROGRESS NOTES
Hematology/Oncology Outpatient Follow-up  Ju Elmore 77 y o  female 1945 [id number]    Date:  7/22/2020        Assessment and Plan:  1  Malignant neoplasm of overlapping 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, estrogen receptor positive ([facility] [address] )  The patient was encouraged to stay on the Tykerb at the minimal dose of 1000 mg once a day since it does have good brain penetration  I did encourage her also to hydrate herself and to eat small portions of meals 6 times at least a day  If she cannot hydrate herself well she can still use the IV hydration in our infusion center as needed  We discussed also taking the Tykerb on an empty stomach gradually so she would not get nauseous taking the all the 4 pills at the same time  We will check her tumor markers before her next visit in 4 weeks from now  - CBC and differential; Future  - Comprehensive metabolic panel; Future  - Magnesium; Future  - Cancer antigen 15-3; Future  - Cancer antigen 19-9; Future    2  Brain metastasis ([facility] [address] )  She is status post whole-brain radiation  She will be getting another MRI of the brain for close follow-up around the end of August         HPI:  The patient came today for a follow-up visit  She is not the using the IV hydration any longer since she is now drinking and hydrating herself relatively well  She stated that her nausea vomiting has improved significantly but she continues to have poor appetite  She did lose about 25 lb since March of this year  She did complain about generalized fatigue and unsteady on her feet  She did ask for a walker  She also complained about the some numbness and tingling of her feet  The patient stated that she continues to take the anastrozole on a daily basis as endocrine therapy  However, she is not very compliant with the Tykerb  She stated that she occasionally takes 500 mg once a day and alternating with 1000   She claims that the Tykerb is causing significant nausea and occasional vomiting  Her most recent blood work on 07/20 showed normal magnesium and LDH  CMP was normal   Her CBC showed hemoglobin of 12 9 with normal white cells and platelets  She denies bleeding from any sites  Oncology History    Katie New is a 77 y o female with a history of right breast cancer  She completed whole brain radiation for brain metastasis 4/10/20  She returns today for her first follow-up  3/27/20 PET scan- 1  Increasing size of metastatic right axillary node  2   Nonspecific increased activity in the lower spinal canal at the T12-L1 level  This may be reassessed on follow-up exam   Contrast MR evaluation may also be considered if clinically warranted  3   Decreased nonspecific left adrenal activity and stable right perihilar activity  Continued follow-up recommended  4   Otherwise no new hypermetabolic metastases visualized  5/20/20 Dr Uday Rodriguez- tolerating lapatinib at the current dose of 1500 mg daily which started 4/20/20  She is also continuing with anastrozole daily  Briefly discussed potential need to add capecitabine in the near future  She is anxious about her overall condition, refused low dose xanax  Asked her to discuss the increased activity seen on the recent imaging studies in the spinal canal at the T12-L1 level with the Radiation Oncology team     5/22/20 MRI brain- There are 4 parenchymal metastases are again identified all which have decreased in size in the interval  There are no new lesions identified  Surrounding FLAIR abnormality in the left occipital lesion has also decreased      6/10/20 Dr Uday Rodriguez        Malignant neoplasm of overlapping 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, estrogen receptor positive ([facility] [address] )    2015 -  Hormone Therapy     Anastrozole      5/28/2015 Initial Diagnosis     Metastatic breast cancer ([facility] [address] )  (recurrence)      6/30/2015 -  Chemotherapy     1-Taxotere, Herceptin, Perjeta started in Sh[place] 2015 x6 cycles  2-Herceptin and Perjeta alone were continued since the patient was not interested in continue the Taxotere  3-Herceptin and Perjeta were put on hold due to decline of her ejection fraction around May 2017    4-low dose weekly Taxol was started in July 2017  5-Taxol was switched to every other week basis      4/14/2019 - 6/9/2019 Chemotherapy     PACLitaxel (TAXOL) 144 6 mg in sodium chloride 0 9 % 250 mL chemo IVPB, 80 mg/m2, Intravenous, Once, 2 of 6 cycles      12/4/2019 - 12/31/2019 Chemotherapy     PACLitaxel (TAXOL) 142 2 mg in sodium chloride 0 9 % 250 mL chemo IVPB, 80 mg/m2 = 142 2 mg, Intravenous, Once, 1 of 6 cycles  Administration: 142 2 mg (12/4/2019), 142 2 mg (12/18/2019)      1/8/2020 -  Chemotherapy     PACLitaxel (TAXOL) 141 6 mg in sodium chloride 0 9 % 250 mL chemo IVPB, 80 mg/m2 = 141 6 mg, Intravenous, Once, 4 of 6 cycles  Administration: 141 6 mg (1/8/2020), 141 6 mg (1/29/2020), 141 6 mg (2/19/2020)  trastuzumab (HERCEPTIN) 600 mg in sodium chloride 0 9 % 250 mL chemo infusion, 609 mg, Intravenous, Once, 4 of 6 cycles  Administration: 600 mg (1/8/2020), 450 mg (1/29/2020), 450 mg (2/19/2020)       Chemotherapy     PACLitaxel (TAXOL) 144 6 mg in sodium chloride 0 9 % 250 mL chemo IVPB, 80 mg/m2, Intravenous, Once, 2 of 6 cycles    PACLitaxel (TAXOL) 142 2 mg in sodium chloride 0 9 % 250 mL chemo IVPB, 80 mg/m2 = 142 2 mg, Intravenous, Once, 1 of 6 cycles    Administration: 142 2 mg (12/4/2019), 142 2 mg (12/18/2019)    PACLitaxel (TAXOL) 141 6 mg in sodium chloride 0 9 % 250 mL chemo IVPB, 80 mg/m2, Intravenous, Once, 0 of 12 cycles        trastuzumab (HERCEPTIN) 304 mg in sodium chloride 0 9 % 250 mL chemo infusion, 4 mg/kg, Intravenous, Once, 0 of 26 cycles    PACLitaxel (TAXOL) 141 6 mg in sodium chloride 0 9 % 250 mL chemo IVPB, 80 mg/m2 = 141 6 mg, Intravenous, Once, 4 of 6 cycles    Administration: 141 6 mg (1/8/2020), 141 6 mg (1/29/2020), 141 6 mg (2/19/2020)        trastuzumab (HERCEPTIN) 600 mg in sodium chloride 0 9 % 250 mL chemo infusion, 609 mg, Intravenous, Once, 4 of 6 cycles    Administration: 600 mg (1/8/2020), 450 mg (1/29/2020), 450 mg (2/19/2020)         Chemotherapy     PACLitaxel (TAXOL) 144 6 mg in sodium chloride 0 9 % 250 mL chemo IVPB, 80 mg/m2, Intravenous, Once, 2 of 6 cycles    PACLitaxel (TAXOL) 142 2 mg in sodium chloride 0 9 % 250 mL chemo IVPB, 80 mg/m2 = 142 2 mg, Intravenous, Once, 1 of 6 cycles    Administration: 142 2 mg (12/4/2019), 142 2 mg (12/18/2019)    PACLitaxel (TAXOL) 141 6 mg in sodium chloride 0 9 % 250 mL chemo IVPB, 80 mg/m2, Intravenous, Once, 0 of 12 cycles        trastuzumab (HERCEPTIN) 304 mg in sodium chloride 0 9 % 250 mL chemo infusion, 4 mg/kg, Intravenous, Once, 0 of 26 cycles    PACLitaxel (TAXOL) 141 6 mg in sodium chloride 0 9 % 250 mL chemo IVPB, 80 mg/m2 = 141 6 mg, Intravenous, Once, 4 of 6 cycles    Administration: 141 6 mg (1/8/2020), 141 6 mg (1/29/2020), 141 6 mg (2/19/2020)        trastuzumab (HERCEPTIN) 600 mg in sodium chloride 0 9 % 250 mL chemo infusion, 609 mg, Intravenous, Once, 4 of 6 cycles    Administration: 600 mg (1/8/2020), 450 mg (1/29/2020), 450 mg (2/19/2020)      Lapatinib 1500 mg once a day was started on 04/20/2020 after she was found to have metastatic disease to the brain, status post whole brain radiation  Brain metastasis ([facility] [address] )    3/30/2020 - 4/10/2020 Radiation     Plan ID Energy Fractions Dose per Fraction (cGy) Dose Correction (cGy) Total Dose Delivered (cGy) Elapsed Days   Whole Brai # 6X 10 / 10 300 0 3,000 11           4/9/2020 Initial Diagnosis     Brain metastasis (HCC)         Interval history:    ROS: Review of Systems   Constitutional: Positive for activity change, appetite change, fatigue and unexpected weight change  Negative for chills, diaphoresis and fever     HENT: Negative for congestion, dental problem, ear discharge, ear pain, facial swelling, hearing loss, mouth sores, nosebleeds, postnasal drip, sore throat, tinnitus and trouble swallowing  Eyes: Negative for discharge, redness, itching and visual disturbance  Respiratory: Negative for cough, chest tightness, shortness of breath and wheezing  Cardiovascular: Negative for chest pain, palpitations and leg swelling  Gastrointestinal: Positive for constipation and nausea  Negative for abdominal distention, abdominal pain, anal bleeding, blood in stool, diarrhea and vomiting  Genitourinary: Negative for difficulty urinating, dysuria, flank pain, frequency, hematuria and urgency  Musculoskeletal: Negative for arthralgias, back pain, gait problem, joint swelling, myalgias and neck pain  Skin: Negative for color change, pallor, rash and wound  Neurological: Positive for weakness ( generalized weakness) and numbness (Of her feet)  Negative for dizziness, syncope, speech difficulty, light-headedness and headaches  Hematological: Negative for adenopathy  Does not bruise/bleed easily  Psychiatric/Behavioral: Negative for agitation, behavioral problems, confusion and sleep disturbance         Past Medical History:   Diagnosis Date    [facility] [address] [facility]     [facility]     Hypertension     Lymphedema of right arm     Vitamin B12 deficiency        Past Surgical History:   Procedure Laterality Date    APPENDECTOMY      BREAST SURGERY      bilat mastectomy-right total mastectomy and prophylactic left total mastectomy-2005    KNEE SURGERY      right knee replacement 2014 in [address] [place] [address] Bilateral        Social History     Socioeconomic History    Marital status: Single     Spouse name: None    Number of children: None    Years of education: None    Highest education level: None   Occupational History    None   Social Needs    Financial resource strain: None    Food insecurity:     Worry: None     Inability: None    Transportation needs:     Medical: None     Non-medical: None   Tobacco Use    Smoking status: Never Smoker    Smokeless tobacco: Never Used    Tobacco comment: no passive smoke exposure   Substance and Sexual Activity    Alcohol use: Never     Frequency: Never     Binge frequency: Never    Drug use: No    Sexual activity: None   Lifestyle    Physical activity:     Days per week: None     Minutes per session: None    Stress: None   Relationships    Social connections:     Talks on phone: None     Gets together: None     Attends Jain service: None     Active member of club or organization: None     Attends meetings of clubs or organizations: None     Relationship status: None    Intimate partner violence:     Fear of current or ex partner: None     Emotionally abused: None     Physically abused: None     Forced sexual activity: None   Other Topics Concern    None   Social History Narrative    None       Family History   Problem Relation Age of Onset    Asthma Mother     Osteoarthritis Father     Bone cancer Paternal Aunt         bone cancer       Allergies   Allergen Reactions    Penicillins Rash         Current Outpatient Medications:     anastrozole (ARIMIDEX) 1 mg tablet, Take 1 tablet (1 mg total) by mouth every 24 hours, Disp: 90 tablet, Rfl: 4    cholecalciferol (VITAMIN D3) 1,000 units tablet, Take 1 tablet (1,000 Units total) by mouth daily, Disp: 30 tablet, Rfl: 0    Cyanocobalamin 1000 MCG/ML KIT, Inject as directed every 6 (six) months, Disp: , Rfl:     escitalopram (LEXAPRO) 5 mg tablet, Take 1 tablet (5 mg total) by mouth daily, Disp: 30 tablet, Rfl: 5    esomeprazole (NexIUM) 40 MG capsule, Take 1 capsule (40 mg total) by mouth daily, Disp: 90 capsule, Rfl: 0    megestrol (MEGACE) 400 mg/10 mL, Take 400 mg by mouth daily, Disp: 600 mL, Rfl: 0    Misc  Devices (ILLUSIONS C BREAST PROSTHESIS) MISC, 1 breast prosthesis, Disp: 1 each, Rfl: 0    Misc   Devices (REFLECTIONS C BREAST PROSTHES) MISC, Dispense 1 breast prosthesis, Disp: 1 each, Rfl: 0    nebivolol (Bystolic) 5 mg tablet, Take 1 tablet (5 mg total) by mouth daily, Disp: 90 tablet, Rfl: 3    ondansetron (ZOFRAN) 4 mg tablet, Take 1 tablet (4 mg total) by mouth every 6 (six) hours, Disp: 20 tablet, Rfl: 0    potassium chloride (K-DUR,KLOR-CON) 20 mEq tablet, , Disp: , Rfl:     potassium chloride (KLOR-CON) 20 mEq packet, Take 20 mEq by mouth daily, Disp: 30 packet, Rfl: 0    indapamide (LOZOL) 1 25 mg tablet, Take 1 tablet (1 25 mg total) by mouth every morning (Patient not taking: Reported on 6/11/2020), Disp: 30 tablet, Rfl: 1    lisinopril (ZESTRIL) 10 mg tablet, Take 1 tablet (10 mg total) by mouth daily (Patient not taking: Reported on 6/11/2020), Disp: 90 tablet, Rfl: 3    predniSONE 20 mg tablet, Take 1 tablet (20 mg total) by mouth daily (Patient not taking: Reported on 6/11/2020), Disp: 60 tablet, Rfl: 2    scopolamine (TRANSDERM-SCOP) 1 5 mg/3 days TD 72 hr patch, Place 1 patch on the skin every third day (Patient not taking: Reported on 6/11/2020), Disp: 10 patch, Rfl: 0      Physical Exam:  /82 (BP Location: Left arm, Patient Position: Sitting, Cuff Size: Adult)   Pulse 93   Temp 98 2 °F (36 8 °C)   Resp 18   Wt 62 5 kg (137 lb 12 8 oz)   SpO2 98%   BMI 24 41 kg/m²     Physical Exam   Constitutional: She is oriented to person, place, and time  She appears well-developed and well-nourished  No distress  HENT:   Head: Normocephalic and atraumatic  Eyes: Pupils are equal, round, and reactive to light  Conjunctivae and EOM are normal  Right eye exhibits no discharge  Left eye exhibits no discharge  No scleral icterus  Neck: Normal range of motion  Neck supple  No JVD present  No tracheal deviation present  No thyromegaly present  Cardiovascular: Normal rate, regular rhythm and normal heart sounds  Exam reveals no friction rub  No murmur heard  Pulmonary/Chest: Effort normal and breath sounds normal  No stridor  No respiratory distress  She has no wheezes  She has no rales  She exhibits no tenderness  Abdominal: Soft   Bowel sounds are normal  She exhibits no distension and no mass  There is no hepatosplenomegaly, splenomegaly or hepatomegaly  There is no tenderness  There is no rebound and no guarding  Musculoskeletal: Normal range of motion  She exhibits no edema, tenderness or deformity  Lymphadenopathy:     She has no cervical adenopathy  Neurological: She is alert and oriented to person, place, and time  She has normal reflexes  No cranial nerve deficit  Coordination normal    Skin: Skin is warm and dry  No rash noted  She is not diaphoretic  No erythema  No pallor  Psychiatric: She has a normal mood and affect  Her behavior is normal  Judgment and thought content normal          Labs:  Lab Results   Component Value Date    WBC 5 30 07/20/2020    HGB 12 9 07/20/2020    HCT 39 8 07/20/2020    MCV 95 07/20/2020     07/20/2020     Lab Results   Component Value Date     06/18/2018    K 4 2 07/20/2020     07/20/2020    CO2 26 07/20/2020    ANIONGAP 8 06/18/2018    BUN 9 07/20/2020    CREATININE 0 93 07/20/2020    GLUF 89 07/20/2020    CALCIUM 9 6 07/20/2020    AST 19 07/20/2020    ALT 16 07/20/2020    ALKPHOS 42 (L) 07/20/2020    PROT 7 1 06/18/2018    BILITOT 0 3 06/18/2018    EGFR 61 07/20/2020     No results found for: TSH    Patient voiced understanding and agreement in the above discussion  Aware to contact our office with questions/symptoms in the interim

## 2020-07-22 NOTE — TELEPHONE ENCOUNTER
Aakash Scripture called in to verify appointment  Advised of time and location  Aakash Scripture voiced understanding

## 2020-07-27 ENCOUNTER — HOSPITAL ENCOUNTER (OUTPATIENT)
Dept: INFUSION CENTER | Facility: HOSPITAL | Age: 75
Discharge: HOME | End: 2020-07-27
Attending: INTERNAL MEDICINE
Payer: MEDICARE

## 2020-07-27 VITALS
HEART RATE: 88 BPM | RESPIRATION RATE: 18 BRPM | TEMPERATURE: 97.7 F | SYSTOLIC BLOOD PRESSURE: 122 MMHG | DIASTOLIC BLOOD PRESSURE: 78 MMHG

## 2020-07-27 DIAGNOSIS — R11.2 CHEMOTHERAPY INDUCED NAUSEA AND VOMITING: ICD-10-CM

## 2020-07-27 DIAGNOSIS — T45.1X5A CHEMOTHERAPY INDUCED NAUSEA AND VOMITING: ICD-10-CM

## 2020-07-27 DIAGNOSIS — E86.0 DEHYDRATION: Primary | ICD-10-CM

## 2020-07-27 DIAGNOSIS — R52 PAIN: ICD-10-CM

## 2020-07-27 PROCEDURE — 96361 HYDRATE IV INFUSION ADD-ON: CPT

## 2020-07-27 PROCEDURE — 96365 THER/PROPH/DIAG IV INF INIT: CPT

## 2020-07-27 RX ADMIN — ONDANSETRON HYDROCHLORIDE 8 MG: 2 INJECTION, SOLUTION INTRAMUSCULAR; INTRAVENOUS at 13:54

## 2020-07-27 RX ADMIN — SODIUM CHLORIDE 1000 ML: 0.9 INJECTION, SOLUTION INTRAVENOUS at 13:43

## 2020-07-27 NOTE — PLAN OF CARE
Problem: Potential for Falls  Goal: Patient will remain free of falls  Description  INTERVENTIONS:  - Assess patient frequently for physical needs  -  Identify cognitive and physical deficits and behaviors that affect risk of falls  -  [place] fall precautions as indicated by assessment   - Educate patient/family on patient safety including physical limitations  - Instruct patient to call for assistance with activity based on assessment  - Modify environment to reduce risk of injury  - Consider OT/PT consult to assist with strengthening/mobility  Outcome: Progressing     Problem: Knowledge Deficit  Goal: Patient/family/caregiver demonstrates understanding of disease process, treatment plan, medications, and discharge instructions  Description  Complete learning assessment and assess knowledge base    Interventions:  - Provide teaching at level of understanding  - Provide teaching via preferred learning methods  Outcome: Progressing

## 2020-07-27 NOTE — PROGRESS NOTES
Pt tolerated IV hydration and zofran without difficulty  Confirmed tomorrow's appt  Escorted to [facility] in w/c by RN and transported home by [employer]

## 2020-07-28 ENCOUNTER — HOSPITAL ENCOUNTER (OUTPATIENT)
Dept: INFUSION CENTER | Facility: HOSPITAL | Age: 75
Discharge: HOME | End: 2020-07-28
Attending: INTERNAL MEDICINE
Payer: MEDICARE

## 2020-07-28 VITALS
RESPIRATION RATE: 18 BRPM | DIASTOLIC BLOOD PRESSURE: 80 MMHG | HEART RATE: 82 BPM | TEMPERATURE: 97.4 F | SYSTOLIC BLOOD PRESSURE: 132 MMHG

## 2020-07-28 DIAGNOSIS — C79.31 BRAIN METASTASIS (HCC): ICD-10-CM

## 2020-07-28 DIAGNOSIS — T45.1X5A CHEMOTHERAPY INDUCED NAUSEA AND VOMITING: ICD-10-CM

## 2020-07-28 DIAGNOSIS — R26.9 GAIT ABNORMALITY: Primary | ICD-10-CM

## 2020-07-28 DIAGNOSIS — R52 PAIN: ICD-10-CM

## 2020-07-28 DIAGNOSIS — R11.2 CHEMOTHERAPY INDUCED NAUSEA AND VOMITING: ICD-10-CM

## 2020-07-28 DIAGNOSIS — E86.0 DEHYDRATION: Primary | ICD-10-CM

## 2020-07-28 DIAGNOSIS — C50.919 MALIGNANT NEOPLASM OF FEMALE BREAST, UNSPECIFIED ESTROGEN RECEPTOR STATUS, UNSPECIFIED LATERALITY, UNSPECIFIED SITE OF BREAST (HCC): ICD-10-CM

## 2020-07-28 PROCEDURE — 96365 THER/PROPH/DIAG IV INF INIT: CPT

## 2020-07-28 PROCEDURE — 96361 HYDRATE IV INFUSION ADD-ON: CPT

## 2020-07-28 RX ADMIN — SODIUM CHLORIDE 1000 ML: 0.9 INJECTION, SOLUTION INTRAVENOUS at 13:26

## 2020-07-28 RX ADMIN — ONDANSETRON HYDROCHLORIDE 8 MG: 2 INJECTION, SOLUTION INTRAMUSCULAR; INTRAVENOUS at 13:36

## 2020-07-28 NOTE — PLAN OF CARE
Problem: Potential for Falls  Goal: Patient will remain free of falls  Description  INTERVENTIONS:  - Assess patient frequently for physical needs  -  Identify cognitive and physical deficits and behaviors that affect risk of falls  -  [place] fall precautions as indicated by assessment   - Educate patient/family on patient safety including physical limitations  - Instruct patient to call for assistance with activity based on assessment  - Modify environment to reduce risk of injury  - Consider OT/PT consult to assist with strengthening/mobility  Outcome: Progressing     Problem: Knowledge Deficit  Goal: Patient/family/caregiver demonstrates understanding of disease process, treatment plan, medications, and discharge instructions  Description  Complete learning assessment and assess knowledge base    Interventions:  - Provide teaching at level of understanding  - Provide teaching via preferred learning methods  Outcome: Progressing

## 2020-07-28 NOTE — PROGRESS NOTES
Pt tolerated IV hydration and zofran without difficulty  Continues to report 6/10 pain in B/L upper and lower extremities and states her trouble walking is due to pain  Declined call to office for pain med or PT request   Savannah Adrien flushed per protocol and left accessed for tomorrow's tx  AVS declined  Escorted to lobby in w/c by PCA and transported home by [employer]

## 2020-07-29 ENCOUNTER — HOSPITAL ENCOUNTER (OUTPATIENT)
Dept: INFUSION CENTER | Facility: HOSPITAL | Age: 75
End: 2020-07-29
Attending: INTERNAL MEDICINE

## 2020-07-30 ENCOUNTER — HOSPITAL ENCOUNTER (OUTPATIENT)
Dept: INFUSION CENTER | Facility: HOSPITAL | Age: 75
Discharge: HOME | End: 2020-07-30
Attending: INTERNAL MEDICINE
Payer: MEDICARE

## 2020-07-30 VITALS
SYSTOLIC BLOOD PRESSURE: 148 MMHG | TEMPERATURE: 97.4 F | RESPIRATION RATE: 16 BRPM | DIASTOLIC BLOOD PRESSURE: 90 MMHG | OXYGEN SATURATION: 99 % | HEART RATE: 85 BPM

## 2020-07-30 DIAGNOSIS — R11.2 CHEMOTHERAPY INDUCED NAUSEA AND VOMITING: ICD-10-CM

## 2020-07-30 DIAGNOSIS — R52 PAIN: ICD-10-CM

## 2020-07-30 DIAGNOSIS — E86.0 DEHYDRATION: Primary | ICD-10-CM

## 2020-07-30 DIAGNOSIS — T45.1X5A CHEMOTHERAPY INDUCED NAUSEA AND VOMITING: ICD-10-CM

## 2020-07-30 DIAGNOSIS — Z79.811 USE OF AROMATASE INHIBITORS: ICD-10-CM

## 2020-07-30 DIAGNOSIS — Z17.0 MALIGNANT NEOPLASM OF OVERLAPPING SITES OF RIGHT BREAST IN FEMALE, ESTROGEN RECEPTOR POSITIVE (HCC): ICD-10-CM

## 2020-07-30 DIAGNOSIS — C50.811 MALIGNANT NEOPLASM OF OVERLAPPING SITES OF RIGHT BREAST IN FEMALE, ESTROGEN RECEPTOR POSITIVE (HCC): ICD-10-CM

## 2020-07-30 PROCEDURE — 96401 CHEMO ANTI-NEOPL SQ/IM: CPT

## 2020-07-30 PROCEDURE — 96360 HYDRATION IV INFUSION INIT: CPT

## 2020-07-30 RX ORDER — LAPATINIB 250 MG/1
TABLET ORAL
COMMUNITY
Start: 2020-07-24 | End: 2021-03-31

## 2020-07-30 RX ADMIN — SODIUM CHLORIDE 1000 ML: 0.9 INJECTION, SOLUTION INTRAVENOUS at 13:26

## 2020-07-30 RX ADMIN — DENOSUMAB 60 MG: 60 INJECTION SUBCUTANEOUS at 14:22

## 2020-07-30 NOTE — PLAN OF CARE
Problem: Potential for Falls  Goal: Patient will remain free of falls  Description  INTERVENTIONS:  - Assess patient frequently for physical needs  -  Identify cognitive and physical deficits and behaviors that affect risk of falls    -  [place] fall precautions as indicated by assessment   - Educate patient/family on patient safety including physical limitations  - Instruct patient to call for assistance with activity based on assessment  - Modify environment to reduce risk of injury  - Consider OT/PT consult to assist with strengthening/mobility  Outcome: Progressing

## 2020-07-30 NOTE — PROGRESS NOTES
Pt here for daily hydration  She missed yesterdays appt due to diarrhea  States she has had no episodes today  She also has no nausea and declined the ordered zofran for today, tolerated hydration well without complications  confirmed appt back for tomorrow, AVS declined

## 2020-07-31 ENCOUNTER — HOSPITAL ENCOUNTER (OUTPATIENT)
Dept: INFUSION CENTER | Facility: HOSPITAL | Age: 75
Discharge: HOME | End: 2020-07-31
Attending: INTERNAL MEDICINE
Payer: MEDICARE

## 2020-07-31 DIAGNOSIS — T45.1X5A CHEMOTHERAPY INDUCED NAUSEA AND VOMITING: ICD-10-CM

## 2020-07-31 DIAGNOSIS — E86.0 DEHYDRATION: Primary | ICD-10-CM

## 2020-07-31 DIAGNOSIS — R52 PAIN: ICD-10-CM

## 2020-07-31 DIAGNOSIS — R11.2 CHEMOTHERAPY INDUCED NAUSEA AND VOMITING: ICD-10-CM

## 2020-07-31 PROCEDURE — 96360 HYDRATION IV INFUSION INIT: CPT

## 2020-07-31 RX ADMIN — SODIUM CHLORIDE 1000 ML: 0.9 INJECTION, SOLUTION INTRAVENOUS at 11:21

## 2020-07-31 NOTE — PROGRESS NOTES
Pt tolerated hydration well  Refused zofran today as she has not been having nausea  Port flushed and deaccessed per routine  Scheduled all next weeks appts with pt and [facility] transport  Discharged in wc to lobby with volunteer  Sister Jennifer Renaes will be transporting today

## 2020-08-03 ENCOUNTER — HOSPITAL ENCOUNTER (OUTPATIENT)
Dept: INFUSION CENTER | Facility: HOSPITAL | Age: 75
Discharge: HOME | End: 2020-08-03
Attending: INTERNAL MEDICINE
Payer: MEDICARE

## 2020-08-03 VITALS
TEMPERATURE: 97.5 F | DIASTOLIC BLOOD PRESSURE: 87 MMHG | SYSTOLIC BLOOD PRESSURE: 135 MMHG | OXYGEN SATURATION: 99 % | RESPIRATION RATE: 16 BRPM | HEART RATE: 75 BPM

## 2020-08-03 DIAGNOSIS — R11.2 CHEMOTHERAPY INDUCED NAUSEA AND VOMITING: ICD-10-CM

## 2020-08-03 DIAGNOSIS — R52 PAIN: ICD-10-CM

## 2020-08-03 DIAGNOSIS — E86.0 DEHYDRATION: Primary | ICD-10-CM

## 2020-08-03 DIAGNOSIS — T45.1X5A CHEMOTHERAPY INDUCED NAUSEA AND VOMITING: ICD-10-CM

## 2020-08-03 PROCEDURE — 96360 HYDRATION IV INFUSION INIT: CPT

## 2020-08-03 RX ORDER — PANTOPRAZOLE SODIUM 40 MG/1
TABLET, DELAYED RELEASE ORAL
COMMUNITY
Start: 2020-07-28 | End: 2020-11-16 | Stop reason: SDUPTHER

## 2020-08-03 RX ADMIN — SODIUM CHLORIDE 1000 ML: 0.9 INJECTION, SOLUTION INTRAVENOUS at 13:30

## 2020-08-03 NOTE — PROGRESS NOTES
Pt here for hydration today  Patient declined Bebeto Precise today  States she is feeling better, her arm and leg pain is better, but now has low back pain  She states she spoke with her doctor in [place] yesterday and stated she needs a steroid injection  I advised pt to call her PCP, Dr Rj Early to address this for her  Pt agreeable  Confirmed appt back tomorrow

## 2020-08-03 NOTE — PLAN OF CARE
Problem: Potential for Falls  Goal: Patient will remain free of falls  Description: INTERVENTIONS:  - Assess patient frequently for physical needs  -  Identify cognitive and physical deficits and behaviors that affect risk of falls    -  [place] fall precautions as indicated by assessment   - Educate patient/family on patient safety including physical limitations  - Instruct patient to call for assistance with activity based on assessment  - Modify environment to reduce risk of injury  - Consider OT/PT consult to assist with strengthening/mobility  Outcome: Progressing

## 2020-08-04 ENCOUNTER — HOSPITAL ENCOUNTER (OUTPATIENT)
Dept: INFUSION CENTER | Facility: HOSPITAL | Age: 75
Discharge: HOME | End: 2020-08-04
Attending: INTERNAL MEDICINE

## 2020-08-05 ENCOUNTER — HOSPITAL ENCOUNTER (OUTPATIENT)
Dept: INFUSION CENTER | Facility: HOSPITAL | Age: 75
Discharge: HOME | End: 2020-08-05
Attending: INTERNAL MEDICINE
Payer: MEDICARE

## 2020-08-05 VITALS — TEMPERATURE: 98.3 F | HEART RATE: 69 BPM

## 2020-08-05 DIAGNOSIS — T45.1X5A CHEMOTHERAPY INDUCED NAUSEA AND VOMITING: ICD-10-CM

## 2020-08-05 DIAGNOSIS — E86.0 DEHYDRATION: ICD-10-CM

## 2020-08-05 DIAGNOSIS — R52 PAIN: Primary | ICD-10-CM

## 2020-08-05 DIAGNOSIS — R11.2 CHEMOTHERAPY INDUCED NAUSEA AND VOMITING: ICD-10-CM

## 2020-08-05 PROCEDURE — 96360 HYDRATION IV INFUSION INIT: CPT

## 2020-08-05 RX ADMIN — SODIUM CHLORIDE 1000 ML: 0.9 INJECTION, SOLUTION INTRAVENOUS at 12:59

## 2020-08-05 NOTE — PLAN OF CARE
Problem: Potential for Falls  Goal: Patient will remain free of falls  Description: INTERVENTIONS:  - Assess patient frequently for physical needs  -  Identify cognitive and physical deficits and behaviors that affect risk of falls    -  [place] fall precautions as indicated by assessment   - Educate patient/family on patient safety including physical limitations  - Instruct patient to call for assistance with activity based on assessment  - Modify environment to reduce risk of injury  - Consider OT/PT consult to assist with strengthening/mobility  Outcome: Progressing

## 2020-08-05 NOTE — PROGRESS NOTES
Pt hydrated as per orders  Port flushed and capped for use tomorrow  discharged in wc to [facility]

## 2020-08-06 ENCOUNTER — HOSPITAL ENCOUNTER (OUTPATIENT)
Dept: INFUSION CENTER | Facility: HOSPITAL | Age: 75
Discharge: HOME | End: 2020-08-06
Attending: INTERNAL MEDICINE
Payer: MEDICARE

## 2020-08-06 VITALS
OXYGEN SATURATION: 99 % | TEMPERATURE: 98 F | SYSTOLIC BLOOD PRESSURE: 134 MMHG | DIASTOLIC BLOOD PRESSURE: 74 MMHG | HEART RATE: 79 BPM | RESPIRATION RATE: 16 BRPM

## 2020-08-06 DIAGNOSIS — T45.1X5A CHEMOTHERAPY INDUCED NAUSEA AND VOMITING: ICD-10-CM

## 2020-08-06 DIAGNOSIS — R52 PAIN: ICD-10-CM

## 2020-08-06 DIAGNOSIS — R11.2 CHEMOTHERAPY INDUCED NAUSEA AND VOMITING: ICD-10-CM

## 2020-08-06 DIAGNOSIS — E86.0 DEHYDRATION: Primary | ICD-10-CM

## 2020-08-06 PROCEDURE — 96374 THER/PROPH/DIAG INJ IV PUSH: CPT

## 2020-08-06 PROCEDURE — 96361 HYDRATE IV INFUSION ADD-ON: CPT

## 2020-08-06 RX ORDER — KETOROLAC TROMETHAMINE 30 MG/ML
15 INJECTION, SOLUTION INTRAMUSCULAR; INTRAVENOUS ONCE
Status: CANCELLED
Start: 2020-08-07

## 2020-08-06 RX ORDER — KETOROLAC TROMETHAMINE 30 MG/ML
15 INJECTION, SOLUTION INTRAMUSCULAR; INTRAVENOUS ONCE
Status: COMPLETED | OUTPATIENT
Start: 2020-08-06 | End: 2020-08-06

## 2020-08-06 RX ORDER — KETOROLAC TROMETHAMINE 30 MG/ML
15 INJECTION, SOLUTION INTRAMUSCULAR; INTRAVENOUS ONCE
Status: CANCELLED
Start: 2020-08-06

## 2020-08-06 RX ADMIN — SODIUM CHLORIDE 1000 ML: 0.9 INJECTION, SOLUTION INTRAVENOUS at 13:31

## 2020-08-06 RX ADMIN — KETOROLAC TROMETHAMINE 15 MG: 30 INJECTION, SOLUTION INTRAMUSCULAR at 14:37

## 2020-08-06 NOTE — PROGRESS NOTES
Pt here for daily hydration, tolerated well without complications  Patient states she is following up with her PCP on Monday 8-10-20 regarding her back pain  Pt flagged down Hansa Jordan RN during hydration and stated she is having arm and leg pain which is ongoing for her  [place] [facility] called and spoke with Carla Peters RN/Dr Rajani Boss and order of Toradol 15mg was given to patient at end of treatment  Confirmed appt back tomorrow for hydration, AVS declined

## 2020-08-06 NOTE — PLAN OF CARE
Problem: Potential for Falls  Goal: Patient will remain free of falls  Description: INTERVENTIONS:  - Assess patient frequently for physical needs  -  Identify cognitive and physical deficits and behaviors that affect risk of falls    -  [place] fall precautions as indicated by assessment   - Educate patient/family on patient safety including physical limitations  - Instruct patient to call for assistance with activity based on assessment  - Modify environment to reduce risk of injury  - Consider OT/PT consult to assist with strengthening/mobility  Outcome: Progressing

## 2020-08-07 ENCOUNTER — HOSPITAL ENCOUNTER (OUTPATIENT)
Dept: INFUSION CENTER | Facility: HOSPITAL | Age: 75
Discharge: HOME | End: 2020-08-07
Attending: INTERNAL MEDICINE
Payer: MEDICARE

## 2020-08-07 DIAGNOSIS — R11.2 CHEMOTHERAPY INDUCED NAUSEA AND VOMITING: ICD-10-CM

## 2020-08-07 DIAGNOSIS — E86.0 DEHYDRATION: Primary | ICD-10-CM

## 2020-08-07 DIAGNOSIS — R52 PAIN: ICD-10-CM

## 2020-08-07 DIAGNOSIS — T45.1X5A CHEMOTHERAPY INDUCED NAUSEA AND VOMITING: ICD-10-CM

## 2020-08-07 PROCEDURE — 96360 HYDRATION IV INFUSION INIT: CPT

## 2020-08-07 RX ADMIN — SODIUM CHLORIDE 1000 ML: 0.9 INJECTION, SOLUTION INTRAVENOUS at 13:04

## 2020-08-13 ENCOUNTER — OFFICE VISIT (OUTPATIENT)
Dept: FAMILY MEDICINE CLINIC | Facility: CLINIC | Age: 75
End: 2020-08-13
Payer: MEDICARE

## 2020-08-13 VITALS
BODY MASS INDEX: 24.27 KG/M2 | OXYGEN SATURATION: 98 % | SYSTOLIC BLOOD PRESSURE: 132 MMHG | TEMPERATURE: 97.6 F | WEIGHT: 137 LBS | HEART RATE: 98 BPM | DIASTOLIC BLOOD PRESSURE: 75 MMHG

## 2020-08-13 DIAGNOSIS — R94.5 ABNORMAL LIVER FUNCTION: ICD-10-CM

## 2020-08-13 DIAGNOSIS — Z17.0 MALIGNANT NEOPLASM OF OVERLAPPING SITES OF RIGHT BREAST IN FEMALE, ESTROGEN RECEPTOR POSITIVE (HCC): ICD-10-CM

## 2020-08-13 DIAGNOSIS — M54.41 CHRONIC BILATERAL LOW BACK PAIN WITH BILATERAL SCIATICA: Primary | ICD-10-CM

## 2020-08-13 DIAGNOSIS — M54.42 CHRONIC BILATERAL LOW BACK PAIN WITH BILATERAL SCIATICA: Primary | ICD-10-CM

## 2020-08-13 DIAGNOSIS — R94.8 ABNORMAL POSITRON EMISSION TOMOGRAPHY (PET) SCAN: ICD-10-CM

## 2020-08-13 DIAGNOSIS — Z71.85 IMMUNIZATION COUNSELING: ICD-10-CM

## 2020-08-13 DIAGNOSIS — N30.00 ACUTE CYSTITIS WITHOUT HEMATURIA: ICD-10-CM

## 2020-08-13 DIAGNOSIS — C50.811 MALIGNANT NEOPLASM OF OVERLAPPING SITES OF RIGHT BREAST IN FEMALE, ESTROGEN RECEPTOR POSITIVE (HCC): ICD-10-CM

## 2020-08-13 DIAGNOSIS — G89.29 CHRONIC BILATERAL LOW BACK PAIN WITH BILATERAL SCIATICA: Primary | ICD-10-CM

## 2020-08-13 DIAGNOSIS — C79.31 BRAIN METASTASES (HCC): ICD-10-CM

## 2020-08-13 DIAGNOSIS — I10 BENIGN ESSENTIAL HYPERTENSION: ICD-10-CM

## 2020-08-13 DIAGNOSIS — Z12.11 SCREENING FOR COLON CANCER: ICD-10-CM

## 2020-08-13 PROCEDURE — 3075F SYST BP GE 130 - 139MM HG: CPT | Performed by: FAMILY MEDICINE

## 2020-08-13 PROCEDURE — 1160F RVW MEDS BY RX/DR IN RCRD: CPT | Performed by: FAMILY MEDICINE

## 2020-08-13 PROCEDURE — 3078F DIAST BP <80 MM HG: CPT | Performed by: FAMILY MEDICINE

## 2020-08-13 PROCEDURE — 1036F TOBACCO NON-USER: CPT | Performed by: FAMILY MEDICINE

## 2020-08-13 PROCEDURE — 99214 OFFICE O/P EST MOD 30 MIN: CPT | Performed by: FAMILY MEDICINE

## 2020-08-13 RX ORDER — ACETAMINOPHEN 500 MG
TABLET ORAL
Qty: 30 TABLET | Refills: 0 | COMMUNITY
Start: 2020-08-13 | End: 2020-12-02 | Stop reason: ALTCHOICE

## 2020-08-13 NOTE — PROGRESS NOTES
Assessment/Plan:       No problem-specific Assessment & Plan notes found for this encounter  Diagnoses and all orders for this visit:    Chronic bilateral low back pain with bilateral sciatica  Comments:  Left worse than right  Call if symptoms worse  Take Tylenol as directed and avoid Advil  Orders:  -     acetaminophen (TYLENOL) 500 mg tablet; Two tablet every 8 hours  -     Cancel: MRI lumbar spine wo contrast; Future  -     MRI lumbar spine wo contrast; Future    Malignant neoplasm of overlapping 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, estrogen receptor positive ([facility] [address] )  Comments: Following with Oncology    Abnormal positron emission tomography (PET) scan  Comments:  Abnormal PET scan of the lumbar spine  Orders:  -     MRI lumbar spine wo contrast; Future    Abnormal liver function    Brain metastases (HCC)  Comments:  Patient is going to have follow-up brain MRI next    Acute cystitis without hematuria  Comments:  Resolved  Orders:  -     UA (URINE) with reflex to Scope  -     Urine culture; Future    Immunization counseling  Comments:  Recommend Pneumovax and flu shot  Patient declined    Screening for colon cancer  Comments:  Patient declined    Benign essential hypertension  Comments:  Controlled        Patient Instructions   To follow up with test results      Orders Placed This Encounter   Procedures    Urine culture     Standing Status:   Future     Standing Expiration Date:   8/13/2021    MRI lumbar spine wo contrast     Standing Status:   Future     Standing Expiration Date:   8/13/2024     Scheduling Instructions: There is no preparation for this test  Please leave your jewelry and valuables at home, wedding rings are the exception  Magnetic nail polish must be removed prior to arrival for your test  Please bring your insurance cards, a form of photo ID and a list of your medications with you  Arrive 15 minutes prior to your appointment time in order to register   Please bring any prior CT or MRI studies of this area that were not performed at a [facility] facility  To schedule this appointment, please contact Central Scheduling at [phone]  Prior to your appointment, please make sure you complete the MRI Screening Form when you e-Check in for your appointment  This will be available starting 7 days before your appointment in [address]  You may receive an e-mail with an activation code if you do not have a [employer] account  If you do not have access to a device, we will complete your screening at your appointment  Order Specific Question:   What is the patient's sedation requirement? Answer:   No Sedation    UA (URINE) with reflex to Scope         Subjective:     Patient ID: Ana Rosa Minor is a 77 y o  female      HPI  Back pain  Patient developed low back pain started 2 months ago  Pain is persistent  Moderate sometimes more than moderate  Does radiate to both legs  Left more than the right  Patient does feel some weakness of the left leg  Denied numbness  Patient stated when she was a child she had broke her left ankle, and developed osteomyelitis send than her left leg is thinner than the right leg  Patient admit to history of back pain few years ago and she had epidural injection twice  And she had no problem enter recently  Denied injury  Patient with known breast cancer with metastases  Taking Advil p r n  Breast cancer with Mets  Patient has brain Mets she is going to repeat brain MRI next week  Denied headache, loss of vision, slurred speech, dizziness or numbness  Hypertension ,admit to low-salt diet  Urinary tract infection patient feels well has no dysuria, urinary frequency or hematuria  Test results    Lab done on July 20-20  Brain MRI done in May 2020 and PET scan done in March 2020 all reviewed and discussed result with patient    Review of Systems   Constitutional: Negative for activity change, appetite change, chills, fatigue, fever and unexpected weight change  HENT: Negative for congestion, ear discharge, ear pain, hearing loss, nosebleeds, rhinorrhea, sinus pressure, sore throat, tinnitus, trouble swallowing and voice change  Eyes: Negative for photophobia, pain and visual disturbance  Respiratory: Negative for cough, chest tightness, shortness of breath and wheezing  Cardiovascular: Negative for chest pain, palpitations and leg swelling  Gastrointestinal: Negative for abdominal pain, anal bleeding, blood in stool, constipation, diarrhea, nausea and vomiting  Endocrine: Negative for cold intolerance, heat intolerance, polydipsia and polyuria  Genitourinary: Negative for dysuria, frequency, hematuria and urgency  Musculoskeletal: Positive for back pain  Negative for arthralgias, gait problem, joint swelling, myalgias and neck pain  Skin: Negative for rash  Neurological: Negative for dizziness, tremors, seizures, syncope, weakness, light-headedness and headaches  Hematological: Negative for adenopathy  Does not bruise/bleed easily  Psychiatric/Behavioral: Negative for agitation, behavioral problems, confusion, dysphoric mood, hallucinations and sleep disturbance  The patient is not nervous/anxious  Objective:     Physical Exam  Nursing note reviewed  Constitutional:       General: She is not in acute distress  Appearance: Normal appearance  She is well-developed  HENT:      Head: Normocephalic and atraumatic  Eyes:      General: No scleral icterus  Extraocular Movements: Extraocular movements intact  Conjunctiva/sclera: Conjunctivae normal       Pupils: Pupils are equal, round, and reactive to light  Neck:      Musculoskeletal: Neck supple  Thyroid: No thyromegaly  Vascular: No carotid bruit or JVD  Cardiovascular:      Rate and Rhythm: Normal rate and regular rhythm  Heart sounds: Normal heart sounds  No murmur  Comments: Extremities    No edema  Pulmonary: Effort: Pulmonary effort is normal       Breath sounds: Normal breath sounds  Abdominal:      General: Bowel sounds are normal  There is no distension  Palpations: Abdomen is soft  There is no mass  Tenderness: There is no abdominal tenderness  There is no guarding or rebound  Hernia: No hernia is present  Musculoskeletal:         General: No swelling  Right lower leg: No edema  Left lower leg: No edema  Comments: Back  No spine or bone tenderness  Positive left leg raising  Has muscle atrophy of the left leg compared to the right leg   Lymphadenopathy:      Cervical: No cervical adenopathy  Skin:     Findings: No rash  Neurological:      Mental Status: She is alert and oriented to person, place, and time  Cranial Nerves: No cranial nerve deficit  Sensory: No sensory deficit  Motor: No weakness or abnormal muscle tone  Coordination: Coordination normal       Deep Tendon Reflexes: Reflexes normal       Comments: Gait is abnormal patient does drag her left leg with walking  Weak ankle reflex, bilateral  Normal Babinski reflex bilateral   Psychiatric:         Mood and Affect: Mood normal          Behavior: Behavior normal          Thought Content:  Thought content normal          Judgment: Judgment normal

## 2020-08-14 ENCOUNTER — TELEPHONE (OUTPATIENT)
Dept: FAMILY MEDICINE CLINIC | Facility: CLINIC | Age: 75
End: 2020-08-14

## 2020-08-14 NOTE — TELEPHONE ENCOUNTER
Called [facility] to start patients prior authorization for MRI of lumbar spine  Records uploaded to [facility] for clinical review   Tracking number [id number]

## 2020-08-17 ENCOUNTER — HOSPITAL ENCOUNTER (OUTPATIENT)
Dept: INFUSION CENTER | Facility: HOSPITAL | Age: 75
Discharge: HOME | End: 2020-08-17
Attending: INTERNAL MEDICINE
Payer: MEDICARE

## 2020-08-17 VITALS
HEART RATE: 92 BPM | SYSTOLIC BLOOD PRESSURE: 136 MMHG | RESPIRATION RATE: 16 BRPM | DIASTOLIC BLOOD PRESSURE: 78 MMHG | OXYGEN SATURATION: 98 % | TEMPERATURE: 97.3 F

## 2020-08-17 DIAGNOSIS — C50.811 MALIGNANT NEOPLASM OF OVERLAPPING SITES OF RIGHT BREAST IN FEMALE, ESTROGEN RECEPTOR POSITIVE (HCC): ICD-10-CM

## 2020-08-17 DIAGNOSIS — Z17.0 MALIGNANT NEOPLASM OF OVERLAPPING SITES OF RIGHT BREAST IN FEMALE, ESTROGEN RECEPTOR POSITIVE (HCC): ICD-10-CM

## 2020-08-17 DIAGNOSIS — E86.0 DEHYDRATION: ICD-10-CM

## 2020-08-17 DIAGNOSIS — T45.1X5A CHEMOTHERAPY INDUCED NAUSEA AND VOMITING: ICD-10-CM

## 2020-08-17 DIAGNOSIS — R52 PAIN: Primary | ICD-10-CM

## 2020-08-17 DIAGNOSIS — R11.2 CHEMOTHERAPY INDUCED NAUSEA AND VOMITING: ICD-10-CM

## 2020-08-17 LAB
ALBUMIN SERPL BCP-MCNC: 3.2 G/DL (ref 3–5.2)
ALP SERPL-CCNC: 44 U/L (ref 43–122)
ALT SERPL W P-5'-P-CCNC: 17 U/L (ref 9–52)
ANION GAP SERPL CALCULATED.3IONS-SCNC: 6 MMOL/L (ref 5–14)
AST SERPL W P-5'-P-CCNC: 27 U/L (ref 14–36)
BASOPHILS # BLD AUTO: 0 THOUSANDS/ΜL (ref 0–0.1)
BASOPHILS NFR BLD AUTO: 1 % (ref 0–1)
BILIRUB SERPL-MCNC: 1 MG/DL
BUN SERPL-MCNC: 8 MG/DL (ref 5–25)
CALCIUM SERPL-MCNC: 8.5 MG/DL (ref 8.4–10.2)
CHLORIDE SERPL-SCNC: 104 MMOL/L (ref 97–108)
CO2 SERPL-SCNC: 25 MMOL/L (ref 22–30)
CREAT SERPL-MCNC: 0.68 MG/DL (ref 0.6–1.2)
EOSINOPHIL # BLD AUTO: 0.1 THOUSAND/ΜL (ref 0–0.4)
EOSINOPHIL NFR BLD AUTO: 1 % (ref 0–6)
ERYTHROCYTE [DISTWIDTH] IN BLOOD BY AUTOMATED COUNT: 16.1 %
GFR SERPL CREATININE-BSD FRML MDRD: 86 ML/MIN/1.73SQ M
GLUCOSE SERPL-MCNC: 95 MG/DL (ref 70–99)
HCT VFR BLD AUTO: 34.3 % (ref 36–46)
HGB BLD-MCNC: 11.7 G/DL (ref 12–16)
LYMPHOCYTES # BLD AUTO: 1.1 THOUSANDS/ΜL (ref 0.5–4)
LYMPHOCYTES NFR BLD AUTO: 21 % (ref 25–45)
MAGNESIUM SERPL-MCNC: 1.7 MG/DL (ref 1.6–2.3)
MCH RBC QN AUTO: 31.7 PG (ref 26–34)
MCHC RBC AUTO-ENTMCNC: 34.2 G/DL (ref 31–36)
MCV RBC AUTO: 93 FL (ref 80–100)
MONOCYTES # BLD AUTO: 0.4 THOUSAND/ΜL (ref 0.2–0.9)
MONOCYTES NFR BLD AUTO: 7 % (ref 1–10)
NEUTROPHILS # BLD AUTO: 3.7 THOUSANDS/ΜL (ref 1.8–7.8)
NEUTS SEG NFR BLD AUTO: 70 % (ref 45–65)
PLATELET # BLD AUTO: 252 THOUSANDS/UL (ref 150–450)
PMV BLD AUTO: 7.4 FL (ref 8.9–12.7)
POTASSIUM SERPL-SCNC: 3.6 MMOL/L (ref 3.6–5)
PROT SERPL-MCNC: 6.6 G/DL (ref 5.9–8.4)
RBC # BLD AUTO: 3.7 MILLION/UL (ref 4–5.2)
SODIUM SERPL-SCNC: 135 MMOL/L (ref 137–147)
WBC # BLD AUTO: 5.2 THOUSAND/UL (ref 4.5–11)

## 2020-08-17 PROCEDURE — 86301 IMMUNOASSAY TUMOR CA 19-9: CPT

## 2020-08-17 PROCEDURE — 85025 COMPLETE CBC W/AUTO DIFF WBC: CPT

## 2020-08-17 PROCEDURE — 80053 COMPREHEN METABOLIC PANEL: CPT

## 2020-08-17 PROCEDURE — 83735 ASSAY OF MAGNESIUM: CPT

## 2020-08-17 PROCEDURE — 96360 HYDRATION IV INFUSION INIT: CPT

## 2020-08-17 PROCEDURE — 86300 IMMUNOASSAY TUMOR CA 15-3: CPT

## 2020-08-17 RX ADMIN — SODIUM CHLORIDE 1000 ML: 0.9 INJECTION, SOLUTION INTRAVENOUS at 12:53

## 2020-08-17 NOTE — PROGRESS NOTES
Pt here for daily hydration, central labs drawn per orders, tolerated well, declined Zofran today, pt tolerated hydration well without complications  Made appts next week and faxed to [facility], AVS provided

## 2020-08-18 ENCOUNTER — HOSPITAL ENCOUNTER (OUTPATIENT)
Dept: INFUSION CENTER | Facility: HOSPITAL | Age: 75
Discharge: HOME | End: 2020-08-18
Attending: INTERNAL MEDICINE
Payer: MEDICARE

## 2020-08-18 ENCOUNTER — HOSPITAL ENCOUNTER (OUTPATIENT)
Dept: INFUSION CENTER | Facility: HOSPITAL | Age: 75
Discharge: HOME | End: 2020-08-18
Attending: INTERNAL MEDICINE

## 2020-08-18 VITALS
DIASTOLIC BLOOD PRESSURE: 80 MMHG | RESPIRATION RATE: 18 BRPM | TEMPERATURE: 98.3 F | HEART RATE: 94 BPM | SYSTOLIC BLOOD PRESSURE: 123 MMHG

## 2020-08-18 DIAGNOSIS — R52 PAIN: ICD-10-CM

## 2020-08-18 DIAGNOSIS — R11.2 CHEMOTHERAPY INDUCED NAUSEA AND VOMITING: ICD-10-CM

## 2020-08-18 DIAGNOSIS — E86.0 DEHYDRATION: Primary | ICD-10-CM

## 2020-08-18 DIAGNOSIS — T45.1X5A CHEMOTHERAPY INDUCED NAUSEA AND VOMITING: ICD-10-CM

## 2020-08-18 LAB
CANCER AG15-3 SERPL-ACNC: 27.3 U/ML (ref 0–25)
CANCER AG19-9 SERPL-ACNC: 9 U/ML (ref 0–35)

## 2020-08-18 PROCEDURE — 96360 HYDRATION IV INFUSION INIT: CPT

## 2020-08-18 RX ADMIN — SODIUM CHLORIDE 1000 ML: 0.9 INJECTION, SOLUTION INTRAVENOUS at 12:34

## 2020-08-18 NOTE — PLAN OF CARE
Problem: Potential for Falls  Goal: Patient will remain free of falls  Description: INTERVENTIONS:  - Assess patient frequently for physical needs  -  Identify cognitive and physical deficits and behaviors that affect risk of falls    -  [place] fall precautions as indicated by assessment   - Educate patient/family on patient safety including physical limitations  - Instruct patient to call for assistance with activity based on assessment  - Modify environment to reduce risk of injury  - Consider OT/PT consult to assist with strengthening/mobility  Outcome: Progressing

## 2020-08-18 NOTE — PROGRESS NOTES
Pt tolerated IV hydration without difficulty  Declined IV zofran today  Reports no nausea  Port flushed per protocol  Kept accessed for tomorrow's tx  AVS declined  Escorted to lobby in w/c to await [facility] transport

## 2020-08-19 ENCOUNTER — HOSPITAL ENCOUNTER (OUTPATIENT)
Dept: INFUSION CENTER | Facility: HOSPITAL | Age: 75
Discharge: HOME | End: 2020-08-19
Attending: INTERNAL MEDICINE
Payer: MEDICARE

## 2020-08-19 VITALS
HEART RATE: 80 BPM | TEMPERATURE: 97.1 F | SYSTOLIC BLOOD PRESSURE: 135 MMHG | RESPIRATION RATE: 18 BRPM | DIASTOLIC BLOOD PRESSURE: 73 MMHG

## 2020-08-19 DIAGNOSIS — T45.1X5A CHEMOTHERAPY INDUCED NAUSEA AND VOMITING: ICD-10-CM

## 2020-08-19 DIAGNOSIS — R52 PAIN: Primary | ICD-10-CM

## 2020-08-19 DIAGNOSIS — R11.2 CHEMOTHERAPY INDUCED NAUSEA AND VOMITING: ICD-10-CM

## 2020-08-19 DIAGNOSIS — E86.0 DEHYDRATION: ICD-10-CM

## 2020-08-19 PROCEDURE — 96360 HYDRATION IV INFUSION INIT: CPT

## 2020-08-19 RX ADMIN — SODIUM CHLORIDE 1000 ML: 0.9 INJECTION, SOLUTION INTRAVENOUS at 12:37

## 2020-08-20 ENCOUNTER — HOSPITAL ENCOUNTER (OUTPATIENT)
Dept: INFUSION CENTER | Facility: HOSPITAL | Age: 75
Discharge: HOME | End: 2020-08-20
Attending: INTERNAL MEDICINE
Payer: MEDICARE

## 2020-08-20 VITALS
HEART RATE: 84 BPM | RESPIRATION RATE: 18 BRPM | SYSTOLIC BLOOD PRESSURE: 131 MMHG | DIASTOLIC BLOOD PRESSURE: 79 MMHG | TEMPERATURE: 97.1 F

## 2020-08-20 DIAGNOSIS — E86.0 DEHYDRATION: ICD-10-CM

## 2020-08-20 DIAGNOSIS — R11.2 CHEMOTHERAPY INDUCED NAUSEA AND VOMITING: ICD-10-CM

## 2020-08-20 DIAGNOSIS — R52 PAIN: Primary | ICD-10-CM

## 2020-08-20 DIAGNOSIS — T45.1X5A CHEMOTHERAPY INDUCED NAUSEA AND VOMITING: ICD-10-CM

## 2020-08-20 PROCEDURE — 96360 HYDRATION IV INFUSION INIT: CPT

## 2020-08-20 RX ADMIN — SODIUM CHLORIDE 1000 ML: 0.9 INJECTION, SOLUTION INTRAVENOUS at 12:40

## 2020-08-20 NOTE — PLAN OF CARE
Problem: Potential for Falls  Goal: Patient will remain free of falls  Description: INTERVENTIONS:  - Assess patient frequently for physical needs  -  Identify cognitive and physical deficits and behaviors that affect risk of falls    -  [place] fall precautions as indicated by assessment   - Educate patient/family on patient safety including physical limitations  - Instruct patient to call for assistance with activity based on assessment  - Modify environment to reduce risk of injury  - Consider OT/PT consult to assist with strengthening/mobility  Outcome: Progressing

## 2020-08-20 NOTE — PROGRESS NOTES
Pt tolerated IV hydration without difficulty  Declined zofran today  Next appt confirmed and AVS provided  Escorted to lobby in w/c for [facility] transport

## 2020-08-21 ENCOUNTER — OFFICE VISIT (OUTPATIENT)
Dept: HEMATOLOGY ONCOLOGY | Facility: CLINIC | Age: 75
End: 2020-08-21
Payer: MEDICARE

## 2020-08-21 VITALS
HEART RATE: 101 BPM | RESPIRATION RATE: 16 BRPM | TEMPERATURE: 98 F | SYSTOLIC BLOOD PRESSURE: 134 MMHG | HEIGHT: 63 IN | OXYGEN SATURATION: 99 % | DIASTOLIC BLOOD PRESSURE: 86 MMHG | WEIGHT: 138.2 LBS | BODY MASS INDEX: 24.49 KG/M2

## 2020-08-21 DIAGNOSIS — D64.9 ANEMIA, UNSPECIFIED TYPE: ICD-10-CM

## 2020-08-21 DIAGNOSIS — C79.31 BRAIN METASTASIS (HCC): ICD-10-CM

## 2020-08-21 DIAGNOSIS — C50.811 MALIGNANT NEOPLASM OF OVERLAPPING SITES OF RIGHT BREAST IN FEMALE, ESTROGEN RECEPTOR POSITIVE (HCC): Primary | ICD-10-CM

## 2020-08-21 DIAGNOSIS — Z17.0 MALIGNANT NEOPLASM OF OVERLAPPING SITES OF RIGHT BREAST IN FEMALE, ESTROGEN RECEPTOR POSITIVE (HCC): Primary | ICD-10-CM

## 2020-08-21 PROCEDURE — 99214 OFFICE O/P EST MOD 30 MIN: CPT | Performed by: INTERNAL MEDICINE

## 2020-08-21 PROCEDURE — 1160F RVW MEDS BY RX/DR IN RCRD: CPT | Performed by: INTERNAL MEDICINE

## 2020-08-21 PROCEDURE — 3079F DIAST BP 80-89 MM HG: CPT | Performed by: INTERNAL MEDICINE

## 2020-08-21 PROCEDURE — 3008F BODY MASS INDEX DOCD: CPT | Performed by: INTERNAL MEDICINE

## 2020-08-21 PROCEDURE — 3075F SYST BP GE 130 - 139MM HG: CPT | Performed by: INTERNAL MEDICINE

## 2020-08-21 PROCEDURE — 1036F TOBACCO NON-USER: CPT | Performed by: INTERNAL MEDICINE

## 2020-08-21 NOTE — PROGRESS NOTES
Hematology/Oncology Outpatient Follow-up  Basia Putnam 77 y o  female 1945 [id number]    Date:  8/21/2020        Assessment and Plan:  1  Malignant neoplasm of overlapping 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, estrogen receptor positive ([address] )  The patient seems to be tolerating the Tykerb out the dose of 1000 mg once a day  I did advise the patient to hydrate herself very well  She seems to be interested in continuing the IV hydration on a daily basis in the infusion center for the time being  We will aim to increase the dose of the Tykerb to 1250 at the next visit  Regarding, her back pain at this will be investigated with lumbar MRI early next week  We will wait for the results findings and act accordingly   - CBC and differential; Future  - Cancer antigen 27 29; Future    2  Brain metastasis ([facility] [address] )  She is status post whole brain radiation  She is scheduled for an MRI of the brain early next week  3  Anemia, unspecified type  Workup will be done prior to her next visit  - CBC and differential; Future  - Comprehensive metabolic panel; Future  - Iron Panel (Includes Ferritin, Iron Sat%, Iron, and TIBC); Future  - Ferritin; Future  - Vitamin B12; Future  - Folate; Future  - Occult Blood, Fecal Immunochemical; Future        HPI:  The patient came today for a follow-up visit  She is tolerating the Tykerb at the dose of 1000 mg once a day without significant nausea or vomiting at this point  She continues to get IV hydration almost on a daily basis  The patient complained today about significant lower back pain which is going to be evaluated with lumbar MRI next week  She is also due for another brain MRI next week as well  Her most recent blood work from 08/17 showed mild decline of the CA 15-3 tumor marker  Hemoglobin is 11 7 with normal white cells and platelets  Her CMP was normal   She denies bleeding from any sites    Oncology History   Nay Syed is a 77 y o female with a history of right breast cancer  She completed whole brain radiation for brain metastasis 4/10/20  She returns today for her first follow-up  3/27/20 PET scan- 1  Increasing size of metastatic right axillary node  2   Nonspecific increased activity in the lower spinal canal at the T12-L1 level  This may be reassessed on follow-up exam   Contrast MR evaluation may also be considered if clinically warranted  3   Decreased nonspecific left adrenal activity and stable right perihilar activity  Continued follow-up recommended  4   Otherwise no new hypermetabolic metastases visualized  5/20/20 Dr Greg Abrams- tolerating lapatinib at the current dose of 1500 mg daily which started 4/20/20  She is also continuing with anastrozole daily  Briefly discussed potential need to add capecitabine in the near future  She is anxious about her overall condition, refused low dose xanax  Asked her to discuss the increased activity seen on the recent imaging studies in the spinal canal at the T12-L1 level with the Radiation Oncology team     5/22/20 MRI brain- There are 4 parenchymal metastases are again identified all which have decreased in size in the interval  There are no new lesions identified  Surrounding FLAIR abnormality in the left occipital lesion has also decreased  6/10/20 Dr Greg Abrams     Malignant neoplasm of overlapping 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, estrogen receptor positive ([facility] [address] )   2015 -  Hormone Therapy    Anastrozole     5/28/2015 Initial Diagnosis    Metastatic breast cancer ([address] )  (recurrence)     6/30/2015 -  Chemotherapy    1-Taxotere, Herceptin, Perjeta started in [place] 2015 x6 cycles  2-Herceptin and Perjeta alone were continued since the patient was not interested in continue the Taxotere  3-Herceptin and Perjeta were put on hold due to decline of her ejection fraction around May 2017    4-low dose weekly Taxol was started in July 2017  5-Taxol was switched to every other week basis     4/14/2019 - 6/9/2019 Chemotherapy PACLitaxel (TAXOL) 144 6 mg in sodium chloride 0 9 % 250 mL chemo IVPB, 80 mg/m2, Intravenous, Once, 2 of 6 cycles     12/4/2019 - 12/31/2019 Chemotherapy    PACLitaxel (TAXOL) 142 2 mg in sodium chloride 0 9 % 250 mL chemo IVPB, 80 mg/m2 = 142 2 mg, Intravenous, Once, 1 of 6 cycles  Administration: 142 2 mg (12/4/2019), 142 2 mg (12/18/2019)     1/8/2020 -  Chemotherapy    PACLitaxel (TAXOL) 141 6 mg in sodium chloride 0 9 % 250 mL chemo IVPB, 80 mg/m2 = 141 6 mg, Intravenous, Once, 4 of 6 cycles  Administration: 141 6 mg (1/8/2020), 141 6 mg (1/29/2020), 141 6 mg (2/19/2020)  trastuzumab (HERCEPTIN) 600 mg in sodium chloride 0 9 % 250 mL chemo infusion, 609 mg, Intravenous, Once, 4 of 6 cycles  Administration: 600 mg (1/8/2020), 450 mg (1/29/2020), 450 mg (2/19/2020)      Chemotherapy    PACLitaxel (TAXOL) 144 6 mg in sodium chloride 0 9 % 250 mL chemo IVPB, 80 mg/m2, Intravenous, Once, 2 of 6 cycles    PACLitaxel (TAXOL) 142 2 mg in sodium chloride 0 9 % 250 mL chemo IVPB, 80 mg/m2 = 142 2 mg, Intravenous, Once, 1 of 6 cycles    Administration: 142 2 mg (12/4/2019), 142 2 mg (12/18/2019)    PACLitaxel (TAXOL) 141 6 mg in sodium chloride 0 9 % 250 mL chemo IVPB, 80 mg/m2, Intravenous, Once, 0 of 12 cycles        trastuzumab (HERCEPTIN) 304 mg in sodium chloride 0 9 % 250 mL chemo infusion, 4 mg/kg, Intravenous, Once, 0 of 26 cycles    PACLitaxel (TAXOL) 141 6 mg in sodium chloride 0 9 % 250 mL chemo IVPB, 80 mg/m2 = 141 6 mg, Intravenous, Once, 4 of 6 cycles    Administration: 141 6 mg (1/8/2020), 141 6 mg (1/29/2020), 141 6 mg (2/19/2020)        trastuzumab (HERCEPTIN) 600 mg in sodium chloride 0 9 % 250 mL chemo infusion, 609 mg, Intravenous, Once, 4 of 6 cycles    Administration: 600 mg (1/8/2020), 450 mg (1/29/2020), 450 mg (2/19/2020)        Chemotherapy    PACLitaxel (TAXOL) 144 6 mg in sodium chloride 0 9 % 250 mL chemo IVPB, 80 mg/m2, Intravenous, Once, 2 of 6 cycles    PACLitaxel (TAXOL) 142 2 mg in sodium chloride 0 9 % 250 mL chemo IVPB, 80 mg/m2 = 142 2 mg, Intravenous, Once, 1 of 6 cycles    Administration: 142 2 mg (12/4/2019), 142 2 mg (12/18/2019)    PACLitaxel (TAXOL) 141 6 mg in sodium chloride 0 9 % 250 mL chemo IVPB, 80 mg/m2, Intravenous, Once, 0 of 12 cycles        trastuzumab (HERCEPTIN) 304 mg in sodium chloride 0 9 % 250 mL chemo infusion, 4 mg/kg, Intravenous, Once, 0 of 26 cycles    PACLitaxel (TAXOL) 141 6 mg in sodium chloride 0 9 % 250 mL chemo IVPB, 80 mg/m2 = 141 6 mg, Intravenous, Once, 4 of 6 cycles    Administration: 141 6 mg (1/8/2020), 141 6 mg (1/29/2020), 141 6 mg (2/19/2020)        trastuzumab (HERCEPTIN) 600 mg in sodium chloride 0 9 % 250 mL chemo infusion, 609 mg, Intravenous, Once, 4 of 6 cycles    Administration: 600 mg (1/8/2020), 450 mg (1/29/2020), 450 mg (2/19/2020)      Lapatinib 1500 mg once a day was started on 04/20/2020 after she was found to have metastatic disease to the brain, status post whole brain radiation  Brain metastasis ([facility] [address] )   3/30/2020 - 4/10/2020 Radiation    Plan ID Energy Fractions Dose per Fraction (cGy) Dose Correction (cGy) Total Dose Delivered (cGy) Elapsed Days   Whole Brai # 6X 10 / 10 300 0 3,000 11          4/9/2020 Initial Diagnosis    Brain metastasis (H[facility])         Interval history:    ROS: Review of Systems   Constitutional: Positive for fatigue  Negative for activity change, appetite change, chills, diaphoresis, fever and unexpected weight change  HENT: Negative for congestion, dental problem, ear discharge, ear pain, facial swelling, hearing loss, mouth sores, nosebleeds, postnasal drip, sore throat, tinnitus and trouble swallowing  Eyes: Negative for discharge, redness, itching and visual disturbance  Respiratory: Negative for cough, chest tightness, shortness of breath and wheezing  Cardiovascular: Negative for chest pain, palpitations and leg swelling     Gastrointestinal: Negative for abdominal distention, abdominal pain, anal bleeding, blood in stool, constipation, diarrhea, nausea and vomiting  Genitourinary: Negative for difficulty urinating, dysuria, flank pain, frequency, hematuria and urgency  Musculoskeletal: Positive for back pain  Negative for arthralgias, gait problem, joint swelling, myalgias and neck pain  Skin: Negative for color change, pallor, rash and wound  Neurological: Negative for dizziness, syncope, speech difficulty, weakness, light-headedness, numbness and headaches  Hematological: Negative for adenopathy  Does not bruise/bleed easily  Psychiatric/Behavioral: Negative for agitation, behavioral problems, confusion and sleep disturbance         Past Medical History:   Diagnosis Date    Brain cancer ([facility] [address] )     [facility] cancer ([facility] )     Hypertension     Lymphedema of right arm     Vitamin B12 deficiency        Past Surgical History:   Procedure Laterality Date    APPENDECTOMY      BREAST SURGERY      bilat mastectomy-right total mastectomy and prophylactic left total mastectomy-2005    KNEE SURGERY      right knee replacement 2014 in [address] [place] [address] Bilateral        Social History     Socioeconomic History    Marital status: Single     Spouse name: None    Number of children: None    Years of education: None    Highest education level: None   Occupational History    None   Social Needs    Financial resource strain: None    Food insecurity     Worry: None     Inability: None    Transportation needs     Medical: None     Non-medical: None   Tobacco Use    Smoking status: Never Smoker    Smokeless tobacco: Never Used    Tobacco comment: no passive smoke exposure   Substance and Sexual Activity    Alcohol use: Never     Frequency: Never     Binge frequency: Never    Drug use: No    Sexual activity: None   Lifestyle    Physical activity     Days per week: None     Minutes per session: None    Stress: None   Relationships    Social connections     Talks on phone: None     Gets together: None     Attends [employer] service: None     Active member of club or organization: None     Attends meetings of clubs or organizations: None     Relationship status: None    Intimate partner violence     Fear of current or ex partner: None     Emotionally abused: None     Physically abused: None     Forced sexual activity: None   Other Topics Concern    None   Social History Narrative    None       Family History   Problem Relation Age of Onset    Asthma Mother     Osteoarthritis Father     Bone cancer Paternal Aunt         bone cancer       Allergies   Allergen Reactions    Penicillins Rash         Current Outpatient Medications:     acetaminophen (TYLENOL) 500 mg tablet, Two tablet every 8 hours, Disp: 30 tablet, Rfl: 0    anastrozole (ARIMIDEX) 1 mg tablet, Take 1 tablet (1 mg total) by mouth every 24 hours, Disp: 90 tablet, Rfl: 4    cholecalciferol (VITAMIN D3) 1,000 units tablet, Take 1 tablet (1,000 Units total) by mouth daily, Disp: 30 tablet, Rfl: 0    Cyanocobalamin 1000 MCG/ML KIT, Inject as directed every 6 (six) months, Disp: , Rfl:     escitalopram (LEXAPRO) 5 mg tablet, Take 1 tablet (5 mg total) by mouth daily, Disp: 30 tablet, Rfl: 5    esomeprazole (NexIUM) 40 MG capsule, TAKE 1 CAPSULE BY MOUTH ONCE DAILY, Disp: 90 capsule, Rfl: 0    megestrol (MEGACE) 400 mg/10 mL, Take 400 mg by mouth daily, Disp: 600 mL, Rfl: 0    Misc  Devices (ILLUSIONS C BREAST PROSTHESIS) MISC, 1 breast prosthesis, Disp: 1 each, Rfl: 0    Misc   Devices (REFLECTIONS C BREAST PROSTHES) MISC, Dispense 1 breast prosthesis, Disp: 1 each, Rfl: 0    nebivolol (Bystolic) 5 mg tablet, Take 1 tablet (5 mg total) by mouth daily, Disp: 90 tablet, Rfl: 3    ondansetron (ZOFRAN) 4 mg tablet, Take 1 tablet (4 mg total) by mouth every 6 (six) hours, Disp: 20 tablet, Rfl: 0    pantoprazole (PROTONIX) 40 mg tablet, , Disp: , Rfl:     potassium chloride (K-DUR,KLOR-CON) 20 mEq tablet, , Disp: , Rfl:    scopolamine (TRANSDERM-SCOP) 1 5 mg/3 days TD 72 hr patch, Place 1 patch on the skin every third day, Disp: 10 patch, Rfl: 0    TYKERB 250 MG tablet, , Disp: , Rfl:     indapamide (LOZOL) 1 25 mg tablet, Take 1 tablet (1 25 mg total) by mouth every morning (Patient not taking: Reported on 6/11/2020), Disp: 30 tablet, Rfl: 1    lisinopril (ZESTRIL) 10 mg tablet, Take 1 tablet (10 mg total) by mouth daily (Patient not taking: Reported on 6/11/2020), Disp: 90 tablet, Rfl: 3    potassium chloride (KLOR-CON) 20 mEq packet, Take 20 mEq by mouth daily (Patient not taking: Reported on 8/13/2020), Disp: 30 packet, Rfl: 0    predniSONE 20 mg tablet, Take 1 tablet (20 mg total) by mouth daily (Patient not taking: Reported on 6/11/2020), Disp: 60 tablet, Rfl: 2  No current facility-administered medications for this visit  Facility-Administered Medications Ordered in Other Visits:     ondansetron (ZOFRAN) 8 mg in sodium chloride 0 9 % 50 mL IVPB, 8 mg, Intravenous, Once, Reina Araujo MD    ondansetron (ZOFRAN) 8 mg in sodium chloride 0 9 % 50 mL IVPB, 8 mg, Intravenous, Once, Reina Araujo MD    ondansetron (ZOFRAN) 8 mg in sodium chloride 0 9 % 50 mL IVPB, 8 mg, Intravenous, Once, Reina Araujo MD      Physical Exam:  /86 (BP Location: Left arm, Patient Position: Sitting, Cuff Size: Adult)   Pulse 101   Temp 98 °F (36 7 °C) (Tympanic)   Resp 16   Ht 5' 3" (1 6 m)   Wt 62 7 kg (138 lb 3 2 oz)   SpO2 99%   BMI 24 48 kg/m²     Physical Exam  Constitutional:       General: She is not in acute distress  Appearance: She is well-developed  She is not diaphoretic  HENT:      Head: Normocephalic and atraumatic  Eyes:      General: No scleral icterus  Right eye: No discharge  Left eye: No discharge  Conjunctiva/sclera: Conjunctivae normal       Pupils: Pupils are equal, round, and reactive to light  Neck:      Musculoskeletal: Normal range of motion and neck supple        Thyroid: No thyromegaly  Vascular: No JVD  Trachea: No tracheal deviation  Cardiovascular:      Rate and Rhythm: Normal rate and regular rhythm  Heart sounds: Normal heart sounds  No murmur  No friction rub  Pulmonary:      Effort: Pulmonary effort is normal  No respiratory distress  Breath sounds: Normal breath sounds  No stridor  No wheezing or rales  Chest:      Chest wall: No tenderness  Abdominal:      General: Bowel sounds are normal  There is no distension  Palpations: Abdomen is soft  There is no hepatomegaly, splenomegaly or mass  Tenderness: There is no abdominal tenderness  There is no guarding or rebound  Musculoskeletal: Normal range of motion  General: No tenderness or deformity  Lymphadenopathy:      Cervical: No cervical adenopathy  Skin:     General: Skin is warm and dry  Coloration: Skin is not pale  Findings: No erythema or rash  Neurological:      Mental Status: She is alert and oriented to person, place, and time  Cranial Nerves: No cranial nerve deficit  Coordination: Coordination normal       Deep Tendon Reflexes: Reflexes are normal and symmetric  Psychiatric:         Behavior: Behavior normal          Thought Content: Thought content normal          Judgment: Judgment normal            Labs:  Lab Results   Component Value Date    WBC 5 20 08/17/2020    HGB 11 7 (L) 08/17/2020    HCT 34 3 (L) 08/17/2020    MCV 93 08/17/2020     08/17/2020     Lab Results   Component Value Date     06/18/2018    K 3 6 08/17/2020     08/17/2020    CO2 25 08/17/2020    ANIONGAP 8 06/18/2018    BUN 8 08/17/2020    CREATININE 0 68 08/17/2020    GLUF 89 07/20/2020    CALCIUM 8 5 08/17/2020    AST 27 08/17/2020    ALT 17 08/17/2020    ALKPHOS 44 08/17/2020    PROT 7 1 06/18/2018    BILITOT 0 3 06/18/2018    EGFR 86 08/17/2020     No results found for: TSH    Patient voiced understanding and agreement in the above discussion   Aware to contact our office with questions/symptoms in the interim

## 2020-08-24 ENCOUNTER — HOSPITAL ENCOUNTER (OUTPATIENT)
Dept: MRI IMAGING | Facility: HOSPITAL | Age: 75
Discharge: HOME | End: 2020-08-24
Payer: MEDICARE

## 2020-08-24 ENCOUNTER — HOSPITAL ENCOUNTER (OUTPATIENT)
Dept: INFUSION CENTER | Facility: HOSPITAL | Age: 75
Discharge: HOME | End: 2020-08-24
Attending: INTERNAL MEDICINE

## 2020-08-24 ENCOUNTER — HOSPITAL ENCOUNTER (OUTPATIENT)
Dept: MRI IMAGING | Facility: HOSPITAL | Age: 75
Discharge: HOME | End: 2020-08-24
Attending: FAMILY MEDICINE
Payer: MEDICARE

## 2020-08-24 DIAGNOSIS — C50.811 MALIGNANT NEOPLASM OF OVERLAPPING SITES OF RIGHT BREAST IN FEMALE, ESTROGEN RECEPTOR POSITIVE (HCC): ICD-10-CM

## 2020-08-24 DIAGNOSIS — Z79.811 USE OF AROMATASE INHIBITORS: ICD-10-CM

## 2020-08-24 DIAGNOSIS — Z17.0 MALIGNANT NEOPLASM OF OVERLAPPING SITES OF RIGHT BREAST IN FEMALE, ESTROGEN RECEPTOR POSITIVE (HCC): ICD-10-CM

## 2020-08-24 DIAGNOSIS — G89.29 CHRONIC BILATERAL LOW BACK PAIN WITH BILATERAL SCIATICA: ICD-10-CM

## 2020-08-24 DIAGNOSIS — R94.8 ABNORMAL POSITRON EMISSION TOMOGRAPHY (PET) SCAN: ICD-10-CM

## 2020-08-24 DIAGNOSIS — M54.41 CHRONIC BILATERAL LOW BACK PAIN WITH BILATERAL SCIATICA: ICD-10-CM

## 2020-08-24 DIAGNOSIS — M54.42 CHRONIC BILATERAL LOW BACK PAIN WITH BILATERAL SCIATICA: ICD-10-CM

## 2020-08-24 DIAGNOSIS — C79.31 BRAIN METASTASIS (HCC): ICD-10-CM

## 2020-08-24 PROCEDURE — G1004 CDSM NDSC: HCPCS

## 2020-08-24 PROCEDURE — 72148 MRI LUMBAR SPINE W/O DYE: CPT

## 2020-08-24 PROCEDURE — 70553 MRI BRAIN STEM W/O & W/DYE: CPT

## 2020-08-24 PROCEDURE — A9585 GADOBUTROL INJECTION: HCPCS | Performed by: RADIOLOGY

## 2020-08-24 RX ADMIN — GADOBUTROL 6 ML: 604.72 INJECTION INTRAVENOUS at 11:34

## 2020-08-25 ENCOUNTER — HOSPITAL ENCOUNTER (OUTPATIENT)
Dept: INFUSION CENTER | Facility: HOSPITAL | Age: 75
Discharge: HOME | End: 2020-08-25
Attending: INTERNAL MEDICINE
Payer: MEDICARE

## 2020-08-25 ENCOUNTER — TELEPHONE (OUTPATIENT)
Dept: FAMILY MEDICINE CLINIC | Facility: CLINIC | Age: 75
End: 2020-08-25

## 2020-08-25 VITALS
OXYGEN SATURATION: 98 % | TEMPERATURE: 97.2 F | DIASTOLIC BLOOD PRESSURE: 70 MMHG | SYSTOLIC BLOOD PRESSURE: 129 MMHG | HEART RATE: 82 BPM | RESPIRATION RATE: 16 BRPM

## 2020-08-25 DIAGNOSIS — R11.2 CHEMOTHERAPY INDUCED NAUSEA AND VOMITING: ICD-10-CM

## 2020-08-25 DIAGNOSIS — M48.061 BILATERAL STENOSIS OF LATERAL RECESS OF LUMBAR SPINE: Primary | ICD-10-CM

## 2020-08-25 DIAGNOSIS — T45.1X5A CHEMOTHERAPY INDUCED NAUSEA AND VOMITING: ICD-10-CM

## 2020-08-25 DIAGNOSIS — E86.0 DEHYDRATION: Primary | ICD-10-CM

## 2020-08-25 DIAGNOSIS — R52 PAIN: ICD-10-CM

## 2020-08-25 PROCEDURE — 96360 HYDRATION IV INFUSION INIT: CPT

## 2020-08-25 RX ADMIN — SODIUM CHLORIDE 1000 ML: 0.9 INJECTION, SOLUTION INTRAVENOUS at 12:49

## 2020-08-25 NOTE — PROGRESS NOTES
Pt here for daily hydration, tolerated well without complications, pt states she is feeling well today and declined the zofran  Confirmed appt back tomorrow

## 2020-08-25 NOTE — TELEPHONE ENCOUNTER
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

## 2020-08-25 NOTE — PLAN OF CARE
Problem: Potential for Falls  Goal: Patient will remain free of falls  Description: INTERVENTIONS:  - Assess patient frequently for physical needs  -  Identify cognitive and physical deficits and behaviors that affect risk of falls    -  [place] fall precautions as indicated by assessment   - Educate patient/family on patient safety including physical limitations  - Instruct patient to call for assistance with activity based on assessment  - Modify environment to reduce risk of injury  - Consider OT/PT consult to assist with strengthening/mobility  Outcome: Progressing

## 2020-08-26 ENCOUNTER — HOSPITAL ENCOUNTER (OUTPATIENT)
Dept: INFUSION CENTER | Facility: HOSPITAL | Age: 75
Discharge: HOME | End: 2020-08-26
Attending: INTERNAL MEDICINE
Payer: MEDICARE

## 2020-08-26 VITALS
SYSTOLIC BLOOD PRESSURE: 118 MMHG | RESPIRATION RATE: 18 BRPM | DIASTOLIC BLOOD PRESSURE: 75 MMHG | TEMPERATURE: 96.9 F | HEART RATE: 76 BPM

## 2020-08-26 DIAGNOSIS — R11.2 CHEMOTHERAPY INDUCED NAUSEA AND VOMITING: ICD-10-CM

## 2020-08-26 DIAGNOSIS — R52 PAIN: ICD-10-CM

## 2020-08-26 DIAGNOSIS — E86.0 DEHYDRATION: Primary | ICD-10-CM

## 2020-08-26 DIAGNOSIS — T45.1X5A CHEMOTHERAPY INDUCED NAUSEA AND VOMITING: ICD-10-CM

## 2020-08-26 PROCEDURE — 96360 HYDRATION IV INFUSION INIT: CPT

## 2020-08-26 RX ADMIN — SODIUM CHLORIDE 1000 ML: 0.9 INJECTION, SOLUTION INTRAVENOUS at 12:31

## 2020-08-27 ENCOUNTER — HOSPITAL ENCOUNTER (OUTPATIENT)
Dept: INFUSION CENTER | Facility: HOSPITAL | Age: 75
Discharge: HOME | End: 2020-08-27
Attending: INTERNAL MEDICINE
Payer: MEDICARE

## 2020-08-27 VITALS
SYSTOLIC BLOOD PRESSURE: 149 MMHG | RESPIRATION RATE: 18 BRPM | DIASTOLIC BLOOD PRESSURE: 90 MMHG | HEART RATE: 74 BPM | TEMPERATURE: 97.3 F

## 2020-08-27 DIAGNOSIS — R52 PAIN: Primary | ICD-10-CM

## 2020-08-27 DIAGNOSIS — E86.0 DEHYDRATION: ICD-10-CM

## 2020-08-27 DIAGNOSIS — R11.2 CHEMOTHERAPY INDUCED NAUSEA AND VOMITING: ICD-10-CM

## 2020-08-27 DIAGNOSIS — T45.1X5A CHEMOTHERAPY INDUCED NAUSEA AND VOMITING: ICD-10-CM

## 2020-08-27 PROCEDURE — 96360 HYDRATION IV INFUSION INIT: CPT

## 2020-08-27 RX ADMIN — SODIUM CHLORIDE 1000 ML: 0.9 INJECTION, SOLUTION INTRAVENOUS at 12:33

## 2020-08-27 NOTE — PROGRESS NOTES
Pt tolerated hydration well  No current issues with nausea and has therefore deferred zofran today  Voiding frequently  Port flushed and left accessed for use tomorrow  Discharged in wc to [facility]pedro

## 2020-08-28 ENCOUNTER — HOSPITAL ENCOUNTER (OUTPATIENT)
Dept: INFUSION CENTER | Facility: HOSPITAL | Age: 75
Discharge: HOME | End: 2020-08-28
Attending: INTERNAL MEDICINE
Payer: MEDICARE

## 2020-08-28 VITALS
SYSTOLIC BLOOD PRESSURE: 135 MMHG | DIASTOLIC BLOOD PRESSURE: 85 MMHG | HEART RATE: 78 BPM | RESPIRATION RATE: 18 BRPM | TEMPERATURE: 97.7 F

## 2020-08-28 DIAGNOSIS — E86.0 DEHYDRATION: Primary | ICD-10-CM

## 2020-08-28 DIAGNOSIS — T45.1X5A CHEMOTHERAPY INDUCED NAUSEA AND VOMITING: ICD-10-CM

## 2020-08-28 DIAGNOSIS — R11.2 CHEMOTHERAPY INDUCED NAUSEA AND VOMITING: ICD-10-CM

## 2020-08-28 DIAGNOSIS — R52 PAIN: ICD-10-CM

## 2020-08-28 PROCEDURE — 96360 HYDRATION IV INFUSION INIT: CPT

## 2020-08-28 RX ADMIN — SODIUM CHLORIDE 1000 ML: 0.9 INJECTION, SOLUTION INTRAVENOUS at 12:30

## 2020-08-28 NOTE — PROGRESS NOTES
Tolerated hydration well  No complaints offered  Port flushed and deaccessed per routine  Discharged in wc to [facility]  Next weeks schedule confirmed with street

## 2020-08-28 NOTE — PLAN OF CARE
Problem: Potential for Falls  Goal: Patient will remain free of falls  Description: INTERVENTIONS:  - Assess patient frequently for physical needs  -  Identify cognitive and physical deficits and behaviors that affect risk of falls    -  [place] fall precautions as indicated by assessment   - Educate patient/family on patient safety including physical limitations  - Instruct patient to call for assistance with activity based on assessment  - Modify environment to reduce risk of injury  - Consider OT/PT consult to assist with strengthening/mobility  Outcome: Progressing

## 2020-08-31 ENCOUNTER — HOSPITAL ENCOUNTER (OUTPATIENT)
Dept: INFUSION CENTER | Facility: HOSPITAL | Age: 75
Discharge: HOME | End: 2020-08-31
Attending: INTERNAL MEDICINE
Payer: MEDICARE

## 2020-08-31 VITALS
RESPIRATION RATE: 18 BRPM | TEMPERATURE: 97.3 F | HEART RATE: 81 BPM | SYSTOLIC BLOOD PRESSURE: 140 MMHG | DIASTOLIC BLOOD PRESSURE: 65 MMHG

## 2020-08-31 DIAGNOSIS — E86.0 DEHYDRATION: Primary | ICD-10-CM

## 2020-08-31 DIAGNOSIS — R11.2 CHEMOTHERAPY INDUCED NAUSEA AND VOMITING: ICD-10-CM

## 2020-08-31 DIAGNOSIS — R52 PAIN: ICD-10-CM

## 2020-08-31 DIAGNOSIS — T45.1X5A CHEMOTHERAPY INDUCED NAUSEA AND VOMITING: ICD-10-CM

## 2020-08-31 PROCEDURE — 96360 HYDRATION IV INFUSION INIT: CPT

## 2020-08-31 RX ADMIN — SODIUM CHLORIDE 1000 ML: 0.9 INJECTION, SOLUTION INTRAVENOUS at 12:44

## 2020-08-31 NOTE — PLAN OF CARE
Problem: Potential for Falls  Goal: Patient will remain free of falls  Description: INTERVENTIONS:  - Assess patient frequently for physical needs  -  Identify cognitive and physical deficits and behaviors that affect risk of falls    -  [place] fall precautions as indicated by assessment   - Educate patient/family on patient safety including physical limitations  - Instruct patient to call for assistance with activity based on assessment  - Modify environment to reduce risk of injury  - Consider OT/PT consult to assist with strengthening/mobility  Outcome: Progressing

## 2020-08-31 NOTE — PROGRESS NOTES
Pt tolerated hydration well  No comp[laints offered  Port flushed and deaccessed per routine  discharged in wc with volunteer

## 2020-09-01 ENCOUNTER — HOSPITAL ENCOUNTER (OUTPATIENT)
Dept: INFUSION CENTER | Facility: HOSPITAL | Age: 75
Discharge: HOME | End: 2020-09-01
Attending: INTERNAL MEDICINE
Payer: MEDICARE

## 2020-09-01 VITALS
TEMPERATURE: 97.9 F | SYSTOLIC BLOOD PRESSURE: 126 MMHG | HEART RATE: 84 BPM | DIASTOLIC BLOOD PRESSURE: 72 MMHG | OXYGEN SATURATION: 98 % | RESPIRATION RATE: 16 BRPM

## 2020-09-01 DIAGNOSIS — R52 PAIN: ICD-10-CM

## 2020-09-01 DIAGNOSIS — R11.2 CHEMOTHERAPY INDUCED NAUSEA AND VOMITING: ICD-10-CM

## 2020-09-01 DIAGNOSIS — T45.1X5A CHEMOTHERAPY INDUCED NAUSEA AND VOMITING: ICD-10-CM

## 2020-09-01 DIAGNOSIS — E86.0 DEHYDRATION: Primary | ICD-10-CM

## 2020-09-01 PROCEDURE — 96360 HYDRATION IV INFUSION INIT: CPT

## 2020-09-01 RX ADMIN — SODIUM CHLORIDE 1000 ML: 0.9 INJECTION, SOLUTION INTRAVENOUS at 12:40

## 2020-09-01 NOTE — PROGRESS NOTES
Pt here for daily hydration, pt offers no new complaints today, declines zofran, confirmed appt back tomorrow

## 2020-09-02 ENCOUNTER — HOSPITAL ENCOUNTER (OUTPATIENT)
Dept: INFUSION CENTER | Facility: HOSPITAL | Age: 75
Discharge: HOME | End: 2020-09-02
Attending: INTERNAL MEDICINE
Payer: MEDICARE

## 2020-09-02 DIAGNOSIS — E86.0 DEHYDRATION: Primary | ICD-10-CM

## 2020-09-02 DIAGNOSIS — R11.2 CHEMOTHERAPY INDUCED NAUSEA AND VOMITING: ICD-10-CM

## 2020-09-02 DIAGNOSIS — R52 PAIN: ICD-10-CM

## 2020-09-02 DIAGNOSIS — T45.1X5A CHEMOTHERAPY INDUCED NAUSEA AND VOMITING: ICD-10-CM

## 2020-09-02 PROCEDURE — 96360 HYDRATION IV INFUSION INIT: CPT

## 2020-09-02 RX ADMIN — SODIUM CHLORIDE 1000 ML: 0.9 INJECTION, SOLUTION INTRAVENOUS at 12:50

## 2020-09-02 NOTE — PROGRESS NOTES
Pt tolerated hydration well  Port left accessed for use Friday  Discharged in wc to lobby to meet star

## 2020-09-03 ENCOUNTER — CLINICAL SUPPORT (OUTPATIENT)
Dept: RADIATION ONCOLOGY | Facility: CLINIC | Age: 75
End: 2020-09-03
Attending: INTERNAL MEDICINE

## 2020-09-03 ENCOUNTER — TELEMEDICINE (OUTPATIENT)
Dept: RADIATION ONCOLOGY | Facility: CLINIC | Age: 75
End: 2020-09-03
Attending: INTERNAL MEDICINE

## 2020-09-03 ENCOUNTER — HOSPITAL ENCOUNTER (OUTPATIENT)
Dept: INFUSION CENTER | Facility: HOSPITAL | Age: 75
Discharge: HOME | End: 2020-09-03
Attending: INTERNAL MEDICINE

## 2020-09-03 VITALS — WEIGHT: 140 LBS | BODY MASS INDEX: 24.8 KG/M2

## 2020-09-03 DIAGNOSIS — C79.31 BRAIN METASTASIS (HCC): Primary | ICD-10-CM

## 2020-09-03 DIAGNOSIS — Z79.811 USE OF AROMATASE INHIBITORS: ICD-10-CM

## 2020-09-03 DIAGNOSIS — Z17.0 MALIGNANT NEOPLASM OF OVERLAPPING SITES OF RIGHT BREAST IN FEMALE, ESTROGEN RECEPTOR POSITIVE (HCC): ICD-10-CM

## 2020-09-03 DIAGNOSIS — C50.811 MALIGNANT NEOPLASM OF OVERLAPPING SITES OF RIGHT BREAST IN FEMALE, ESTROGEN RECEPTOR POSITIVE (HCC): ICD-10-CM

## 2020-09-03 NOTE — PROGRESS NOTES
Virtual Regular Visit    Problem List Items Addressed This Visit     None               Reason for visit is radiation oncology follow-up    Encounter provider Jessica Blum MD    Provider located at [address] [place] [id number]    Recent Visits  No visits were found meeting these conditions  Showing recent visits within past 7 days and meeting all other requirements     Today's Visits  Date Type Provider Dept   09/03/20 Telemedicine Jessica Blum MD [facility]   Showing today's visits and meeting all other requirements     Future Appointments  No visits were found meeting these conditions  Showing future appointments within next 150 days and meeting all other requirements        After connecting through [employer], the patient was identified by name and date of birth  Ravinder Bradford was informed that this is a telemedicine visit and that the visit is being conducted through [employer] and patient was informed that this is not a secure, HIPAA-complaint platform  She agrees to proceed  which may not be secure and therefore, might not be HIPAA-compliant  My office door was closed  No one else was in the room  She acknowledged consent and understanding of privacy and security of the video platform  The patient has agreed to participate and understands they can discontinue the visit at any time  Subjective    Ravinder Bradford is a 77 y o  female with a history of a right breast infiltrating ductal carcinoma grade 2 diagnosed in February of 2005 status post right mastectomy with left prophylactic mastectomy  Pathology confirmed 8/17 positive axillary lymph nodes from the right axilla  Estrogen receptors positive with negative progesterone receptors and Her 2 Gregory was also positive  She was treated with 6 cycles chemotherapy followed by tamoxifen and adjuvant radiation therapy to the right axilla and chest wall regions in [place]  Her tamoxifen was switched to Arimidex that she continued up until 2012  She was diagnosed with local chest wall recurrence on the right side with metastatic disease involving the right axilla, pectoralis muscle, left axilla that was confirmed with biopsy admitted 2015  She was started on palliative chemotherapy and had a good response and then has been on maintenance treatment since that time  Her PET-CT scan December 16, 2019 showed increase uptake in an enlarging right axillary lymph node with some mild increased uptake in the right perihilar region and a new area in the left adrenal gland that was suspicious  She had right axillary biopsy December 26, 2019 that confirmed invasive ductal carcinoma  Her treatment was changed to Herceptin and Taxol every 3 weeks  Her treatment had to be held the on March 16, 2020 due to reduced ejection fraction  She had CT scan of the head on March 18, 2020 an MRI of the brain March 20, 2020 which confirms 4 discrete intracranial metastasis with the largest measuring 24 mm in size in the left occipital lobe with some associated edema  She is having no neurologic symptoms  We discussed that she has multiple brain metastasis and we recommended whole-brain radiation therapy  She completed whole brain radiation therapy in April 10, 2020 and returns today for follow-up visit  She was last seen 5/28/2020  She had recovered well from radiation  PET-CT study March 27, 2020 that revealed Increasing size of metastatic right axillary node and nonspecific increased activity in the lower spinal canal at the T12-L1 level  There was no CT correlate on the PET CT scan images and she is having no lower back pain  Recommend this area be followed  She remains on the anastrozole and started Tykerb with some associated nausea and decrease in appetite  6/1/2020 admitted with intractable nausea/malnutrition  [place] began after starting Tykerb    6/10/2020  Began daily IV hydration with antiemetics, prn     6/22/2020 Dr Greg Abrams- resume Tykerb, at lower dose, continue IV fluids, and prednisone daily  7/22/2020 Dr Greg Abrams- Leopold Pilsner  Has wt loss, and generalized weakness  8/21/2020 Dr Greg Abrams- Pt continues IV daily hydration Having low back pain  8/24/2020 MRI lumbar spine: Spondylotic degenerative disease identified particularly at L4-5 where there is severe central and bilateral lateral recess stenosis with moderate bilateral foraminal narrowing  Moderate right lateral recess stenosis at L2-3 with no greater than mild central or foraminal narrowing elsewhere  8/24/2020 Brain MRI: New metastatic lesion identified in the left occipital lobe measuring 4 mm immediately superior to the dominant occipital lobe mass  There is increasing FLAIR abnormality in the left occipital lobe when compared to the prior study  Additional lesions in the right parietal lobe and right inferior temporal lobe have decreased in size from the prior study  Continues to receive IV hydration at [facility] center  9/18/2020 f/u with Dr Greg Brown referred patient to neurosurgeon 8/25/20, they are waiting for a call        Past Medical History:   Diagnosis Date    [facility] cancer ([facility]     Breast cancer ([facility] )     Hypertension     Lymphedema of right arm     Vitamin B12 deficiency        Past Surgical History:   Procedure Laterality Date    APPENDECTOMY      BREAST SURGERY      bilat mastectomy-right total mastectomy and prophylactic left total mastectomy-2005    KNEE SURGERY      right knee replacement 2014 in [address] [place] [address] Bilateral        Current Outpatient Medications   Medication Sig Dispense Refill    anastrozole (ARIMIDEX) 1 mg tablet Take 1 tablet (1 mg total) by mouth every 24 hours 90 tablet 4    cholecalciferol (VITAMIN D3) 1,000 units tablet Take 1 tablet (1,000 Units total) by mouth daily 30 tablet 0    Cyanocobalamin 1000 MCG/ML KIT Inject as directed every 6 (six) months      escitalopram (LEXAPRO) 5 mg tablet Take 1 tablet (5 mg total) by mouth daily 30 tablet 5    esomeprazole (NexIUM) 40 MG capsule TAKE 1 CAPSULE BY MOUTH ONCE DAILY 90 capsule 0    lisinopril (ZESTRIL) 10 mg tablet Take 1 tablet (10 mg total) by mouth daily 90 tablet 3    nebivolol (Bystolic) 5 mg tablet Take 1 tablet (5 mg total) by mouth daily 90 tablet 3    TYKERB 250 MG tablet       acetaminophen (TYLENOL) 500 mg tablet Two tablet every 8 hours (Patient not taking: Reported on 9/3/2020) 30 tablet 0    indapamide (LOZOL) 1 25 mg tablet Take 1 tablet (1 25 mg total) by mouth every morning (Patient not taking: Reported on 6/11/2020) 30 tablet 1    megestrol (MEGACE) 400 mg/10 mL Take 400 mg by mouth daily (Patient not taking: Reported on 9/3/2020) 600 mL 0    Misc  Devices (ILLUSIONS C BREAST PROSTHESIS) MISC 1 breast prosthesis 1 each 0    Misc  Devices (REFLECTIONS C BREAST PROSTHES) MISC Dispense 1 breast prosthesis 1 each 0    ondansetron (ZOFRAN) 4 mg tablet Take 1 tablet (4 mg total) by mouth every 6 (six) hours (Patient not taking: Reported on 9/3/2020) 20 tablet 0    pantoprazole (PROTONIX) 40 mg tablet       potassium chloride (K-DUR,KLOR-CON) 20 mEq tablet       potassium chloride (KLOR-CON) 20 mEq packet Take 20 mEq by mouth daily (Patient not taking: Reported on 8/13/2020) 30 packet 0    predniSONE 20 mg tablet Take 1 tablet (20 mg total) by mouth daily (Patient not taking: Reported on 6/11/2020) 60 tablet 2    scopolamine (TRANSDERM-SCOP) 1 5 mg/3 days TD 72 hr patch Place 1 patch on the skin every third day (Patient not taking: Reported on 9/3/2020) 10 patch 0     No current facility-administered medications for this visit        Facility-Administered Medications Ordered in Other Visits   Medication Dose Route Frequency Provider Last Rate Last Dose    ondansetron (ZOFRAN) 8 mg in sodium chloride 0 9 % 50 mL IVPB  8 mg Intravenous Once Ronny Vuong MD Allergies   Allergen Reactions    Penicillins Rash         I spent 30 minutes with the patient during this visit  Follow up visit       Oncology History   Malignant neoplasm of overlapping 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, estrogen receptor positive ([facility] )   2015 -  Hormone Therapy    Anastrozole     5/28/2015 Initial Diagnosis    Metastatic breast cancer ([facility] [address] [facility] )  (recurrence)     6/30/2015 -  Chemotherapy    1-Taxotere, Herceptin, Perjeta started in [place] 2015 x6 cycles  2-Herceptin and Perjeta alone were continued since the patient was not interested in continue the Taxotere  3-Herceptin and Perjeta were put on hold due to decline of her ejection fraction around May 2017    4-low dose weekly Taxol was started in July 2017  5-Taxol was switched to every other week basis     4/14/2019 - 6/9/2019 Chemotherapy    PACLitaxel (TAXOL) 144 6 mg in sodium chloride 0 9 % 250 mL chemo IVPB, 80 mg/m2, Intravenous, Once, 2 of 6 cycles     12/4/2019 - 12/31/2019 Chemotherapy    PACLitaxel (TAXOL) 142 2 mg in sodium chloride 0 9 % 250 mL chemo IVPB, 80 mg/m2 = 142 2 mg, Intravenous, Once, 1 of 6 cycles  Administration: 142 2 mg (12/4/2019), 142 2 mg (12/18/2019)     1/8/2020 -  Chemotherapy    PACLitaxel (TAXOL) 141 6 mg in sodium chloride 0 9 % 250 mL chemo IVPB, 80 mg/m2 = 141 6 mg, Intravenous, Once, 4 of 6 cycles  Administration: 141 6 mg (1/8/2020), 141 6 mg (1/29/2020), 141 6 mg (2/19/2020)  trastuzumab (HERCEPTIN) 600 mg in sodium chloride 0 9 % 250 mL chemo infusion, 609 mg, Intravenous, Once, 4 of 6 cycles  Administration: 600 mg (1/8/2020), 450 mg (1/29/2020), 450 mg (2/19/2020)      Chemotherapy    PACLitaxel (TAXOL) 144 6 mg in sodium chloride 0 9 % 250 mL chemo IVPB, 80 mg/m2, Intravenous, Once, 2 of 6 cycles    PACLitaxel (TAXOL) 142 2 mg in sodium chloride 0 9 % 250 mL chemo IVPB, 80 mg/m2 = 142 2 mg, Intravenous, Once, 1 of 6 cycles    Administration: 142 2 mg (12/4/2019), 142 2 mg (12/18/2019)    PACLitaxel (TAXOL) 141 6 mg in sodium chloride 0 9 % 250 mL chemo IVPB, 80 mg/m2, Intravenous, Once, 0 of 12 cycles        trastuzumab (HERCEPTIN) 304 mg in sodium chloride 0 9 % 250 mL chemo infusion, 4 mg/kg, Intravenous, Once, 0 of 26 cycles    PACLitaxel (TAXOL) 141 6 mg in sodium chloride 0 9 % 250 mL chemo IVPB, 80 mg/m2 = 141 6 mg, Intravenous, Once, 4 of 6 cycles    Administration: 141 6 mg (1/8/2020), 141 6 mg (1/29/2020), 141 6 mg (2/19/2020)        trastuzumab (HERCEPTIN) 600 mg in sodium chloride 0 9 % 250 mL chemo infusion, 609 mg, Intravenous, Once, 4 of 6 cycles    Administration: 600 mg (1/8/2020), 450 mg (1/29/2020), 450 mg (2/19/2020)        Chemotherapy    PACLitaxel (TAXOL) 144 6 mg in sodium chloride 0 9 % 250 mL chemo IVPB, 80 mg/m2, Intravenous, Once, 2 of 6 cycles    PACLitaxel (TAXOL) 142 2 mg in sodium chloride 0 9 % 250 mL chemo IVPB, 80 mg/m2 = 142 2 mg, Intravenous, Once, 1 of 6 cycles    Administration: 142 2 mg (12/4/2019), 142 2 mg (12/18/2019)    PACLitaxel (TAXOL) 141 6 mg in sodium chloride 0 9 % 250 mL chemo IVPB, 80 mg/m2, Intravenous, Once, 0 of 12 cycles        trastuzumab (HERCEPTIN) 304 mg in sodium chloride 0 9 % 250 mL chemo infusion, 4 mg/kg, Intravenous, Once, 0 of 26 cycles    PACLitaxel (TAXOL) 141 6 mg in sodium chloride 0 9 % 250 mL chemo IVPB, 80 mg/m2 = 141 6 mg, Intravenous, Once, 4 of 6 cycles    Administration: 141 6 mg (1/8/2020), 141 6 mg (1/29/2020), 141 6 mg (2/19/2020)        trastuzumab (HERCEPTIN) 600 mg in sodium chloride 0 9 % 250 mL chemo infusion, 609 mg, Intravenous, Once, 4 of 6 cycles    Administration: 600 mg (1/8/2020), 450 mg (1/29/2020), 450 mg (2/19/2020)      Lapatinib 1500 mg once a day was started on 04/20/2020 after she was found to have metastatic disease to the brain, status post whole brain radiation       Brain metastasis ([facility] [address] [facility] )   3/30/2020 - 4/10/2020 Radiation    Plan ID Energy Fractions Dose per Fraction (cGy) Dose Correction (cGy) Total Dose Delivered (cGy) Elapsed Days   Whole Brai # 6X 10 / 10 300 0 3,000 11          4/9/2020 Initial Diagnosis    Brain metastasis (HCC)         Clinical Trial: no      Health Maintenance   Topic Date Due    Colorectal Cancer Screening  01/13/1996    Pneumococcal Vaccine: 65+ Years (1 of 1 - PPSV23) 01/13/2011    Influenza Vaccine  07/01/2020    DTaP,Tdap,and Td Vaccines (1 - Tdap) 01/16/2021 (Originally 1/13/1966)    Fall Risk  01/17/2021    Medicare Annual Wellness Visit (AWV)  01/17/2021    Depression Screening PHQ  03/26/2021    BMI: Adult  08/21/2021    Hepatitis C Screening  Completed    HIB Vaccine  Aged Out    Hepatitis B Vaccine  Aged Out    IPV Vaccine  Aged Out    Hepatitis A Vaccine  Aged Out    Meningococcal ACWY Vaccine  Aged Out    HPV Vaccine  Aged Out       Patient Active Problem List   Diagnosis    Malignant neoplasm of overlapping 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, estrogen receptor positive ([address]    Use of aromatase inhibitors    Vitamin D deficiency    Benign essential hypertension    Abnormal echocardiography    Chronic systolic heart failure ([address]    Malignant neoplasm of breast ([address]    Colonoscopy refused    Immunization not carried out because of patient decision    Heart burn    Abnormal gastrointestinal PET scan    Osteopenia    Essential hypertension    Brain metastasis ([address]    Nonischemic cardiomyopathy ([address]    S/P chemotherapy, time since 4-12 weeks    Status post chemoradiation    Chemotherapy induced nausea and vomiting    Acute cystitis without hematuria    Severe protein-calorie malnutrition Duayne Six: less than 60% of standard weight) ([address]    Hypokalemia    Dehydration    Pain     Past Medical History:   Diagnosis Date    Brain cancer ([address]     Breast cancer ([address]     Hypertension     Lymphedema of right arm     Vitamin B12 deficiency      Past Surgical History:   Procedure Laterality Date    APPENDECTOMY      BREAST SURGERY      bilat mastectomy-right total mastectomy and prophylactic left total mastectomy-2005    KNEE SURGERY      right knee replacement 2014 [address] Bilateral      Family History   Problem Relation Age of Onset    Asthma Mother     Osteoarthritis Father     Bone cancer Paternal Aunt         bone cancer     Social History     Socioeconomic History    Marital status: Single     Spouse name: Not on file    Number of children: Not on file    Years of education: Not on file    Highest education level: Not on file   Occupational History    Not on file   Social Needs    Financial resource strain: Not on file    Food insecurity     Worry: Not on file     Inability: Not on file    Transportation needs     Medical: Not on file     Non-medical: Not on file   Tobacco Use    Smoking status: Never Smoker    Smokeless tobacco: Never Used    Tobacco comment: no passive smoke exposure   Substance and Sexual Activity    Alcohol use: Never     Frequency: Never     Binge frequency: Never    Drug use: No    Sexual activity: Not on file   Lifestyle    Physical activity     Days per week: Not on file     Minutes per session: Not on file    Stress: Not on file   Relationships    Social connections     Talks on phone: Not on file     Gets together: Not on file     Attends [employer] service: Not on file     Active member of club or organization: Not on file     Attends meetings of clubs or organizations: Not on file     Relationship status: Not on file    Intimate partner violence     Fear of current or ex partner: Not on file     Emotionally abused: Not on file     Physically abused: Not on file     Forced sexual activity: Not on file   Other Topics Concern    Not on file   Social History Narrative    Not on file       Current Outpatient Medications:     anastrozole (ARIMIDEX) 1 mg tablet, Take 1 tablet (1 mg total) by mouth every 24 hours, Disp: 90 tablet, Rfl: 4    cholecalciferol (VITAMIN D3) 1,000 units tablet, Take 1 tablet (1,000 Units total) by mouth daily, Disp: 30 tablet, Rfl: 0    Cyanocobalamin 1000 MCG/ML KIT, Inject as directed every 6 (six) months, Disp: , Rfl:     escitalopram (LEXAPRO) 5 mg tablet, Take 1 tablet (5 mg total) by mouth daily, Disp: 30 tablet, Rfl: 5    esomeprazole (NexIUM) 40 MG capsule, TAKE 1 CAPSULE BY MOUTH ONCE DAILY, Disp: 90 capsule, Rfl: 0    lisinopril (ZESTRIL) 10 mg tablet, Take 1 tablet (10 mg total) by mouth daily, Disp: 90 tablet, Rfl: 3    nebivolol (Bystolic) 5 mg tablet, Take 1 tablet (5 mg total) by mouth daily, Disp: 90 tablet, Rfl: 3    TYKERB 250 MG tablet, , Disp: , Rfl:     acetaminophen (TYLENOL) 500 mg tablet, Two tablet every 8 hours (Patient not taking: Reported on 9/3/2020), Disp: 30 tablet, Rfl: 0    indapamide (LOZOL) 1 25 mg tablet, Take 1 tablet (1 25 mg total) by mouth every morning (Patient not taking: Reported on 6/11/2020), Disp: 30 tablet, Rfl: 1    megestrol (MEGACE) 400 mg/10 mL, Take 400 mg by mouth daily (Patient not taking: Reported on 9/3/2020), Disp: 600 mL, Rfl: 0    Misc  Devices (ILLUSIONS C BREAST PROSTHESIS) MISC, 1 breast prosthesis, Disp: 1 each, Rfl: 0    Misc   Devices (REFLECTIONS C BREAST PROSTHES) MISC, Dispense 1 breast prosthesis, Disp: 1 each, Rfl: 0    ondansetron (ZOFRAN) 4 mg tablet, Take 1 tablet (4 mg total) by mouth every 6 (six) hours (Patient not taking: Reported on 9/3/2020), Disp: 20 tablet, Rfl: 0    pantoprazole (PROTONIX) 40 mg tablet, , Disp: , Rfl:     potassium chloride (K-DUR,KLOR-CON) 20 mEq tablet, , Disp: , Rfl:     potassium chloride (KLOR-CON) 20 mEq packet, Take 20 mEq by mouth daily (Patient not taking: Reported on 8/13/2020), Disp: 30 packet, Rfl: 0    predniSONE 20 mg tablet, Take 1 tablet (20 mg total) by mouth daily (Patient not taking: Reported on 6/11/2020), Disp: 60 tablet, Rfl: 2    scopolamine (TRANSDERM-SCOP) 1 5 mg/3 days TD 72 hr patch, Place 1 patch on the skin every third day (Patient not taking: Reported on 9/3/2020), Disp: 10 patch, Rfl: 0  No current facility-administered medications for this visit  Facility-Administered Medications Ordered in Other Visits:     ondansetron (ZOFRAN) 8 mg in sodium chloride 0 9 % 50 mL IVPB, 8 mg, Intravenous, Once, Dorinda Rogers MD  Allergies   Allergen Reactions    Penicillins Rash       Review of Systems:  Review of Systems   Constitutional: Negative  HENT: Negative  Eyes: Negative  Respiratory: Negative  Cardiovascular: Negative  Gastrointestinal: Negative  Endocrine: Negative  Genitourinary: Negative  Musculoskeletal: Positive for back pain (lower back x 2 months)  Skin: Negative  Allergic/Immunologic: Negative  Neurological: Positive for numbness (toes)  Hematological: Negative  Vitals:    09/03/20 1029   Weight: 63 5 kg (140 lb)        Pain Score:   8      Imaging:Mri Brain W Wo Contrast    Result Date: 8/24/2020  Narrative: MRI BRAIN WITH AND WITHOUT CONTRAST INDICATION: C79 31: Secondary malignant neoplasm of brain C50 811: Malignant neoplasm of overlapping sites of right female breast Z17 0: Estrogen receptor positive status (ER+) Z79 811: Long term (current) use of aromatase inhibitors  COMPARISON:  None  TECHNIQUE: Sagittal T1, axial T2, axial FLAIR, axial T1, axial [place], axial diffusion  Sagittal, axial T1 postcontrast   Axial bravo postcontrast with coronal reconstructions  IV Contrast:  6 mL of gadobutrol injection (MULTI-DOSE)  IMAGE QUALITY:   Diagnostic  FINDINGS: BRAIN PARENCHYMA:  There is a new enhancing lesion identified in the left occipital lobe series 1001 image 89 measuring 4 mm suspicious for metastatic disease    Additional lesions are noted in the left occipital lobe stable from prior study on series 1001 image 70 measures approximately 18 x 11 mm when measured similarly to the prior study however, the FLAIR signal surrounding the lesion has increased in the interval  Right parietal enhancing lesion series 901 image 18 measures 4 mm which has decreased in size from the prior study on which it measured 6 mm  Right inferior temporal lobe lesion has decreased in size from the prior study on series 801 image 18 measuring 4 mm compared to 7 mm on the prior  Small scattered hyperintensities on T2/FLAIR imaging are noted in the periventricular and subcortical white matter demonstrating an appearance that is statistically most likely to represent mild microangiopathic change  VENTRICLES:  Normal for the patient's age  SELLA AND PITUITARY GLAND:  Normal  ORBITS:  Normal  PARANASAL SINUSES:  Normal  VASCULATURE:  Evaluation of the major intracranial vasculature demonstrates appropriate flow voids  CALVARIUM AND SKULL BASE:  Normal  EXTRACRANIAL SOFT TISSUES:  Normal      Impression: New metastatic lesion identified in the left occipital lobe measuring 4 mm immediately superior to the dominant occipital lobe mass  There is increasing FLAIR abnormality in the left occipital lobe when compared to the prior study  Additional lesions in the right parietal lobe and right inferior temporal lobe have decreased in size from the prior study  Workstation performed: [id number]     Mri Lumbar Spine Wo Contrast    Result Date: 8/24/2020  Narrative: MRI LUMBAR SPINE WITHOUT CONTRAST INDICATION: M54 42: Lumbago with sciatica, left side M54 41: Lumbago with sciatica, right side G89 29: Other chronic pain R94 8: Abnormal results of function studies of other organs and systems  COMPARISON:  None  TECHNIQUE:  Sagittal T1, sagittal T2, sagittal inversion recovery, axial T1 and axial T2, coronal T2   IMAGE QUALITY:  Diagnostic FINDINGS: VERTEBRAL BODIES:  There are 5 lumbar type vertebral bodies  Levoscoliosis apex L3  Slight anterolisthesis L2-3 and L3-4  Retrolisthesis L4-5  Endplate degenerative marrow signal L4-5  Hemangioma T11  SACRUM:  Normal signal within the sacrum   No evidence of insufficiency or stress fracture  DISTAL CORD AND CONUS:  Normal size and signal within the distal cord and conus  PARASPINAL SOFT TISSUES:  Paraspinal soft tissues are unremarkable  LOWER THORACIC DISC SPACES:  Normal disc height and signal   No disc herniation, canal stenosis or foraminal narrowing  LUMBAR DISC SPACES: L1-L2:  Moderate facet hypertrophy  Minimal central stenosis without foraminal narrowing  L2-L3:  There is a small right paramedian protrusion type disc herniation and diffuse annular bulging with moderate facet hypertrophy combining to result in mild central stenosis, moderate right lateral recess stenosis  L3-L4:  Diffuse annular bulge with marginal osteophytes eccentric to the right results in mild to moderate right and mild left foraminal narrowing  Moderate central stenosis  L4-L5:  Degenerative disc osteophyte complex with marginal osteophytes results in severe central stenosis and severe bilateral lateral recess stenosis  Moderate bilateral foraminal narrowing  L5-S1:  Degenerative disc osteophyte complex with marginal osteophytes results in moderate left greater than right foraminal narrowing  There is mild central stenosis  Impression: Spondylotic degenerative disease identified particularly at L4-5 where there is severe central and bilateral lateral recess stenosis with moderate bilateral foraminal narrowing  Moderate right lateral recess stenosis at L2-3 with no greater than mild central or foraminal narrowing elsewhere   Workstation performed: [id number]

## 2020-09-03 NOTE — PROGRESS NOTES
Follow-up - Radiation Oncology   Tomer Gil 1945 77 y o  female [id number]      History of Present Illness   Cancer Staging  See Below    Tomer Gil is a 77y o  year old female with a history of stage IV metastatic right breast carcinoma  Reason for visit is radiation oncology follow-up     Encounter provider Marisela Chávez MD     Provider located at [address] [place] [phone]     Recent Visits  No visits were found meeting these conditions  Showing recent visits within past 7 days and meeting all other requirements      Today's Visits  Date Type Provider Dept   09/03/20 Telemedicine Marisela Chávez MD [facility]   Showing today's visits and meeting all other requirements      Future Appointments  No visits were found meeting these conditions  Showing future appointments within next 150 days and meeting all other requirements         After connecting through [employer], the patient was identified by name and date of birth  Tomer Gil was informed that this is a telemedicine visit and that the visit is being conducted through [employer] and patient was informed that this is not a secure, HIPAA-complaint platform  She agrees to proceed  which may not be secure and therefore, might not be HIPAA-compliant  My office door was closed  No one else was in the room  She acknowledged consent and understanding of privacy and security of the video platform  The patient has agreed to participate and understands they can discontinue the visit at any time      Subjective     Tomer Gil is a 77 y o  female with a history of a right breast infiltrating ductal carcinoma grade 2 diagnosed in February of 2005 status post right mastectomy with left prophylactic mastectomy   Pathology confirmed 8/17 positive axillary lymph nodes from the right axilla   Estrogen receptors positive with negative progesterone receptors and Her 2 Gregory was also positive   She was treated with 6 cycles chemotherapy followed by tamoxifen and adjuvant radiation therapy to the right axilla and chest wall regions in Quadra Quad[place] [id number] tamoxifen was switched to Arimidex that she continued up until 2012  She was diagnosed with local chest wall recurrence on the right side with metastatic disease involving the right axilla, pectoralis muscle, left axilla that was confirmed with biopsy admitted 2015  She was started on palliative chemotherapy and had a good response and then has been on maintenance treatment since that time  [facility] PET-CT scan December 16, 2019 showed increase uptake in an enlarging right axillary lymph node with some mild increased uptake in the right perihilar region and a new area in the left adrenal gland that was suspicious   She had right axillary biopsy December 26, 2019 that confirmed invasive ductal carcinoma   Her treatment was changed to Herceptin and Taxol every 3 weeks  [facility] treatment had to be held the on March 16, 2020 due to reduced ejection fraction  She had CT scan of the head on March 18, 2020 an MRI of the brain March 20, 2020 which confirms 4 discrete intracranial metastasis with the largest measuring 24 mm in size in the left occipital lobe with some associated edema  Casey Kendall is having no neurologic symptoms   We discussed that she has multiple brain metastasis and we recommended whole-brain radiation therapy   She completed whole brain radiation therapy in April 10, 2020 and returns today for follow-up visit  She was last seen 5/28/2020  She had recovered well from radiation  PET-CT study March 27, 2020 that revealed Increasing size of metastatic right axillary node and nonspecific increased activity in the lower spinal canal at the T12-L1 level   There was no CT correlate on the PET CT scan images and she is having no lower back pain   Recommend this area be followed  Casey Kendall remains on the anastrozole and started [facility] some associated nausea and decrease in appetite  Interval History:   6/1/2020 admitted with intractable nausea/malnutrition  [place] began after starting Tykerb  6/10/2020  Began daily IV hydration with antiemetics, prn      6/22/2020 Dr Becca Cuevas, at lower dose, continue IV fluids, and prednisone daily       7/22/2020 Dr Becca Gottlieb Finders  Has wt loss, and generalized weakness       8/21/2020 Dr Becca Bolden- Pt continues IV daily hydration  Having low back pain      8/24/2020 MRI lumbar spine: Spondylotic degenerative disease identified particularly at L4-5 where there is severe central and bilateral lateral recess stenosis with moderate bilateral foraminal narrowing  Moderate right lateral recess stenosis at L2-3 with no greater than mild central or foraminal narrowing elsewhere      8/24/2020 Brain MRI: New metastatic lesion identified in the left occipital lobe measuring 4 mm immediately superior to the dominant occipital lobe mass  Verta Uriel is increasing FLAIR abnormality in the left occipital lobe when compared to the prior study    Additional lesions in the right parietal lobe and right inferior temporal lobe have decreased in size from the prior study      Continues to receive IV hydration at infusion center  Patient seen for follow-up today with her sister  She reports she is feeling better than she was in June and July with having daily IV hydration  She denies any headaches and no neurologic symptoms  She does have hair loss  She is on Tykerb every 4 weeks with some nausea and vomiting initially that required admission to the hospital   She then resumed at a lower dose and is feeling somewhat better with daily hydration  She still has generalized weakness  She has a decrease in her appetite  She can only tolerate small portions with her meals  She does not like Boost or Ensure but does like homemade yogurt  Her appetite has improved somewhat over the last 2 weeks    She denies any pain or swelling in the right axilla  She has no palpable axillary adenopathy  She denies any thoracic pain  She does have lumbar back pain on exertion  She continues on anastrozole daily      9/18/2020 f/u with Dr Ramona Ma referred patient to neurosurgeon 8/25/20, they are waiting for a call      Historical Information   Oncology History   Malignant neoplasm of overlapping 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, estrogen receptor positive ([facility] [address] )   2015 -  Hormone Therapy    Anastrozole     5/28/2015 Initial Diagnosis    Metastatic breast cancer ([facility] [address] )  (recurrence)     6/30/2015 -  Chemotherapy    1-Taxotere, Herceptin, Perjeta started in Shaylee 2015 x6 cycles  2-Herceptin and Perjeta alone were continued since the patient was not interested in continue the Taxotere  3-Herceptin and Perjeta were put on hold due to decline of her ejection fraction around May 2017    4-low dose weekly Taxol was started in July 2017  5-Taxol was switched to every other week basis     4/14/2019 - 6/9/2019 Chemotherapy    PACLitaxel (TAXOL) 144 6 mg in sodium chloride 0 9 % 250 mL chemo IVPB, 80 mg/m2, Intravenous, Once, 2 of 6 cycles     12/4/2019 - 12/31/2019 Chemotherapy    PACLitaxel (TAXOL) 142 2 mg in sodium chloride 0 9 % 250 mL chemo IVPB, 80 mg/m2 = 142 2 mg, Intravenous, Once, 1 of 6 cycles  Administration: 142 2 mg (12/4/2019), 142 2 mg (12/18/2019)     1/8/2020 -  Chemotherapy    PACLitaxel (TAXOL) 141 6 mg in sodium chloride 0 9 % 250 mL chemo IVPB, 80 mg/m2 = 141 6 mg, Intravenous, Once, 4 of 6 cycles  Administration: 141 6 mg (1/8/2020), 141 6 mg (1/29/2020), 141 6 mg (2/19/2020)  trastuzumab (HERCEPTIN) 600 mg in sodium chloride 0 9 % 250 mL chemo infusion, 609 mg, Intravenous, Once, 4 of 6 cycles  Administration: 600 mg (1/8/2020), 450 mg (1/29/2020), 450 mg (2/19/2020)      Chemotherapy    PACLitaxel (TAXOL) 144 6 mg in sodium chloride 0 9 % 250 mL chemo IVPB, 80 mg/m2, Intravenous, Once, 2 of 6 cycles    PACLitaxel (TAXOL) 142 2 mg in sodium chloride 0 9 % 250 mL chemo IVPB, 80 mg/m2 = 142 2 mg, Intravenous, Once, 1 of 6 cycles    Administration: 142 2 mg (12/4/2019), 142 2 mg (12/18/2019)    PACLitaxel (TAXOL) 141 6 mg in sodium chloride 0 9 % 250 mL chemo IVPB, 80 mg/m2, Intravenous, Once, 0 of 12 cycles        trastuzumab (HERCEPTIN) 304 mg in sodium chloride 0 9 % 250 mL chemo infusion, 4 mg/kg, Intravenous, Once, 0 of 26 cycles    PACLitaxel (TAXOL) 141 6 mg in sodium chloride 0 9 % 250 mL chemo IVPB, 80 mg/m2 = 141 6 mg, Intravenous, Once, 4 of 6 cycles    Administration: 141 6 mg (1/8/2020), 141 6 mg (1/29/2020), 141 6 mg (2/19/2020)        trastuzumab (HERCEPTIN) 600 mg in sodium chloride 0 9 % 250 mL chemo infusion, 609 mg, Intravenous, Once, 4 of 6 cycles    Administration: 600 mg (1/8/2020), 450 mg (1/29/2020), 450 mg (2/19/2020)        Chemotherapy    PACLitaxel (TAXOL) 144 6 mg in sodium chloride 0 9 % 250 mL chemo IVPB, 80 mg/m2, Intravenous, Once, 2 of 6 cycles    PACLitaxel (TAXOL) 142 2 mg in sodium chloride 0 9 % 250 mL chemo IVPB, 80 mg/m2 = 142 2 mg, Intravenous, Once, 1 of 6 cycles    Administration: 142 2 mg (12/4/2019), 142 2 mg (12/18/2019)    PACLitaxel (TAXOL) 141 6 mg in sodium chloride 0 9 % 250 mL chemo IVPB, 80 mg/m2, Intravenous, Once, 0 of 12 cycles        trastuzumab (HERCEPTIN) 304 mg in sodium chloride 0 9 % 250 mL chemo infusion, 4 mg/kg, Intravenous, Once, 0 of 26 cycles    PACLitaxel (TAXOL) 141 6 mg in sodium chloride 0 9 % 250 mL chemo IVPB, 80 mg/m2 = 141 6 mg, Intravenous, Once, 4 of 6 cycles    Administration: 141 6 mg (1/8/2020), 141 6 mg (1/29/2020), 141 6 mg (2/19/2020)        trastuzumab (HERCEPTIN) 600 mg in sodium chloride 0 9 % 250 mL chemo infusion, 609 mg, Intravenous, Once, 4 of 6 cycles    Administration: 600 mg (1/8/2020), 450 mg (1/29/2020), 450 mg (2/19/2020)      Lapatinib 1500 mg once a day was started on 04/20/2020 after she was found to have metastatic disease to the brain, status post whole brain radiation       Brain metastasis ([facility] [address] )   3/30/2020 - 4/10/2020 Radiation    Plan ID Energy Fractions Dose per Fraction (cGy) Dose Correction (cGy) Total Dose Delivered (cGy) Elapsed Days   Whole Brai # 6X 10 / 10 300 0 3,000 11          4/9/2020 Initial Diagnosis    Brain metastasis (HCC)         Past Medical History:   Diagnosis Date    Brain cancer ([facility] [address] )     Breast cancer ([facility] [address] )     Hypertension     Lymphedema of right arm     Vitamin B12 deficiency      Past Surgical History:   Procedure Laterality Date    APPENDECTOMY      BREAST SURGERY      bilat mastectomy-right total mastectomy and prophylactic left total mastectomy-2005    KNEE SURGERY      right knee replacement 2014 in [facility] Bilateral        Social History   Social History     Substance and Sexual Activity   Alcohol Use Never    Frequency: Never    Binge frequency: Never     Social History     Substance and Sexual Activity   Drug Use No     Social History     Tobacco Use   Smoking Status Never Smoker   Smokeless Tobacco Never Used   Tobacco Comment    no passive smoke exposure     Meds/Allergies     Current Outpatient Medications:     anastrozole (ARIMIDEX) 1 mg tablet, Take 1 tablet (1 mg total) by mouth every 24 hours, Disp: 90 tablet, Rfl: 4    cholecalciferol (VITAMIN D3) 1,000 units tablet, Take 1 tablet (1,000 Units total) by mouth daily, Disp: 30 tablet, Rfl: 0    Cyanocobalamin 1000 MCG/ML KIT, Inject as directed every 6 (six) months, Disp: , Rfl:     escitalopram (LEXAPRO) 5 mg tablet, Take 1 tablet (5 mg total) by mouth daily, Disp: 30 tablet, Rfl: 5    esomeprazole (NexIUM) 40 MG capsule, TAKE 1 CAPSULE BY MOUTH ONCE DAILY, Disp: 90 capsule, Rfl: 0    lisinopril (ZESTRIL) 10 mg tablet, Take 1 tablet (10 mg total) by mouth daily, Disp: 90 tablet, Rfl: 3    nebivolol (Bystolic) 5 mg tablet, Take 1 tablet (5 mg total) by mouth daily, Disp: 90 tablet, Rfl: 3    TYKERB 250 MG tablet, , Disp: , Rfl:     acetaminophen (TYLENOL) 500 mg tablet, Two tablet every 8 hours (Patient not taking: Reported on 9/3/2020), Disp: 30 tablet, Rfl: 0    indapamide (LOZOL) 1 25 mg tablet, Take 1 tablet (1 25 mg total) by mouth every morning (Patient not taking: Reported on 6/11/2020), Disp: 30 tablet, Rfl: 1    megestrol (MEGACE) 400 mg/10 mL, Take 400 mg by mouth daily (Patient not taking: Reported on 9/3/2020), Disp: 600 mL, Rfl: 0    Misc  Devices (ILLUSIONS C BREAST PROSTHESIS) MISC, 1 breast prosthesis, Disp: 1 each, Rfl: 0    Misc  Devices (REFLECTIONS C BREAST PROSTHES) MISC, Dispense 1 breast prosthesis, Disp: 1 each, Rfl: 0    ondansetron (ZOFRAN) 4 mg tablet, Take 1 tablet (4 mg total) by mouth every 6 (six) hours (Patient not taking: Reported on 9/3/2020), Disp: 20 tablet, Rfl: 0    pantoprazole (PROTONIX) 40 mg tablet, , Disp: , Rfl:     potassium chloride (K-DUR,KLOR-CON) 20 mEq tablet, , Disp: , Rfl:     potassium chloride (KLOR-CON) 20 mEq packet, Take 20 mEq by mouth daily (Patient not taking: Reported on 8/13/2020), Disp: 30 packet, Rfl: 0    predniSONE 20 mg tablet, Take 1 tablet (20 mg total) by mouth daily (Patient not taking: Reported on 6/11/2020), Disp: 60 tablet, Rfl: 2    scopolamine (TRANSDERM-SCOP) 1 5 mg/3 days TD 72 hr patch, Place 1 patch on the skin every third day (Patient not taking: Reported on 9/3/2020), Disp: 10 patch, Rfl: 0  No current facility-administered medications for this visit  Facility-Administered Medications Ordered in Other Visits:     ondansetron (ZOFRAN) 8 mg in sodium chloride 0 9 % 50 mL IVPB, 8 mg, Intravenous, Once, Luz Richey MD  Allergies   Allergen Reactions    Penicillins Rash     Review of Systems  Constitutional: Negative  HENT: Negative  Eyes: Negative  Respiratory: Negative  Cardiovascular: Negative  Gastrointestinal: Negative  Endocrine: Negative  Genitourinary: Negative      Musculoskeletal: Positive for back pain (lower back x 2 months)  Skin: Negative  Allergic/Immunologic: Negative  Neurological: Positive for numbness (toes)  Hematological: Negative        OBJECTIVE:   Wt 63 5 kg (140 lb)   BMI 24 80 kg/m²   Pain Assessment:  8 with walking, 1-2 at rest  ECOG/Zubrod/WHO: 2 - Symptomatic, <50% confined to bed    Physical Exam virtual physical examination:  Constitutional:  Well-developed in no acute distress with alopecia  Eyes:  No scleral icterus  Neck:  No lymphadenopathy  Pulmonary/chest:  Respiratory effort normal with no stridor  There respiratory distress  Speaking in full sentences  Neurological:  Alert orient x3 with no facial asymmetry  Skin:  No pallor, no rash and not diaphoretic  Psychiatric:  Normal mood and affect normal behavior      RESULTS    Lab Results:   Recent Results (from the past 672 hour(s))   Cancer antigen 15-3    Collection Time: 08/17/20  1:08 PM   Result Value Ref Range    CA 15-3 27 3 (H) 0 0 - 25 0 U/mL   Cancer antigen 19-9    Collection Time: 08/17/20  1:08 PM   Result Value Ref Range    CA 19-9 9 0 - 35 U/mL   CBC and differential    Collection Time: 08/17/20  1:08 PM   Result Value Ref Range    WBC 5 20 4 50 - 11 00 Thousand/uL    RBC 3 70 (L) 4 00 - 5 20 Million/uL    Hemoglobin 11 7 (L) 12 0 - 16 0 g/dL    Hematocrit 34 3 (L) 36 0 - 46 0 %    MCV 93 80 - 100 fL    MCH 31 7 26 0 - 34 0 pg    MCHC 34 2 31 0 - 36 0 g/dL    RDW 16 1 (H) <15 3 %    MPV 7 4 (L) 8 9 - 12 7 fL    Platelets 254 998 - 858 Thousands/uL    Neutrophils Relative 70 (H) 45 - 65 %    Lymphocytes Relative 21 (L) 25 - 45 %    Monocytes Relative 7 1 - 10 %    Eosinophils Relative 1 0 - 6 %    Basophils Relative 1 0 - 1 %    Neutrophils Absolute 3 70 1 80 - 7 80 Thousands/µL    Lymphocytes Absolute 1 10 0 50 - 4 00 Thousands/µL    Monocytes Absolute 0 40 0 20 - 0 90 Thousand/µL    Eosinophils Absolute 0 10 0 00 - 0 40 Thousand/µL    Basophils Absolute 0 00 0 00 - 0 10 Thousands/µL   Comprehensive metabolic panel    Collection Time: 08/17/20  1:08 PM   Result Value Ref Range    Sodium 135 (L) 137 - 147 mmol/L    Potassium 3 6 3 6 - 5 0 mmol/L    Chloride 104 97 - 108 mmol/L    CO2 25 22 - 30 mmol/L    ANION GAP 6 5 - 14 mmol/L    BUN 8 5 - 25 mg/dL    Creatinine 0 68 0 60 - 1 20 mg/dL    Glucose 95 70 - 99 mg/dL    Calcium 8 5 8 4 - 10 2 mg/dL    AST 27 14 - 36 U/L    ALT 17 9 - 52 U/L    Alkaline Phosphatase 44 43 - 122 U/L    Total Protein 6 6 5 9 - 8 4 g/dL    Albumin 3 2 3 0 - 5 2 g/dL    Total Bilirubin 1 00 <1 30 mg/dL    eGFR 86 >60 ml/min/1 73sq m   Magnesium    Collection Time: 08/17/20  1:08 PM   Result Value Ref Range    Magnesium 1 7 1 6 - 2 3 mg/dL       Imaging Studies:Mri Brain W Wo Contrast    Result Date: 8/24/2020  Narrative: MRI BRAIN WITH AND WITHOUT CONTRAST INDICATION: C79 31: Secondary malignant neoplasm of brain C50 811: Malignant neoplasm of overlapping sites of right female breast Z17 0: Estrogen receptor positive status (ER+) Z79 811: Long term (current) use of aromatase inhibitors  COMPARISON:  None  TECHNIQUE: Sagittal T1, axial T2, axial FLAIR, axial T1, axial [place], axial diffusion  Sagittal, axial T1 postcontrast   Axial bravo postcontrast with coronal reconstructions  IV Contrast:  6 mL of gadobutrol injection (MULTI-DOSE)  IMAGE QUALITY:   Diagnostic  FINDINGS: BRAIN PARENCHYMA:  There is a new enhancing lesion identified in the left occipital lobe series 1001 image 89 measuring 4 mm suspicious for metastatic disease  Additional lesions are noted in the left occipital lobe stable from prior study on series 1001 image 70 measures approximately 18 x 11 mm when measured similarly to the prior study however, the FLAIR signal surrounding the lesion has increased in the interval  Right parietal enhancing lesion series 901 image 18 measures 4 mm which has decreased in size from the prior study on which it measured 6 mm   Right inferior temporal lobe lesion has decreased in size from the prior study on series 801 image 18 measuring 4 mm compared to 7 mm on the prior  Small scattered hyperintensities on T2/FLAIR imaging are noted in the periventricular and subcortical white matter demonstrating an appearance that is statistically most likely to represent mild microangiopathic change  VENTRICLES:  Normal for the patient's age  SELLA AND PITUITARY GLAND:  Normal  ORBITS:  Normal  PARANASAL SINUSES:  Normal  VASCULATURE:  Evaluation of the major intracranial vasculature demonstrates appropriate flow voids  CALVARIUM AND SKULL BASE:  Normal  EXTRACRANIAL SOFT TISSUES:  Normal      Impression: New metastatic lesion identified in the left occipital lobe measuring 4 mm immediately superior to the dominant occipital lobe mass  There is increasing FLAIR abnormality in the left occipital lobe when compared to the prior study  Additional lesions in the right parietal lobe and right inferior temporal lobe have decreased in size from the prior study  Workstation performed: [id number]     Mri Lumbar Spine Wo Contrast    Result Date: 8/24/2020  Narrative: MRI LUMBAR SPINE WITHOUT CONTRAST INDICATION: M54 42: Lumbago with sciatica, left side M54 41: Lumbago with sciatica, right side G89 29: Other chronic pain R94 8: Abnormal results of function studies of other organs and systems  COMPARISON:  None  TECHNIQUE:  Sagittal T1, sagittal T2, sagittal inversion recovery, axial T1 and axial T2, coronal T2   IMAGE QUALITY:  Diagnostic FINDINGS: VERTEBRAL BODIES:  There are 5 lumbar type vertebral bodies  Levoscoliosis apex L3  Slight anterolisthesis L2-3 and L3-4  Retrolisthesis L4-5  Endplate degenerative marrow signal L4-5  Hemangioma T11  SACRUM:  Normal signal within the sacrum  No evidence of insufficiency or stress fracture  DISTAL CORD AND CONUS:  Normal size and signal within the distal cord and conus  PARASPINAL SOFT TISSUES:  Paraspinal soft tissues are unremarkable   LOWER THORACIC DISC SPACES:  Normal disc height and signal   No disc herniation, canal stenosis or foraminal narrowing  LUMBAR DISC SPACES: L1-L2:  Moderate facet hypertrophy  Minimal central stenosis without foraminal narrowing  L2-L3:  There is a small right paramedian protrusion type disc herniation and diffuse annular bulging with moderate facet hypertrophy combining to result in mild central stenosis, moderate right lateral recess stenosis  L3-L4:  Diffuse annular bulge with marginal osteophytes eccentric to the right results in mild to moderate right and mild left foraminal narrowing  Moderate central stenosis  L4-L5:  Degenerative disc osteophyte complex with marginal osteophytes results in severe central stenosis and severe bilateral lateral recess stenosis  Moderate bilateral foraminal narrowing  L5-S1:  Degenerative disc osteophyte complex with marginal osteophytes results in moderate left greater than right foraminal narrowing  There is mild central stenosis  Impression: Spondylotic degenerative disease identified particularly at L4-5 where there is severe central and bilateral lateral recess stenosis with moderate bilateral foraminal narrowing  Moderate right lateral recess stenosis at L2-3 with no greater than mild central or foraminal narrowing elsewhere  Workstation performed: [id number]       Assessment/Plan:  Orders Placed This Encounter   Procedures    MRI brain w wo contrast        Brady Patel is a 77y o  year old female with a history of a right breast infiltrating ductal carcinoma grade 2 diagnosed in February of 2005 status post right mastectomy with left prophylactic mastectomy   Pathology confirmed 8/17 positive axillary lymph nodes from the right axilla   Estrogen receptors positive with negative progesterone receptors and Her 2 Gregory was also positive   She was treated with 6 cycles chemotherapy followed by tamoxifen and adjuvant radiation therapy to the right axilla and chest wall regions in Quadra Quadra [id number] tamoxifen was switched to Arimidex that she continued up until 2012      She was diagnosed with local chest wall recurrence on the right side with metastatic disease involving the right axilla, pectoralis muscle, left axilla that was confirmed with biopsy admitted 2015  She was started on palliative chemotherapy and had a good response and then has been on maintenance treatment since that time  Ozzy has had to have a hold on her therapy at times due to low ejection fraction   Her PET-CT scan December 16, 2019 showed increase uptake in an enlarging right axillary lymph node with some mild increased uptake in the right perihilar region and a new area in the left adrenal gland that was suspicious   She had right axillary biopsy December 26, 2019 that confirmed invasive ductal carcinoma   Her treatment was changed to Herceptin and Taxol every 3 weeks  Winter Kohli treatment had to be held the on March 16, 2020 due to reduced ejection fraction       She had CT scan of the head on March 18, 2020 an MRI of the brain March 20, 2020 which confirms 4 discrete intracranial metastasis with the largest measuring 24 mm in size in the left occipital lobe with some associated edema   She had no neurologic symptoms   We discussed that she has multiple brain metastasis and we recommended whole-brain radiation therapy  She completed whole brain radiation therapy in April 10, 2020 and returns today for follow-up visit       She is having no neurologic complaints  Repeat MRI of the brain on May 22, 2020 showed a good response with reduction in all 4 for brain metastasis and no new lesions  She had a repeat PET-CT study March 27, 2020 that revealed Increasing size of metastatic right axillary node and nonspecific increased activity in the lower spinal canal at the T12-L1 level  There was no CT correlate on the PET CT scan images and she was having no lower back pain at the time of her last appointment on May 28, 2020   She has now developed lumbar spine pain and had MRI lumbar spine August 24, 2020 that revealed degenerative changes with severe central and bilateral lateral recess stenosis with moderate bilateral foraminal narrowing  She will be seeing neurosurgery for further evaluation  Repeat MRI of the brain August 24, 2020 revealed the 2 lesions on the right side had decreased in size since May  There is a stable left occipital lobe mass with increased FLAIR abnormality and a new 4 mm lesion in the left occipital lobe immediately superior to the dominant occipital lobe mass with increasing FLAIR  MRI results were reviewed today with the patient and her sister  Increasing FLAIR signal a left occipital lobe lesion could be due to post treatment changes but it is unclear if there is any residual viable tumor at this site  Imaging studies were reviewed today with neuroradiologist   We recommend repeat MRI of the brain with a shorter interval at 8 weeks  If the next MRI reveals any progression, then she could be considered for stereotactic radiosurgery  If the lesions are stable or smaller, then she can be followed/observed  She remains on the anastrozole and Tykerb with a reduced dose and less associated nausea and decrease in appetite  She remains on IV hydration daily  She will see Dr Fitz Carrera follow-up on September 18, 2020  She will return here for follow-up in 2 months with a repeat MRI of the brain           Comfort Burt MD  9/3/2020,12:25 PM    Portions of the record may have been created with voice recognition software   Occasional wrong word or "sound a like" substitutions may have occurred due to the inherent limitations of voice recognition software   Read the chart carefully and recognize, using context, where substitutions have occurred

## 2020-09-04 ENCOUNTER — HOSPITAL ENCOUNTER (OUTPATIENT)
Dept: INFUSION CENTER | Facility: HOSPITAL | Age: 75
Discharge: HOME | End: 2020-09-04
Attending: INTERNAL MEDICINE

## 2020-09-04 ENCOUNTER — HOSPITAL ENCOUNTER (OUTPATIENT)
Dept: INFUSION CENTER | Facility: HOSPITAL | Age: 75
Discharge: HOME | End: 2020-09-04
Attending: INTERNAL MEDICINE
Payer: MEDICARE

## 2020-09-04 VITALS
HEART RATE: 85 BPM | RESPIRATION RATE: 16 BRPM | DIASTOLIC BLOOD PRESSURE: 77 MMHG | SYSTOLIC BLOOD PRESSURE: 133 MMHG | OXYGEN SATURATION: 98 % | TEMPERATURE: 98.2 F

## 2020-09-04 DIAGNOSIS — R11.2 CHEMOTHERAPY INDUCED NAUSEA AND VOMITING: ICD-10-CM

## 2020-09-04 DIAGNOSIS — R52 PAIN: ICD-10-CM

## 2020-09-04 DIAGNOSIS — T45.1X5A CHEMOTHERAPY INDUCED NAUSEA AND VOMITING: ICD-10-CM

## 2020-09-04 DIAGNOSIS — E86.0 DEHYDRATION: Primary | ICD-10-CM

## 2020-09-04 PROCEDURE — 96360 HYDRATION IV INFUSION INIT: CPT

## 2020-09-04 RX ADMIN — SODIUM CHLORIDE 1000 ML: 0.9 INJECTION, SOLUTION INTRAVENOUS at 12:27

## 2020-09-04 NOTE — PLAN OF CARE
Problem: Potential for Falls  Goal: Patient will remain free of falls  Description: INTERVENTIONS:  - Assess patient frequently for physical needs  -  Identify cognitive and physical deficits and behaviors that affect risk of falls    -  [place] fall precautions as indicated by assessment   - Educate patient/family on patient safety including physical limitations  - Instruct patient to call for assistance with activity based on assessment  - Modify environment to reduce risk of injury  - Consider OT/PT consult to assist with strengthening/mobility  Outcome: Progressing

## 2020-09-04 NOTE — PROGRESS NOTES
Pt here for daily hydration  Offers no new complaints other than her usual back pain, will be seeing neurologist next Friday 9-11-20  Pt tolerated hydration well and declined zofran   confirmed appt back Tue and AVS declined

## 2020-09-08 ENCOUNTER — HOSPITAL ENCOUNTER (OUTPATIENT)
Dept: INFUSION CENTER | Facility: HOSPITAL | Age: 75
Discharge: HOME | End: 2020-09-08
Attending: INTERNAL MEDICINE
Payer: MEDICARE

## 2020-09-08 VITALS
TEMPERATURE: 97.6 F | SYSTOLIC BLOOD PRESSURE: 134 MMHG | DIASTOLIC BLOOD PRESSURE: 90 MMHG | OXYGEN SATURATION: 98 % | HEART RATE: 88 BPM | RESPIRATION RATE: 18 BRPM

## 2020-09-08 DIAGNOSIS — T45.1X5A CHEMOTHERAPY INDUCED NAUSEA AND VOMITING: ICD-10-CM

## 2020-09-08 DIAGNOSIS — R11.2 CHEMOTHERAPY INDUCED NAUSEA AND VOMITING: ICD-10-CM

## 2020-09-08 DIAGNOSIS — R52 PAIN: Primary | ICD-10-CM

## 2020-09-08 DIAGNOSIS — E86.0 DEHYDRATION: ICD-10-CM

## 2020-09-08 PROCEDURE — 96360 HYDRATION IV INFUSION INIT: CPT

## 2020-09-08 RX ADMIN — SODIUM CHLORIDE 1000 ML: 0.9 INJECTION, SOLUTION INTRAVENOUS at 12:44

## 2020-09-09 ENCOUNTER — HOSPITAL ENCOUNTER (OUTPATIENT)
Dept: INFUSION CENTER | Facility: HOSPITAL | Age: 75
Discharge: HOME | End: 2020-09-09
Attending: INTERNAL MEDICINE
Payer: MEDICARE

## 2020-09-09 VITALS
RESPIRATION RATE: 16 BRPM | SYSTOLIC BLOOD PRESSURE: 135 MMHG | TEMPERATURE: 97.4 F | OXYGEN SATURATION: 99 % | HEART RATE: 70 BPM | DIASTOLIC BLOOD PRESSURE: 73 MMHG

## 2020-09-09 DIAGNOSIS — R52 PAIN: ICD-10-CM

## 2020-09-09 DIAGNOSIS — T45.1X5A CHEMOTHERAPY INDUCED NAUSEA AND VOMITING: ICD-10-CM

## 2020-09-09 DIAGNOSIS — E86.0 DEHYDRATION: Primary | ICD-10-CM

## 2020-09-09 DIAGNOSIS — R11.2 CHEMOTHERAPY INDUCED NAUSEA AND VOMITING: ICD-10-CM

## 2020-09-09 PROCEDURE — 96360 HYDRATION IV INFUSION INIT: CPT

## 2020-09-09 RX ADMIN — SODIUM CHLORIDE 1000 ML: 0.9 INJECTION, SOLUTION INTRAVENOUS at 11:55

## 2020-09-09 NOTE — PLAN OF CARE
Problem: Potential for Falls  Goal: Patient will remain free of falls  Description: INTERVENTIONS:  - Assess patient frequently for physical needs  -  Identify cognitive and physical deficits and behaviors that affect risk of falls    -  [place] fall precautions as indicated by assessment   - Educate patient/family on patient safety including physical limitations  - Instruct patient to call for assistance with activity based on assessment  - Modify environment to reduce risk of injury  - Consider OT/PT consult to assist with strengthening/mobility  Outcome: Progressing

## 2020-09-09 NOTE — PROGRESS NOTES
Pt here for daily hydration today  Offers no new complaints other than her typical back pain  Declined zofran  Tolerated hydration well without complications  Confirmed appt back tomorrow

## 2020-09-10 ENCOUNTER — TELEPHONE (OUTPATIENT)
Dept: NEUROSURGERY | Facility: CLINIC | Age: 75
End: 2020-09-10

## 2020-09-10 ENCOUNTER — HOSPITAL ENCOUNTER (OUTPATIENT)
Dept: INFUSION CENTER | Facility: HOSPITAL | Age: 75
Discharge: HOME | End: 2020-09-10
Attending: INTERNAL MEDICINE
Payer: MEDICARE

## 2020-09-10 VITALS
HEART RATE: 71 BPM | SYSTOLIC BLOOD PRESSURE: 147 MMHG | DIASTOLIC BLOOD PRESSURE: 77 MMHG | TEMPERATURE: 97.8 F | RESPIRATION RATE: 18 BRPM

## 2020-09-10 DIAGNOSIS — R11.2 CHEMOTHERAPY INDUCED NAUSEA AND VOMITING: ICD-10-CM

## 2020-09-10 DIAGNOSIS — R52 PAIN: ICD-10-CM

## 2020-09-10 DIAGNOSIS — T45.1X5A CHEMOTHERAPY INDUCED NAUSEA AND VOMITING: ICD-10-CM

## 2020-09-10 DIAGNOSIS — E86.0 DEHYDRATION: Primary | ICD-10-CM

## 2020-09-10 PROCEDURE — 96360 HYDRATION IV INFUSION INIT: CPT

## 2020-09-10 RX ADMIN — SODIUM CHLORIDE 1000 ML: 0.9 INJECTION, SOLUTION INTRAVENOUS at 12:35

## 2020-09-10 NOTE — PLAN OF CARE
Problem: Potential for Falls  Goal: Patient will remain free of falls  Description: INTERVENTIONS:  - Assess patient frequently for physical needs  -  Identify cognitive and physical deficits and behaviors that affect risk of falls    -  [place] fall precautions as indicated by assessment   - Educate patient/family on patient safety including physical limitations  - Instruct patient to call for assistance with activity based on assessment  - Modify environment to reduce risk of injury  - Consider OT/PT consult to assist with strengthening/mobility  Outcome: Progressing

## 2020-09-11 ENCOUNTER — OFFICE VISIT (OUTPATIENT)
Dept: NEUROSURGERY | Facility: CLINIC | Age: 75
End: 2020-09-11
Payer: MEDICARE

## 2020-09-11 VITALS
WEIGHT: 137 LBS | HEIGHT: 63 IN | DIASTOLIC BLOOD PRESSURE: 86 MMHG | RESPIRATION RATE: 16 BRPM | SYSTOLIC BLOOD PRESSURE: 130 MMHG | TEMPERATURE: 98.1 F | BODY MASS INDEX: 24.27 KG/M2

## 2020-09-11 DIAGNOSIS — M48.061 BILATERAL STENOSIS OF LATERAL RECESS OF LUMBAR SPINE: Primary | ICD-10-CM

## 2020-09-11 DIAGNOSIS — C79.31 BRAIN METASTASIS (HCC): ICD-10-CM

## 2020-09-11 PROCEDURE — 99205 OFFICE O/P NEW HI 60 MIN: CPT | Performed by: PHYSICIAN ASSISTANT

## 2020-09-11 RX ORDER — GABAPENTIN 300 MG/1
300 CAPSULE ORAL
Qty: 30 CAPSULE | Refills: 1 | Status: SHIPPED | OUTPATIENT
Start: 2020-09-11 | End: 2020-11-02 | Stop reason: SDUPTHER

## 2020-09-11 NOTE — ASSESSMENT & PLAN NOTE
Patient follows with Dr Shine Wells and Dr Vignesh Muller  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, s/p [facility]  Now with new left occipital lesion, new mets vs radiation change    Plan to repeat MRI brain w/wo in 2 months   SRS if lesion increases in size

## 2020-09-11 NOTE — ASSESSMENT & PLAN NOTE
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

## 2020-09-11 NOTE — PROGRESS NOTES
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 where there is severe central and bilateral lateral recess stenosis with moderate bilateral foraminal narrowing  Plan:  · Referral to pain management for consideration of ORA and management of gabapentin/pain medicine  · Surgery discuss with the patient and her sister today, although highly discouraged as she would need to stop chemotherapy  The patient would like to avoid surgery at all costs  · Patient is only interested in pain control for improved quality of life  · Return as needed    Brain metastasis [facility])  Patient follows with Dr Ruiz Calvo and Dr Tangela Alfonso  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, s/p [facility]  Now with new left occipital lesion, new mets vs radiation change    Plan to repeat MRI brain w/wo in 2 months   SRS if lesion increases in size       Diagnoses and all orders for this visit:    Bilateral stenosis of lateral recess of lumbar spine  -     Ambulatory referral to Neurosurgery  -     gabapentin (NEURONTIN) 300 mg capsule; Take 1 capsule (300 mg total) by mouth daily at bedtime  -     Ambulatory referral to Pain Management; Future    Brain metastasis ([facility] [address] )            CHIEF COMPLAINT    Chief Complaint   Patient presents with    Consult    Back Pain       HISTORY    This is a 80-year-old female with a past medical history of hypertension and metastatic breast cancer who is here today for evaluation of back pain        She states she has had back pain over the past 2 months which radiates down her bilateral anterior legs to her feet  She does have numbness and tingling in her bilateral feet from chemotherapy, but denies any numbness, tingling, or weakness of her bilateral lower extremities  She denies any urinary incontinence or saddle anesthesia  She does admit to difficulty walking secondary to her back pain  Her pain is worse with walking, standing, and sitting  It is relieved laying down  The only time she does not have pain is while sleeping  Currently she is only taking Tylenol for her pain  She has not done physical therapy or tried injections recently  She has had similar pain 5 years ago while living in [facility] and had a series of 3 epidural steroid injections which worked very well  She is interested in trying this again  She was diagnosed with right breast cancer in 2005  This was consistent with invasive ductal carcinoma, estrogen receptor positive, 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, HER2 positive  She is status post mastectomy, chemotherapy, radiation, and had been maintained on Arimidex for many years  Unfortunately, she developed a recurrence of her right chest wall invading her muscle in 2015  She is currently on palliative chemotherapy  In March 2020 she had an MRI of the brain which showed 4 metastatic brain lesions  She underwent whole brain radiation in April of 2020 under the direction of Dr Theodor Ormond  She is currently on anastrozole and Tykerb under the direction of Dr Sarah Matihs  MRI brain with and without contrast obtained on August 24, 2020 showed decrease in size of her for metastatic brain lesions but a new lesion in the left occipital lobe immediately superior to the dominant lesion  This measures 4 mm  This could be a new lesion or radiation changes  Her radiation oncology did review the imaging and recommend an 8 week interval MRI  SRS will be recommended if there is any growth of this lesion        MRI lumbar spine was reviewed in detail with the patient and her sister today  This shows severe canal stenosis and severe bilateral neuroforaminal stenosis at L4-5  Unfortunately, the patient would need to stop chemotherapy to undergo surgery so this is not recommended  Will refer her to pain management for consideration of epidural steroid injections  Will also start her on a trial of gabapentin 300 mg every night  After 1 week she can increase this to twice daily  We will let pain management take over the management of this medication  She can return as needed  REVIEW OF SYSTEMS    Review of Systems   Constitutional: Negative  HENT: Negative  Eyes: Negative  Respiratory: Negative  Cardiovascular: Negative  Gastrointestinal: Negative  Endocrine: Negative  Genitourinary: Negative  Musculoskeletal: Positive for back pain (radiates to b/l legs at times ) and gait problem  PT- NONE  Pain Man--NONE   No Meds for pain   Skin: Negative  Allergic/Immunologic: Negative  Neurological: Positive for weakness (difficulty walking) and numbness (and tingling on b/l legs and feet)  Hematological: Negative  Psychiatric/Behavioral: Negative  All other systems reviewed and are negative      ROS was personally reviewed and changes made as needed     Meds/Allergies     Current Outpatient Medications   Medication Sig Dispense Refill    anastrozole (ARIMIDEX) 1 mg tablet Take 1 tablet (1 mg total) by mouth every 24 hours 90 tablet 4    cholecalciferol (VITAMIN D3) 1,000 units tablet Take 1 tablet (1,000 Units total) by mouth daily 30 tablet 0    Cyanocobalamin 1000 MCG/ML KIT Inject as directed every 6 (six) months      escitalopram (LEXAPRO) 5 mg tablet Take 1 tablet (5 mg total) by mouth daily 30 tablet 5    esomeprazole (NexIUM) 40 MG capsule TAKE 1 CAPSULE BY MOUTH ONCE DAILY 90 capsule 0    indapamide (LOZOL) 1 25 mg tablet Take 1 tablet (1 25 mg total) by mouth every morning 30 tablet 1    lisinopril (ZESTRIL) 10 mg tablet Take 1 tablet (10 mg total) by mouth daily 90 tablet 3    TYKERB 250 MG tablet       acetaminophen (TYLENOL) 500 mg tablet Two tablet every 8 hours (Patient not taking: Reported on 9/3/2020) 30 tablet 0    gabapentin (NEURONTIN) 300 mg capsule Take 1 capsule (300 mg total) by mouth daily at bedtime 30 capsule 1    megestrol (MEGACE) 400 mg/10 mL Take 400 mg by mouth daily (Patient not taking: Reported on 9/3/2020) 600 mL 0    Misc  Devices (ILLUSIONS C BREAST PROSTHESIS) MISC 1 breast prosthesis 1 each 0    Misc  Devices (REFLECTIONS C BREAST PROSTHES) MISC Dispense 1 breast prosthesis 1 each 0    nebivolol (Bystolic) 5 mg tablet Take 1 tablet (5 mg total) by mouth daily 90 tablet 3    pantoprazole (PROTONIX) 40 mg tablet        No current facility-administered medications for this visit        Facility-Administered Medications Ordered in Other Visits   Medication Dose Route Frequency Provider Last Rate Last Dose    ondansetron (ZOFRAN) 8 mg in sodium chloride 0 9 % 50 mL IVPB  8 mg Intravenous Once Antoinette Verduzco MD           Allergies   Allergen Reactions    Penicillins Rash       PAST HISTORY    Past Medical History:   Diagnosis Date    [facility] [address] )     Breast cancer ([facility] [address] )     Hypertension     Lymphedema of right arm     Vitamin B12 deficiency        Past Surgical History:   Procedure Laterality Date    APPENDECTOMY      BREAST SURGERY      bilat mastectomy-right total mastectomy and prophylactic left total mastectomy-2005    KNEE SURGERY      right knee replacement 2014 in [address] [place] [address] Bilateral        Social History     Tobacco Use    Smoking status: Never Smoker    Smokeless tobacco: Never Used    Tobacco comment: no passive smoke exposure   Substance Use Topics    Alcohol use: Never     Frequency: Never     Binge frequency: Never    Drug use: No       Family History   Problem Relation Age of Onset    Asthma Mother     Osteoarthritis Father     Bone cancer Paternal Aunt bone cancer         Above history personally reviewed  EXAM    Vitals:Blood pressure 130/86, temperature 98 1 °F (36 7 °C), temperature source Tympanic, resp  rate 16, height 5' 3" (1 6 m), weight 62 1 kg (137 lb), not currently breastfeeding  ,Body mass index is 24 27 kg/m²  Physical Exam  Vitals signs and nursing note reviewed  Exam conducted with a chaperone present  Constitutional:       Appearance: Normal appearance  She is well-developed and normal weight  HENT:      Head: Normocephalic and atraumatic  Eyes:      Extraocular Movements: Extraocular movements intact  Pupils: Pupils are equal, round, and reactive to light  Neck:      Musculoskeletal: Normal range of motion  Cardiovascular:      Rate and Rhythm: Normal rate  Pulmonary:      Effort: Pulmonary effort is normal  No respiratory distress  Abdominal:      Palpations: Abdomen is soft  Musculoskeletal: Normal range of motion  Skin:     General: Skin is warm and dry  Neurological:      General: No focal deficit present  Mental Status: She is alert and oriented to person, place, and time  Coordination: Finger-Nose-Finger Test normal       Deep Tendon Reflexes:      Reflex Scores:       Patellar reflexes are 2+ on the right side and 2+ on the left side  Achilles reflexes are 2+ on the right side and 2+ on the left side  Psychiatric:         Mood and Affect: Mood normal          Speech: Speech normal          Behavior: Behavior normal          Thought Content: Thought content normal          Judgment: Judgment normal          Neurologic Exam     Mental Status   Oriented to person, place, and time  Follows 2 step commands  Attention: normal  Concentration: normal    Speech: speech is normal   Level of consciousness: alert  Knowledge: good  Able to repeat  Normal comprehension  Cranial Nerves   Cranial nerves II through XII intact       CN III, IV, VI   Pupils are equal, round, and reactive to light     Motor Exam   Muscle bulk: normal  Overall muscle tone: normal    Strength   Strength 5/5 except as noted  Bilateral DF 4/5     Sensory Exam   Light touch normal    DST and JPS intact bilaterally  Pain with all ROM  TTP low lumbar paraspinal muscles     Gait, Coordination, and Reflexes     Gait  Gait: (antalgic)    Coordination   Finger to nose coordination: normal    Tremor   Resting tremor: absent    Reflexes   Right patellar: 2+  Left patellar: 2+  Right achilles: 2+  Left achilles: 2+  Right ankle clonus: absent  Left ankle clonus: absent        MEDICAL DECISION MAKING    Imaging Studies:     Mri Brain W Wo Contrast    Result Date: 8/24/2020  Narrative: MRI BRAIN WITH AND WITHOUT CONTRAST INDICATION: C79 31: Secondary malignant neoplasm of brain C50 811: Malignant neoplasm of overlapping sites of right female breast Z17 0: Estrogen receptor positive status (ER+) Z79 811: Long term (current) use of aromatase inhibitors  COMPARISON:  None  TECHNIQUE: Sagittal T1, axial T2, axial FLAIR, axial T1, axial [place], axial diffusion  Sagittal, axial T1 postcontrast   Axial bravo postcontrast with coronal reconstructions  IV Contrast:  6 mL of gadobutrol injection (MULTI-DOSE)  IMAGE QUALITY:   Diagnostic  FINDINGS: BRAIN PARENCHYMA:  There is a new enhancing lesion identified in the left occipital lobe series 1001 image 89 measuring 4 mm suspicious for metastatic disease  Additional lesions are noted in the left occipital lobe stable from prior study on series 1001 image 70 measures approximately 18 x 11 mm when measured similarly to the prior study however, the FLAIR signal surrounding the lesion has increased in the interval  Right parietal enhancing lesion series 901 image 18 measures 4 mm which has decreased in size from the prior study on which it measured 6 mm   Right inferior temporal lobe lesion has decreased in size from the prior study on series 801 image 18 measuring 4 mm compared to 7 mm on the prior  Small scattered hyperintensities on T2/FLAIR imaging are noted in the periventricular and subcortical white matter demonstrating an appearance that is statistically most likely to represent mild microangiopathic change  VENTRICLES:  Normal for the patient's age  SELLA AND PITUITARY GLAND:  Normal  ORBITS:  Normal  PARANASAL SINUSES:  Normal  VASCULATURE:  Evaluation of the major intracranial vasculature demonstrates appropriate flow voids  CALVARIUM AND SKULL BASE:  Normal  EXTRACRANIAL SOFT TISSUES:  Normal      Impression: New metastatic lesion identified in the left occipital lobe measuring 4 mm immediately superior to the dominant occipital lobe mass  There is increasing FLAIR abnormality in the left occipital lobe when compared to the prior study  Additional lesions in the right parietal lobe and right inferior temporal lobe have decreased in size from the prior study  Workstation performed: [id number]     Mri Lumbar Spine Wo Contrast    Result Date: 8/24/2020  Narrative: MRI LUMBAR SPINE WITHOUT CONTRAST INDICATION: M54 42: Lumbago with sciatica, left side M54 41: Lumbago with sciatica, right side G89 29: Other chronic pain R94 8: Abnormal results of function studies of other organs and systems  COMPARISON:  None  TECHNIQUE:  Sagittal T1, sagittal T2, sagittal inversion recovery, axial T1 and axial T2, coronal T2   IMAGE QUALITY:  Diagnostic FINDINGS: VERTEBRAL BODIES:  There are 5 lumbar type vertebral bodies  Levoscoliosis apex L3  Slight anterolisthesis L2-3 and L3-4  Retrolisthesis L4-5  Endplate degenerative marrow signal L4-5  Hemangioma T11  SACRUM:  Normal signal within the sacrum  No evidence of insufficiency or stress fracture  DISTAL CORD AND CONUS:  Normal size and signal within the distal cord and conus  PARASPINAL SOFT TISSUES:  Paraspinal soft tissues are unremarkable   LOWER THORACIC DISC SPACES:  Normal disc height and signal   No disc herniation, canal stenosis or foraminal narrowing  LUMBAR DISC SPACES: L1-L2:  Moderate facet hypertrophy  Minimal central stenosis without foraminal narrowing  L2-L3:  There is a small right paramedian protrusion type disc herniation and diffuse annular bulging with moderate facet hypertrophy combining to result in mild central stenosis, moderate right lateral recess stenosis  L3-L4:  Diffuse annular bulge with marginal osteophytes eccentric to the right results in mild to moderate right and mild left foraminal narrowing  Moderate central stenosis  L4-L5:  Degenerative disc osteophyte complex with marginal osteophytes results in severe central stenosis and severe bilateral lateral recess stenosis  Moderate bilateral foraminal narrowing  L5-S1:  Degenerative disc osteophyte complex with marginal osteophytes results in moderate left greater than right foraminal narrowing  There is mild central stenosis  Impression: 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

## 2020-09-14 ENCOUNTER — APPOINTMENT (OUTPATIENT)
Dept: LAB | Age: 75
End: 2020-09-14
Payer: MEDICARE

## 2020-09-14 ENCOUNTER — HOSPITAL ENCOUNTER (OUTPATIENT)
Dept: INFUSION CENTER | Facility: HOSPITAL | Age: 75
Discharge: HOME | End: 2020-09-14
Attending: INTERNAL MEDICINE
Payer: MEDICARE

## 2020-09-14 VITALS — DIASTOLIC BLOOD PRESSURE: 59 MMHG | TEMPERATURE: 97.8 F | SYSTOLIC BLOOD PRESSURE: 118 MMHG | HEART RATE: 74 BPM

## 2020-09-14 DIAGNOSIS — R52 PAIN: ICD-10-CM

## 2020-09-14 DIAGNOSIS — T45.1X5A CHEMOTHERAPY INDUCED NAUSEA AND VOMITING: ICD-10-CM

## 2020-09-14 DIAGNOSIS — D64.9 ANEMIA, UNSPECIFIED TYPE: ICD-10-CM

## 2020-09-14 DIAGNOSIS — E86.0 DEHYDRATION: Primary | ICD-10-CM

## 2020-09-14 DIAGNOSIS — C50.811 MALIGNANT NEOPLASM OF OVERLAPPING SITES OF RIGHT BREAST IN FEMALE, ESTROGEN RECEPTOR POSITIVE (HCC): ICD-10-CM

## 2020-09-14 DIAGNOSIS — Z17.0 MALIGNANT NEOPLASM OF OVERLAPPING SITES OF RIGHT BREAST IN FEMALE, ESTROGEN RECEPTOR POSITIVE (HCC): ICD-10-CM

## 2020-09-14 DIAGNOSIS — R11.2 CHEMOTHERAPY INDUCED NAUSEA AND VOMITING: ICD-10-CM

## 2020-09-14 LAB
ALBUMIN SERPL BCP-MCNC: 3.3 G/DL (ref 3.5–5)
ALP SERPL-CCNC: 41 U/L (ref 46–116)
ALT SERPL W P-5'-P-CCNC: 18 U/L (ref 12–78)
ANION GAP SERPL CALCULATED.3IONS-SCNC: 7 MMOL/L (ref 4–13)
AST SERPL W P-5'-P-CCNC: 23 U/L (ref 5–45)
BASOPHILS # BLD AUTO: 0.03 THOUSANDS/ΜL (ref 0–0.1)
BASOPHILS NFR BLD AUTO: 1 % (ref 0–1)
BILIRUB SERPL-MCNC: 1.17 MG/DL (ref 0.2–1)
BUN SERPL-MCNC: 9 MG/DL (ref 5–25)
CALCIUM SERPL-MCNC: 9 MG/DL (ref 8.3–10.1)
CANCER AG27-29 SERPL-ACNC: 39.7 U/ML (ref 0–42.3)
CHLORIDE SERPL-SCNC: 107 MMOL/L (ref 100–108)
CO2 SERPL-SCNC: 25 MMOL/L (ref 21–32)
CREAT SERPL-MCNC: 0.81 MG/DL (ref 0.6–1.3)
EOSINOPHIL # BLD AUTO: 0.09 THOUSAND/ΜL (ref 0–0.61)
EOSINOPHIL NFR BLD AUTO: 2 % (ref 0–6)
ERYTHROCYTE [DISTWIDTH] IN BLOOD BY AUTOMATED COUNT: 14.8 % (ref 11.6–15.1)
FERRITIN SERPL-MCNC: 186 NG/ML (ref 8–388)
FOLATE SERPL-MCNC: 5.1 NG/ML (ref 3.1–17.5)
GFR SERPL CREATININE-BSD FRML MDRD: 72 ML/MIN/1.73SQ M
GLUCOSE P FAST SERPL-MCNC: 89 MG/DL (ref 65–99)
HCT VFR BLD AUTO: 41.2 % (ref 34.8–46.1)
HGB BLD-MCNC: 13.2 G/DL (ref 11.5–15.4)
IMM GRANULOCYTES # BLD AUTO: 0.02 THOUSAND/UL (ref 0–0.2)
IMM GRANULOCYTES NFR BLD AUTO: 0 % (ref 0–2)
IRON SATN MFR SERPL: 21 %
IRON SERPL-MCNC: 67 UG/DL (ref 50–170)
LYMPHOCYTES # BLD AUTO: 1.42 THOUSANDS/ΜL (ref 0.6–4.47)
LYMPHOCYTES NFR BLD AUTO: 28 % (ref 14–44)
MCH RBC QN AUTO: 32 PG (ref 26.8–34.3)
MCHC RBC AUTO-ENTMCNC: 32 G/DL (ref 31.4–37.4)
MCV RBC AUTO: 100 FL (ref 82–98)
MONOCYTES # BLD AUTO: 0.43 THOUSAND/ΜL (ref 0.17–1.22)
MONOCYTES NFR BLD AUTO: 8 % (ref 4–12)
NEUTROPHILS # BLD AUTO: 3.18 THOUSANDS/ΜL (ref 1.85–7.62)
NEUTS SEG NFR BLD AUTO: 61 % (ref 43–75)
NRBC BLD AUTO-RTO: 0 /100 WBCS
PLATELET # BLD AUTO: 304 THOUSANDS/UL (ref 149–390)
PMV BLD AUTO: 9.6 FL (ref 8.9–12.7)
POTASSIUM SERPL-SCNC: 4.1 MMOL/L (ref 3.5–5.3)
PROT SERPL-MCNC: 7.6 G/DL (ref 6.4–8.2)
RBC # BLD AUTO: 4.13 MILLION/UL (ref 3.81–5.12)
SODIUM SERPL-SCNC: 139 MMOL/L (ref 136–145)
TIBC SERPL-MCNC: 317 UG/DL (ref 250–450)
VIT B12 SERPL-MCNC: 236 PG/ML (ref 100–900)
WBC # BLD AUTO: 5.17 THOUSAND/UL (ref 4.31–10.16)

## 2020-09-14 PROCEDURE — 85025 COMPLETE CBC W/AUTO DIFF WBC: CPT

## 2020-09-14 PROCEDURE — 82746 ASSAY OF FOLIC ACID SERUM: CPT

## 2020-09-14 PROCEDURE — 82607 VITAMIN B-12: CPT

## 2020-09-14 PROCEDURE — 83550 IRON BINDING TEST: CPT

## 2020-09-14 PROCEDURE — 96360 HYDRATION IV INFUSION INIT: CPT

## 2020-09-14 PROCEDURE — 36415 COLL VENOUS BLD VENIPUNCTURE: CPT

## 2020-09-14 PROCEDURE — 86300 IMMUNOASSAY TUMOR CA 15-3: CPT

## 2020-09-14 PROCEDURE — 83540 ASSAY OF IRON: CPT

## 2020-09-14 PROCEDURE — 80053 COMPREHEN METABOLIC PANEL: CPT

## 2020-09-14 PROCEDURE — 82728 ASSAY OF FERRITIN: CPT

## 2020-09-14 RX ADMIN — SODIUM CHLORIDE 1000 ML: 0.9 INJECTION, SOLUTION INTRAVENOUS at 12:55

## 2020-09-14 NOTE — PLAN OF CARE
Problem: Potential for Falls  Goal: Patient will remain free of falls  Description: INTERVENTIONS:  - Assess patient frequently for physical needs  -  Identify cognitive and physical deficits and behaviors that affect risk of falls    -  [place] fall precautions as indicated by assessment   - Educate patient/family on patient safety including physical limitations  - Instruct patient to call for assistance with activity based on assessment  - Modify environment to reduce risk of injury  - Consider OT/PT consult to assist with strengthening/mobility  Outcome: Progressing

## 2020-09-15 ENCOUNTER — HOSPITAL ENCOUNTER (OUTPATIENT)
Dept: INFUSION CENTER | Facility: HOSPITAL | Age: 75
Discharge: HOME | End: 2020-09-15
Attending: INTERNAL MEDICINE
Payer: MEDICARE

## 2020-09-15 VITALS
SYSTOLIC BLOOD PRESSURE: 141 MMHG | HEART RATE: 70 BPM | TEMPERATURE: 97.8 F | OXYGEN SATURATION: 99 % | RESPIRATION RATE: 16 BRPM | DIASTOLIC BLOOD PRESSURE: 83 MMHG

## 2020-09-15 DIAGNOSIS — E86.0 DEHYDRATION: Primary | ICD-10-CM

## 2020-09-15 DIAGNOSIS — R52 PAIN: ICD-10-CM

## 2020-09-15 DIAGNOSIS — R11.2 CHEMOTHERAPY INDUCED NAUSEA AND VOMITING: ICD-10-CM

## 2020-09-15 DIAGNOSIS — T45.1X5A CHEMOTHERAPY INDUCED NAUSEA AND VOMITING: ICD-10-CM

## 2020-09-15 PROCEDURE — 96360 HYDRATION IV INFUSION INIT: CPT

## 2020-09-15 RX ADMIN — SODIUM CHLORIDE 1000 ML: 0.9 INJECTION, SOLUTION INTRAVENOUS at 12:38

## 2020-09-15 NOTE — PLAN OF CARE
Problem: Potential for Falls  Goal: Patient will remain free of falls  Description: INTERVENTIONS:  - Assess patient frequently for physical needs  -  Identify cognitive and physical deficits and behaviors that affect risk of falls    -  [place] fall precautions as indicated by assessment   - Educate patient/family on patient safety including physical limitations  - Instruct patient to call for assistance with activity based on assessment  - Modify environment to reduce risk of injury  - Consider OT/PT consult to assist with strengthening/mobility  Outcome: Progressing

## 2020-09-15 NOTE — PROGRESS NOTES
Pt here for daily hydration, tolerated well without complications, declined zofran today, confirmed appt back tomorrow, port staying accessed

## 2020-09-16 ENCOUNTER — HOSPITAL ENCOUNTER (OUTPATIENT)
Dept: INFUSION CENTER | Facility: HOSPITAL | Age: 75
Discharge: HOME | End: 2020-09-16
Attending: INTERNAL MEDICINE
Payer: MEDICARE

## 2020-09-16 DIAGNOSIS — E86.0 DEHYDRATION: ICD-10-CM

## 2020-09-16 DIAGNOSIS — R52 PAIN: Primary | ICD-10-CM

## 2020-09-16 DIAGNOSIS — T45.1X5A CHEMOTHERAPY INDUCED NAUSEA AND VOMITING: ICD-10-CM

## 2020-09-16 DIAGNOSIS — R11.2 CHEMOTHERAPY INDUCED NAUSEA AND VOMITING: ICD-10-CM

## 2020-09-16 PROCEDURE — 96360 HYDRATION IV INFUSION INIT: CPT

## 2020-09-16 RX ADMIN — SODIUM CHLORIDE 1000 ML: 0.9 INJECTION, SOLUTION INTRAVENOUS at 12:27

## 2020-09-16 NOTE — PLAN OF CARE
Problem: Potential for Falls  Goal: Patient will remain free of falls  Description: INTERVENTIONS:  - Assess patient frequently for physical needs  -  Identify cognitive and physical deficits and behaviors that affect risk of falls    -  [place] fall precautions as indicated by assessment   - Educate patient/family on patient safety including physical limitations  - Instruct patient to call for assistance with activity based on assessment  - Modify environment to reduce risk of injury  - Consider OT/PT consult to assist with strengthening/mobility  Outcome: Progressing

## 2020-09-16 NOTE — PROGRESS NOTES
Pt tolerated hydration well  Port flushed and capped per routine for use tomorrow   Next two weeks of appts scheduled for pt and she ws provided with avs

## 2020-09-17 ENCOUNTER — HOSPITAL ENCOUNTER (OUTPATIENT)
Dept: INFUSION CENTER | Facility: HOSPITAL | Age: 75
Discharge: HOME | End: 2020-09-17
Attending: INTERNAL MEDICINE
Payer: MEDICARE

## 2020-09-17 VITALS
TEMPERATURE: 98.1 F | DIASTOLIC BLOOD PRESSURE: 76 MMHG | RESPIRATION RATE: 18 BRPM | HEART RATE: 74 BPM | SYSTOLIC BLOOD PRESSURE: 145 MMHG

## 2020-09-17 DIAGNOSIS — R52 PAIN: ICD-10-CM

## 2020-09-17 DIAGNOSIS — E86.0 DEHYDRATION: Primary | ICD-10-CM

## 2020-09-17 DIAGNOSIS — R11.2 CHEMOTHERAPY INDUCED NAUSEA AND VOMITING: ICD-10-CM

## 2020-09-17 DIAGNOSIS — T45.1X5A CHEMOTHERAPY INDUCED NAUSEA AND VOMITING: ICD-10-CM

## 2020-09-17 PROCEDURE — 96360 HYDRATION IV INFUSION INIT: CPT

## 2020-09-17 RX ADMIN — SODIUM CHLORIDE 1000 ML: 0.9 INJECTION, SOLUTION INTRAVENOUS at 12:34

## 2020-09-17 NOTE — PLAN OF CARE
Problem: Potential for Falls  Goal: Patient will remain free of falls  Description: INTERVENTIONS:  - Assess patient frequently for physical needs  -  Identify cognitive and physical deficits and behaviors that affect risk of falls    -  [place] fall precautions as indicated by assessment   - Educate patient/family on patient safety including physical limitations  - Instruct patient to call for assistance with activity based on assessment  - Modify environment to reduce risk of injury  - Consider OT/PT consult to assist with strengthening/mobility  Outcome: Progressing

## 2020-09-17 NOTE — PROGRESS NOTES
Pt tolerated hydration well  Port flushed and deaccessed at pt request  Discharged in wc to [facility]

## 2020-09-18 ENCOUNTER — HOSPITAL ENCOUNTER (OUTPATIENT)
Dept: INFUSION CENTER | Facility: HOSPITAL | Age: 75
Discharge: HOME | End: 2020-09-18
Attending: INTERNAL MEDICINE
Payer: MEDICARE

## 2020-09-18 ENCOUNTER — OFFICE VISIT (OUTPATIENT)
Dept: HEMATOLOGY ONCOLOGY | Facility: CLINIC | Age: 75
End: 2020-09-18
Payer: MEDICARE

## 2020-09-18 VITALS
OXYGEN SATURATION: 98 % | DIASTOLIC BLOOD PRESSURE: 90 MMHG | WEIGHT: 135 LBS | TEMPERATURE: 98 F | HEART RATE: 90 BPM | RESPIRATION RATE: 18 BRPM | HEIGHT: 63 IN | BODY MASS INDEX: 23.92 KG/M2 | SYSTOLIC BLOOD PRESSURE: 146 MMHG

## 2020-09-18 VITALS
DIASTOLIC BLOOD PRESSURE: 71 MMHG | HEART RATE: 69 BPM | RESPIRATION RATE: 18 BRPM | TEMPERATURE: 97.8 F | OXYGEN SATURATION: 99 % | SYSTOLIC BLOOD PRESSURE: 126 MMHG

## 2020-09-18 DIAGNOSIS — Z17.0 MALIGNANT NEOPLASM OF OVERLAPPING SITES OF RIGHT BREAST IN FEMALE, ESTROGEN RECEPTOR POSITIVE (HCC): Primary | ICD-10-CM

## 2020-09-18 DIAGNOSIS — E53.8 VITAMIN B 12 DEFICIENCY: ICD-10-CM

## 2020-09-18 DIAGNOSIS — C79.31 BRAIN METASTASIS (HCC): ICD-10-CM

## 2020-09-18 DIAGNOSIS — R52 PAIN: ICD-10-CM

## 2020-09-18 DIAGNOSIS — R11.2 CHEMOTHERAPY INDUCED NAUSEA AND VOMITING: ICD-10-CM

## 2020-09-18 DIAGNOSIS — E53.8 VITAMIN B12 DEFICIENCY: ICD-10-CM

## 2020-09-18 DIAGNOSIS — E86.0 DEHYDRATION: Primary | ICD-10-CM

## 2020-09-18 DIAGNOSIS — C50.811 MALIGNANT NEOPLASM OF OVERLAPPING SITES OF RIGHT BREAST IN FEMALE, ESTROGEN RECEPTOR POSITIVE (HCC): Primary | ICD-10-CM

## 2020-09-18 DIAGNOSIS — T45.1X5A CHEMOTHERAPY INDUCED NAUSEA AND VOMITING: ICD-10-CM

## 2020-09-18 PROCEDURE — 99214 OFFICE O/P EST MOD 30 MIN: CPT | Performed by: INTERNAL MEDICINE

## 2020-09-18 PROCEDURE — 96372 THER/PROPH/DIAG INJ SC/IM: CPT

## 2020-09-18 PROCEDURE — 96360 HYDRATION IV INFUSION INIT: CPT

## 2020-09-18 RX ORDER — CYANOCOBALAMIN 1000 UG/ML
1000 INJECTION INTRAMUSCULAR; SUBCUTANEOUS ONCE
Status: COMPLETED | OUTPATIENT
Start: 2020-09-18 | End: 2020-09-18

## 2020-09-18 RX ORDER — CYANOCOBALAMIN 1000 UG/ML
1000 INJECTION INTRAMUSCULAR; SUBCUTANEOUS ONCE
Status: CANCELLED | OUTPATIENT
Start: 2020-09-25

## 2020-09-18 RX ORDER — CYANOCOBALAMIN 1000 UG/ML
1000 INJECTION INTRAMUSCULAR; SUBCUTANEOUS ONCE
Status: CANCELLED
Start: 2020-09-18

## 2020-09-18 RX ORDER — CYANOCOBALAMIN 1000 UG/ML
1000 INJECTION INTRAMUSCULAR; SUBCUTANEOUS ONCE
Status: CANCELLED
Start: 2020-09-25

## 2020-09-18 RX ORDER — CYANOCOBALAMIN 1000 UG/ML
1000 INJECTION INTRAMUSCULAR; SUBCUTANEOUS ONCE
Status: CANCELLED | OUTPATIENT
Start: 2020-09-18

## 2020-09-18 RX ADMIN — CYANOCOBALAMIN 1000 MCG: 1000 INJECTION INTRAMUSCULAR; SUBCUTANEOUS at 12:02

## 2020-09-18 RX ADMIN — SODIUM CHLORIDE 1000 ML: 0.9 INJECTION, SOLUTION INTRAVENOUS at 11:33

## 2020-09-18 NOTE — PROGRESS NOTES
Pt received B12 IM inj in left deltoid & hydration  Ended infusion 10 min earlier per pt request  Sister was waiting for her downstairs   Offers no complaints

## 2020-09-18 NOTE — PROGRESS NOTES
Hematology/Oncology Outpatient Follow-up  Marcos Collier 77 y o  female 1945 [id number]    Date:  9/18/2020        Assessment and Plan:  1  Malignant neoplasm of overlapping 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, estrogen receptor positive ([facility] [address] )  The patient was encouraged to increase the dose of the Tykerb up to 1250 since she is tolerating the 1000 mg dose  We will aim to increase the dose up to 1500 mg once a day in the near future  We will continue with the IV hydration on a daily basis 5 days a week  - CBC and differential; Future  - Comprehensive metabolic panel; Future  - Cancer antigen 15-3; Future  - Cancer antigen 27 29; Future    2  Brain metastasis ([facility] [address] )  See seems to have a new lesion in the left occipital region which is most likely compatible with new metastatic lesion  The Radiation Oncology team is monitoring her closely  3  Vitamin B12 deficiency  She seems to be vitamin B12 deficient which will be corrected with vitamin B12 injections on a weekly basis x4 then monthly  - Vitamin B12; Future        HPI:  The patient came today for a follow-up visit  She is tolerating the current dose of Tykerb 1000 mg once a day relatively well with the less nausea or vomiting  She continues to be on IV hydration once a day for 5 days during the week  Her most recent MRI of the brain on 08/24/2020 showed:     IMPRESSION:     New metastatic lesion identified in the left occipital lobe measuring 4 mm immediately superior to the dominant occipital lobe mass  There is increasing FLAIR abnormality in the left occipital lobe when compared to the prior study      Additional lesions in the right parietal lobe and right inferior temporal lobe have decreased in size from the prior study  Her blood work from 09/14/2020 showed borderline folic acid level and low vitamin B12 of 236  Her iron level was normal with normal CA 27-29  Her hemoglobin level improved to 13 2 with normal white cells and platelets    CMP is normal   Oncology History   Malignant neoplasm of overlapping 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, estrogen receptor positive ([address] )   2015 -  Hormone Therapy    Anastrozole     5/28/2015 Initial Diagnosis    Metastatic breast cancer ([facility] [address] )  (recurrence)     6/30/2015 -  Chemotherapy    1-Taxotere, Herceptin, Perjeta started in [place] 2015 x6 cycles  2-Herceptin and Perjeta alone were continued since the patient was not interested in continue the Taxotere  3-Herceptin and Perjeta were put on hold due to decline of her ejection fraction around May 2017    4-low dose weekly Taxol was started in July 2017  5-Taxol was switched to every other week basis     4/14/2019 - 6/9/2019 Chemotherapy    PACLitaxel (TAXOL) 144 6 mg in sodium chloride 0 9 % 250 mL chemo IVPB, 80 mg/m2, Intravenous, Once, 2 of 6 cycles     12/4/2019 - 12/31/2019 Chemotherapy    PACLitaxel (TAXOL) 142 2 mg in sodium chloride 0 9 % 250 mL chemo IVPB, 80 mg/m2 = 142 2 mg, Intravenous, Once, 1 of 6 cycles  Administration: 142 2 mg (12/4/2019), 142 2 mg (12/18/2019)     1/8/2020 -  Chemotherapy    PACLitaxel (TAXOL) 141 6 mg in sodium chloride 0 9 % 250 mL chemo IVPB, 80 mg/m2 = 141 6 mg, Intravenous, Once, 4 of 6 cycles  Administration: 141 6 mg (1/8/2020), 141 6 mg (1/29/2020), 141 6 mg (2/19/2020)  trastuzumab (HERCEPTIN) 600 mg in sodium chloride 0 9 % 250 mL chemo infusion, 609 mg, Intravenous, Once, 4 of 6 cycles  Administration: 600 mg (1/8/2020), 450 mg (1/29/2020), 450 mg (2/19/2020)      Chemotherapy    PACLitaxel (TAXOL) 144 6 mg in sodium chloride 0 9 % 250 mL chemo IVPB, 80 mg/m2, Intravenous, Once, 2 of 6 cycles    PACLitaxel (TAXOL) 142 2 mg in sodium chloride 0 9 % 250 mL chemo IVPB, 80 mg/m2 = 142 2 mg, Intravenous, Once, 1 of 6 cycles    Administration: 142 2 mg (12/4/2019), 142 2 mg (12/18/2019)    PACLitaxel (TAXOL) 141 6 mg in sodium chloride 0 9 % 250 mL chemo IVPB, 80 mg/m2, Intravenous, Once, 0 of 12 cycles        trastuzumab (HERCEPTIN) 304 mg in sodium chloride 0 9 % 250 mL chemo infusion, 4 mg/kg, Intravenous, Once, 0 of 26 cycles    PACLitaxel (TAXOL) 141 6 mg in sodium chloride 0 9 % 250 mL chemo IVPB, 80 mg/m2 = 141 6 mg, Intravenous, Once, 4 of 6 cycles    Administration: 141 6 mg (1/8/2020), 141 6 mg (1/29/2020), 141 6 mg (2/19/2020)        trastuzumab (HERCEPTIN) 600 mg in sodium chloride 0 9 % 250 mL chemo infusion, 609 mg, Intravenous, Once, 4 of 6 cycles    Administration: 600 mg (1/8/2020), 450 mg (1/29/2020), 450 mg (2/19/2020)        Chemotherapy    PACLitaxel (TAXOL) 144 6 mg in sodium chloride 0 9 % 250 mL chemo IVPB, 80 mg/m2, Intravenous, Once, 2 of 6 cycles    PACLitaxel (TAXOL) 142 2 mg in sodium chloride 0 9 % 250 mL chemo IVPB, 80 mg/m2 = 142 2 mg, Intravenous, Once, 1 of 6 cycles    Administration: 142 2 mg (12/4/2019), 142 2 mg (12/18/2019)    PACLitaxel (TAXOL) 141 6 mg in sodium chloride 0 9 % 250 mL chemo IVPB, 80 mg/m2, Intravenous, Once, 0 of 12 cycles        trastuzumab (HERCEPTIN) 304 mg in sodium chloride 0 9 % 250 mL chemo infusion, 4 mg/kg, Intravenous, Once, 0 of 26 cycles    PACLitaxel (TAXOL) 141 6 mg in sodium chloride 0 9 % 250 mL chemo IVPB, 80 mg/m2 = 141 6 mg, Intravenous, Once, 4 of 6 cycles    Administration: 141 6 mg (1/8/2020), 141 6 mg (1/29/2020), 141 6 mg (2/19/2020)        trastuzumab (HERCEPTIN) 600 mg in sodium chloride 0 9 % 250 mL chemo infusion, 609 mg, Intravenous, Once, 4 of 6 cycles    Administration: 600 mg (1/8/2020), 450 mg (1/29/2020), 450 mg (2/19/2020)      Lapatinib 1500 mg once a day was started on 04/20/2020 after she was found to have metastatic disease to the brain, status post whole brain radiation       Brain metastasis ([facility] [address] )   3/30/2020 - 4/10/2020 Radiation    Plan ID Energy Fractions Dose per Fraction (cGy) Dose Correction (cGy) Total Dose Delivered (cGy) Elapsed Days   Whole Brai # 6X 10 / 10 300 0 3,000 11          4/9/2020 Initial Diagnosis    Brain metastasis (HCC) Interval history:    ROS: Review of Systems   Constitutional: Positive for fatigue  Negative for activity change, appetite change, chills, diaphoresis, fever and unexpected weight change  HENT: Negative for congestion, dental problem, ear discharge, ear pain, facial swelling, hearing loss, mouth sores, nosebleeds, postnasal drip, sore throat, tinnitus and trouble swallowing  Eyes: Negative for discharge, redness, itching and visual disturbance  Respiratory: Negative for cough, chest tightness, shortness of breath and wheezing  Cardiovascular: Negative for chest pain, palpitations and leg swelling  Gastrointestinal: Negative for abdominal distention, abdominal pain, anal bleeding, blood in stool, constipation, diarrhea, nausea and vomiting  Genitourinary: Negative for difficulty urinating, dysuria, flank pain, frequency, hematuria and urgency  Musculoskeletal: Positive for back pain  Negative for arthralgias, gait problem, joint swelling, myalgias and neck pain  Skin: Negative for color change, pallor, rash and wound  Neurological: Negative for dizziness, syncope, speech difficulty, weakness, light-headedness, numbness and headaches  Hematological: Negative for adenopathy  Does not bruise/bleed easily  Psychiatric/Behavioral: Negative for agitation, behavioral problems, confusion and sleep disturbance         Past Medical History:   Diagnosis Date    Brain cancer ([facility] [address] )     Breast cancer ([facility] [address] )     Hypertension     Lymphedema of right arm     Vitamin B12 deficiency        Past Surgical History:   Procedure Laterality Date    APPENDECTOMY      BREAST SURGERY      bilat mastectomy-right total mastectomy and prophylactic left total mastectomy-2005    KNEE SURGERY      right knee replacement 2014 in [address] [place] [address] Bilateral        Social History     Socioeconomic History    Marital status: Single     Spouse name: None    Number of children: None    Years of education: None  Highest education level: None   Occupational History    None   Social Needs    Financial resource strain: None    Food insecurity     Worry: None     Inability: None    Transportation needs     Medical: None     Non-medical: None   Tobacco Use    Smoking status: Never Smoker    Smokeless tobacco: Never Used    Tobacco comment: no passive smoke exposure   Substance and Sexual Activity    Alcohol use: Never     Frequency: Never     Binge frequency: Never    Drug use: No    Sexual activity: None   Lifestyle    Physical activity     Days per week: None     Minutes per session: None    Stress: None   Relationships    Social connections     Talks on phone: None     Gets together: None     Attends Yazidi service: None     Active member of club or organization: None     Attends meetings of clubs or organizations: None     Relationship status: None    Intimate partner violence     Fear of current or ex partner: None     Emotionally abused: None     Physically abused: None     Forced sexual activity: None   Other Topics Concern    None   Social History Narrative    None       Family History   Problem Relation Age of Onset    Asthma Mother     Osteoarthritis Father     Bone cancer Paternal Aunt         bone cancer       Allergies   Allergen Reactions    Penicillins Rash         Current Outpatient Medications:     anastrozole (ARIMIDEX) 1 mg tablet, Take 1 tablet (1 mg total) by mouth every 24 hours, Disp: 90 tablet, Rfl: 4    cholecalciferol (VITAMIN D3) 1,000 units tablet, Take 1 tablet (1,000 Units total) by mouth daily, Disp: 30 tablet, Rfl: 0    Cyanocobalamin 1000 MCG/ML KIT, Inject as directed every 6 (six) months, Disp: , Rfl:     escitalopram (LEXAPRO) 5 mg tablet, Take 1 tablet (5 mg total) by mouth daily, Disp: 30 tablet, Rfl: 5    esomeprazole (NexIUM) 40 MG capsule, TAKE 1 CAPSULE BY MOUTH ONCE DAILY, Disp: 90 capsule, Rfl: 0    gabapentin (NEURONTIN) 300 mg capsule, Take 1 capsule (300 mg total) by mouth daily at bedtime, Disp: 30 capsule, Rfl: 1    indapamide (LOZOL) 1 25 mg tablet, Take 1 tablet (1 25 mg total) by mouth every morning, Disp: 30 tablet, Rfl: 1    lisinopril (ZESTRIL) 10 mg tablet, Take 1 tablet (10 mg total) by mouth daily, Disp: 90 tablet, Rfl: 3    megestrol (MEGACE) 400 mg/10 mL, Take 400 mg by mouth daily, Disp: 600 mL, Rfl: 0    Misc  Devices (ILLUSIONS C BREAST PROSTHESIS) MISC, 1 breast prosthesis, Disp: 1 each, Rfl: 0    Misc  Devices (REFLECTIONS C BREAST PROSTHES) MISC, Dispense 1 breast prosthesis, Disp: 1 each, Rfl: 0    nebivolol (Bystolic) 5 mg tablet, Take 1 tablet (5 mg total) by mouth daily, Disp: 90 tablet, Rfl: 3    pantoprazole (PROTONIX) 40 mg tablet, , Disp: , Rfl:     TYKERB 250 MG tablet, , Disp: , Rfl:     acetaminophen (TYLENOL) 500 mg tablet, Two tablet every 8 hours (Patient not taking: Reported on 9/3/2020), Disp: 30 tablet, Rfl: 0  No current facility-administered medications for this visit  Physical Exam:  /90 (BP Location: Left arm, Patient Position: Sitting, Cuff Size: Adult)   Pulse 90   Temp 98 °F (36 7 °C) (Tympanic)   Resp 18   Ht 5' 3" (1 6 m)   Wt 61 2 kg (135 lb)   SpO2 98%   BMI 23 91 kg/m²     Physical Exam  Constitutional:       General: She is not in acute distress  Appearance: She is well-developed  She is not diaphoretic  HENT:      Head: Normocephalic and atraumatic  Eyes:      General: No scleral icterus  Right eye: No discharge  Left eye: No discharge  Conjunctiva/sclera: Conjunctivae normal       Pupils: Pupils are equal, round, and reactive to light  Neck:      Musculoskeletal: Normal range of motion and neck supple  Thyroid: No thyromegaly  Vascular: No JVD  Trachea: No tracheal deviation  Cardiovascular:      Rate and Rhythm: Normal rate and regular rhythm  Heart sounds: Normal heart sounds  No murmur  No friction rub     Pulmonary:      Effort: Pulmonary effort is normal  No respiratory distress  Breath sounds: Normal breath sounds  No stridor  No wheezing or rales  Chest:      Chest wall: No tenderness  Abdominal:      General: Bowel sounds are normal  There is no distension  Palpations: Abdomen is soft  There is no hepatomegaly, splenomegaly or mass  Tenderness: There is no abdominal tenderness  There is no guarding or rebound  Musculoskeletal: Normal range of motion  General: No tenderness or deformity  Lymphadenopathy:      Cervical: No cervical adenopathy  Skin:     General: Skin is warm and dry  Coloration: Skin is not pale  Findings: No erythema or rash  Neurological:      Mental Status: She is alert and oriented to person, place, and time  Cranial Nerves: No cranial nerve deficit  Coordination: Coordination normal       Deep Tendon Reflexes: Reflexes are normal and symmetric  Psychiatric:         Behavior: Behavior normal          Thought Content: Thought content normal          Judgment: Judgment normal            Labs:  Lab Results   Component Value Date    WBC 5 17 09/14/2020    HGB 13 2 09/14/2020    HCT 41 2 09/14/2020     (H) 09/14/2020     09/14/2020     Lab Results   Component Value Date     06/18/2018    K 4 1 09/14/2020     09/14/2020    CO2 25 09/14/2020    ANIONGAP 8 06/18/2018    BUN 9 09/14/2020    CREATININE 0 81 09/14/2020    GLUF 89 09/14/2020    CALCIUM 9 0 09/14/2020    AST 23 09/14/2020    ALT 18 09/14/2020    ALKPHOS 41 (L) 09/14/2020    PROT 7 1 06/18/2018    BILITOT 0 3 06/18/2018    EGFR 72 09/14/2020     No results found for: TSH    Patient voiced understanding and agreement in the above discussion  Aware to contact our office with questions/symptoms in the interim

## 2020-09-21 ENCOUNTER — HOSPITAL ENCOUNTER (OUTPATIENT)
Dept: INFUSION CENTER | Facility: HOSPITAL | Age: 75
Discharge: HOME | End: 2020-09-21
Attending: INTERNAL MEDICINE
Payer: MEDICARE

## 2020-09-21 VITALS
HEART RATE: 64 BPM | RESPIRATION RATE: 18 BRPM | TEMPERATURE: 97.3 F | OXYGEN SATURATION: 98 % | DIASTOLIC BLOOD PRESSURE: 76 MMHG | SYSTOLIC BLOOD PRESSURE: 135 MMHG

## 2020-09-21 DIAGNOSIS — R11.2 CHEMOTHERAPY INDUCED NAUSEA AND VOMITING: ICD-10-CM

## 2020-09-21 DIAGNOSIS — R52 PAIN: ICD-10-CM

## 2020-09-21 DIAGNOSIS — T45.1X5A CHEMOTHERAPY INDUCED NAUSEA AND VOMITING: ICD-10-CM

## 2020-09-21 DIAGNOSIS — E86.0 DEHYDRATION: Primary | ICD-10-CM

## 2020-09-21 PROCEDURE — 96360 HYDRATION IV INFUSION INIT: CPT

## 2020-09-21 RX ADMIN — SODIUM CHLORIDE 1000 ML: 0.9 INJECTION, SOLUTION INTRAVENOUS at 12:46

## 2020-09-21 NOTE — PROGRESS NOTES
Pt here for daily hydration  Declined zofran  Offers no new complaints  Tolerated hydration well without complications    Left unit via wheelchair being picked up by [facility]

## 2020-09-21 NOTE — PLAN OF CARE
Problem: Potential for Falls  Goal: Patient will remain free of falls  Description: INTERVENTIONS:  - Assess patient frequently for physical needs  -  Identify cognitive and physical deficits and behaviors that affect risk of falls    -  [place] fall precautions as indicated by assessment   - Educate patient/family on patient safety including physical limitations  - Instruct patient to call for assistance with activity based on assessment  - Modify environment to reduce risk of injury  - Consider OT/PT consult to assist with strengthening/mobility  Outcome: Progressing

## 2020-09-22 ENCOUNTER — HOSPITAL ENCOUNTER (OUTPATIENT)
Dept: INFUSION CENTER | Facility: HOSPITAL | Age: 75
Discharge: HOME | End: 2020-09-22
Attending: INTERNAL MEDICINE
Payer: MEDICARE

## 2020-09-22 VITALS
SYSTOLIC BLOOD PRESSURE: 152 MMHG | HEART RATE: 67 BPM | RESPIRATION RATE: 19 BRPM | TEMPERATURE: 98 F | OXYGEN SATURATION: 98 % | DIASTOLIC BLOOD PRESSURE: 72 MMHG

## 2020-09-22 DIAGNOSIS — T45.1X5A CHEMOTHERAPY INDUCED NAUSEA AND VOMITING: ICD-10-CM

## 2020-09-22 DIAGNOSIS — R52 PAIN: Primary | ICD-10-CM

## 2020-09-22 DIAGNOSIS — R11.2 CHEMOTHERAPY INDUCED NAUSEA AND VOMITING: ICD-10-CM

## 2020-09-22 DIAGNOSIS — E86.0 DEHYDRATION: ICD-10-CM

## 2020-09-22 PROCEDURE — 96360 HYDRATION IV INFUSION INIT: CPT

## 2020-09-22 RX ADMIN — SODIUM CHLORIDE 1000 ML: 0.9 INJECTION, SOLUTION INTRAVENOUS at 12:38

## 2020-09-22 NOTE — PROGRESS NOTES
Pt tolerated IV hydration without difficulty  Refused IV zofran today  Next appt confirmed  AVS declined  Escorted to lobby in w/c by PCA and left w/ [facility] transport

## 2020-09-22 NOTE — PLAN OF CARE
Problem: Potential for Falls  Goal: Patient will remain free of falls  Description: INTERVENTIONS:  - Assess patient frequently for physical needs  -  Identify cognitive and physical deficits and behaviors that affect risk of falls  -  [place] fall precautions as indicated by assessment   - Educate patient/family on patient safety including physical limitations  - Instruct patient to call for assistance with activity based on assessment  - Modify environment to reduce risk of injury  - Consider OT/PT consult to assist with strengthening/mobility  Outcome: Progressing     Problem: Knowledge Deficit  Goal: Patient/family/caregiver demonstrates understanding of disease process, treatment plan, medications, and discharge instructions  Description: Complete learning assessment and assess knowledge base    Interventions:  - Provide teaching at level of understanding  - Provide teaching via preferred learning methods  Outcome: Progressing

## 2020-09-23 ENCOUNTER — HOSPITAL ENCOUNTER (OUTPATIENT)
Dept: INFUSION CENTER | Facility: HOSPITAL | Age: 75
Discharge: HOME | End: 2020-09-23
Attending: INTERNAL MEDICINE
Payer: MEDICARE

## 2020-09-23 VITALS
TEMPERATURE: 97.3 F | DIASTOLIC BLOOD PRESSURE: 81 MMHG | RESPIRATION RATE: 18 BRPM | HEART RATE: 68 BPM | SYSTOLIC BLOOD PRESSURE: 127 MMHG

## 2020-09-23 DIAGNOSIS — R11.2 CHEMOTHERAPY INDUCED NAUSEA AND VOMITING: ICD-10-CM

## 2020-09-23 DIAGNOSIS — E86.0 DEHYDRATION: Primary | ICD-10-CM

## 2020-09-23 DIAGNOSIS — T45.1X5A CHEMOTHERAPY INDUCED NAUSEA AND VOMITING: ICD-10-CM

## 2020-09-23 DIAGNOSIS — R52 PAIN: ICD-10-CM

## 2020-09-23 PROCEDURE — 96360 HYDRATION IV INFUSION INIT: CPT

## 2020-09-23 RX ADMIN — SODIUM CHLORIDE 1000 ML: 0.9 INJECTION, SOLUTION INTRAVENOUS at 12:53

## 2020-09-23 NOTE — PROGRESS NOTES
Pt tolerated hydration well  Port flushed and capped for tomorrows use  Discharged in wc deferring avs  [facility] arranged

## 2020-09-24 ENCOUNTER — HOSPITAL ENCOUNTER (OUTPATIENT)
Dept: INFUSION CENTER | Facility: HOSPITAL | Age: 75
Discharge: HOME | End: 2020-09-24
Attending: INTERNAL MEDICINE
Payer: MEDICARE

## 2020-09-24 VITALS
DIASTOLIC BLOOD PRESSURE: 85 MMHG | SYSTOLIC BLOOD PRESSURE: 149 MMHG | TEMPERATURE: 97.2 F | RESPIRATION RATE: 18 BRPM | HEART RATE: 66 BPM

## 2020-09-24 DIAGNOSIS — R11.2 CHEMOTHERAPY INDUCED NAUSEA AND VOMITING: ICD-10-CM

## 2020-09-24 DIAGNOSIS — R52 PAIN: ICD-10-CM

## 2020-09-24 DIAGNOSIS — E86.0 DEHYDRATION: Primary | ICD-10-CM

## 2020-09-24 DIAGNOSIS — T45.1X5A CHEMOTHERAPY INDUCED NAUSEA AND VOMITING: ICD-10-CM

## 2020-09-24 PROCEDURE — 96360 HYDRATION IV INFUSION INIT: CPT

## 2020-09-24 RX ADMIN — SODIUM CHLORIDE 1000 ML: 0.9 INJECTION, SOLUTION INTRAVENOUS at 12:55

## 2020-09-24 NOTE — PLAN OF CARE
Problem: Potential for Falls  Goal: Patient will remain free of falls  Description: INTERVENTIONS:  - Assess patient frequently for physical needs  -  Identify cognitive and physical deficits and behaviors that affect risk of falls    -  [place] fall precautions as indicated by assessment   - Educate patient/family on patient safety including physical limitations  - Instruct patient to call for assistance with activity based on assessment  - Modify environment to reduce risk of injury  - Consider OT/PT consult to assist with strengthening/mobility  Outcome: Progressing

## 2020-09-24 NOTE — PROGRESS NOTES
Pt tolerated NSS hydration without difficulty  Port flushed and deaccessed per protocol since pt unsure if she will be here tomorrow  AVS declined  Escorted in w/c to lobby for [facility] transport

## 2020-09-25 ENCOUNTER — CONSULT (OUTPATIENT)
Dept: PAIN MEDICINE | Facility: MEDICAL CENTER | Age: 75
End: 2020-09-25
Payer: MEDICARE

## 2020-09-25 ENCOUNTER — HOSPITAL ENCOUNTER (OUTPATIENT)
Dept: INFUSION CENTER | Facility: HOSPITAL | Age: 75
Discharge: HOME | End: 2020-09-25
Attending: INTERNAL MEDICINE
Payer: MEDICARE

## 2020-09-25 VITALS
DIASTOLIC BLOOD PRESSURE: 98 MMHG | HEIGHT: 61 IN | TEMPERATURE: 97.9 F | WEIGHT: 137 LBS | BODY MASS INDEX: 25.86 KG/M2 | SYSTOLIC BLOOD PRESSURE: 162 MMHG | HEART RATE: 71 BPM

## 2020-09-25 VITALS
HEART RATE: 67 BPM | RESPIRATION RATE: 18 BRPM | SYSTOLIC BLOOD PRESSURE: 139 MMHG | DIASTOLIC BLOOD PRESSURE: 77 MMHG | TEMPERATURE: 97.3 F

## 2020-09-25 DIAGNOSIS — R11.2 CHEMOTHERAPY INDUCED NAUSEA AND VOMITING: ICD-10-CM

## 2020-09-25 DIAGNOSIS — R52 PAIN: ICD-10-CM

## 2020-09-25 DIAGNOSIS — T45.1X5A CHEMOTHERAPY INDUCED NAUSEA AND VOMITING: ICD-10-CM

## 2020-09-25 DIAGNOSIS — E53.8 VITAMIN B 12 DEFICIENCY: Primary | ICD-10-CM

## 2020-09-25 DIAGNOSIS — M54.16 LUMBAR RADICULITIS: ICD-10-CM

## 2020-09-25 DIAGNOSIS — M48.061 BILATERAL STENOSIS OF LATERAL RECESS OF LUMBAR SPINE: Primary | ICD-10-CM

## 2020-09-25 DIAGNOSIS — E86.0 DEHYDRATION: ICD-10-CM

## 2020-09-25 PROCEDURE — 99204 OFFICE O/P NEW MOD 45 MIN: CPT | Performed by: PHYSICAL MEDICINE & REHABILITATION

## 2020-09-25 PROCEDURE — 96360 HYDRATION IV INFUSION INIT: CPT

## 2020-09-25 PROCEDURE — 96372 THER/PROPH/DIAG INJ SC/IM: CPT

## 2020-09-25 RX ORDER — CYANOCOBALAMIN 1000 UG/ML
1000 INJECTION INTRAMUSCULAR; SUBCUTANEOUS ONCE
Status: CANCELLED
Start: 2020-10-02

## 2020-09-25 RX ORDER — CYANOCOBALAMIN 1000 UG/ML
1000 INJECTION INTRAMUSCULAR; SUBCUTANEOUS ONCE
Status: COMPLETED | OUTPATIENT
Start: 2020-09-25 | End: 2020-09-25

## 2020-09-25 RX ORDER — CYANOCOBALAMIN 1000 UG/ML
1000 INJECTION INTRAMUSCULAR; SUBCUTANEOUS ONCE
Status: CANCELLED | OUTPATIENT
Start: 2020-10-02

## 2020-09-25 RX ADMIN — SODIUM CHLORIDE 1000 ML: 0.9 INJECTION, SOLUTION INTRAVENOUS at 13:59

## 2020-09-25 RX ADMIN — CYANOCOBALAMIN 1000 MCG: 1000 INJECTION INTRAMUSCULAR; SUBCUTANEOUS at 14:06

## 2020-09-25 NOTE — PROGRESS NOTES
Assessment  1  Bilateral stenosis of lateral recess of lumbar spine    2  Lumbar radiculitis        Plan  1  Continue gabapentin 300 mg q h s , when the patient requires a refill she may contact us  2  Schedule for L5-S1 interlaminar lumbar epidural steroid injection  Complete risks and benefits including bleeding, infection, tissue reaction, allergic reaction were discussed  Verbal consent obtained  My impressions and treatment recommendations were discussed in detail with the patient who verbalized understanding and had no further questions  Discharge instructions were provided  I personally saw and examined the patient and I agree with the above discussed plan of care  Orders Placed This Encounter   Procedures    FL spine and pain procedure     Standing Status:   Future     Standing Expiration Date:   9/25/2021     Order Specific Question:   Reason for Exam:     Answer:   L5 LESI     Order Specific Question:   Anticoagulant hold needed? Answer:   no     No orders of the defined types were placed in this encounter  History of Present Illness    Raghav Vasquez is a 77 y o  female seen in consultation at the request of Jayna Rob PA-C from Neurosurgery for consultation regarding back and radiating bilateral leg pain consistent with spinal stenosis and neurogenic claudication  The patient has been experiencing symptoms for number of years with positive response to prior epidural steroid injection which gave her relief for about 5 years  She is now noticing severe pain rated as a 10/10 which is constant without any typical pattern characterized as numbness, dull, aching  She does describe subjective lower extremity weakness  She is not currently require an assistive device for ambulation  Aggravating factors include walking      Diagnostic studies include MRI of the lumbar spine demonstrating severe L4-5 central canal and lateral recess stenosis, bilateral foraminal stenosis noted at multiple levels as well  Past medical history significant for breast cancer with metastasis to the brain  I have personally reviewed and/or updated the patient's past medical history, past surgical history, family history, social history, current medications, allergies, and vital signs today  Review of Systems   Constitutional: Negative  HENT: Negative  Eyes: Negative  Respiratory: Negative  Cardiovascular: Negative  Gastrointestinal: Negative  Endocrine: Negative  Genitourinary: Negative  Musculoskeletal: Positive for back pain  Skin: Negative  Allergic/Immunologic: Negative  Neurological: Negative  Hematological: Negative  Psychiatric/Behavioral: Negative          Patient Active Problem List   Diagnosis    Malignant neoplasm of overlapping 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, estrogen receptor positive ([facility] )    Use of aromatase inhibitors    Vitamin D deficiency    Benign essential hypertension    Abnormal echocardiography    Chronic systolic heart failure (HCC)    Malignant [facility] of breast ([facility]    Colonoscopy refused    Immunization not carried out because of patient decision    Heart burn    Abnormal gastrointestinal PET scan    Osteopenia    Essential hypertension    Brain metastasis [facility]    Nonischemic cardiomyopathy ([facility]    S/P chemotherapy, time since 4-12 weeks    Status post chemoradiation    Chemotherapy induced nausea and vomiting    Acute cystitis without hematuria    Severe protein-calorie malnutrition Cate Hun: less than 60% of standard weight) (HCC)    Hypokalemia    Dehydration    Pain    Bilateral stenosis of lateral recess of lumbar spine    Vitamin B 12 deficiency       Past Medical History:   Diagnosis Date    [facility] cancer [facility]     Breast cancer ([facility]     Hypertension     Lymphedema of right arm     Vitamin B12 deficiency        Past Surgical History:   Procedure Laterality Date    APPENDECTOMY      BREAST SURGERY bilat mastectomy-right total mastectomy and prophylactic left total mastectomy-2005    KNEE SURGERY      right knee replacement 2014 in [facility] Bilateral        Family History   Problem Relation Age of Onset    Asthma Mother     Osteoarthritis Father     Bone cancer Paternal Aunt         bone cancer       Social History     Occupational History    Not on file   Tobacco Use    Smoking status: Never Smoker    Smokeless tobacco: Never Used    Tobacco comment: no passive smoke exposure   Substance and Sexual Activity    Alcohol use: Never     Frequency: Never     Binge frequency: Never    Drug use: No    Sexual activity: Not on file       Current Outpatient Medications on File Prior to Visit   Medication Sig    anastrozole (ARIMIDEX) 1 mg tablet Take 1 tablet (1 mg total) by mouth every 24 hours    cholecalciferol (VITAMIN D3) 1,000 units tablet Take 1 tablet (1,000 Units total) by mouth daily    Cyanocobalamin 1000 MCG/ML KIT Inject as directed every 6 (six) months    escitalopram (LEXAPRO) 5 mg tablet Take 1 tablet (5 mg total) by mouth daily    esomeprazole (NexIUM) 40 MG capsule TAKE 1 CAPSULE BY MOUTH ONCE DAILY    gabapentin (NEURONTIN) 300 mg capsule Take 1 capsule (300 mg total) by mouth daily at bedtime    indapamide (LOZOL) 1 25 mg tablet Take 1 tablet (1 25 mg total) by mouth every morning    lisinopril (ZESTRIL) 10 mg tablet Take 1 tablet (10 mg total) by mouth daily    megestrol (MEGACE) 400 mg/10 mL Take 400 mg by mouth daily    Misc  Devices (ILLUSIONS C BREAST PROSTHESIS) MISC 1 breast prosthesis    Misc   Devices (REFLECTIONS C BREAST PROSTHES) MISC Dispense 1 breast prosthesis    nebivolol (Bystolic) 5 mg tablet Take 1 tablet (5 mg total) by mouth daily    pantoprazole (PROTONIX) 40 mg tablet     TYKERB 250 MG tablet     acetaminophen (TYLENOL) 500 mg tablet Two tablet every 8 hours (Patient not taking: Reported on 9/3/2020)     Current Facility-Administered Medications on File Prior to Visit   Medication    [COMPLETED] sodium chloride 0 9 % bolus 1,000 mL       Allergies   Allergen Reactions    Penicillins Rash       Physical Exam    /98   Pulse 71   Temp 97 9 °F (36 6 °C)   Ht 5' 1" (1 549 m)   Wt 62 1 kg (137 lb)   BMI 25 89 kg/m²     LUMBAR  General: Well-developed, well-nourished individual in no acute distress  Mental: Appropriate mood and affect  Grossly oriented with coherent speech and thought processing   Neuro:  Cranial nerves: Cranial nerve function is grossly intact bilaterally   Strength: Bilateral lower extremity strength is normal and symmetric   No atrophy or tone abnormalities noted   Reflexes: Bilateral lower extremity muscle stretch reflexes are physiologic and symmetric   No ankle clonus is noted   Sensation: No loss of sensation is noted   SLR/Foraminal Compression Maneuvers: Straight leg raising in the sitting  position is negative for radicular pain   Gait:  Gait/gross motor: Gait is slow and antalgic appearing  Station is forward flexed  Toe walking, heel walking  are not tested secondary to balance deficit  Musculoskeletal:  Palpation: Inspection and palpation of the spine and extremities are unremarkable   Spine: Normal pain-free range of motion except for lumbar extension which reproduces back and radiating leg pain  No gross axial skeletal deformities   Skin: Skin inspection grossly negative for erythema, breakdown, or concerning lesions in affected area   Lymph: No lymphadenopathy is appreciated in the involved extremity   Vessels: No lower extremity edema   Lungs: Breathing is comfortable and regular  No dyspnea noted during examination   Eyes: Visual field grossly intact to confrontation  No redness appreciated  ENT: No craniofacial deformities or asymmetry  No neck masses appreciated           Imaging    MRI LUMBAR SPINE WITHOUT CONTRAST     INDICATION: M54 42: Lumbago with sciatica, left side  M54 41: Lumbago with sciatica, right side  G89 29: Other chronic pain  R94 8: Abnormal results of function studies of other organs and systems      COMPARISON:  None      TECHNIQUE:  Sagittal T1, sagittal T2, sagittal inversion recovery, axial T1 and axial T2, coronal T2     IMAGE QUALITY:  Diagnostic     FINDINGS:     VERTEBRAL BODIES:  There are 5 lumbar type vertebral bodies  Levoscoliosis apex L3  Slight anterolisthesis L2-3 and L3-4  Retrolisthesis L4-5  Endplate degenerative marrow signal L4-5  Hemangioma T11      SACRUM:  Normal signal within the sacrum  No evidence of insufficiency or stress fracture      DISTAL CORD AND CONUS:  Normal size and signal within the distal cord and conus      PARASPINAL SOFT TISSUES:  Paraspinal soft tissues are unremarkable      LOWER THORACIC DISC SPACES:  Normal disc height and signal   No disc herniation, canal stenosis or foraminal narrowing      LUMBAR DISC SPACES:     L1-L2:  Moderate facet hypertrophy  Minimal central stenosis without foraminal narrowing      L2-L3:  There is a small right paramedian protrusion type disc herniation and diffuse annular bulging with moderate facet hypertrophy combining to result in mild central stenosis, moderate right lateral recess stenosis      L3-L4:  Diffuse annular bulge with marginal osteophytes eccentric to the right results in mild to moderate right and mild left foraminal narrowing  Moderate central stenosis      L4-L5:  Degenerative disc osteophyte complex with marginal osteophytes results in severe central stenosis and severe bilateral lateral recess stenosis  Moderate bilateral foraminal narrowing      L5-S1:  Degenerative disc osteophyte complex with marginal osteophytes results in moderate left greater than right foraminal narrowing    There is mild central stenosis      IMPRESSION:     Spondylotic degenerative disease identified particularly at L4-5 where there is severe central and bilateral lateral recess stenosis with moderate bilateral foraminal narrowing      Moderate right lateral recess stenosis at L2-3 with no greater than mild central or foraminal narrowing elsewhere

## 2020-09-25 NOTE — PLAN OF CARE
Problem: Potential for Falls  Goal: Patient will remain free of falls  Description: INTERVENTIONS:  - Assess patient frequently for physical needs  -  Identify cognitive and physical deficits and behaviors that affect risk of falls    -  [place] fall precautions as indicated by assessment   - Educate patient/family on patient safety including physical limitations  - Instruct patient to call for assistance with activity based on assessment  - Modify environment to reduce risk of injury  - Consider OT/PT consult to assist with strengthening/mobility  Outcome: Progressing

## 2020-09-25 NOTE — PATIENT INSTRUCTIONS
Lumbar Spinal Stenosis   WHAT YOU NEED TO KNOW:   Lumbar spinal stenosis is narrowing of the spinal canal in your lower back  Your spinal canal holds your spinal cord  The spinal cord controls your ability to move  When your spinal canal narrows, it may put pressure on your spinal cord  DISCHARGE INSTRUCTIONS:   Return to the emergency department if:   · You have pain in your leg that does not go away or gets worse  · You have trouble moving your legs  · You cannot control when you urinate or have a bowel movement  Contact your healthcare provider if:   · You have new or worsening symptoms  · Your symptoms keep you from doing your daily activities  · You have questions or concerns about your condition or care  Medicines:   · NSAIDs , such as ibuprofen, help decrease swelling, pain, and fever  NSAIDs can cause stomach bleeding or kidney problems in certain people  If you take blood thinner medicine, always ask your healthcare provider if NSAIDs are safe for you  Always read the medicine label and follow directions  · Acetaminophen  decreases pain and fever  It is available without a doctor's order  Ask how much to take and how often to take it  Follow directions  Read the labels of all other medicines you are using to see if they also contain acetaminophen, or ask your doctor or pharmacist  Acetaminophen can cause liver damage if not taken correctly  Do not use more than 4 grams (4,000 milligrams) total of acetaminophen in one day  · Prescription pain medicine  may be given  Ask how to take this medicine safely  · Muscle relaxers  help decrease pain and muscle spasms  · Take your medicine as directed  Contact your healthcare provider if you think your medicine is not helping or if you have side effects  Tell him or her if you are allergic to any medicine  Keep a list of the medicines, vitamins, and herbs you take  Include the amounts, and when and why you take them   Bring the list or the pill bottles to follow-up visits  Carry your medicine list with you in case of an emergency  Self-care:   · Rest  when you feel it is needed  Slowly start to do more each day  Return to your daily activities as directed  · Apply heat on your back for 20 to 30 minutes every 2 hours for as many days as directed  Heat helps decrease pain and muscle spasms  · Apply ice  on your back for 15 to 20 minutes every hour or as directed  Use an ice pack, or put crushed ice in a plastic bag  Cover it with a towel before you apply it to your skin  Ice helps prevent tissue damage and decreases swelling and pain  Go to physical and occupational therapy as directed:  A physical therapist teaches you exercises to help improve movement and strength, and to decrease pain  An occupational therapist teaches you skills to help with your daily activities  Follow up with your healthcare provider as directed:  Write down your questions so you remember to ask them during your visits  © 2017 [facility] Information is for End User's use only and may not be sold, redistributed or otherwise used for commercial purposes  All illustrations and images included in CareNotes® are the copyrighted property of [employer]  or [employer] Jacobsen  The above information is an  only  It is not intended as medical advice for individual conditions or treatments  Talk to your doctor, nurse or pharmacist before following any medical regimen to see if it is safe and effective for you

## 2020-09-28 ENCOUNTER — HOSPITAL ENCOUNTER (OUTPATIENT)
Dept: INFUSION CENTER | Facility: HOSPITAL | Age: 75
Discharge: HOME | End: 2020-09-28
Attending: INTERNAL MEDICINE
Payer: MEDICARE

## 2020-09-28 VITALS
SYSTOLIC BLOOD PRESSURE: 142 MMHG | DIASTOLIC BLOOD PRESSURE: 86 MMHG | TEMPERATURE: 97.5 F | HEART RATE: 77 BPM | RESPIRATION RATE: 18 BRPM

## 2020-09-28 DIAGNOSIS — R52 PAIN: Primary | ICD-10-CM

## 2020-09-28 DIAGNOSIS — E86.0 DEHYDRATION: ICD-10-CM

## 2020-09-28 DIAGNOSIS — R11.2 CHEMOTHERAPY INDUCED NAUSEA AND VOMITING: ICD-10-CM

## 2020-09-28 DIAGNOSIS — T45.1X5A CHEMOTHERAPY INDUCED NAUSEA AND VOMITING: ICD-10-CM

## 2020-09-28 PROCEDURE — 96360 HYDRATION IV INFUSION INIT: CPT

## 2020-09-28 RX ADMIN — SODIUM CHLORIDE 1000 ML: 0.9 INJECTION, SOLUTION INTRAVENOUS at 12:53

## 2020-09-29 ENCOUNTER — HOSPITAL ENCOUNTER (OUTPATIENT)
Dept: INFUSION CENTER | Facility: HOSPITAL | Age: 75
Discharge: HOME | End: 2020-09-29
Attending: INTERNAL MEDICINE

## 2020-09-30 ENCOUNTER — HOSPITAL ENCOUNTER (OUTPATIENT)
Dept: INFUSION CENTER | Facility: HOSPITAL | Age: 75
Discharge: HOME | End: 2020-09-30
Attending: INTERNAL MEDICINE
Payer: MEDICARE

## 2020-09-30 VITALS
DIASTOLIC BLOOD PRESSURE: 88 MMHG | RESPIRATION RATE: 18 BRPM | SYSTOLIC BLOOD PRESSURE: 151 MMHG | HEART RATE: 70 BPM | TEMPERATURE: 96.8 F

## 2020-09-30 DIAGNOSIS — T45.1X5A CHEMOTHERAPY INDUCED NAUSEA AND VOMITING: ICD-10-CM

## 2020-09-30 DIAGNOSIS — R52 PAIN: ICD-10-CM

## 2020-09-30 DIAGNOSIS — E86.0 DEHYDRATION: Primary | ICD-10-CM

## 2020-09-30 DIAGNOSIS — R11.2 CHEMOTHERAPY INDUCED NAUSEA AND VOMITING: ICD-10-CM

## 2020-09-30 PROCEDURE — 96360 HYDRATION IV INFUSION INIT: CPT

## 2020-09-30 RX ADMIN — SODIUM CHLORIDE 1000 ML: 0.9 INJECTION, SOLUTION INTRAVENOUS at 12:32

## 2020-10-01 ENCOUNTER — HOSPITAL ENCOUNTER (OUTPATIENT)
Dept: INFUSION CENTER | Facility: HOSPITAL | Age: 75
Discharge: HOME | End: 2020-10-01
Attending: INTERNAL MEDICINE
Payer: MEDICARE

## 2020-10-01 ENCOUNTER — HOSPITAL ENCOUNTER (OUTPATIENT)
Dept: INFUSION CENTER | Facility: HOSPITAL | Age: 75
Discharge: HOME | End: 2020-10-01
Attending: INTERNAL MEDICINE

## 2020-10-01 VITALS
OXYGEN SATURATION: 100 % | DIASTOLIC BLOOD PRESSURE: 88 MMHG | SYSTOLIC BLOOD PRESSURE: 157 MMHG | HEART RATE: 72 BPM | TEMPERATURE: 97.4 F | RESPIRATION RATE: 18 BRPM

## 2020-10-01 DIAGNOSIS — T45.1X5A CHEMOTHERAPY INDUCED NAUSEA AND VOMITING: ICD-10-CM

## 2020-10-01 DIAGNOSIS — E86.0 DEHYDRATION: Primary | ICD-10-CM

## 2020-10-01 DIAGNOSIS — R52 PAIN: ICD-10-CM

## 2020-10-01 DIAGNOSIS — R11.2 CHEMOTHERAPY INDUCED NAUSEA AND VOMITING: ICD-10-CM

## 2020-10-01 PROCEDURE — 96360 HYDRATION IV INFUSION INIT: CPT

## 2020-10-01 RX ADMIN — SODIUM CHLORIDE 1000 ML: 0.9 INJECTION, SOLUTION INTRAVENOUS at 12:26

## 2020-10-02 ENCOUNTER — HOSPITAL ENCOUNTER (OUTPATIENT)
Dept: INFUSION CENTER | Facility: HOSPITAL | Age: 75
Discharge: HOME | End: 2020-10-02
Attending: INTERNAL MEDICINE
Payer: MEDICARE

## 2020-10-02 ENCOUNTER — HOSPITAL ENCOUNTER (OUTPATIENT)
Dept: INFUSION CENTER | Facility: HOSPITAL | Age: 75
End: 2020-10-02
Attending: INTERNAL MEDICINE

## 2020-10-02 VITALS
SYSTOLIC BLOOD PRESSURE: 140 MMHG | DIASTOLIC BLOOD PRESSURE: 70 MMHG | TEMPERATURE: 97.4 F | RESPIRATION RATE: 18 BRPM | HEART RATE: 66 BPM | OXYGEN SATURATION: 98 %

## 2020-10-02 DIAGNOSIS — E53.8 VITAMIN B 12 DEFICIENCY: ICD-10-CM

## 2020-10-02 DIAGNOSIS — E86.0 DEHYDRATION: Primary | ICD-10-CM

## 2020-10-02 DIAGNOSIS — R52 PAIN: ICD-10-CM

## 2020-10-02 DIAGNOSIS — T45.1X5A CHEMOTHERAPY INDUCED NAUSEA AND VOMITING: ICD-10-CM

## 2020-10-02 DIAGNOSIS — R11.2 CHEMOTHERAPY INDUCED NAUSEA AND VOMITING: ICD-10-CM

## 2020-10-02 PROCEDURE — 96360 HYDRATION IV INFUSION INIT: CPT

## 2020-10-02 PROCEDURE — 96372 THER/PROPH/DIAG INJ SC/IM: CPT

## 2020-10-02 RX ORDER — CYANOCOBALAMIN 1000 UG/ML
1000 INJECTION INTRAMUSCULAR; SUBCUTANEOUS ONCE
Status: COMPLETED | OUTPATIENT
Start: 2020-10-02 | End: 2020-10-02

## 2020-10-02 RX ORDER — CYANOCOBALAMIN 1000 UG/ML
1000 INJECTION INTRAMUSCULAR; SUBCUTANEOUS ONCE
Status: CANCELLED
Start: 2020-10-09

## 2020-10-02 RX ORDER — CYANOCOBALAMIN 1000 UG/ML
1000 INJECTION INTRAMUSCULAR; SUBCUTANEOUS ONCE
Status: CANCELLED | OUTPATIENT
Start: 2020-10-09

## 2020-10-02 RX ADMIN — SODIUM CHLORIDE 1000 ML: 0.9 INJECTION, SOLUTION INTRAVENOUS at 13:04

## 2020-10-02 RX ADMIN — CYANOCOBALAMIN 1000 MCG: 1000 INJECTION INTRAMUSCULAR; SUBCUTANEOUS at 13:06

## 2020-10-05 ENCOUNTER — HOSPITAL ENCOUNTER (OUTPATIENT)
Dept: INFUSION CENTER | Facility: HOSPITAL | Age: 75
Discharge: HOME | End: 2020-10-05
Attending: INTERNAL MEDICINE
Payer: MEDICARE

## 2020-10-05 VITALS
SYSTOLIC BLOOD PRESSURE: 140 MMHG | RESPIRATION RATE: 16 BRPM | DIASTOLIC BLOOD PRESSURE: 72 MMHG | OXYGEN SATURATION: 100 % | HEART RATE: 64 BPM | TEMPERATURE: 97.4 F

## 2020-10-05 DIAGNOSIS — E86.0 DEHYDRATION: Primary | ICD-10-CM

## 2020-10-05 DIAGNOSIS — T45.1X5A CHEMOTHERAPY INDUCED NAUSEA AND VOMITING: ICD-10-CM

## 2020-10-05 DIAGNOSIS — R52 PAIN: ICD-10-CM

## 2020-10-05 DIAGNOSIS — R11.2 CHEMOTHERAPY INDUCED NAUSEA AND VOMITING: ICD-10-CM

## 2020-10-05 PROCEDURE — 96360 HYDRATION IV INFUSION INIT: CPT

## 2020-10-05 RX ADMIN — SODIUM CHLORIDE 1000 ML: 0.9 INJECTION, SOLUTION INTRAVENOUS at 13:33

## 2020-10-06 ENCOUNTER — HOSPITAL ENCOUNTER (OUTPATIENT)
Dept: INFUSION CENTER | Facility: HOSPITAL | Age: 75
Discharge: HOME | End: 2020-10-06
Attending: INTERNAL MEDICINE
Payer: MEDICARE

## 2020-10-06 VITALS
TEMPERATURE: 97.4 F | SYSTOLIC BLOOD PRESSURE: 123 MMHG | HEART RATE: 66 BPM | RESPIRATION RATE: 16 BRPM | DIASTOLIC BLOOD PRESSURE: 66 MMHG

## 2020-10-06 DIAGNOSIS — R52 PAIN: ICD-10-CM

## 2020-10-06 DIAGNOSIS — R11.2 CHEMOTHERAPY INDUCED NAUSEA AND VOMITING: ICD-10-CM

## 2020-10-06 DIAGNOSIS — T45.1X5A CHEMOTHERAPY INDUCED NAUSEA AND VOMITING: ICD-10-CM

## 2020-10-06 DIAGNOSIS — E86.0 DEHYDRATION: Primary | ICD-10-CM

## 2020-10-06 PROCEDURE — 96360 HYDRATION IV INFUSION INIT: CPT

## 2020-10-06 RX ADMIN — SODIUM CHLORIDE 1000 ML: 0.9 INJECTION, SOLUTION INTRAVENOUS at 12:20

## 2020-10-07 ENCOUNTER — HOSPITAL ENCOUNTER (OUTPATIENT)
Dept: RADIOLOGY | Facility: MEDICAL CENTER | Age: 75
Discharge: HOME | End: 2020-10-07
Attending: PHYSICAL MEDICINE & REHABILITATION | Admitting: PHYSICAL MEDICINE & REHABILITATION
Payer: MEDICARE

## 2020-10-07 VITALS
RESPIRATION RATE: 20 BRPM | OXYGEN SATURATION: 96 % | HEART RATE: 69 BPM | SYSTOLIC BLOOD PRESSURE: 160 MMHG | TEMPERATURE: 98.3 F | DIASTOLIC BLOOD PRESSURE: 85 MMHG

## 2020-10-07 DIAGNOSIS — M48.061 BILATERAL STENOSIS OF LATERAL RECESS OF LUMBAR SPINE: ICD-10-CM

## 2020-10-07 DIAGNOSIS — M54.16 LUMBAR RADICULITIS: ICD-10-CM

## 2020-10-07 PROCEDURE — 62323 NJX INTERLAMINAR LMBR/SAC: CPT | Performed by: PHYSICAL MEDICINE & REHABILITATION

## 2020-10-07 RX ORDER — LIDOCAINE HYDROCHLORIDE 10 MG/ML
5 INJECTION, SOLUTION EPIDURAL; INFILTRATION; INTRACAUDAL; PERINEURAL ONCE
Status: COMPLETED | OUTPATIENT
Start: 2020-10-07 | End: 2020-10-07

## 2020-10-07 RX ORDER — METHYLPREDNISOLONE ACETATE 80 MG/ML
80 INJECTION, SUSPENSION INTRA-ARTICULAR; INTRALESIONAL; INTRAMUSCULAR; PARENTERAL; SOFT TISSUE ONCE
Status: COMPLETED | OUTPATIENT
Start: 2020-10-07 | End: 2020-10-07

## 2020-10-07 RX ADMIN — METHYLPREDNISOLONE ACETATE 80 MG: 80 INJECTION, SUSPENSION INTRA-ARTICULAR; INTRALESIONAL; INTRAMUSCULAR; PARENTERAL; SOFT TISSUE at 13:22

## 2020-10-07 RX ADMIN — LIDOCAINE HYDROCHLORIDE 2.5 ML: 10 INJECTION, SOLUTION EPIDURAL; INFILTRATION; INTRACAUDAL; PERINEURAL at 13:20

## 2020-10-07 RX ADMIN — IOHEXOL 2 ML: 300 INJECTION, SOLUTION INTRAVENOUS at 13:21

## 2020-10-08 ENCOUNTER — HOSPITAL ENCOUNTER (OUTPATIENT)
Dept: INFUSION CENTER | Facility: HOSPITAL | Age: 75
Discharge: HOME | End: 2020-10-08
Attending: INTERNAL MEDICINE

## 2020-10-09 ENCOUNTER — HOSPITAL ENCOUNTER (OUTPATIENT)
Dept: INFUSION CENTER | Facility: HOSPITAL | Age: 75
Discharge: HOME | End: 2020-10-09
Attending: INTERNAL MEDICINE
Payer: MEDICARE

## 2020-10-09 VITALS
RESPIRATION RATE: 16 BRPM | OXYGEN SATURATION: 98 % | TEMPERATURE: 97.4 F | DIASTOLIC BLOOD PRESSURE: 81 MMHG | SYSTOLIC BLOOD PRESSURE: 132 MMHG | HEART RATE: 62 BPM

## 2020-10-09 DIAGNOSIS — E53.8 VITAMIN B 12 DEFICIENCY: Primary | ICD-10-CM

## 2020-10-09 DIAGNOSIS — R11.2 CHEMOTHERAPY INDUCED NAUSEA AND VOMITING: ICD-10-CM

## 2020-10-09 DIAGNOSIS — R52 PAIN: ICD-10-CM

## 2020-10-09 DIAGNOSIS — T45.1X5A CHEMOTHERAPY INDUCED NAUSEA AND VOMITING: ICD-10-CM

## 2020-10-09 DIAGNOSIS — E86.0 DEHYDRATION: ICD-10-CM

## 2020-10-09 PROCEDURE — 96372 THER/PROPH/DIAG INJ SC/IM: CPT

## 2020-10-09 PROCEDURE — 96360 HYDRATION IV INFUSION INIT: CPT

## 2020-10-09 RX ORDER — CYANOCOBALAMIN 1000 UG/ML
1000 INJECTION INTRAMUSCULAR; SUBCUTANEOUS ONCE
Status: COMPLETED | OUTPATIENT
Start: 2020-10-09 | End: 2020-10-09

## 2020-10-09 RX ORDER — CYANOCOBALAMIN 1000 UG/ML
1000 INJECTION INTRAMUSCULAR; SUBCUTANEOUS ONCE
Status: CANCELLED | OUTPATIENT
Start: 2020-10-16

## 2020-10-09 RX ORDER — CYANOCOBALAMIN 1000 UG/ML
1000 INJECTION INTRAMUSCULAR; SUBCUTANEOUS ONCE
Status: CANCELLED
Start: 2020-10-16

## 2020-10-09 RX ADMIN — SODIUM CHLORIDE 1000 ML: 0.9 INJECTION, SOLUTION INTRAVENOUS at 13:01

## 2020-10-09 RX ADMIN — CYANOCOBALAMIN 1000 MCG: 1000 INJECTION INTRAMUSCULAR; SUBCUTANEOUS at 13:16

## 2020-10-12 ENCOUNTER — HOSPITAL ENCOUNTER (OUTPATIENT)
Dept: INFUSION CENTER | Facility: HOSPITAL | Age: 75
Discharge: HOME | End: 2020-10-12
Attending: INTERNAL MEDICINE
Payer: MEDICARE

## 2020-10-12 VITALS
HEART RATE: 64 BPM | DIASTOLIC BLOOD PRESSURE: 78 MMHG | SYSTOLIC BLOOD PRESSURE: 140 MMHG | RESPIRATION RATE: 18 BRPM | TEMPERATURE: 98 F | OXYGEN SATURATION: 100 %

## 2020-10-12 DIAGNOSIS — R11.2 CHEMOTHERAPY INDUCED NAUSEA AND VOMITING: ICD-10-CM

## 2020-10-12 DIAGNOSIS — E86.0 DEHYDRATION: ICD-10-CM

## 2020-10-12 DIAGNOSIS — T45.1X5A CHEMOTHERAPY INDUCED NAUSEA AND VOMITING: ICD-10-CM

## 2020-10-12 DIAGNOSIS — R52 PAIN: Primary | ICD-10-CM

## 2020-10-12 PROCEDURE — 96360 HYDRATION IV INFUSION INIT: CPT

## 2020-10-12 RX ADMIN — SODIUM CHLORIDE 1000 ML: 0.9 INJECTION, SOLUTION INTRAVENOUS at 13:30

## 2020-10-13 ENCOUNTER — HOSPITAL ENCOUNTER (OUTPATIENT)
Dept: INFUSION CENTER | Facility: HOSPITAL | Age: 75
Discharge: HOME | End: 2020-10-13
Attending: INTERNAL MEDICINE
Payer: MEDICARE

## 2020-10-13 VITALS
TEMPERATURE: 97.1 F | SYSTOLIC BLOOD PRESSURE: 107 MMHG | DIASTOLIC BLOOD PRESSURE: 65 MMHG | RESPIRATION RATE: 20 BRPM | HEART RATE: 72 BPM

## 2020-10-13 DIAGNOSIS — T45.1X5A CHEMOTHERAPY INDUCED NAUSEA AND VOMITING: ICD-10-CM

## 2020-10-13 DIAGNOSIS — E86.0 DEHYDRATION: ICD-10-CM

## 2020-10-13 DIAGNOSIS — R52 PAIN: Primary | ICD-10-CM

## 2020-10-13 DIAGNOSIS — R11.2 CHEMOTHERAPY INDUCED NAUSEA AND VOMITING: ICD-10-CM

## 2020-10-13 PROCEDURE — 96360 HYDRATION IV INFUSION INIT: CPT

## 2020-10-13 RX ADMIN — SODIUM CHLORIDE 1000 ML: 0.9 INJECTION, SOLUTION INTRAVENOUS at 13:08

## 2020-10-14 ENCOUNTER — TELEPHONE (OUTPATIENT)
Dept: PAIN MEDICINE | Facility: CLINIC | Age: 75
End: 2020-10-14

## 2020-10-14 ENCOUNTER — HOSPITAL ENCOUNTER (OUTPATIENT)
Dept: INFUSION CENTER | Facility: HOSPITAL | Age: 75
Discharge: HOME | End: 2020-10-14
Attending: INTERNAL MEDICINE
Payer: MEDICARE

## 2020-10-14 VITALS
HEART RATE: 77 BPM | DIASTOLIC BLOOD PRESSURE: 61 MMHG | TEMPERATURE: 98.1 F | SYSTOLIC BLOOD PRESSURE: 124 MMHG | OXYGEN SATURATION: 98 % | RESPIRATION RATE: 18 BRPM

## 2020-10-14 DIAGNOSIS — R52 PAIN: ICD-10-CM

## 2020-10-14 DIAGNOSIS — E86.0 DEHYDRATION: Primary | ICD-10-CM

## 2020-10-14 DIAGNOSIS — T45.1X5A CHEMOTHERAPY INDUCED NAUSEA AND VOMITING: ICD-10-CM

## 2020-10-14 DIAGNOSIS — R11.2 CHEMOTHERAPY INDUCED NAUSEA AND VOMITING: ICD-10-CM

## 2020-10-14 PROCEDURE — 96360 HYDRATION IV INFUSION INIT: CPT

## 2020-10-14 RX ADMIN — SODIUM CHLORIDE 1000 ML: 0.9 INJECTION, SOLUTION INTRAVENOUS at 12:35

## 2020-10-15 ENCOUNTER — HOSPITAL ENCOUNTER (OUTPATIENT)
Dept: INFUSION CENTER | Facility: HOSPITAL | Age: 75
Discharge: HOME | End: 2020-10-15
Attending: INTERNAL MEDICINE
Payer: MEDICARE

## 2020-10-15 VITALS
TEMPERATURE: 97.2 F | HEART RATE: 76 BPM | DIASTOLIC BLOOD PRESSURE: 79 MMHG | RESPIRATION RATE: 18 BRPM | SYSTOLIC BLOOD PRESSURE: 150 MMHG

## 2020-10-15 DIAGNOSIS — E86.0 DEHYDRATION: Primary | ICD-10-CM

## 2020-10-15 DIAGNOSIS — Z17.0 MALIGNANT NEOPLASM OF OVERLAPPING SITES OF RIGHT BREAST IN FEMALE, ESTROGEN RECEPTOR POSITIVE (HCC): ICD-10-CM

## 2020-10-15 DIAGNOSIS — T45.1X5A CHEMOTHERAPY INDUCED NAUSEA AND VOMITING: ICD-10-CM

## 2020-10-15 DIAGNOSIS — R11.2 CHEMOTHERAPY INDUCED NAUSEA AND VOMITING: ICD-10-CM

## 2020-10-15 DIAGNOSIS — C50.811 MALIGNANT NEOPLASM OF OVERLAPPING SITES OF RIGHT BREAST IN FEMALE, ESTROGEN RECEPTOR POSITIVE (HCC): ICD-10-CM

## 2020-10-15 DIAGNOSIS — R52 PAIN: ICD-10-CM

## 2020-10-15 DIAGNOSIS — E53.8 VITAMIN B12 DEFICIENCY: ICD-10-CM

## 2020-10-15 LAB
ALBUMIN SERPL BCP-MCNC: 3 G/DL (ref 3–5.2)
ALP SERPL-CCNC: 47 U/L (ref 43–122)
ALT SERPL W P-5'-P-CCNC: 14 U/L (ref 9–52)
ANION GAP SERPL CALCULATED.3IONS-SCNC: 5 MMOL/L (ref 5–14)
AST SERPL W P-5'-P-CCNC: 21 U/L (ref 14–36)
BASOPHILS # BLD AUTO: 0 THOUSANDS/ΜL (ref 0–0.1)
BASOPHILS NFR BLD AUTO: 0 % (ref 0–1)
BILIRUB SERPL-MCNC: 0.5 MG/DL
BUN SERPL-MCNC: 13 MG/DL (ref 5–25)
CALCIUM ALBUM COR SERPL-MCNC: 8.7 MG/DL (ref 8.3–10.1)
CALCIUM SERPL-MCNC: 7.9 MG/DL (ref 8.4–10.2)
CANCER AG27-29 SERPL-ACNC: 29.8 U/ML (ref 0–42.3)
CHLORIDE SERPL-SCNC: 107 MMOL/L (ref 97–108)
CO2 SERPL-SCNC: 24 MMOL/L (ref 22–30)
CREAT SERPL-MCNC: 0.67 MG/DL (ref 0.6–1.2)
EOSINOPHIL # BLD AUTO: 0.1 THOUSAND/ΜL (ref 0–0.4)
EOSINOPHIL NFR BLD AUTO: 1 % (ref 0–6)
ERYTHROCYTE [DISTWIDTH] IN BLOOD BY AUTOMATED COUNT: 14.4 %
GFR SERPL CREATININE-BSD FRML MDRD: 87 ML/MIN/1.73SQ M
GLUCOSE SERPL-MCNC: 84 MG/DL (ref 70–99)
HCT VFR BLD AUTO: 33.3 % (ref 36–46)
HGB BLD-MCNC: 11.3 G/DL (ref 12–16)
LYMPHOCYTES # BLD AUTO: 0.8 THOUSANDS/ΜL (ref 0.5–4)
LYMPHOCYTES NFR BLD AUTO: 17 % (ref 25–45)
MCH RBC QN AUTO: 32.3 PG (ref 26–34)
MCHC RBC AUTO-ENTMCNC: 33.9 G/DL (ref 31–36)
MCV RBC AUTO: 95 FL (ref 80–100)
MONOCYTES # BLD AUTO: 0.4 THOUSAND/ΜL (ref 0.2–0.9)
MONOCYTES NFR BLD AUTO: 8 % (ref 1–10)
NEUTROPHILS # BLD AUTO: 3.6 THOUSANDS/ΜL (ref 1.8–7.8)
NEUTS SEG NFR BLD AUTO: 74 % (ref 45–65)
PLATELET # BLD AUTO: 259 THOUSANDS/UL (ref 150–450)
PMV BLD AUTO: 8 FL (ref 8.9–12.7)
POTASSIUM SERPL-SCNC: 3.8 MMOL/L (ref 3.6–5)
PROT SERPL-MCNC: 6.3 G/DL (ref 5.9–8.4)
RBC # BLD AUTO: 3.5 MILLION/UL (ref 4–5.2)
SODIUM SERPL-SCNC: 136 MMOL/L (ref 137–147)
VIT B12 SERPL-MCNC: 665 PG/ML (ref 100–900)
WBC # BLD AUTO: 4.9 THOUSAND/UL (ref 4.5–11)

## 2020-10-15 PROCEDURE — 96360 HYDRATION IV INFUSION INIT: CPT

## 2020-10-15 PROCEDURE — 85025 COMPLETE CBC W/AUTO DIFF WBC: CPT

## 2020-10-15 PROCEDURE — 86300 IMMUNOASSAY TUMOR CA 15-3: CPT

## 2020-10-15 PROCEDURE — 82607 VITAMIN B-12: CPT

## 2020-10-15 PROCEDURE — 80053 COMPREHEN METABOLIC PANEL: CPT

## 2020-10-15 RX ADMIN — SODIUM CHLORIDE 1000 ML: 0.9 INJECTION, SOLUTION INTRAVENOUS at 12:38

## 2020-10-16 ENCOUNTER — HOSPITAL ENCOUNTER (OUTPATIENT)
Dept: INFUSION CENTER | Facility: HOSPITAL | Age: 75
Discharge: HOME | End: 2020-10-16
Attending: INTERNAL MEDICINE
Payer: MEDICARE

## 2020-10-16 VITALS
HEART RATE: 70 BPM | DIASTOLIC BLOOD PRESSURE: 84 MMHG | SYSTOLIC BLOOD PRESSURE: 156 MMHG | RESPIRATION RATE: 18 BRPM | TEMPERATURE: 97.1 F

## 2020-10-16 DIAGNOSIS — E86.0 DEHYDRATION: Primary | ICD-10-CM

## 2020-10-16 DIAGNOSIS — T45.1X5A CHEMOTHERAPY INDUCED NAUSEA AND VOMITING: ICD-10-CM

## 2020-10-16 DIAGNOSIS — R11.2 CHEMOTHERAPY INDUCED NAUSEA AND VOMITING: ICD-10-CM

## 2020-10-16 DIAGNOSIS — R52 PAIN: ICD-10-CM

## 2020-10-16 DIAGNOSIS — E53.8 VITAMIN B 12 DEFICIENCY: ICD-10-CM

## 2020-10-16 LAB — CANCER AG15-3 SERPL-ACNC: 24.1 U/ML (ref 0–25)

## 2020-10-16 PROCEDURE — 96360 HYDRATION IV INFUSION INIT: CPT

## 2020-10-16 RX ADMIN — SODIUM CHLORIDE 1000 ML: 0.9 INJECTION, SOLUTION INTRAVENOUS at 13:18

## 2020-10-19 ENCOUNTER — HOSPITAL ENCOUNTER (OUTPATIENT)
Dept: INFUSION CENTER | Facility: HOSPITAL | Age: 75
Discharge: HOME | End: 2020-10-19
Attending: INTERNAL MEDICINE
Payer: MEDICARE

## 2020-10-19 ENCOUNTER — OFFICE VISIT (OUTPATIENT)
Dept: HEMATOLOGY ONCOLOGY | Facility: CLINIC | Age: 75
End: 2020-10-19
Payer: MEDICARE

## 2020-10-19 VITALS
OXYGEN SATURATION: 100 % | TEMPERATURE: 97.2 F | DIASTOLIC BLOOD PRESSURE: 67 MMHG | HEART RATE: 75 BPM | RESPIRATION RATE: 18 BRPM | SYSTOLIC BLOOD PRESSURE: 140 MMHG

## 2020-10-19 VITALS
RESPIRATION RATE: 18 BRPM | SYSTOLIC BLOOD PRESSURE: 152 MMHG | BODY MASS INDEX: 26.21 KG/M2 | HEART RATE: 75 BPM | TEMPERATURE: 97.2 F | HEIGHT: 61 IN | OXYGEN SATURATION: 99 % | DIASTOLIC BLOOD PRESSURE: 82 MMHG | WEIGHT: 138.8 LBS

## 2020-10-19 DIAGNOSIS — T45.1X5A CHEMOTHERAPY INDUCED NAUSEA AND VOMITING: ICD-10-CM

## 2020-10-19 DIAGNOSIS — R11.2 CHEMOTHERAPY INDUCED NAUSEA AND VOMITING: ICD-10-CM

## 2020-10-19 DIAGNOSIS — C50.811 MALIGNANT NEOPLASM OF OVERLAPPING SITES OF RIGHT BREAST IN FEMALE, ESTROGEN RECEPTOR POSITIVE (HCC): Primary | ICD-10-CM

## 2020-10-19 DIAGNOSIS — R52 PAIN: ICD-10-CM

## 2020-10-19 DIAGNOSIS — C79.31 BRAIN METASTASIS (HCC): ICD-10-CM

## 2020-10-19 DIAGNOSIS — E53.8 VITAMIN B12 DEFICIENCY: ICD-10-CM

## 2020-10-19 DIAGNOSIS — Z17.0 MALIGNANT NEOPLASM OF OVERLAPPING SITES OF RIGHT BREAST IN FEMALE, ESTROGEN RECEPTOR POSITIVE (HCC): Primary | ICD-10-CM

## 2020-10-19 DIAGNOSIS — E86.0 DEHYDRATION: Primary | ICD-10-CM

## 2020-10-19 PROCEDURE — 99214 OFFICE O/P EST MOD 30 MIN: CPT | Performed by: INTERNAL MEDICINE

## 2020-10-19 PROCEDURE — 96360 HYDRATION IV INFUSION INIT: CPT

## 2020-10-19 RX ADMIN — SODIUM CHLORIDE 1000 ML: 0.9 INJECTION, SOLUTION INTRAVENOUS at 12:49

## 2020-10-20 ENCOUNTER — HOSPITAL ENCOUNTER (OUTPATIENT)
Dept: INFUSION CENTER | Facility: HOSPITAL | Age: 75
Discharge: HOME | End: 2020-10-20
Attending: INTERNAL MEDICINE
Payer: MEDICARE

## 2020-10-20 VITALS
SYSTOLIC BLOOD PRESSURE: 136 MMHG | OXYGEN SATURATION: 99 % | RESPIRATION RATE: 17 BRPM | HEART RATE: 71 BPM | TEMPERATURE: 98.2 F | DIASTOLIC BLOOD PRESSURE: 73 MMHG

## 2020-10-20 DIAGNOSIS — R11.2 CHEMOTHERAPY INDUCED NAUSEA AND VOMITING: ICD-10-CM

## 2020-10-20 DIAGNOSIS — E86.0 DEHYDRATION: Primary | ICD-10-CM

## 2020-10-20 DIAGNOSIS — R52 PAIN: ICD-10-CM

## 2020-10-20 DIAGNOSIS — T45.1X5A CHEMOTHERAPY INDUCED NAUSEA AND VOMITING: ICD-10-CM

## 2020-10-20 PROCEDURE — 96360 HYDRATION IV INFUSION INIT: CPT

## 2020-10-20 RX ADMIN — SODIUM CHLORIDE 1000 ML: 0.9 INJECTION, SOLUTION INTRAVENOUS at 12:44

## 2020-10-21 ENCOUNTER — HOSPITAL ENCOUNTER (OUTPATIENT)
Dept: INFUSION CENTER | Facility: HOSPITAL | Age: 75
Discharge: HOME | End: 2020-10-21
Attending: INTERNAL MEDICINE
Payer: MEDICARE

## 2020-10-21 ENCOUNTER — TELEPHONE (OUTPATIENT)
Dept: HEMATOLOGY ONCOLOGY | Facility: CLINIC | Age: 75
End: 2020-10-21

## 2020-10-21 VITALS
DIASTOLIC BLOOD PRESSURE: 79 MMHG | HEART RATE: 65 BPM | RESPIRATION RATE: 18 BRPM | TEMPERATURE: 97 F | SYSTOLIC BLOOD PRESSURE: 133 MMHG

## 2020-10-21 DIAGNOSIS — R52 PAIN: Primary | ICD-10-CM

## 2020-10-21 DIAGNOSIS — T45.1X5A CHEMOTHERAPY INDUCED NAUSEA AND VOMITING: ICD-10-CM

## 2020-10-21 DIAGNOSIS — E86.0 DEHYDRATION: ICD-10-CM

## 2020-10-21 DIAGNOSIS — R11.2 CHEMOTHERAPY INDUCED NAUSEA AND VOMITING: ICD-10-CM

## 2020-10-21 PROCEDURE — 96360 HYDRATION IV INFUSION INIT: CPT

## 2020-10-21 RX ADMIN — SODIUM CHLORIDE 1000 ML: 0.9 INJECTION, SOLUTION INTRAVENOUS at 12:22

## 2020-10-22 ENCOUNTER — HOSPITAL ENCOUNTER (OUTPATIENT)
Dept: INFUSION CENTER | Facility: HOSPITAL | Age: 75
Discharge: HOME | End: 2020-10-22
Attending: INTERNAL MEDICINE
Payer: MEDICARE

## 2020-10-22 VITALS
TEMPERATURE: 96.8 F | RESPIRATION RATE: 18 BRPM | HEART RATE: 62 BPM | DIASTOLIC BLOOD PRESSURE: 74 MMHG | SYSTOLIC BLOOD PRESSURE: 162 MMHG

## 2020-10-22 DIAGNOSIS — E86.0 DEHYDRATION: ICD-10-CM

## 2020-10-22 DIAGNOSIS — R52 PAIN: Primary | ICD-10-CM

## 2020-10-22 DIAGNOSIS — T45.1X5A CHEMOTHERAPY INDUCED NAUSEA AND VOMITING: ICD-10-CM

## 2020-10-22 DIAGNOSIS — R11.2 CHEMOTHERAPY INDUCED NAUSEA AND VOMITING: ICD-10-CM

## 2020-10-22 PROCEDURE — 96360 HYDRATION IV INFUSION INIT: CPT

## 2020-10-22 RX ADMIN — SODIUM CHLORIDE 1000 ML: 0.9 INJECTION, SOLUTION INTRAVENOUS at 12:43

## 2020-10-23 ENCOUNTER — HOSPITAL ENCOUNTER (OUTPATIENT)
Dept: INFUSION CENTER | Facility: HOSPITAL | Age: 75
Discharge: HOME | End: 2020-10-23
Attending: INTERNAL MEDICINE
Payer: MEDICARE

## 2020-10-23 VITALS
HEART RATE: 65 BPM | TEMPERATURE: 97.5 F | OXYGEN SATURATION: 99 % | DIASTOLIC BLOOD PRESSURE: 72 MMHG | RESPIRATION RATE: 18 BRPM | SYSTOLIC BLOOD PRESSURE: 146 MMHG

## 2020-10-23 DIAGNOSIS — E86.0 DEHYDRATION: Primary | ICD-10-CM

## 2020-10-23 DIAGNOSIS — T45.1X5A CHEMOTHERAPY INDUCED NAUSEA AND VOMITING: ICD-10-CM

## 2020-10-23 DIAGNOSIS — R11.2 CHEMOTHERAPY INDUCED NAUSEA AND VOMITING: ICD-10-CM

## 2020-10-23 DIAGNOSIS — R52 PAIN: ICD-10-CM

## 2020-10-23 PROCEDURE — 96360 HYDRATION IV INFUSION INIT: CPT

## 2020-10-23 RX ORDER — CYANOCOBALAMIN 1000 UG/ML
1000 INJECTION INTRAMUSCULAR; SUBCUTANEOUS ONCE
Status: CANCELLED | OUTPATIENT
Start: 2020-11-12

## 2020-10-23 RX ADMIN — SODIUM CHLORIDE 1000 ML: 0.9 INJECTION, SOLUTION INTRAVENOUS at 12:25

## 2020-10-26 ENCOUNTER — HOSPITAL ENCOUNTER (OUTPATIENT)
Dept: MRI IMAGING | Facility: HOSPITAL | Age: 75
Discharge: HOME | End: 2020-10-26
Payer: MEDICARE

## 2020-10-26 ENCOUNTER — HOSPITAL ENCOUNTER (OUTPATIENT)
Dept: INFUSION CENTER | Facility: HOSPITAL | Age: 75
Discharge: HOME | End: 2020-10-26
Attending: INTERNAL MEDICINE
Payer: MEDICARE

## 2020-10-26 VITALS
RESPIRATION RATE: 18 BRPM | DIASTOLIC BLOOD PRESSURE: 64 MMHG | SYSTOLIC BLOOD PRESSURE: 132 MMHG | TEMPERATURE: 98.3 F | OXYGEN SATURATION: 99 % | HEART RATE: 64 BPM

## 2020-10-26 DIAGNOSIS — C79.31 BRAIN METASTASIS (HCC): ICD-10-CM

## 2020-10-26 DIAGNOSIS — R11.2 CHEMOTHERAPY INDUCED NAUSEA AND VOMITING: ICD-10-CM

## 2020-10-26 DIAGNOSIS — R52 PAIN: ICD-10-CM

## 2020-10-26 DIAGNOSIS — Z17.0 MALIGNANT NEOPLASM OF OVERLAPPING SITES OF RIGHT BREAST IN FEMALE, ESTROGEN RECEPTOR POSITIVE (HCC): ICD-10-CM

## 2020-10-26 DIAGNOSIS — E86.0 DEHYDRATION: Primary | ICD-10-CM

## 2020-10-26 DIAGNOSIS — C50.811 MALIGNANT NEOPLASM OF OVERLAPPING SITES OF RIGHT BREAST IN FEMALE, ESTROGEN RECEPTOR POSITIVE (HCC): ICD-10-CM

## 2020-10-26 DIAGNOSIS — T45.1X5A CHEMOTHERAPY INDUCED NAUSEA AND VOMITING: ICD-10-CM

## 2020-10-26 PROCEDURE — 96360 HYDRATION IV INFUSION INIT: CPT

## 2020-10-26 PROCEDURE — G1004 CDSM NDSC: HCPCS

## 2020-10-26 PROCEDURE — 70553 MRI BRAIN STEM W/O & W/DYE: CPT

## 2020-10-26 PROCEDURE — A9585 GADOBUTROL INJECTION: HCPCS | Performed by: RADIOLOGY

## 2020-10-26 RX ADMIN — GADOBUTROL 6 ML: 604.72 INJECTION INTRAVENOUS at 11:47

## 2020-10-26 RX ADMIN — SODIUM CHLORIDE 1000 ML: 0.9 INJECTION, SOLUTION INTRAVENOUS at 12:05

## 2020-10-27 ENCOUNTER — HOSPITAL ENCOUNTER (OUTPATIENT)
Dept: INFUSION CENTER | Facility: HOSPITAL | Age: 75
Discharge: HOME | End: 2020-10-27
Attending: INTERNAL MEDICINE
Payer: MEDICARE

## 2020-10-27 VITALS
DIASTOLIC BLOOD PRESSURE: 77 MMHG | RESPIRATION RATE: 18 BRPM | SYSTOLIC BLOOD PRESSURE: 136 MMHG | OXYGEN SATURATION: 99 % | TEMPERATURE: 97.9 F | HEART RATE: 71 BPM

## 2020-10-27 DIAGNOSIS — R52 PAIN: ICD-10-CM

## 2020-10-27 DIAGNOSIS — E86.0 DEHYDRATION: Primary | ICD-10-CM

## 2020-10-27 DIAGNOSIS — R11.2 CHEMOTHERAPY INDUCED NAUSEA AND VOMITING: ICD-10-CM

## 2020-10-27 DIAGNOSIS — T45.1X5A CHEMOTHERAPY INDUCED NAUSEA AND VOMITING: ICD-10-CM

## 2020-10-27 PROCEDURE — 96360 HYDRATION IV INFUSION INIT: CPT

## 2020-10-27 RX ADMIN — SODIUM CHLORIDE 1000 ML: 0.9 INJECTION, SOLUTION INTRAVENOUS at 12:59

## 2020-10-28 ENCOUNTER — HOSPITAL ENCOUNTER (OUTPATIENT)
Dept: INFUSION CENTER | Facility: HOSPITAL | Age: 75
Discharge: HOME | End: 2020-10-28
Attending: INTERNAL MEDICINE
Payer: MEDICARE

## 2020-10-28 VITALS
TEMPERATURE: 97.9 F | HEART RATE: 69 BPM | RESPIRATION RATE: 18 BRPM | SYSTOLIC BLOOD PRESSURE: 138 MMHG | OXYGEN SATURATION: 100 % | DIASTOLIC BLOOD PRESSURE: 69 MMHG

## 2020-10-28 DIAGNOSIS — R11.2 CHEMOTHERAPY INDUCED NAUSEA AND VOMITING: ICD-10-CM

## 2020-10-28 DIAGNOSIS — E86.0 DEHYDRATION: Primary | ICD-10-CM

## 2020-10-28 DIAGNOSIS — T45.1X5A CHEMOTHERAPY INDUCED NAUSEA AND VOMITING: ICD-10-CM

## 2020-10-28 DIAGNOSIS — R52 PAIN: ICD-10-CM

## 2020-10-28 PROCEDURE — 96360 HYDRATION IV INFUSION INIT: CPT

## 2020-10-28 RX ADMIN — SODIUM CHLORIDE 1000 ML: 0.9 INJECTION, SOLUTION INTRAVENOUS at 13:02

## 2020-10-29 ENCOUNTER — HOSPITAL ENCOUNTER (OUTPATIENT)
Dept: INFUSION CENTER | Facility: HOSPITAL | Age: 75
Discharge: HOME | End: 2020-10-29
Attending: INTERNAL MEDICINE
Payer: MEDICARE

## 2020-10-29 VITALS
HEART RATE: 68 BPM | RESPIRATION RATE: 18 BRPM | SYSTOLIC BLOOD PRESSURE: 144 MMHG | TEMPERATURE: 97.3 F | DIASTOLIC BLOOD PRESSURE: 67 MMHG

## 2020-10-29 DIAGNOSIS — T45.1X5A CHEMOTHERAPY INDUCED NAUSEA AND VOMITING: ICD-10-CM

## 2020-10-29 DIAGNOSIS — R11.2 CHEMOTHERAPY INDUCED NAUSEA AND VOMITING: ICD-10-CM

## 2020-10-29 DIAGNOSIS — R52 PAIN: ICD-10-CM

## 2020-10-29 DIAGNOSIS — E86.0 DEHYDRATION: Primary | ICD-10-CM

## 2020-10-29 PROCEDURE — 96360 HYDRATION IV INFUSION INIT: CPT

## 2020-10-29 RX ADMIN — SODIUM CHLORIDE 1000 ML: 0.9 INJECTION, SOLUTION INTRAVENOUS at 12:40

## 2020-10-30 ENCOUNTER — HOSPITAL ENCOUNTER (OUTPATIENT)
Dept: INFUSION CENTER | Facility: HOSPITAL | Age: 75
Discharge: HOME | End: 2020-10-30
Attending: INTERNAL MEDICINE
Payer: MEDICARE

## 2020-10-30 VITALS
HEART RATE: 65 BPM | TEMPERATURE: 96.6 F | DIASTOLIC BLOOD PRESSURE: 70 MMHG | RESPIRATION RATE: 18 BRPM | SYSTOLIC BLOOD PRESSURE: 134 MMHG

## 2020-10-30 DIAGNOSIS — E86.0 DEHYDRATION: ICD-10-CM

## 2020-10-30 DIAGNOSIS — T45.1X5A CHEMOTHERAPY INDUCED NAUSEA AND VOMITING: ICD-10-CM

## 2020-10-30 DIAGNOSIS — R11.2 CHEMOTHERAPY INDUCED NAUSEA AND VOMITING: ICD-10-CM

## 2020-10-30 DIAGNOSIS — R52 PAIN: Primary | ICD-10-CM

## 2020-10-30 PROCEDURE — 96360 HYDRATION IV INFUSION INIT: CPT

## 2020-10-30 RX ADMIN — SODIUM CHLORIDE 1000 ML: 0.9 INJECTION, SOLUTION INTRAVENOUS at 13:22

## 2020-11-02 ENCOUNTER — OFFICE VISIT (OUTPATIENT)
Dept: PAIN MEDICINE | Facility: MEDICAL CENTER | Age: 75
End: 2020-11-02
Payer: MEDICARE

## 2020-11-02 VITALS
HEIGHT: 61 IN | SYSTOLIC BLOOD PRESSURE: 148 MMHG | BODY MASS INDEX: 26.06 KG/M2 | TEMPERATURE: 97.9 F | WEIGHT: 138 LBS | HEART RATE: 98 BPM | DIASTOLIC BLOOD PRESSURE: 86 MMHG

## 2020-11-02 DIAGNOSIS — M48.061 BILATERAL STENOSIS OF LATERAL RECESS OF LUMBAR SPINE: ICD-10-CM

## 2020-11-02 DIAGNOSIS — M54.16 LUMBAR RADICULITIS: Primary | ICD-10-CM

## 2020-11-02 PROCEDURE — 99214 OFFICE O/P EST MOD 30 MIN: CPT | Performed by: NURSE PRACTITIONER

## 2020-11-02 RX ORDER — GABAPENTIN 300 MG/1
300 CAPSULE ORAL
Qty: 30 CAPSULE | Refills: 1 | Status: SHIPPED | OUTPATIENT
Start: 2020-11-02 | End: 2020-12-09 | Stop reason: SDUPTHER

## 2020-11-03 ENCOUNTER — HOSPITAL ENCOUNTER (OUTPATIENT)
Dept: INFUSION CENTER | Facility: HOSPITAL | Age: 75
Discharge: HOME | End: 2020-11-03
Attending: INTERNAL MEDICINE

## 2020-11-04 ENCOUNTER — HOSPITAL ENCOUNTER (OUTPATIENT)
Dept: INFUSION CENTER | Facility: HOSPITAL | Age: 75
Discharge: HOME | End: 2020-11-04
Attending: INTERNAL MEDICINE
Payer: MEDICARE

## 2020-11-04 VITALS
HEART RATE: 80 BPM | DIASTOLIC BLOOD PRESSURE: 67 MMHG | TEMPERATURE: 98.5 F | RESPIRATION RATE: 18 BRPM | SYSTOLIC BLOOD PRESSURE: 110 MMHG

## 2020-11-04 DIAGNOSIS — T45.1X5A CHEMOTHERAPY INDUCED NAUSEA AND VOMITING: ICD-10-CM

## 2020-11-04 DIAGNOSIS — E86.0 DEHYDRATION: Primary | ICD-10-CM

## 2020-11-04 DIAGNOSIS — R52 PAIN: ICD-10-CM

## 2020-11-04 DIAGNOSIS — R11.2 CHEMOTHERAPY INDUCED NAUSEA AND VOMITING: ICD-10-CM

## 2020-11-04 PROCEDURE — 96360 HYDRATION IV INFUSION INIT: CPT

## 2020-11-04 RX ADMIN — SODIUM CHLORIDE 1000 ML: 0.9 INJECTION, SOLUTION INTRAVENOUS at 12:55

## 2020-11-05 ENCOUNTER — HOSPITAL ENCOUNTER (OUTPATIENT)
Dept: INFUSION CENTER | Facility: HOSPITAL | Age: 75
Discharge: HOME | End: 2020-11-05
Attending: INTERNAL MEDICINE
Payer: MEDICARE

## 2020-11-05 VITALS
TEMPERATURE: 97.2 F | HEART RATE: 77 BPM | DIASTOLIC BLOOD PRESSURE: 57 MMHG | SYSTOLIC BLOOD PRESSURE: 109 MMHG | RESPIRATION RATE: 18 BRPM

## 2020-11-05 DIAGNOSIS — T45.1X5A CHEMOTHERAPY INDUCED NAUSEA AND VOMITING: ICD-10-CM

## 2020-11-05 DIAGNOSIS — R11.2 CHEMOTHERAPY INDUCED NAUSEA AND VOMITING: ICD-10-CM

## 2020-11-05 DIAGNOSIS — E86.0 DEHYDRATION: Primary | ICD-10-CM

## 2020-11-05 DIAGNOSIS — R52 PAIN: ICD-10-CM

## 2020-11-05 PROCEDURE — 96360 HYDRATION IV INFUSION INIT: CPT

## 2020-11-05 RX ADMIN — SODIUM CHLORIDE 1000 ML: 0.9 INJECTION, SOLUTION INTRAVENOUS at 13:24

## 2020-11-06 ENCOUNTER — HOSPITAL ENCOUNTER (OUTPATIENT)
Dept: INFUSION CENTER | Facility: HOSPITAL | Age: 75
Discharge: HOME | End: 2020-11-06
Attending: INTERNAL MEDICINE
Payer: MEDICARE

## 2020-11-06 DIAGNOSIS — E86.0 DEHYDRATION: Primary | ICD-10-CM

## 2020-11-06 DIAGNOSIS — R52 PAIN: ICD-10-CM

## 2020-11-06 DIAGNOSIS — T45.1X5A CHEMOTHERAPY INDUCED NAUSEA AND VOMITING: ICD-10-CM

## 2020-11-06 DIAGNOSIS — R11.2 CHEMOTHERAPY INDUCED NAUSEA AND VOMITING: ICD-10-CM

## 2020-11-06 PROCEDURE — 96360 HYDRATION IV INFUSION INIT: CPT

## 2020-11-06 RX ADMIN — SODIUM CHLORIDE 1000 ML: 0.9 INJECTION, SOLUTION INTRAVENOUS at 13:02

## 2020-11-10 ENCOUNTER — HOSPITAL ENCOUNTER (OUTPATIENT)
Dept: RADIOLOGY | Facility: MEDICAL CENTER | Age: 75
Discharge: HOME | End: 2020-11-10
Attending: PHYSICAL MEDICINE & REHABILITATION | Admitting: PHYSICAL MEDICINE & REHABILITATION
Payer: MEDICARE

## 2020-11-10 VITALS
HEART RATE: 82 BPM | OXYGEN SATURATION: 97 % | TEMPERATURE: 98.2 F | SYSTOLIC BLOOD PRESSURE: 147 MMHG | RESPIRATION RATE: 20 BRPM | DIASTOLIC BLOOD PRESSURE: 85 MMHG

## 2020-11-10 DIAGNOSIS — M48.061 BILATERAL STENOSIS OF LATERAL RECESS OF LUMBAR SPINE: ICD-10-CM

## 2020-11-10 DIAGNOSIS — M54.16 LUMBAR RADICULITIS: ICD-10-CM

## 2020-11-10 PROCEDURE — 62323 NJX INTERLAMINAR LMBR/SAC: CPT | Performed by: PHYSICAL MEDICINE & REHABILITATION

## 2020-11-10 RX ORDER — METHYLPREDNISOLONE ACETATE 80 MG/ML
80 INJECTION, SUSPENSION INTRA-ARTICULAR; INTRALESIONAL; INTRAMUSCULAR; PARENTERAL; SOFT TISSUE ONCE
Status: COMPLETED | OUTPATIENT
Start: 2020-11-10 | End: 2020-11-10

## 2020-11-10 RX ORDER — LIDOCAINE HYDROCHLORIDE 10 MG/ML
5 INJECTION, SOLUTION EPIDURAL; INFILTRATION; INTRACAUDAL; PERINEURAL ONCE
Status: COMPLETED | OUTPATIENT
Start: 2020-11-10 | End: 2020-11-10

## 2020-11-10 RX ADMIN — METHYLPREDNISOLONE ACETATE 80 MG: 80 INJECTION, SUSPENSION INTRA-ARTICULAR; INTRALESIONAL; INTRAMUSCULAR; PARENTERAL; SOFT TISSUE at 15:50

## 2020-11-10 RX ADMIN — IOHEXOL 2 ML: 300 INJECTION, SOLUTION INTRAVENOUS at 15:49

## 2020-11-10 RX ADMIN — LIDOCAINE HYDROCHLORIDE 2 ML: 10 INJECTION, SOLUTION EPIDURAL; INFILTRATION; INTRACAUDAL; PERINEURAL at 15:48

## 2020-11-11 ENCOUNTER — TELEMEDICINE (OUTPATIENT)
Dept: RADIATION ONCOLOGY | Facility: CLINIC | Age: 75
End: 2020-11-11
Attending: RADIOLOGY

## 2020-11-11 ENCOUNTER — TELEPHONE (OUTPATIENT)
Dept: RADIATION ONCOLOGY | Facility: CLINIC | Age: 75
End: 2020-11-11

## 2020-11-11 ENCOUNTER — DOCUMENTATION (OUTPATIENT)
Dept: NEUROSURGERY | Facility: CLINIC | Age: 75
End: 2020-11-11

## 2020-11-11 DIAGNOSIS — Z17.0 MALIGNANT NEOPLASM OF OVERLAPPING SITES OF RIGHT BREAST IN FEMALE, ESTROGEN RECEPTOR POSITIVE (HCC): ICD-10-CM

## 2020-11-11 DIAGNOSIS — Z79.811 USE OF AROMATASE INHIBITORS: ICD-10-CM

## 2020-11-11 DIAGNOSIS — C79.31 BRAIN METASTASIS (HCC): Primary | ICD-10-CM

## 2020-11-11 DIAGNOSIS — C50.811 MALIGNANT NEOPLASM OF OVERLAPPING SITES OF RIGHT BREAST IN FEMALE, ESTROGEN RECEPTOR POSITIVE (HCC): ICD-10-CM

## 2020-11-11 RX ORDER — DEXAMETHASONE 2 MG/1
2 TABLET ORAL 2 TIMES DAILY WITH MEALS
Qty: 60 TABLET | Refills: 1 | Status: SHIPPED | OUTPATIENT
Start: 2020-11-11 | End: 2020-12-02 | Stop reason: ALTCHOICE

## 2020-11-12 ENCOUNTER — HOSPITAL ENCOUNTER (OUTPATIENT)
Dept: INFUSION CENTER | Facility: HOSPITAL | Age: 75
Discharge: HOME | End: 2020-11-12
Attending: INTERNAL MEDICINE
Payer: MEDICARE

## 2020-11-12 DIAGNOSIS — T45.1X5A CHEMOTHERAPY INDUCED NAUSEA AND VOMITING: ICD-10-CM

## 2020-11-12 DIAGNOSIS — R11.2 CHEMOTHERAPY INDUCED NAUSEA AND VOMITING: ICD-10-CM

## 2020-11-12 DIAGNOSIS — R52 PAIN: ICD-10-CM

## 2020-11-12 DIAGNOSIS — E53.8 VITAMIN B 12 DEFICIENCY: ICD-10-CM

## 2020-11-12 DIAGNOSIS — E86.0 DEHYDRATION: Primary | ICD-10-CM

## 2020-11-12 PROCEDURE — 96360 HYDRATION IV INFUSION INIT: CPT

## 2020-11-12 PROCEDURE — 96372 THER/PROPH/DIAG INJ SC/IM: CPT

## 2020-11-12 RX ORDER — CYANOCOBALAMIN 1000 UG/ML
1000 INJECTION INTRAMUSCULAR; SUBCUTANEOUS ONCE
Status: COMPLETED | OUTPATIENT
Start: 2020-11-12 | End: 2020-11-12

## 2020-11-12 RX ORDER — CYANOCOBALAMIN 1000 UG/ML
1000 INJECTION INTRAMUSCULAR; SUBCUTANEOUS ONCE
Status: CANCELLED | OUTPATIENT
Start: 2020-12-10

## 2020-11-12 RX ADMIN — SODIUM CHLORIDE 1000 ML: 0.9 INJECTION, SOLUTION INTRAVENOUS at 13:30

## 2020-11-12 RX ADMIN — CYANOCOBALAMIN 1000 MCG: 1000 INJECTION INTRAMUSCULAR; SUBCUTANEOUS at 13:35

## 2020-11-13 ENCOUNTER — HOSPITAL ENCOUNTER (OUTPATIENT)
Dept: INFUSION CENTER | Facility: HOSPITAL | Age: 75
Discharge: HOME | End: 2020-11-13
Attending: INTERNAL MEDICINE
Payer: MEDICARE

## 2020-11-13 VITALS
RESPIRATION RATE: 18 BRPM | SYSTOLIC BLOOD PRESSURE: 144 MMHG | TEMPERATURE: 97.7 F | DIASTOLIC BLOOD PRESSURE: 75 MMHG | HEART RATE: 85 BPM

## 2020-11-13 DIAGNOSIS — R11.2 CHEMOTHERAPY INDUCED NAUSEA AND VOMITING: ICD-10-CM

## 2020-11-13 DIAGNOSIS — T45.1X5A CHEMOTHERAPY INDUCED NAUSEA AND VOMITING: ICD-10-CM

## 2020-11-13 DIAGNOSIS — C50.811 MALIGNANT NEOPLASM OF OVERLAPPING SITES OF RIGHT BREAST IN FEMALE, ESTROGEN RECEPTOR POSITIVE (HCC): ICD-10-CM

## 2020-11-13 DIAGNOSIS — E86.0 DEHYDRATION: Primary | ICD-10-CM

## 2020-11-13 DIAGNOSIS — R52 PAIN: ICD-10-CM

## 2020-11-13 DIAGNOSIS — Z17.0 MALIGNANT NEOPLASM OF OVERLAPPING SITES OF RIGHT BREAST IN FEMALE, ESTROGEN RECEPTOR POSITIVE (HCC): ICD-10-CM

## 2020-11-13 LAB
ALBUMIN SERPL BCP-MCNC: 3.6 G/DL (ref 3–5.2)
ALP SERPL-CCNC: 53 U/L (ref 43–122)
ALT SERPL W P-5'-P-CCNC: 7 U/L (ref 9–52)
ANION GAP SERPL CALCULATED.3IONS-SCNC: 5 MMOL/L (ref 5–14)
AST SERPL W P-5'-P-CCNC: 23 U/L (ref 14–36)
BASOPHILS # BLD AUTO: 0 THOUSANDS/ΜL (ref 0–0.1)
BASOPHILS NFR BLD AUTO: 0 % (ref 0–1)
BILIRUB SERPL-MCNC: 0.4 MG/DL
BUN SERPL-MCNC: 20 MG/DL (ref 5–25)
CALCIUM SERPL-MCNC: 8.6 MG/DL (ref 8.4–10.2)
CHLORIDE SERPL-SCNC: 104 MMOL/L (ref 97–108)
CO2 SERPL-SCNC: 27 MMOL/L (ref 22–30)
CREAT SERPL-MCNC: 0.87 MG/DL (ref 0.6–1.2)
EOSINOPHIL # BLD AUTO: 0 THOUSAND/ΜL (ref 0–0.4)
EOSINOPHIL NFR BLD AUTO: 1 % (ref 0–6)
ERYTHROCYTE [DISTWIDTH] IN BLOOD BY AUTOMATED COUNT: 13.9 %
GFR SERPL CREATININE-BSD FRML MDRD: 66 ML/MIN/1.73SQ M
GLUCOSE SERPL-MCNC: 96 MG/DL (ref 70–99)
HCT VFR BLD AUTO: 33.7 % (ref 36–46)
HGB BLD-MCNC: 11.5 G/DL (ref 12–16)
LYMPHOCYTES # BLD AUTO: 1.2 THOUSANDS/ΜL (ref 0.5–4)
LYMPHOCYTES NFR BLD AUTO: 18 % (ref 25–45)
MCH RBC QN AUTO: 31.6 PG (ref 26–34)
MCHC RBC AUTO-ENTMCNC: 34.2 G/DL (ref 31–36)
MCV RBC AUTO: 93 FL (ref 80–100)
MONOCYTES # BLD AUTO: 0.5 THOUSAND/ΜL (ref 0.2–0.9)
MONOCYTES NFR BLD AUTO: 7 % (ref 1–10)
NEUTROPHILS # BLD AUTO: 5 THOUSANDS/ΜL (ref 1.8–7.8)
NEUTS SEG NFR BLD AUTO: 74 % (ref 45–65)
PLATELET # BLD AUTO: 348 THOUSANDS/UL (ref 150–450)
PMV BLD AUTO: 7.1 FL (ref 8.9–12.7)
POTASSIUM SERPL-SCNC: 3.6 MMOL/L (ref 3.6–5)
PROT SERPL-MCNC: 7.3 G/DL (ref 5.9–8.4)
RBC # BLD AUTO: 3.64 MILLION/UL (ref 4–5.2)
SODIUM SERPL-SCNC: 136 MMOL/L (ref 137–147)
WBC # BLD AUTO: 6.8 THOUSAND/UL (ref 4.5–11)

## 2020-11-13 PROCEDURE — 96360 HYDRATION IV INFUSION INIT: CPT

## 2020-11-13 PROCEDURE — 80053 COMPREHEN METABOLIC PANEL: CPT

## 2020-11-13 PROCEDURE — 85025 COMPLETE CBC W/AUTO DIFF WBC: CPT

## 2020-11-13 RX ADMIN — SODIUM CHLORIDE 1000 ML: 0.9 INJECTION, SOLUTION INTRAVENOUS at 12:28

## 2020-11-16 ENCOUNTER — OFFICE VISIT (OUTPATIENT)
Dept: HEMATOLOGY ONCOLOGY | Facility: CLINIC | Age: 75
End: 2020-11-16
Payer: MEDICARE

## 2020-11-16 ENCOUNTER — HOSPITAL ENCOUNTER (OUTPATIENT)
Dept: INFUSION CENTER | Facility: HOSPITAL | Age: 75
Discharge: HOME | End: 2020-11-16
Attending: INTERNAL MEDICINE
Payer: MEDICARE

## 2020-11-16 VITALS
SYSTOLIC BLOOD PRESSURE: 134 MMHG | DIASTOLIC BLOOD PRESSURE: 74 MMHG | RESPIRATION RATE: 18 BRPM | TEMPERATURE: 97.3 F | HEART RATE: 71 BPM | OXYGEN SATURATION: 99 %

## 2020-11-16 VITALS
OXYGEN SATURATION: 94 % | SYSTOLIC BLOOD PRESSURE: 150 MMHG | DIASTOLIC BLOOD PRESSURE: 89 MMHG | BODY MASS INDEX: 25.68 KG/M2 | HEART RATE: 77 BPM | RESPIRATION RATE: 18 BRPM | HEIGHT: 61 IN | WEIGHT: 136 LBS | TEMPERATURE: 97.1 F

## 2020-11-16 DIAGNOSIS — R11.2 CHEMOTHERAPY INDUCED NAUSEA AND VOMITING: ICD-10-CM

## 2020-11-16 DIAGNOSIS — C50.919 METASTATIC BREAST CANCER (HCC): ICD-10-CM

## 2020-11-16 DIAGNOSIS — R11.0 NAUSEA: ICD-10-CM

## 2020-11-16 DIAGNOSIS — C50.811 MALIGNANT NEOPLASM OF OVERLAPPING SITES OF RIGHT BREAST IN FEMALE, ESTROGEN RECEPTOR POSITIVE (HCC): Primary | ICD-10-CM

## 2020-11-16 DIAGNOSIS — E53.8 VITAMIN B12 DEFICIENCY: ICD-10-CM

## 2020-11-16 DIAGNOSIS — T45.1X5A CHEMOTHERAPY INDUCED NAUSEA AND VOMITING: ICD-10-CM

## 2020-11-16 DIAGNOSIS — Z17.0 MALIGNANT NEOPLASM OF OVERLAPPING SITES OF RIGHT BREAST IN FEMALE, ESTROGEN RECEPTOR POSITIVE (HCC): Primary | ICD-10-CM

## 2020-11-16 DIAGNOSIS — C79.31 BRAIN METASTASIS (HCC): ICD-10-CM

## 2020-11-16 DIAGNOSIS — R52 PAIN: Primary | ICD-10-CM

## 2020-11-16 DIAGNOSIS — E86.0 DEHYDRATION: ICD-10-CM

## 2020-11-16 PROCEDURE — 96360 HYDRATION IV INFUSION INIT: CPT

## 2020-11-16 PROCEDURE — 99214 OFFICE O/P EST MOD 30 MIN: CPT | Performed by: INTERNAL MEDICINE

## 2020-11-16 RX ORDER — ANASTROZOLE 1 MG/1
TABLET ORAL
Qty: 90 TABLET | Refills: 3 | Status: SHIPPED | OUTPATIENT
Start: 2020-11-16 | End: 2021-10-29 | Stop reason: SDUPTHER

## 2020-11-16 RX ORDER — PANTOPRAZOLE SODIUM 40 MG/1
40 TABLET, DELAYED RELEASE ORAL DAILY
Qty: 90 TABLET | Refills: 3 | Status: SHIPPED | OUTPATIENT
Start: 2020-11-16 | End: 2021-02-12

## 2020-11-16 RX ADMIN — SODIUM CHLORIDE 1000 ML: 0.9 INJECTION, SOLUTION INTRAVENOUS at 11:46

## 2020-11-17 ENCOUNTER — TELEPHONE (OUTPATIENT)
Dept: PAIN MEDICINE | Facility: CLINIC | Age: 75
End: 2020-11-17

## 2020-11-17 ENCOUNTER — HOSPITAL ENCOUNTER (OUTPATIENT)
Dept: INFUSION CENTER | Facility: HOSPITAL | Age: 75
Discharge: HOME | End: 2020-11-17
Attending: INTERNAL MEDICINE
Payer: MEDICARE

## 2020-11-17 VITALS — TEMPERATURE: 97.8 F | SYSTOLIC BLOOD PRESSURE: 126 MMHG | DIASTOLIC BLOOD PRESSURE: 68 MMHG | HEART RATE: 76 BPM

## 2020-11-17 DIAGNOSIS — R52 PAIN: ICD-10-CM

## 2020-11-17 DIAGNOSIS — T45.1X5A CHEMOTHERAPY INDUCED NAUSEA AND VOMITING: ICD-10-CM

## 2020-11-17 DIAGNOSIS — E86.0 DEHYDRATION: Primary | ICD-10-CM

## 2020-11-17 DIAGNOSIS — R11.2 CHEMOTHERAPY INDUCED NAUSEA AND VOMITING: ICD-10-CM

## 2020-11-17 PROCEDURE — 96360 HYDRATION IV INFUSION INIT: CPT

## 2020-11-17 RX ADMIN — SODIUM CHLORIDE 1000 ML: 0.9 INJECTION, SOLUTION INTRAVENOUS at 12:44

## 2020-11-18 ENCOUNTER — HOSPITAL ENCOUNTER (OUTPATIENT)
Dept: INFUSION CENTER | Facility: HOSPITAL | Age: 75
Discharge: HOME | End: 2020-11-18
Attending: INTERNAL MEDICINE

## 2020-11-19 ENCOUNTER — HOSPITAL ENCOUNTER (OUTPATIENT)
Dept: INFUSION CENTER | Facility: HOSPITAL | Age: 75
Discharge: HOME | End: 2020-11-19
Attending: INTERNAL MEDICINE
Payer: MEDICARE

## 2020-11-19 VITALS
DIASTOLIC BLOOD PRESSURE: 72 MMHG | HEART RATE: 73 BPM | TEMPERATURE: 97.3 F | RESPIRATION RATE: 20 BRPM | SYSTOLIC BLOOD PRESSURE: 145 MMHG

## 2020-11-19 DIAGNOSIS — R52 PAIN: ICD-10-CM

## 2020-11-19 DIAGNOSIS — E86.0 DEHYDRATION: Primary | ICD-10-CM

## 2020-11-19 DIAGNOSIS — T45.1X5A CHEMOTHERAPY INDUCED NAUSEA AND VOMITING: ICD-10-CM

## 2020-11-19 DIAGNOSIS — R11.2 CHEMOTHERAPY INDUCED NAUSEA AND VOMITING: ICD-10-CM

## 2020-11-19 PROCEDURE — 96360 HYDRATION IV INFUSION INIT: CPT

## 2020-11-19 RX ADMIN — SODIUM CHLORIDE 1000 ML: 0.9 INJECTION, SOLUTION INTRAVENOUS at 12:56

## 2020-11-20 ENCOUNTER — HOSPITAL ENCOUNTER (OUTPATIENT)
Dept: INFUSION CENTER | Facility: HOSPITAL | Age: 75
Discharge: HOME | End: 2020-11-20
Attending: INTERNAL MEDICINE
Payer: MEDICARE

## 2020-11-20 VITALS
TEMPERATURE: 97.1 F | OXYGEN SATURATION: 98 % | SYSTOLIC BLOOD PRESSURE: 133 MMHG | RESPIRATION RATE: 16 BRPM | HEART RATE: 75 BPM | DIASTOLIC BLOOD PRESSURE: 83 MMHG

## 2020-11-20 DIAGNOSIS — E86.0 DEHYDRATION: ICD-10-CM

## 2020-11-20 DIAGNOSIS — R11.2 CHEMOTHERAPY INDUCED NAUSEA AND VOMITING: ICD-10-CM

## 2020-11-20 DIAGNOSIS — R52 PAIN: Primary | ICD-10-CM

## 2020-11-20 DIAGNOSIS — T45.1X5A CHEMOTHERAPY INDUCED NAUSEA AND VOMITING: ICD-10-CM

## 2020-11-20 PROCEDURE — 96360 HYDRATION IV INFUSION INIT: CPT

## 2020-11-20 RX ADMIN — SODIUM CHLORIDE 1000 ML: 0.9 INJECTION, SOLUTION INTRAVENOUS at 13:04

## 2020-11-23 ENCOUNTER — HOSPITAL ENCOUNTER (OUTPATIENT)
Dept: INFUSION CENTER | Facility: HOSPITAL | Age: 75
Discharge: HOME | End: 2020-11-23
Attending: INTERNAL MEDICINE
Payer: MEDICARE

## 2020-11-23 VITALS
HEART RATE: 80 BPM | RESPIRATION RATE: 16 BRPM | DIASTOLIC BLOOD PRESSURE: 73 MMHG | TEMPERATURE: 97.1 F | OXYGEN SATURATION: 98 % | SYSTOLIC BLOOD PRESSURE: 140 MMHG

## 2020-11-23 DIAGNOSIS — T45.1X5A CHEMOTHERAPY INDUCED NAUSEA AND VOMITING: ICD-10-CM

## 2020-11-23 DIAGNOSIS — R11.2 CHEMOTHERAPY INDUCED NAUSEA AND VOMITING: ICD-10-CM

## 2020-11-23 DIAGNOSIS — R52 PAIN: ICD-10-CM

## 2020-11-23 DIAGNOSIS — E86.0 DEHYDRATION: Primary | ICD-10-CM

## 2020-11-23 PROCEDURE — 96360 HYDRATION IV INFUSION INIT: CPT

## 2020-11-23 RX ADMIN — SODIUM CHLORIDE 1000 ML: 0.9 INJECTION, SOLUTION INTRAVENOUS at 14:21

## 2020-11-24 ENCOUNTER — HOSPITAL ENCOUNTER (OUTPATIENT)
Dept: INFUSION CENTER | Facility: HOSPITAL | Age: 75
Discharge: HOME | End: 2020-11-24
Attending: INTERNAL MEDICINE
Payer: MEDICARE

## 2020-11-24 VITALS
RESPIRATION RATE: 20 BRPM | HEART RATE: 78 BPM | SYSTOLIC BLOOD PRESSURE: 152 MMHG | DIASTOLIC BLOOD PRESSURE: 91 MMHG | OXYGEN SATURATION: 99 % | TEMPERATURE: 97.9 F

## 2020-11-24 DIAGNOSIS — R52 PAIN: Primary | ICD-10-CM

## 2020-11-24 DIAGNOSIS — R11.2 CHEMOTHERAPY INDUCED NAUSEA AND VOMITING: ICD-10-CM

## 2020-11-24 DIAGNOSIS — T45.1X5A CHEMOTHERAPY INDUCED NAUSEA AND VOMITING: ICD-10-CM

## 2020-11-24 DIAGNOSIS — E86.0 DEHYDRATION: ICD-10-CM

## 2020-11-24 PROCEDURE — 96360 HYDRATION IV INFUSION INIT: CPT

## 2020-11-24 RX ADMIN — SODIUM CHLORIDE 1000 ML: 0.9 INJECTION, SOLUTION INTRAVENOUS at 13:06

## 2020-11-25 ENCOUNTER — HOSPITAL ENCOUNTER (OUTPATIENT)
Dept: INFUSION CENTER | Facility: HOSPITAL | Age: 75
Discharge: HOME | End: 2020-11-25
Attending: INTERNAL MEDICINE
Payer: MEDICARE

## 2020-11-25 ENCOUNTER — OFFICE VISIT (OUTPATIENT)
Dept: FAMILY MEDICINE CLINIC | Facility: CLINIC | Age: 75
End: 2020-11-25
Payer: MEDICARE

## 2020-11-25 VITALS
HEART RATE: 68 BPM | TEMPERATURE: 96.4 F | DIASTOLIC BLOOD PRESSURE: 67 MMHG | RESPIRATION RATE: 16 BRPM | SYSTOLIC BLOOD PRESSURE: 137 MMHG

## 2020-11-25 VITALS
BODY MASS INDEX: 24.17 KG/M2 | HEART RATE: 93 BPM | SYSTOLIC BLOOD PRESSURE: 140 MMHG | HEIGHT: 63 IN | WEIGHT: 136.4 LBS | OXYGEN SATURATION: 99 % | DIASTOLIC BLOOD PRESSURE: 80 MMHG

## 2020-11-25 DIAGNOSIS — Z53.20 COLONOSCOPY REFUSED: ICD-10-CM

## 2020-11-25 DIAGNOSIS — I10 BENIGN ESSENTIAL HYPERTENSION: Primary | ICD-10-CM

## 2020-11-25 DIAGNOSIS — T45.1X5A CHEMOTHERAPY INDUCED NAUSEA AND VOMITING: ICD-10-CM

## 2020-11-25 DIAGNOSIS — M85.80 OSTEOPENIA, UNSPECIFIED LOCATION: ICD-10-CM

## 2020-11-25 DIAGNOSIS — E86.0 DEHYDRATION: Primary | ICD-10-CM

## 2020-11-25 DIAGNOSIS — Z17.0 MALIGNANT NEOPLASM OF OVERLAPPING SITES OF RIGHT BREAST IN FEMALE, ESTROGEN RECEPTOR POSITIVE (HCC): ICD-10-CM

## 2020-11-25 DIAGNOSIS — Z12.11 SCREENING FOR COLON CANCER: ICD-10-CM

## 2020-11-25 DIAGNOSIS — Z28.20 IMMUNIZATION NOT CARRIED OUT BECAUSE OF PATIENT DECISION: ICD-10-CM

## 2020-11-25 DIAGNOSIS — C50.811 MALIGNANT NEOPLASM OF OVERLAPPING SITES OF RIGHT BREAST IN FEMALE, ESTROGEN RECEPTOR POSITIVE (HCC): ICD-10-CM

## 2020-11-25 DIAGNOSIS — R11.2 CHEMOTHERAPY INDUCED NAUSEA AND VOMITING: ICD-10-CM

## 2020-11-25 DIAGNOSIS — N30.00 ACUTE CYSTITIS WITHOUT HEMATURIA: ICD-10-CM

## 2020-11-25 DIAGNOSIS — E55.9 VITAMIN D DEFICIENCY: ICD-10-CM

## 2020-11-25 DIAGNOSIS — C79.31 BRAIN METASTASIS (HCC): ICD-10-CM

## 2020-11-25 DIAGNOSIS — R52 PAIN: ICD-10-CM

## 2020-11-25 DIAGNOSIS — R79.89 HIGH SERUM LOW DENSITY LIPOPROTEIN (LDL) CHOLESTEROL: ICD-10-CM

## 2020-11-25 PROCEDURE — 96360 HYDRATION IV INFUSION INIT: CPT

## 2020-11-25 PROCEDURE — 99214 OFFICE O/P EST MOD 30 MIN: CPT | Performed by: FAMILY MEDICINE

## 2020-11-25 RX ADMIN — SODIUM CHLORIDE 1000 ML: 0.9 INJECTION, SOLUTION INTRAVENOUS at 13:00

## 2020-11-27 ENCOUNTER — HOSPITAL ENCOUNTER (OUTPATIENT)
Dept: INFUSION CENTER | Facility: HOSPITAL | Age: 75
Discharge: HOME | End: 2020-11-27
Attending: INTERNAL MEDICINE
Payer: MEDICARE

## 2020-11-27 VITALS
RESPIRATION RATE: 18 BRPM | TEMPERATURE: 96.5 F | SYSTOLIC BLOOD PRESSURE: 130 MMHG | DIASTOLIC BLOOD PRESSURE: 68 MMHG | HEART RATE: 71 BPM

## 2020-11-27 DIAGNOSIS — R52 PAIN: ICD-10-CM

## 2020-11-27 DIAGNOSIS — T45.1X5A CHEMOTHERAPY INDUCED NAUSEA AND VOMITING: ICD-10-CM

## 2020-11-27 DIAGNOSIS — R11.2 CHEMOTHERAPY INDUCED NAUSEA AND VOMITING: ICD-10-CM

## 2020-11-27 DIAGNOSIS — E86.0 DEHYDRATION: Primary | ICD-10-CM

## 2020-11-27 PROCEDURE — 96360 HYDRATION IV INFUSION INIT: CPT

## 2020-11-27 RX ADMIN — SODIUM CHLORIDE 1000 ML: 0.9 INJECTION, SOLUTION INTRAVENOUS at 13:00

## 2020-11-30 ENCOUNTER — HOSPITAL ENCOUNTER (OUTPATIENT)
Dept: NUCLEAR MEDICINE | Facility: HOSPITAL | Age: 75
Discharge: HOME | End: 2020-11-30
Payer: MEDICARE

## 2020-11-30 DIAGNOSIS — Z17.0 MALIGNANT NEOPLASM OF OVERLAPPING SITES OF RIGHT BREAST IN FEMALE, ESTROGEN RECEPTOR POSITIVE (HCC): ICD-10-CM

## 2020-11-30 DIAGNOSIS — C50.811 MALIGNANT NEOPLASM OF OVERLAPPING SITES OF RIGHT BREAST IN FEMALE, ESTROGEN RECEPTOR POSITIVE (HCC): ICD-10-CM

## 2020-11-30 DIAGNOSIS — C79.31 BRAIN METASTASIS (HCC): ICD-10-CM

## 2020-11-30 LAB — GLUCOSE SERPL-MCNC: 62 MG/DL (ref 65–140)

## 2020-11-30 PROCEDURE — 78815 PET IMAGE W/CT SKULL-THIGH: CPT

## 2020-11-30 PROCEDURE — 82948 REAGENT STRIP/BLOOD GLUCOSE: CPT

## 2020-11-30 PROCEDURE — A9552 F18 FDG: HCPCS

## 2020-11-30 PROCEDURE — G1004 CDSM NDSC: HCPCS

## 2020-12-01 ENCOUNTER — HOSPITAL ENCOUNTER (OUTPATIENT)
Dept: MRI IMAGING | Facility: HOSPITAL | Age: 75
Discharge: HOME | End: 2020-12-01
Payer: MEDICARE

## 2020-12-01 DIAGNOSIS — Z17.0 MALIGNANT NEOPLASM OF OVERLAPPING SITES OF RIGHT BREAST IN FEMALE, ESTROGEN RECEPTOR POSITIVE (HCC): ICD-10-CM

## 2020-12-01 DIAGNOSIS — C79.31 BRAIN METASTASIS (HCC): ICD-10-CM

## 2020-12-01 DIAGNOSIS — C50.811 MALIGNANT NEOPLASM OF OVERLAPPING SITES OF RIGHT BREAST IN FEMALE, ESTROGEN RECEPTOR POSITIVE (HCC): ICD-10-CM

## 2020-12-01 PROCEDURE — A9585 GADOBUTROL INJECTION: HCPCS | Performed by: RADIOLOGY

## 2020-12-01 PROCEDURE — 70553 MRI BRAIN STEM W/O & W/DYE: CPT

## 2020-12-01 PROCEDURE — G1004 CDSM NDSC: HCPCS

## 2020-12-01 RX ADMIN — GADOBUTROL 6 ML: 604.72 INJECTION INTRAVENOUS at 12:16

## 2020-12-02 ENCOUNTER — OFFICE VISIT (OUTPATIENT)
Dept: NEUROSURGERY | Facility: CLINIC | Age: 75
End: 2020-12-02
Payer: MEDICARE

## 2020-12-02 ENCOUNTER — APPOINTMENT (OUTPATIENT)
Dept: RADIATION ONCOLOGY | Facility: HOSPITAL | Age: 75
End: 2020-12-02
Attending: RADIOLOGY
Payer: MEDICARE

## 2020-12-02 VITALS
RESPIRATION RATE: 16 BRPM | HEART RATE: 70 BPM | BODY MASS INDEX: 24.24 KG/M2 | HEIGHT: 63 IN | TEMPERATURE: 97.5 F | DIASTOLIC BLOOD PRESSURE: 80 MMHG | WEIGHT: 136.8 LBS | SYSTOLIC BLOOD PRESSURE: 132 MMHG | HEART RATE: 70 BPM | HEIGHT: 63 IN | TEMPERATURE: 97.5 F | WEIGHT: 136.8 LBS | BODY MASS INDEX: 24.24 KG/M2 | SYSTOLIC BLOOD PRESSURE: 132 MMHG | DIASTOLIC BLOOD PRESSURE: 80 MMHG | RESPIRATION RATE: 16 BRPM

## 2020-12-02 DIAGNOSIS — C50.919 METASTATIC BREAST CANCER (HCC): ICD-10-CM

## 2020-12-02 DIAGNOSIS — C79.31 BRAIN METASTASIS (HCC): Primary | ICD-10-CM

## 2020-12-02 DIAGNOSIS — M43.16 SPONDYLOLISTHESIS OF LUMBAR REGION: ICD-10-CM

## 2020-12-02 DIAGNOSIS — M47.26 OTHER SPONDYLOSIS WITH RADICULOPATHY, LUMBAR REGION: ICD-10-CM

## 2020-12-02 DIAGNOSIS — G93.6 CEREBRAL EDEMA (HCC): ICD-10-CM

## 2020-12-02 DIAGNOSIS — M54.16 LUMBAR RADICULITIS: ICD-10-CM

## 2020-12-02 PROCEDURE — 99215 OFFICE O/P EST HI 40 MIN: CPT | Performed by: NEUROLOGICAL SURGERY

## 2020-12-02 PROCEDURE — 99211 OFF/OP EST MAY X REQ PHY/QHP: CPT | Performed by: RADIOLOGY

## 2020-12-03 ENCOUNTER — RADIATION THERAPY TREATMENT (OUTPATIENT)
Dept: RADIATION ONCOLOGY | Facility: HOSPITAL | Age: 75
End: 2020-12-03
Attending: RADIOLOGY
Payer: MEDICARE

## 2020-12-03 PROCEDURE — 77470 SPECIAL RADIATION TREATMENT: CPT | Performed by: RADIOLOGY

## 2020-12-03 PROCEDURE — 77334 RADIATION TREATMENT AID(S): CPT | Performed by: RADIOLOGY

## 2020-12-03 PROCEDURE — 77370 RADIATION PHYSICS CONSULT: CPT | Performed by: RADIOLOGY

## 2020-12-03 PROCEDURE — 77290 THER RAD SIMULAJ FIELD CPLX: CPT | Performed by: RADIOLOGY

## 2020-12-04 ENCOUNTER — TELEPHONE (OUTPATIENT)
Dept: RADIATION ONCOLOGY | Facility: HOSPITAL | Age: 75
End: 2020-12-04

## 2020-12-04 ENCOUNTER — TELEPHONE (OUTPATIENT)
Dept: NEUROSURGERY | Facility: CLINIC | Age: 75
End: 2020-12-04

## 2020-12-07 ENCOUNTER — HOSPITAL ENCOUNTER (OUTPATIENT)
Dept: INFUSION CENTER | Facility: HOSPITAL | Age: 75
Discharge: HOME | End: 2020-12-07
Attending: INTERNAL MEDICINE
Payer: MEDICARE

## 2020-12-07 VITALS
HEART RATE: 72 BPM | SYSTOLIC BLOOD PRESSURE: 131 MMHG | TEMPERATURE: 97.4 F | DIASTOLIC BLOOD PRESSURE: 60 MMHG | RESPIRATION RATE: 18 BRPM

## 2020-12-07 DIAGNOSIS — R52 PAIN: ICD-10-CM

## 2020-12-07 DIAGNOSIS — E86.0 DEHYDRATION: Primary | ICD-10-CM

## 2020-12-07 DIAGNOSIS — R11.2 CHEMOTHERAPY INDUCED NAUSEA AND VOMITING: ICD-10-CM

## 2020-12-07 DIAGNOSIS — T45.1X5A CHEMOTHERAPY INDUCED NAUSEA AND VOMITING: ICD-10-CM

## 2020-12-07 PROCEDURE — 96360 HYDRATION IV INFUSION INIT: CPT

## 2020-12-07 RX ADMIN — SODIUM CHLORIDE 1000 ML: 0.9 INJECTION, SOLUTION INTRAVENOUS at 11:12

## 2020-12-08 ENCOUNTER — HOSPITAL ENCOUNTER (OUTPATIENT)
Dept: INFUSION CENTER | Facility: HOSPITAL | Age: 75
Discharge: HOME | End: 2020-12-08
Attending: INTERNAL MEDICINE
Payer: MEDICARE

## 2020-12-08 VITALS
RESPIRATION RATE: 16 BRPM | TEMPERATURE: 97 F | DIASTOLIC BLOOD PRESSURE: 64 MMHG | SYSTOLIC BLOOD PRESSURE: 123 MMHG | OXYGEN SATURATION: 97 % | HEART RATE: 72 BPM

## 2020-12-08 DIAGNOSIS — T45.1X5A CHEMOTHERAPY INDUCED NAUSEA AND VOMITING: ICD-10-CM

## 2020-12-08 DIAGNOSIS — R52 PAIN: Primary | ICD-10-CM

## 2020-12-08 DIAGNOSIS — E86.0 DEHYDRATION: ICD-10-CM

## 2020-12-08 DIAGNOSIS — R11.2 CHEMOTHERAPY INDUCED NAUSEA AND VOMITING: ICD-10-CM

## 2020-12-08 PROCEDURE — 77370 RADIATION PHYSICS CONSULT: CPT | Performed by: RADIOLOGY

## 2020-12-08 PROCEDURE — 77334 RADIATION TREATMENT AID(S): CPT | Performed by: RADIOLOGY

## 2020-12-08 PROCEDURE — 77300 RADIATION THERAPY DOSE PLAN: CPT | Performed by: RADIOLOGY

## 2020-12-08 PROCEDURE — 77295 3-D RADIOTHERAPY PLAN: CPT | Performed by: RADIOLOGY

## 2020-12-08 PROCEDURE — 96360 HYDRATION IV INFUSION INIT: CPT

## 2020-12-08 RX ADMIN — SODIUM CHLORIDE 1000 ML: 0.9 INJECTION, SOLUTION INTRAVENOUS at 11:15

## 2020-12-09 ENCOUNTER — OFFICE VISIT (OUTPATIENT)
Dept: PAIN MEDICINE | Facility: MEDICAL CENTER | Age: 75
End: 2020-12-09
Payer: MEDICARE

## 2020-12-09 ENCOUNTER — APPOINTMENT (OUTPATIENT)
Dept: RADIATION ONCOLOGY | Facility: HOSPITAL | Age: 75
End: 2020-12-09
Attending: RADIOLOGY
Payer: MEDICARE

## 2020-12-09 ENCOUNTER — PROCEDURE VISIT (OUTPATIENT)
Dept: NEUROSURGERY | Facility: CLINIC | Age: 75
End: 2020-12-09
Payer: MEDICARE

## 2020-12-09 VITALS
SYSTOLIC BLOOD PRESSURE: 131 MMHG | DIASTOLIC BLOOD PRESSURE: 83 MMHG | WEIGHT: 135 LBS | HEIGHT: 62 IN | HEART RATE: 71 BPM | RESPIRATION RATE: 16 BRPM | TEMPERATURE: 98 F | BODY MASS INDEX: 24.84 KG/M2

## 2020-12-09 DIAGNOSIS — M48.061 BILATERAL STENOSIS OF LATERAL RECESS OF LUMBAR SPINE: ICD-10-CM

## 2020-12-09 DIAGNOSIS — C79.31 BRAIN METASTASIS (HCC): Primary | ICD-10-CM

## 2020-12-09 DIAGNOSIS — M43.16 SPONDYLOLISTHESIS OF LUMBAR REGION: ICD-10-CM

## 2020-12-09 DIAGNOSIS — M54.16 LUMBAR RADICULITIS: Primary | ICD-10-CM

## 2020-12-09 DIAGNOSIS — C50.919 METASTATIC BREAST CANCER (HCC): ICD-10-CM

## 2020-12-09 PROCEDURE — 77372 SRS LINEAR BASED: CPT | Performed by: RADIOLOGY

## 2020-12-09 PROCEDURE — 99214 OFFICE O/P EST MOD 30 MIN: CPT | Performed by: NURSE PRACTITIONER

## 2020-12-09 PROCEDURE — 77336 RADIATION PHYSICS CONSULT: CPT | Performed by: RADIOLOGY

## 2020-12-09 PROCEDURE — 61796 SRS CRANIAL LESION SIMPLE: CPT | Performed by: NEUROLOGICAL SURGERY

## 2020-12-09 PROCEDURE — 77280 THER RAD SIMULAJ FIELD SMPL: CPT | Performed by: RADIOLOGY

## 2020-12-09 RX ORDER — GABAPENTIN 300 MG/1
900 CAPSULE ORAL
Qty: 90 CAPSULE | Refills: 2 | Status: SHIPPED | OUTPATIENT
Start: 2020-12-09 | End: 2021-03-03 | Stop reason: SDUPTHER

## 2020-12-10 ENCOUNTER — HOSPITAL ENCOUNTER (OUTPATIENT)
Dept: INFUSION CENTER | Facility: HOSPITAL | Age: 75
End: 2020-12-10
Attending: INTERNAL MEDICINE

## 2020-12-10 ENCOUNTER — PATIENT OUTREACH (OUTPATIENT)
Dept: HEMATOLOGY ONCOLOGY | Facility: CLINIC | Age: 75
End: 2020-12-10

## 2020-12-10 ENCOUNTER — TELEPHONE (OUTPATIENT)
Dept: RADIATION ONCOLOGY | Facility: HOSPITAL | Age: 75
End: 2020-12-10

## 2020-12-10 ENCOUNTER — DOCUMENTATION (OUTPATIENT)
Dept: HEMATOLOGY ONCOLOGY | Facility: CLINIC | Age: 75
End: 2020-12-10

## 2020-12-11 ENCOUNTER — HOSPITAL ENCOUNTER (OUTPATIENT)
Dept: INFUSION CENTER | Facility: HOSPITAL | Age: 75
Discharge: HOME | End: 2020-12-11
Attending: INTERNAL MEDICINE
Payer: MEDICARE

## 2020-12-11 ENCOUNTER — LAB (OUTPATIENT)
Dept: LAB | Age: 75
End: 2020-12-11
Payer: MEDICARE

## 2020-12-11 VITALS
RESPIRATION RATE: 18 BRPM | HEART RATE: 69 BPM | SYSTOLIC BLOOD PRESSURE: 131 MMHG | TEMPERATURE: 97 F | DIASTOLIC BLOOD PRESSURE: 60 MMHG

## 2020-12-11 DIAGNOSIS — E34.9 INCREASED PTH LEVEL: ICD-10-CM

## 2020-12-11 DIAGNOSIS — I10 ESSENTIAL HYPERTENSION: ICD-10-CM

## 2020-12-11 DIAGNOSIS — N30.00 ACUTE CYSTITIS WITHOUT HEMATURIA: ICD-10-CM

## 2020-12-11 DIAGNOSIS — C50.811 MALIGNANT NEOPLASM OF OVERLAPPING SITES OF RIGHT BREAST IN FEMALE, ESTROGEN RECEPTOR POSITIVE (HCC): ICD-10-CM

## 2020-12-11 DIAGNOSIS — R11.2 CHEMOTHERAPY INDUCED NAUSEA AND VOMITING: ICD-10-CM

## 2020-12-11 DIAGNOSIS — R52 PAIN: ICD-10-CM

## 2020-12-11 DIAGNOSIS — R82.90 ABNORMAL URINALYSIS: ICD-10-CM

## 2020-12-11 DIAGNOSIS — E55.9 VITAMIN D DEFICIENCY: ICD-10-CM

## 2020-12-11 DIAGNOSIS — T45.1X5A CHEMOTHERAPY INDUCED NAUSEA AND VOMITING: ICD-10-CM

## 2020-12-11 DIAGNOSIS — R79.89 HIGH SERUM LOW DENSITY LIPOPROTEIN (LDL) CHOLESTEROL: ICD-10-CM

## 2020-12-11 DIAGNOSIS — F41.9 ANXIETY: ICD-10-CM

## 2020-12-11 DIAGNOSIS — E86.0 DEHYDRATION: Primary | ICD-10-CM

## 2020-12-11 DIAGNOSIS — Z17.0 MALIGNANT NEOPLASM OF OVERLAPPING SITES OF RIGHT BREAST IN FEMALE, ESTROGEN RECEPTOR POSITIVE (HCC): ICD-10-CM

## 2020-12-11 LAB
25(OH)D3 SERPL-MCNC: 30.1 NG/ML (ref 30–100)
ALBUMIN SERPL BCP-MCNC: 3 G/DL (ref 3.5–5)
ALP SERPL-CCNC: 47 U/L (ref 46–116)
ALT SERPL W P-5'-P-CCNC: 19 U/L (ref 12–78)
ANION GAP SERPL CALCULATED.3IONS-SCNC: 4 MMOL/L (ref 4–13)
AST SERPL W P-5'-P-CCNC: 17 U/L (ref 5–45)
BASOPHILS # BLD AUTO: 0.03 THOUSANDS/ΜL (ref 0–0.1)
BASOPHILS NFR BLD AUTO: 1 % (ref 0–1)
BILIRUB SERPL-MCNC: 0.66 MG/DL (ref 0.2–1)
BUN SERPL-MCNC: 16 MG/DL (ref 5–25)
CALCIUM ALBUM COR SERPL-MCNC: 9.8 MG/DL (ref 8.3–10.1)
CALCIUM SERPL-MCNC: 9 MG/DL (ref 8.3–10.1)
CHLORIDE SERPL-SCNC: 107 MMOL/L (ref 100–108)
CHOLEST SERPL-MCNC: 172 MG/DL (ref 50–200)
CO2 SERPL-SCNC: 28 MMOL/L (ref 21–32)
CREAT SERPL-MCNC: 0.98 MG/DL (ref 0.6–1.3)
EOSINOPHIL # BLD AUTO: 0.18 THOUSAND/ΜL (ref 0–0.61)
EOSINOPHIL NFR BLD AUTO: 4 % (ref 0–6)
ERYTHROCYTE [DISTWIDTH] IN BLOOD BY AUTOMATED COUNT: 14.6 % (ref 11.6–15.1)
GFR SERPL CREATININE-BSD FRML MDRD: 57 ML/MIN/1.73SQ M
GLUCOSE P FAST SERPL-MCNC: 74 MG/DL (ref 65–99)
HCT VFR BLD AUTO: 37.6 % (ref 34.8–46.1)
HDLC SERPL-MCNC: 36 MG/DL
HGB BLD-MCNC: 11.8 G/DL (ref 11.5–15.4)
IMM GRANULOCYTES # BLD AUTO: 0.04 THOUSAND/UL (ref 0–0.2)
IMM GRANULOCYTES NFR BLD AUTO: 1 % (ref 0–2)
LDLC SERPL CALC-MCNC: 102 MG/DL (ref 0–100)
LYMPHOCYTES # BLD AUTO: 1.03 THOUSANDS/ΜL (ref 0.6–4.47)
LYMPHOCYTES NFR BLD AUTO: 21 % (ref 14–44)
MAGNESIUM SERPL-MCNC: 1.9 MG/DL (ref 1.6–2.6)
MCH RBC QN AUTO: 30.6 PG (ref 26.8–34.3)
MCHC RBC AUTO-ENTMCNC: 31.4 G/DL (ref 31.4–37.4)
MCV RBC AUTO: 98 FL (ref 82–98)
MONOCYTES # BLD AUTO: 0.43 THOUSAND/ΜL (ref 0.17–1.22)
MONOCYTES NFR BLD AUTO: 9 % (ref 4–12)
NEUTROPHILS # BLD AUTO: 3.1 THOUSANDS/ΜL (ref 1.85–7.62)
NEUTS SEG NFR BLD AUTO: 64 % (ref 43–75)
NRBC BLD AUTO-RTO: 0 /100 WBCS
PLATELET # BLD AUTO: 278 THOUSANDS/UL (ref 149–390)
PMV BLD AUTO: 9.5 FL (ref 8.9–12.7)
POTASSIUM SERPL-SCNC: 4.4 MMOL/L (ref 3.5–5.3)
PROT SERPL-MCNC: 7.1 G/DL (ref 6.4–8.2)
PTH-INTACT SERPL-MCNC: 84.1 PG/ML (ref 18.4–80.1)
RBC # BLD AUTO: 3.85 MILLION/UL (ref 3.81–5.12)
SODIUM SERPL-SCNC: 139 MMOL/L (ref 136–145)
T4 FREE SERPL-MCNC: 1.04 NG/DL (ref 0.76–1.46)
TRIGL SERPL-MCNC: 170 MG/DL
TSH SERPL DL<=0.05 MIU/L-ACNC: 4.41 UIU/ML (ref 0.36–3.74)
WBC # BLD AUTO: 4.81 THOUSAND/UL (ref 4.31–10.16)

## 2020-12-11 PROCEDURE — 85025 COMPLETE CBC W/AUTO DIFF WBC: CPT

## 2020-12-11 PROCEDURE — 80061 LIPID PANEL: CPT

## 2020-12-11 PROCEDURE — 96360 HYDRATION IV INFUSION INIT: CPT

## 2020-12-11 PROCEDURE — 84439 ASSAY OF FREE THYROXINE: CPT

## 2020-12-11 PROCEDURE — 84443 ASSAY THYROID STIM HORMONE: CPT

## 2020-12-11 PROCEDURE — 36415 COLL VENOUS BLD VENIPUNCTURE: CPT

## 2020-12-11 PROCEDURE — 80053 COMPREHEN METABOLIC PANEL: CPT

## 2020-12-11 PROCEDURE — 82306 VITAMIN D 25 HYDROXY: CPT

## 2020-12-11 PROCEDURE — 83970 ASSAY OF PARATHORMONE: CPT

## 2020-12-11 PROCEDURE — 83735 ASSAY OF MAGNESIUM: CPT

## 2020-12-11 RX ORDER — ACETAMINOPHEN 325 MG/1
650 TABLET ORAL ONCE
Status: COMPLETED | OUTPATIENT
Start: 2020-12-11 | End: 2020-12-11

## 2020-12-11 RX ORDER — ACETAMINOPHEN 325 MG/1
650 TABLET ORAL ONCE
Status: CANCELLED
Start: 2020-12-12

## 2020-12-11 RX ORDER — ACETAMINOPHEN 325 MG/1
650 TABLET ORAL ONCE
Status: CANCELLED
Start: 2020-12-11

## 2020-12-11 RX ADMIN — ACETAMINOPHEN 650 MG: 325 TABLET ORAL at 11:41

## 2020-12-11 RX ADMIN — SODIUM CHLORIDE 1000 ML: 0.9 INJECTION, SOLUTION INTRAVENOUS at 11:22

## 2020-12-14 ENCOUNTER — OFFICE VISIT (OUTPATIENT)
Dept: HEMATOLOGY ONCOLOGY | Facility: CLINIC | Age: 75
End: 2020-12-14
Payer: MEDICARE

## 2020-12-14 VITALS
HEIGHT: 62 IN | OXYGEN SATURATION: 99 % | HEART RATE: 75 BPM | WEIGHT: 137.6 LBS | TEMPERATURE: 97.6 F | BODY MASS INDEX: 25.32 KG/M2 | RESPIRATION RATE: 16 BRPM | DIASTOLIC BLOOD PRESSURE: 86 MMHG | SYSTOLIC BLOOD PRESSURE: 152 MMHG

## 2020-12-14 DIAGNOSIS — D51.3 OTHER DIETARY VITAMIN B12 DEFICIENCY ANEMIA: ICD-10-CM

## 2020-12-14 DIAGNOSIS — C50.919 METASTATIC BREAST CANCER (HCC): Primary | ICD-10-CM

## 2020-12-14 DIAGNOSIS — E53.8 VITAMIN B12 DEFICIENCY: ICD-10-CM

## 2020-12-14 DIAGNOSIS — C79.31 BRAIN METASTASIS (HCC): ICD-10-CM

## 2020-12-14 PROCEDURE — 99214 OFFICE O/P EST MOD 30 MIN: CPT | Performed by: INTERNAL MEDICINE

## 2020-12-15 ENCOUNTER — HOSPITAL ENCOUNTER (OUTPATIENT)
Dept: INFUSION CENTER | Facility: HOSPITAL | Age: 75
Discharge: HOME | End: 2020-12-15
Attending: INTERNAL MEDICINE
Payer: MEDICARE

## 2020-12-15 VITALS
SYSTOLIC BLOOD PRESSURE: 119 MMHG | HEART RATE: 67 BPM | TEMPERATURE: 97.4 F | DIASTOLIC BLOOD PRESSURE: 57 MMHG | RESPIRATION RATE: 18 BRPM

## 2020-12-15 DIAGNOSIS — R52 PAIN: ICD-10-CM

## 2020-12-15 DIAGNOSIS — E86.0 DEHYDRATION: Primary | ICD-10-CM

## 2020-12-15 DIAGNOSIS — T45.1X5A CHEMOTHERAPY INDUCED NAUSEA AND VOMITING: ICD-10-CM

## 2020-12-15 DIAGNOSIS — E53.8 VITAMIN B 12 DEFICIENCY: Primary | ICD-10-CM

## 2020-12-15 DIAGNOSIS — R11.2 CHEMOTHERAPY INDUCED NAUSEA AND VOMITING: ICD-10-CM

## 2020-12-15 PROCEDURE — 96360 HYDRATION IV INFUSION INIT: CPT

## 2020-12-15 RX ORDER — CYANOCOBALAMIN 1000 UG/ML
1000 INJECTION INTRAMUSCULAR; SUBCUTANEOUS ONCE
Status: CANCELLED | OUTPATIENT
Start: 2020-12-18

## 2020-12-15 RX ADMIN — SODIUM CHLORIDE 1000 ML: 0.9 INJECTION, SOLUTION INTRAVENOUS at 11:30

## 2020-12-16 ENCOUNTER — TELEPHONE (OUTPATIENT)
Dept: RADIATION ONCOLOGY | Facility: HOSPITAL | Age: 75
End: 2020-12-16

## 2020-12-16 ENCOUNTER — HOSPITAL ENCOUNTER (OUTPATIENT)
Dept: INFUSION CENTER | Facility: HOSPITAL | Age: 75
End: 2020-12-16
Attending: INTERNAL MEDICINE

## 2020-12-18 ENCOUNTER — HOSPITAL ENCOUNTER (OUTPATIENT)
Dept: INFUSION CENTER | Facility: HOSPITAL | Age: 75
Discharge: HOME | End: 2020-12-18
Attending: INTERNAL MEDICINE

## 2020-12-21 ENCOUNTER — HOSPITAL ENCOUNTER (OUTPATIENT)
Dept: INFUSION CENTER | Facility: HOSPITAL | Age: 75
Discharge: HOME | End: 2020-12-21
Attending: INTERNAL MEDICINE
Payer: MEDICARE

## 2020-12-21 VITALS
HEART RATE: 64 BPM | RESPIRATION RATE: 20 BRPM | SYSTOLIC BLOOD PRESSURE: 127 MMHG | DIASTOLIC BLOOD PRESSURE: 62 MMHG | TEMPERATURE: 97 F

## 2020-12-21 DIAGNOSIS — E53.8 VITAMIN B 12 DEFICIENCY: ICD-10-CM

## 2020-12-21 DIAGNOSIS — R52 PAIN: ICD-10-CM

## 2020-12-21 DIAGNOSIS — E86.0 DEHYDRATION: Primary | ICD-10-CM

## 2020-12-21 DIAGNOSIS — R11.2 CHEMOTHERAPY INDUCED NAUSEA AND VOMITING: ICD-10-CM

## 2020-12-21 DIAGNOSIS — T45.1X5A CHEMOTHERAPY INDUCED NAUSEA AND VOMITING: ICD-10-CM

## 2020-12-21 PROCEDURE — 96360 HYDRATION IV INFUSION INIT: CPT

## 2020-12-21 PROCEDURE — 96372 THER/PROPH/DIAG INJ SC/IM: CPT

## 2020-12-21 RX ORDER — CYANOCOBALAMIN 1000 UG/ML
1000 INJECTION INTRAMUSCULAR; SUBCUTANEOUS ONCE
Status: CANCELLED | OUTPATIENT
Start: 2021-01-15

## 2020-12-21 RX ORDER — CYANOCOBALAMIN 1000 UG/ML
1000 INJECTION INTRAMUSCULAR; SUBCUTANEOUS ONCE
Status: COMPLETED | OUTPATIENT
Start: 2020-12-21 | End: 2020-12-21

## 2020-12-21 RX ADMIN — SODIUM CHLORIDE 1000 ML: 0.9 INJECTION, SOLUTION INTRAVENOUS at 14:20

## 2020-12-21 RX ADMIN — CYANOCOBALAMIN 1000 MCG: 1000 INJECTION INTRAMUSCULAR; SUBCUTANEOUS at 14:20

## 2020-12-22 ENCOUNTER — HOSPITAL ENCOUNTER (OUTPATIENT)
Dept: INFUSION CENTER | Facility: HOSPITAL | Age: 75
Discharge: HOME | End: 2020-12-22
Attending: INTERNAL MEDICINE
Payer: MEDICARE

## 2020-12-22 VITALS
RESPIRATION RATE: 18 BRPM | SYSTOLIC BLOOD PRESSURE: 146 MMHG | TEMPERATURE: 97.7 F | HEART RATE: 76 BPM | DIASTOLIC BLOOD PRESSURE: 90 MMHG

## 2020-12-22 DIAGNOSIS — R11.2 CHEMOTHERAPY INDUCED NAUSEA AND VOMITING: ICD-10-CM

## 2020-12-22 DIAGNOSIS — T45.1X5A CHEMOTHERAPY INDUCED NAUSEA AND VOMITING: ICD-10-CM

## 2020-12-22 DIAGNOSIS — E86.0 DEHYDRATION: Primary | ICD-10-CM

## 2020-12-22 DIAGNOSIS — R52 PAIN: ICD-10-CM

## 2020-12-22 PROCEDURE — 96360 HYDRATION IV INFUSION INIT: CPT

## 2020-12-22 RX ADMIN — SODIUM CHLORIDE 1000 ML: 0.9 INJECTION, SOLUTION INTRAVENOUS at 14:40

## 2020-12-23 ENCOUNTER — HOSPITAL ENCOUNTER (OUTPATIENT)
Dept: INFUSION CENTER | Facility: HOSPITAL | Age: 75
End: 2020-12-23
Attending: INTERNAL MEDICINE

## 2020-12-24 ENCOUNTER — HOSPITAL ENCOUNTER (OUTPATIENT)
Dept: INFUSION CENTER | Facility: HOSPITAL | Age: 75
End: 2020-12-24
Attending: INTERNAL MEDICINE

## 2020-12-28 ENCOUNTER — HOSPITAL ENCOUNTER (OUTPATIENT)
Dept: INFUSION CENTER | Facility: HOSPITAL | Age: 75
Discharge: HOME | End: 2020-12-28
Attending: INTERNAL MEDICINE
Payer: MEDICARE

## 2020-12-28 DIAGNOSIS — R52 PAIN: ICD-10-CM

## 2020-12-28 DIAGNOSIS — R11.2 CHEMOTHERAPY INDUCED NAUSEA AND VOMITING: ICD-10-CM

## 2020-12-28 DIAGNOSIS — T45.1X5A CHEMOTHERAPY INDUCED NAUSEA AND VOMITING: ICD-10-CM

## 2020-12-28 DIAGNOSIS — E86.0 DEHYDRATION: Primary | ICD-10-CM

## 2020-12-28 PROCEDURE — 96360 HYDRATION IV INFUSION INIT: CPT

## 2020-12-28 RX ADMIN — SODIUM CHLORIDE 1000 ML: 0.9 INJECTION, SOLUTION INTRAVENOUS at 14:03

## 2020-12-28 NOTE — PLAN OF CARE
Problem: Potential for Falls  Goal: Patient will remain free of falls  Description: INTERVENTIONS:  - Assess patient frequently for physical needs  -  Identify cognitive and physical deficits and behaviors that affect risk of falls    -  [place] fall precautions as indicated by assessment   - Educate patient/family on patient safety including physical limitations  - Instruct patient to call for assistance with activity based on assessment  - Modify environment to reduce risk of injury  - Consider OT/PT consult to assist with strengthening/mobility  Outcome: Progressing     Problem: METABOLIC, FLUID AND ELECTROLYTES - ADULT  Goal: Fluid balance maintained  Description: INTERVENTIONS:  - Monitor labs   - Monitor I/O and WT  - Instruct patient on fluid and nutrition as appropriate  - Assess for signs & symptoms of volume excess or deficit  Outcome: Progressing

## 2020-12-29 ENCOUNTER — TELEPHONE (OUTPATIENT)
Dept: HEMATOLOGY ONCOLOGY | Facility: CLINIC | Age: 75
End: 2020-12-29

## 2020-12-29 ENCOUNTER — HOSPITAL ENCOUNTER (OUTPATIENT)
Dept: INFUSION CENTER | Facility: HOSPITAL | Age: 75
Discharge: HOME | End: 2020-12-29
Attending: INTERNAL MEDICINE
Payer: MEDICARE

## 2020-12-29 VITALS
SYSTOLIC BLOOD PRESSURE: 112 MMHG | TEMPERATURE: 97.9 F | RESPIRATION RATE: 18 BRPM | HEART RATE: 65 BPM | DIASTOLIC BLOOD PRESSURE: 56 MMHG

## 2020-12-29 DIAGNOSIS — R11.2 CHEMOTHERAPY INDUCED NAUSEA AND VOMITING: ICD-10-CM

## 2020-12-29 DIAGNOSIS — R52 PAIN: Primary | ICD-10-CM

## 2020-12-29 DIAGNOSIS — T45.1X5A CHEMOTHERAPY INDUCED NAUSEA AND VOMITING: ICD-10-CM

## 2020-12-29 DIAGNOSIS — Z17.0 MALIGNANT NEOPLASM OF OVERLAPPING SITES OF RIGHT BREAST IN FEMALE, ESTROGEN RECEPTOR POSITIVE (HCC): ICD-10-CM

## 2020-12-29 DIAGNOSIS — E86.0 DEHYDRATION: ICD-10-CM

## 2020-12-29 DIAGNOSIS — Z79.811 USE OF AROMATASE INHIBITORS: Primary | ICD-10-CM

## 2020-12-29 DIAGNOSIS — C50.811 MALIGNANT NEOPLASM OF OVERLAPPING SITES OF RIGHT BREAST IN FEMALE, ESTROGEN RECEPTOR POSITIVE (HCC): ICD-10-CM

## 2020-12-29 PROCEDURE — 96360 HYDRATION IV INFUSION INIT: CPT

## 2020-12-29 RX ADMIN — SODIUM CHLORIDE 1000 ML: 0.9 INJECTION, SOLUTION INTRAVENOUS at 13:45

## 2020-12-29 NOTE — PROGRESS NOTES
Pt here for daily hydration  Reports that she had a tooth extracted last week  Notified Lonny Veras RN at [facility] office due to [facility] being due on 1/14  Tx plan redated until March to allow for 60 days in between tooth extraction and Prolia

## 2020-12-29 NOTE — PLAN OF CARE
Problem: Potential for Falls  Goal: Patient will remain free of falls  Description: INTERVENTIONS:  - Assess patient frequently for physical needs  -  Identify cognitive and physical deficits and behaviors that affect risk of falls  -  [place] fall precautions as indicated by assessment   - Educate patient/family on patient safety including physical limitations  - Instruct patient to call for assistance with activity based on assessment  - Modify environment to reduce risk of injury  - Consider OT/PT consult to assist with strengthening/mobility  Outcome: Progressing     Problem: Knowledge Deficit  Goal: Patient/family/caregiver demonstrates understanding of disease process, treatment plan, medications, and discharge instructions  Description: Complete learning assessment and assess knowledge base    Interventions:  - Provide teaching at level of understanding  - Provide teaching via preferred learning methods  Outcome: Progressing

## 2020-12-29 NOTE — PROGRESS NOTES
Pt tolerated IV hydration without difficulty  Port flushed and left accessed for tomorrow's tx  Next appt confirmed and AVS declined  Escorted to lobby in w/c for [facility] transport

## 2020-12-30 ENCOUNTER — HOSPITAL ENCOUNTER (OUTPATIENT)
Dept: INFUSION CENTER | Facility: HOSPITAL | Age: 75
Discharge: HOME | End: 2020-12-30
Attending: INTERNAL MEDICINE
Payer: MEDICARE

## 2020-12-30 VITALS
HEART RATE: 76 BPM | TEMPERATURE: 97.7 F | DIASTOLIC BLOOD PRESSURE: 78 MMHG | SYSTOLIC BLOOD PRESSURE: 144 MMHG | RESPIRATION RATE: 18 BRPM

## 2020-12-30 DIAGNOSIS — T45.1X5A CHEMOTHERAPY INDUCED NAUSEA AND VOMITING: ICD-10-CM

## 2020-12-30 DIAGNOSIS — R11.2 CHEMOTHERAPY INDUCED NAUSEA AND VOMITING: ICD-10-CM

## 2020-12-30 DIAGNOSIS — E86.0 DEHYDRATION: ICD-10-CM

## 2020-12-30 DIAGNOSIS — R52 PAIN: Primary | ICD-10-CM

## 2020-12-30 PROCEDURE — 96360 HYDRATION IV INFUSION INIT: CPT

## 2020-12-30 RX ADMIN — SODIUM CHLORIDE 1000 ML: 0.9 INJECTION, SOLUTION INTRAVENOUS at 13:59

## 2020-12-30 NOTE — PROGRESS NOTES
Pt tolerated hydration without complication  Pt port de-accessed,  requested to be canceled tomorrow  Patient's appts next 2 weeks made and [facility] notified  AVS provided

## 2020-12-30 NOTE — PLAN OF CARE
Problem: Potential for Falls  Goal: Patient will remain free of falls  Description: INTERVENTIONS:  - Assess patient frequently for physical needs  -  Identify cognitive and physical deficits and behaviors that affect risk of falls    -  [place] fall precautions as indicated by assessment   - Educate patient/family on patient safety including physical limitations  - Instruct patient to call for assistance with activity based on assessment  - Modify environment to reduce risk of injury  - Consider OT/PT consult to assist with strengthening/mobility  Outcome: Progressing

## 2021-01-04 ENCOUNTER — TELEPHONE (OUTPATIENT)
Dept: FAMILY MEDICINE CLINIC | Facility: CLINIC | Age: 76
End: 2021-01-04

## 2021-01-04 ENCOUNTER — HOSPITAL ENCOUNTER (OUTPATIENT)
Dept: INFUSION CENTER | Facility: HOSPITAL | Age: 76
Discharge: HOME | End: 2021-01-04
Attending: INTERNAL MEDICINE
Payer: MEDICARE

## 2021-01-04 DIAGNOSIS — E86.0 DEHYDRATION: Primary | ICD-10-CM

## 2021-01-04 DIAGNOSIS — R52 PAIN: ICD-10-CM

## 2021-01-04 DIAGNOSIS — T45.1X5A CHEMOTHERAPY INDUCED NAUSEA AND VOMITING: ICD-10-CM

## 2021-01-04 DIAGNOSIS — R11.2 CHEMOTHERAPY INDUCED NAUSEA AND VOMITING: ICD-10-CM

## 2021-01-04 PROCEDURE — 96361 HYDRATE IV INFUSION ADD-ON: CPT

## 2021-01-04 PROCEDURE — 96374 THER/PROPH/DIAG INJ IV PUSH: CPT

## 2021-01-04 RX ORDER — ONDANSETRON 4 MG/1
TABLET, FILM COATED ORAL EVERY 12 HOURS
COMMUNITY
End: 2022-03-08 | Stop reason: HOSPADM

## 2021-01-04 RX ORDER — IBUPROFEN 800 MG/1
800 TABLET ORAL EVERY 8 HOURS PRN
COMMUNITY
Start: 2020-12-23 | End: 2021-12-23

## 2021-01-04 RX ADMIN — ONDANSETRON HYDROCHLORIDE 8 MG: 2 INJECTION, SOLUTION INTRAMUSCULAR; INTRAVENOUS at 13:38

## 2021-01-04 RX ADMIN — SODIUM CHLORIDE 1000 ML: 0.9 INJECTION, SOLUTION INTRAVENOUS at 13:21

## 2021-01-04 NOTE — PROGRESS NOTES
Pt here for hydration today  Requesting zofran, pt states her soup she had today did not agree with her  Patient tolerated treatment well today and felt better at the end  Patient port remained accessed for appt back tomorrow which was confirmed

## 2021-01-04 NOTE — PLAN OF CARE
Problem: Potential for Falls  Goal: Patient will remain free of falls  Description: INTERVENTIONS:  - Assess patient frequently for physical needs  -  Identify cognitive and physical deficits and behaviors that affect risk of falls    -  [place] fall precautions as indicated by assessment   - Educate patient/family on patient safety including physical limitations  - Instruct patient to call for assistance with activity based on assessment  - Modify environment to reduce risk of injury  - Consider OT/PT consult to assist with strengthening/mobility  Outcome: Progressing

## 2021-01-05 ENCOUNTER — HOSPITAL ENCOUNTER (OUTPATIENT)
Dept: INFUSION CENTER | Facility: HOSPITAL | Age: 76
Discharge: HOME | End: 2021-01-05
Attending: INTERNAL MEDICINE
Payer: MEDICARE

## 2021-01-05 VITALS
TEMPERATURE: 97.7 F | RESPIRATION RATE: 18 BRPM | HEART RATE: 69 BPM | DIASTOLIC BLOOD PRESSURE: 70 MMHG | SYSTOLIC BLOOD PRESSURE: 151 MMHG

## 2021-01-05 DIAGNOSIS — E86.0 DEHYDRATION: Primary | ICD-10-CM

## 2021-01-05 DIAGNOSIS — R11.2 CHEMOTHERAPY INDUCED NAUSEA AND VOMITING: ICD-10-CM

## 2021-01-05 DIAGNOSIS — R52 PAIN: ICD-10-CM

## 2021-01-05 DIAGNOSIS — T45.1X5A CHEMOTHERAPY INDUCED NAUSEA AND VOMITING: ICD-10-CM

## 2021-01-05 PROCEDURE — 96361 HYDRATE IV INFUSION ADD-ON: CPT

## 2021-01-05 PROCEDURE — 96365 THER/PROPH/DIAG IV INF INIT: CPT

## 2021-01-05 RX ADMIN — SODIUM CHLORIDE 1000 ML: 0.9 INJECTION, SOLUTION INTRAVENOUS at 13:01

## 2021-01-05 RX ADMIN — ONDANSETRON HYDROCHLORIDE 8 MG: 2 INJECTION, SOLUTION INTRAMUSCULAR; INTRAVENOUS at 13:33

## 2021-01-05 NOTE — PLAN OF CARE
Problem: Potential for Falls  Goal: Patient will remain free of falls  Description: INTERVENTIONS:  - Assess patient frequently for physical needs  -  Identify cognitive and physical deficits and behaviors that affect risk of falls    -  [place] fall precautions as indicated by assessment   - Educate patient/family on patient safety including physical limitations  - Instruct patient to call for assistance with activity based on assessment  - Modify environment to reduce risk of injury  - Consider OT/PT consult to assist with strengthening/mobility  Outcome: Progressing

## 2021-01-05 NOTE — PROGRESS NOTES
Pt tolerated hydration today and requested zofran for nausea and was given as ordered  AVS provided  Left unit via w/c escorted by staff to the lobby

## 2021-01-06 ENCOUNTER — HOSPITAL ENCOUNTER (OUTPATIENT)
Dept: INFUSION CENTER | Facility: HOSPITAL | Age: 76
Discharge: HOME | End: 2021-01-06
Attending: INTERNAL MEDICINE
Payer: MEDICARE

## 2021-01-06 VITALS
DIASTOLIC BLOOD PRESSURE: 70 MMHG | RESPIRATION RATE: 18 BRPM | SYSTOLIC BLOOD PRESSURE: 141 MMHG | TEMPERATURE: 97.9 F | HEART RATE: 71 BPM

## 2021-01-06 DIAGNOSIS — T45.1X5A CHEMOTHERAPY INDUCED NAUSEA AND VOMITING: ICD-10-CM

## 2021-01-06 DIAGNOSIS — C50.919 METASTATIC BREAST CANCER (HCC): ICD-10-CM

## 2021-01-06 DIAGNOSIS — E86.0 DEHYDRATION: Primary | ICD-10-CM

## 2021-01-06 DIAGNOSIS — R52 PAIN: ICD-10-CM

## 2021-01-06 DIAGNOSIS — R11.2 CHEMOTHERAPY INDUCED NAUSEA AND VOMITING: ICD-10-CM

## 2021-01-06 DIAGNOSIS — D51.3 OTHER DIETARY VITAMIN B12 DEFICIENCY ANEMIA: ICD-10-CM

## 2021-01-06 LAB
ALBUMIN SERPL BCP-MCNC: 3.6 G/DL (ref 3–5.2)
ALP SERPL-CCNC: 47 U/L (ref 43–122)
ALT SERPL W P-5'-P-CCNC: 33 U/L (ref 9–52)
ANION GAP SERPL CALCULATED.3IONS-SCNC: 6 MMOL/L (ref 5–14)
AST SERPL W P-5'-P-CCNC: 41 U/L (ref 14–36)
BASOPHILS # BLD AUTO: 0 THOUSANDS/ΜL (ref 0–0.1)
BASOPHILS NFR BLD AUTO: 0 % (ref 0–1)
BILIRUB SERPL-MCNC: 0.5 MG/DL
BUN SERPL-MCNC: 16 MG/DL (ref 5–25)
CALCIUM SERPL-MCNC: 9.2 MG/DL (ref 8.4–10.2)
CANCER AG27-29 SERPL-ACNC: 31.9 U/ML (ref 0–42.3)
CHLORIDE SERPL-SCNC: 105 MMOL/L (ref 97–108)
CO2 SERPL-SCNC: 29 MMOL/L (ref 22–30)
CREAT SERPL-MCNC: 0.83 MG/DL (ref 0.6–1.2)
EOSINOPHIL # BLD AUTO: 0.1 THOUSAND/ΜL (ref 0–0.4)
EOSINOPHIL NFR BLD AUTO: 2 % (ref 0–6)
ERYTHROCYTE [DISTWIDTH] IN BLOOD BY AUTOMATED COUNT: 15.5 %
GFR SERPL CREATININE-BSD FRML MDRD: 70 ML/MIN/1.73SQ M
GLUCOSE SERPL-MCNC: 91 MG/DL (ref 70–99)
HCT VFR BLD AUTO: 35 % (ref 36–46)
HGB BLD-MCNC: 11.4 G/DL (ref 12–16)
LYMPHOCYTES # BLD AUTO: 1.1 THOUSANDS/ΜL (ref 0.5–4)
LYMPHOCYTES NFR BLD AUTO: 19 % (ref 25–45)
MCH RBC QN AUTO: 30.8 PG (ref 26–34)
MCHC RBC AUTO-ENTMCNC: 32.7 G/DL (ref 31–36)
MCV RBC AUTO: 94 FL (ref 80–100)
MONOCYTES # BLD AUTO: 0.4 THOUSAND/ΜL (ref 0.2–0.9)
MONOCYTES NFR BLD AUTO: 7 % (ref 1–10)
NEUTROPHILS # BLD AUTO: 4.3 THOUSANDS/ΜL (ref 1.8–7.8)
NEUTS SEG NFR BLD AUTO: 72 % (ref 45–65)
PLATELET # BLD AUTO: 268 THOUSANDS/UL (ref 150–450)
PMV BLD AUTO: 7.8 FL (ref 8.9–12.7)
POTASSIUM SERPL-SCNC: 4.1 MMOL/L (ref 3.6–5)
PROT SERPL-MCNC: 7.1 G/DL (ref 5.9–8.4)
RBC # BLD AUTO: 3.7 MILLION/UL (ref 4–5.2)
SODIUM SERPL-SCNC: 140 MMOL/L (ref 137–147)
VIT B12 SERPL-MCNC: 552 PG/ML (ref 100–900)
WBC # BLD AUTO: 5.9 THOUSAND/UL (ref 4.5–11)

## 2021-01-06 PROCEDURE — 82607 VITAMIN B-12: CPT

## 2021-01-06 PROCEDURE — 85025 COMPLETE CBC W/AUTO DIFF WBC: CPT

## 2021-01-06 PROCEDURE — 96374 THER/PROPH/DIAG INJ IV PUSH: CPT

## 2021-01-06 PROCEDURE — 86300 IMMUNOASSAY TUMOR CA 15-3: CPT

## 2021-01-06 PROCEDURE — 96361 HYDRATE IV INFUSION ADD-ON: CPT

## 2021-01-06 PROCEDURE — 80053 COMPREHEN METABOLIC PANEL: CPT

## 2021-01-06 RX ADMIN — ONDANSETRON HYDROCHLORIDE 8 MG: 2 INJECTION, SOLUTION INTRAMUSCULAR; INTRAVENOUS at 12:32

## 2021-01-06 RX ADMIN — SODIUM CHLORIDE 1000 ML: 0.9 INJECTION, SOLUTION INTRAVENOUS at 12:22

## 2021-01-06 NOTE — PROGRESS NOTES
Pt tolerated hydration and prn zofran today well without complications  Central labs also drawn for her visit with habib 1-11-21  Patient states she has been having nausea and stomach pain for a couple days now after taking her [employer] which she has been on for several months now  Lonny Veras RN notified   Confirmed appt back tomorrow

## 2021-01-06 NOTE — PLAN OF CARE
Problem: Potential for Falls  Goal: Patient will remain free of falls  Description: INTERVENTIONS:  - Assess patient frequently for physical needs  -  Identify cognitive and physical deficits and behaviors that affect risk of falls    -  [place] fall precautions as indicated by assessment   - Educate patient/family on patient safety including physical limitations  - Instruct patient to call for assistance with activity based on assessment  - Modify environment to reduce risk of injury  - Consider OT/PT consult to assist with strengthening/mobility  Outcome: Progressing

## 2021-01-07 ENCOUNTER — HOSPITAL ENCOUNTER (OUTPATIENT)
Dept: INFUSION CENTER | Facility: HOSPITAL | Age: 76
Discharge: HOME | End: 2021-01-07
Attending: INTERNAL MEDICINE
Payer: MEDICARE

## 2021-01-07 DIAGNOSIS — R52 PAIN: ICD-10-CM

## 2021-01-07 DIAGNOSIS — R11.2 CHEMOTHERAPY INDUCED NAUSEA AND VOMITING: ICD-10-CM

## 2021-01-07 DIAGNOSIS — T45.1X5A CHEMOTHERAPY INDUCED NAUSEA AND VOMITING: ICD-10-CM

## 2021-01-07 DIAGNOSIS — E86.0 DEHYDRATION: Primary | ICD-10-CM

## 2021-01-07 LAB — CANCER AG15-3 SERPL-ACNC: 25.5 U/ML (ref 0–25)

## 2021-01-07 PROCEDURE — 96361 HYDRATE IV INFUSION ADD-ON: CPT

## 2021-01-07 PROCEDURE — 96365 THER/PROPH/DIAG IV INF INIT: CPT

## 2021-01-07 RX ADMIN — ONDANSETRON HYDROCHLORIDE 8 MG: 2 INJECTION, SOLUTION INTRAMUSCULAR; INTRAVENOUS at 13:06

## 2021-01-07 RX ADMIN — SODIUM CHLORIDE 1000 ML: 0.9 INJECTION, SOLUTION INTRAVENOUS at 13:00

## 2021-01-07 NOTE — PLAN OF CARE
Problem: Potential for Falls  Goal: Patient will remain free of falls  Description: INTERVENTIONS:  - Assess patient frequently for physical needs  -  Identify cognitive and physical deficits and behaviors that affect risk of falls  -  [place] fall precautions as indicated by assessment   - Educate patient/family on patient safety including physical limitations  - Instruct patient to call for assistance with activity based on assessment  - Modify environment to reduce risk of injury  - Consider OT/PT consult to assist with strengthening/mobility  Outcome: Progressing     Problem: Knowledge Deficit  Goal: Patient/family/caregiver demonstrates understanding of disease process, treatment plan, medications, and discharge instructions  Description: Complete learning assessment and assess knowledge base    Interventions:  - Provide teaching at level of understanding  - Provide teaching via preferred learning methods  Outcome: Progressing

## 2021-01-07 NOTE — PROGRESS NOTES
Pt tolerated IV hydration and zofran without difficulty  Port flushed and left accessed for tomorrow's tx  AVS declined  Escorted to lobby in w/c for [facility] transport

## 2021-01-08 ENCOUNTER — HOSPITAL ENCOUNTER (OUTPATIENT)
Dept: INFUSION CENTER | Facility: HOSPITAL | Age: 76
Discharge: HOME | End: 2021-01-08
Attending: INTERNAL MEDICINE
Payer: MEDICARE

## 2021-01-08 VITALS
RESPIRATION RATE: 18 BRPM | SYSTOLIC BLOOD PRESSURE: 143 MMHG | DIASTOLIC BLOOD PRESSURE: 64 MMHG | TEMPERATURE: 97.9 F | HEART RATE: 74 BPM

## 2021-01-08 DIAGNOSIS — R11.2 CHEMOTHERAPY INDUCED NAUSEA AND VOMITING: ICD-10-CM

## 2021-01-08 DIAGNOSIS — R52 PAIN: ICD-10-CM

## 2021-01-08 DIAGNOSIS — E86.0 DEHYDRATION: Primary | ICD-10-CM

## 2021-01-08 DIAGNOSIS — T45.1X5A CHEMOTHERAPY INDUCED NAUSEA AND VOMITING: ICD-10-CM

## 2021-01-08 PROCEDURE — 96361 HYDRATE IV INFUSION ADD-ON: CPT

## 2021-01-08 PROCEDURE — 96374 THER/PROPH/DIAG INJ IV PUSH: CPT

## 2021-01-08 RX ADMIN — ONDANSETRON HYDROCHLORIDE 8 MG: 2 INJECTION, SOLUTION INTRAMUSCULAR; INTRAVENOUS at 12:40

## 2021-01-08 RX ADMIN — SODIUM CHLORIDE 1000 ML: 0.9 INJECTION, SOLUTION INTRAVENOUS at 12:30

## 2021-01-08 NOTE — PROGRESS NOTES
Pt tolerated hydration and zofran well today without complication  States her stomach is feeling much better  Confirmed next week appts and AVS declined

## 2021-01-08 NOTE — PLAN OF CARE
Problem: Potential for Falls  Goal: Patient will remain free of falls  Description: INTERVENTIONS:  - Assess patient frequently for physical needs  -  Identify cognitive and physical deficits and behaviors that affect risk of falls    -  [place] fall precautions as indicated by assessment   - Educate patient/family on patient safety including physical limitations  - Instruct patient to call for assistance with activity based on assessment  - Modify environment to reduce risk of injury  - Consider OT/PT consult to assist with strengthening/mobility  Outcome: Progressing

## 2021-01-11 ENCOUNTER — HOSPITAL ENCOUNTER (OUTPATIENT)
Dept: INFUSION CENTER | Facility: HOSPITAL | Age: 76
Discharge: HOME | End: 2021-01-11
Attending: INTERNAL MEDICINE
Payer: MEDICARE

## 2021-01-11 ENCOUNTER — OFFICE VISIT (OUTPATIENT)
Dept: HEMATOLOGY ONCOLOGY | Facility: CLINIC | Age: 76
End: 2021-01-11
Payer: MEDICARE

## 2021-01-11 VITALS
HEART RATE: 72 BPM | RESPIRATION RATE: 18 BRPM | DIASTOLIC BLOOD PRESSURE: 68 MMHG | SYSTOLIC BLOOD PRESSURE: 140 MMHG | TEMPERATURE: 97.8 F

## 2021-01-11 VITALS
TEMPERATURE: 98 F | HEART RATE: 76 BPM | SYSTOLIC BLOOD PRESSURE: 150 MMHG | RESPIRATION RATE: 18 BRPM | WEIGHT: 138.2 LBS | BODY MASS INDEX: 25.28 KG/M2 | OXYGEN SATURATION: 99 % | DIASTOLIC BLOOD PRESSURE: 88 MMHG

## 2021-01-11 DIAGNOSIS — C50.919 METASTATIC BREAST CANCER (HCC): Primary | ICD-10-CM

## 2021-01-11 DIAGNOSIS — R11.2 CHEMOTHERAPY INDUCED NAUSEA AND VOMITING: ICD-10-CM

## 2021-01-11 DIAGNOSIS — C79.31 BRAIN METASTASIS (HCC): ICD-10-CM

## 2021-01-11 DIAGNOSIS — T45.1X5A CHEMOTHERAPY INDUCED NAUSEA AND VOMITING: ICD-10-CM

## 2021-01-11 DIAGNOSIS — Z79.811 USE OF AROMATASE INHIBITORS: ICD-10-CM

## 2021-01-11 DIAGNOSIS — R52 PAIN: ICD-10-CM

## 2021-01-11 DIAGNOSIS — E86.0 DEHYDRATION: Primary | ICD-10-CM

## 2021-01-11 DIAGNOSIS — E53.8 VITAMIN B12 DEFICIENCY: ICD-10-CM

## 2021-01-11 DIAGNOSIS — E55.9 VITAMIN D DEFICIENCY: ICD-10-CM

## 2021-01-11 PROCEDURE — 96374 THER/PROPH/DIAG INJ IV PUSH: CPT

## 2021-01-11 PROCEDURE — 96361 HYDRATE IV INFUSION ADD-ON: CPT

## 2021-01-11 PROCEDURE — 99214 OFFICE O/P EST MOD 30 MIN: CPT | Performed by: INTERNAL MEDICINE

## 2021-01-11 RX ADMIN — SODIUM CHLORIDE 1000 ML: 0.9 INJECTION, SOLUTION INTRAVENOUS at 12:35

## 2021-01-11 RX ADMIN — ONDANSETRON HYDROCHLORIDE 8 MG: 2 INJECTION, SOLUTION INTRAMUSCULAR; INTRAVENOUS at 12:52

## 2021-01-11 NOTE — PROGRESS NOTES
Hematology/Oncology Outpatient Follow-up  Shannan Kim 77 y o  female 1945 [id number]    Date:  1/11/2021        Assessment and Plan:  1  Metastatic breast cancer [facility])  The patient was asked to continue with the Tykerb out the current dose 1500 mg once a day and the anastrozole daily  She was supposed to get an echocardiogram which was not done prior to this office visit  We will continue with the IV hydration on a daily basis 5 days a week as needed  She was educated about the stable/normal tumor markers  - CBC and differential; Future  - Comprehensive metabolic panel; Future  - Magnesium; Future  - Cancer antigen 15-3; Future  - Cancer antigen 27 29; Future    2  Use of aromatase inhibitors  She was encouraged to continue with the anastrozole on a daily basis  She will be continue on the Prolia injection every 6 months  3  Brain metastasis ([facility] )  She is scheduled to get a brain MRI on the 8th of February for close monitoring of her metastatic disease in the brain  4  Vitamin B12 deficiency  She will be continue on vitamin B12 injections on a monthly basis  5  Vitamin D deficiency  Her most recent vitamin-D level on 12/11/2020 was in the low-normal range of 30 1  HPI:  The patient came today for a follow-up visit accompanied by her sister  She continues to get IV hydration on a daily basis 5 days a week  She is also on the Tykerb 1500 mg once a day which she is tolerating relatively well along with the anastrozole daily  Her most recent blood work on 01/06/2021 showed high normal CA 15-3 with normal CA 27-29  CMP was normal with the slightly higher than average AST of 41  B12 of 552  Hemoglobin 11 4 with normal white cells and platelets    Oncology History   Malignant neoplasm of overlapping 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, estrogen receptor positive ([facility] )   2015 -  Hormone Therapy    Anastrozole     5/28/2015 Initial Diagnosis    Metastatic breast cancer (HCC)  (recurrence) 6/30/2015 -  Chemotherapy    1-Taxotere, Herceptin, Perjeta started in [place] 2015 x6 cycles  2-Herceptin and Perjeta alone were continued since the patient was not interested in continue the Taxotere  3-Herceptin and Perjeta were put on hold due to decline of her ejection fraction around May 2017    4-low dose weekly Taxol was started in July 2017  5-Taxol was switched to every other week basis     4/14/2019 - 6/9/2019 Chemotherapy    PACLitaxel (TAXOL) 144 6 mg in sodium chloride 0 9 % 250 mL chemo IVPB, 80 mg/m2, Intravenous, Once, 2 of 6 cycles     12/4/2019 - 12/31/2019 Chemotherapy    PACLitaxel (TAXOL) 142 2 mg in sodium chloride 0 9 % 250 mL chemo IVPB, 80 mg/m2 = 142 2 mg, Intravenous, Once, 1 of 6 cycles  Administration: 142 2 mg (12/4/2019), 142 2 mg (12/18/2019)     1/8/2020 - 3/10/2020 Chemotherapy    PACLitaxel (TAXOL) 141 6 mg in sodium chloride 0 9 % 250 mL chemo IVPB, 80 mg/m2 = 141 6 mg, Intravenous, Once, 4 of 6 cycles  Administration: 141 6 mg (1/8/2020), 141 6 mg (1/29/2020), 141 6 mg (2/19/2020)  trastuzumab (HERCEPTIN) 600 mg in sodium chloride 0 9 % 250 mL chemo infusion, 609 mg, Intravenous, Once, 4 of 6 cycles  Administration: 600 mg (1/8/2020), 450 mg (1/29/2020), 450 mg (2/19/2020)      Chemotherapy    PACLitaxel (TAXOL) 144 6 mg in sodium chloride 0 9 % 250 mL chemo IVPB, 80 mg/m2, Intravenous, Once, 2 of 6 cycles    PACLitaxel (TAXOL) 142 2 mg in sodium chloride 0 9 % 250 mL chemo IVPB, 80 mg/m2 = 142 2 mg, Intravenous, Once, 1 of 6 cycles    Administration: 142 2 mg (12/4/2019), 142 2 mg (12/18/2019)    PACLitaxel (TAXOL) 141 6 mg in sodium chloride 0 9 % 250 mL chemo IVPB, 80 mg/m2, Intravenous, Once, 0 of 12 cycles        trastuzumab (HERCEPTIN) 304 mg in sodium chloride 0 9 % 250 mL chemo infusion, 4 mg/kg, Intravenous, Once, 0 of 26 cycles    PACLitaxel (TAXOL) 141 6 mg in sodium chloride 0 9 % 250 mL chemo IVPB, 80 mg/m2 = 141 6 mg, Intravenous, Once, 4 of 6 cycles    Administration: 141 6 mg (1/8/2020), 141 6 mg (1/29/2020), 141 6 mg (2/19/2020)        trastuzumab (HERCEPTIN) 600 mg in sodium chloride 0 9 % 250 mL chemo infusion, 609 mg, Intravenous, Once, 4 of 6 cycles    Administration: 600 mg (1/8/2020), 450 mg (1/29/2020), 450 mg (2/19/2020)        Chemotherapy    PACLitaxel (TAXOL) 144 6 mg in sodium chloride 0 9 % 250 mL chemo IVPB, 80 mg/m2, Intravenous, Once, 2 of 6 cycles    PACLitaxel (TAXOL) 142 2 mg in sodium chloride 0 9 % 250 mL chemo IVPB, 80 mg/m2 = 142 2 mg, Intravenous, Once, 1 of 6 cycles    Administration: 142 2 mg (12/4/2019), 142 2 mg (12/18/2019)    PACLitaxel (TAXOL) 141 6 mg in sodium chloride 0 9 % 250 mL chemo IVPB, 80 mg/m2, Intravenous, Once, 0 of 12 cycles        trastuzumab (HERCEPTIN) 304 mg in sodium chloride 0 9 % 250 mL chemo infusion, 4 mg/kg, Intravenous, Once, 0 of 26 cycles    PACLitaxel (TAXOL) 141 6 mg in sodium chloride 0 9 % 250 mL chemo IVPB, 80 mg/m2 = 141 6 mg, Intravenous, Once, 4 of 6 cycles    Administration: 141 6 mg (1/8/2020), 141 6 mg (1/29/2020), 141 6 mg (2/19/2020)        trastuzumab (HERCEPTIN) 600 mg in sodium chloride 0 9 % 250 mL chemo infusion, 609 mg, Intravenous, Once, 4 of 6 cycles    Administration: 600 mg (1/8/2020), 450 mg (1/29/2020), 450 mg (2/19/2020)      Lapatinib 1500 mg once a day was started on 04/20/2020 after she was found to have metastatic disease to the brain, status post whole brain radiation  Brain metastasis ([facility] )   3/30/2020 - 4/10/2020 Radiation    Plan ID Energy Fractions Dose per Fraction (cGy) Dose Correction (cGy) Total Dose Delivered (cGy) Elapsed Days   Whole Brai # 6X 10 / 10 300 0 3,000 11          4/9/2020 Initial Diagnosis    Brain metastasis (HCC)         Interval history:    ROS: Review of Systems   Constitutional: Positive for fatigue  Negative for activity change, appetite change, chills, diaphoresis, fever and unexpected weight change     HENT: Negative for congestion, dental problem, ear discharge, ear pain, facial swelling, hearing loss, mouth sores, nosebleeds, postnasal drip, sore throat, tinnitus and trouble swallowing  Eyes: Negative for discharge, redness, itching and visual disturbance  Respiratory: Negative for cough, chest tightness, shortness of breath and wheezing  Cardiovascular: Negative for chest pain, palpitations and leg swelling  Gastrointestinal: Negative for abdominal distention, abdominal pain, anal bleeding, blood in stool, constipation, diarrhea, nausea and vomiting  Genitourinary: Negative for difficulty urinating, dysuria, flank pain, frequency, hematuria and urgency  Musculoskeletal: Positive for back pain  Negative for arthralgias, gait problem, joint swelling, myalgias and neck pain  Skin: Negative for color change, pallor, rash and wound  Neurological: Negative for dizziness, syncope, speech difficulty, weakness, light-headedness, numbness and headaches  Hematological: Negative for adenopathy  Does not bruise/bleed easily  Psychiatric/Behavioral: Negative for agitation, behavioral problems, confusion and sleep disturbance         Past Medical History:   Diagnosis Date    Back pain     [facility] )     Breast cancer ([facility] )     Chronic pain     Hypertension     Knee pain     Lymphedema of right arm     Vitamin B12 deficiency        Past Surgical History:   Procedure Laterality Date    ANKLE SURGERY      APPENDECTOMY      BREAST SURGERY      bilat mastectomy-right total mastectomy and prophylactic left total mastectomy-2005    FRACTURE SURGERY      KNEE SURGERY      right knee replacement 2014 in [address] [place] [address] Bilateral     ORTHOPEDIC SURGERY         Social History     Socioeconomic History    Marital status: Single     Spouse name: None    Number of children: 0    Years of education: None    Highest education level: None   Occupational History    None   Social Needs    Financial resource strain: None    Food insecurity     Worry: None     Inability: None    Transportation needs     Medical: None     Non-medical: None   Tobacco Use    Smoking status: Never Smoker    Smokeless tobacco: Never Used    Tobacco comment: no passive smoke exposure   Substance and Sexual Activity    Alcohol use: Never     Frequency: Never     Binge frequency: Never    Drug use: No    Sexual activity: Not Currently   Lifestyle    Physical activity     Days per week: None     Minutes per session: None    Stress: None   Relationships    Social connections     Talks on phone: None     Gets together: None     Attends Scientologist service: None     Active member of club or organization: None     Attends meetings of clubs or organizations: None     Relationship status: None    Intimate partner violence     Fear of current or ex partner: None     Emotionally abused: None     Physically abused: None     Forced sexual activity: None   Other Topics Concern    None   Social History Narrative    None       Family History   Problem Relation Age of Onset    Asthma Mother     Osteoarthritis Father     Bone cancer Paternal Aunt         bone cancer       Allergies   Allergen Reactions    Penicillins Rash         Current Outpatient Medications:     anastrozole (ARIMIDEX) 1 mg tablet, TAKE ONE TABLET BY MOUTH EVERY DAY, Disp: 90 tablet, Rfl: 3    cholecalciferol (VITAMIN D3) 1,000 units tablet, Take 1 tablet (1,000 Units total) by mouth daily, Disp: 30 tablet, Rfl: 0    Cyanocobalamin 1000 MCG/ML KIT, Inject as directed every 6 (six) months, Disp: , Rfl:     escitalopram (LEXAPRO) 5 mg tablet, Take 1 tablet (5 mg total) by mouth daily, Disp: 30 tablet, Rfl: 5    esomeprazole (NexIUM) 40 MG capsule, TAKE 1 CAPSULE BY MOUTH ONCE DAILY, Disp: 90 capsule, Rfl: 0    gabapentin (NEURONTIN) 300 mg capsule, Take 3 capsules (900 mg total) by mouth daily at bedtime, Disp: 90 capsule, Rfl: 2    ibuprofen (MOTRIN) 800 mg tablet, Take 800 mg by mouth every 8 (eight) hours as needed, Disp: , Rfl:     indapamide (LOZOL) 1 25 mg tablet, Take 1 tablet (1 25 mg total) by mouth every morning, Disp: 30 tablet, Rfl: 1    lisinopril (ZESTRIL) 10 mg tablet, Take 1 tablet (10 mg total) by mouth daily, Disp: 90 tablet, Rfl: 3    megestrol (MEGACE) 400 mg/10 mL, Take 400 mg by mouth daily, Disp: 600 mL, Rfl: 0    Misc  Devices (ILLUSIONS C BREAST PROSTHESIS) MISC, 1 breast prosthesis, Disp: 1 each, Rfl: 0    Misc  Devices (REFLECTIONS C BREAST PROSTHES) MISC, Dispense 1 breast prosthesis, Disp: 1 each, Rfl: 0    nebivolol (Bystolic) 5 mg tablet, Take 1 tablet (5 mg total) by mouth daily, Disp: 90 tablet, Rfl: 3    ondansetron (Zofran) 4 mg tablet, Every 12 hours, Disp: , Rfl:     pantoprazole (PROTONIX) 40 mg tablet, Take 1 tablet (40 mg total) by mouth daily, Disp: 90 tablet, Rfl: 3    TYKERB 250 MG tablet, , Disp: , Rfl:     Elastic Bandages & Supports (Prolite Lumbar Support) MISC, Use daily as needed (back pain), Disp: 1 each, Rfl: 0      Physical Exam:  /88 (BP Location: Left arm)   Pulse 76   Temp 98 °F (36 7 °C) (Tympanic)   Resp 18   Wt 62 7 kg (138 lb 3 2 oz)   SpO2 99%   BMI 25 28 kg/m²     Physical Exam  Constitutional:       General: She is not in acute distress  Appearance: She is well-developed  She is not diaphoretic  HENT:      Head: Normocephalic and atraumatic  Eyes:      General: No scleral icterus  Right eye: No discharge  Left eye: No discharge  Conjunctiva/sclera: Conjunctivae normal       Pupils: Pupils are equal, round, and reactive to light  Neck:      Musculoskeletal: Normal range of motion and neck supple  Thyroid: No thyromegaly  Vascular: No JVD  Trachea: No tracheal deviation  Cardiovascular:      Rate and Rhythm: Normal rate and regular rhythm  Heart sounds: Normal heart sounds  No murmur  No friction rub     Pulmonary:      Effort: Pulmonary effort is normal  No respiratory distress  Breath sounds: Normal breath sounds  No stridor  No wheezing or rales  Chest:      Chest wall: No tenderness  Abdominal:      General: There is no distension  Palpations: Abdomen is soft  There is no hepatomegaly or splenomegaly  Tenderness: There is no abdominal tenderness  There is no guarding or rebound  Musculoskeletal: Normal range of motion  General: No tenderness or deformity  Lymphadenopathy:      Cervical: No cervical adenopathy  Skin:     General: Skin is warm and dry  Coloration: Skin is not pale  Findings: No erythema or rash  Neurological:      Mental Status: She is alert and oriented to person, place, and time  Cranial Nerves: No cranial nerve deficit  Coordination: Coordination normal       Deep Tendon Reflexes: Reflexes are normal and symmetric  Psychiatric:         Behavior: Behavior normal          Thought Content: Thought content normal          Judgment: Judgment normal            Labs:  Lab Results   Component Value Date    WBC 5 90 01/06/2021    HGB 11 4 (L) 01/06/2021    HCT 35 0 (L) 01/06/2021    MCV 94 01/06/2021     01/06/2021     Lab Results   Component Value Date     06/18/2018    K 4 1 01/06/2021     01/06/2021    CO2 29 01/06/2021    ANIONGAP 8 06/18/2018    BUN 16 01/06/2021    CREATININE 0 83 01/06/2021    GLUF 74 12/11/2020    CALCIUM 9 2 01/06/2021    CORRECTEDCA 9 8 12/11/2020    AST 41 (H) 01/06/2021    ALT 33 01/06/2021    ALKPHOS 47 01/06/2021    PROT 7 1 06/18/2018    BILITOT 0 3 06/18/2018    EGFR 70 01/06/2021     No results found for: TSH    Patient voiced understanding and agreement in the above discussion  Aware to contact our office with questions/symptoms in the interim

## 2021-01-11 NOTE — PLAN OF CARE
Problem: Potential for Falls  Goal: Patient will remain free of falls  Description: INTERVENTIONS:  - Assess patient frequently for physical needs  -  Identify cognitive and physical deficits and behaviors that affect risk of falls    -  [place] fall precautions as indicated by assessment   - Educate patient/family on patient safety including physical limitations  - Instruct patient to call for assistance with activity based on assessment  - Modify environment to reduce risk of injury  - Consider OT/PT consult to assist with strengthening/mobility  Outcome: Progressing

## 2021-01-12 ENCOUNTER — HOSPITAL ENCOUNTER (OUTPATIENT)
Dept: INFUSION CENTER | Facility: HOSPITAL | Age: 76
Discharge: HOME | End: 2021-01-12
Attending: INTERNAL MEDICINE
Payer: MEDICARE

## 2021-01-12 VITALS
SYSTOLIC BLOOD PRESSURE: 137 MMHG | HEART RATE: 82 BPM | RESPIRATION RATE: 18 BRPM | TEMPERATURE: 97.3 F | DIASTOLIC BLOOD PRESSURE: 95 MMHG

## 2021-01-12 DIAGNOSIS — E86.0 DEHYDRATION: Primary | ICD-10-CM

## 2021-01-12 DIAGNOSIS — R52 PAIN: ICD-10-CM

## 2021-01-12 DIAGNOSIS — T45.1X5A CHEMOTHERAPY INDUCED NAUSEA AND VOMITING: ICD-10-CM

## 2021-01-12 DIAGNOSIS — R11.2 CHEMOTHERAPY INDUCED NAUSEA AND VOMITING: ICD-10-CM

## 2021-01-12 PROCEDURE — 96361 HYDRATE IV INFUSION ADD-ON: CPT

## 2021-01-12 PROCEDURE — 96374 THER/PROPH/DIAG INJ IV PUSH: CPT

## 2021-01-12 RX ADMIN — SODIUM CHLORIDE 1000 ML: 0.9 INJECTION, SOLUTION INTRAVENOUS at 12:29

## 2021-01-12 RX ADMIN — ONDANSETRON 8 MG: 2 INJECTION INTRAMUSCULAR; INTRAVENOUS at 12:54

## 2021-01-12 NOTE — PROGRESS NOTES
Pt tolerated day 2 of hydration and zofran today with no adverse reactions  Left unit via w/c escorted by staff to lobby for star

## 2021-01-13 ENCOUNTER — HOSPITAL ENCOUNTER (OUTPATIENT)
Dept: INFUSION CENTER | Facility: HOSPITAL | Age: 76
Discharge: HOME | End: 2021-01-13
Attending: INTERNAL MEDICINE
Payer: MEDICARE

## 2021-01-13 VITALS
HEART RATE: 70 BPM | RESPIRATION RATE: 18 BRPM | DIASTOLIC BLOOD PRESSURE: 65 MMHG | SYSTOLIC BLOOD PRESSURE: 139 MMHG | TEMPERATURE: 98 F

## 2021-01-13 DIAGNOSIS — R52 PAIN: ICD-10-CM

## 2021-01-13 DIAGNOSIS — T45.1X5A CHEMOTHERAPY INDUCED NAUSEA AND VOMITING: ICD-10-CM

## 2021-01-13 DIAGNOSIS — E86.0 DEHYDRATION: Primary | ICD-10-CM

## 2021-01-13 DIAGNOSIS — R11.2 CHEMOTHERAPY INDUCED NAUSEA AND VOMITING: ICD-10-CM

## 2021-01-13 PROCEDURE — 96374 THER/PROPH/DIAG INJ IV PUSH: CPT

## 2021-01-13 RX ADMIN — ONDANSETRON HYDROCHLORIDE 8 MG: 2 INJECTION, SOLUTION INTRAMUSCULAR; INTRAVENOUS at 12:52

## 2021-01-13 RX ADMIN — SODIUM CHLORIDE 500 ML: 0.9 INJECTION, SOLUTION INTRAVENOUS at 12:29

## 2021-01-13 NOTE — PROGRESS NOTES
Pt arrives on unit today for Hydration and Zofran  Pt states she only wanted half of her hydration today  Notified Fiorella Ledesma RN  Okay to give 500ml over 30 minutes  Pt tolerated  Left unit via w/c escorted by staff to lobby for star

## 2021-01-13 NOTE — PLAN OF CARE
Problem: Potential for Falls  Goal: Patient will remain free of falls  Description: INTERVENTIONS:  - Assess patient frequently for physical needs  -  Identify cognitive and physical deficits and behaviors that affect risk of falls    -  [place] fall precautions as indicated by assessment   - Educate patient/family on patient safety including physical limitations  - Instruct patient to call for assistance with activity based on assessment  - Modify environment to reduce risk of injury  - Consider OT/PT consult to assist with strengthening/mobility  Outcome: Progressing

## 2021-01-14 ENCOUNTER — HOSPITAL ENCOUNTER (OUTPATIENT)
Dept: INFUSION CENTER | Facility: HOSPITAL | Age: 76
Discharge: HOME | End: 2021-01-14
Attending: INTERNAL MEDICINE
Payer: MEDICARE

## 2021-01-14 VITALS
SYSTOLIC BLOOD PRESSURE: 146 MMHG | TEMPERATURE: 97.8 F | DIASTOLIC BLOOD PRESSURE: 87 MMHG | HEART RATE: 74 BPM | RESPIRATION RATE: 18 BRPM

## 2021-01-14 DIAGNOSIS — R11.2 CHEMOTHERAPY INDUCED NAUSEA AND VOMITING: ICD-10-CM

## 2021-01-14 DIAGNOSIS — E86.0 DEHYDRATION: Primary | ICD-10-CM

## 2021-01-14 DIAGNOSIS — R52 PAIN: ICD-10-CM

## 2021-01-14 DIAGNOSIS — T45.1X5A CHEMOTHERAPY INDUCED NAUSEA AND VOMITING: ICD-10-CM

## 2021-01-14 PROCEDURE — 96374 THER/PROPH/DIAG INJ IV PUSH: CPT

## 2021-01-14 PROCEDURE — 96361 HYDRATE IV INFUSION ADD-ON: CPT

## 2021-01-14 RX ADMIN — ONDANSETRON HYDROCHLORIDE 8 MG: 2 INJECTION, SOLUTION INTRAMUSCULAR; INTRAVENOUS at 12:42

## 2021-01-14 RX ADMIN — SODIUM CHLORIDE 1000 ML: 0.9 INJECTION, SOLUTION INTRAVENOUS at 12:27

## 2021-01-14 NOTE — PLAN OF CARE
Problem: Potential for Falls  Goal: Patient will remain free of falls  Description: INTERVENTIONS:  - Assess patient frequently for physical needs  -  Identify cognitive and physical deficits and behaviors that affect risk of falls    -  [place] fall precautions as indicated by assessment   - Educate patient/family on patient safety including physical limitations  - Instruct patient to call for assistance with activity based on assessment  - Modify environment to reduce risk of injury  - Consider OT/PT consult to assist with strengthening/mobility  Outcome: Progressing

## 2021-01-14 NOTE — PROGRESS NOTES
Fluids and antiemetics administered as per order  Patient tolerated well  Patients port left accessed for tomorrows treatment  Brisk blood return noted   Patient declined AVS

## 2021-01-15 ENCOUNTER — HOSPITAL ENCOUNTER (OUTPATIENT)
Dept: INFUSION CENTER | Facility: HOSPITAL | Age: 76
Discharge: HOME | End: 2021-01-15
Attending: INTERNAL MEDICINE
Payer: MEDICARE

## 2021-01-15 DIAGNOSIS — E86.0 DEHYDRATION: Primary | ICD-10-CM

## 2021-01-15 DIAGNOSIS — E53.8 VITAMIN B 12 DEFICIENCY: ICD-10-CM

## 2021-01-15 DIAGNOSIS — R52 PAIN: ICD-10-CM

## 2021-01-15 DIAGNOSIS — T45.1X5A CHEMOTHERAPY INDUCED NAUSEA AND VOMITING: ICD-10-CM

## 2021-01-15 DIAGNOSIS — R11.2 CHEMOTHERAPY INDUCED NAUSEA AND VOMITING: ICD-10-CM

## 2021-01-15 PROCEDURE — 96372 THER/PROPH/DIAG INJ SC/IM: CPT

## 2021-01-15 PROCEDURE — 96365 THER/PROPH/DIAG IV INF INIT: CPT

## 2021-01-15 PROCEDURE — 96361 HYDRATE IV INFUSION ADD-ON: CPT

## 2021-01-15 RX ORDER — CYANOCOBALAMIN 1000 UG/ML
1000 INJECTION INTRAMUSCULAR; SUBCUTANEOUS ONCE
Status: CANCELLED | OUTPATIENT
Start: 2021-02-12

## 2021-01-15 RX ORDER — CYANOCOBALAMIN 1000 UG/ML
1000 INJECTION INTRAMUSCULAR; SUBCUTANEOUS ONCE
Status: COMPLETED | OUTPATIENT
Start: 2021-01-15 | End: 2021-01-15

## 2021-01-15 RX ADMIN — ONDANSETRON HYDROCHLORIDE 8 MG: 2 INJECTION, SOLUTION INTRAMUSCULAR; INTRAVENOUS at 13:18

## 2021-01-15 RX ADMIN — SODIUM CHLORIDE 1000 ML: 0.9 INJECTION, SOLUTION INTRAVENOUS at 12:58

## 2021-01-15 RX ADMIN — CYANOCOBALAMIN 1000 MCG: 1000 INJECTION INTRAMUSCULAR; SUBCUTANEOUS at 13:52

## 2021-01-15 NOTE — PROGRESS NOTES
Pt tolerated Hydration and zofran today with no adverse reactions  AVS provided  Left unit via w/c escorted by staff to the lobby

## 2021-01-18 ENCOUNTER — HOSPITAL ENCOUNTER (OUTPATIENT)
Dept: INFUSION CENTER | Facility: HOSPITAL | Age: 76
Discharge: HOME | End: 2021-01-18
Attending: INTERNAL MEDICINE

## 2021-01-18 ENCOUNTER — TELEPHONE (OUTPATIENT)
Dept: HEMATOLOGY ONCOLOGY | Facility: CLINIC | Age: 76
End: 2021-01-18

## 2021-01-18 NOTE — TELEPHONE ENCOUNTER
Reschedule Appointment     Who is calling in  [facility]   Doctor Appointment Scheduled with Chacorta Barrow date and time 02/08 at 10:00am    New date and time 02/15 at 2:20pm    Location [place]   Patient verbalized understanding    Yes

## 2021-01-19 ENCOUNTER — HOSPITAL ENCOUNTER (OUTPATIENT)
Dept: INFUSION CENTER | Facility: HOSPITAL | Age: 76
Discharge: HOME | End: 2021-01-19
Attending: INTERNAL MEDICINE
Payer: MEDICARE

## 2021-01-19 VITALS
TEMPERATURE: 98.8 F | RESPIRATION RATE: 18 BRPM | DIASTOLIC BLOOD PRESSURE: 70 MMHG | SYSTOLIC BLOOD PRESSURE: 146 MMHG | HEART RATE: 70 BPM

## 2021-01-19 DIAGNOSIS — E86.0 DEHYDRATION: Primary | ICD-10-CM

## 2021-01-19 DIAGNOSIS — T45.1X5A CHEMOTHERAPY INDUCED NAUSEA AND VOMITING: ICD-10-CM

## 2021-01-19 DIAGNOSIS — R11.2 CHEMOTHERAPY INDUCED NAUSEA AND VOMITING: ICD-10-CM

## 2021-01-19 DIAGNOSIS — R52 PAIN: ICD-10-CM

## 2021-01-19 PROCEDURE — 96361 HYDRATE IV INFUSION ADD-ON: CPT

## 2021-01-19 PROCEDURE — 96374 THER/PROPH/DIAG INJ IV PUSH: CPT

## 2021-01-19 RX ADMIN — ONDANSETRON 8 MG: 2 INJECTION INTRAMUSCULAR; INTRAVENOUS at 13:05

## 2021-01-19 RX ADMIN — SODIUM CHLORIDE 1000 ML: 0.9 INJECTION, SOLUTION INTRAVENOUS at 12:50

## 2021-01-19 NOTE — PLAN OF CARE
Problem: Potential for Falls  Goal: Patient will remain free of falls  Description: INTERVENTIONS:  - Assess patient frequently for physical needs  -  Identify cognitive and physical deficits and behaviors that affect risk of falls    -  [place] fall precautions as indicated by assessment   - Educate patient/family on patient safety including physical limitations  - Instruct patient to call for assistance with activity based on assessment  - Modify environment to reduce risk of injury  - Consider OT/PT consult to assist with strengthening/mobility  Outcome: Progressing

## 2021-01-19 NOTE — PROGRESS NOTES
Pt tolerated hydration/zofran today without complications  Patient did not want port to stay accessed, will be getting COVID injection tomorrow and unsure if she will come for hydration

## 2021-01-20 ENCOUNTER — IMMUNIZATIONS (OUTPATIENT)
Dept: FAMILY MEDICINE CLINIC | Facility: HOSPITAL | Age: 76
End: 2021-01-20

## 2021-01-20 ENCOUNTER — HOSPITAL ENCOUNTER (OUTPATIENT)
Dept: INFUSION CENTER | Facility: HOSPITAL | Age: 76
End: 2021-01-20
Attending: INTERNAL MEDICINE

## 2021-01-20 DIAGNOSIS — Z23 ENCOUNTER FOR IMMUNIZATION: Primary | ICD-10-CM

## 2021-01-20 PROCEDURE — 91301 SARS-COV-2 / COVID-19 MRNA VACCINE (MODERNA) 100 MCG: CPT

## 2021-01-20 PROCEDURE — 0011A SARS-COV-2 / COVID-19 MRNA VACCINE (MODERNA) 100 MCG: CPT

## 2021-01-22 ENCOUNTER — HOSPITAL ENCOUNTER (OUTPATIENT)
Dept: INFUSION CENTER | Facility: HOSPITAL | Age: 76
Discharge: HOME | End: 2021-01-22
Attending: INTERNAL MEDICINE
Payer: MEDICARE

## 2021-01-22 VITALS
HEART RATE: 77 BPM | SYSTOLIC BLOOD PRESSURE: 120 MMHG | RESPIRATION RATE: 18 BRPM | TEMPERATURE: 97.5 F | DIASTOLIC BLOOD PRESSURE: 76 MMHG

## 2021-01-22 DIAGNOSIS — T45.1X5A CHEMOTHERAPY INDUCED NAUSEA AND VOMITING: ICD-10-CM

## 2021-01-22 DIAGNOSIS — R52 PAIN: ICD-10-CM

## 2021-01-22 DIAGNOSIS — R11.2 CHEMOTHERAPY INDUCED NAUSEA AND VOMITING: ICD-10-CM

## 2021-01-22 DIAGNOSIS — E86.0 DEHYDRATION: Primary | ICD-10-CM

## 2021-01-22 PROCEDURE — 96361 HYDRATE IV INFUSION ADD-ON: CPT

## 2021-01-22 PROCEDURE — 96365 THER/PROPH/DIAG IV INF INIT: CPT

## 2021-01-22 RX ADMIN — ONDANSETRON HYDROCHLORIDE 8 MG: 2 INJECTION, SOLUTION INTRAMUSCULAR; INTRAVENOUS at 12:36

## 2021-01-22 RX ADMIN — SODIUM CHLORIDE 1000 ML: 0.9 INJECTION, SOLUTION INTRAVENOUS at 12:27

## 2021-01-22 NOTE — PROGRESS NOTES
Pt tolerated treatment today with no adverse reactions   Left unit via w/c escorted by staff to the lobby for star

## 2021-01-22 NOTE — PLAN OF CARE
Problem: Potential for Falls  Goal: Patient will remain free of falls  Description: INTERVENTIONS:  - Assess patient frequently for physical needs  -  Identify cognitive and physical deficits and behaviors that affect risk of falls    -  [place] fall precautions as indicated by assessment   - Educate patient/family on patient safety including physical limitations  - Instruct patient to call for assistance with activity based on assessment  - Modify environment to reduce risk of injury  - Consider OT/PT consult to assist with strengthening/mobility  Outcome: Progressing

## 2021-01-25 ENCOUNTER — HOSPITAL ENCOUNTER (OUTPATIENT)
Dept: INFUSION CENTER | Facility: HOSPITAL | Age: 76
Discharge: HOME | End: 2021-01-25
Attending: INTERNAL MEDICINE
Payer: MEDICARE

## 2021-01-25 VITALS
HEART RATE: 87 BPM | DIASTOLIC BLOOD PRESSURE: 67 MMHG | SYSTOLIC BLOOD PRESSURE: 139 MMHG | TEMPERATURE: 97.8 F | RESPIRATION RATE: 20 BRPM

## 2021-01-25 DIAGNOSIS — R11.2 CHEMOTHERAPY INDUCED NAUSEA AND VOMITING: ICD-10-CM

## 2021-01-25 DIAGNOSIS — R52 PAIN: ICD-10-CM

## 2021-01-25 DIAGNOSIS — E86.0 DEHYDRATION: Primary | ICD-10-CM

## 2021-01-25 DIAGNOSIS — T45.1X5A CHEMOTHERAPY INDUCED NAUSEA AND VOMITING: ICD-10-CM

## 2021-01-25 PROCEDURE — 96360 HYDRATION IV INFUSION INIT: CPT

## 2021-01-25 RX ADMIN — SODIUM CHLORIDE 1000 ML: 0.9 INJECTION, SOLUTION INTRAVENOUS at 11:10

## 2021-01-25 NOTE — PLAN OF CARE
Problem: Potential for Falls  Goal: Patient will remain free of falls  Description: INTERVENTIONS:  - Assess patient frequently for physical needs  -  Identify cognitive and physical deficits and behaviors that affect risk of falls  -  [place] fall precautions as indicated by assessment   - Educate patient/family on patient safety including physical limitations  - Instruct patient to call for assistance with activity based on assessment  - Modify environment to reduce risk of injury  - Consider OT/PT consult to assist with strengthening/mobility  Outcome: Progressing     Problem: Knowledge Deficit  Goal: Patient/family/caregiver demonstrates understanding of disease process, treatment plan, medications, and discharge instructions  Description: Complete learning assessment and assess knowledge base    Interventions:  - Provide teaching at level of understanding  - Provide teaching via preferred learning methods  Outcome: Progressing

## 2021-01-25 NOTE — PROGRESS NOTES
Pt tolerated IV hydration without difficulty  Port flushed and left accessed for tomorrow's tx  AVS declined  Escorted to lobby in w/c for [facility] transport  Refill request from [facility] pharmacy    Humalog  accu-chek avia test strip    Last seen 2/2/18  No fuv scheduled    Due for appt (diabetes) in Aug    Refills to pharmacy per protocol

## 2021-01-26 ENCOUNTER — HOSPITAL ENCOUNTER (OUTPATIENT)
Dept: INFUSION CENTER | Facility: HOSPITAL | Age: 76
End: 2021-01-26
Attending: INTERNAL MEDICINE

## 2021-01-27 ENCOUNTER — HOSPITAL ENCOUNTER (OUTPATIENT)
Dept: INFUSION CENTER | Facility: HOSPITAL | Age: 76
Discharge: HOME | End: 2021-01-27
Attending: INTERNAL MEDICINE
Payer: MEDICARE

## 2021-01-27 VITALS
TEMPERATURE: 97.8 F | DIASTOLIC BLOOD PRESSURE: 62 MMHG | RESPIRATION RATE: 18 BRPM | SYSTOLIC BLOOD PRESSURE: 122 MMHG | HEART RATE: 76 BPM

## 2021-01-27 DIAGNOSIS — R52 PAIN: Primary | ICD-10-CM

## 2021-01-27 DIAGNOSIS — T45.1X5A CHEMOTHERAPY INDUCED NAUSEA AND VOMITING: ICD-10-CM

## 2021-01-27 DIAGNOSIS — R11.2 CHEMOTHERAPY INDUCED NAUSEA AND VOMITING: ICD-10-CM

## 2021-01-27 DIAGNOSIS — E86.0 DEHYDRATION: ICD-10-CM

## 2021-01-27 PROCEDURE — 96360 HYDRATION IV INFUSION INIT: CPT

## 2021-01-27 RX ADMIN — SODIUM CHLORIDE 1000 ML: 0.9 INJECTION, SOLUTION INTRAVENOUS at 11:16

## 2021-01-27 NOTE — PROGRESS NOTES
Pt tolerated hydration today with no adverse reactions  Port remains accessed for tomorrows treatment  Left unit via w/c escorted by staff to the lobby for star

## 2021-01-27 NOTE — PROGRESS NOTES
Pt reports that she slipped on ice and fell yesterday  States she fell on R side, and c/o R arm pain and decreased mobility  Declined ED workup  Notified Jann Dewitt RN at Dr Zahra Ortiz office

## 2021-01-27 NOTE — PLAN OF CARE
Problem: Potential for Falls  Goal: Patient will remain free of falls  Description: INTERVENTIONS:  - Assess patient frequently for physical needs  -  Identify cognitive and physical deficits and behaviors that affect risk of falls    -  [place] fall precautions as indicated by assessment   - Educate patient/family on patient safety including physical limitations  - Instruct patient to call for assistance with activity based on assessment  - Modify environment to reduce risk of injury  - Consider OT/PT consult to assist with strengthening/mobility  Outcome: Progressing

## 2021-01-28 ENCOUNTER — HOSPITAL ENCOUNTER (OUTPATIENT)
Dept: INFUSION CENTER | Facility: HOSPITAL | Age: 76
Discharge: HOME | End: 2021-01-28
Attending: INTERNAL MEDICINE
Payer: MEDICARE

## 2021-01-28 VITALS
RESPIRATION RATE: 18 BRPM | DIASTOLIC BLOOD PRESSURE: 63 MMHG | HEART RATE: 83 BPM | SYSTOLIC BLOOD PRESSURE: 135 MMHG | TEMPERATURE: 97.8 F

## 2021-01-28 DIAGNOSIS — R52 PAIN: ICD-10-CM

## 2021-01-28 DIAGNOSIS — R11.2 CHEMOTHERAPY INDUCED NAUSEA AND VOMITING: ICD-10-CM

## 2021-01-28 DIAGNOSIS — E86.0 DEHYDRATION: Primary | ICD-10-CM

## 2021-01-28 DIAGNOSIS — T45.1X5A CHEMOTHERAPY INDUCED NAUSEA AND VOMITING: ICD-10-CM

## 2021-01-28 PROCEDURE — 96374 THER/PROPH/DIAG INJ IV PUSH: CPT

## 2021-01-28 PROCEDURE — 96361 HYDRATE IV INFUSION ADD-ON: CPT

## 2021-01-28 RX ADMIN — ONDANSETRON 8 MG: 2 INJECTION INTRAMUSCULAR; INTRAVENOUS at 12:50

## 2021-01-28 RX ADMIN — SODIUM CHLORIDE 1000 ML: 0.9 INJECTION, SOLUTION INTRAVENOUS at 12:44

## 2021-01-28 NOTE — PLAN OF CARE
Problem: Potential for Falls  Goal: Patient will remain free of falls  Description: INTERVENTIONS:  - Assess patient frequently for physical needs  -  Identify cognitive and physical deficits and behaviors that affect risk of falls    -  [place] fall precautions as indicated by assessment   - Educate patient/family on patient safety including physical limitations  - Instruct patient to call for assistance with activity based on assessment  - Modify environment to reduce risk of injury  - Consider OT/PT consult to assist with strengthening/mobility  Outcome: Progressing

## 2021-01-28 NOTE — PROGRESS NOTES
Pt tolerated hydration and zofran today without complications  Patient still has pain in right elbow and hand and still declines any further interventions  Confirmed appt back tomorrow

## 2021-01-29 ENCOUNTER — HOSPITAL ENCOUNTER (OUTPATIENT)
Dept: INFUSION CENTER | Facility: HOSPITAL | Age: 76
Discharge: HOME | End: 2021-01-29
Attending: INTERNAL MEDICINE
Payer: MEDICARE

## 2021-01-29 VITALS
SYSTOLIC BLOOD PRESSURE: 137 MMHG | TEMPERATURE: 97.7 F | DIASTOLIC BLOOD PRESSURE: 67 MMHG | RESPIRATION RATE: 18 BRPM | HEART RATE: 74 BPM

## 2021-01-29 DIAGNOSIS — R52 PAIN: ICD-10-CM

## 2021-01-29 DIAGNOSIS — R11.2 CHEMOTHERAPY INDUCED NAUSEA AND VOMITING: ICD-10-CM

## 2021-01-29 DIAGNOSIS — E86.0 DEHYDRATION: Primary | ICD-10-CM

## 2021-01-29 DIAGNOSIS — T45.1X5A CHEMOTHERAPY INDUCED NAUSEA AND VOMITING: ICD-10-CM

## 2021-01-29 PROCEDURE — 96361 HYDRATE IV INFUSION ADD-ON: CPT

## 2021-01-29 PROCEDURE — 96365 THER/PROPH/DIAG IV INF INIT: CPT

## 2021-01-29 RX ADMIN — SODIUM CHLORIDE 1000 ML: 0.9 INJECTION, SOLUTION INTRAVENOUS at 10:39

## 2021-01-29 RX ADMIN — ONDANSETRON 8 MG: 2 INJECTION INTRAMUSCULAR; INTRAVENOUS at 10:57

## 2021-01-29 NOTE — PLAN OF CARE
Problem: Potential for Falls  Goal: Patient will remain free of falls  Description: INTERVENTIONS:  - Assess patient frequently for physical needs  -  Identify cognitive and physical deficits and behaviors that affect risk of falls  -  [place] fall precautions as indicated by assessment   - Educate patient/family on patient safety including physical limitations  - Instruct patient to call for assistance with activity based on assessment  - Modify environment to reduce risk of injury  - Consider OT/PT consult to assist with strengthening/mobility  Outcome: Progressing     Problem: Knowledge Deficit  Goal: Patient/family/caregiver demonstrates understanding of disease process, treatment plan, medications, and discharge instructions  Description: Complete learning assessment and assess knowledge base    Interventions:  - Provide teaching at level of understanding  - Provide teaching via preferred learning methods  Outcome: Progressing

## 2021-01-29 NOTE — PROGRESS NOTES
Pt tolerated IV hydration and zofran without difficulty  Port flushed and deaccessed per protocol  Next appt confirmed  AVS declined  Escorted to lobby in w/c for [employer] transport home

## 2021-02-02 ENCOUNTER — HOSPITAL ENCOUNTER (OUTPATIENT)
Dept: INFUSION CENTER | Facility: HOSPITAL | Age: 76
Discharge: HOME | End: 2021-02-02
Attending: INTERNAL MEDICINE

## 2021-02-04 ENCOUNTER — HOSPITAL ENCOUNTER (OUTPATIENT)
Dept: INFUSION CENTER | Facility: HOSPITAL | Age: 76
Discharge: HOME | End: 2021-02-04
Attending: INTERNAL MEDICINE
Payer: MEDICARE

## 2021-02-04 VITALS
DIASTOLIC BLOOD PRESSURE: 69 MMHG | SYSTOLIC BLOOD PRESSURE: 127 MMHG | HEART RATE: 83 BPM | RESPIRATION RATE: 18 BRPM | TEMPERATURE: 97.5 F

## 2021-02-04 DIAGNOSIS — T45.1X5A CHEMOTHERAPY INDUCED NAUSEA AND VOMITING: ICD-10-CM

## 2021-02-04 DIAGNOSIS — R11.2 CHEMOTHERAPY INDUCED NAUSEA AND VOMITING: ICD-10-CM

## 2021-02-04 DIAGNOSIS — R52 PAIN: ICD-10-CM

## 2021-02-04 DIAGNOSIS — E86.0 DEHYDRATION: Primary | ICD-10-CM

## 2021-02-04 DIAGNOSIS — C50.919 METASTATIC BREAST CANCER (HCC): ICD-10-CM

## 2021-02-04 LAB
ALBUMIN SERPL BCP-MCNC: 3.5 G/DL (ref 3–5.2)
ALP SERPL-CCNC: 50 U/L (ref 43–122)
ALT SERPL W P-5'-P-CCNC: 17 U/L (ref 9–52)
ANION GAP SERPL CALCULATED.3IONS-SCNC: 4 MMOL/L (ref 5–14)
AST SERPL W P-5'-P-CCNC: 30 U/L (ref 14–36)
BASOPHILS # BLD AUTO: 0 THOUSANDS/ΜL (ref 0–0.1)
BASOPHILS NFR BLD AUTO: 0 % (ref 0–1)
BILIRUB SERPL-MCNC: 0.5 MG/DL
BUN SERPL-MCNC: 18 MG/DL (ref 5–25)
CALCIUM SERPL-MCNC: 8.8 MG/DL (ref 8.4–10.2)
CHLORIDE SERPL-SCNC: 103 MMOL/L (ref 97–108)
CO2 SERPL-SCNC: 30 MMOL/L (ref 22–30)
CREAT SERPL-MCNC: 0.91 MG/DL (ref 0.6–1.2)
EOSINOPHIL # BLD AUTO: 0.1 THOUSAND/ΜL (ref 0–0.4)
EOSINOPHIL NFR BLD AUTO: 2 % (ref 0–6)
ERYTHROCYTE [DISTWIDTH] IN BLOOD BY AUTOMATED COUNT: 15 %
GFR SERPL CREATININE-BSD FRML MDRD: 62 ML/MIN/1.73SQ M
GLUCOSE SERPL-MCNC: 83 MG/DL (ref 70–99)
HCT VFR BLD AUTO: 35.1 % (ref 36–46)
HGB BLD-MCNC: 11.5 G/DL (ref 12–16)
LYMPHOCYTES # BLD AUTO: 0.9 THOUSANDS/ΜL (ref 0.5–4)
LYMPHOCYTES NFR BLD AUTO: 22 % (ref 25–45)
MAGNESIUM SERPL-MCNC: 1.7 MG/DL (ref 1.6–2.3)
MCH RBC QN AUTO: 31 PG (ref 26–34)
MCHC RBC AUTO-ENTMCNC: 32.7 G/DL (ref 31–36)
MCV RBC AUTO: 95 FL (ref 80–100)
MONOCYTES # BLD AUTO: 0.4 THOUSAND/ΜL (ref 0.2–0.9)
MONOCYTES NFR BLD AUTO: 11 % (ref 1–10)
NEUTROPHILS # BLD AUTO: 2.7 THOUSANDS/ΜL (ref 1.8–7.8)
NEUTS SEG NFR BLD AUTO: 65 % (ref 45–65)
PLATELET # BLD AUTO: 254 THOUSANDS/UL (ref 150–450)
PMV BLD AUTO: 7.6 FL (ref 8.9–12.7)
POTASSIUM SERPL-SCNC: 3.9 MMOL/L (ref 3.6–5)
PROT SERPL-MCNC: 7.1 G/DL (ref 5.9–8.4)
RBC # BLD AUTO: 3.71 MILLION/UL (ref 4–5.2)
SODIUM SERPL-SCNC: 137 MMOL/L (ref 137–147)
WBC # BLD AUTO: 4.2 THOUSAND/UL (ref 4.5–11)

## 2021-02-04 PROCEDURE — 86300 IMMUNOASSAY TUMOR CA 15-3: CPT

## 2021-02-04 PROCEDURE — 85025 COMPLETE CBC W/AUTO DIFF WBC: CPT

## 2021-02-04 PROCEDURE — 83735 ASSAY OF MAGNESIUM: CPT

## 2021-02-04 PROCEDURE — 96361 HYDRATE IV INFUSION ADD-ON: CPT

## 2021-02-04 PROCEDURE — 80053 COMPREHEN METABOLIC PANEL: CPT

## 2021-02-04 PROCEDURE — 96374 THER/PROPH/DIAG INJ IV PUSH: CPT

## 2021-02-04 RX ADMIN — SODIUM CHLORIDE 1000 ML: 0.9 INJECTION, SOLUTION INTRAVENOUS at 12:08

## 2021-02-04 RX ADMIN — ONDANSETRON 8 MG: 2 INJECTION INTRAMUSCULAR; INTRAVENOUS at 12:19

## 2021-02-04 NOTE — PROGRESS NOTES
Okay to draw labs today for 2/15/21 follow up appointment as per provider  Hydration administered as per order  Patient tolerated well  Next appointment verified, AVS provided  Port left accessed for tomorrows treatment  Brisk blood return noted

## 2021-02-04 NOTE — PLAN OF CARE
Problem: Potential for Falls  Goal: Patient will remain free of falls  Description: INTERVENTIONS:  - Assess patient frequently for physical needs  -  Identify cognitive and physical deficits and behaviors that affect risk of falls    -  [place] fall precautions as indicated by assessment   - Educate patient/family on patient safety including physical limitations  - Instruct patient to call for assistance with activity based on assessment  - Modify environment to reduce risk of injury  - Consider OT/PT consult to assist with strengthening/mobility  Outcome: Progressing     Problem: METABOLIC, FLUID AND ELECTROLYTES - ADULT  Goal: Electrolytes maintained within normal limits  Description: INTERVENTIONS:  - Monitor labs and assess patient for signs and symptoms of electrolyte imbalances  - Administer electrolyte replacement as ordered  - Monitor response to electrolyte replacements, including repeat lab results as appropriate  - Instruct patient on fluid and nutrition as appropriate  Outcome: Progressing  Goal: Fluid balance maintained  Description: INTERVENTIONS:  - Monitor labs   - Monitor I/O and WT  - Instruct patient on fluid and nutrition as appropriate  - Assess for signs & symptoms of volume excess or deficit  Outcome: Progressing

## 2021-02-05 ENCOUNTER — HOSPITAL ENCOUNTER (OUTPATIENT)
Dept: INFUSION CENTER | Facility: HOSPITAL | Age: 76
Discharge: HOME | End: 2021-02-05
Attending: INTERNAL MEDICINE
Payer: MEDICARE

## 2021-02-05 VITALS
RESPIRATION RATE: 17 BRPM | SYSTOLIC BLOOD PRESSURE: 121 MMHG | TEMPERATURE: 97.9 F | DIASTOLIC BLOOD PRESSURE: 70 MMHG | HEART RATE: 89 BPM | OXYGEN SATURATION: 96 %

## 2021-02-05 DIAGNOSIS — R11.2 CHEMOTHERAPY INDUCED NAUSEA AND VOMITING: ICD-10-CM

## 2021-02-05 DIAGNOSIS — R52 PAIN: ICD-10-CM

## 2021-02-05 DIAGNOSIS — T45.1X5A CHEMOTHERAPY INDUCED NAUSEA AND VOMITING: ICD-10-CM

## 2021-02-05 DIAGNOSIS — E86.0 DEHYDRATION: Primary | ICD-10-CM

## 2021-02-05 LAB
CANCER AG15-3 SERPL-ACNC: 24.5 U/ML (ref 0–25)
CANCER AG27-29 SERPL-ACNC: 35.9 U/ML (ref 0–42.3)

## 2021-02-05 PROCEDURE — 96374 THER/PROPH/DIAG INJ IV PUSH: CPT

## 2021-02-05 PROCEDURE — 96361 HYDRATE IV INFUSION ADD-ON: CPT

## 2021-02-05 RX ADMIN — ONDANSETRON 8 MG: 2 INJECTION INTRAMUSCULAR; INTRAVENOUS at 10:49

## 2021-02-05 RX ADMIN — SODIUM CHLORIDE 1000 ML: 0.9 INJECTION, SOLUTION INTRAVENOUS at 10:25

## 2021-02-05 NOTE — PLAN OF CARE
Problem: Potential for Falls  Goal: Patient will remain free of falls  Description: INTERVENTIONS:  - Assess patient frequently for physical needs  -  Identify cognitive and physical deficits and behaviors that affect risk of falls    -  [place] fall precautions as indicated by assessment   - Educate patient/family on patient safety including physical limitations  - Instruct patient to call for assistance with activity based on assessment  - Modify environment to reduce risk of injury  - Consider OT/PT consult to assist with strengthening/mobility  Outcome: Progressing     Problem: METABOLIC, FLUID AND ELECTROLYTES - ADULT  Goal: Electrolytes maintained within normal limits  Description: INTERVENTIONS:  - Monitor labs and assess patient for signs and symptoms of electrolyte imbalances  - Administer electrolyte replacement as ordered  - Monitor response to electrolyte replacements, including repeat lab results as appropriate  - Instruct patient on fluid and nutrition as appropriate  Outcome: Progressing  Goal: Fluid balance maintained  Description: INTERVENTIONS:  - Monitor labs   - Monitor I/O and WT  - Instruct patient on fluid and nutrition as appropriate  - Assess for signs & symptoms of volume excess or deficit  Outcome: Progressing

## 2021-02-05 NOTE — PROGRESS NOTES
Hydration administered as per order  Patient tolerated well  Next appointment verified  Port de accessed   Patient declined AVS

## 2021-02-08 ENCOUNTER — HOSPITAL ENCOUNTER (OUTPATIENT)
Dept: INFUSION CENTER | Facility: HOSPITAL | Age: 76
Discharge: HOME | End: 2021-02-08
Attending: INTERNAL MEDICINE
Payer: MEDICARE

## 2021-02-08 ENCOUNTER — HOSPITAL ENCOUNTER (OUTPATIENT)
Dept: MRI IMAGING | Facility: HOSPITAL | Age: 76
Discharge: HOME | End: 2021-02-08
Payer: MEDICARE

## 2021-02-08 VITALS
HEART RATE: 83 BPM | TEMPERATURE: 97.9 F | RESPIRATION RATE: 18 BRPM | SYSTOLIC BLOOD PRESSURE: 128 MMHG | DIASTOLIC BLOOD PRESSURE: 75 MMHG

## 2021-02-08 DIAGNOSIS — T45.1X5A CHEMOTHERAPY INDUCED NAUSEA AND VOMITING: ICD-10-CM

## 2021-02-08 DIAGNOSIS — C79.31 BRAIN METASTASIS (HCC): ICD-10-CM

## 2021-02-08 DIAGNOSIS — R52 PAIN: Primary | ICD-10-CM

## 2021-02-08 DIAGNOSIS — R11.2 CHEMOTHERAPY INDUCED NAUSEA AND VOMITING: ICD-10-CM

## 2021-02-08 DIAGNOSIS — E86.0 DEHYDRATION: ICD-10-CM

## 2021-02-08 PROCEDURE — 96361 HYDRATE IV INFUSION ADD-ON: CPT

## 2021-02-08 PROCEDURE — A9585 GADOBUTROL INJECTION: HCPCS | Performed by: RADIOLOGY

## 2021-02-08 PROCEDURE — G1004 CDSM NDSC: HCPCS

## 2021-02-08 PROCEDURE — 70553 MRI BRAIN STEM W/O & W/DYE: CPT

## 2021-02-08 PROCEDURE — 96365 THER/PROPH/DIAG IV INF INIT: CPT

## 2021-02-08 RX ADMIN — GADOBUTROL 6 ML: 604.72 INJECTION INTRAVENOUS at 16:01

## 2021-02-08 RX ADMIN — SODIUM CHLORIDE 1000 ML: 0.9 INJECTION, SOLUTION INTRAVENOUS at 14:19

## 2021-02-08 RX ADMIN — ONDANSETRON 8 MG: 2 INJECTION INTRAMUSCULAR; INTRAVENOUS at 14:29

## 2021-02-08 NOTE — PLAN OF CARE
Problem: Potential for Falls  Goal: Patient will remain free of falls  Description: INTERVENTIONS:  - Assess patient frequently for physical needs  -  Identify cognitive and physical deficits and behaviors that affect risk of falls    -  [place] fall precautions as indicated by assessment   - Educate patient/family on patient safety including physical limitations  - Instruct patient to call for assistance with activity based on assessment  - Modify environment to reduce risk of injury  - Consider OT/PT consult to assist with strengthening/mobility  2/8/2021 1441 by Angelo Kam RN  Outcome: Progressing  2/8/2021 1441 by Angelo Kam, RN  Outcome: Progressing

## 2021-02-08 NOTE — PROGRESS NOTES
Pt tolerated treatment today with no adverse reactions  Port remains accessed for day 2 of hydration  Left unit via w/c escorted by staff to MRI

## 2021-02-09 ENCOUNTER — HOSPITAL ENCOUNTER (OUTPATIENT)
Dept: INFUSION CENTER | Facility: HOSPITAL | Age: 76
End: 2021-02-09
Attending: INTERNAL MEDICINE

## 2021-02-10 ENCOUNTER — RADIATION ONCOLOGY FOLLOW-UP (OUTPATIENT)
Dept: RADIATION ONCOLOGY | Facility: HOSPITAL | Age: 76
End: 2021-02-10
Attending: RADIOLOGY
Payer: MEDICARE

## 2021-02-10 ENCOUNTER — HOSPITAL ENCOUNTER (OUTPATIENT)
Dept: RADIOLOGY | Facility: MEDICAL CENTER | Age: 76
Discharge: HOME | End: 2021-02-10
Attending: PHYSICAL MEDICINE & REHABILITATION
Payer: MEDICARE

## 2021-02-10 ENCOUNTER — OFFICE VISIT (OUTPATIENT)
Dept: NEUROSURGERY | Facility: CLINIC | Age: 76
End: 2021-02-10

## 2021-02-10 VITALS
RESPIRATION RATE: 20 BRPM | DIASTOLIC BLOOD PRESSURE: 87 MMHG | SYSTOLIC BLOOD PRESSURE: 136 MMHG | HEART RATE: 87 BPM | OXYGEN SATURATION: 97 % | TEMPERATURE: 98.5 F

## 2021-02-10 VITALS
DIASTOLIC BLOOD PRESSURE: 70 MMHG | RESPIRATION RATE: 16 BRPM | HEIGHT: 63 IN | TEMPERATURE: 97.3 F | WEIGHT: 134 LBS | SYSTOLIC BLOOD PRESSURE: 120 MMHG | HEART RATE: 97 BPM | BODY MASS INDEX: 23.74 KG/M2

## 2021-02-10 VITALS
OXYGEN SATURATION: 97 % | BODY MASS INDEX: 23.74 KG/M2 | DIASTOLIC BLOOD PRESSURE: 70 MMHG | RESPIRATION RATE: 16 BRPM | SYSTOLIC BLOOD PRESSURE: 120 MMHG | HEART RATE: 97 BPM | TEMPERATURE: 97.3 F | HEIGHT: 63 IN | WEIGHT: 134 LBS

## 2021-02-10 DIAGNOSIS — M54.16 LUMBAR RADICULITIS: ICD-10-CM

## 2021-02-10 DIAGNOSIS — C79.31 BRAIN METASTASIS (HCC): Primary | ICD-10-CM

## 2021-02-10 DIAGNOSIS — M48.061 BILATERAL STENOSIS OF LATERAL RECESS OF LUMBAR SPINE: ICD-10-CM

## 2021-02-10 DIAGNOSIS — M43.16 SPONDYLOLISTHESIS OF LUMBAR REGION: ICD-10-CM

## 2021-02-10 PROCEDURE — 62323 NJX INTERLAMINAR LMBR/SAC: CPT | Performed by: PHYSICAL MEDICINE & REHABILITATION

## 2021-02-10 PROCEDURE — 99024 POSTOP FOLLOW-UP VISIT: CPT | Performed by: NEUROLOGICAL SURGERY

## 2021-02-10 PROCEDURE — 99211 OFF/OP EST MAY X REQ PHY/QHP: CPT | Performed by: RADIOLOGY

## 2021-02-10 RX ORDER — LIDOCAINE HYDROCHLORIDE 10 MG/ML
5 INJECTION, SOLUTION EPIDURAL; INFILTRATION; INTRACAUDAL; PERINEURAL ONCE
Status: COMPLETED | OUTPATIENT
Start: 2021-02-10 | End: 2021-02-10

## 2021-02-10 RX ORDER — METHYLPREDNISOLONE ACETATE 80 MG/ML
80 INJECTION, SUSPENSION INTRA-ARTICULAR; INTRALESIONAL; INTRAMUSCULAR; PARENTERAL; SOFT TISSUE ONCE
Status: COMPLETED | OUTPATIENT
Start: 2021-02-10 | End: 2021-02-10

## 2021-02-10 RX ADMIN — LIDOCAINE HYDROCHLORIDE 2 ML: 10 INJECTION, SOLUTION EPIDURAL; INFILTRATION; INTRACAUDAL; PERINEURAL at 10:50

## 2021-02-10 RX ADMIN — METHYLPREDNISOLONE ACETATE 80 MG: 80 INJECTION, SUSPENSION INTRA-ARTICULAR; INTRALESIONAL; INTRAMUSCULAR; PARENTERAL; SOFT TISSUE at 10:52

## 2021-02-10 RX ADMIN — IOHEXOL 2 ML: 300 INJECTION, SOLUTION INTRAVENOUS at 10:51

## 2021-02-10 NOTE — PROGRESS NOTES
Valerie Sagastume 1945 is a 77 y o  female       Follow up visit     Patient mostly speaks Tamazight  Accompanied by her sister today    Valerie Sagastume is a 77y o  year old female with a history of a right breast infiltrating ductal carcinoma grade 2 diagnosed in February of 2005 status post right mastectomy with left prophylactic mastectomy  Pathology confirmed 8/17 positive axillary lymph nodes from the right axilla  Estrogen receptors positive with negative progesterone receptors and Her 2 Gregory was also positive  She was treated with 6 cycles chemotherapy followed by tamoxifen and adjuvant radiation therapy to the right axilla and chest wall regions in [place]  Her tamoxifen was switched to Arimidex that she continued up until 2012  She was diagnosed with local chest wall recurrence on the right side with metastatic disease involving the right axilla, pectoralis muscle, left axilla that was confirmed with biopsy admitted 2015  She was started on palliative chemotherapy and had a good response and then has been on maintenance treatment since that time  She has had to have a hold on her therapy at times due to low ejection fraction  Her PET-CT scan December 16, 2019 showed increase uptake in an enlarging right axillary lymph node with some mild increased uptake in the right perihilar region and a new area in the left adrenal gland that was suspicious  She had right axillary biopsy December 26, 2019 that confirmed invasive ductal carcinoma  Her treatment was changed to Herceptin and Taxol every 3 weeks  Her treatment had to be held on March 16, 2020 due to reduced ejection fraction  She had CT scan of the head on March 18, 2020 an MRI of the brain March 20, 2020 which confirmed 4 discrete intracranial metastasis with the largest measuring 24 mm in size in the left occipital lobe with some associated edema  She had no neurologic symptoms    We discussed that she has multiple brain metastasis and we recommended whole-brain radiation therapy  She completed whole brain radiation therapy in April 10, 2020  She had SRS to the brain 12/9/2020     Repeat MRI of the brain on May 22, 2020 showed a good response with reduction in all 4 for brain metastasis and no new lesions  She had a repeat PET-CT study March 27, 2020 that revealed Increasing size of metastatic right axillary node and nonspecific increased activity in the lower spinal canal at the T12-L1 level  There was no CT correlate on the PET CT scan images and she was having no lower back pain at the time of her last appointment on May 28, 2020  She has developed lumbar spine pain and had MRI lumbar spine August 24, 2020 that revealed degenerative changes with severe central and bilateral lateral recess stenosis with moderate bilateral foraminal narrowing  She saw neurosurgery/Dr Jessica Caballero September 11, 2020 who recommended referral to pain management and no surgical intervention  Repeat MRI of the brain August 24, 2020 revealed the 2 lesions on the right side had decreased in size since May  There is a stable left occipital lobe mass with increased FLAIR abnormality and a new 4 mm lesion in the left occipital lobe immediately superior to the dominant occipital lobe mass with increasing FLAIR  Increasing FLAIR signal a left occipital lobe lesion could be due to post treatment changes but it is unclear if there is any residual viable tumor at this site  We recommend repeat MRI of the brain with a shorter interval at 8 weeks  11/11/2020 Telemedicine follow-up in [address] [place] [address] with Dr Chelsie Baxter  Repeat MRI of the brain from October 26, 2020 showed 2 enhancing lesions that have increased in size with a right parietal lobe lesion going from 3 to 5 mm and a 2nd larger enhancing occipital lobe mass going from 1 5 to 1 9 cm with some increasing edema  No new enhancing lesions were identified  Results were reviewed today with the patient and her sister    Her MRI was reviewed this morning in Neuro-Oncology working group and recommendations were made to start her on low-dose dexamethasone 2 mg b i d  She will have repeat MRI of the brain with and without contrast in 1 month  Should there be further progression, she will have stereotactic radiosurgery  PET-CT was also recommended to assess the status of the rest of her disease  She remains on the anastrozole and Tykerb with a reduced dose and less associated nausea and decrease in appetite  She remains on IV hydration daily  11/16/2020 F/U with Dr Briana Lou:   Assessment and Plan:  The patient was encouraged to continue with the Tykerb and to increase the dose to 1500 mg once a day as soon as possible  We will continue with the IV hydration on a daily basis as needed  Her case was discussed with the Radiation Oncology team recently after she had an MRI of the brain in showed 2 lesions which are getting larger  The plan is to pursue another MRI in a month from now and then consider stereotactic radiation to the 2 brain lesions  She stated that she is not interested in starting the dexamethasone which was prescribed by the Radiation Oncology team due to potential side effects  11/30/2020 PET/CT  IMPRESSION:  1  Partially decreased FDG uptake at the right axillary lymph node suggesting response to therapy but residual FDG uptake remains  2  New small focus of FDG uptake at the skin/subcutaneous tissues of the left anterior chest wall for which malignancy is not excluded  Close follow-up is advised  3  No significant residual FDG uptake in the right perihilar region  4   Slightly decreased left adrenal activity, nonspecific  5   A few tiny droplets of gas within the bladder lumen, nonspecific  Correlate for any recent infection or instrumentation  12/1/2020 MRI brain   IMPRESSION:  No new disease    Stable enhancing diastatic foci in the left occipital and right posterior parietal lobes, with minor increased edema related to the larger occipital lesion  2/8/21-MRI brain w wo contrast  IMPRESSION:     Favorable interval response of left occipital metastasis to interval SRS  Enhancing nidus and vasogenic edema have diminished since prior exam 12/1/2020      Treated right posterior parietal subcentimeter lesion is stable  Upcoming  3/18/21 f/u rad onc Dr Warner [facility]      Oncology History   Malignant neoplasm of overlapping 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, estrogen receptor positive ([facility] [address] )   2015 -  Hormone Therapy    Anastrozole     5/28/2015 Initial Diagnosis    Metastatic breast cancer ([facility] [address] )  (recurrence)     6/30/2015 -  Chemotherapy    1-Taxotere, Herceptin, Perjeta started in [place] 2015 x6 cycles  2-Herceptin and Perjeta alone were continued since the patient was not interested in continue the Taxotere  3-Herceptin and Perjeta were put on hold due to decline of her ejection fraction around May 2017    4-low dose weekly Taxol was started in July 2017  5-Taxol was switched to every other week basis     4/14/2019 - 6/9/2019 Chemotherapy    PACLitaxel (TAXOL) 144 6 mg in sodium chloride 0 9 % 250 mL chemo IVPB, 80 mg/m2, Intravenous, Once, 2 of 6 cycles     12/4/2019 - 12/31/2019 Chemotherapy    PACLitaxel (TAXOL) 142 2 mg in sodium chloride 0 9 % 250 mL chemo IVPB, 80 mg/m2 = 142 2 mg, Intravenous, Once, 1 of 6 cycles  Administration: 142 2 mg (12/4/2019), 142 2 mg (12/18/2019)     1/8/2020 - 3/10/2020 Chemotherapy    PACLitaxel (TAXOL) 141 6 mg in sodium chloride 0 9 % 250 mL chemo IVPB, 80 mg/m2 = 141 6 mg, Intravenous, Once, 4 of 6 cycles  Administration: 141 6 mg (1/8/2020), 141 6 mg (1/29/2020), 141 6 mg (2/19/2020)  trastuzumab (HERCEPTIN) 600 mg in sodium chloride 0 9 % 250 mL chemo infusion, 609 mg, Intravenous, Once, 4 of 6 cycles  Administration: 600 mg (1/8/2020), 450 mg (1/29/2020), 450 mg (2/19/2020)      Chemotherapy    PACLitaxel (TAXOL) 144 6 mg in sodium chloride 0 9 % 250 mL chemo IVPB, 80 mg/m2, Intravenous, Once, 2 of 6 cycles    PACLitaxel (TAXOL) 142 2 mg in sodium chloride 0 9 % 250 mL chemo IVPB, 80 mg/m2 = 142 2 mg, Intravenous, Once, 1 of 6 cycles    Administration: 142 2 mg (12/4/2019), 142 2 mg (12/18/2019)    PACLitaxel (TAXOL) 141 6 mg in sodium chloride 0 9 % 250 mL chemo IVPB, 80 mg/m2, Intravenous, Once, 0 of 12 cycles        trastuzumab (HERCEPTIN) 304 mg in sodium chloride 0 9 % 250 mL chemo infusion, 4 mg/kg, Intravenous, Once, 0 of 26 cycles    PACLitaxel (TAXOL) 141 6 mg in sodium chloride 0 9 % 250 mL chemo IVPB, 80 mg/m2 = 141 6 mg, Intravenous, Once, 4 of 6 cycles    Administration: 141 6 mg (1/8/2020), 141 6 mg (1/29/2020), 141 6 mg (2/19/2020)        trastuzumab (HERCEPTIN) 600 mg in sodium chloride 0 9 % 250 mL chemo infusion, 609 mg, Intravenous, Once, 4 of 6 cycles    Administration: 600 mg (1/8/2020), 450 mg (1/29/2020), 450 mg (2/19/2020)        Chemotherapy    PACLitaxel (TAXOL) 144 6 mg in sodium chloride 0 9 % 250 mL chemo IVPB, 80 mg/m2, Intravenous, Once, 2 of 6 cycles    PACLitaxel (TAXOL) 142 2 mg in sodium chloride 0 9 % 250 mL chemo IVPB, 80 mg/m2 = 142 2 mg, Intravenous, Once, 1 of 6 cycles    Administration: 142 2 mg (12/4/2019), 142 2 mg (12/18/2019)    PACLitaxel (TAXOL) 141 6 mg in sodium chloride 0 9 % 250 mL chemo IVPB, 80 mg/m2, Intravenous, Once, 0 of 12 cycles        trastuzumab (HERCEPTIN) 304 mg in sodium chloride 0 9 % 250 mL chemo infusion, 4 mg/kg, Intravenous, Once, 0 of 26 cycles    PACLitaxel (TAXOL) 141 6 mg in sodium chloride 0 9 % 250 mL chemo IVPB, 80 mg/m2 = 141 6 mg, Intravenous, Once, 4 of 6 cycles    Administration: 141 6 mg (1/8/2020), 141 6 mg (1/29/2020), 141 6 mg (2/19/2020)        trastuzumab (HERCEPTIN) 600 mg in sodium chloride 0 9 % 250 mL chemo infusion, 609 mg, Intravenous, Once, 4 of 6 cycles    Administration: 600 mg (1/8/2020), 450 mg (1/29/2020), 450 mg (2/19/2020)      Lapatinib 1500 mg once a day was started on 04/20/2020 after she was found to have metastatic disease to the brain, status post whole brain radiation       Brain metastasis ([facility] [address] )   3/30/2020 - 4/10/2020 Radiation    Plan ID Energy Fractions Dose per Fraction (cGy) Dose Correction (cGy) Total Dose Delivered (cGy) Elapsed Days   Whole Brai # 6X 10 / 10 300 0 3,000 11          4/9/2020 Initial Diagnosis    Brain metastasis (HCC)         Clinical Trial: no      Health Maintenance   Topic Date Due    DTaP,Tdap,and Td Vaccines (1 - Tdap) 01/[birth year]/1967    Colorectal Cancer Screening  01/13/1996    Pneumococcal Vaccine: 65+ Years (1 of 1 - PPSV23) 01/13/2011    Fall Risk  01/17/2021    Medicare Annual Wellness Visit (AWV)  01/17/2021    Influenza Vaccine (1) 06/30/2021 (Originally 9/1/2020)    COVID-19 Vaccine (2 of 2 - Moderna series) 02/17/2021    Depression Screening PHQ  12/02/2021    BMI: Adult  02/10/2022    Hepatitis C Screening  Completed    HIB Vaccine  Aged Out    Hepatitis B Vaccine  Aged Out    IPV Vaccine  Aged Out    Hepatitis A Vaccine  Aged Out    Meningococcal ACWY Vaccine  Aged Out    HPV Vaccine  Aged Out       Patient Active Problem List   Diagnosis    Malignant neoplasm of overlapping 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, estrogen receptor positive ([address] )    Use of aromatase inhibitors    Vitamin D deficiency    Benign essential hypertension    Abnormal echocardiography    Chronic systolic heart failure ([address]    Metastatic breast cancer ([address]    Colonoscopy refused    Immunization not carried out because of patient decision    Heart burn    Abnormal gastrointestinal PET scan    Osteopenia    Essential hypertension    Brain metastasis ([address]    Nonischemic cardiomyopathy ([address]    S/P chemotherapy, time since 4-12 weeks    Status post chemoradiation    Chemotherapy induced nausea and vomiting    Acute cystitis without hematuria    Severe protein-calorie malnutrition Josepha Plum: less than 60% of standard weight) ([address] )    Hypokalemia    Dehydration    Pain    Bilateral stenosis of lateral recess of lumbar spine    Vitamin B 12 deficiency    Lumbar radiculitis    Other spondylosis with radiculopathy, lumbar region    Cerebral edema (HCC)    Spondylolisthesis of lumbar region     Past Medical History:   Diagnosis Date    Back pain     Brain cancer ([facility] [address] )     Breast cancer ([facility] [address] )     Chronic pain     Hypertension     Knee pain     Lymphedema of right arm     Vitamin B12 deficiency      Past Surgical History:   Procedure Laterality Date    ANKLE SURGERY      APPENDECTOMY      BREAST SURGERY      bilat mastectomy-right total mastectomy and prophylactic left total mastectomy-2005    FRACTURE SURGERY      KNEE SURGERY      right knee replacement 2014 in [address] [place] [address] Bilateral     ORTHOPEDIC SURGERY       Family History   Problem Relation Age of Onset    Asthma Mother     Osteoarthritis Father     Bone cancer Paternal Aunt         bone cancer     Social History     Socioeconomic History    Marital status: Single     Spouse name: Not on file    Number of children: 0    Years of education: Not on file    Highest education level: Not on file   Occupational History    Not on file   Social Needs    Financial resource strain: Not on file    Food insecurity     Worry: Not on file     Inability: Not on file   [employer] needs     Medical: Not on file     Non-medical: Not on file   Tobacco Use    Smoking status: Never Smoker    Smokeless tobacco: Never Used    Tobacco comment: no passive smoke exposure   Substance and Sexual Activity    Alcohol use: Never     Frequency: Never     Binge frequency: Never    Drug use: No    Sexual activity: Not Currently   Lifestyle    Physical activity     Days per week: Not on file     Minutes per session: Not on file    Stress: Not on file   Relationships    Social connections     Talks on phone: Not on file     Gets together: Not on file     Attends Gnosticism service: Not on file     Active member of club or organization: Not on file     Attends meetings of clubs or organizations: Not on file     Relationship status: Not on file    Intimate partner violence     Fear of current or ex partner: Not on file     Emotionally abused: Not on file     Physically abused: Not on file     Forced sexual activity: Not on file   Other Topics Concern    Not on file   Social History Narrative    Not on file       Current Outpatient Medications:     anastrozole (ARIMIDEX) 1 mg tablet, TAKE ONE TABLET BY MOUTH EVERY DAY, Disp: 90 tablet, Rfl: 3    cholecalciferol (VITAMIN D3) 1,000 units tablet, Take 1 tablet (1,000 Units total) by mouth daily, Disp: 30 tablet, Rfl: 0    Cyanocobalamin 1000 MCG/ML KIT, Inject as directed every 6 (six) months, Disp: , Rfl:     Elastic Bandages & Supports (Prolite Lumbar Support) MISC, Use daily as needed (back pain), Disp: 1 each, Rfl: 0    esomeprazole (NexIUM) 40 MG capsule, TAKE 1 CAPSULE BY MOUTH ONCE DAILY, Disp: 90 capsule, Rfl: 0    gabapentin (NEURONTIN) 300 mg capsule, Take 3 capsules (900 mg total) by mouth daily at bedtime, Disp: 90 capsule, Rfl: 2    ibuprofen (MOTRIN) 800 mg tablet, Take 800 mg by mouth every 8 (eight) hours as needed, Disp: , Rfl:     indapamide (LOZOL) 1 25 mg tablet, Take 1 tablet (1 25 mg total) by mouth every morning, Disp: 30 tablet, Rfl: 1    lisinopril (ZESTRIL) 10 mg tablet, Take 1 tablet (10 mg total) by mouth daily, Disp: 90 tablet, Rfl: 3    Misc  Devices (ILLUSIONS C BREAST PROSTHESIS) MISC, 1 breast prosthesis, Disp: 1 each, Rfl: 0    Misc   Devices (REFLECTIONS C BREAST PROSTHES) MISC, Dispense 1 breast prosthesis, Disp: 1 each, Rfl: 0    nebivolol (Bystolic) 5 mg tablet, Take 1 tablet (5 mg total) by mouth daily, Disp: 90 tablet, Rfl: 3    ondansetron (Zofran) 4 mg tablet, Every 12 hours, Disp: , Rfl:     TYKERB 250 MG tablet, , Disp: , Rfl:     escitalopram (LEXAPRO) 5 mg tablet, Take 1 tablet (5 mg total) by mouth daily (Patient not taking: Reported on 2/10/2021), Disp: 30 tablet, Rfl: 5    megestrol (MEGACE) 400 mg/10 mL, Take 400 mg by mouth daily (Patient not taking: Reported on 2/10/2021), Disp: 600 mL, Rfl: 0    pantoprazole (PROTONIX) 40 mg tablet, Take 1 tablet (40 mg total) by mouth daily (Patient not taking: Reported on 2/10/2021), Disp: 90 tablet, Rfl: 3  No current facility-administered medications for this visit  Allergies   Allergen Reactions    Penicillins Rash       Review of Systems:  Review of Systems   Constitutional: Positive for appetite change and unexpected weight change  Negative for activity change, chills, fatigue and fever  Pt gets IV hydration almost daily  No appetite  HENT: Negative  Eyes: Negative  Respiratory: Negative for cough and shortness of breath  Cardiovascular: Negative  Negative for chest pain and leg swelling  Gastrointestinal: Negative for abdominal pain, blood in stool, constipation, diarrhea, nausea and vomiting  Genitourinary: Negative  Musculoskeletal: Positive for back pain and gait problem  Patient fell last week  Missed a step  Denies injuries but has some R arm pain  Skin: Negative  Allergic/Immunologic: Negative  Neurological: Positive for weakness (leg weakness at times) and numbness (bilat feet at night)  Negative for dizziness, light-headedness and headaches  Hematological: Negative  Psychiatric/Behavioral: Negative  Vitals:    02/10/21 1513   BP: 120/70   BP Location: Left arm   Patient Position: Sitting   Pulse: 97   Resp: 16   Temp: (!) 97 3 °F (36 3 °C)   TempSrc: Temporal   SpO2: 97%   Weight: 60 8 kg (134 lb)   Height: 5' 2 5" (1 588 m)               Imaging:Mri Brain W Wo Contrast    Result Date: 2/9/2021  Narrative: MRI BRAIN WITH AND WITHOUT CONTRAST INDICATION: C79 31: Secondary malignant neoplasm of brain     SRS left occipital mass 12/9/2020 COMPARISON:  MR 12/1/2020 TECHNIQUE: Sagittal T1, axial T2, axial FLAIR, axial T1, axial [place], axial diffusion  Sagittal, axial T1 postcontrast   Axial bravo postcontrast with coronal reconstructions  IV Contrast:  6 mL of Gadobutrol injection (SINGLE-DOSE)  IMAGE QUALITY:   Diagnostic  FINDINGS: BRAIN PARENCHYMA:  Favorable interval response of left occipital metastasis when compared to pretreatment MR 12/1/2020  Previously 1 9 x 1 5 cm, the enhancing nidus is currently 1 5 x 0 9 cm, 1001:67  Significant interval reduction in vasogenic edema  No significant mass effect  Static posterior right parietal lesion, approximately 8 x 5 mm 1001:152 is stable in appearance  No significant vasogenic edema or mass effect  Treatment-related changes in the brain parenchyma  Postcontrast imaging of the brain demonstrates no additional abnormal enhancement  VENTRICLES:  Normal for the patient's age  SELLA AND PITUITARY GLAND:  Normal  ORBITS:  Normal  PARANASAL SINUSES:  Hypoaeration of the sinuses with wall thickening indicative of chronic sinusitis  VASCULATURE:  Evaluation of the major intracranial vasculature demonstrates appropriate flow voids  CALVARIUM AND SKULL BASE:  Normal  EXTRACRANIAL SOFT TISSUES:  Normal      Impression: Favorable interval response of left occipital metastasis to interval SRS  Enhancing nidus and vasogenic edema have diminished since prior exam 12/1/2020  Treated right posterior parietal subcentimeter lesion is stable  Workstation performed: [id number]     Fl Spine And Pain Procedure    Result Date: 2/10/2021  Narrative: Indication:  Back and radiating leg pain Preoperative diagnosis:  Lumbar radiculitis Postoperative diagnosis:  Lumbar radiculitis Procedure: Fluoroscopically-guided left L5-S1 interlaminar epidural steroid injection under fluoroscopy EBL:  none Specimens:  not applicable After discussing the risks, benefits, and alternatives to the procedure, the patient expressed understanding and wished to proceed    The patient was brought to the fluoroscopy suite and placed in the prone position  A procedural pause was conducted to verify:  correct patient identity, procedure to be performed and as applicable, correct side and site, correct patient position, and availability of implants, special equipment and special requirements  After identifying the L5-S1 space fluoroscopically, the skin was sterilely prepped and draped in the usual fashion using Chloraprep skin prep  The skin and subcutaneous tissues were anesthetized with 0 5% lidocaine  Utilizing a loss of resistance technique and intermittent fluoroscopic guidance, a 3 5 inch 20 gauge Tuohy needle was advanced into the epidural space  Proper needle positioning was confirmed using multiple fluoroscopic views  After negative aspiration, Omnipaque 300 contrast was injected confirming epidural spread without evidence of intravascular or intrathecal spread  A 4 ml solution consisting of 80 mg of Depo-Medrol in sterile saline was injected slowly and incrementally into the epidural space  Following the injection the needle was withdrawn slightly and flushed with 0 5% buffered lidocaine as it was fully extracted  The patient tolerated the procedure well and there were no apparent complications  After appropriate observation, the patient was dismissed from the clinic in good condition under their own power  STEROID DISCLAIMER:  Given the potential for immune suppression with exposure to steroids and worsening response to infection with COVID-19 the patient was also verbally consented regarding this information and told to strictly follow current CDC guidelines

## 2021-02-10 NOTE — H&P
History of Present Illness:  The patient is a 77 y o  female who presents with complaints of Back and bilateral leg pain    Patient Active Problem List   Diagnosis    Malignant neoplasm of overlapping 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, estrogen receptor positive ([facility] )    Use of aromatase inhibitors    Vitamin D deficiency    Benign essential hypertension    Abnormal echocardiography    Chronic systolic heart failure ([facility] )    [facility]ast[facility] breast cancer ([facility] )    Colonoscopy refused    Immunization not carried out because of patient decision    Heart burn    Abnormal gastrointestinal PET scan    Osteopenia    Essential hypertension    Brain metastasis ([facility] )    Nonischemic cardiomyopathy ([facility] )    S/P chemotherapy, time since 4-12 weeks    Status post chemoradiation    Chemotherapy induced nausea and vomiting    Acute cystitis without hematuria    Severe protein-calorie malnutrition Carline Ferris: less than 60% of standard weight) (HCC)    Hypokalemia    Dehydration    Pain    Bilateral stenosis of lateral recess of lumbar spine    Vitamin B 12 deficiency    Lumbar radiculitis    Other spondylosis with radiculopathy, lumbar region    Cerebral edema (HCC)    Spondylolisthesis of lumbar region       Past Medical History:   Diagnosis Date    Back pain     Brain cancer ([facility] )     Breast cancer ([facility] )     Chronic pain     Hypertension     Knee pain     Lymphedema of right arm     Vitamin B12 deficiency        Past Surgical History:   Procedure Laterality Date    ANKLE SURGERY      APPENDECTOMY      BREAST SURGERY      bilat mastectomy-right total mastectomy and prophylactic left total mastectomy-2005    FRACTURE SURGERY      KNEE SURGERY      right knee replacement 2014 in [address] [place] [address] Bilateral     ORTHOPEDIC SURGERY           Current Outpatient Medications:     anastrozole (ARIMIDEX) 1 mg tablet, TAKE ONE TABLET BY MOUTH EVERY DAY, Disp: 90 tablet, Rfl: 3    cholecalciferol (VITAMIN D3) 1,000 units tablet, Take 1 tablet (1,000 Units total) by mouth daily, Disp: 30 tablet, Rfl: 0    Cyanocobalamin 1000 MCG/ML KIT, Inject as directed every 6 (six) months, Disp: , Rfl:     Elastic Bandages & Supports (Prolite Lumbar Support) MISC, Use daily as needed (back pain), Disp: 1 each, Rfl: 0    escitalopram (LEXAPRO) 5 mg tablet, Take 1 tablet (5 mg total) by mouth daily, Disp: 30 tablet, Rfl: 5    esomeprazole (NexIUM) 40 MG capsule, TAKE 1 CAPSULE BY MOUTH ONCE DAILY, Disp: 90 capsule, Rfl: 0    gabapentin (NEURONTIN) 300 mg capsule, Take 3 capsules (900 mg total) by mouth daily at bedtime, Disp: 90 capsule, Rfl: 2    ibuprofen (MOTRIN) 800 mg tablet, Take 800 mg by mouth every 8 (eight) hours as needed, Disp: , Rfl:     indapamide (LOZOL) 1 25 mg tablet, Take 1 tablet (1 25 mg total) by mouth every morning, Disp: 30 tablet, Rfl: 1    lisinopril (ZESTRIL) 10 mg tablet, Take 1 tablet (10 mg total) by mouth daily, Disp: 90 tablet, Rfl: 3    megestrol (MEGACE) 400 mg/10 mL, Take 400 mg by mouth daily, Disp: 600 mL, Rfl: 0    Misc  Devices (ILLUSIONS C BREAST PROSTHESIS) MISC, 1 breast prosthesis, Disp: 1 each, Rfl: 0    Misc   Devices (REFLECTIONS C BREAST PROSTHES) MISC, Dispense 1 breast prosthesis, Disp: 1 each, Rfl: 0    nebivolol (Bystolic) 5 mg tablet, Take 1 tablet (5 mg total) by mouth daily, Disp: 90 tablet, Rfl: 3    ondansetron (Zofran) 4 mg tablet, Every 12 hours, Disp: , Rfl:     pantoprazole (PROTONIX) 40 mg tablet, Take 1 tablet (40 mg total) by mouth daily, Disp: 90 tablet, Rfl: 3    TYKERB 250 MG tablet, , Disp: , Rfl:     Current Facility-Administered Medications:     iohexol (OMNIPAQUE) 300 mg/mL injection 50 mL, 50 mL, Epidural, Once, Shannan Arrow, DO    lidocaine (PF) (XYLOCAINE-MPF) 1 % injection 5 mL, 5 mL, Infiltration, Once, Shannan Arrow, DO    methylPREDNISolone acetate (DEPO-MEDROL) injection 80 mg, 80 mg, Epidural, Once, Shannan Arrow, DO    Allergies Allergen Reactions    Penicillins Rash       Physical Exam:   Vitals:    02/10/21 1039   BP: 135/87   Pulse: 94   Resp: 20   Temp: 98 5 °F (36 9 °C)   SpO2: 98%     General: Awake, Alert, Oriented x 3, Mood and affect appropriate  Respiratory: Respirations even and unlabored  Cardiovascular: Peripheral pulses intact; no edema  Musculoskeletal Exam:  Back and bilateral leg pain    ASA Score:  2    Patient/Chart Verification  Patient ID Verified: Verbal  Consents Confirmed: Procedural, To be obtained in the Pre-Procedure area  H&P( within 30 days) Verified: To be obtained in the Pre-Procedure area  Allergies Reviewed: Yes  Anticoag/NSAID held?: NA  Currently on antibiotics?: No  Pregnancy denied?: NA    Assessment:   1  Lumbar radiculitis    2  Spondylolisthesis of lumbar region    3   Bilateral stenosis of lateral recess of lumbar spine        Plan: L5 LESI on or after 2-10-21

## 2021-02-10 NOTE — PROGRESS NOTES
Follow-up - Radiation Oncology   Valerie Sagastume 1945 77 y o  female [id number]      History of Present Illness   Cancer Staging  No matching staging information was found for the patient  Interval History:   Patient mostly speaks English  Accompanied by her sister today     Valerie Sagastume is a 77y o  year old female with a history of a right breast infiltrating ductal carcinoma grade 2 diagnosed in February of 2005 status post right mastectomy with left prophylactic mastectomy  Pathology confirmed 8/17 positive axillary lymph nodes from the right axilla   Estrogen receptors positive with negative progesterone receptors and Her 2 Gregory was also positive   She was treated with 6 cycles chemotherapy followed by tamoxifen and adjuvant radiation therapy to the right axilla and chest wall regions in [place] [id number] tamoxifen was switched to Arimidex that she continued up until 2012      She was diagnosed with local chest wall recurrence on the right side with metastatic disease involving the right axilla, pectoralis muscle, left axilla that was confirmed with biopsy admitted 2015   She was started on palliative chemotherapy and had a good response and then has been on maintenance treatment since that time  Gelaamy Villanuevagina has had to have a hold on her therapy at times due to low ejection fraction   Her PET-CT scan December 16, 2019 showed increase uptake in an enlarging right axillary lymph node with some mild increased uptake in the right perihilar region and a new area in the left adrenal gland that was suspicious   She had right axillary biopsy December 26, 2019 that confirmed invasive ductal carcinoma   Her treatment was changed to Herceptin and Taxol every 3 weeks  [facility] treatment had to be held on March 16, 2020 due to reduced ejection fraction          She had CT scan of the head on March 18, 2020 an MRI of the brain March 20, 2020 which confirmed 4 discrete intracranial metastasis with the largest measuring 24 mm in size in the left occipital lobe with some associated edema   She had no neurologic symptoms   We discussed that she has multiple brain metastasis and we recommended whole-brain radiation therapy   She completed whole brain radiation therapy in April 10, 2020  She had SRS to the brain 12/9/2020     Repeat MRI of the brain on May 22, 2020 showed a good response with reduction in all 4 for brain metastasis and no new lesions   She had a repeat PET-CT study March 27, 2020 that revealed Increasing size of metastatic right axillary node and nonspecific increased activity in the lower spinal canal at the T12-L1 level   There was no CT correlate on the PET CT scan images and she was having no lower back pain at the time of her last appointment on May 28, 2020  She has developed lumbar spine pain and had MRI lumbar spine August 24, 2020 that revealed degenerative changes with severe central and bilateral lateral recess stenosis with moderate bilateral foraminal narrowing   She saw neurosurgery/Dr Clarice Farias September 11, 2020 who recommended referral to pain management and no surgical intervention   Repeat MRI of the brain August 24, 2020 revealed the 2 lesions on the right side had decreased in size since May   There is a stable left occipital lobe mass with increased FLAIR abnormality and a new 4 mm lesion in the left occipital lobe immediately superior to the dominant occipital lobe mass with increasing FLAIR   Increasing FLAIR signal a left occipital lobe lesion could be due to post treatment changes but it is unclear if there is any residual viable tumor at this site   We recommend repeat MRI of the brain with a shorter interval at 8 weeks        11/11/2020 Telemedicine follow-up in [facility] with Dr Mariposa Stevens MRI of the brain from October 26, 2020 showed 2 enhancing lesions that have increased in size with a right parietal lobe lesion going from 3 to 5 mm and a 2nd larger enhancing occipital lobe mass going from 1 5 to 1 9 cm with some increasing edema   No new enhancing lesions were identified   Results were reviewed today with the patient and her sister  Chaya Alvarado MRI was reviewed this morning in Neuro-Oncology working group and recommendations were made to start her on low-dose dexamethasone 2 mg b i d  Nahum White will have repeat MRI of the brain with and without contrast in 1 month   Should there be further progression, she will have stereotactic radiosurgery   PET-CT was also recommended to assess the status of the rest of her disease   She remains on the anastrozole and Tykerb with a reduced dose and less associated nausea and decrease in appetite   She remains on IV hydration daily         11/16/2020 F/U with Dr Darin Victoria:   Assessment and Plan:  The patient was encouraged to continue with the Tykerb and to increase the dose to 1500 mg once a day as soon as possible   We will continue with the IV hydration on a daily basis as needed   Her case was discussed with the Radiation Oncology team recently after she had an MRI of the brain in showed 2 lesions which are getting larger   The plan is to pursue another MRI in a month from now and then consider stereotactic radiation to the 2 brain lesions   She stated that she is not interested in starting the dexamethasone which was prescribed by the Radiation Oncology team due to potential side effects      11/30/2020 PET/CT  IMPRESSION:  1  Partially decreased FDG uptake at the right axillary lymph node suggesting response to therapy but residual FDG uptake remains  2  New small focus of FDG uptake at the skin/subcutaneous tissues of the left anterior chest wall for which malignancy is not excluded   Close follow-up is advised  3  No significant residual FDG uptake in the right perihilar region  4   Slightly decreased left adrenal activity, nonspecific    5   A few tiny droplets of gas within the bladder lumen, nonspecific   Correlate for any recent infection or instrumentation      12/1/2020 MRI brain   IMPRESSION:  No new disease   Stable enhancing diastatic foci in the left occipital and right posterior parietal lobes, with minor increased edema related to the larger occipital lesion      2/8/21-MRI brain w wo contrast  IMPRESSION:     Favorable interval response of left occipital metastasis to interval SRS   Enhancing nidus and vasogenic edema have diminished since prior exam 12/1/2020      Treated right posterior parietal subcentimeter lesion is stable            Upcoming  3/18/21 f/u rad onc Dr Davi Sorensen   Oncology History   Malignant neoplasm of overlapping 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, estrogen receptor positive ([facility] [address] )   2015 -  Hormone Therapy    Anastrozole     5/28/2015 Initial Diagnosis    Metastatic breast cancer ([facility] [address] )  (recurrence)     6/30/2015 -  Chemotherapy    1-Taxotere, Herceptin, Perjeta started in [place] 2015 x6 cycles  2-Herceptin and Perjeta alone were continued since the patient was not interested in continue the Taxotere  3-Herceptin and Perjeta were put on hold due to decline of her ejection fraction around May 2017    4-low dose weekly Taxol was started in July 2017  5-Taxol was switched to every other week basis     4/14/2019 - 6/9/2019 Chemotherapy    PACLitaxel (TAXOL) 144 6 mg in sodium chloride 0 9 % 250 mL chemo IVPB, 80 mg/m2, Intravenous, Once, 2 of 6 cycles     12/4/2019 - 12/31/2019 Chemotherapy    PACLitaxel (TAXOL) 142 2 mg in sodium chloride 0 9 % 250 mL chemo IVPB, 80 mg/m2 = 142 2 mg, Intravenous, Once, 1 of 6 cycles  Administration: 142 2 mg (12/4/2019), 142 2 mg (12/18/2019)     1/8/2020 - 3/10/2020 Chemotherapy    PACLitaxel (TAXOL) 141 6 mg in sodium chloride 0 9 % 250 mL chemo IVPB, 80 mg/m2 = 141 6 mg, Intravenous, Once, 4 of 6 cycles  Administration: 141 6 mg (1/8/2020), 141 6 mg (1/29/2020), 141 6 mg (2/19/2020)  trastuzumab (HERCEPTIN) 600 mg in sodium chloride 0 9 % 250 mL chemo infusion, 609 mg, Intravenous, Once, 4 of 6 cycles  Administration: 600 mg (1/8/2020), 450 mg (1/29/2020), 450 mg (2/19/2020)      Chemotherapy    PACLitaxel (TAXOL) 144 6 mg in sodium chloride 0 9 % 250 mL chemo IVPB, 80 mg/m2, Intravenous, Once, 2 of 6 cycles    PACLitaxel (TAXOL) 142 2 mg in sodium chloride 0 9 % 250 mL chemo IVPB, 80 mg/m2 = 142 2 mg, Intravenous, Once, 1 of 6 cycles    Administration: 142 2 mg (12/4/2019), 142 2 mg (12/18/2019)    PACLitaxel (TAXOL) 141 6 mg in sodium chloride 0 9 % 250 mL chemo IVPB, 80 mg/m2, Intravenous, Once, 0 of 12 cycles        trastuzumab (HERCEPTIN) 304 mg in sodium chloride 0 9 % 250 mL chemo infusion, 4 mg/kg, Intravenous, Once, 0 of 26 cycles    PACLitaxel (TAXOL) 141 6 mg in sodium chloride 0 9 % 250 mL chemo IVPB, 80 mg/m2 = 141 6 mg, Intravenous, Once, 4 of 6 cycles    Administration: 141 6 mg (1/8/2020), 141 6 mg (1/29/2020), 141 6 mg (2/19/2020)        trastuzumab (HERCEPTIN) 600 mg in sodium chloride 0 9 % 250 mL chemo infusion, 609 mg, Intravenous, Once, 4 of 6 cycles    Administration: 600 mg (1/8/2020), 450 mg (1/29/2020), 450 mg (2/19/2020)        Chemotherapy    PACLitaxel (TAXOL) 144 6 mg in sodium chloride 0 9 % 250 mL chemo IVPB, 80 mg/m2, Intravenous, Once, 2 of 6 cycles    PACLitaxel (TAXOL) 142 2 mg in sodium chloride 0 9 % 250 mL chemo IVPB, 80 mg/m2 = 142 2 mg, Intravenous, Once, 1 of 6 cycles    Administration: 142 2 mg (12/4/2019), 142 2 mg (12/18/2019)    PACLitaxel (TAXOL) 141 6 mg in sodium chloride 0 9 % 250 mL chemo IVPB, 80 mg/m2, Intravenous, Once, 0 of 12 cycles        trastuzumab (HERCEPTIN) 304 mg in sodium chloride 0 9 % 250 mL chemo infusion, 4 mg/kg, Intravenous, Once, 0 of 26 cycles    PACLitaxel (TAXOL) 141 6 mg in sodium chloride 0 9 % 250 mL chemo IVPB, 80 mg/m2 = 141 6 mg, Intravenous, Once, 4 of 6 cycles    Administration: 141 6 mg (1/8/2020), 141 6 mg (1/29/2020), 141 6 mg (2/19/2020)        trastuzumab (HERCEPTIN) 600 mg in sodium chloride 0 9 % 250 mL chemo infusion, 609 mg, Intravenous, Once, 4 of 6 cycles    Administration: 600 mg (1/8/2020), 450 mg (1/29/2020), 450 mg (2/19/2020)      Lapatinib 1500 mg once a day was started on 04/20/2020 after she was found to have metastatic disease to the brain, status post whole brain radiation       Brain metastasis ([facility] )   3/30/2020 - 4/10/2020 Radiation    Plan ID Energy Fractions Dose per Fraction (cGy) Dose Correction (cGy) Total Dose Delivered (cGy) Elapsed Days   Whole Brai # 6X 10 / 10 300 0 3,000 11          4/9/2020 Initial Diagnosis    Brain metastasis ([facility])         Past Medical History:   Diagnosis Date    Back pain     Brain cancer ([facility] )     Breast cancer ([facility] )     Chronic pain     Hypertension     Knee pain     Lymphedema of right arm     Vitamin B12 deficiency      Past Surgical History:   Procedure Laterality Date    ANKLE SURGERY      APPENDECTOMY      BREAST SURGERY      bilat mastectomy-right total mastectomy and prophylactic left total mastectomy-2005    FRACTURE SURGERY      KNEE SURGERY      right knee replacement 2014 in [facility] Bilateral    [facility] History   Social History     Substance and Sexual Activity   Alcohol Use Never    Frequency: Never    Binge frequency: Never     Social History     Substance and Sexual Activity   Drug Use No     Social History     Tobacco Use   Smoking Status Never Smoker   Smokeless Tobacco Never Used   Tobacco Comment    no passive smoke exposure         Meds/Allergies     Current Outpatient Medications:     anastrozole (ARIMIDEX) 1 mg tablet, TAKE ONE TABLET BY MOUTH EVERY DAY, Disp: 90 tablet, Rfl: 3    cholecalciferol (VITAMIN D3) 1,000 units tablet, Take 1 tablet (1,000 Units total) by mouth daily, Disp: 30 tablet, Rfl: 0    Cyanocobalamin 1000 MCG/ML KIT, Inject as directed every 6 (six) months, Disp: , Rfl:     Elastic Bandages & Supports (Prolite Lumbar Support) MISC, Use daily as needed (back pain), Disp: 1 each, Rfl: 0    esomeprazole (NexIUM) 40 MG capsule, TAKE 1 CAPSULE BY MOUTH ONCE DAILY, Disp: 90 capsule, Rfl: 0    gabapentin (NEURONTIN) 300 mg capsule, Take 3 capsules (900 mg total) by mouth daily at bedtime, Disp: 90 capsule, Rfl: 2    ibuprofen (MOTRIN) 800 mg tablet, Take 800 mg by mouth every 8 (eight) hours as needed, Disp: , Rfl:     indapamide (LOZOL) 1 25 mg tablet, Take 1 tablet (1 25 mg total) by mouth every morning, Disp: 30 tablet, Rfl: 1    lisinopril (ZESTRIL) 10 mg tablet, Take 1 tablet (10 mg total) by mouth daily, Disp: 90 tablet, Rfl: 3    Misc  Devices (ILLUSIONS C BREAST PROSTHESIS) MISC, 1 breast prosthesis, Disp: 1 each, Rfl: 0    Misc  Devices (REFLECTIONS C BREAST PROSTHES) MISC, Dispense 1 breast prosthesis, Disp: 1 each, Rfl: 0    nebivolol (Bystolic) 5 mg tablet, Take 1 tablet (5 mg total) by mouth daily, Disp: 90 tablet, Rfl: 3    ondansetron (Zofran) 4 mg tablet, Every 12 hours, Disp: , Rfl:     TYKERB 250 MG tablet, , Disp: , Rfl:     escitalopram (LEXAPRO) 5 mg tablet, Take 1 tablet (5 mg total) by mouth daily (Patient not taking: Reported on 2/10/2021), Disp: 30 tablet, Rfl: 5    megestrol (MEGACE) 400 mg/10 mL, Take 400 mg by mouth daily (Patient not taking: Reported on 2/10/2021), Disp: 600 mL, Rfl: 0    pantoprazole (PROTONIX) 40 mg tablet, Take 1 tablet (40 mg total) by mouth daily (Patient not taking: Reported on 2/10/2021), Disp: 90 tablet, Rfl: 3  No current facility-administered medications for this visit  Allergies   Allergen Reactions    Penicillins Rash         Review of Systems   Constitutional: Positive for appetite change and unexpected weight change  Negative for activity change, chills, fatigue and fever  Pt gets IV hydration almost daily  No appetite  HENT: Negative  Eyes: Negative  Respiratory: Negative for cough and shortness of breath  Cardiovascular: Negative  Negative for chest pain and leg swelling  Gastrointestinal: Negative for abdominal pain, blood in stool, constipation, diarrhea, nausea and vomiting  Genitourinary: Negative  Musculoskeletal: Positive for back pain and gait problem  Patient fell last week  Missed a step  Denies injuries but has some R arm pain  Skin: Negative  Allergic/Immunologic: Negative  Neurological: Positive for weakness (leg weakness at times) and numbness (bilat feet at night)  Negative for dizziness, light-headedness and headaches  Hematological: Negative  Psychiatric/Behavioral: Negative  OBJECTIVE:   /70 (BP Location: Left arm, Patient Position: Sitting)   Pulse 97   Temp (!) 97 3 °F (36 3 °C) (Temporal)   Resp 16   Ht 5' 2 5" (1 588 m)   Wt 60 8 kg (134 lb)   SpO2 97%   BMI 24 12 kg/m²   Pain Assessment:  0  ECOG/Zubrod/WHO: 1 - Symptomatic but completely ambulatory    Physical Exam     She is conversing appropriately with   Her breathing is unlabored            RESULTS    Lab Results:   Recent Results (from the past 672 hour(s))   Cancer antigen 15-3    Collection Time: 02/04/21 12:03 PM   Result Value Ref Range    CA 15-3 24 5 0 0 - 25 0 U/mL   Cancer antigen 27 29    Collection Time: 02/04/21 12:03 PM   Result Value Ref Range    CA 27 29 35 9 0 0 - 42 3 U/mL   CBC and differential    Collection Time: 02/04/21 12:03 PM   Result Value Ref Range    WBC 4 20 (L) 4 50 - 11 00 Thousand/uL    RBC 3 71 (L) 4 00 - 5 20 Million/uL    Hemoglobin 11 5 (L) 12 0 - 16 0 g/dL    Hematocrit 35 1 (L) 36 0 - 46 0 %    MCV 95 80 - 100 fL    MCH 31 0 26 0 - 34 0 pg    MCHC 32 7 31 0 - 36 0 g/dL    RDW 15 0 <15 3 %    MPV 7 6 (L) 8 9 - 12 7 fL    Platelets 274 299 - 169 Thousands/uL    Neutrophils Relative 65 45 - 65 %    Lymphocytes Relative 22 (L) 25 - 45 %    Monocytes Relative 11 (H) 1 - 10 %    Eosinophils Relative 2 0 - 6 %    Basophils Relative 0 0 - 1 %    Neutrophils Absolute 2 70 1 80 - 7 80 Thousands/µL    Lymphocytes Absolute 0 90 0 50 - 4 00 Thousands/µL    Monocytes Absolute 0 40 0 20 - 0 90 Thousand/µL    Eosinophils Absolute 0 10 0 00 - 0 40 Thousand/µL    Basophils Absolute 0 00 0 00 - 0 10 Thousands/µL   Comprehensive metabolic panel    Collection Time: 02/04/21 12:03 PM   Result Value Ref Range    Sodium 137 137 - 147 mmol/L    Potassium 3 9 3 6 - 5 0 mmol/L    Chloride 103 97 - 108 mmol/L    CO2 30 22 - 30 mmol/L    ANION GAP 4 (L) 5 - 14 mmol/L    BUN 18 5 - 25 mg/dL    Creatinine 0 91 0 60 - 1 20 mg/dL    Glucose 83 70 - 99 mg/dL    Calcium 8 8 8 4 - 10 2 mg/dL    AST 30 14 - 36 U/L    ALT 17 9 - 52 U/L    Alkaline Phosphatase 50 43 - 122 U/L    Total Protein 7 1 5 9 - 8 4 g/dL    Albumin 3 5 3 0 - 5 2 g/dL    Total Bilirubin 0 50 <1 30 mg/dL    eGFR 62 >60 ml/min/1 73sq m   Magnesium    Collection Time: 02/04/21 12:03 PM   Result Value Ref Range    Magnesium 1 7 1 6 - 2 3 mg/dL       Imaging Studies:Mri Brain W Wo Contrast    Result Date: 2/9/2021  Narrative: MRI BRAIN WITH AND WITHOUT CONTRAST INDICATION: C79 31: Secondary malignant neoplasm of brain  SRS left occipital mass 12/9/2020 COMPARISON:  MR 12/1/2020 TECHNIQUE: Sagittal T1, axial T2, axial FLAIR, axial T1, axial Redfox, axial diffusion  Sagittal, axial T1 postcontrast   Axial bravo postcontrast with coronal reconstructions  IV Contrast:  6 mL of Gadobutrol injection (SINGLE-DOSE)  IMAGE QUALITY:   Diagnostic  FINDINGS: BRAIN PARENCHYMA:  Favorable interval response of left occipital metastasis when compared to pretreatment MR 12/1/2020  Previously 1 9 x 1 5 cm, the enhancing nidus is currently 1 5 x 0 9 cm, 1001:67  Significant interval reduction in vasogenic edema  No significant mass effect  Static posterior right parietal lesion, approximately 8 x 5 mm 1001:152 is stable in appearance  No significant vasogenic edema or mass effect  Treatment-related changes in the brain parenchyma  Postcontrast imaging of the brain demonstrates no additional abnormal enhancement  VENTRICLES:  Normal for the patient's age  SELLA AND PITUITARY GLAND:  Normal  ORBITS:  Normal  PARANASAL SINUSES:  Hypoaeration of the sinuses with wall thickening indicative of chronic sinusitis  VASCULATURE:  Evaluation of the major intracranial vasculature demonstrates appropriate flow voids  CALVARIUM AND SKULL BASE:  Normal  EXTRACRANIAL SOFT TISSUES:  Normal      Impression: Favorable interval response of left occipital metastasis to interval SRS  Enhancing nidus and vasogenic edema have diminished since prior exam 12/1/2020  Treated right posterior parietal subcentimeter lesion is stable  Workstation performed: [id number]     Fl Spine And Pain Procedure    Result Date: 2/10/2021  Narrative: Indication:  Back and radiating leg pain Preoperative diagnosis:  Lumbar radiculitis Postoperative diagnosis:  Lumbar radiculitis Procedure: Fluoroscopically-guided left L5-S1 interlaminar epidural steroid injection under fluoroscopy EBL:  none Specimens:  not applicable After discussing the risks, benefits, and alternatives to the procedure, the patient expressed understanding and wished to proceed  The patient was brought to the fluoroscopy suite and placed in the prone position  A procedural pause was conducted to verify:  correct patient identity, procedure to be performed and as applicable, correct side and site, correct patient position, and availability of implants, special equipment and special requirements  After identifying the L5-S1 space fluoroscopically, the skin was sterilely prepped and draped in the usual fashion using Chloraprep skin prep  The skin and subcutaneous tissues were anesthetized with 0 5% lidocaine  Utilizing a loss of resistance technique and intermittent fluoroscopic guidance, a 3 5 inch 20 gauge Tuohy needle was advanced into the epidural space  Proper needle positioning was confirmed using multiple fluoroscopic views    After negative aspiration, Omnipaque 300 contrast was injected confirming epidural spread without evidence of intravascular or intrathecal spread  A 4 ml solution consisting of 80 mg of Depo-Medrol in sterile saline was injected slowly and incrementally into the epidural space  Following the injection the needle was withdrawn slightly and flushed with 0 5% buffered lidocaine as it was fully extracted  The patient tolerated the procedure well and there were no apparent complications  After appropriate observation, the patient was dismissed from the clinic in good condition under their own power  STEROID DISCLAIMER:  Given the potential for immune suppression with exposure to steroids and worsening response to infection with COVID-19 the patient was also verbally consented regarding this information and told to strictly follow current CDC guidelines  Assessment/Plan:  No orders of the defined types were placed in this encounter  Shannan Kim is a 77y o  year old female  Who is 2 months status post  SRS for recurrent brain metastasis from her breast  Carcinoma  She has tolerated treatment well  She was seen in Neuro-Oncology Clinic today along with Dr Price Celestin  We reviewed her MRI of the brain which reveals response to therapy  She does follow with Medical Oncology and receives hydration  We have ordered follow-up MRI of the brain in 3 months and she will return to Neuro-Oncology Clinic thereafter  Annel Power MD  2/10/2021,5:18 PM    Portions of the record may have been created with voice recognition software   Occasional wrong word or "sound a like" substitutions may have occurred due to the inherent limitations of voice recognition software   Read the chart carefully and recognize, using context, where substitutions have occurred

## 2021-02-10 NOTE — PROGRESS NOTES
Neurosurgery Office Note  Tricia Ramirez 77 y o  female MRN: [id number]      Assessment/Plan      Diagnoses and all orders for this visit:    Brain metastasis [facility])    Other orders  -     MRI brain w wo contrast; Future          Discussion:    S/p SRS to left occipital lesions, 12/9/20  Lesion shows improvement  Plan for f/u MRI in 3 months, that I've ordered  12/02/20 Metrics: [id number]; VAS 50; ECOG 1; KPS 90    02/10/21 Metrics: ; VAS 50; ECOG 1; KPS 80          CHIEF COMPLAINT    Chief Complaint   Patient presents with    Post-op     2 MONTH POST SRS MRI 2/8       HISTORY    History of Present Illness     77y o  year old female     HPI    See Discussion    REVIEW OF SYSTEMS    Review of Systems   Constitutional: Positive for appetite change (doesn't eat alot, get fluids by IV) and unexpected weight change (lose of weight, get fluids by IV)  HENT: Negative  Eyes: Negative  Respiratory: Negative  Cardiovascular: Negative  Gastrointestinal: Negative  Endocrine: Negative  Genitourinary: Negative  Musculoskeletal: Positive for back pain (centered LBP radiates to b/l legs at times ) and gait problem (recent fall last week, missed a step, takes her time when walking)  Skin: Negative  Allergic/Immunologic: Negative  Neurological: Positive for weakness (sometimes not constant, bilateral legs causes difficulty walking  ) and numbness (and tingling in bilateral feet)  Negative for dizziness, seizures, syncope, speech difficulty, light-headedness and headaches  Hematological: Negative  Psychiatric/Behavioral: Negative            Meds/Allergies     Current Outpatient Medications   Medication Sig Dispense Refill    anastrozole (ARIMIDEX) 1 mg tablet TAKE ONE TABLET BY MOUTH EVERY DAY 90 tablet 3    cholecalciferol (VITAMIN D3) 1,000 units tablet Take 1 tablet (1,000 Units total) by mouth daily 30 tablet 0    Cyanocobalamin 1000 MCG/ML KIT Inject as directed every 6 (six) months      Elastic Bandages & Supports (Prolite Lumbar Support) MISC Use daily as needed (back pain) 1 each 0    esomeprazole (NexIUM) 40 MG capsule TAKE 1 CAPSULE BY MOUTH ONCE DAILY 90 capsule 0    gabapentin (NEURONTIN) 300 mg capsule Take 3 capsules (900 mg total) by mouth daily at bedtime 90 capsule 2    ibuprofen (MOTRIN) 800 mg tablet Take 800 mg by mouth every 8 (eight) hours as needed      indapamide (LOZOL) 1 25 mg tablet Take 1 tablet (1 25 mg total) by mouth every morning 30 tablet 1    lisinopril (ZESTRIL) 10 mg tablet Take 1 tablet (10 mg total) by mouth daily 90 tablet 3    Misc  Devices (ILLUSIONS C BREAST PROSTHESIS) MISC 1 breast prosthesis 1 each 0    Misc  Devices (REFLECTIONS C BREAST PROSTHES) MISC Dispense 1 breast prosthesis 1 each 0    nebivolol (Bystolic) 5 mg tablet Take 1 tablet (5 mg total) by mouth daily 90 tablet 3    ondansetron (Zofran) 4 mg tablet Every 12 hours      TYKERB 250 MG tablet       escitalopram (LEXAPRO) 5 mg tablet Take 1 tablet (5 mg total) by mouth daily (Patient not taking: Reported on 2/10/2021) 30 tablet 5    megestrol (MEGACE) 400 mg/10 mL Take 400 mg by mouth daily (Patient not taking: Reported on 2/10/2021) 600 mL 0    pantoprazole (PROTONIX) 40 mg tablet Take 1 tablet (40 mg total) by mouth daily (Patient not taking: Reported on 2/10/2021) 90 tablet 3     No current facility-administered medications for this visit          Allergies   Allergen Reactions    Penicillins Rash       PAST HISTORY    Past Medical History:   Diagnosis Date    Back pain     [facility] [address] [facility]     [facility] cancer [facility] [address] [facility]     Chronic pain     Hypertension     Knee pain     Lymphedema of right arm     Vitamin B12 deficiency        Past Surgical History:   Procedure Laterality Date    ANKLE SURGERY      APPENDECTOMY      BREAST SURGERY      bilat mastectomy-right total mastectomy and prophylactic left total mastectomy-2005    FRACTURE SURGERY      KNEE SURGERY right knee replacement 2014 [facility] Bilateral     ORTHOPEDIC SURGERY         Social History     Tobacco Use    Smoking status: Never Smoker    Smokeless tobacco: Never Used    Tobacco comment: no passive smoke exposure   Substance Use Topics    Alcohol use: Never     Frequency: Never     Binge frequency: Never    Drug use: No       Family History   Problem Relation Age of Onset    Asthma Mother     Osteoarthritis Father     Bone cancer Paternal Aunt         bone cancer         The following portions of the patient's history were reviewed in this encounter and updated as appropriate: Past medical, surgical, family, and social history, as well as medications, allergies, and review of systems  EXAM    Vitals:Blood pressure 120/70, pulse 97, temperature (!) 97 3 °F (36 3 °C), resp  rate 16, height 5' 2 5" (1 588 m), weight 60 8 kg (134 lb), not currently breastfeeding  ,Body mass index is 24 12 kg/m²  Physical Exam    Neurologic Exam      MEDICAL DECISION MAKING    Imaging Studies:     Mri Brain W Wo Contrast    Result Date: 2/9/2021  Narrative: MRI BRAIN WITH AND WITHOUT CONTRAST INDICATION: C79 31: Secondary malignant neoplasm of brain  SRS left occipital mass 12/9/2020 COMPARISON:  MR 12/1/2020 TECHNIQUE: Sagittal T1, axial T2, axial FLAIR, axial T1, axial Hedrick, axial diffusion  Sagittal, axial T1 postcontrast   Axial bravo postcontrast with coronal reconstructions  IV Contrast:  6 mL of Gadobutrol injection (SINGLE-DOSE)  IMAGE QUALITY:   Diagnostic  FINDINGS: BRAIN PARENCHYMA:  Favorable interval response of left occipital metastasis when compared to pretreatment MR 12/1/2020  Previously 1 9 x 1 5 cm, the enhancing nidus is currently 1 5 x 0 9 cm, 1001:67  Significant interval reduction in vasogenic edema  No significant mass effect  Static posterior right parietal lesion, approximately 8 x 5 mm 1001:152 is stable in appearance    No significant vasogenic edema or mass effect  Treatment-related changes in the brain parenchyma  Postcontrast imaging of the brain demonstrates no additional abnormal enhancement  VENTRICLES:  Normal for the patient's age  SELLA AND PITUITARY GLAND:  Normal  ORBITS:  Normal  PARANASAL SINUSES:  Hypoaeration of the sinuses with wall thickening indicative of chronic sinusitis  VASCULATURE:  Evaluation of the major intracranial vasculature demonstrates appropriate flow voids  CALVARIUM AND SKULL BASE:  Normal  EXTRACRANIAL SOFT TISSUES:  Normal      Impression: Favorable interval response of left occipital metastasis to interval SRS  Enhancing nidus and vasogenic edema have diminished since prior exam 12/1/2020  Treated right posterior parietal subcentimeter lesion is stable  Workstation performed: [id number]       I have personally reviewed pertinent reports     and I have personally reviewed pertinent films in PACS

## 2021-02-10 NOTE — DISCHARGE INSTRUCTIONS
Epidural Steroid Injection   WHAT YOU NEED TO KNOW:   An epidural steroid injection (ORA) is a procedure to inject steroid medicine into the epidural space  The epidural space is between your spinal cord and vertebrae  Steroids reduce inflammation and fluid buildup in your spine that may be causing pain  You may be given pain medicine along with the steroids  ACTIVITY  · Do not drive or operate machinery today  · No strenuous activity today - bending, lifting, etc   · You may resume normal activites starting tomorrow - start slowly and as tolerated  · You may shower today, but no tub baths or hot tubs  · You may have numbness for several hours from the local anesthetic  Please use caution and common sense, especially with weight-bearing activities  CARE OF THE INJECTION SITE  · If you have soreness or pain, apply ice to the area today (20 minutes on/20 minutes off)  · Starting tomorrow, you may use warm, moist heat or ice if needed  · You may have an increase or change in your discomfort for 36-48 hours after your treatment  · Apply ice and continue with any pain medication you have been prescribed  · Notify [facility] if you have any of the following: redness, drainage, swelling, headache, stiff neck or fever above 100°F     SPECIAL INSTRUCTIONS  · Our office will contact you in approximately 7 days for a progress report  MEDICATIONS  · Continue to take all routine medications  · Our office may have instructed you to hold some medications  If you have a problem specifically related to your procedure, please call our office at [phone]  Problems not related to your procedure should be directed to your primary care physician

## 2021-02-11 ENCOUNTER — HOSPITAL ENCOUNTER (OUTPATIENT)
Dept: INFUSION CENTER | Facility: HOSPITAL | Age: 76
Discharge: HOME | End: 2021-02-11
Attending: INTERNAL MEDICINE
Payer: MEDICARE

## 2021-02-11 ENCOUNTER — TELEPHONE (OUTPATIENT)
Dept: HEMATOLOGY ONCOLOGY | Facility: CLINIC | Age: 76
End: 2021-02-11

## 2021-02-11 ENCOUNTER — TELEPHONE (OUTPATIENT)
Dept: NUTRITION | Facility: CLINIC | Age: 76
End: 2021-02-11

## 2021-02-11 VITALS
RESPIRATION RATE: 18 BRPM | SYSTOLIC BLOOD PRESSURE: 137 MMHG | TEMPERATURE: 97.6 F | HEART RATE: 84 BPM | DIASTOLIC BLOOD PRESSURE: 96 MMHG

## 2021-02-11 DIAGNOSIS — R11.2 CHEMOTHERAPY INDUCED NAUSEA AND VOMITING: ICD-10-CM

## 2021-02-11 DIAGNOSIS — E86.0 DEHYDRATION: Primary | ICD-10-CM

## 2021-02-11 DIAGNOSIS — T45.1X5A CHEMOTHERAPY INDUCED NAUSEA AND VOMITING: ICD-10-CM

## 2021-02-11 DIAGNOSIS — R52 PAIN: ICD-10-CM

## 2021-02-11 PROCEDURE — 96360 HYDRATION IV INFUSION INIT: CPT

## 2021-02-11 RX ADMIN — SODIUM CHLORIDE 1000 ML: 0.9 INJECTION, SOLUTION INTRAVENOUS at 13:10

## 2021-02-11 NOTE — TELEPHONE ENCOUNTER
Contacted Renu Jung and her sister Joe Miranda (as pt speaks Maltese and is main ) to set up nutrition consultation after receiving notification by Dari RN (Ivan Crimes ) on 2/10/21 that pt has triggered for oncology nutrition care (reason for referral: Malnutrition Screening Tool (MST) Triggers: scored a 1 indicating No recent wt loss and is eating poorly due to a decreased appetite  (Date of MST: 2/10/21) and patient request due to "pt request   Gets IV hydration almost daily")  No answer  Left voice message with the reason for today's call and this RDs contact information asking that Fe and/or Joe Miranda call back as able/desired  Oncology Diagnosis & Treatments:  Oncology History   Malignant neoplasm of overlapping 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, estrogen receptor positive ([facility] [address] )   2015 -  Hormone Therapy    Anastrozole     5/28/2015 Initial Diagnosis    Metastatic breast cancer (HCC)  (recurrence)     6/30/2015 -  Chemotherapy    1-Taxotere, Herceptin, Perjeta started in [place] 2015 x6 cycles  2-Herceptin and Perjeta alone were continued since the patient was not interested in continue the Taxotere  3-Herceptin and Perjeta were put on hold due to decline of her ejection fraction around May 2017    4-low dose weekly Taxol was started in July 2017  5-Taxol was switched to every other week basis     4/14/2019 - 6/9/2019 Chemotherapy    PACLitaxel (TAXOL) 144 6 mg in sodium chloride 0 9 % 250 mL chemo IVPB, 80 mg/m2, Intravenous, Once, 2 of 6 cycles     12/4/2019 - 12/31/2019 Chemotherapy    PACLitaxel (TAXOL) 142 2 mg in sodium chloride 0 9 % 250 mL chemo IVPB, 80 mg/m2 = 142 2 mg, Intravenous, Once, 1 of 6 cycles  Administration: 142 2 mg (12/4/2019), 142 2 mg (12/18/2019)     1/8/2020 - 3/10/2020 Chemotherapy    PACLitaxel (TAXOL) 141 6 mg in sodium chloride 0 9 % 250 mL chemo IVPB, 80 mg/m2 = 141 6 mg, Intravenous, Once, 4 of 6 cycles  Administration: 141 6 mg (1/8/2020), 141 6 mg (1/29/2020), 141 6 mg (2/19/2020)  trastuzumab (HERCEPTIN) 600 mg in sodium chloride 0 9 % 250 mL chemo infusion, 609 mg, Intravenous, Once, 4 of 6 cycles  Administration: 600 mg (1/8/2020), 450 mg (1/29/2020), 450 mg (2/19/2020)      Chemotherapy    PACLitaxel (TAXOL) 144 6 mg in sodium chloride 0 9 % 250 mL chemo IVPB, 80 mg/m2, Intravenous, Once, 2 of 6 cycles    PACLitaxel (TAXOL) 142 2 mg in sodium chloride 0 9 % 250 mL chemo IVPB, 80 mg/m2 = 142 2 mg, Intravenous, Once, 1 of 6 cycles    Administration: 142 2 mg (12/4/2019), 142 2 mg (12/18/2019)    PACLitaxel (TAXOL) 141 6 mg in sodium chloride 0 9 % 250 mL chemo IVPB, 80 mg/m2, Intravenous, Once, 0 of 12 cycles        trastuzumab (HERCEPTIN) 304 mg in sodium chloride 0 9 % 250 mL chemo infusion, 4 mg/kg, Intravenous, Once, 0 of 26 cycles    PACLitaxel (TAXOL) 141 6 mg in sodium chloride 0 9 % 250 mL chemo IVPB, 80 mg/m2 = 141 6 mg, Intravenous, Once, 4 of 6 cycles    Administration: 141 6 mg (1/8/2020), 141 6 mg (1/29/2020), 141 6 mg (2/19/2020)        trastuzumab (HERCEPTIN) 600 mg in sodium chloride 0 9 % 250 mL chemo infusion, 609 mg, Intravenous, Once, 4 of 6 cycles    Administration: 600 mg (1/8/2020), 450 mg (1/29/2020), 450 mg (2/19/2020)        Chemotherapy    PACLitaxel (TAXOL) 144 6 mg in sodium chloride 0 9 % 250 mL chemo IVPB, 80 mg/m2, Intravenous, Once, 2 of 6 cycles    PACLitaxel (TAXOL) 142 2 mg in sodium chloride 0 9 % 250 mL chemo IVPB, 80 mg/m2 = 142 2 mg, Intravenous, Once, 1 of 6 cycles    Administration: 142 2 mg (12/4/2019), 142 2 mg (12/18/2019)    PACLitaxel (TAXOL) 141 6 mg in sodium chloride 0 9 % 250 mL chemo IVPB, 80 mg/m2, Intravenous, Once, 0 of 12 cycles        trastuzumab (HERCEPTIN) 304 mg in sodium chloride 0 9 % 250 mL chemo infusion, 4 mg/kg, Intravenous, Once, 0 of 26 cycles    PACLitaxel (TAXOL) 141 6 mg in sodium chloride 0 9 % 250 mL chemo IVPB, 80 mg/m2 = 141 6 mg, Intravenous, Once, 4 of 6 cycles    Administration: 141 6 mg (1/8/2020), 141 6 mg (1/29/2020), 141 6 mg (2/19/2020)        trastuzumab (HERCEPTIN) 600 mg in sodium chloride 0 9 % 250 mL chemo infusion, 609 mg, Intravenous, Once, 4 of 6 cycles    Administration: 600 mg (1/8/2020), 450 mg (1/29/2020), 450 mg (2/19/2020)      Lapatinib 1500 mg once a day was started on 04/20/2020 after she was found to have metastatic disease to the brain, status post whole brain radiation  Brain metastasis ([facility] [address] )   3/30/2020 - 4/10/2020 Radiation    Plan ID Energy Fractions Dose per Fraction (cGy) Dose Correction (cGy) Total Dose Delivered (cGy) Elapsed Days   Whole Brai # 6X 10 / 10 300 0 3,000 11          4/9/2020 Initial Diagnosis    Brain metastasis ([facility] [address] )     12/9/2020 - 12/9/2020 Radiation    Plan ID Energy Fractions Dose per Fraction (cGy) Dose Correction (cGy) Total Dose Delivered (cGy) Elapsed Days   [id number] 6X 1 / 1 1,800 0 1,800 0      Treatment Dates:  12/9/2020 - 12/9/2020

## 2021-02-12 ENCOUNTER — HOSPITAL ENCOUNTER (OUTPATIENT)
Dept: INFUSION CENTER | Facility: HOSPITAL | Age: 76
Discharge: HOME | End: 2021-02-12
Attending: INTERNAL MEDICINE
Payer: MEDICARE

## 2021-02-12 VITALS
HEART RATE: 74 BPM | TEMPERATURE: 96.6 F | SYSTOLIC BLOOD PRESSURE: 147 MMHG | DIASTOLIC BLOOD PRESSURE: 87 MMHG | RESPIRATION RATE: 16 BRPM

## 2021-02-12 DIAGNOSIS — T45.1X5A CHEMOTHERAPY INDUCED NAUSEA AND VOMITING: ICD-10-CM

## 2021-02-12 DIAGNOSIS — R11.2 CHEMOTHERAPY INDUCED NAUSEA AND VOMITING: ICD-10-CM

## 2021-02-12 DIAGNOSIS — E86.0 DEHYDRATION: Primary | ICD-10-CM

## 2021-02-12 DIAGNOSIS — Z17.0 MALIGNANT NEOPLASM OF OVERLAPPING SITES OF RIGHT BREAST IN FEMALE, ESTROGEN RECEPTOR POSITIVE (HCC): ICD-10-CM

## 2021-02-12 DIAGNOSIS — R52 PAIN: ICD-10-CM

## 2021-02-12 DIAGNOSIS — C50.811 MALIGNANT NEOPLASM OF OVERLAPPING SITES OF RIGHT BREAST IN FEMALE, ESTROGEN RECEPTOR POSITIVE (HCC): ICD-10-CM

## 2021-02-12 PROCEDURE — 96360 HYDRATION IV INFUSION INIT: CPT

## 2021-02-12 RX ORDER — PANTOPRAZOLE SODIUM 40 MG/1
TABLET, DELAYED RELEASE ORAL
Qty: 90 TABLET | Refills: 0 | Status: SHIPPED | OUTPATIENT
Start: 2021-02-12 | End: 2022-03-08 | Stop reason: HOSPADM

## 2021-02-12 RX ADMIN — SODIUM CHLORIDE 1000 ML: 0.9 INJECTION, SOLUTION INTRAVENOUS at 13:19

## 2021-02-15 ENCOUNTER — HOSPITAL ENCOUNTER (OUTPATIENT)
Dept: INFUSION CENTER | Facility: HOSPITAL | Age: 76
End: 2021-02-15
Attending: INTERNAL MEDICINE

## 2021-02-15 ENCOUNTER — HOSPITAL ENCOUNTER (OUTPATIENT)
Dept: INFUSION CENTER | Facility: HOSPITAL | Age: 76
Discharge: HOME | End: 2021-02-15
Attending: INTERNAL MEDICINE
Payer: MEDICARE

## 2021-02-15 ENCOUNTER — OFFICE VISIT (OUTPATIENT)
Dept: HEMATOLOGY ONCOLOGY | Facility: CLINIC | Age: 76
End: 2021-02-15
Payer: MEDICARE

## 2021-02-15 VITALS
HEIGHT: 63 IN | DIASTOLIC BLOOD PRESSURE: 92 MMHG | OXYGEN SATURATION: 98 % | TEMPERATURE: 97.3 F | WEIGHT: 135.4 LBS | SYSTOLIC BLOOD PRESSURE: 140 MMHG | BODY MASS INDEX: 23.99 KG/M2 | HEART RATE: 96 BPM | RESPIRATION RATE: 18 BRPM

## 2021-02-15 DIAGNOSIS — E53.8 VITAMIN B 12 DEFICIENCY: ICD-10-CM

## 2021-02-15 DIAGNOSIS — E53.8 VITAMIN B 12 DEFICIENCY: Primary | ICD-10-CM

## 2021-02-15 DIAGNOSIS — C50.811 MALIGNANT NEOPLASM OF OVERLAPPING SITES OF RIGHT BREAST IN FEMALE, ESTROGEN RECEPTOR POSITIVE (HCC): Primary | ICD-10-CM

## 2021-02-15 DIAGNOSIS — Z79.811 USE OF AROMATASE INHIBITORS: ICD-10-CM

## 2021-02-15 DIAGNOSIS — Z17.0 MALIGNANT NEOPLASM OF OVERLAPPING SITES OF RIGHT BREAST IN FEMALE, ESTROGEN RECEPTOR POSITIVE (HCC): Primary | ICD-10-CM

## 2021-02-15 DIAGNOSIS — C79.31 BRAIN METASTASIS (HCC): ICD-10-CM

## 2021-02-15 PROCEDURE — 99214 OFFICE O/P EST MOD 30 MIN: CPT | Performed by: INTERNAL MEDICINE

## 2021-02-15 PROCEDURE — 96372 THER/PROPH/DIAG INJ SC/IM: CPT

## 2021-02-15 RX ORDER — CYANOCOBALAMIN 1000 UG/ML
1000 INJECTION INTRAMUSCULAR; SUBCUTANEOUS ONCE
Status: CANCELLED | OUTPATIENT
Start: 2021-03-15

## 2021-02-15 RX ORDER — CYANOCOBALAMIN 1000 UG/ML
1000 INJECTION INTRAMUSCULAR; SUBCUTANEOUS ONCE
Status: COMPLETED | OUTPATIENT
Start: 2021-02-15 | End: 2021-02-15

## 2021-02-15 RX ADMIN — CYANOCOBALAMIN 1000 MCG: 1000 INJECTION INTRAMUSCULAR; SUBCUTANEOUS at 14:54

## 2021-02-15 NOTE — PROGRESS NOTES
Hematology/Oncology Outpatient Follow-up  Melissa Monk 77 y o  female 1945 [id number]    Date:  2/15/2021        Assessment and Plan:  1  Malignant neoplasm of overlapping 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, estrogen receptor positive ([facility] )    The patient was asked to continue with the Tykerb on the current dose of 1500 mg once a day along with the anastrozole daily without interruption  We will wait for the echocardiogram results which will be done this week  She seems to be interested in continuing the IV hydration on as-needed basis  We did discuss the tumor markers which seem to be stable  - CBC and differential; Future  - Comprehensive metabolic panel; Future  - Cancer antigen 15-3; Future  - Cancer antigen 27 29; Future  - LD,Blood; Future    2  Brain metastasis ([facility] )    We discussed the most recent brain MRI from last week which showed good control without any hint of progression  She is most likely going to get another MRI of the brain on every 3 months basis  3  Use of aromatase inhibitors   she is encouraged to continue on the vitamin-D supplements on a daily basis  4  Vitamin B 12 deficiency    She is on vitamin B12 injections monthly which is improving her energy  I did also discuss were her increasing her meals beyond 3 times a day  To prevent further weight loss  HPI:   the patient came today for a follow-up visit accompanied by her sister  She continues to be on the Tykerb 1500 mg once a day along with the anastrozole  She also continues to take advantage of the IV hydration on as-needed basis  Her most recent blood work on 01/06/2021 showed normal vitamin B12 of 552  Her tumor markers are within normal range and stable  Her CBC showed hemoglobin of 11 4 with normal white cells and platelets    CMP entirely normal   She had an MRI of the brain for close follow-up with contrast on 02/08/2021 which showed:  IMPRESSION:     Favorable interval response of left occipital metastasis to interval SRS  Enhancing nidus and vasogenic edema have diminished since prior exam 12/1/2020      Treated right posterior parietal subcentimeter lesion is stable  She did complain about mild fatigue and about 4 lb weight loss  Oncology History   Malignant neoplasm of overlapping 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, estrogen receptor positive ([facility] )   2015 -  Hormone Therapy    Anastrozole     5/28/2015 Initial Diagnosis    Metastatic breast cancer ([facility] )  (recurrence)     6/30/2015 -  Chemotherapy    1-Taxotere, Herceptin, Perjeta started in [place] 2015 x6 cycles  2-Herceptin and Perjeta alone were continued since the patient was not interested in continue the Taxotere  3-Herceptin and Perjeta were put on hold due to decline of her ejection fraction around May 2017    4-low dose weekly Taxol was started in July 2017  5-Taxol was switched to every other week basis     4/14/2019 - 6/9/2019 Chemotherapy    PACLitaxel (TAXOL) 144 6 mg in sodium chloride 0 9 % 250 mL chemo IVPB, 80 mg/m2, Intravenous, Once, 2 of 6 cycles     12/4/2019 - 12/31/2019 Chemotherapy    PACLitaxel (TAXOL) 142 2 mg in sodium chloride 0 9 % 250 mL chemo IVPB, 80 mg/m2 = 142 2 mg, Intravenous, Once, 1 of 6 cycles  Administration: 142 2 mg (12/4/2019), 142 2 mg (12/18/2019)     1/8/2020 - 3/10/2020 Chemotherapy    PACLitaxel (TAXOL) 141 6 mg in sodium chloride 0 9 % 250 mL chemo IVPB, 80 mg/m2 = 141 6 mg, Intravenous, Once, 4 of 6 cycles  Administration: 141 6 mg (1/8/2020), 141 6 mg (1/29/2020), 141 6 mg (2/19/2020)  trastuzumab (HERCEPTIN) 600 mg in sodium chloride 0 9 % 250 mL chemo infusion, 609 mg, Intravenous, Once, 4 of 6 cycles  Administration: 600 mg (1/8/2020), 450 mg (1/29/2020), 450 mg (2/19/2020)      Chemotherapy    PACLitaxel (TAXOL) 144 6 mg in sodium chloride 0 9 % 250 mL chemo IVPB, 80 mg/m2, Intravenous, Once, 2 of 6 cycles    PACLitaxel (TAXOL) 142 2 mg in sodium chloride 0 9 % 250 mL chemo IVPB, 80 mg/m2 = 142 2 mg, Intravenous, Once, 1 of 6 cycles    Administration: 142 2 mg (12/4/2019), 142 2 mg (12/18/2019)    PACLitaxel (TAXOL) 141 6 mg in sodium chloride 0 9 % 250 mL chemo IVPB, 80 mg/m2, Intravenous, Once, 0 of 12 cycles        trastuzumab (HERCEPTIN) 304 mg in sodium chloride 0 9 % 250 mL chemo infusion, 4 mg/kg, Intravenous, Once, 0 of 26 cycles    PACLitaxel (TAXOL) 141 6 mg in sodium chloride 0 9 % 250 mL chemo IVPB, 80 mg/m2 = 141 6 mg, Intravenous, Once, 4 of 6 cycles    Administration: 141 6 mg (1/8/2020), 141 6 mg (1/29/2020), 141 6 mg (2/19/2020)        trastuzumab (HERCEPTIN) 600 mg in sodium chloride 0 9 % 250 mL chemo infusion, 609 mg, Intravenous, Once, 4 of 6 cycles    Administration: 600 mg (1/8/2020), 450 mg (1/29/2020), 450 mg (2/19/2020)        Chemotherapy    PACLitaxel (TAXOL) 144 6 mg in sodium chloride 0 9 % 250 mL chemo IVPB, 80 mg/m2, Intravenous, Once, 2 of 6 cycles    PACLitaxel (TAXOL) 142 2 mg in sodium chloride 0 9 % 250 mL chemo IVPB, 80 mg/m2 = 142 2 mg, Intravenous, Once, 1 of 6 cycles    Administration: 142 2 mg (12/4/2019), 142 2 mg (12/18/2019)    PACLitaxel (TAXOL) 141 6 mg in sodium chloride 0 9 % 250 mL chemo IVPB, 80 mg/m2, Intravenous, Once, 0 of 12 cycles        trastuzumab (HERCEPTIN) 304 mg in sodium chloride 0 9 % 250 mL chemo infusion, 4 mg/kg, Intravenous, Once, 0 of 26 cycles    PACLitaxel (TAXOL) 141 6 mg in sodium chloride 0 9 % 250 mL chemo IVPB, 80 mg/m2 = 141 6 mg, Intravenous, Once, 4 of 6 cycles    Administration: 141 6 mg (1/8/2020), 141 6 mg (1/29/2020), 141 6 mg (2/19/2020)        trastuzumab (HERCEPTIN) 600 mg in sodium chloride 0 9 % 250 mL chemo infusion, 609 mg, Intravenous, Once, 4 of 6 cycles    Administration: 600 mg (1/8/2020), 450 mg (1/29/2020), 450 mg (2/19/2020)      Lapatinib 1500 mg once a day was started on 04/20/2020 after she was found to have metastatic disease to the brain, status post whole brain radiation       Brain metastasis ([facility] )   3/30/2020 - 4/10/2020 Radiation    Plan ID Energy Fractions Dose per Fraction (cGy) Dose Correction (cGy) Total Dose Delivered (cGy) Elapsed Days   Whole Brai # 6X 10 / 10 300 0 3,000 11          4/9/2020 Initial Diagnosis    Brain metastasis ([facility] )     12/9/2020 - 12/9/2020 Radiation    Plan ID Energy Fractions Dose per Fraction (cGy) Dose Correction (cGy) Total Dose Delivered (cGy) Elapsed Days   [id number] 6X 1 / 1 1,800 0 1,800 0      Treatment Dates:  12/9/2020 - 12/9/2020  Interval history:    ROS: Review of Systems   Constitutional: Positive for fatigue  Negative for chills and fever  HENT: Negative for ear pain and sore throat  Eyes: Negative for pain and visual disturbance  Respiratory: Negative for cough and shortness of breath  Cardiovascular: Negative for chest pain and palpitations  Gastrointestinal: Negative for abdominal pain and vomiting  Genitourinary: Negative for dysuria and hematuria  Musculoskeletal: Negative for arthralgias and back pain  Skin: Negative for color change and rash  Neurological: Negative for seizures and syncope  All other systems reviewed and are negative        Past Medical History:   Diagnosis Date    Back pain     Brain cancer ([facility] )     Breast cancer ([facility] )     Chronic pain     Hypertension     Knee pain     Lymphedema of right arm     Vitamin B12 deficiency        Past Surgical History:   Procedure Laterality Date    ANKLE SURGERY      APPENDECTOMY      BREAST SURGERY      bilat mastectomy-right total mastectomy and prophylactic left total mastectomy-2005    FRACTURE SURGERY      KNEE SURGERY      right knee replacement 2014 in [address] [place] [address] Bilateral     ORTHOPEDIC SURGERY         Social History     Socioeconomic History    Marital status: Single     Spouse name: None    Number of children: 0    Years of education: None    Highest education level: None   Occupational History    None   Social Needs    Financial resource strain: None    Food insecurity     Worry: None     Inability: None    Transportation needs     Medical: None     Non-medical: None   Tobacco Use    Smoking status: Never Smoker    Smokeless tobacco: Never Used    Tobacco comment: no passive smoke exposure   Substance and Sexual Activity    Alcohol use: Never     Frequency: Never     Binge frequency: Never    Drug use: No    Sexual activity: Not Currently   Lifestyle    Physical activity     Days per week: None     Minutes per session: None    Stress: None   Relationships    Social connections     Talks on phone: None     Gets together: None     Attends Protestant service: None     Active member of club or organization: None     Attends meetings of clubs or organizations: None     Relationship status: None    Intimate partner violence     Fear of current or ex partner: None     Emotionally abused: None     Physically abused: None     Forced sexual activity: None   Other Topics Concern    None   Social History Narrative    None       Family History   Problem Relation Age of Onset    Asthma Mother     Osteoarthritis Father     Bone cancer Paternal Aunt         bone cancer       Allergies   Allergen Reactions    Penicillins Rash         Current Outpatient Medications:     anastrozole (ARIMIDEX) 1 mg tablet, TAKE ONE TABLET BY MOUTH EVERY DAY, Disp: 90 tablet, Rfl: 3    cholecalciferol (VITAMIN D3) 1,000 units tablet, Take 1 tablet (1,000 Units total) by mouth daily, Disp: 30 tablet, Rfl: 0    Cyanocobalamin 1000 MCG/ML KIT, Inject as directed every 6 (six) months, Disp: , Rfl:     Elastic Bandages & Supports (Prolite Lumbar Support) MISC, Use daily as needed (back pain), Disp: 1 each, Rfl: 0    esomeprazole (NexIUM) 40 MG capsule, TAKE 1 CAPSULE BY MOUTH ONCE DAILY, Disp: 90 capsule, Rfl: 0    gabapentin (NEURONTIN) 300 mg capsule, Take 3 capsules (900 mg total) by mouth daily at bedtime, Disp: 90 capsule, Rfl: 2    ibuprofen (MOTRIN) 800 mg tablet, Take 800 mg by mouth every 8 (eight) hours as needed, Disp: , Rfl:     indapamide (LOZOL) 1 25 mg tablet, Take 1 tablet (1 25 mg total) by mouth every morning, Disp: 30 tablet, Rfl: 1    lisinopril (ZESTRIL) 10 mg tablet, Take 1 tablet (10 mg total) by mouth daily, Disp: 90 tablet, Rfl: 3    Misc  Devices (ILLUSIONS C BREAST PROSTHESIS) MISC, 1 breast prosthesis, Disp: 1 each, Rfl: 0    Misc  Devices (REFLECTIONS C BREAST PROSTHES) MISC, Dispense 1 breast prosthesis, Disp: 1 each, Rfl: 0    nebivolol (Bystolic) 5 mg tablet, Take 1 tablet (5 mg total) by mouth daily, Disp: 90 tablet, Rfl: 3    ondansetron (Zofran) 4 mg tablet, Every 12 hours, Disp: , Rfl:     pantoprazole (PROTONIX) 40 mg tablet, TAKE ONE TABLET BY MOUTH EVERY DAY, Disp: 90 tablet, Rfl: 0    TYKERB 250 MG tablet, , Disp: , Rfl:     escitalopram (LEXAPRO) 5 mg tablet, Take 1 tablet (5 mg total) by mouth daily (Patient not taking: Reported on 2/10/2021), Disp: 30 tablet, Rfl: 5    megestrol (MEGACE) 400 mg/10 mL, Take 400 mg by mouth daily (Patient not taking: Reported on 2/10/2021), Disp: 600 mL, Rfl: 0  No current facility-administered medications for this visit  Facility-Administered Medications Ordered in Other Visits:     ondansetron (ZOFRAN) 8 mg in sodium chloride 0 9 % 50 mL IVPB, 8 mg, Intravenous, Once, Mya Courtney MD      Physical Exam:  /92 (BP Location: Left arm, Patient Position: Sitting, Cuff Size: Adult)   Pulse 96   Temp (!) 97 3 °F (36 3 °C) (Tympanic)   Resp 18   Ht 5' 2 5" (1 588 m)   Wt 61 4 kg (135 lb 6 4 oz)   SpO2 98%   BMI 24 37 kg/m²     Physical Exam  Constitutional:       General: She is not in acute distress  Appearance: She is well-developed  She is not diaphoretic  HENT:      Head: Normocephalic and atraumatic  Eyes:      General: No scleral icterus  Right eye: No discharge  Left eye: No discharge        Conjunctiva/sclera: Conjunctivae normal       Pupils: Pupils are equal, round, and reactive to light  Neck:      Musculoskeletal: Normal range of motion and neck supple  Thyroid: No thyromegaly  Vascular: No JVD  Trachea: No tracheal deviation  Cardiovascular:      Rate and Rhythm: Normal rate and regular rhythm  Heart sounds: Normal heart sounds  No murmur  No friction rub  Pulmonary:      Effort: Pulmonary effort is normal  No respiratory distress  Breath sounds: Normal breath sounds  No stridor  No wheezing or rales  Chest:      Chest wall: No tenderness  Abdominal:      General: There is no distension  Palpations: Abdomen is soft  There is no hepatomegaly or splenomegaly  Tenderness: There is no abdominal tenderness  There is no guarding or rebound  Musculoskeletal: Normal range of motion  General: No tenderness or deformity  Lymphadenopathy:      Cervical: No cervical adenopathy  Skin:     General: Skin is warm and dry  Coloration: Skin is not pale  Findings: No erythema or rash  Neurological:      Mental Status: She is alert and oriented to person, place, and time  Cranial Nerves: No cranial nerve deficit  Coordination: Coordination normal       Deep Tendon Reflexes: Reflexes are normal and symmetric  Psychiatric:         Behavior: Behavior normal          Thought Content:  Thought content normal          Judgment: Judgment normal            Labs:  Lab Results   Component Value Date    WBC 4 20 (L) 02/04/2021    HGB 11 5 (L) 02/04/2021    HCT 35 1 (L) 02/04/2021    MCV 95 02/04/2021     02/04/2021     Lab Results   Component Value Date     06/18/2018    K 3 9 02/04/2021     02/04/2021    CO2 30 02/04/2021    ANIONGAP 8 06/18/2018    BUN 18 02/04/2021    CREATININE 0 91 02/04/2021    GLUF 74 12/11/2020    CALCIUM 8 8 02/04/2021    CORRECTEDCA 9 8 12/11/2020    AST 30 02/04/2021    ALT 17 02/04/2021    ALKPHOS 50 02/04/2021    PROT 7 1 06/18/2018    BILITOT 0 3 06/18/2018 EGFR 62 02/04/2021     No results found for: TSH    Patient voiced understanding and agreement in the above discussion  Aware to contact our office with questions/symptoms in the interim

## 2021-02-15 NOTE — TELEPHONE ENCOUNTER
Second attempt made today to establish care with Argelia Hernandez  Spoke with sister, Deion Pierre, today  Explained the reason for my call and how I can help improve her sister's nutritional status  Deion Pierre reports that her sister sometimes does not want to eat because she does not feel hungry and does not like to drink  She receives daily IV hydration  Pt has also lost wt per sister  Sister says she likes fruit but some foods taste too sweet  Pt does better with salty or savory flavors  Her sister is trying to cook plain and bland foods  Deion Pierre is trying to be as supportive as she can by offering a wide variety of foods, but she says her sister frequently changes her mind about what she does/does not want to eat  She will consistently eat hamburgers and salmon  She will not eat eggs or yogurt  She tends to eat small portions and does best with eating earlier in the day  She will not consume oral nutrition supplements and is hesitant to try protein bars  Pt has megace on her med list but does not take it because she has too many medications already, discussed how this may be helpful for appetite stimulation  Recommended discussing medical management for appetite stimulation with her oncologist who she will see today and discussed the role of Palliative Care for sx management  Provided MNT for reduced appetite and wt loss to sister today  Encouraged a high kcal/protein diet and discussed food choices  Suggestions include: Offer something small to eat with protein every 1-2 hrs  Discussed protein containing foods  Incorporate more nuts and nut butter  Try savory trail mix flavors  Try nut butter or cheese on crackers  Jagruti Mutter with more oil and butter for added kcal   Choose liquids with calories (diluted juice, whole milk, tea with honey)  Choose whole milk and full-fat dairy products  Choose more savory and salty flavors rather than sweet ones  Make soups with bone broth    Try sipping on bone broth for protein, sodium, and to stay hydrated  Use dry milk powder when cooking for added calories and protein  Try cutting up small pieces of a protein bar to have with snacks  E-mailed to sister ([email or web address]'): [employer] Eating Hints Book, Oncology Milkshake & Smoothie Recipes, Poor Appetite handout    Discussed oncology nutrition services available (options for in-person and phone consultation) and the benefits of meeting for a consultation  For now, Laura Mike will try the above suggestions and review the handouts provided via e-mail  She will reach out to this RD with any questions, concerns, or should she wish to set up an appt for her sister  All questions and concerns addressed at this time

## 2021-02-15 NOTE — PROGRESS NOTES
Pt tolerated b12 to left deltoid without complications  Made next appts for hydration next week and AVS provided

## 2021-02-15 NOTE — PLAN OF CARE
Problem: Potential for Falls  Goal: Patient will remain free of falls  Description: INTERVENTIONS:  - Assess patient frequently for physical needs  -  Identify cognitive and physical deficits and behaviors that affect risk of falls    -  [place] fall precautions as indicated by assessment   - Educate patient/family on patient safety including physical limitations  - Instruct patient to call for assistance with activity based on assessment  - Modify environment to reduce risk of injury  - Consider OT/PT consult to assist with strengthening/mobility  Outcome: Progressing

## 2021-02-16 ENCOUNTER — HOSPITAL ENCOUNTER (OUTPATIENT)
Dept: INFUSION CENTER | Facility: HOSPITAL | Age: 76
End: 2021-02-16
Attending: INTERNAL MEDICINE

## 2021-02-17 ENCOUNTER — TELEPHONE (OUTPATIENT)
Dept: PAIN MEDICINE | Facility: CLINIC | Age: 76
End: 2021-02-17

## 2021-02-17 ENCOUNTER — HOSPITAL ENCOUNTER (OUTPATIENT)
Dept: NON INVASIVE DIAGNOSTICS | Facility: HOSPITAL | Age: 76
Discharge: HOME | End: 2021-02-17
Attending: INTERNAL MEDICINE
Payer: MEDICARE

## 2021-02-17 DIAGNOSIS — C50.919 METASTATIC BREAST CANCER (HCC): ICD-10-CM

## 2021-02-17 PROCEDURE — 93306 TTE W/DOPPLER COMPLETE: CPT | Performed by: INTERNAL MEDICINE

## 2021-02-17 PROCEDURE — 93306 TTE W/DOPPLER COMPLETE: CPT

## 2021-02-19 ENCOUNTER — IMMUNIZATIONS (OUTPATIENT)
Dept: FAMILY MEDICINE CLINIC | Facility: HOSPITAL | Age: 76
End: 2021-02-19

## 2021-02-19 DIAGNOSIS — Z23 ENCOUNTER FOR IMMUNIZATION: Primary | ICD-10-CM

## 2021-02-19 PROCEDURE — 91301 SARS-COV-2 / COVID-19 MRNA VACCINE (MODERNA) 100 MCG: CPT

## 2021-02-19 PROCEDURE — 0012A SARS-COV-2 / COVID-19 MRNA VACCINE (MODERNA) 100 MCG: CPT

## 2021-02-22 ENCOUNTER — HOSPITAL ENCOUNTER (OUTPATIENT)
Dept: INFUSION CENTER | Facility: HOSPITAL | Age: 76
End: 2021-02-22
Attending: INTERNAL MEDICINE

## 2021-02-23 ENCOUNTER — HOSPITAL ENCOUNTER (OUTPATIENT)
Dept: INFUSION CENTER | Facility: HOSPITAL | Age: 76
Discharge: HOME | End: 2021-02-23
Attending: INTERNAL MEDICINE
Payer: MEDICARE

## 2021-02-23 VITALS
SYSTOLIC BLOOD PRESSURE: 115 MMHG | HEART RATE: 77 BPM | TEMPERATURE: 97.9 F | DIASTOLIC BLOOD PRESSURE: 63 MMHG | RESPIRATION RATE: 16 BRPM

## 2021-02-23 DIAGNOSIS — R52 PAIN: ICD-10-CM

## 2021-02-23 DIAGNOSIS — E86.0 DEHYDRATION: Primary | ICD-10-CM

## 2021-02-23 DIAGNOSIS — R11.2 CHEMOTHERAPY INDUCED NAUSEA AND VOMITING: ICD-10-CM

## 2021-02-23 DIAGNOSIS — T45.1X5A CHEMOTHERAPY INDUCED NAUSEA AND VOMITING: ICD-10-CM

## 2021-02-23 PROCEDURE — 96360 HYDRATION IV INFUSION INIT: CPT

## 2021-02-23 RX ADMIN — SODIUM CHLORIDE 1000 ML: 0.9 INJECTION, SOLUTION INTRAVENOUS at 10:01

## 2021-02-23 NOTE — PLAN OF CARE
Problem: Potential for Falls  Goal: Patient will remain free of falls  Description: INTERVENTIONS:  - Assess patient frequently for physical needs  -  Identify cognitive and physical deficits and behaviors that affect risk of falls    -  [place] fall precautions as indicated by assessment   - Educate patient/family on patient safety including physical limitations  - Instruct patient to call for assistance with activity based on assessment  - Modify environment to reduce risk of injury  - Consider OT/PT consult to assist with strengthening/mobility  Outcome: Progressing

## 2021-02-23 NOTE — PROGRESS NOTES
Pt tolerated hydration well without complications, declined zofran today, confirmed next appts and AVS provided

## 2021-02-24 ENCOUNTER — HOSPITAL ENCOUNTER (OUTPATIENT)
Dept: INFUSION CENTER | Facility: HOSPITAL | Age: 76
Discharge: HOME | End: 2021-02-24
Attending: INTERNAL MEDICINE
Payer: MEDICARE

## 2021-02-24 VITALS
RESPIRATION RATE: 18 BRPM | SYSTOLIC BLOOD PRESSURE: 123 MMHG | HEART RATE: 77 BPM | DIASTOLIC BLOOD PRESSURE: 62 MMHG | TEMPERATURE: 97.8 F

## 2021-02-24 DIAGNOSIS — R11.2 CHEMOTHERAPY INDUCED NAUSEA AND VOMITING: ICD-10-CM

## 2021-02-24 DIAGNOSIS — E86.0 DEHYDRATION: Primary | ICD-10-CM

## 2021-02-24 DIAGNOSIS — T45.1X5A CHEMOTHERAPY INDUCED NAUSEA AND VOMITING: ICD-10-CM

## 2021-02-24 DIAGNOSIS — R52 PAIN: ICD-10-CM

## 2021-02-24 PROCEDURE — 96360 HYDRATION IV INFUSION INIT: CPT

## 2021-02-24 RX ADMIN — SODIUM CHLORIDE 1000 ML: 0.9 INJECTION, SOLUTION INTRAVENOUS at 12:39

## 2021-02-24 NOTE — PROGRESS NOTES
Pt tolerated hydration well today without complications  Declined zofran  Patient confirmed next appt Friday and requested to keep port accessed

## 2021-02-26 ENCOUNTER — HOSPITAL ENCOUNTER (OUTPATIENT)
Dept: INFUSION CENTER | Facility: HOSPITAL | Age: 76
Discharge: HOME | End: 2021-02-26
Attending: INTERNAL MEDICINE
Payer: MEDICARE

## 2021-02-26 VITALS
TEMPERATURE: 97.5 F | RESPIRATION RATE: 16 BRPM | SYSTOLIC BLOOD PRESSURE: 124 MMHG | HEART RATE: 75 BPM | DIASTOLIC BLOOD PRESSURE: 77 MMHG

## 2021-02-26 DIAGNOSIS — R11.2 CHEMOTHERAPY INDUCED NAUSEA AND VOMITING: ICD-10-CM

## 2021-02-26 DIAGNOSIS — R52 PAIN: ICD-10-CM

## 2021-02-26 DIAGNOSIS — T45.1X5A CHEMOTHERAPY INDUCED NAUSEA AND VOMITING: ICD-10-CM

## 2021-02-26 DIAGNOSIS — E86.0 DEHYDRATION: Primary | ICD-10-CM

## 2021-02-26 PROCEDURE — 96360 HYDRATION IV INFUSION INIT: CPT

## 2021-02-26 RX ADMIN — SODIUM CHLORIDE 1000 ML: 0.9 INJECTION, SOLUTION INTRAVENOUS at 12:54

## 2021-02-26 NOTE — PROGRESS NOTES
Pt tolerated Hydration today with out c/o  Declined zofran  Left unit via w/c escorted by staff to the lobby for star

## 2021-03-01 ENCOUNTER — HOSPITAL ENCOUNTER (OUTPATIENT)
Dept: INFUSION CENTER | Facility: HOSPITAL | Age: 76
Discharge: HOME | End: 2021-03-01
Attending: INTERNAL MEDICINE
Payer: MEDICARE

## 2021-03-01 VITALS
RESPIRATION RATE: 18 BRPM | HEART RATE: 77 BPM | SYSTOLIC BLOOD PRESSURE: 136 MMHG | TEMPERATURE: 97.8 F | DIASTOLIC BLOOD PRESSURE: 63 MMHG

## 2021-03-01 DIAGNOSIS — R11.2 CHEMOTHERAPY INDUCED NAUSEA AND VOMITING: ICD-10-CM

## 2021-03-01 DIAGNOSIS — E86.0 DEHYDRATION: Primary | ICD-10-CM

## 2021-03-01 DIAGNOSIS — T45.1X5A CHEMOTHERAPY INDUCED NAUSEA AND VOMITING: ICD-10-CM

## 2021-03-01 DIAGNOSIS — R52 PAIN: ICD-10-CM

## 2021-03-01 PROCEDURE — 96360 HYDRATION IV INFUSION INIT: CPT

## 2021-03-01 RX ADMIN — SODIUM CHLORIDE 1000 ML: 0.9 INJECTION, SOLUTION INTRAVENOUS at 12:38

## 2021-03-01 NOTE — PROGRESS NOTES
Pt tolerated hydration  Today, declines zofran   Left unit via w/c escorted by staff to the lobby for star

## 2021-03-01 NOTE — PLAN OF CARE
Problem: Potential for Falls  Goal: Patient will remain free of falls  Description: INTERVENTIONS:  - Assess patient frequently for physical needs  -  Identify cognitive and physical deficits and behaviors that affect risk of falls    -  [place] fall precautions as indicated by assessment   - Educate patient/family on patient safety including physical limitations  - Instruct patient to call for assistance with activity based on assessment  - Modify environment to reduce risk of injury  - Consider OT/PT consult to assist with strengthening/mobility  Outcome: Progressing

## 2021-03-02 ENCOUNTER — HOSPITAL ENCOUNTER (OUTPATIENT)
Dept: INFUSION CENTER | Facility: HOSPITAL | Age: 76
Discharge: HOME | End: 2021-03-02
Attending: INTERNAL MEDICINE
Payer: MEDICARE

## 2021-03-02 VITALS — HEART RATE: 84 BPM | DIASTOLIC BLOOD PRESSURE: 63 MMHG | SYSTOLIC BLOOD PRESSURE: 135 MMHG | TEMPERATURE: 97.8 F

## 2021-03-02 DIAGNOSIS — E86.0 DEHYDRATION: Primary | ICD-10-CM

## 2021-03-02 DIAGNOSIS — T45.1X5A CHEMOTHERAPY INDUCED NAUSEA AND VOMITING: ICD-10-CM

## 2021-03-02 DIAGNOSIS — R52 PAIN: ICD-10-CM

## 2021-03-02 DIAGNOSIS — R11.2 CHEMOTHERAPY INDUCED NAUSEA AND VOMITING: ICD-10-CM

## 2021-03-02 PROCEDURE — 96360 HYDRATION IV INFUSION INIT: CPT

## 2021-03-02 RX ADMIN — SODIUM CHLORIDE 1000 ML: 0.9 INJECTION, SOLUTION INTRAVENOUS at 13:32

## 2021-03-02 NOTE — PROGRESS NOTES
Pt tolerated hydration well today without complications  Declined zofran today  Confirmed appt back Thur, AVS declined

## 2021-03-02 NOTE — PLAN OF CARE
Problem: Potential for Falls  Goal: Patient will remain free of falls  Description: INTERVENTIONS:  - Assess patient frequently for physical needs  -  Identify cognitive and physical deficits and behaviors that affect risk of falls    -  [place] fall precautions as indicated by assessment   - Educate patient/family on patient safety including physical limitations  - Instruct patient to call for assistance with activity based on assessment  - Modify environment to reduce risk of injury  - Consider OT/PT consult to assist with strengthening/mobility  Outcome: Progressing

## 2021-03-03 ENCOUNTER — OFFICE VISIT (OUTPATIENT)
Dept: PAIN MEDICINE | Facility: MEDICAL CENTER | Age: 76
End: 2021-03-03
Payer: MEDICARE

## 2021-03-03 VITALS
DIASTOLIC BLOOD PRESSURE: 81 MMHG | SYSTOLIC BLOOD PRESSURE: 146 MMHG | BODY MASS INDEX: 23.39 KG/M2 | TEMPERATURE: 98.7 F | WEIGHT: 132 LBS | HEIGHT: 63 IN | HEART RATE: 84 BPM

## 2021-03-03 DIAGNOSIS — M43.16 SPONDYLOLISTHESIS OF LUMBAR REGION: ICD-10-CM

## 2021-03-03 DIAGNOSIS — M79.18 MYOFASCIAL PAIN SYNDROME: ICD-10-CM

## 2021-03-03 DIAGNOSIS — M47.26 OTHER SPONDYLOSIS WITH RADICULOPATHY, LUMBAR REGION: ICD-10-CM

## 2021-03-03 DIAGNOSIS — M54.16 LUMBAR RADICULITIS: Primary | ICD-10-CM

## 2021-03-03 DIAGNOSIS — M48.061 BILATERAL STENOSIS OF LATERAL RECESS OF LUMBAR SPINE: ICD-10-CM

## 2021-03-03 PROCEDURE — 99213 OFFICE O/P EST LOW 20 MIN: CPT | Performed by: NURSE PRACTITIONER

## 2021-03-03 RX ORDER — GABAPENTIN 300 MG/1
900 CAPSULE ORAL
Qty: 90 CAPSULE | Refills: 2 | Status: SHIPPED | OUTPATIENT
Start: 2021-03-03

## 2021-03-03 RX ORDER — LIDOCAINE 50 MG/G
1 PATCH TOPICAL DAILY
Qty: 30 PATCH | Refills: 1 | Status: SHIPPED | OUTPATIENT
Start: 2021-03-03 | End: 2022-03-08 | Stop reason: HOSPADM

## 2021-03-03 NOTE — PROGRESS NOTES
Assessment:  1  Lumbar radiculitis    2  Other spondylosis with radiculopathy, lumbar region    3  Spondylolisthesis of lumbar region    4  Bilateral stenosis of lateral recess of lumbar spine    5  Myofascial pain syndrome        Plan:  Continue with gabapentin, this was refilled today  We will reschedule the patient's next epidural which would be for on or after May 10, 2021 her L5 lumbar epidural steroid injection  Follow-up in 3 months for medication refills   Complete risks and benefits including bleeding, infection, tissue reaction, nerve injury and allergic reaction were discussed  The approach was demonstrated using models and literature was provided  Verbal and written consent was obtained  History of Present Illness: The patient is a 77 y o  female who presents for consultation in regards to Back Pain  Today the patient reports she is doing little better rates her pain 6 to 7/10  He has constant pain in the morning described as burning  She is taking gabapentin 900 mg at bedtime which she feels helpful without any side effects or issues  Patient is status post L5 lumbar epidural steroid injection with Dr Angela Child on February 10, 2021 and she does feel that she got at least 50% relief from the injection  She feels that the pain lasted for about 1 month and her symptoms are starting to gradually return  Review of Systems:    Review of Systems   Constitutional: Negative for fatigue  HENT: Negative for ear pain and hearing loss  Eyes: Negative for redness  Respiratory: Negative for cough  Cardiovascular: Negative for leg swelling  Gastrointestinal: Negative for anal bleeding and rectal pain  Endocrine: Negative for polydipsia  Genitourinary: Negative for hematuria  Musculoskeletal: Positive for back pain  Negative for joint swelling  Skin: Negative for rash  Neurological: Negative for headaches  Hematological: Does not bruise/bleed easily  Psychiatric/Behavioral: Negative for behavioral problems and hallucinations           Past Medical History:   Diagnosis Date    Back pain     Brain cancer ([facility] )     Breast cancer [facility]     Chronic pain     Hypertension     Knee pain     Lymphedema of right arm     Vitamin B12 deficiency        Past Surgical History:   Procedure Laterality Date    ANKLE SURGERY      APPENDECTOMY      BREAST SURGERY      bilat mastectomy-right total mastectomy and prophylactic left total mastectomy-2005    FRACTURE SURGERY      KNEE SURGERY      right knee replacement 2014 in [address] [place] [address] Bilateral     ORTHOPEDIC SURGERY         Family History   Problem Relation Age of Onset    Asthma Mother     Osteoarthritis Father     Bone cancer Paternal Aunt         bone cancer       Social History     Occupational History    Not on file   Tobacco Use    Smoking status: Never Smoker    Smokeless tobacco: Never Used    Tobacco comment: no passive smoke exposure   Substance and Sexual Activity    Alcohol use: Never     Frequency: Never     Binge frequency: Never    Drug use: No    Sexual activity: Not Currently         Current Outpatient Medications:     anastrozole (ARIMIDEX) 1 mg tablet, TAKE ONE TABLET BY MOUTH EVERY DAY, Disp: 90 tablet, Rfl: 3    cholecalciferol (VITAMIN D3) 1,000 units tablet, Take 1 tablet (1,000 Units total) by mouth daily, Disp: 30 tablet, Rfl: 0    escitalopram (LEXAPRO) 5 mg tablet, Take 1 tablet (5 mg total) by mouth daily, Disp: 30 tablet, Rfl: 5    esomeprazole (NexIUM) 40 MG capsule, TAKE 1 CAPSULE BY MOUTH ONCE DAILY, Disp: 90 capsule, Rfl: 0    gabapentin (NEURONTIN) 300 mg capsule, Take 3 capsules (900 mg total) by mouth daily at bedtime, Disp: 90 capsule, Rfl: 2    ibuprofen (MOTRIN) 800 mg tablet, Take 800 mg by mouth every 8 (eight) hours as needed, Disp: , Rfl:     indapamide (LOZOL) 1 25 mg tablet, Take 1 tablet (1 25 mg total) by mouth every morning, Disp: 30 tablet, Rfl: 1    lisinopril (ZESTRIL) 10 mg tablet, Take 1 tablet (10 mg total) by mouth daily, Disp: 90 tablet, Rfl: 3    megestrol (MEGACE) 400 mg/10 mL, Take 400 mg by mouth daily, Disp: 600 mL, Rfl: 0    nebivolol (Bystolic) 5 mg tablet, Take 1 tablet (5 mg total) by mouth daily, Disp: 90 tablet, Rfl: 3    ondansetron (Zofran) 4 mg tablet, Every 12 hours, Disp: , Rfl:     pantoprazole (PROTONIX) 40 mg tablet, TAKE ONE TABLET BY MOUTH EVERY DAY, Disp: 90 tablet, Rfl: 0    TYKERB 250 MG tablet, , Disp: , Rfl:     Cyanocobalamin 1000 MCG/ML KIT, Inject as directed every 6 (six) months, Disp: , Rfl:     Elastic Bandages & Supports (Prolite Lumbar Support) MISC, Use daily as needed (back pain), Disp: 1 each, Rfl: 0    lidocaine (LIDODERM) 5 %, Apply 1 patch topically daily Remove & Discard patch within 12 hours or as directed by MD, Disp: 30 patch, Rfl: 1    Misc  Devices (ILLUSIONS C BREAST PROSTHESIS) MISC, 1 breast prosthesis, Disp: 1 each, Rfl: 0    Misc  Devices (REFLECTIONS C BREAST PROSTHES) MISC, Dispense 1 breast prosthesis, Disp: 1 each, Rfl: 0    Allergies   Allergen Reactions    Penicillins Rash       Physical Exam:    /81   Pulse 84   Temp 98 7 °F (37 1 °C)   Ht 5' 2 5" (1 588 m)   Wt 59 9 kg (132 lb)   BMI 23 76 kg/m²     Constitutional: normal, well developed, well nourished, alert, in no distress and non-toxic and no overt pain behavior    Eyes: anicteric  HEENT: grossly intact  Neck: supple, symmetric, trachea midline and no masses   Pulmonary:even and unlabored  Cardiovascular:No edema or pitting edema present  Skin:Normal without rashes or lesions and well hydrated  Psychiatric:Mood and affect appropriate  Neurologic:Cranial Nerves II-XII grossly intact  Musculoskeletal:normal    Imaging  FL spine and pain procedure    (Results Pending)       Orders Placed This Encounter   Procedures    FL spine and pain procedure

## 2021-03-04 ENCOUNTER — HOSPITAL ENCOUNTER (OUTPATIENT)
Dept: INFUSION CENTER | Facility: HOSPITAL | Age: 76
Discharge: HOME | End: 2021-03-04
Attending: INTERNAL MEDICINE
Payer: MEDICARE

## 2021-03-04 VITALS — TEMPERATURE: 97.6 F | HEART RATE: 76 BPM | DIASTOLIC BLOOD PRESSURE: 69 MMHG | SYSTOLIC BLOOD PRESSURE: 110 MMHG

## 2021-03-04 DIAGNOSIS — T45.1X5A CHEMOTHERAPY INDUCED NAUSEA AND VOMITING: ICD-10-CM

## 2021-03-04 DIAGNOSIS — E86.0 DEHYDRATION: Primary | ICD-10-CM

## 2021-03-04 DIAGNOSIS — R52 PAIN: ICD-10-CM

## 2021-03-04 DIAGNOSIS — R11.2 CHEMOTHERAPY INDUCED NAUSEA AND VOMITING: ICD-10-CM

## 2021-03-04 PROCEDURE — 96360 HYDRATION IV INFUSION INIT: CPT

## 2021-03-04 RX ADMIN — SODIUM CHLORIDE 1000 ML: 0.9 INJECTION, SOLUTION INTRAVENOUS at 12:40

## 2021-03-04 NOTE — PLAN OF CARE
Problem: Potential for Falls  Goal: Patient will remain free of falls  Description: INTERVENTIONS:  - Assess patient frequently for physical needs  -  Identify cognitive and physical deficits and behaviors that affect risk of falls    -  [place] fall precautions as indicated by assessment   - Educate patient/family on patient safety including physical limitations  - Instruct patient to call for assistance with activity based on assessment  - Modify environment to reduce risk of injury  - Consider OT/PT consult to assist with strengthening/mobility  Outcome: Progressing

## 2021-03-05 ENCOUNTER — HOSPITAL ENCOUNTER (OUTPATIENT)
Dept: INFUSION CENTER | Facility: HOSPITAL | Age: 76
Discharge: HOME | End: 2021-03-05
Attending: INTERNAL MEDICINE
Payer: MEDICARE

## 2021-03-05 VITALS
SYSTOLIC BLOOD PRESSURE: 122 MMHG | TEMPERATURE: 98.2 F | RESPIRATION RATE: 18 BRPM | HEART RATE: 79 BPM | DIASTOLIC BLOOD PRESSURE: 74 MMHG

## 2021-03-05 DIAGNOSIS — E86.0 DEHYDRATION: Primary | ICD-10-CM

## 2021-03-05 DIAGNOSIS — R11.2 CHEMOTHERAPY INDUCED NAUSEA AND VOMITING: ICD-10-CM

## 2021-03-05 DIAGNOSIS — R52 PAIN: ICD-10-CM

## 2021-03-05 DIAGNOSIS — T45.1X5A CHEMOTHERAPY INDUCED NAUSEA AND VOMITING: ICD-10-CM

## 2021-03-05 PROCEDURE — 96360 HYDRATION IV INFUSION INIT: CPT

## 2021-03-05 RX ADMIN — SODIUM CHLORIDE 1000 ML: 0.9 INJECTION, SOLUTION INTRAVENOUS at 11:58

## 2021-03-05 NOTE — PROGRESS NOTES
Pt tolerated day 5 of hydration with no c/o  AVS provided and next apt confirmed  Left unit via w/c escorted to the lobby by staff for [facility]

## 2021-03-08 ENCOUNTER — HOSPITAL ENCOUNTER (OUTPATIENT)
Dept: INFUSION CENTER | Facility: HOSPITAL | Age: 76
Discharge: HOME | End: 2021-03-08
Attending: INTERNAL MEDICINE
Payer: MEDICARE

## 2021-03-08 VITALS
OXYGEN SATURATION: 99 % | RESPIRATION RATE: 17 BRPM | SYSTOLIC BLOOD PRESSURE: 142 MMHG | TEMPERATURE: 97.1 F | DIASTOLIC BLOOD PRESSURE: 81 MMHG | HEART RATE: 86 BPM

## 2021-03-08 DIAGNOSIS — R11.2 CHEMOTHERAPY INDUCED NAUSEA AND VOMITING: ICD-10-CM

## 2021-03-08 DIAGNOSIS — R52 PAIN: ICD-10-CM

## 2021-03-08 DIAGNOSIS — E86.0 DEHYDRATION: Primary | ICD-10-CM

## 2021-03-08 DIAGNOSIS — T45.1X5A CHEMOTHERAPY INDUCED NAUSEA AND VOMITING: ICD-10-CM

## 2021-03-08 PROCEDURE — 96360 HYDRATION IV INFUSION INIT: CPT

## 2021-03-08 RX ADMIN — SODIUM CHLORIDE 1000 ML: 0.9 INJECTION, SOLUTION INTRAVENOUS at 11:28

## 2021-03-08 NOTE — PLAN OF CARE
Problem: Potential for Falls  Goal: Patient will remain free of falls  Description: INTERVENTIONS:  - Assess patient frequently for physical needs  -  Identify cognitive and physical deficits and behaviors that affect risk of falls    -  [place] fall precautions as indicated by assessment   - Educate patient/family on patient safety including physical limitations  - Instruct patient to call for assistance with activity based on assessment  - Modify environment to reduce risk of injury  - Consider OT/PT consult to assist with strengthening/mobility  Outcome: Progressing     Problem: METABOLIC, FLUID AND ELECTROLYTES - ADULT  Goal: Fluid balance maintained  Description: INTERVENTIONS:  - Monitor labs   - Monitor I/O and WT  - Instruct patient on fluid and nutrition as appropriate  - Assess for signs & symptoms of volume excess or deficit  Outcome: Progressing

## 2021-03-09 ENCOUNTER — HOSPITAL ENCOUNTER (OUTPATIENT)
Dept: INFUSION CENTER | Facility: HOSPITAL | Age: 76
Discharge: HOME | End: 2021-03-09
Attending: INTERNAL MEDICINE
Payer: MEDICARE

## 2021-03-09 VITALS
DIASTOLIC BLOOD PRESSURE: 77 MMHG | SYSTOLIC BLOOD PRESSURE: 165 MMHG | TEMPERATURE: 97.9 F | HEART RATE: 83 BPM | RESPIRATION RATE: 18 BRPM

## 2021-03-09 DIAGNOSIS — T45.1X5A CHEMOTHERAPY INDUCED NAUSEA AND VOMITING: ICD-10-CM

## 2021-03-09 DIAGNOSIS — R11.2 CHEMOTHERAPY INDUCED NAUSEA AND VOMITING: ICD-10-CM

## 2021-03-09 DIAGNOSIS — E86.0 DEHYDRATION: Primary | ICD-10-CM

## 2021-03-09 DIAGNOSIS — R52 PAIN: ICD-10-CM

## 2021-03-09 PROCEDURE — 96360 HYDRATION IV INFUSION INIT: CPT

## 2021-03-09 RX ADMIN — SODIUM CHLORIDE 1000 ML: 0.9 INJECTION, SOLUTION INTRAVENOUS at 12:18

## 2021-03-09 NOTE — PROGRESS NOTES
Pt tolerated IV hydration without difficulty  Declined zofran today  Port flushed and kept accessed for tomorrow's tx  AVS declined  Escorted to lobby in w/c for [facility] transport

## 2021-03-09 NOTE — PLAN OF CARE
Problem: Potential for Falls  Goal: Patient will remain free of falls  Description: INTERVENTIONS:  - Assess patient frequently for physical needs  -  Identify cognitive and physical deficits and behaviors that affect risk of falls  -  [place] fall precautions as indicated by assessment   - Educate patient/family on patient safety including physical limitations  - Instruct patient to call for assistance with activity based on assessment  - Modify environment to reduce risk of injury  - Consider OT/PT consult to assist with strengthening/mobility  Outcome: Progressing     Problem: Knowledge Deficit  Goal: Patient/family/caregiver demonstrates understanding of disease process, treatment plan, medications, and discharge instructions  Description: Complete learning assessment and assess knowledge base    Interventions:  - Provide teaching at level of understanding  - Provide teaching via preferred learning methods  Outcome: Progressing

## 2021-03-10 ENCOUNTER — OFFICE VISIT (OUTPATIENT)
Dept: FAMILY MEDICINE CLINIC | Facility: CLINIC | Age: 76
End: 2021-03-10
Payer: MEDICARE

## 2021-03-10 ENCOUNTER — HOSPITAL ENCOUNTER (OUTPATIENT)
Dept: INFUSION CENTER | Facility: HOSPITAL | Age: 76
Discharge: HOME | End: 2021-03-10
Attending: INTERNAL MEDICINE
Payer: MEDICARE

## 2021-03-10 VITALS
HEART RATE: 73 BPM | DIASTOLIC BLOOD PRESSURE: 63 MMHG | SYSTOLIC BLOOD PRESSURE: 121 MMHG | TEMPERATURE: 97.3 F | RESPIRATION RATE: 16 BRPM

## 2021-03-10 VITALS
RESPIRATION RATE: 16 BRPM | WEIGHT: 139.4 LBS | TEMPERATURE: 97.2 F | SYSTOLIC BLOOD PRESSURE: 142 MMHG | BODY MASS INDEX: 24.7 KG/M2 | HEART RATE: 79 BPM | DIASTOLIC BLOOD PRESSURE: 82 MMHG | OXYGEN SATURATION: 99 % | HEIGHT: 63 IN

## 2021-03-10 DIAGNOSIS — E34.9 INCREASED PTH LEVEL: ICD-10-CM

## 2021-03-10 DIAGNOSIS — C50.811 MALIGNANT NEOPLASM OF OVERLAPPING SITES OF RIGHT BREAST IN FEMALE, ESTROGEN RECEPTOR POSITIVE (HCC): ICD-10-CM

## 2021-03-10 DIAGNOSIS — T45.1X5A CHEMOTHERAPY INDUCED NAUSEA AND VOMITING: ICD-10-CM

## 2021-03-10 DIAGNOSIS — E86.0 DEHYDRATION: ICD-10-CM

## 2021-03-10 DIAGNOSIS — R52 PAIN: Primary | ICD-10-CM

## 2021-03-10 DIAGNOSIS — Z00.00 MEDICARE ANNUAL WELLNESS VISIT, SUBSEQUENT: Primary | ICD-10-CM

## 2021-03-10 DIAGNOSIS — E55.9 VITAMIN D DEFICIENCY: ICD-10-CM

## 2021-03-10 DIAGNOSIS — Z78.0 MENOPAUSE: ICD-10-CM

## 2021-03-10 DIAGNOSIS — I10 BENIGN ESSENTIAL HYPERTENSION: ICD-10-CM

## 2021-03-10 DIAGNOSIS — E78.1 PURE HYPERTRIGLYCERIDEMIA: ICD-10-CM

## 2021-03-10 DIAGNOSIS — I42.8 NONISCHEMIC CARDIOMYOPATHY (HCC): ICD-10-CM

## 2021-03-10 DIAGNOSIS — R11.2 CHEMOTHERAPY INDUCED NAUSEA AND VOMITING: ICD-10-CM

## 2021-03-10 DIAGNOSIS — M85.80 OSTEOPENIA, UNSPECIFIED LOCATION: ICD-10-CM

## 2021-03-10 DIAGNOSIS — N18.2 CHRONIC RENAL IMPAIRMENT, STAGE 2 (MILD): ICD-10-CM

## 2021-03-10 DIAGNOSIS — Z17.0 MALIGNANT NEOPLASM OF OVERLAPPING SITES OF RIGHT BREAST IN FEMALE, ESTROGEN RECEPTOR POSITIVE (HCC): ICD-10-CM

## 2021-03-10 DIAGNOSIS — C79.31 BRAIN METASTASES (HCC): ICD-10-CM

## 2021-03-10 DIAGNOSIS — Z12.11 SCREENING FOR COLON CANCER: ICD-10-CM

## 2021-03-10 DIAGNOSIS — J45.30 MILD PERSISTENT ASTHMATIC BRONCHITIS WITHOUT COMPLICATION: ICD-10-CM

## 2021-03-10 DIAGNOSIS — R82.90 ABNORMAL URINALYSIS: ICD-10-CM

## 2021-03-10 DIAGNOSIS — R94.6 ABNORMAL THYROID FUNCTION TEST: ICD-10-CM

## 2021-03-10 PROCEDURE — 99214 OFFICE O/P EST MOD 30 MIN: CPT | Performed by: FAMILY MEDICINE

## 2021-03-10 PROCEDURE — 96360 HYDRATION IV INFUSION INIT: CPT

## 2021-03-10 PROCEDURE — G0439 PPPS, SUBSEQ VISIT: HCPCS | Performed by: FAMILY MEDICINE

## 2021-03-10 PROCEDURE — 1123F ACP DISCUSS/DSCN MKR DOCD: CPT | Performed by: FAMILY MEDICINE

## 2021-03-10 RX ORDER — CEFUROXIME AXETIL 250 MG/1
250 TABLET ORAL EVERY 12 HOURS SCHEDULED
Qty: 20 TABLET | Refills: 0 | Status: SHIPPED | OUTPATIENT
Start: 2021-03-10 | End: 2021-03-20

## 2021-03-10 RX ORDER — ALBUTEROL SULFATE 90 UG/1
2 AEROSOL, METERED RESPIRATORY (INHALATION) EVERY 6 HOURS PRN
Qty: 3 INHALER | Refills: 3 | Status: SHIPPED | OUTPATIENT
Start: 2021-03-10 | End: 2021-12-23 | Stop reason: SDUPTHER

## 2021-03-10 RX ORDER — METHYLPREDNISOLONE 4 MG/1
TABLET ORAL
Qty: 21 EACH | Refills: 0 | Status: SHIPPED | OUTPATIENT
Start: 2021-03-10 | End: 2021-12-29 | Stop reason: ALTCHOICE

## 2021-03-10 RX ORDER — ALBUTEROL SULFATE 90 UG/1
2 AEROSOL, METERED RESPIRATORY (INHALATION) EVERY 6 HOURS PRN
Qty: 3 INHALER | Refills: 3 | Status: SHIPPED | OUTPATIENT
Start: 2021-03-10 | End: 2021-03-10 | Stop reason: SDUPTHER

## 2021-03-10 RX ADMIN — SODIUM CHLORIDE 1000 ML: 0.9 INJECTION, SOLUTION INTRAVENOUS at 13:04

## 2021-03-10 NOTE — PATIENT INSTRUCTIONS
Medicare Preventive Visit Patient Instructions  Thank you for completing your Welcome to Medicare Visit or Medicare Annual Wellness Visit today  Your next wellness visit will be due in one year (3/11/2022)  The screening/preventive services that you may require over the next 5-10 years are detailed below  Some tests may not apply to you based off risk factors and/or age  Screening tests ordered at today's visit but not completed yet may show as past due  Also, please note that scanned in results may not display below  Preventive Screenings:  Service Recommendations Previous Testing/Comments   Colorectal Cancer Screening  * Colonoscopy    * Fecal Occult Blood Test (FOBT)/Fecal Immunochemical Test (FIT)  * Fecal DNA/Cologuard Test  * Flexible Sigmoidoscopy Age: 54-65 years old   Colonoscopy: every 10 years (may be performed more frequently if at higher risk)  OR  FOBT/FIT: every 1 year  OR  Cologuard: every 3 years  OR  Sigmoidoscopy: every 5 years  Screening may be recommended earlier than age 48 if at higher risk for colorectal cancer  Also, an individualized decision between you and your healthcare provider will decide whether screening between the ages of 74-80 would be appropriate  Colonoscopy: Not on file  FOBT/FIT: Not on file  Cologuard: Not on file  Sigmoidoscopy: Not on file          Breast Cancer Screening Age: 36 years old  Frequency: every 1-2 years  Not required if history of left and right mastectomy Mammogram: Not on file        Cervical Cancer Screening Between the ages of 21-29, pap smear recommended once every 3 years  Between the ages of 33-67, can perform pap smear with HPV co-testing every 5 years     Recommendations may differ for women with a history of total hysterectomy, cervical cancer, or abnormal pap smears in past  Pap Smear: Not on file        Hepatitis C Screening Once for adults born between [facility]  More frequently in patients at high risk for Hepatitis C Hep C Antibody: 01/27/2020        Diabetes Screening 1-2 times per year if you're at risk for diabetes or have pre-diabetes Fasting glucose: 74 mg/dL   A1C: No results in last 5 years        Cholesterol Screening Once every 5 years if you don't have a lipid disorder  May order more often based on risk factors  Lipid panel: 12/11/2020          Other Preventive Screenings Covered by Medicare:  1  Abdominal Aortic Aneurysm (AAA) Screening: covered once if your at risk  You're considered to be at risk if you have a family history of AAA  2  Lung Cancer Screening: covers low dose CT scan once per year if you meet all of the following conditions: (1) Age 50-69; (2) No signs or symptoms of lung cancer; (3) Current smoker or have quit smoking within the last 15 years; (4) You have a tobacco smoking history of at least 30 pack years (packs per day multiplied by number of years you smoked); (5) You get a written order from a healthcare provider  3  Glaucoma Screening: covered annually if you're considered high risk: (1) You have diabetes OR (2) Family history of glaucoma OR (3)  aged 48 and older OR (3)  American aged 72 and older  3  Osteoporosis Screening: covered every 2 years if you meet one of the following conditions: (1) You're estrogen deficient and at risk for osteoporosis based off medical history and other findings; (2) Have a vertebral abnormality; (3) On glucocorticoid therapy for more than 3 months; (4) Have primary hyperparathyroidism; (5) On osteoporosis medications and need to assess response to drug therapy  · Last bone density test (DXA Scan): 03/25/2019   5  HIV Screening: covered annually if you're between the age of 15-65  Also covered annually if you are younger than 13 and older than 72 with risk factors for HIV infection  For pregnant patients, it is covered up to 3 times per pregnancy      Immunizations:  Immunization Recommendations   Influenza Vaccine Annual influenza vaccination during flu season is recommended for all persons aged >= 6 months who do not have contraindications   Pneumococcal Vaccine (Prevnar and Pneumovax)  * Prevnar = PCV13  * Pneumovax = PPSV23   Adults 25-60 years old: 1-3 doses may be recommended based on certain risk factors  Adults 72 years old: Prevnar (PCV13) vaccine recommended followed by Pneumovax (PPSV23) vaccine  If already received PPSV23 since turning 65, then PCV13 recommended at least one year after PPSV23 dose  Hepatitis B Vaccine 3 dose series if at intermediate or high risk (ex: diabetes, end stage renal disease, liver disease)   Tetanus (Td) Vaccine - COST NOT COVERED BY MEDICARE PART B Following completion of primary series, a booster dose should be given every 10 years to maintain immunity against tetanus  Td may also be given as tetanus wound prophylaxis  Tdap Vaccine - COST NOT COVERED BY MEDICARE PART B Recommended at least once for all adults  For pregnant patients, recommended with each pregnancy  Shingles Vaccine (Shingrix) - COST NOT COVERED BY MEDICARE PART B  2 shot series recommended in those aged 48 and above     Health Maintenance Due:      Topic Date Due    Colorectal Cancer Screening  01/13/1996    Hepatitis C Screening  Completed     Immunizations Due:      Topic Date Due    DTaP,Tdap,and Td Vaccines (1 - Tdap) 01/13/1967    Pneumococcal Vaccine: 65+ Years (1 of 1 - PPSV23) 01/13/2011     Advance Directives   What are advance directives? Advance directives are legal documents that state your wishes and plans for medical care  These plans are made ahead of time in case you lose your ability to make decisions for yourself  Advance directives can apply to any medical decision, such as the treatments you want, and if you want to donate organs  What are the types of advance directives? There are many types of advance directives, and each state has rules about how to use them   You may choose a combination of any of the following:  · Living will: This is a written record of the treatment you want  You can also choose which treatments you do not want, which to limit, and which to stop at a certain time  This includes surgery, medicine, IV fluid, and tube feedings  · Durable power of  for healthcare [facility]): This is a written record that states who you want to make healthcare choices for you when you are unable to make them for yourself  This person, called a proxy, is usually a family member or a friend  You may choose more than 1 proxy  · Do not resuscitate (DNR) order:  A DNR order is used in case your heart stops beating or you stop breathing  It is a request not to have certain forms of treatment, such as CPR  A DNR order may be included in other types of advance directives  · Medical directive: This covers the care that you want if you are in a coma, near death, or unable to make decisions for yourself  You can list the treatments you want for each condition  Treatment may include pain medicine, surgery, blood transfusions, dialysis, IV or tube feedings, and a ventilator (breathing machine)  · Values history: This document has questions about your views, beliefs, and how you feel and think about life  This information can help others choose the care that you would choose  Why are advance directives important? An advance directive helps you control your care  Although spoken wishes may be used, it is better to have your wishes written down  Spoken wishes can be misunderstood, or not followed  Treatments may be given even if you do not want them  An advance directive may make it easier for your family to make difficult choices about your care  Weight Management   Why it is important to manage your weight:  Being overweight increases your risk of health conditions such as heart disease, high blood pressure, type 2 diabetes, and certain types of cancer   It can also increase your risk for osteoarthritis, sleep apnea, and other respiratory problems  Aim for a slow, steady weight loss  Even a small amount of weight loss can lower your risk of health problems  How to lose weight safely:  A safe and healthy way to lose weight is to eat fewer calories and get regular exercise  You can lose up about 1 pound a week by decreasing the number of calories you eat by 500 calories each day  Healthy meal plan for weight management:  A healthy meal plan includes a variety of foods, contains fewer calories, and helps you stay healthy  A healthy meal plan includes the following:  · Eat whole-grain foods more often  A healthy meal plan should contain fiber  Fiber is the part of grains, fruits, and vegetables that is not broken down by your body  Whole-grain foods are healthy and provide extra fiber in your diet  Some examples of whole-grain foods are whole-wheat breads and pastas, oatmeal, brown rice, and bulgur  · Eat a variety of vegetables every day  Include dark, leafy greens such as spinach, kale, sadia greens, and mustard greens  Eat yellow and orange vegetables such as carrots, sweet potatoes, and winter squash  · Eat a variety of fruits every day  Choose fresh or canned fruit (canned in its own juice or light syrup) instead of juice  Fruit juice has very little or no fiber  · Eat low-fat dairy foods  Drink fat-free (skim) milk or 1% milk  Eat fat-free yogurt and low-fat cottage cheese  Try low-fat cheeses such as mozzarella and other reduced-fat cheeses  · Choose meat and other protein foods that are low in fat  Choose beans or other legumes such as split peas or lentils  Choose fish, skinless poultry (chicken or turkey), or lean cuts of red meat (beef or pork)  Before you cook meat or poultry, cut off any visible fat  · Use less fat and oil  Try baking foods instead of frying them  Add less fat, such as margarine, sour cream, regular salad dressing and mayonnaise to foods  Eat fewer high-fat foods   Some examples of high-fat foods include french fries, doughnuts, ice cream, and cakes  · Eat fewer sweets  Limit foods and drinks that are high in sugar  This includes candy, cookies, regular soda, and sweetened drinks  Exercise:  Exercise at least 30 minutes per day on most days of the week  Some examples of exercise include walking, biking, dancing, and swimming  You can also fit in more physical activity by taking the stairs instead of the elevator or parking farther away from stores  Ask your healthcare provider about the best exercise plan for you  © Copyright [employer] 2018 Information is for End User's use only and may not be sold, redistributed or otherwise used for commercial purposes   All illustrations and images included in CareNotes® are the copyrighted property of [employer]  or [employer]  To follow up with test results

## 2021-03-10 NOTE — PROGRESS NOTES
Assessment and Plan:     Problem List Items Addressed This Visit     None      Visit Diagnoses     Medicare annual wellness visit, subsequent    -  Primary           Preventive health issues were discussed with patient, and age appropriate screening tests were ordered as noted in patient's After Visit Summary  Personalized health advice and appropriate referrals for health education or preventive services given if needed, as noted in patient's After Visit Summary       History of Present Illness:     Patient presents for Medicare Annual Wellness visit    Patient Care Team:  Gloria Loza MD as PCP - General (Family Medicine)  Becki Chavarria MD (Hematology and Oncology)  Claudia He MD (Radiation Oncology)  Verna Blas MD (Radiation Oncology)  Siobhan Nascimento RD (Nutrition)     Problem List:     Patient Active Problem List   Diagnosis    Malignant neoplasm of overlapping 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, estrogen receptor positive ([address]    Use of aromatase inhibitors    Vitamin D deficiency    Benign essential hypertension    Abnormal echocardiography    Chronic systolic heart failure ([address] )    Metastatic breast cancer ([address] )    Colonoscopy refused    Immunization not carried out because of patient decision    Heart burn    Abnormal gastrointestinal PET scan    Osteopenia    Essential hypertension    Brain metastasis ([address] )    Nonischemic cardiomyopathy ([address] )    S/P chemotherapy, time since 4-12 weeks    Status post chemoradiation    Chemotherapy induced nausea and vomiting    Acute cystitis without hematuria    Severe protein-calorie malnutrition Conor Plan: less than 60% of standard weight) ([address] )    Hypokalemia    Dehydration    Pain    Bilateral stenosis of lateral recess of lumbar spine    Vitamin B 12 deficiency    Lumbar radiculitis    Other spondylosis with radiculopathy, lumbar region    Cerebral edema ([address] )    Spondylolisthesis of lumbar region      Past Medical and Surgical History:     Past Medical History:   Diagnosis Date    Back pain     [facility]     Breast cancer [facility]     Chronic pain     Hypertension     Knee pain     Lymphedema of right arm     Vitamin B12 deficiency      Past Surgical History:   Procedure Laterality Date    ANKLE SURGERY      APPENDECTOMY      BREAST SURGERY      bilat mastectomy-right total mastectomy and prophylactic left total mastectomy-2005    FRACTURE SURGERY      KNEE SURGERY      right knee replacement 2014 in [address] [place] [address] Bilateral     ORTHOPEDIC SURGERY        Family History:     Family History   Problem Relation Age of Onset    Asthma Mother     Osteoarthritis Father     Bone cancer Paternal Aunt         bone cancer      Social History:     E-Cigarette/Vaping    E-Cigarette Use Never User      E-Cigarette/Vaping Substances    Nicotine No     THC No     CBD No     Flavoring No     Other No     Unknown No      Social History     Socioeconomic History    Marital status: Single     Spouse name: None    Number of children: 0    Years of education: None    Highest education level: None   Occupational History    None   Social Needs    Financial resource strain: None    Food insecurity     Worry: None     Inability: None    Transportation needs     Medical: None     Non-medical: None   Tobacco Use    Smoking status: Never Smoker    Smokeless tobacco: Never Used    Tobacco comment: no passive smoke exposure   Substance and Sexual Activity    Alcohol use: Never     Frequency: Never     Binge frequency: Never    Drug use: No    Sexual activity: Not Currently   Lifestyle    Physical activity     Days per week: None     Minutes per session: None    Stress: None   Relationships    Social connections     Talks on phone: None     Gets together: None     Attends Rastafari service: None     Active member of club or organization: None     Attends meetings of clubs or organizations: None     Relationship status: None    Intimate partner violence     Fear of current or ex partner: None     Emotionally abused: None     Physically abused: None     Forced sexual activity: None   Other Topics Concern    None   Social History Narrative    None      Medications and Allergies:     Current Outpatient Medications   Medication Sig Dispense Refill    anastrozole (ARIMIDEX) 1 mg tablet TAKE ONE TABLET BY MOUTH EVERY DAY 90 tablet 3    cholecalciferol (VITAMIN D3) 1,000 units tablet Take 1 tablet (1,000 Units total) by mouth daily 30 tablet 0    Cyanocobalamin 1000 MCG/ML KIT Inject as directed every 6 (six) months      Elastic Bandages & Supports (Prolite Lumbar Support) MISC Use daily as needed (back pain) 1 each 0    escitalopram (LEXAPRO) 5 mg tablet Take 1 tablet (5 mg total) by mouth daily 30 tablet 5    esomeprazole (NexIUM) 40 MG capsule TAKE 1 CAPSULE BY MOUTH ONCE DAILY 90 capsule 0    gabapentin (NEURONTIN) 300 mg capsule Take 3 capsules (900 mg total) by mouth daily at bedtime 90 capsule 2    ibuprofen (MOTRIN) 800 mg tablet Take 800 mg by mouth every 8 (eight) hours as needed      indapamide (LOZOL) 1 25 mg tablet Take 1 tablet (1 25 mg total) by mouth every morning 30 tablet 1    lidocaine (LIDODERM) 5 % Apply 1 patch topically daily Remove & Discard patch within 12 hours or as directed by MD 30 patch 1    lisinopril (ZESTRIL) 10 mg tablet Take 1 tablet (10 mg total) by mouth daily 90 tablet 3    megestrol (MEGACE) 400 mg/10 mL Take 400 mg by mouth daily 600 mL 0    Misc  Devices (ILLUSIONS C BREAST PROSTHESIS) MISC 1 breast prosthesis 1 each 0    Misc   Devices (REFLECTIONS C BREAST PROSTHES) MISC Dispense 1 breast prosthesis 1 each 0    nebivolol (Bystolic) 5 mg tablet Take 1 tablet (5 mg total) by mouth daily 90 tablet 3    ondansetron (Zofran) 4 mg tablet Every 12 hours      pantoprazole (PROTONIX) 40 mg tablet TAKE ONE TABLET BY MOUTH EVERY DAY 90 tablet 0    TYKERB 250 MG tablet        No current facility-administered medications for this visit  Allergies   Allergen Reactions    Penicillins Rash      Immunizations:     Immunization History   Administered Date(s) Administered    SARS-CoV-2 / COVID-19 mRNA IM (Moderna) 01/20/2021, 02/19/2021      Health Maintenance:         Topic Date Due    Colorectal Cancer Screening  01/13/1996    Hepatitis C Screening  Completed         Topic Date Due    DTaP,Tdap,and Td Vaccines (1 - Tdap) 01/13/1967    Pneumococcal Vaccine: 65+ Years (1 of 1 - PPSV23) 01/13/2011      Medicare Health Risk Assessment:     /82 (BP Location: Left arm, Patient Position: Sitting, Cuff Size: Large)   Pulse 79   Temp (!) 97 2 °F (36 2 °C) (Temporal)   Ht 5' 2 5" (1 588 m)   Wt 63 2 kg (139 lb 6 4 oz)   SpO2 99%   BMI 25 09 kg/m²      Brayden Newell is here for her Subsequent Wellness visit  Health Risk Assessment:   Patient rates overall health as good  Patient feels that their physical health rating is same  Patient is satisfied with their life  Eyesight was rated as same  Hearing was rated as same  Patient feels that their emotional and mental health rating is same  Patients states they are never, rarely angry  Patient states they are never, rarely unusually tired/fatigued  Pain experienced in the last 7 days has been none  Patient states that she has experienced no weight loss or gain in last 6 months  Depression Screening:   PHQ-2 Score: 0      Fall Risk Screening: In the past year, patient has experienced: no history of falling in past year      Urinary Incontinence Screening:   Patient has not leaked urine accidently in the last six months  Home Safety:  Patient does not have trouble with stairs inside or outside of their home  Patient has working smoke alarms and has working carbon monoxide detector  Home safety hazards include: none  Nutrition:   Current diet is Regular and Limited junk food       Medications:   Patient is currently taking over-the-counter supplements  OTC medications include: see medication list  Patient is able to manage medications  Activities of Daily Living (ADLs)/Instrumental Activities of Daily Living (IADLs):   Walk and transfer into and out of bed and chair?: Yes  Dress and groom yourself?: Yes    Bathe or shower yourself?: Yes    Feed yourself? Yes  Do your laundry/housekeeping?: Yes  Manage your money, pay your bills and track your expenses?: Yes  Make your own meals?: Yes    Do your own shopping?: Yes    Previous Hospitalizations:   Any hospitalizations or ED visits within the last 12 months?: No      Advance Care Planning:   Living will: Yes    Durable POA for healthcare: Yes    Advanced directive: Yes      Cognitive Screening:   Provider or family/friend/caregiver concerned regarding cognition?: No    PREVENTIVE SCREENINGS      Cardiovascular Screening:    General: Screening Current      Diabetes Screening:     General: Screening Current      Colorectal Cancer Screening:     General: Risks and Benefits Discussed and Patient Declines      Breast Cancer Screening:     General: History Breast Cancer and Screening Current      Cervical Cancer Screening:    General: Screening Not Indicated      Osteoporosis Screening:    General: Risks and Benefits Discussed    Due for: Bone Density Ultrasound      Abdominal Aortic Aneurysm (AAA) Screening:        General: Screening Not Indicated      Lung Cancer Screening:     General: Screening Not Indicated      Hepatitis C Screening:    General: Screening Current    Screening, Brief Intervention, and Referral to Treatment (SBIRT)    Screening  Typical number of drinks in a day: 0  Typical number of drinks in a week: 0  Interpretation: Low risk drinking behavior      Single Item Drug Screening:  How often have you used an illegal drug (including marijuana) or a prescription medication for non-medical reasons in the past year? never    Single Item Drug Screen Score: 0  Interpretation: Negative screen for possible drug use disorder    Review of Current Opioid Use    Opioid Risk Tool (ORT) Interpretation: Complete Opioid Risk Tool (ORT)    Other Counseling Topics:   Car/seat belt/driving safety, skin self-exam, sunscreen and calcium and vitamin D intake and regular weightbearing exercise         Shyam Arellano MD

## 2021-03-10 NOTE — PLAN OF CARE
Problem: Potential for Falls  Goal: Patient will remain free of falls  Description: INTERVENTIONS:  - Assess patient frequently for physical needs  -  Identify cognitive and physical deficits and behaviors that affect risk of falls    -  [place] fall precautions as indicated by assessment   - Educate patient/family on patient safety including physical limitations  - Instruct patient to call for assistance with activity based on assessment  - Modify environment to reduce risk of injury  - Consider OT/PT consult to assist with strengthening/mobility  Outcome: Progressing

## 2021-03-10 NOTE — PROGRESS NOTES
Pt received hydration today with no c/o  Port remains accessed for tomorrows treatment   Left unit via w/c escorted by staff to the lobby for star

## 2021-03-10 NOTE — PROGRESS NOTES
Assessment/Plan:       No problem-specific Assessment & Plan notes found for this encounter  Diagnoses and all orders for this visit:    Medicare annual wellness visit, subsequent    Mild persistent asthmatic bronchitis without complication  Comments:  Take Medrol pack with food  GI side effect discussed  Patient to call if symptoms not better  If he develops shortness of breath to go to the ER  Orders:  -     Discontinue: albuterol (PROVENTIL HFA,VENTOLIN HFA) 90 mcg/act inhaler; Inhale 2 puffs every 6 (six) hours as needed for wheezing or shortness of breath  -     methylPREDNISolone 4 MG tablet therapy pack; Use as directed on package with food  -     cefuroxime (CEFTIN) 250 mg tablet; Take 1 tablet (250 mg total) by mouth every 12 (twelve) hours for 10 days  -     albuterol (PROVENTIL HFA,VENTOLIN HFA) 90 mcg/act inhaler; Inhale 2 puffs every 6 (six) hours as needed for wheezing or shortness of breath    Chronic renal impairment, stage 2 (mild)  Comments:  Stable, Avoid NSAID  Benign essential hypertension  Comments:  Not well controlled  Take medication as directed, to follow with 2 g salt diet    Malignant neoplasm of overlapping 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, estrogen receptor positive ([facility] [address] )  Comments: Following with Oncology    Vitamin D deficiency  Comments:  Level corrected  Continue vitamin-D supplement    Brain metastases [facility])  Comments:  Neuro certain consultation on 12/02/2020 noted    [facility] )  Comments:  Compensated  To follow with Dr Adi Mayes  Increased PTH level  Comments:  Patient but scan did not show any parathyroid adenoma  Orders:  -     PTH, intact; Future    Abnormal urinalysis  Comments:  Check urine culture  Order exist    Screening for colon cancer  Comments:  Patient declined    Osteopenia, unspecified location  -     DXA bone density spine hip and pelvis; Future    Pure hypertriglyceridemia  Comments:   To watch fat intake    Menopause  - DXA bone density spine hip and pelvis; Future    Abnormal thyroid function test  Comments:  Elevated TSH  Orders:  -     TSH, 3rd generation with Free T4 reflex; Future        Patient Instructions       Medicare Preventive Visit Patient Instructions  Thank you for completing your Welcome to Medicare Visit or Medicare Annual Wellness Visit today  Your next wellness visit will be due in one year (3/11/2022)  The screening/preventive services that you may require over the next 5-10 years are detailed below  Some tests may not apply to you based off risk factors and/or age  Screening tests ordered at today's visit but not completed yet may show as past due  Also, please note that scanned in results may not display below  Preventive Screenings:  Service Recommendations Previous Testing/Comments   Colorectal Cancer Screening  * Colonoscopy    * Fecal Occult Blood Test (FOBT)/Fecal Immunochemical Test (FIT)  * Fecal DNA/Cologuard Test  * Flexible Sigmoidoscopy Age: 54-65 years old   Colonoscopy: every 10 years (may be performed more frequently if at higher risk)  OR  FOBT/FIT: every 1 year  OR  Cologuard: every 3 years  OR  Sigmoidoscopy: every 5 years  Screening may be recommended earlier than age 48 if at higher risk for colorectal cancer  Also, an individualized decision between you and your healthcare provider will decide whether screening between the ages of 74-80 would be appropriate  Colonoscopy: Not on file  FOBT/FIT: Not on file  Cologuard: Not on file  Sigmoidoscopy: Not on file          Breast Cancer Screening Age: 36 years old  Frequency: every 1-2 years  Not required if history of left and right mastectomy Mammogram: Not on file        Cervical Cancer Screening Between the ages of 21-29, pap smear recommended once every 3 years  Between the ages of 33-67, can perform pap smear with HPV co-testing every 5 years     Recommendations may differ for women with a history of total hysterectomy, cervical cancer, or abnormal pap smears in past  Pap Smear: Not on file        Hepatitis C Screening Once for adults born between 1945 and 1965  More frequently in patients at high risk for Hepatitis C Hep C Antibody: 01/27/2020        Diabetes Screening 1-2 times per year if you're at risk for diabetes or have pre-diabetes Fasting glucose: 74 mg/dL   A1C: No results in last 5 years        Cholesterol Screening Once every 5 years if you don't have a lipid disorder  May order more often based on risk factors  Lipid panel: 12/11/2020          Other Preventive Screenings Covered by Medicare:  1  Abdominal Aortic Aneurysm (AAA) Screening: covered once if your at risk  You're considered to be at risk if you have a family history of AAA  2  Lung Cancer Screening: covers low dose CT scan once per year if you meet all of the following conditions: (1) Age 50-69; (2) No signs or symptoms of lung cancer; (3) Current smoker or have quit smoking within the last 15 years; (4) You have a tobacco smoking history of at least 30 pack years (packs per day multiplied by number of years you smoked); (5) You get a written order from a healthcare provider  3  Glaucoma Screening: covered annually if you're considered high risk: (1) You have diabetes OR (2) Family history of glaucoma OR (3)  aged 48 and older OR (3)  American aged 72 and older  3  Osteoporosis Screening: covered every 2 years if you meet one of the following conditions: (1) You're estrogen deficient and at risk for osteoporosis based off medical history and other findings; (2) Have a vertebral abnormality; (3) On glucocorticoid therapy for more than 3 months; (4) Have primary hyperparathyroidism; (5) On osteoporosis medications and need to assess response to drug therapy  · Last bone density test (DXA Scan): 03/25/2019   5  HIV Screening: covered annually if you're between the age of 15-65   Also covered annually if you are younger than 13 and older than 72 with risk factors for HIV infection  For pregnant patients, it is covered up to 3 times per pregnancy  Immunizations:  Immunization Recommendations   Influenza Vaccine Annual influenza vaccination during flu season is recommended for all persons aged >= 6 months who do not have contraindications   Pneumococcal Vaccine (Prevnar and Pneumovax)  * Prevnar = PCV13  * Pneumovax = PPSV23   Adults 25-60 years old: 1-3 doses may be recommended based on certain risk factors  Adults 72 years old: Prevnar (PCV13) vaccine recommended followed by Pneumovax (PPSV23) vaccine  If already received PPSV23 since turning 65, then PCV13 recommended at least one year after PPSV23 dose  Hepatitis B Vaccine 3 dose series if at intermediate or high risk (ex: diabetes, end stage renal disease, liver disease)   Tetanus (Td) Vaccine - COST NOT COVERED BY MEDICARE PART B Following completion of primary series, a booster dose should be given every 10 years to maintain immunity against tetanus  Td may also be given as tetanus wound prophylaxis  Tdap Vaccine - COST NOT COVERED BY MEDICARE PART B Recommended at least once for all adults  For pregnant patients, recommended with each pregnancy  Shingles Vaccine (Shingrix) - COST NOT COVERED BY MEDICARE PART B  2 shot series recommended in those aged 48 and above     Health Maintenance Due:      Topic Date Due    Colorectal Cancer Screening  01/13/1996    Hepatitis C Screening  Completed     Immunizations Due:      Topic Date Due    DTaP,Tdap,and Td Vaccines (1 - Tdap) 01/13/1967    Pneumococcal Vaccine: 65+ Years (1 of 1 - PPSV23) 01/13/2011     Advance Directives   What are advance directives? Advance directives are legal documents that state your wishes and plans for medical care  These plans are made ahead of time in case you lose your ability to make decisions for yourself   Advance directives can apply to any medical decision, such as the treatments you want, and if you want to donate organs  What are the types of advance directives? There are many types of advance directives, and each state has rules about how to use them  You may choose a combination of any of the following:  · Living will: This is a written record of the treatment you want  You can also choose which treatments you do not want, which to limit, and which to stop at a certain time  This includes surgery, medicine, IV fluid, and tube feedings  · Durable power of  for healthcare [facility]): This is a written record that states who you want to make healthcare choices for you when you are unable to make them for yourself  This person, called a proxy, is usually a family member or a friend  You may choose more than 1 proxy  · Do not resuscitate (DNR) order:  A DNR order is used in case your heart stops beating or you stop breathing  It is a request not to have certain forms of treatment, such as CPR  A DNR order may be included in other types of advance directives  · Medical directive: This covers the care that you want if you are in a coma, near death, or unable to make decisions for yourself  You can list the treatments you want for each condition  Treatment may include pain medicine, surgery, blood transfusions, dialysis, IV or tube feedings, and a ventilator (breathing machine)  · Values history: This document has questions about your views, beliefs, and how you feel and think about life  This information can help others choose the care that you would choose  Why are advance directives important? An advance directive helps you control your care  Although spoken wishes may be used, it is better to have your wishes written down  Spoken wishes can be misunderstood, or not followed  Treatments may be given even if you do not want them  An advance directive may make it easier for your family to make difficult choices about your care     Weight Management   Why it is important to manage your weight:  Being overweight increases your risk of health conditions such as heart disease, high blood pressure, type 2 diabetes, and certain types of cancer  It can also increase your risk for osteoarthritis, sleep apnea, and other respiratory problems  Aim for a slow, steady weight loss  Even a small amount of weight loss can lower your risk of health problems  How to lose weight safely:  A safe and healthy way to lose weight is to eat fewer calories and get regular exercise  You can lose up about 1 pound a week by decreasing the number of calories you eat by 500 calories each day  Healthy meal plan for weight management:  A healthy meal plan includes a variety of foods, contains fewer calories, and helps you stay healthy  A healthy meal plan includes the following:  · Eat whole-grain foods more often  A healthy meal plan should contain fiber  Fiber is the part of grains, fruits, and vegetables that is not broken down by your body  Whole-grain foods are healthy and provide extra fiber in your diet  Some examples of whole-grain foods are whole-wheat breads and pastas, oatmeal, brown rice, and bulgur  · Eat a variety of vegetables every day  Include dark, leafy greens such as spinach, kale, sadia greens, and mustard greens  Eat yellow and orange vegetables such as carrots, sweet potatoes, and winter squash  · Eat a variety of fruits every day  Choose fresh or canned fruit (canned in its own juice or light syrup) instead of juice  Fruit juice has very little or no fiber  · Eat low-fat dairy foods  Drink fat-free (skim) milk or 1% milk  Eat fat-free yogurt and low-fat cottage cheese  Try low-fat cheeses such as mozzarella and other reduced-fat cheeses  · Choose meat and other protein foods that are low in fat  Choose beans or other legumes such as split peas or lentils  Choose fish, skinless poultry (chicken or turkey), or lean cuts of red meat (beef or pork)  Before you cook meat or poultry, cut off any visible fat     · Use less fat and oil  Try baking foods instead of frying them  Add less fat, such as margarine, sour cream, regular salad dressing and mayonnaise to foods  Eat fewer high-fat foods  Some examples of high-fat foods include french fries, doughnuts, ice cream, and cakes  · Eat fewer sweets  Limit foods and drinks that are high in sugar  This includes candy, cookies, regular soda, and sweetened drinks  Exercise:  Exercise at least 30 minutes per day on most days of the week  Some examples of exercise include walking, biking, dancing, and swimming  You can also fit in more physical activity by taking the stairs instead of the elevator or parking farther away from stores  Ask your healthcare provider about the best exercise plan for you  © Copyright [employer] 2018 Information is for End User's use only and may not be sold, redistributed or otherwise used for commercial purposes  All illustrations and images included in CareNotes® are the copyrighted property of [employer]  or [employer]  To follow up with test results      Orders Placed This Encounter   Procedures    DXA bone density spine hip and pelvis     Standing Status:   Future     Standing Expiration Date:   3/10/2025     Scheduling Instructions:      Please wear comfortable clothing with no metal buttons, zippers, snaps or an underwire bra  Do not take any calcium supplements 24 hours prior to your test  Please bring your insurance cards, a form of photo ID and a list of your medications with you  Arrive 5-10 minutes prior to your appointment time in order to register  Your study cannot be performed if you take your calcium supplement 24 hours before the scheduled Dexa scan examination  To schedule this appointment, please contact Central Scheduling at [phone]      PTH, intact     Standing Status:   Future     Standing Expiration Date:   3/10/2022    TSH, 3rd generation with Free T4 reflex     Standing Status: Future     Standing Expiration Date:   3/10/2022         Subjective:     Patient ID: Pastora Petty is a 77 y o  female      HPI   New complaint  Cough  Patient stated in the last 2 weeks she developed cough  Productive  Raising yellow phlegm  Mild  Patient stated in the morning and the AV sometimes she hears herself wheezing  She does use her Proventil inhaler it does goes away  Denied shortness of breath  Denied fever or chills  Denied hemoptysis  Patient admit to history of asthma    follow-up her chronic medical problems  Hypertension, patient stated she takes Bystolic every day, but she does not take pro most of the time  Denied headache, flushing or dizziness  Abnormal thyroid function test   Denied significant weight gain  Denied fatigue or cold intolerance  Breast cancer with metastasis to the brain  Denied headache, dizziness  Off balance  Has been following with Oncology  Chronic renal failure  Denied edema  Abnormal urinalysis  Denied urinary frequency, dysuria or hematuria  Had COVID vaccine for 2 doses    Test results  MRI of the brain February 8, 2021  Lab done December 11, 2020 and February 4th, 2021  PET scan November 30, 2020  Or reviewed and discussed result with patient    Review of Systems   Constitutional: Negative for chills, diaphoresis and fatigue  HENT: Negative for ear pain, sore throat, trouble swallowing and voice change  Eyes: Negative for visual disturbance  Respiratory: Positive for cough and wheezing  Negative for chest tightness, shortness of breath and stridor  Cardiovascular: Negative for chest pain, palpitations and leg swelling  Gastrointestinal: Negative for abdominal pain, blood in stool, constipation, diarrhea and nausea  Endocrine: Negative for polydipsia and polyuria  Genitourinary: Negative for dysuria, flank pain, frequency, hematuria, pelvic pain, urgency, vaginal bleeding and vaginal discharge     Musculoskeletal: Negative for arthralgias, back pain, gait problem, myalgias and neck pain  Skin: Negative for rash  Allergic/Immunologic: Negative for food allergies  Neurological: Negative for dizziness, tremors, seizures, weakness, light-headedness, numbness and headaches  Hematological: Negative for adenopathy  Does not bruise/bleed easily  Psychiatric/Behavioral: Negative for confusion and dysphoric mood  The patient is not nervous/anxious  Objective:     Physical Exam  Constitutional:       General: She is not in acute distress  Appearance: Normal appearance  She is well-developed  She is not ill-appearing or diaphoretic  HENT:      Head: Normocephalic and atraumatic  Eyes:      General: No scleral icterus  Right eye: No discharge  Left eye: No discharge  Conjunctiva/sclera: Conjunctivae normal       Pupils: Pupils are equal, round, and reactive to light  Neck:      Musculoskeletal: Neck supple  Thyroid: No thyromegaly  Vascular: No carotid bruit or JVD  Cardiovascular:      Rate and Rhythm: Normal rate and regular rhythm  Pulses:           Carotid pulses are 3+ on the right side and 3+ on the left side  Heart sounds: Normal heart sounds  No murmur  Comments: Extremities  No edema  Pulmonary:      Effort: Pulmonary effort is normal  No respiratory distress  Breath sounds: Normal breath sounds  No stridor  No wheezing, rhonchi or rales  Abdominal:      General: Bowel sounds are normal  There is no distension  Palpations: Abdomen is soft  There is no mass  Tenderness: There is no abdominal tenderness  There is no guarding or rebound  Hernia: No hernia is present  Musculoskeletal:      Right lower leg: No edema  Lymphadenopathy:      Cervical: No cervical adenopathy  Skin:     Findings: No rash  Neurological:      Mental Status: She is alert and oriented to person, place, and time  Cranial Nerves: No cranial nerve deficit        Motor: No abnormal muscle tone  Coordination: Coordination normal    Psychiatric:         Behavior: Behavior normal          Thought Content: Thought content normal          Judgment: Judgment normal         BMI Counseling: Body mass index is 25 09 kg/m²  The BMI is above normal  Nutrition recommendations include encouraging healthy choices of fruits and vegetables  Falls Plan of Care: balance, strength, and gait training instructions were provided  Home safety education provided

## 2021-03-11 ENCOUNTER — HOSPITAL ENCOUNTER (OUTPATIENT)
Dept: INFUSION CENTER | Facility: HOSPITAL | Age: 76
Discharge: HOME | End: 2021-03-11
Attending: INTERNAL MEDICINE
Payer: MEDICARE

## 2021-03-11 VITALS
HEART RATE: 75 BPM | DIASTOLIC BLOOD PRESSURE: 73 MMHG | TEMPERATURE: 98 F | RESPIRATION RATE: 18 BRPM | SYSTOLIC BLOOD PRESSURE: 151 MMHG

## 2021-03-11 DIAGNOSIS — E86.0 DEHYDRATION: ICD-10-CM

## 2021-03-11 DIAGNOSIS — R11.2 CHEMOTHERAPY INDUCED NAUSEA AND VOMITING: ICD-10-CM

## 2021-03-11 DIAGNOSIS — C50.811 MALIGNANT NEOPLASM OF OVERLAPPING SITES OF RIGHT BREAST IN FEMALE, ESTROGEN RECEPTOR POSITIVE (HCC): Primary | ICD-10-CM

## 2021-03-11 DIAGNOSIS — Z17.0 MALIGNANT NEOPLASM OF OVERLAPPING SITES OF RIGHT BREAST IN FEMALE, ESTROGEN RECEPTOR POSITIVE (HCC): Primary | ICD-10-CM

## 2021-03-11 DIAGNOSIS — T45.1X5A CHEMOTHERAPY INDUCED NAUSEA AND VOMITING: ICD-10-CM

## 2021-03-11 DIAGNOSIS — R52 PAIN: ICD-10-CM

## 2021-03-11 PROBLEM — Z12.11 SCREENING FOR COLON CANCER: Status: ACTIVE | Noted: 2021-03-11

## 2021-03-11 PROBLEM — J20.9 ACUTE BRONCHITIS: Status: ACTIVE | Noted: 2021-03-11

## 2021-03-11 PROBLEM — E78.1 PURE HYPERTRIGLYCERIDEMIA: Status: ACTIVE | Noted: 2021-03-11

## 2021-03-11 PROBLEM — R94.6 ABNORMAL THYROID FUNCTION TEST: Status: ACTIVE | Noted: 2021-03-11

## 2021-03-11 LAB
ALBUMIN SERPL BCP-MCNC: 3.6 G/DL (ref 3–5.2)
ALP SERPL-CCNC: 51 U/L (ref 43–122)
ALT SERPL W P-5'-P-CCNC: 8 U/L (ref 9–52)
ANION GAP SERPL CALCULATED.3IONS-SCNC: 4 MMOL/L (ref 5–14)
AST SERPL W P-5'-P-CCNC: 28 U/L (ref 14–36)
BASOPHILS # BLD AUTO: 0 THOUSANDS/ΜL (ref 0–0.1)
BASOPHILS NFR BLD AUTO: 0 % (ref 0–1)
BILIRUB SERPL-MCNC: 0.7 MG/DL
BUN SERPL-MCNC: 19 MG/DL (ref 5–25)
CALCIUM SERPL-MCNC: 8.8 MG/DL (ref 8.4–10.2)
CANCER AG27-29 SERPL-ACNC: 38.2 U/ML (ref 0–42.3)
CHLORIDE SERPL-SCNC: 101 MMOL/L (ref 97–108)
CO2 SERPL-SCNC: 31 MMOL/L (ref 22–30)
CREAT SERPL-MCNC: 0.86 MG/DL (ref 0.6–1.2)
EOSINOPHIL # BLD AUTO: 0.2 THOUSAND/ΜL (ref 0–0.4)
EOSINOPHIL NFR BLD AUTO: 4 % (ref 0–6)
ERYTHROCYTE [DISTWIDTH] IN BLOOD BY AUTOMATED COUNT: 14.9 %
GFR SERPL CREATININE-BSD FRML MDRD: 66 ML/MIN/1.73SQ M
GLUCOSE SERPL-MCNC: 95 MG/DL (ref 70–99)
HCT VFR BLD AUTO: 32.6 % (ref 36–46)
HGB BLD-MCNC: 10.8 G/DL (ref 12–16)
LDH SERPL-CCNC: 354 U/L (ref 313–618)
LYMPHOCYTES # BLD AUTO: 1.1 THOUSANDS/ΜL (ref 0.5–4)
LYMPHOCYTES NFR BLD AUTO: 20 % (ref 25–45)
MCH RBC QN AUTO: 31.2 PG (ref 26–34)
MCHC RBC AUTO-ENTMCNC: 33.1 G/DL (ref 31–36)
MCV RBC AUTO: 94 FL (ref 80–100)
MONOCYTES # BLD AUTO: 0.5 THOUSAND/ΜL (ref 0.2–0.9)
MONOCYTES NFR BLD AUTO: 8 % (ref 1–10)
NEUTROPHILS # BLD AUTO: 3.8 THOUSANDS/ΜL (ref 1.8–7.8)
NEUTS SEG NFR BLD AUTO: 67 % (ref 45–65)
PLATELET # BLD AUTO: 289 THOUSANDS/UL (ref 150–450)
PMV BLD AUTO: 7.1 FL (ref 8.9–12.7)
POTASSIUM SERPL-SCNC: 3.7 MMOL/L (ref 3.6–5)
PROT SERPL-MCNC: 7.2 G/DL (ref 5.9–8.4)
RBC # BLD AUTO: 3.46 MILLION/UL (ref 4–5.2)
SODIUM SERPL-SCNC: 136 MMOL/L (ref 137–147)
WBC # BLD AUTO: 5.6 THOUSAND/UL (ref 4.5–11)

## 2021-03-11 PROCEDURE — 86300 IMMUNOASSAY TUMOR CA 15-3: CPT

## 2021-03-11 PROCEDURE — 96360 HYDRATION IV INFUSION INIT: CPT

## 2021-03-11 PROCEDURE — 83615 LACTATE (LD) (LDH) ENZYME: CPT

## 2021-03-11 PROCEDURE — 80053 COMPREHEN METABOLIC PANEL: CPT

## 2021-03-11 PROCEDURE — 85025 COMPLETE CBC W/AUTO DIFF WBC: CPT

## 2021-03-11 RX ADMIN — SODIUM CHLORIDE 1000 ML: 0.9 INJECTION, SOLUTION INTRAVENOUS at 12:36

## 2021-03-11 NOTE — PLAN OF CARE
Problem: Potential for Falls  Goal: Patient will remain free of falls  Description: INTERVENTIONS:  - Assess patient frequently for physical needs  -  Identify cognitive and physical deficits and behaviors that affect risk of falls    -  [place] fall precautions as indicated by assessment   - Educate patient/family on patient safety including physical limitations  - Instruct patient to call for assistance with activity based on assessment  - Modify environment to reduce risk of injury  - Consider OT/PT consult to assist with strengthening/mobility  Outcome: Progressing

## 2021-03-11 NOTE — PROGRESS NOTES
Pt tolerated hydration today with no c/o  Declined zofran today  Port remains accessed for Friday treatment  Left unit via w/c with volunteer to the lobby for an uber

## 2021-03-12 ENCOUNTER — HOSPITAL ENCOUNTER (OUTPATIENT)
Dept: INFUSION CENTER | Facility: HOSPITAL | Age: 76
Discharge: HOME | End: 2021-03-12
Attending: INTERNAL MEDICINE
Payer: MEDICARE

## 2021-03-12 VITALS
RESPIRATION RATE: 18 BRPM | HEART RATE: 75 BPM | SYSTOLIC BLOOD PRESSURE: 136 MMHG | TEMPERATURE: 98.5 F | DIASTOLIC BLOOD PRESSURE: 71 MMHG

## 2021-03-12 DIAGNOSIS — R52 PAIN: ICD-10-CM

## 2021-03-12 DIAGNOSIS — E86.0 DEHYDRATION: Primary | ICD-10-CM

## 2021-03-12 DIAGNOSIS — R11.2 CHEMOTHERAPY INDUCED NAUSEA AND VOMITING: ICD-10-CM

## 2021-03-12 DIAGNOSIS — T45.1X5A CHEMOTHERAPY INDUCED NAUSEA AND VOMITING: ICD-10-CM

## 2021-03-12 PROCEDURE — 96360 HYDRATION IV INFUSION INIT: CPT

## 2021-03-12 RX ADMIN — SODIUM CHLORIDE 1000 ML: 0.9 INJECTION, SOLUTION INTRAVENOUS at 12:48

## 2021-03-12 NOTE — PROGRESS NOTES
Pt tolerated hydration well today without complications  Confirmed appt back Monday, avs declined as she states she has one at home already

## 2021-03-12 NOTE — PLAN OF CARE
Problem: Potential for Falls  Goal: Patient will remain free of falls  Description: INTERVENTIONS:  - Assess patient frequently for physical needs  -  Identify cognitive and physical deficits and behaviors that affect risk of falls    -  [place] fall precautions as indicated by assessment   - Educate patient/family on patient safety including physical limitations  - Instruct patient to call for assistance with activity based on assessment  - Modify environment to reduce risk of injury  - Consider OT/PT consult to assist with strengthening/mobility  Outcome: Progressing

## 2021-03-15 ENCOUNTER — HOSPITAL ENCOUNTER (OUTPATIENT)
Dept: INFUSION CENTER | Facility: HOSPITAL | Age: 76
End: 2021-03-15
Attending: INTERNAL MEDICINE

## 2021-03-15 ENCOUNTER — OFFICE VISIT (OUTPATIENT)
Dept: HEMATOLOGY ONCOLOGY | Facility: CLINIC | Age: 76
End: 2021-03-15
Payer: MEDICARE

## 2021-03-15 VITALS
WEIGHT: 136 LBS | SYSTOLIC BLOOD PRESSURE: 132 MMHG | OXYGEN SATURATION: 98 % | TEMPERATURE: 98.2 F | HEART RATE: 74 BPM | RESPIRATION RATE: 18 BRPM | DIASTOLIC BLOOD PRESSURE: 80 MMHG | BODY MASS INDEX: 24.1 KG/M2 | HEIGHT: 63 IN

## 2021-03-15 DIAGNOSIS — E53.8 VITAMIN B12 DEFICIENCY: ICD-10-CM

## 2021-03-15 DIAGNOSIS — Z17.0 MALIGNANT NEOPLASM OF OVERLAPPING SITES OF RIGHT BREAST IN FEMALE, ESTROGEN RECEPTOR POSITIVE (HCC): Primary | ICD-10-CM

## 2021-03-15 DIAGNOSIS — E55.9 VITAMIN D DEFICIENCY: ICD-10-CM

## 2021-03-15 DIAGNOSIS — C50.811 MALIGNANT NEOPLASM OF OVERLAPPING SITES OF RIGHT BREAST IN FEMALE, ESTROGEN RECEPTOR POSITIVE (HCC): Primary | ICD-10-CM

## 2021-03-15 DIAGNOSIS — C79.31 BRAIN METASTASIS (HCC): ICD-10-CM

## 2021-03-15 DIAGNOSIS — Z79.811 USE OF AROMATASE INHIBITORS: ICD-10-CM

## 2021-03-15 PROCEDURE — 99214 OFFICE O/P EST MOD 30 MIN: CPT | Performed by: INTERNAL MEDICINE

## 2021-03-15 NOTE — PROGRESS NOTES
Hematology/Oncology Outpatient Follow-up  Dave Isidro 77 y o  female 1945 [id number]    Date:  3/15/2021        Assessment and Plan:  1  Malignant neoplasm of overlapping 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, estrogen receptor positive ([facility] )    The patient is doing well on the current treatment with Tykerb out the dose of 1500 mg on a daily basis along with the anastrozole without interruption  Her echocardiogram continues to show stable ejection fraction around 35% which seems to be her  New baseline  The patient is relatively asymptomatic  She continues to require IV hydration as needed  - CBC and differential; Future  - Comprehensive metabolic panel; Future  - LD,Blood; Future  - Cancer antigen 15-3; Future  - Cancer antigen 27 29; Future    2  Brain metastasis ([facility] )    She is under close observation with frequent MRI of the brain by the Radiation Oncology team     3  Vitamin B12 deficiency    She will be continue on the vitamin B12 injections on a monthly basis  4  Use of aromatase inhibitors    She was encouraged to continue with vitamin-D supplements on a daily basis  HPI:  The patient came  today for a follow-up visit accompanied by her sister  She continues to be on the Tykerb at the current dose of 1500 mg once a day along with the anastrozole as endocrine therapy  The patient continues to be also on IV hydration as-needed basis  She did have an echocardiogram on 02/17/2021 which showed:  IMPRESSIONS:  Technical quality: Good  Cardiac rhythm: Sinus     1  Borderline dilated left ventricular cavity, moderate to markedly reduced left ventricular systolic function  EF around 35%  Global hypokinesis  Grade 1 diastolic dysfunction  2  Normal right ventricular size and systolic function  3  Borderline left atrial cavity enlargement  4  Aortic valve sclerosis, mild aortic valve regurgitation  5  Mitral valve sclerosis, mild, 2+ mitral valve regurgitation    6  Mild, 1+ tricuspid valve regurgitation  7  Mild pulmonary hypertension  Estimated RVSP/PASP is 36 mmHg  8  No pericardial effusion  most recent blood work on 03/11/2021 showed hemoglobin of 10 8 with normal white cells and platelets  The tumor markers are trending in the normal range  Creatinine 0 8 with normal calcium and liver enzymes  Oncology History   Malignant neoplasm of overlapping 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, estrogen receptor positive ([facility] )   2015 -  Hormone Therapy    Anastrozole     5/28/2015 Initial Diagnosis    Metastatic breast cancer ([facility] )  (recurrence)     6/30/2015 -  Chemotherapy    1-Taxotere, Herceptin, Perjeta started in [place] 2015 x6 cycles  2-Herceptin and Perjeta alone were continued since the patient was not interested in continue the Taxotere  3-Herceptin and Perjeta were put on hold due to decline of her ejection fraction around May 2017    4-low dose weekly Taxol was started in July 2017  5-Taxol was switched to every other week basis     4/14/2019 - 6/9/2019 Chemotherapy    PACLitaxel (TAXOL) 144 6 mg in sodium chloride 0 9 % 250 mL chemo IVPB, 80 mg/m2, Intravenous, Once, 2 of 6 cycles     12/4/2019 - 12/31/2019 Chemotherapy    PACLitaxel (TAXOL) 142 2 mg in sodium chloride 0 9 % 250 mL chemo IVPB, 80 mg/m2 = 142 2 mg, Intravenous, Once, 1 of 6 cycles  Administration: 142 2 mg (12/4/2019), 142 2 mg (12/18/2019)     1/8/2020 - 3/10/2020 Chemotherapy    PACLitaxel (TAXOL) 141 6 mg in sodium chloride 0 9 % 250 mL chemo IVPB, 80 mg/m2 = 141 6 mg, Intravenous, Once, 4 of 6 cycles  Administration: 141 6 mg (1/8/2020), 141 6 mg (1/29/2020), 141 6 mg (2/19/2020)  trastuzumab (HERCEPTIN) 600 mg in sodium chloride 0 9 % 250 mL chemo infusion, 609 mg, Intravenous, Once, 4 of 6 cycles  Administration: 600 mg (1/8/2020), 450 mg (1/29/2020), 450 mg (2/19/2020)      Chemotherapy    PACLitaxel (TAXOL) 144 6 mg in sodium chloride 0 9 % 250 mL chemo IVPB, 80 mg/m2, Intravenous, Once, 2 of 6 cycles    PACLitaxel (TAXOL) 142 2 mg in sodium chloride 0 9 % 250 mL chemo IVPB, 80 mg/m2 = 142 2 mg, Intravenous, Once, 1 of 6 cycles    Administration: 142 2 mg (12/4/2019), 142 2 mg (12/18/2019)    PACLitaxel (TAXOL) 141 6 mg in sodium chloride 0 9 % 250 mL chemo IVPB, 80 mg/m2, Intravenous, Once, 0 of 12 cycles        trastuzumab (HERCEPTIN) 304 mg in sodium chloride 0 9 % 250 mL chemo infusion, 4 mg/kg, Intravenous, Once, 0 of 26 cycles    PACLitaxel (TAXOL) 141 6 mg in sodium chloride 0 9 % 250 mL chemo IVPB, 80 mg/m2 = 141 6 mg, Intravenous, Once, 4 of 6 cycles    Administration: 141 6 mg (1/8/2020), 141 6 mg (1/29/2020), 141 6 mg (2/19/2020)        trastuzumab (HERCEPTIN) 600 mg in sodium chloride 0 9 % 250 mL chemo infusion, 609 mg, Intravenous, Once, 4 of 6 cycles    Administration: 600 mg (1/8/2020), 450 mg (1/29/2020), 450 mg (2/19/2020)        Chemotherapy    PACLitaxel (TAXOL) 144 6 mg in sodium chloride 0 9 % 250 mL chemo IVPB, 80 mg/m2, Intravenous, Once, 2 of 6 cycles    PACLitaxel (TAXOL) 142 2 mg in sodium chloride 0 9 % 250 mL chemo IVPB, 80 mg/m2 = 142 2 mg, Intravenous, Once, 1 of 6 cycles    Administration: 142 2 mg (12/4/2019), 142 2 mg (12/18/2019)    PACLitaxel (TAXOL) 141 6 mg in sodium chloride 0 9 % 250 mL chemo IVPB, 80 mg/m2, Intravenous, Once, 0 of 12 cycles        trastuzumab (HERCEPTIN) 304 mg in sodium chloride 0 9 % 250 mL chemo infusion, 4 mg/kg, Intravenous, Once, 0 of 26 cycles    PACLitaxel (TAXOL) 141 6 mg in sodium chloride 0 9 % 250 mL chemo IVPB, 80 mg/m2 = 141 6 mg, Intravenous, Once, 4 of 6 cycles    Administration: 141 6 mg (1/8/2020), 141 6 mg (1/29/2020), 141 6 mg (2/19/2020)        trastuzumab (HERCEPTIN) 600 mg in sodium chloride 0 9 % 250 mL chemo infusion, 609 mg, Intravenous, Once, 4 of 6 cycles    Administration: 600 mg (1/8/2020), 450 mg (1/29/2020), 450 mg (2/19/2020)      Lapatinib 1500 mg once a day was started on 04/20/2020 after she was found to have metastatic disease to the brain, status post whole brain radiation  Brain metastasis ([facility] )   3/30/2020 - 4/10/2020 Radiation    Plan ID Energy Fractions Dose per Fraction (cGy) Dose Correction (cGy) Total Dose Delivered (cGy) Elapsed Days   Whole Brai # 6X 10 / 10 300 0 3,000 11          4/9/2020 Initial Diagnosis    Brain metastasis ([facility] )     12/9/2020 - 12/9/2020 Radiation    Plan ID Energy Fractions Dose per Fraction (cGy) Dose Correction (cGy) Total Dose Delivered (cGy) Elapsed Days   [id number] 6X 1 / 1 1,800 0 1,800 0      Treatment Dates:  12/9/2020 - 12/9/2020  Interval history:    ROS: Review of Systems   Constitutional: Negative for chills and fever  HENT: Negative for ear pain and sore throat  Eyes: Negative for pain and visual disturbance  Respiratory: Negative for cough and shortness of breath  Cardiovascular: Negative for chest pain and palpitations  Gastrointestinal: Negative for abdominal pain and vomiting  Genitourinary: Negative for dysuria and hematuria  Musculoskeletal: Positive for back pain  Negative for arthralgias  Skin: Negative for color change and rash  Neurological: Negative for seizures and syncope  All other systems reviewed and are negative        Past Medical History:   Diagnosis Date    Back pain     Brain cancer ([facility] )     Breast cancer ([facility] )     Chronic pain     Hypertension     Knee pain     Lymphedema of right arm     Vitamin B12 deficiency        Past Surgical History:   Procedure Laterality Date    ANKLE SURGERY      APPENDECTOMY      BREAST SURGERY      bilat mastectomy-right total mastectomy and prophylactic left total mastectomy-2005    FRACTURE SURGERY      KNEE SURGERY      right knee replacement 2014 in [address] [place] [address] Bilateral     ORTHOPEDIC SURGERY         Social History     Socioeconomic History    Marital status: Single     Spouse name: None    Number of children: 0    Years of education: None    Highest education level: None   Occupational History    None   Social Needs    Financial resource strain: None    Food insecurity     Worry: None     Inability: None    Transportation needs     Medical: None     Non-medical: None   Tobacco Use    Smoking status: Never Smoker    Smokeless tobacco: Never Used    Tobacco comment: no passive smoke exposure   Substance and Sexual Activity    Alcohol use: Never     Frequency: Never     Binge frequency: Never    Drug use: No    Sexual activity: Not Currently   Lifestyle    Physical activity     Days per week: None     Minutes per session: None    Stress: None   Relationships    Social connections     Talks on phone: None     Gets together: None     Attends Religion service: None     Active member of club or organization: None     Attends meetings of clubs or organizations: None     Relationship status: None    Intimate partner violence     Fear of current or ex partner: None     Emotionally abused: None     Physically abused: None     Forced sexual activity: None   Other Topics Concern    None   Social History Narrative    None       Family History   Problem Relation Age of Onset    Asthma Mother     Osteoarthritis Father     Bone cancer Paternal Aunt         bone cancer       Allergies   Allergen Reactions    Penicillins Rash         Current Outpatient Medications:     albuterol (PROVENTIL HFA,VENTOLIN HFA) 90 mcg/act inhaler, Inhale 2 puffs every 6 (six) hours as needed for wheezing or shortness of breath, Disp: 3 Inhaler, Rfl: 3    anastrozole (ARIMIDEX) 1 mg tablet, TAKE ONE TABLET BY MOUTH EVERY DAY, Disp: 90 tablet, Rfl: 3    cefuroxime (CEFTIN) 250 mg tablet, Take 1 tablet (250 mg total) by mouth every 12 (twelve) hours for 10 days, Disp: 20 tablet, Rfl: 0    cholecalciferol (VITAMIN D3) 1,000 units tablet, Take 1 tablet (1,000 Units total) by mouth daily, Disp: 30 tablet, Rfl: 0    Cyanocobalamin 1000 MCG/ML KIT, Inject as directed every 6 (six) months, Disp: , Rfl:     Elastic Bandages & Supports (Prolite Lumbar Support) MISC, Use daily as needed (back pain), Disp: 1 each, Rfl: 0    escitalopram (LEXAPRO) 5 mg tablet, Take 1 tablet (5 mg total) by mouth daily, Disp: 30 tablet, Rfl: 5    esomeprazole (NexIUM) 40 MG capsule, TAKE 1 CAPSULE BY MOUTH ONCE DAILY, Disp: 90 capsule, Rfl: 0    gabapentin (NEURONTIN) 300 mg capsule, Take 3 capsules (900 mg total) by mouth daily at bedtime, Disp: 90 capsule, Rfl: 2    ibuprofen (MOTRIN) 800 mg tablet, Take 800 mg by mouth every 8 (eight) hours as needed, Disp: , Rfl:     lisinopril (ZESTRIL) 10 mg tablet, Take 1 tablet (10 mg total) by mouth daily, Disp: 90 tablet, Rfl: 3    megestrol (MEGACE) 400 mg/10 mL, Take 400 mg by mouth daily, Disp: 600 mL, Rfl: 0    methylPREDNISolone 4 MG tablet therapy pack, Use as directed on package with food, Disp: 21 each, Rfl: 0    Misc  Devices (ILLUSIONS C BREAST PROSTHESIS) MISC, 1 breast prosthesis, Disp: 1 each, Rfl: 0    Misc  Devices (REFLECTIONS C BREAST PROSTHES) MISC, Dispense 1 breast prosthesis, Disp: 1 each, Rfl: 0    nebivolol (Bystolic) 5 mg tablet, Take 1 tablet (5 mg total) by mouth daily, Disp: 90 tablet, Rfl: 3    ondansetron (Zofran) 4 mg tablet, Every 12 hours, Disp: , Rfl:     pantoprazole (PROTONIX) 40 mg tablet, TAKE ONE TABLET BY MOUTH EVERY DAY, Disp: 90 tablet, Rfl: 0    TYKERB 250 MG tablet, , Disp: , Rfl:     indapamide (LOZOL) 1 25 mg tablet, Take 1 tablet (1 25 mg total) by mouth every morning (Patient not taking: Reported on 3/10/2021), Disp: 30 tablet, Rfl: 1    lidocaine (LIDODERM) 5 %, Apply 1 patch topically daily Remove & Discard patch within 12 hours or as directed by MD (Patient not taking: Reported on 3/10/2021), Disp: 30 patch, Rfl: 1  No current facility-administered medications for this visit         Physical Exam:  /80 (BP Location: Left arm, Patient Position: Sitting, Cuff Size: Adult)   Pulse 74   Temp 98 2 °F (36 8 °C)   Resp 18   Ht 5' 2 5" (1 588 m)   Wt 61 7 kg (136 lb)   SpO2 98%   BMI 24 48 kg/m²     Physical Exam  Constitutional:       General: She is not in acute distress  Appearance: She is well-developed  She is not diaphoretic  HENT:      Head: Normocephalic and atraumatic  Eyes:      General: No scleral icterus  Right eye: No discharge  Left eye: No discharge  Conjunctiva/sclera: Conjunctivae normal       Pupils: Pupils are equal, round, and reactive to light  Neck:      Musculoskeletal: Normal range of motion and neck supple  Thyroid: No thyromegaly  Vascular: No JVD  Trachea: No tracheal deviation  Cardiovascular:      Rate and Rhythm: Normal rate and regular rhythm  Heart sounds: Normal heart sounds  No murmur  No friction rub  Pulmonary:      Effort: Pulmonary effort is normal  No respiratory distress  Breath sounds: Normal breath sounds  No stridor  No wheezing or rales  Chest:      Chest wall: No tenderness  Abdominal:      General: There is no distension  Palpations: Abdomen is soft  There is no hepatomegaly or splenomegaly  Tenderness: There is no abdominal tenderness  There is no guarding or rebound  Musculoskeletal: Normal range of motion  General: No tenderness or deformity  Lymphadenopathy:      Cervical: No cervical adenopathy  Skin:     General: Skin is warm and dry  Coloration: Skin is not pale  Findings: No erythema or rash  Neurological:      Mental Status: She is alert and oriented to person, place, and time  Cranial Nerves: No cranial nerve deficit  Coordination: Coordination normal       Deep Tendon Reflexes: Reflexes are normal and symmetric  Psychiatric:         Behavior: Behavior normal          Thought Content:  Thought content normal          Judgment: Judgment normal            Labs:  Lab Results   Component Value Date    WBC 5 60 03/11/2021    HGB 10 8 (L) 03/11/2021    HCT 32 6 (L) 03/11/2021    MCV 94 03/11/2021     03/11/2021     Lab Results   Component Value Date     06/18/2018    K 3 7 03/11/2021     03/11/2021    CO2 31 (H) 03/11/2021    ANIONGAP 8 06/18/2018    BUN 19 03/11/2021    CREATININE 0 86 03/11/2021    GLUF 74 12/11/2020    CALCIUM 8 8 03/11/2021    CORRECTEDCA 9 8 12/11/2020    AST 28 03/11/2021    ALT 8 (L) 03/11/2021    ALKPHOS 51 03/11/2021    PROT 7 1 06/18/2018    BILITOT 0 3 06/18/2018    EGFR 66 03/11/2021     No results found for: TSH    Patient voiced understanding and agreement in the above discussion  Aware to contact our office with questions/symptoms in the interim

## 2021-03-16 ENCOUNTER — HOSPITAL ENCOUNTER (OUTPATIENT)
Dept: INFUSION CENTER | Facility: HOSPITAL | Age: 76
Discharge: HOME | End: 2021-03-16
Attending: INTERNAL MEDICINE
Payer: MEDICARE

## 2021-03-16 VITALS
DIASTOLIC BLOOD PRESSURE: 73 MMHG | HEART RATE: 80 BPM | SYSTOLIC BLOOD PRESSURE: 148 MMHG | RESPIRATION RATE: 18 BRPM | TEMPERATURE: 98.4 F

## 2021-03-16 DIAGNOSIS — E86.0 DEHYDRATION: ICD-10-CM

## 2021-03-16 DIAGNOSIS — T45.1X5A CHEMOTHERAPY INDUCED NAUSEA AND VOMITING: ICD-10-CM

## 2021-03-16 DIAGNOSIS — R11.2 CHEMOTHERAPY INDUCED NAUSEA AND VOMITING: ICD-10-CM

## 2021-03-16 DIAGNOSIS — R52 PAIN: ICD-10-CM

## 2021-03-16 DIAGNOSIS — E53.8 VITAMIN B 12 DEFICIENCY: Primary | ICD-10-CM

## 2021-03-16 PROCEDURE — 96360 HYDRATION IV INFUSION INIT: CPT

## 2021-03-16 PROCEDURE — 96372 THER/PROPH/DIAG INJ SC/IM: CPT

## 2021-03-16 RX ORDER — CYANOCOBALAMIN 1000 UG/ML
1000 INJECTION INTRAMUSCULAR; SUBCUTANEOUS ONCE
Status: CANCELLED | OUTPATIENT
Start: 2021-04-12

## 2021-03-16 RX ORDER — CYANOCOBALAMIN 1000 UG/ML
1000 INJECTION INTRAMUSCULAR; SUBCUTANEOUS ONCE
Status: COMPLETED | OUTPATIENT
Start: 2021-03-16 | End: 2021-03-16

## 2021-03-16 RX ADMIN — SODIUM CHLORIDE 1000 ML: 0.9 INJECTION, SOLUTION INTRAVENOUS at 12:15

## 2021-03-16 RX ADMIN — CYANOCOBALAMIN 1000 MCG: 1000 INJECTION INTRAMUSCULAR; SUBCUTANEOUS at 13:09

## 2021-03-16 NOTE — PROGRESS NOTES
Pt tolerated hydration  today with no c/o  Left unit via w/c escorted by staff to the lobby for star transport

## 2021-03-16 NOTE — PLAN OF CARE
Problem: Potential for Falls  Goal: Patient will remain free of falls  Description: INTERVENTIONS:  - Assess patient frequently for physical needs  -  Identify cognitive and physical deficits and behaviors that affect risk of falls    -  [place] fall precautions as indicated by assessment   - Educate patient/family on patient safety including physical limitations  - Instruct patient to call for assistance with activity based on assessment  - Modify environment to reduce risk of injury  - Consider OT/PT consult to assist with strengthening/mobility  3/16/2021 1355 by Tona Kim RN  Outcome: Progressing  3/16/2021 1224 by Tona Kim RN  Outcome: Progressing

## 2021-03-16 NOTE — PLAN OF CARE
Problem: Potential for Falls  Goal: Patient will remain free of falls  Description: INTERVENTIONS:  - Assess patient frequently for physical needs  -  Identify cognitive and physical deficits and behaviors that affect risk of falls    -  [place] fall precautions as indicated by assessment   - Educate patient/family on patient safety including physical limitations  - Instruct patient to call for assistance with activity based on assessment  - Modify environment to reduce risk of injury  - Consider OT/PT consult to assist with strengthening/mobility  Outcome: Progressing

## 2021-03-17 ENCOUNTER — HOSPITAL ENCOUNTER (OUTPATIENT)
Dept: INFUSION CENTER | Facility: HOSPITAL | Age: 76
Discharge: HOME | End: 2021-03-17
Attending: INTERNAL MEDICINE
Payer: MEDICARE

## 2021-03-17 VITALS
SYSTOLIC BLOOD PRESSURE: 118 MMHG | DIASTOLIC BLOOD PRESSURE: 70 MMHG | TEMPERATURE: 98.2 F | HEART RATE: 74 BPM | RESPIRATION RATE: 18 BRPM

## 2021-03-17 DIAGNOSIS — E86.0 DEHYDRATION: Primary | ICD-10-CM

## 2021-03-17 DIAGNOSIS — T45.1X5A CHEMOTHERAPY INDUCED NAUSEA AND VOMITING: ICD-10-CM

## 2021-03-17 DIAGNOSIS — R11.2 CHEMOTHERAPY INDUCED NAUSEA AND VOMITING: ICD-10-CM

## 2021-03-17 DIAGNOSIS — R52 PAIN: ICD-10-CM

## 2021-03-17 LAB — CANCER AG15-3 SERPL-ACNC: 24.7 U/ML (ref 0–25)

## 2021-03-17 PROCEDURE — 96360 HYDRATION IV INFUSION INIT: CPT

## 2021-03-17 RX ADMIN — SODIUM CHLORIDE 1000 ML: 0.9 INJECTION, SOLUTION INTRAVENOUS at 12:23

## 2021-03-17 NOTE — PROGRESS NOTES
Pt tolerated Hydration today with no c/o  Port remains accessed for next hydration  Left unit via w/c with staff to the lobby

## 2021-03-17 NOTE — PLAN OF CARE
Problem: Potential for Falls  Goal: Patient will remain free of falls  Description: INTERVENTIONS:  - Assess patient frequently for physical needs  -  Identify cognitive and physical deficits and behaviors that affect risk of falls    -  [place] fall precautions as indicated by assessment   - Educate patient/family on patient safety including physical limitations  - Instruct patient to call for assistance with activity based on assessment  - Modify environment to reduce risk of injury  - Consider OT/PT consult to assist with strengthening/mobility  Outcome: Progressing

## 2021-03-18 ENCOUNTER — HOSPITAL ENCOUNTER (OUTPATIENT)
Dept: INFUSION CENTER | Facility: HOSPITAL | Age: 76
Discharge: HOME | End: 2021-03-18
Attending: INTERNAL MEDICINE

## 2021-03-19 ENCOUNTER — HOSPITAL ENCOUNTER (OUTPATIENT)
Dept: INFUSION CENTER | Facility: HOSPITAL | Age: 76
Discharge: HOME | End: 2021-03-19
Attending: INTERNAL MEDICINE
Payer: MEDICARE

## 2021-03-19 VITALS
RESPIRATION RATE: 16 BRPM | HEART RATE: 78 BPM | SYSTOLIC BLOOD PRESSURE: 110 MMHG | DIASTOLIC BLOOD PRESSURE: 62 MMHG | TEMPERATURE: 98 F

## 2021-03-19 DIAGNOSIS — R52 PAIN: ICD-10-CM

## 2021-03-19 DIAGNOSIS — E86.0 DEHYDRATION: Primary | ICD-10-CM

## 2021-03-19 DIAGNOSIS — Z79.811 USE OF AROMATASE INHIBITORS: ICD-10-CM

## 2021-03-19 DIAGNOSIS — C50.811 MALIGNANT NEOPLASM OF OVERLAPPING SITES OF RIGHT BREAST IN FEMALE, ESTROGEN RECEPTOR POSITIVE (HCC): ICD-10-CM

## 2021-03-19 DIAGNOSIS — Z17.0 MALIGNANT NEOPLASM OF OVERLAPPING SITES OF RIGHT BREAST IN FEMALE, ESTROGEN RECEPTOR POSITIVE (HCC): ICD-10-CM

## 2021-03-19 DIAGNOSIS — T45.1X5A CHEMOTHERAPY INDUCED NAUSEA AND VOMITING: ICD-10-CM

## 2021-03-19 DIAGNOSIS — R11.2 CHEMOTHERAPY INDUCED NAUSEA AND VOMITING: ICD-10-CM

## 2021-03-19 PROCEDURE — 96401 CHEMO ANTI-NEOPL SQ/IM: CPT

## 2021-03-19 PROCEDURE — 96360 HYDRATION IV INFUSION INIT: CPT

## 2021-03-19 RX ADMIN — SODIUM CHLORIDE 1000 ML: 0.9 INJECTION, SOLUTION INTRAVENOUS at 12:08

## 2021-03-19 RX ADMIN — DENOSUMAB 60 MG: 60 INJECTION SUBCUTANEOUS at 12:44

## 2021-03-19 NOTE — PLAN OF CARE
Problem: Potential for Falls  Goal: Patient will remain free of falls  Description: INTERVENTIONS:  - Assess patient frequently for physical needs  -  Identify cognitive and physical deficits and behaviors that affect risk of falls    -  [place] fall precautions as indicated by assessment   - Educate patient/family on patient safety including physical limitations  - Instruct patient to call for assistance with activity based on assessment  - Modify environment to reduce risk of injury  - Consider OT/PT consult to assist with strengthening/mobility  Outcome: Progressing

## 2021-03-19 NOTE — PROGRESS NOTES
Pt tolerated hydration today with no c/o  Left unit via w/c escorted by staff to the lobby for star

## 2021-03-30 DIAGNOSIS — C50.811 MALIGNANT NEOPLASM OF OVERLAPPING SITES OF RIGHT BREAST IN FEMALE, ESTROGEN RECEPTOR POSITIVE (HCC): Primary | ICD-10-CM

## 2021-03-30 DIAGNOSIS — Z17.0 MALIGNANT NEOPLASM OF OVERLAPPING SITES OF RIGHT BREAST IN FEMALE, ESTROGEN RECEPTOR POSITIVE (HCC): Primary | ICD-10-CM

## 2021-03-31 RX ORDER — LAPATINIB 250 MG/1
TABLET ORAL
Qty: 180 TABLET | Refills: 10 | Status: SHIPPED | OUTPATIENT
Start: 2021-03-31 | End: 2021-12-29

## 2021-05-10 ENCOUNTER — TELEPHONE (OUTPATIENT)
Dept: NEUROSURGERY | Facility: CLINIC | Age: 76
End: 2021-05-10

## 2021-05-10 NOTE — TELEPHONE ENCOUNTER
Noticed patient no showed to MRI brain on 5/6/21  Called patient to help reschedule  Patient did not answer  Left a detailed voice message requesting for a call back  Provided my direct line  [facility] also [facility] after her MRI brain is rescheduled

## 2021-06-04 ENCOUNTER — TELEPHONE (OUTPATIENT)
Dept: NEUROSURGERY | Facility: CLINIC | Age: 76
End: 2021-06-04

## 2021-06-04 NOTE — TELEPHONE ENCOUNTER
Called Jayesh to see if the patient is willing to reschedule the MRI brain that she no showed to on 5/6/21 and to reschedule the [facility] appointment  Warren Olmos reports the patient went [place] vacation [place] and not sure when she will be back  She said the patient should be back soon, but she does not want to reschedule any appointments yet until she comes back home  Warren Olmos states how she will call us back to reschedule once the patient is home  She was appreciative for the call

## 2021-09-20 ENCOUNTER — TELEPHONE (OUTPATIENT)
Dept: HEMATOLOGY ONCOLOGY | Facility: CLINIC | Age: 76
End: 2021-09-20

## 2021-09-20 NOTE — TELEPHONE ENCOUNTER
Scheduling Appointment     Who Is Calling to Schedule Sister    Doctor Dr Chel Carrasquillo   Location [facility]   Date and Time 10/29/2021 @920am         Patient verbalized understanding   Yes

## 2021-10-01 ENCOUNTER — TELEPHONE (OUTPATIENT)
Dept: HEMATOLOGY ONCOLOGY | Facility: CLINIC | Age: 76
End: 2021-10-01

## 2021-10-25 ENCOUNTER — APPOINTMENT (OUTPATIENT)
Dept: LAB | Age: 76
End: 2021-10-25
Payer: MEDICARE

## 2021-10-25 DIAGNOSIS — C50.919 METASTATIC BREAST CANCER (HCC): ICD-10-CM

## 2021-10-25 LAB
ALBUMIN SERPL BCP-MCNC: 3.4 G/DL (ref 3.5–5)
ALP SERPL-CCNC: 51 U/L (ref 46–116)
ALT SERPL W P-5'-P-CCNC: 22 U/L (ref 12–78)
ANION GAP SERPL CALCULATED.3IONS-SCNC: 0 MMOL/L (ref 4–13)
AST SERPL W P-5'-P-CCNC: 21 U/L (ref 5–45)
BASOPHILS # BLD AUTO: 0.04 THOUSANDS/ΜL (ref 0–0.1)
BASOPHILS NFR BLD AUTO: 1 % (ref 0–1)
BILIRUB SERPL-MCNC: 0.68 MG/DL (ref 0.2–1)
BUN SERPL-MCNC: 18 MG/DL (ref 5–25)
CALCIUM ALBUM COR SERPL-MCNC: 10.6 MG/DL (ref 8.3–10.1)
CALCIUM SERPL-MCNC: 10.1 MG/DL (ref 8.3–10.1)
CANCER AG27-29 SERPL-ACNC: 51.6 U/ML (ref 0–42.3)
CHLORIDE SERPL-SCNC: 103 MMOL/L (ref 100–108)
CO2 SERPL-SCNC: 32 MMOL/L (ref 21–32)
CREAT SERPL-MCNC: 0.98 MG/DL (ref 0.6–1.3)
EOSINOPHIL # BLD AUTO: 0.3 THOUSAND/ΜL (ref 0–0.61)
EOSINOPHIL NFR BLD AUTO: 4 % (ref 0–6)
ERYTHROCYTE [DISTWIDTH] IN BLOOD BY AUTOMATED COUNT: 14.2 % (ref 11.6–15.1)
GFR SERPL CREATININE-BSD FRML MDRD: 57 ML/MIN/1.73SQ M
GLUCOSE P FAST SERPL-MCNC: 96 MG/DL (ref 65–99)
HCT VFR BLD AUTO: 45.5 % (ref 34.8–46.1)
HGB BLD-MCNC: 14.7 G/DL (ref 11.5–15.4)
IMM GRANULOCYTES # BLD AUTO: 0.02 THOUSAND/UL (ref 0–0.2)
IMM GRANULOCYTES NFR BLD AUTO: 0 % (ref 0–2)
LYMPHOCYTES # BLD AUTO: 1.57 THOUSANDS/ΜL (ref 0.6–4.47)
LYMPHOCYTES NFR BLD AUTO: 23 % (ref 14–44)
MCH RBC QN AUTO: 32 PG (ref 26.8–34.3)
MCHC RBC AUTO-ENTMCNC: 32.3 G/DL (ref 31.4–37.4)
MCV RBC AUTO: 99 FL (ref 82–98)
MONOCYTES # BLD AUTO: 0.41 THOUSAND/ΜL (ref 0.17–1.22)
MONOCYTES NFR BLD AUTO: 6 % (ref 4–12)
NEUTROPHILS # BLD AUTO: 4.47 THOUSANDS/ΜL (ref 1.85–7.62)
NEUTS SEG NFR BLD AUTO: 66 % (ref 43–75)
NRBC BLD AUTO-RTO: 0 /100 WBCS
PLATELET # BLD AUTO: 310 THOUSANDS/UL (ref 149–390)
PMV BLD AUTO: 9.7 FL (ref 8.9–12.7)
POTASSIUM SERPL-SCNC: 4.5 MMOL/L (ref 3.5–5.3)
PROT SERPL-MCNC: 8.1 G/DL (ref 6.4–8.2)
RBC # BLD AUTO: 4.59 MILLION/UL (ref 3.81–5.12)
SODIUM SERPL-SCNC: 135 MMOL/L (ref 136–145)
WBC # BLD AUTO: 6.81 THOUSAND/UL (ref 4.31–10.16)

## 2021-10-25 PROCEDURE — 86300 IMMUNOASSAY TUMOR CA 15-3: CPT

## 2021-10-25 PROCEDURE — 80053 COMPREHEN METABOLIC PANEL: CPT

## 2021-10-25 PROCEDURE — 36415 COLL VENOUS BLD VENIPUNCTURE: CPT

## 2021-10-25 PROCEDURE — 85025 COMPLETE CBC W/AUTO DIFF WBC: CPT

## 2021-10-26 LAB — CANCER AG15-3 SERPL-ACNC: 36.7 U/ML (ref 0–25)

## 2021-10-29 ENCOUNTER — TELEPHONE (OUTPATIENT)
Dept: HEMATOLOGY ONCOLOGY | Facility: CLINIC | Age: 76
End: 2021-10-29

## 2021-10-29 ENCOUNTER — OFFICE VISIT (OUTPATIENT)
Dept: HEMATOLOGY ONCOLOGY | Facility: CLINIC | Age: 76
End: 2021-10-29
Payer: MEDICARE

## 2021-10-29 VITALS
WEIGHT: 134.8 LBS | OXYGEN SATURATION: 100 % | TEMPERATURE: 98.2 F | HEART RATE: 97 BPM | DIASTOLIC BLOOD PRESSURE: 88 MMHG | HEIGHT: 63 IN | BODY MASS INDEX: 23.88 KG/M2 | RESPIRATION RATE: 16 BRPM | SYSTOLIC BLOOD PRESSURE: 130 MMHG

## 2021-10-29 DIAGNOSIS — Z17.0 MALIGNANT NEOPLASM OF OVERLAPPING SITES OF RIGHT BREAST IN FEMALE, ESTROGEN RECEPTOR POSITIVE (HCC): Primary | ICD-10-CM

## 2021-10-29 DIAGNOSIS — C79.31 BRAIN METASTASIS (HCC): ICD-10-CM

## 2021-10-29 DIAGNOSIS — C50.811 MALIGNANT NEOPLASM OF OVERLAPPING SITES OF RIGHT BREAST IN FEMALE, ESTROGEN RECEPTOR POSITIVE (HCC): Primary | ICD-10-CM

## 2021-10-29 DIAGNOSIS — C50.919 METASTATIC BREAST CANCER (HCC): ICD-10-CM

## 2021-10-29 DIAGNOSIS — E53.8 VITAMIN B12 DEFICIENCY: ICD-10-CM

## 2021-10-29 DIAGNOSIS — E55.9 VITAMIN D DEFICIENCY: ICD-10-CM

## 2021-10-29 PROCEDURE — 99215 OFFICE O/P EST HI 40 MIN: CPT | Performed by: INTERNAL MEDICINE

## 2021-10-29 RX ORDER — ANASTROZOLE 1 MG/1
1 TABLET ORAL DAILY
Qty: 90 TABLET | Refills: 3 | Status: SHIPPED | OUTPATIENT
Start: 2021-10-29 | End: 2022-01-21 | Stop reason: SDUPTHER

## 2021-11-09 ENCOUNTER — HOSPITAL ENCOUNTER (OUTPATIENT)
Dept: NUCLEAR MEDICINE | Facility: HOSPITAL | Age: 76
Discharge: HOME | End: 2021-11-09
Attending: INTERNAL MEDICINE
Payer: MEDICARE

## 2021-11-09 DIAGNOSIS — C50.811 MALIGNANT NEOPLASM OF OVERLAPPING SITES OF RIGHT BREAST IN FEMALE, ESTROGEN RECEPTOR POSITIVE (HCC): ICD-10-CM

## 2021-11-09 DIAGNOSIS — Z17.0 MALIGNANT NEOPLASM OF OVERLAPPING SITES OF RIGHT BREAST IN FEMALE, ESTROGEN RECEPTOR POSITIVE (HCC): ICD-10-CM

## 2021-11-09 LAB — GLUCOSE SERPL-MCNC: 64 MG/DL (ref 65–140)

## 2021-11-09 PROCEDURE — A9552 F18 FDG: HCPCS

## 2021-11-09 PROCEDURE — 82948 REAGENT STRIP/BLOOD GLUCOSE: CPT

## 2021-11-09 PROCEDURE — 78815 PET IMAGE W/CT SKULL-THIGH: CPT

## 2021-11-17 ENCOUNTER — HOSPITAL ENCOUNTER (OUTPATIENT)
Dept: NON INVASIVE DIAGNOSTICS | Facility: HOSPITAL | Age: 76
Discharge: HOME | End: 2021-11-17
Attending: INTERNAL MEDICINE
Payer: MEDICARE

## 2021-11-17 ENCOUNTER — HOSPITAL ENCOUNTER (OUTPATIENT)
Dept: MRI IMAGING | Facility: HOSPITAL | Age: 76
Discharge: HOME | End: 2021-11-17
Attending: INTERNAL MEDICINE
Payer: MEDICARE

## 2021-11-17 VITALS
BODY MASS INDEX: 24.66 KG/M2 | HEIGHT: 62 IN | DIASTOLIC BLOOD PRESSURE: 88 MMHG | SYSTOLIC BLOOD PRESSURE: 130 MMHG | WEIGHT: 134 LBS

## 2021-11-17 DIAGNOSIS — Z17.0 MALIGNANT NEOPLASM OF OVERLAPPING SITES OF RIGHT BREAST IN FEMALE, ESTROGEN RECEPTOR POSITIVE (HCC): ICD-10-CM

## 2021-11-17 DIAGNOSIS — C50.811 MALIGNANT NEOPLASM OF OVERLAPPING SITES OF RIGHT BREAST IN FEMALE, ESTROGEN RECEPTOR POSITIVE (HCC): ICD-10-CM

## 2021-11-17 DIAGNOSIS — C79.31 BRAIN METASTASIS (HCC): ICD-10-CM

## 2021-11-17 LAB
AORTIC ROOT: 2.8 CM
APICAL FOUR CHAMBER EJECTION FRACTION: 31 %
E WAVE DECELERATION TIME: 126 MS
FRACTIONAL SHORTENING: 12 % (ref 28–44)
GLOBAL LONGITUIDAL STRAIN: -10 %
INTERVENTRICULAR SEPTUM IN DIASTOLE (PARASTERNAL SHORT AXIS VIEW): 0.8 CM
LEFT ATRIUM AREA SYSTOLE SINGLE PLANE A4C: 7.7 CM2
LEFT INTERNAL DIMENSION IN SYSTOLE: 3.8 CM (ref 2.1–4)
LEFT VENTRICULAR INTERNAL DIMENSION IN DIASTOLE: 4.3 CM (ref 3.78–5.63)
LEFT VENTRICULAR POSTERIOR WALL IN END DIASTOLE: 0.8 CM
LEFT VENTRICULAR STROKE VOLUME: 20 ML
MV E'TISSUE VEL-SEP: 4 CM/S
MV PEAK A VEL: 0.87 M/S
MV PEAK E VEL: 23 CM/S
MV STENOSIS PRESSURE HALF TIME: 0 MS
PA SYSTOLIC PRESSURE: 22 MMHG
RIGHT ATRIUM AREA SYSTOLE A4C: 7.4 CM2
RIGHT VENTRICLE ID DIMENSION: 2.9 CM
SL CV LV EF: 31
SL CV PED ECHO LEFT VENTRICLE DIASTOLIC VOLUME (MOD BIPLANE) 2D: 81 ML
SL CV PED ECHO LEFT VENTRICLE SYSTOLIC VOLUME (MOD BIPLANE) 2D: 61 ML
TRICUSPID ANNULAR PLANE SYSTOLIC EXCURSION: 1.5 CM
TRICUSPID VALVE PEAK REGURGITATION VELOCITY: 2.07 M/S
TRICUSPID VALVE S': 0.2 CM/S
TV PEAK GRADIENT: 17 MMHG
Z-SCORE OF LEFT VENTRICULAR DIMENSION IN END SYSTOLE: -0.71

## 2021-11-17 PROCEDURE — A9585 GADOBUTROL INJECTION: HCPCS | Performed by: INTERNAL MEDICINE

## 2021-11-17 PROCEDURE — 93306 TTE W/DOPPLER COMPLETE: CPT | Performed by: INTERNAL MEDICINE

## 2021-11-17 PROCEDURE — 93356 MYOCRD STRAIN IMG SPCKL TRCK: CPT

## 2021-11-17 PROCEDURE — 93306 TTE W/DOPPLER COMPLETE: CPT

## 2021-11-17 PROCEDURE — 70553 MRI BRAIN STEM W/O & W/DYE: CPT

## 2021-11-17 RX ADMIN — GADOBUTROL 6 ML: 604.72 INJECTION INTRAVENOUS at 13:48

## 2021-11-18 ENCOUNTER — TELEPHONE (OUTPATIENT)
Dept: HEMATOLOGY ONCOLOGY | Facility: CLINIC | Age: 76
End: 2021-11-18

## 2021-11-22 ENCOUNTER — APPOINTMENT (OUTPATIENT)
Dept: LAB | Age: 76
End: 2021-11-22
Payer: MEDICARE

## 2021-11-22 DIAGNOSIS — E53.8 VITAMIN B12 DEFICIENCY: ICD-10-CM

## 2021-11-22 DIAGNOSIS — Z17.0 MALIGNANT NEOPLASM OF OVERLAPPING SITES OF RIGHT BREAST IN FEMALE, ESTROGEN RECEPTOR POSITIVE (HCC): ICD-10-CM

## 2021-11-22 DIAGNOSIS — E55.9 VITAMIN D DEFICIENCY: ICD-10-CM

## 2021-11-22 DIAGNOSIS — C50.811 MALIGNANT NEOPLASM OF OVERLAPPING SITES OF RIGHT BREAST IN FEMALE, ESTROGEN RECEPTOR POSITIVE (HCC): ICD-10-CM

## 2021-11-22 LAB
25(OH)D3 SERPL-MCNC: 46.1 NG/ML (ref 30–100)
ALBUMIN SERPL BCP-MCNC: 3.1 G/DL (ref 3.5–5)
ALP SERPL-CCNC: 52 U/L (ref 46–116)
ALT SERPL W P-5'-P-CCNC: 22 U/L (ref 12–78)
ANION GAP SERPL CALCULATED.3IONS-SCNC: 2 MMOL/L (ref 4–13)
AST SERPL W P-5'-P-CCNC: 21 U/L (ref 5–45)
BASOPHILS # BLD AUTO: 0.04 THOUSANDS/ΜL (ref 0–0.1)
BASOPHILS NFR BLD AUTO: 1 % (ref 0–1)
BILIRUB SERPL-MCNC: 0.51 MG/DL (ref 0.2–1)
BUN SERPL-MCNC: 19 MG/DL (ref 5–25)
CALCIUM ALBUM COR SERPL-MCNC: 10 MG/DL (ref 8.3–10.1)
CALCIUM SERPL-MCNC: 9.3 MG/DL (ref 8.3–10.1)
CHLORIDE SERPL-SCNC: 104 MMOL/L (ref 100–108)
CO2 SERPL-SCNC: 30 MMOL/L (ref 21–32)
CREAT SERPL-MCNC: 0.97 MG/DL (ref 0.6–1.3)
EOSINOPHIL # BLD AUTO: 0.25 THOUSAND/ΜL (ref 0–0.61)
EOSINOPHIL NFR BLD AUTO: 4 % (ref 0–6)
ERYTHROCYTE [DISTWIDTH] IN BLOOD BY AUTOMATED COUNT: 13.5 % (ref 11.6–15.1)
GFR SERPL CREATININE-BSD FRML MDRD: 57 ML/MIN/1.73SQ M
GLUCOSE P FAST SERPL-MCNC: 83 MG/DL (ref 65–99)
HCT VFR BLD AUTO: 43.1 % (ref 34.8–46.1)
HGB BLD-MCNC: 13.9 G/DL (ref 11.5–15.4)
IMM GRANULOCYTES # BLD AUTO: 0.11 THOUSAND/UL (ref 0–0.2)
IMM GRANULOCYTES NFR BLD AUTO: 2 % (ref 0–2)
LYMPHOCYTES # BLD AUTO: 1.9 THOUSANDS/ΜL (ref 0.6–4.47)
LYMPHOCYTES NFR BLD AUTO: 29 % (ref 14–44)
MAGNESIUM SERPL-MCNC: 2 MG/DL (ref 1.6–2.6)
MCH RBC QN AUTO: 31.4 PG (ref 26.8–34.3)
MCHC RBC AUTO-ENTMCNC: 32.3 G/DL (ref 31.4–37.4)
MCV RBC AUTO: 98 FL (ref 82–98)
MONOCYTES # BLD AUTO: 0.42 THOUSAND/ΜL (ref 0.17–1.22)
MONOCYTES NFR BLD AUTO: 6 % (ref 4–12)
NEUTROPHILS # BLD AUTO: 3.86 THOUSANDS/ΜL (ref 1.85–7.62)
NEUTS SEG NFR BLD AUTO: 58 % (ref 43–75)
NRBC BLD AUTO-RTO: 0 /100 WBCS
PLATELET # BLD AUTO: 321 THOUSANDS/UL (ref 149–390)
PMV BLD AUTO: 9.8 FL (ref 8.9–12.7)
POTASSIUM SERPL-SCNC: 4.9 MMOL/L (ref 3.5–5.3)
PROT SERPL-MCNC: 7.6 G/DL (ref 6.4–8.2)
RBC # BLD AUTO: 4.42 MILLION/UL (ref 3.81–5.12)
SODIUM SERPL-SCNC: 136 MMOL/L (ref 136–145)
VIT B12 SERPL-MCNC: 1037 PG/ML (ref 100–900)
WBC # BLD AUTO: 6.58 THOUSAND/UL (ref 4.31–10.16)

## 2021-11-22 PROCEDURE — 80053 COMPREHEN METABOLIC PANEL: CPT

## 2021-11-22 PROCEDURE — 85025 COMPLETE CBC W/AUTO DIFF WBC: CPT

## 2021-11-22 PROCEDURE — 83735 ASSAY OF MAGNESIUM: CPT

## 2021-11-22 PROCEDURE — 82607 VITAMIN B-12: CPT

## 2021-11-22 PROCEDURE — 36415 COLL VENOUS BLD VENIPUNCTURE: CPT

## 2021-11-22 PROCEDURE — 82306 VITAMIN D 25 HYDROXY: CPT

## 2021-11-29 ENCOUNTER — OFFICE VISIT (OUTPATIENT)
Dept: HEMATOLOGY ONCOLOGY | Facility: CLINIC | Age: 76
End: 2021-11-29
Payer: MEDICARE

## 2021-11-29 VITALS
DIASTOLIC BLOOD PRESSURE: 96 MMHG | RESPIRATION RATE: 18 BRPM | OXYGEN SATURATION: 99 % | BODY MASS INDEX: 23.99 KG/M2 | HEIGHT: 63 IN | SYSTOLIC BLOOD PRESSURE: 175 MMHG | TEMPERATURE: 96.4 F | WEIGHT: 135.4 LBS | HEART RATE: 97 BPM

## 2021-11-29 DIAGNOSIS — Z79.811 USE OF AROMATASE INHIBITORS: ICD-10-CM

## 2021-11-29 DIAGNOSIS — C50.811 MALIGNANT NEOPLASM OF OVERLAPPING SITES OF RIGHT BREAST IN FEMALE, ESTROGEN RECEPTOR POSITIVE (HCC): Primary | ICD-10-CM

## 2021-11-29 DIAGNOSIS — C50.811 MALIGNANT NEOPLASM OF OVERLAPPING SITES OF RIGHT BREAST IN FEMALE, ESTROGEN RECEPTOR POSITIVE (HCC): ICD-10-CM

## 2021-11-29 DIAGNOSIS — C50.919 METASTATIC BREAST CANCER (HCC): ICD-10-CM

## 2021-11-29 DIAGNOSIS — Z17.0 MALIGNANT NEOPLASM OF OVERLAPPING SITES OF RIGHT BREAST IN FEMALE, ESTROGEN RECEPTOR POSITIVE (HCC): Primary | ICD-10-CM

## 2021-11-29 DIAGNOSIS — Z17.0 MALIGNANT NEOPLASM OF OVERLAPPING SITES OF RIGHT BREAST IN FEMALE, ESTROGEN RECEPTOR POSITIVE (HCC): ICD-10-CM

## 2021-11-29 DIAGNOSIS — C79.31 BRAIN METASTASIS (HCC): ICD-10-CM

## 2021-11-29 DIAGNOSIS — Z79.811 USE OF AROMATASE INHIBITORS: Primary | ICD-10-CM

## 2021-11-29 DIAGNOSIS — I50.9 HEART FAILURE, UNSPECIFIED HF CHRONICITY, UNSPECIFIED HEART FAILURE TYPE (HCC): ICD-10-CM

## 2021-11-29 PROCEDURE — 99215 OFFICE O/P EST HI 40 MIN: CPT | Performed by: INTERNAL MEDICINE

## 2021-11-29 RX ORDER — ONDANSETRON HYDROCHLORIDE 8 MG/1
8 TABLET, FILM COATED ORAL EVERY 8 HOURS PRN
Qty: 20 TABLET | Refills: 3 | Status: SHIPPED | OUTPATIENT
Start: 2021-11-29

## 2021-11-30 ENCOUNTER — DOCUMENTATION (OUTPATIENT)
Dept: HEMATOLOGY ONCOLOGY | Facility: CLINIC | Age: 76
End: 2021-11-30

## 2021-12-01 DIAGNOSIS — Z17.0 MALIGNANT NEOPLASM OF OVERLAPPING SITES OF RIGHT BREAST IN FEMALE, ESTROGEN RECEPTOR POSITIVE (HCC): Primary | ICD-10-CM

## 2021-12-01 DIAGNOSIS — C50.811 MALIGNANT NEOPLASM OF OVERLAPPING SITES OF RIGHT BREAST IN FEMALE, ESTROGEN RECEPTOR POSITIVE (HCC): Primary | ICD-10-CM

## 2021-12-01 RX ORDER — CAPECITABINE 500 MG/1
TABLET, FILM COATED ORAL
Qty: 70 TABLET | Refills: 11 | Status: CANCELLED | OUTPATIENT
Start: 2021-12-01

## 2021-12-03 DIAGNOSIS — Z17.0 MALIGNANT NEOPLASM OF OVERLAPPING SITES OF RIGHT BREAST IN FEMALE, ESTROGEN RECEPTOR POSITIVE (HCC): Primary | ICD-10-CM

## 2021-12-03 DIAGNOSIS — C50.811 MALIGNANT NEOPLASM OF OVERLAPPING SITES OF RIGHT BREAST IN FEMALE, ESTROGEN RECEPTOR POSITIVE (HCC): Primary | ICD-10-CM

## 2021-12-03 RX ORDER — CAPECITABINE 500 MG/1
TABLET, FILM COATED ORAL
Qty: 70 TABLET | Refills: 11 | Status: SHIPPED | OUTPATIENT
Start: 2021-12-03

## 2021-12-08 ENCOUNTER — CLINICAL SUPPORT (OUTPATIENT)
Dept: RADIATION ONCOLOGY | Facility: HOSPITAL | Age: 76
End: 2021-12-08
Attending: RADIOLOGY
Payer: MEDICARE

## 2021-12-08 ENCOUNTER — OFFICE VISIT (OUTPATIENT)
Dept: NEUROSURGERY | Facility: CLINIC | Age: 76
End: 2021-12-08
Payer: MEDICARE

## 2021-12-08 VITALS
HEIGHT: 62 IN | BODY MASS INDEX: 24.18 KG/M2 | WEIGHT: 131.4 LBS | SYSTOLIC BLOOD PRESSURE: 134 MMHG | DIASTOLIC BLOOD PRESSURE: 88 MMHG | TEMPERATURE: 97.9 F | RESPIRATION RATE: 16 BRPM | OXYGEN SATURATION: 100 % | HEART RATE: 100 BPM

## 2021-12-08 VITALS
RESPIRATION RATE: 16 BRPM | DIASTOLIC BLOOD PRESSURE: 88 MMHG | TEMPERATURE: 97.9 F | BODY MASS INDEX: 24.18 KG/M2 | WEIGHT: 131.4 LBS | HEIGHT: 62 IN | HEART RATE: 100 BPM | SYSTOLIC BLOOD PRESSURE: 134 MMHG

## 2021-12-08 DIAGNOSIS — C79.31 BRAIN METASTASES (HCC): Primary | ICD-10-CM

## 2021-12-08 DIAGNOSIS — C50.919 METASTATIC BREAST CANCER (HCC): ICD-10-CM

## 2021-12-08 DIAGNOSIS — C79.31 BRAIN METASTASIS (HCC): Primary | ICD-10-CM

## 2021-12-08 PROBLEM — Z45.2 ENCOUNTER FOR CENTRAL LINE CARE: Status: ACTIVE | Noted: 2021-12-08

## 2021-12-08 PROCEDURE — 77470 SPECIAL RADIATION TREATMENT: CPT | Performed by: RADIOLOGY

## 2021-12-08 PROCEDURE — 99211 OFF/OP EST MAY X REQ PHY/QHP: CPT | Performed by: RADIOLOGY

## 2021-12-08 PROCEDURE — 77263 THER RADIOLOGY TX PLNG CPLX: CPT | Performed by: RADIOLOGY

## 2021-12-08 PROCEDURE — 99215 OFFICE O/P EST HI 40 MIN: CPT | Performed by: RADIOLOGY

## 2021-12-08 PROCEDURE — 99215 OFFICE O/P EST HI 40 MIN: CPT | Performed by: NEUROLOGICAL SURGERY

## 2021-12-09 ENCOUNTER — RADIATION THERAPY TREATMENT (OUTPATIENT)
Dept: RADIATION ONCOLOGY | Facility: HOSPITAL | Age: 76
End: 2021-12-09
Attending: RADIOLOGY
Payer: MEDICARE

## 2021-12-09 ENCOUNTER — HOSPITAL ENCOUNTER (OUTPATIENT)
Dept: INFUSION CENTER | Facility: HOSPITAL | Age: 76
Discharge: HOME | End: 2021-12-09
Attending: INTERNAL MEDICINE
Payer: MEDICARE

## 2021-12-09 VITALS — HEIGHT: 62 IN | BODY MASS INDEX: 24.18 KG/M2 | WEIGHT: 131.39 LBS

## 2021-12-09 DIAGNOSIS — R11.2 CHEMOTHERAPY INDUCED NAUSEA AND VOMITING: ICD-10-CM

## 2021-12-09 DIAGNOSIS — T45.1X5A CHEMOTHERAPY INDUCED NAUSEA AND VOMITING: ICD-10-CM

## 2021-12-09 DIAGNOSIS — Z79.811 USE OF AROMATASE INHIBITORS: Primary | ICD-10-CM

## 2021-12-09 DIAGNOSIS — R52 PAIN: ICD-10-CM

## 2021-12-09 DIAGNOSIS — Z17.0 MALIGNANT NEOPLASM OF OVERLAPPING SITES OF RIGHT BREAST IN FEMALE, ESTROGEN RECEPTOR POSITIVE (HCC): ICD-10-CM

## 2021-12-09 DIAGNOSIS — E86.0 DEHYDRATION: ICD-10-CM

## 2021-12-09 DIAGNOSIS — C50.811 MALIGNANT NEOPLASM OF OVERLAPPING SITES OF RIGHT BREAST IN FEMALE, ESTROGEN RECEPTOR POSITIVE (HCC): ICD-10-CM

## 2021-12-09 DIAGNOSIS — Z45.2 ENCOUNTER FOR CENTRAL LINE CARE: ICD-10-CM

## 2021-12-09 PROCEDURE — 77334 RADIATION TREATMENT AID(S): CPT | Performed by: RADIOLOGY

## 2021-12-09 PROCEDURE — 96401 CHEMO ANTI-NEOPL SQ/IM: CPT

## 2021-12-09 PROCEDURE — 77290 THER RAD SIMULAJ FIELD CPLX: CPT | Performed by: RADIOLOGY

## 2021-12-09 PROCEDURE — 77399 UNLISTED PX MED RADJ PHYSICS: CPT | Performed by: RADIOLOGY

## 2021-12-09 RX ADMIN — DENOSUMAB 60 MG: 60 INJECTION SUBCUTANEOUS at 13:01

## 2021-12-14 PROCEDURE — 77334 RADIATION TREATMENT AID(S): CPT | Performed by: RADIOLOGY

## 2021-12-14 PROCEDURE — 77300 RADIATION THERAPY DOSE PLAN: CPT | Performed by: RADIOLOGY

## 2021-12-14 PROCEDURE — 77295 3-D RADIOTHERAPY PLAN: CPT | Performed by: RADIOLOGY

## 2021-12-15 ENCOUNTER — APPOINTMENT (OUTPATIENT)
Dept: RADIATION ONCOLOGY | Facility: HOSPITAL | Age: 76
End: 2021-12-15
Attending: RADIOLOGY
Payer: MEDICARE

## 2021-12-15 ENCOUNTER — PROCEDURE VISIT (OUTPATIENT)
Dept: NEUROSURGERY | Facility: CLINIC | Age: 76
End: 2021-12-15
Payer: MEDICARE

## 2021-12-15 ENCOUNTER — APPOINTMENT (OUTPATIENT)
Dept: RADIATION ONCOLOGY | Facility: HOSPITAL | Age: 76
End: 2021-12-15
Payer: MEDICARE

## 2021-12-15 DIAGNOSIS — C79.31 BRAIN METASTASES (HCC): Primary | ICD-10-CM

## 2021-12-15 PROCEDURE — 61797 SRS CRAN LES SIMPLE ADDL: CPT | Performed by: NEUROLOGICAL SURGERY

## 2021-12-15 PROCEDURE — 99024 POSTOP FOLLOW-UP VISIT: CPT | Performed by: NEUROLOGICAL SURGERY

## 2021-12-15 PROCEDURE — 77432 STEREOTACTIC RADIATION TRMT: CPT | Performed by: RADIOLOGY

## 2021-12-15 PROCEDURE — 61796 SRS CRANIAL LESION SIMPLE: CPT | Performed by: NEUROLOGICAL SURGERY

## 2021-12-15 PROCEDURE — 77336 RADIATION PHYSICS CONSULT: CPT | Performed by: RADIOLOGY

## 2021-12-15 PROCEDURE — 77372 SRS LINEAR BASED: CPT | Performed by: RADIOLOGY

## 2021-12-16 ENCOUNTER — TELEPHONE (OUTPATIENT)
Dept: RADIATION ONCOLOGY | Facility: HOSPITAL | Age: 76
End: 2021-12-16

## 2021-12-23 ENCOUNTER — OFFICE VISIT (OUTPATIENT)
Dept: FAMILY MEDICINE CLINIC | Facility: CLINIC | Age: 76
End: 2021-12-23
Payer: MEDICARE

## 2021-12-23 ENCOUNTER — TELEPHONE (OUTPATIENT)
Dept: HEMATOLOGY ONCOLOGY | Facility: CLINIC | Age: 76
End: 2021-12-23

## 2021-12-23 VITALS
SYSTOLIC BLOOD PRESSURE: 132 MMHG | OXYGEN SATURATION: 99 % | TEMPERATURE: 97.2 F | DIASTOLIC BLOOD PRESSURE: 77 MMHG | RESPIRATION RATE: 16 BRPM | BODY MASS INDEX: 24.66 KG/M2 | WEIGHT: 134 LBS | HEIGHT: 62 IN | HEART RATE: 99 BPM

## 2021-12-23 DIAGNOSIS — M48.061 BILATERAL STENOSIS OF LATERAL RECESS OF LUMBAR SPINE: ICD-10-CM

## 2021-12-23 DIAGNOSIS — I49.3 PVC (PREMATURE VENTRICULAR CONTRACTION): ICD-10-CM

## 2021-12-23 DIAGNOSIS — R94.6 ABNORMAL THYROID FUNCTION TEST: ICD-10-CM

## 2021-12-23 DIAGNOSIS — M85.80 OSTEOPENIA, UNSPECIFIED LOCATION: ICD-10-CM

## 2021-12-23 DIAGNOSIS — C79.31 BRAIN METASTASES (HCC): ICD-10-CM

## 2021-12-23 DIAGNOSIS — N18.31 STAGE 3A CHRONIC KIDNEY DISEASE (HCC): ICD-10-CM

## 2021-12-23 DIAGNOSIS — Z28.21 IMMUNIZATION NOT CARRIED OUT BECAUSE OF PATIENT REFUSAL: ICD-10-CM

## 2021-12-23 DIAGNOSIS — I10 BENIGN ESSENTIAL HYPERTENSION: Primary | ICD-10-CM

## 2021-12-23 DIAGNOSIS — E78.1 PURE HYPERTRIGLYCERIDEMIA: ICD-10-CM

## 2021-12-23 DIAGNOSIS — Z12.11 SCREENING FOR COLON CANCER: ICD-10-CM

## 2021-12-23 DIAGNOSIS — R12 HEART BURN: ICD-10-CM

## 2021-12-23 DIAGNOSIS — E55.9 VITAMIN D DEFICIENCY: ICD-10-CM

## 2021-12-23 DIAGNOSIS — I42.8 NONISCHEMIC CARDIOMYOPATHY (HCC): ICD-10-CM

## 2021-12-23 DIAGNOSIS — R82.90 ABNORMAL URINALYSIS: ICD-10-CM

## 2021-12-23 DIAGNOSIS — Z17.0 MALIGNANT NEOPLASM OF OVERLAPPING SITES OF RIGHT BREAST IN FEMALE, ESTROGEN RECEPTOR POSITIVE (HCC): ICD-10-CM

## 2021-12-23 DIAGNOSIS — C50.811 MALIGNANT NEOPLASM OF OVERLAPPING SITES OF RIGHT BREAST IN FEMALE, ESTROGEN RECEPTOR POSITIVE (HCC): ICD-10-CM

## 2021-12-23 DIAGNOSIS — E34.9 INCREASED PTH LEVEL: ICD-10-CM

## 2021-12-23 DIAGNOSIS — J45.30 MILD PERSISTENT ASTHMATIC BRONCHITIS WITHOUT COMPLICATION: ICD-10-CM

## 2021-12-23 PROCEDURE — 99214 OFFICE O/P EST MOD 30 MIN: CPT | Performed by: FAMILY MEDICINE

## 2021-12-23 RX ORDER — ALBUTEROL SULFATE 90 UG/1
2 AEROSOL, METERED RESPIRATORY (INHALATION) EVERY 6 HOURS PRN
Qty: 18 G | Refills: 2 | Status: SHIPPED | OUTPATIENT
Start: 2021-12-23 | End: 2022-03-28 | Stop reason: SDUPTHER

## 2021-12-23 RX ORDER — FAMOTIDINE 10 MG
10 TABLET ORAL 2 TIMES DAILY PRN
COMMUNITY

## 2021-12-23 RX ORDER — ACETAMINOPHEN 500 MG
1000 TABLET ORAL EVERY 8 HOURS PRN
Qty: 30 TABLET | Refills: 0 | COMMUNITY
Start: 2021-12-23 | End: 2022-03-28 | Stop reason: SDUPTHER

## 2021-12-24 PROBLEM — Z28.21 IMMUNIZATION NOT CARRIED OUT BECAUSE OF PATIENT REFUSAL: Status: ACTIVE | Noted: 2021-12-24

## 2021-12-24 PROBLEM — E34.9 INCREASED PTH LEVEL: Status: ACTIVE | Noted: 2021-12-24

## 2021-12-24 PROBLEM — J45.909 ASTHMATIC BRONCHITIS: Status: ACTIVE | Noted: 2021-12-24

## 2021-12-24 PROBLEM — R82.90 ABNORMAL URINALYSIS: Status: ACTIVE | Noted: 2021-12-24

## 2021-12-27 ENCOUNTER — APPOINTMENT (OUTPATIENT)
Dept: LAB | Age: 76
End: 2021-12-27
Payer: MEDICARE

## 2021-12-27 DIAGNOSIS — C50.811 MALIGNANT NEOPLASM OF OVERLAPPING SITES OF RIGHT BREAST IN FEMALE, ESTROGEN RECEPTOR POSITIVE (HCC): ICD-10-CM

## 2021-12-27 DIAGNOSIS — E34.9 INCREASED PTH LEVEL: ICD-10-CM

## 2021-12-27 DIAGNOSIS — E78.1 PURE HYPERTRIGLYCERIDEMIA: ICD-10-CM

## 2021-12-27 DIAGNOSIS — R94.6 ABNORMAL THYROID FUNCTION TEST: ICD-10-CM

## 2021-12-27 DIAGNOSIS — Z17.0 MALIGNANT NEOPLASM OF OVERLAPPING SITES OF RIGHT BREAST IN FEMALE, ESTROGEN RECEPTOR POSITIVE (HCC): ICD-10-CM

## 2021-12-27 DIAGNOSIS — N18.31 STAGE 3A CHRONIC KIDNEY DISEASE (HCC): ICD-10-CM

## 2021-12-27 DIAGNOSIS — Z79.811 USE OF AROMATASE INHIBITORS: ICD-10-CM

## 2021-12-27 LAB
ALBUMIN SERPL BCP-MCNC: 3.4 G/DL (ref 3.5–5)
ALP SERPL-CCNC: 47 U/L (ref 46–116)
ALT SERPL W P-5'-P-CCNC: 17 U/L (ref 12–78)
ANION GAP SERPL CALCULATED.3IONS-SCNC: 4 MMOL/L (ref 4–13)
AST SERPL W P-5'-P-CCNC: 18 U/L (ref 5–45)
BACTERIA UR QL AUTO: ABNORMAL /HPF
BASOPHILS # BLD AUTO: 0.02 THOUSANDS/ΜL (ref 0–0.1)
BASOPHILS NFR BLD AUTO: 0 % (ref 0–1)
BILIRUB SERPL-MCNC: 0.9 MG/DL (ref 0.2–1)
BILIRUB UR QL STRIP: NEGATIVE
BUN SERPL-MCNC: 17 MG/DL (ref 5–25)
CALCIUM ALBUM COR SERPL-MCNC: 9 MG/DL (ref 8.3–10.1)
CALCIUM SERPL-MCNC: 8.5 MG/DL (ref 8.3–10.1)
CHLORIDE SERPL-SCNC: 105 MMOL/L (ref 100–108)
CHOLEST SERPL-MCNC: 146 MG/DL
CLARITY UR: ABNORMAL
CO2 SERPL-SCNC: 28 MMOL/L (ref 21–32)
COLOR UR: YELLOW
CREAT SERPL-MCNC: 0.94 MG/DL (ref 0.6–1.3)
EOSINOPHIL # BLD AUTO: 0.19 THOUSAND/ΜL (ref 0–0.61)
EOSINOPHIL NFR BLD AUTO: 3 % (ref 0–6)
ERYTHROCYTE [DISTWIDTH] IN BLOOD BY AUTOMATED COUNT: 15.3 % (ref 11.6–15.1)
GFR SERPL CREATININE-BSD FRML MDRD: 59 ML/MIN/1.73SQ M
GLUCOSE P FAST SERPL-MCNC: 90 MG/DL (ref 65–99)
GLUCOSE UR STRIP-MCNC: NEGATIVE MG/DL
HCT VFR BLD AUTO: 41.1 % (ref 34.8–46.1)
HDLC SERPL-MCNC: 47 MG/DL
HGB BLD-MCNC: 13.2 G/DL (ref 11.5–15.4)
HGB UR QL STRIP.AUTO: ABNORMAL
HYALINE CASTS #/AREA URNS LPF: ABNORMAL /LPF
IMM GRANULOCYTES # BLD AUTO: 0.02 THOUSAND/UL (ref 0–0.2)
IMM GRANULOCYTES NFR BLD AUTO: 0 % (ref 0–2)
KETONES UR STRIP-MCNC: NEGATIVE MG/DL
LDLC SERPL CALC-MCNC: 73 MG/DL (ref 0–100)
LEUKOCYTE ESTERASE UR QL STRIP: ABNORMAL
LYMPHOCYTES # BLD AUTO: 1.55 THOUSANDS/ΜL (ref 0.6–4.47)
LYMPHOCYTES NFR BLD AUTO: 27 % (ref 14–44)
MAGNESIUM SERPL-MCNC: 2.2 MG/DL (ref 1.6–2.6)
MCH RBC QN AUTO: 31.9 PG (ref 26.8–34.3)
MCHC RBC AUTO-ENTMCNC: 32.1 G/DL (ref 31.4–37.4)
MCV RBC AUTO: 99 FL (ref 82–98)
MONOCYTES # BLD AUTO: 0.45 THOUSAND/ΜL (ref 0.17–1.22)
MONOCYTES NFR BLD AUTO: 8 % (ref 4–12)
NEUTROPHILS # BLD AUTO: 3.51 THOUSANDS/ΜL (ref 1.85–7.62)
NEUTS SEG NFR BLD AUTO: 62 % (ref 43–75)
NITRITE UR QL STRIP: POSITIVE
NON-SQ EPI CELLS URNS QL MICRO: ABNORMAL /HPF
NRBC BLD AUTO-RTO: 0 /100 WBCS
PH UR STRIP.AUTO: 6.5 [PH]
PHOSPHATE SERPL-MCNC: 2.8 MG/DL (ref 2.3–4.1)
PLATELET # BLD AUTO: 243 THOUSANDS/UL (ref 149–390)
PMV BLD AUTO: 9.3 FL (ref 8.9–12.7)
POTASSIUM SERPL-SCNC: 4.5 MMOL/L (ref 3.5–5.3)
PROT SERPL-MCNC: 7.5 G/DL (ref 6.4–8.2)
PROT UR STRIP-MCNC: NEGATIVE MG/DL
PTH-INTACT SERPL-MCNC: 114.4 PG/ML (ref 18.4–80.1)
RBC # BLD AUTO: 4.14 MILLION/UL (ref 3.81–5.12)
RBC #/AREA URNS AUTO: ABNORMAL /HPF
SODIUM SERPL-SCNC: 137 MMOL/L (ref 136–145)
SP GR UR STRIP.AUTO: 1.01 (ref 1–1.03)
TRIGL SERPL-MCNC: 132 MG/DL
TSH SERPL DL<=0.05 MIU/L-ACNC: 2.85 UIU/ML (ref 0.36–3.74)
UROBILINOGEN UR QL STRIP.AUTO: 0.2 E.U./DL
WBC # BLD AUTO: 5.74 THOUSAND/UL (ref 4.31–10.16)
WBC #/AREA URNS AUTO: ABNORMAL /HPF

## 2021-12-27 PROCEDURE — 81001 URINALYSIS AUTO W/SCOPE: CPT | Performed by: FAMILY MEDICINE

## 2021-12-27 PROCEDURE — 83735 ASSAY OF MAGNESIUM: CPT

## 2021-12-27 PROCEDURE — 87086 URINE CULTURE/COLONY COUNT: CPT | Performed by: FAMILY MEDICINE

## 2021-12-27 PROCEDURE — 87186 SC STD MICRODIL/AGAR DIL: CPT | Performed by: FAMILY MEDICINE

## 2021-12-27 PROCEDURE — 84443 ASSAY THYROID STIM HORMONE: CPT

## 2021-12-27 PROCEDURE — 87077 CULTURE AEROBIC IDENTIFY: CPT | Performed by: FAMILY MEDICINE

## 2021-12-27 PROCEDURE — 84100 ASSAY OF PHOSPHORUS: CPT

## 2021-12-27 PROCEDURE — 85025 COMPLETE CBC W/AUTO DIFF WBC: CPT

## 2021-12-27 PROCEDURE — 83970 ASSAY OF PARATHORMONE: CPT

## 2021-12-27 PROCEDURE — 36415 COLL VENOUS BLD VENIPUNCTURE: CPT

## 2021-12-27 PROCEDURE — 80053 COMPREHEN METABOLIC PANEL: CPT

## 2021-12-27 PROCEDURE — 80061 LIPID PANEL: CPT

## 2021-12-29 ENCOUNTER — OFFICE VISIT (OUTPATIENT)
Dept: HEMATOLOGY ONCOLOGY | Facility: CLINIC | Age: 76
End: 2021-12-29
Payer: MEDICARE

## 2021-12-29 VITALS
OXYGEN SATURATION: 100 % | SYSTOLIC BLOOD PRESSURE: 128 MMHG | BODY MASS INDEX: 24.22 KG/M2 | RESPIRATION RATE: 16 BRPM | WEIGHT: 131.6 LBS | HEIGHT: 62 IN | TEMPERATURE: 97.1 F | HEART RATE: 105 BPM | DIASTOLIC BLOOD PRESSURE: 76 MMHG

## 2021-12-29 DIAGNOSIS — Z17.0 MALIGNANT NEOPLASM OF OVERLAPPING SITES OF RIGHT BREAST IN FEMALE, ESTROGEN RECEPTOR POSITIVE (HCC): Primary | ICD-10-CM

## 2021-12-29 DIAGNOSIS — N39.0 URINARY TRACT INFECTION WITHOUT HEMATURIA, SITE UNSPECIFIED: ICD-10-CM

## 2021-12-29 DIAGNOSIS — C50.811 MALIGNANT NEOPLASM OF OVERLAPPING SITES OF RIGHT BREAST IN FEMALE, ESTROGEN RECEPTOR POSITIVE (HCC): Primary | ICD-10-CM

## 2021-12-29 DIAGNOSIS — C79.31 BRAIN METASTASIS (HCC): ICD-10-CM

## 2021-12-29 DIAGNOSIS — Z79.811 USE OF AROMATASE INHIBITORS: ICD-10-CM

## 2021-12-29 LAB — BACTERIA UR CULT: ABNORMAL

## 2021-12-29 PROCEDURE — 99214 OFFICE O/P EST MOD 30 MIN: CPT | Performed by: NURSE PRACTITIONER

## 2021-12-30 ENCOUNTER — TELEPHONE (OUTPATIENT)
Dept: FAMILY MEDICINE CLINIC | Facility: CLINIC | Age: 76
End: 2021-12-30

## 2021-12-30 NOTE — TELEPHONE ENCOUNTER
If Cipro does interact with her med ,I woulld  assume levaquin would interact with her medication  Please go ahead and treat patient  Thank you

## 2022-01-04 ENCOUNTER — TELEPHONE (OUTPATIENT)
Dept: HEMATOLOGY ONCOLOGY | Facility: CLINIC | Age: 77
End: 2022-01-04

## 2022-01-04 NOTE — TELEPHONE ENCOUNTER
Received a phone call from pts sister to report that pt had 7 episodes of diarrhea last night  Pt only took one dose of Imodium  I reviewed that the paperwork I gave pt had instructions listed on how to take Imodium once Nerlynx was started  I reviewed that pt should be taking Imodium 4 mg TID at least for the first 14 days  Pts sister will look thru all of the paperwork to review this information again  I asked that she call us back if Imodium is not controlling the diarhhea  Sister phoned me back to let me know that yesterday pt had titrated up to six pills a day for the Nerlynx and this is when the diarrhea became much worse  I will discuss with Dr Danuta Dickinson and phone her back with decision regarding Nerlynx dosing

## 2022-01-05 ENCOUNTER — TELEPHONE (OUTPATIENT)
Dept: HEMATOLOGY ONCOLOGY | Facility: CLINIC | Age: 77
End: 2022-01-05

## 2022-01-05 NOTE — TELEPHONE ENCOUNTER
Phoned pt's sister Car Ferguson and instructed her to have pt take 4 pills daily of Nerlynx until her F/U with Dr Rubio Lan and take the Imodium as instructed

## 2022-01-18 ENCOUNTER — APPOINTMENT (OUTPATIENT)
Dept: LAB | Age: 77
End: 2022-01-18
Payer: MEDICARE

## 2022-01-18 ENCOUNTER — HOSPITAL ENCOUNTER (OUTPATIENT)
Dept: BONE DENSITY | Facility: CLINIC | Age: 77
Discharge: HOME | End: 2022-01-18
Payer: MEDICARE

## 2022-01-18 DIAGNOSIS — C50.811 MALIGNANT NEOPLASM OF OVERLAPPING SITES OF RIGHT BREAST IN FEMALE, ESTROGEN RECEPTOR POSITIVE (HCC): ICD-10-CM

## 2022-01-18 DIAGNOSIS — Z17.0 MALIGNANT NEOPLASM OF OVERLAPPING SITES OF RIGHT BREAST IN FEMALE, ESTROGEN RECEPTOR POSITIVE (HCC): ICD-10-CM

## 2022-01-18 DIAGNOSIS — Z79.811 USE OF AROMATASE INHIBITORS: ICD-10-CM

## 2022-01-18 LAB
ALBUMIN SERPL BCP-MCNC: 3.4 G/DL (ref 3.5–5)
ALP SERPL-CCNC: 41 U/L (ref 46–116)
ALT SERPL W P-5'-P-CCNC: 15 U/L (ref 12–78)
ANION GAP SERPL CALCULATED.3IONS-SCNC: 4 MMOL/L (ref 4–13)
AST SERPL W P-5'-P-CCNC: 17 U/L (ref 5–45)
BASOPHILS # BLD AUTO: 0.03 THOUSANDS/ΜL (ref 0–0.1)
BASOPHILS NFR BLD AUTO: 1 % (ref 0–1)
BILIRUB SERPL-MCNC: 1.92 MG/DL (ref 0.2–1)
BUN SERPL-MCNC: 12 MG/DL (ref 5–25)
CALCIUM ALBUM COR SERPL-MCNC: 9.3 MG/DL (ref 8.3–10.1)
CALCIUM SERPL-MCNC: 8.8 MG/DL (ref 8.3–10.1)
CANCER AG27-29 SERPL-ACNC: 48.5 U/ML (ref 0–42.3)
CHLORIDE SERPL-SCNC: 104 MMOL/L (ref 100–108)
CO2 SERPL-SCNC: 28 MMOL/L (ref 21–32)
CREAT SERPL-MCNC: 0.9 MG/DL (ref 0.6–1.3)
EOSINOPHIL # BLD AUTO: 0.16 THOUSAND/ΜL (ref 0–0.61)
EOSINOPHIL NFR BLD AUTO: 3 % (ref 0–6)
ERYTHROCYTE [DISTWIDTH] IN BLOOD BY AUTOMATED COUNT: 18.8 % (ref 11.6–15.1)
GFR SERPL CREATININE-BSD FRML MDRD: 62 ML/MIN/1.73SQ M
GLUCOSE P FAST SERPL-MCNC: 87 MG/DL (ref 65–99)
HCT VFR BLD AUTO: 39 % (ref 34.8–46.1)
HGB BLD-MCNC: 13.1 G/DL (ref 11.5–15.4)
IMM GRANULOCYTES # BLD AUTO: 0.03 THOUSAND/UL (ref 0–0.2)
IMM GRANULOCYTES NFR BLD AUTO: 1 % (ref 0–2)
LYMPHOCYTES # BLD AUTO: 1.29 THOUSANDS/ΜL (ref 0.6–4.47)
LYMPHOCYTES NFR BLD AUTO: 26 % (ref 14–44)
MAGNESIUM SERPL-MCNC: 2 MG/DL (ref 1.6–2.6)
MCH RBC QN AUTO: 33.2 PG (ref 26.8–34.3)
MCHC RBC AUTO-ENTMCNC: 33.6 G/DL (ref 31.4–37.4)
MCV RBC AUTO: 99 FL (ref 82–98)
MONOCYTES # BLD AUTO: 0.35 THOUSAND/ΜL (ref 0.17–1.22)
MONOCYTES NFR BLD AUTO: 7 % (ref 4–12)
NEUTROPHILS # BLD AUTO: 3.2 THOUSANDS/ΜL (ref 1.85–7.62)
NEUTS SEG NFR BLD AUTO: 62 % (ref 43–75)
NRBC BLD AUTO-RTO: 0 /100 WBCS
PLATELET # BLD AUTO: 260 THOUSANDS/UL (ref 149–390)
PMV BLD AUTO: 9.3 FL (ref 8.9–12.7)
POTASSIUM SERPL-SCNC: 4 MMOL/L (ref 3.5–5.3)
PROT SERPL-MCNC: 7.7 G/DL (ref 6.4–8.2)
RBC # BLD AUTO: 3.95 MILLION/UL (ref 3.81–5.12)
SODIUM SERPL-SCNC: 136 MMOL/L (ref 136–145)
WBC # BLD AUTO: 5.06 THOUSAND/UL (ref 4.31–10.16)

## 2022-01-18 PROCEDURE — 36415 COLL VENOUS BLD VENIPUNCTURE: CPT

## 2022-01-18 PROCEDURE — 80053 COMPREHEN METABOLIC PANEL: CPT

## 2022-01-18 PROCEDURE — 86300 IMMUNOASSAY TUMOR CA 15-3: CPT

## 2022-01-18 PROCEDURE — 85025 COMPLETE CBC W/AUTO DIFF WBC: CPT

## 2022-01-18 PROCEDURE — 77080 DXA BONE DENSITY AXIAL: CPT

## 2022-01-18 PROCEDURE — 83735 ASSAY OF MAGNESIUM: CPT

## 2022-01-19 LAB — CANCER AG15-3 SERPL-ACNC: 35.9 U/ML (ref 0–25)

## 2022-01-21 ENCOUNTER — OFFICE VISIT (OUTPATIENT)
Dept: HEMATOLOGY ONCOLOGY | Facility: CLINIC | Age: 77
End: 2022-01-21
Payer: MEDICARE

## 2022-01-21 ENCOUNTER — TELEPHONE (OUTPATIENT)
Dept: HEMATOLOGY ONCOLOGY | Facility: CLINIC | Age: 77
End: 2022-01-21

## 2022-01-21 VITALS
HEIGHT: 62 IN | HEART RATE: 90 BPM | DIASTOLIC BLOOD PRESSURE: 88 MMHG | TEMPERATURE: 97.5 F | RESPIRATION RATE: 16 BRPM | WEIGHT: 128.4 LBS | SYSTOLIC BLOOD PRESSURE: 124 MMHG | OXYGEN SATURATION: 99 % | BODY MASS INDEX: 23.63 KG/M2

## 2022-01-21 DIAGNOSIS — Z17.0 MALIGNANT NEOPLASM OF OVERLAPPING SITES OF RIGHT BREAST IN FEMALE, ESTROGEN RECEPTOR POSITIVE (HCC): Primary | ICD-10-CM

## 2022-01-21 DIAGNOSIS — C50.811 MALIGNANT NEOPLASM OF OVERLAPPING SITES OF RIGHT BREAST IN FEMALE, ESTROGEN RECEPTOR POSITIVE (HCC): Primary | ICD-10-CM

## 2022-01-21 DIAGNOSIS — C50.919 METASTATIC BREAST CANCER (HCC): ICD-10-CM

## 2022-01-21 DIAGNOSIS — I50.9 HEART FAILURE, UNSPECIFIED HF CHRONICITY, UNSPECIFIED HEART FAILURE TYPE (HCC): ICD-10-CM

## 2022-01-21 DIAGNOSIS — C79.31 BRAIN METASTASIS (HCC): ICD-10-CM

## 2022-01-21 DIAGNOSIS — Z79.811 USE OF AROMATASE INHIBITORS: ICD-10-CM

## 2022-01-21 DIAGNOSIS — N39.0 URINARY TRACT INFECTION WITHOUT HEMATURIA, SITE UNSPECIFIED: ICD-10-CM

## 2022-01-21 DIAGNOSIS — N39.0 URINARY TRACT INFECTION WITHOUT HEMATURIA, SITE UNSPECIFIED: Primary | ICD-10-CM

## 2022-01-21 PROCEDURE — 99214 OFFICE O/P EST MOD 30 MIN: CPT | Performed by: INTERNAL MEDICINE

## 2022-01-21 RX ORDER — DOXYCYCLINE HYCLATE 100 MG/1
100 CAPSULE ORAL DAILY
Qty: 7 CAPSULE | Refills: 0 | Status: SHIPPED | OUTPATIENT
Start: 2022-01-21 | End: 2022-01-28

## 2022-01-21 RX ORDER — ANASTROZOLE 1 MG/1
1 TABLET ORAL DAILY
Qty: 90 TABLET | Refills: 3 | Status: SHIPPED | OUTPATIENT
Start: 2022-01-21 | End: 2022-03-14 | Stop reason: ALTCHOICE

## 2022-01-21 NOTE — PROGRESS NOTES
Hematology/Oncology Outpatient Follow-up  Calixto Enriquez 69 y o  female 1945 [id number]    Date:  1/21/2022        Assessment and Plan:  1  Malignant neoplasm of overlapping 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, estrogen receptor positive ([facility] )  The patient is tolerating the Xeloda at the current dose of 1500 mg in the morning and 1000 mg in the evening 2 weeks on and 1 week off  She is currently off of the Xeloda and will be starting a new cycle on Monday  I did ask her to increase the dose of Neratinib to the full recommended dose of 240 mg once a day and to use Imodium on regular basis to avoid worsening diarrhea or dehydration  She also can get IV hydration in the infusion center on as-needed basis to avoid dehydration  She is also on anastrozole which will be continue on a daily basis without interruption   - CBC and differential; Future  - Comprehensive metabolic panel; Future  - Magnesium; Future  - LD,Blood; Future  - Cancer antigen 15-3; Future  - Cancer antigen 27 29; Future    2  Brain metastasis ([facility] )  The patient is status post SRS to her recurrent/new lesions which was done on 12/16/2021  She is in the process of getting another MRI of the brain in February  3  Use of aromatase inhibitors  Her bone density scan showed osteopenia  She is already on Prolia injections on every 6 months basis  She was asked to continue with vitamin-D supplements daily and exercise as much as she can  4  Heart failure, unspecified HF chronicity, unspecified heart failure type [facility])  She will be due for another echocardiogram around the middle of February 5  Metastatic breast cancer (HCC)    - anastrozole (ARIMIDEX) 1 mg tablet; Take 1 tablet (1 mg total) by mouth daily  Dispense: 90 tablet; Refill: 3    6  Urine tract infection  The patient was found to have the E coli in her urine which will be treated with doxycycline 100 mg once a day for 7 days    Doxycycline does not seem to interact with her current medications  HPI:  The patient came in today for follow-up visit accompanied by her sister  She stated that she is tolerating the Xeloda at the current dose  However she continues to have problems with Neratinib with significant diarrhea when she takes the full dose of 240 mg daily  She is tolerating the dose of 160 mg daily without significant diarrhea  She does not seem to be taking Imodium on regular basis as recommended  Recent blood work on 01/18/2022 showed hemoglobin of 13 1 with normal white cells and platelets  Creatinine 0 9 with normal calcium liver enzymes  Magnesium 2 0  Her breast tumor markers are stable  Oncology History Overview Note   The patient has a remote history of right breast cancer diagnosed in [place] in February of 2005 with infiltrating ductal carcinoma grade 2 status post right total mastectomy and left prophylactic total mastectomy as well at that time  Her pathology revealed 8/17 positive lymph node for metastatic disease from the right axilla  ER status positive at 5-10%, progesterone negative, her 2 Gregory positive by FISH  She was then treated with 6 cycles of adjuvant chemotherapy with 5 fluorouracil, Adriamycin, and cyclophosphamide followed by tamoxifen and adjuvant radiation to the right axilla and chest wall  The patient was then switched from tamoxifen to Arimidex which was then stopped in 2012  She was then diagnosed with recurrence of her breast cancer with metastatic disease mainly involving the right anterior chest wall, skin, right axilla pectoralis muscle, left axilla and other areas of the body in May 2015  The biopsy from the scan showed triple positive malignant process compatible with recurrence of her breast cancer  She was then started on palliative chemotherapy and received 6 cycles of Taxotere along with trastuzumab and pertuzumab  After 6 cycles she was continued mainly on trastuzumab and pertuzumab alone    The patient then went to [facility] which she continued her oncological care with trastuzumab, pertuzumab and anastrozole  That treatment had to be put on hold after she developed decline of her ejection fraction to about 23% around June 2017  A PET scan in July of 2017 is showed mild progression of her disease and for that reason low-dose weekly Taxol was started on the 18th July 2017  The frequency of Taxol was decreased to 1 treatment every other week  She then received the rest of her treatment and live unknown from September 2017 until March 2018  Her echocardiogram in May 2018 showed ejection fraction of 43%  The patient then went to live alone again in July of 2018 where she continued her Taxol treatment on every other week basis until the middle of January 2019  She had CT scan of the head on March 18, 2020 an MRI of the brain March 20, 2020 which confirmed 4 discrete intracranial metastasis with the largest measuring 24 mm in size in the left occipital lobe with some associated edema  She had no neurologic symptoms  We discussed that she has multiple brain metastasis and we recommended whole-brain radiation therapy  She completed whole brain radiation therapy in April 10, 2020  She had SRS to left occipital region of the brain on 12/9/2020     Repeat MRI of the brain on May 22, 2020 showed a good response with reduction in all 4 for brain metastasis and no new lesions  She had a repeat PET-CT study March 27, 2020 that revealed Increasing size of metastatic right axillary node and nonspecific increased activity in the lower spinal canal at the T12-L1 level  There was no CT correlate on the PET CT scan images and she was having no lower back pain at the time of her appointment on May 28, 2020  She has developed lumbar spine pain and had MRI lumbar spine August 24, 2020 that revealed degenerative changes with severe central and bilateral lateral recess stenosis with moderate bilateral foraminal narrowing    She saw neurosurgery/  [facility] September 11, 2020 who recommended referral to pain management and no surgical intervention  After about 6 month interruption care  patient followed up with repeat imaging 11/2021-  The PET scan on 11/09/2021 showed;  IMPRESSION:  1  Findings concerning for disease progression  2   More intensely FDG avid enlarging right axillary lymph node suspicious for progression  3   More prominent subcutaneous focus at the left anterior chest suspicious for metastasis and progression  4  Stable focus of FDG uptake at the left adrenal gland of uncertain significance  Again no obvious findings on low-dose CT  She also had an MRI of the brain on 11/17/2021 which showed:  IMPRESSION:  1   Stable size with interval change in enhancement characteristics of SRS treated lateral left occipital metastatic lesion, now with peripheral enhancement and central necrosis  There is new 3 mm enhancing nodule along the superior margin of treated   lesion concerning for recurrent /worsening tumor  Slight worsening perilesional edema  2   Interval enlargement of right parietal and anteroinferior right temporal lobe lesions with slight worsening of perilesional edema  Her echocardiogram from 11/17/2021 showed:    Left Ventricle: Left ventricular cavity size is normal  Wall thickness is normal  The left ventricular ejection fraction is 31% by single dimension measurement  Systolic function is severely reduced  Global longitudinal strain is reduced at -10%  There is severe global hypokinesis  Diastolic function is mildly abnormal, consistent with grade I (abnormal) relaxation  Left atrial filling pressure is elevated    Right Ventricle: Systolic function is mildly reduced    Aortic Valve: The aortic valve is trileaflet  The leaflets are moderately thickened  There is mild regurgitation    Mitral Valve: There is moderate regurgitation    Tricuspid Valve: There is mild regurgitation    Pulmonic Valve:  There is mild regurgitation    Pulmonary Artery: The estimated pulmonary artery systolic pressure is 98 7 mmHg  The pulmonary artery systolic pressure is normal      Malignant neoplasm of overlapping 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, estrogen receptor positive ([facility] )   2015 -  Hormone Therapy    Anastrozole     5/28/2015 Initial Diagnosis    Metastatic breast cancer (HCC)  (recurrence)     6/30/2015 -  Chemotherapy    1-Taxotere, Herceptin, Perjeta started in [place] 2015 x6 cycles  2-Herceptin and Perjeta alone were continued since the patient was not interested in continue the Taxotere  3-Herceptin and Perjeta were put on hold due to decline of her ejection fraction around May 2017    4-low dose weekly Taxol was started in July 2017  5-Taxol was switched to every other week basis     4/14/2019 - 6/9/2019 Chemotherapy    PACLitaxel (TAXOL) 144 6 mg in sodium chloride 0 9 % 250 mL chemo IVPB, 80 mg/m2, Intravenous, Once, 2 of 6 cycles     12/4/2019 - 12/31/2019 Chemotherapy    PACLitaxel (TAXOL) 142 2 mg in sodium chloride 0 9 % 250 mL chemo IVPB, 80 mg/m2 = 142 2 mg, Intravenous, Once, 1 of 6 cycles  Administration: 142 2 mg (12/4/2019), 142 2 mg (12/18/2019)     1/8/2020 - 3/10/2020 Chemotherapy    PACLitaxel (TAXOL) 141 6 mg in sodium chloride 0 9 % 250 mL chemo IVPB, 80 mg/m2 = 141 6 mg, Intravenous, Once, 4 of 6 cycles  Administration: 141 6 mg (1/8/2020), 141 6 mg (1/29/2020), 141 6 mg (2/19/2020)  trastuzumab (HERCEPTIN) 600 mg in sodium chloride 0 9 % 250 mL chemo infusion, 609 mg, Intravenous, Once, 4 of 6 cycles  Administration: 600 mg (1/8/2020), 450 mg (1/29/2020), 450 mg (2/19/2020)      Chemotherapy    PACLitaxel (TAXOL) 144 6 mg in sodium chloride 0 9 % 250 mL chemo IVPB, 80 mg/m2, Intravenous, Once, 2 of 6 cycles    PACLitaxel (TAXOL) 142 2 mg in sodium chloride 0 9 % 250 mL chemo IVPB, 80 mg/m2 = 142 2 mg, Intravenous, Once, 1 of 6 cycles    Administration: 142 2 mg (12/4/2019), 142 2 mg (12/18/2019)    PACLitaxel (TAXOL) 141 6 mg in sodium chloride 0 9 % 250 mL chemo IVPB, 80 mg/m2, Intravenous, Once, 0 of 12 cycles        trastuzumab (HERCEPTIN) 304 mg in sodium chloride 0 9 % 250 mL chemo infusion, 4 mg/kg, Intravenous, Once, 0 of 26 cycles    PACLitaxel (TAXOL) 141 6 mg in sodium chloride 0 9 % 250 mL chemo IVPB, 80 mg/m2 = 141 6 mg, Intravenous, Once, 4 of 6 cycles    Administration: 141 6 mg (1/8/2020), 141 6 mg (1/29/2020), 141 6 mg (2/19/2020)        trastuzumab (HERCEPTIN) 600 mg in sodium chloride 0 9 % 250 mL chemo infusion, 609 mg, Intravenous, Once, 4 of 6 cycles    Administration: 600 mg (1/8/2020), 450 mg (1/29/2020), 450 mg (2/19/2020)        Chemotherapy    PACLitaxel (TAXOL) 144 6 mg in sodium chloride 0 9 % 250 mL chemo IVPB, 80 mg/m2, Intravenous, Once, 2 of 6 cycles    PACLitaxel (TAXOL) 142 2 mg in sodium chloride 0 9 % 250 mL chemo IVPB, 80 mg/m2 = 142 2 mg, Intravenous, Once, 1 of 6 cycles    Administration: 142 2 mg (12/4/2019), 142 2 mg (12/18/2019)    PACLitaxel (TAXOL) 141 6 mg in sodium chloride 0 9 % 250 mL chemo IVPB, 80 mg/m2, Intravenous, Once, 0 of 12 cycles        trastuzumab (HERCEPTIN) 304 mg in sodium chloride 0 9 % 250 mL chemo infusion, 4 mg/kg, Intravenous, Once, 0 of 26 cycles    PACLitaxel (TAXOL) 141 6 mg in sodium chloride 0 9 % 250 mL chemo IVPB, 80 mg/m2 = 141 6 mg, Intravenous, Once, 4 of 6 cycles    Administration: 141 6 mg (1/8/2020), 141 6 mg (1/29/2020), 141 6 mg (2/19/2020)        trastuzumab (HERCEPTIN) 600 mg in sodium chloride 0 9 % 250 mL chemo infusion, 609 mg, Intravenous, Once, 4 of 6 cycles    Administration: 600 mg (1/8/2020), 450 mg (1/29/2020), 450 mg (2/19/2020)      Lapatinib 1500 mg once a day was started on 04/20/2020 after she was found to have metastatic disease to the brain, status post whole brain radiation       12/2021 -  Chemotherapy    Started Xeloda (750 mg/m2 b I d  2 weeks on with 1 week of a break) patient's dose is 1500 mg a med and 1000 mg p m  + Neratinib with dose escalation weekly to goal of 240 mg daily (starting week 3)       Brain metastases ([facility] )   3/30/2020 - 4/10/2020 Radiation    Plan ID Energy Fractions Dose per Fraction (cGy) Dose Correction (cGy) Total Dose Delivered (cGy) Elapsed Days   Whole Brai # 6X 10 / 10 300 0 3,000 11          4/9/2020 Initial Diagnosis    Brain metastasis ([facility] )     12/9/2020 - 12/9/2020 Radiation    Plan ID Energy Fractions Dose per Fraction (cGy) Dose Correction (cGy) Total Dose Delivered (cGy) Elapsed Days   [id number] 6X 1 / 1 1,800 0 1,800 0      Treatment Dates:  12/9/2020 - 12/9/2020  Interval history:    ROS: Review of Systems   Constitutional: Positive for activity change, appetite change, fatigue and unexpected weight change  Negative for chills and fever  HENT: Negative for ear pain and sore throat  Eyes: Negative for pain and visual disturbance  Respiratory: Negative for cough and shortness of breath  Cardiovascular: Negative for chest pain and palpitations  Gastrointestinal: Positive for diarrhea  Negative for abdominal pain and vomiting  Genitourinary: Negative for dysuria and hematuria  Musculoskeletal: Negative for arthralgias and back pain  Skin: Negative for color change and rash  Neurological: Negative for seizures and syncope  All other systems reviewed and are negative        Past Medical History:   Diagnosis Date    Back pain     [facility] cancer ([facility] )     Breast cancer ([facility] )     Chronic pain     Hypertension     Knee pain     Lymphedema of right arm     Vitamin B12 deficiency        Past Surgical History:   Procedure Laterality Date    ANKLE SURGERY      APPENDECTOMY      BREAST SURGERY      bilat mastectomy-right total mastectomy and prophylactic left total mastectomy-2005    CATARACT EXTRACTION      FRACTURE SURGERY      INSERTION CENTRAL VENOUS ACCESS DEVICE W/ SUBCUTANEOUS PORT      KNEE SURGERY      right knee replacement 2014 in [address] [place] [address] Bilateral     ORTHOPEDIC SURGERY         Social History     Socioeconomic History    Marital status: Single     Spouse name: None    Number of children: 0    Years of education: None    Highest education level: None   Occupational History    None   Tobacco Use    Smoking status: Never Smoker    Smokeless tobacco: Never Used    Tobacco comment: no passive smoke exposure   Vaping Use    Vaping Use: Never used   Substance and Sexual Activity    Alcohol use: Never    Drug use: No    Sexual activity: Not Currently   Other Topics Concern    None   Social History Narrative    None     Social Determinants of Health     Financial Resource Strain: Not on file   Food Insecurity: Not on file   Transportation Needs: Not on file   Physical Activity: Not on file   Stress: Not on file   Social Connections: Not on file   Intimate Partner Violence: Not on file   Housing Stability: Not on file       Family History   Problem Relation Age of Onset    Asthma Mother     Osteoarthritis Father     Bone cancer Paternal Aunt         bone cancer       Allergies   Allergen Reactions    Penicillins Rash         Current Outpatient Medications:     acetaminophen (TYLENOL) 500 mg tablet, Take 2 tablets (1,000 mg total) by mouth every 8 (eight) hours as needed for mild pain, Disp: 30 tablet, Rfl: 0    albuterol (PROVENTIL HFA,VENTOLIN HFA) 90 mcg/act inhaler, Inhale 2 puffs every 6 (six) hours as needed for wheezing or shortness of breath, Disp: 18 g, Rfl: 2    anastrozole (ARIMIDEX) 1 mg tablet, Take 1 tablet (1 mg total) by mouth daily, Disp: 90 tablet, Rfl: 3    capecitabine (XELODA) 500 MG tablet, Take 3 tabs(1500 mg) in AM and 2 tabs (1000 mg) in PM for 14 days, then 7 days rest, Disp: 70 tablet, Rfl: 11    cholecalciferol (VITAMIN D3) 1,000 units tablet, Take 1 tablet (1,000 Units total) by mouth daily, Disp: 30 tablet, Rfl: 0    Cyanocobalamin 1000 MCG/ML KIT, Inject as directed every 6 (six) months, Disp: , Rfl:     Elastic Bandages & Supports (Prolite Lumbar Support) MISC, Use daily as needed (back pain), Disp: 1 each, Rfl: 0    famotidine (PEPCID) 10 mg tablet, Take 10 mg by mouth 2 (two) times a day as needed for heartburn, Disp: , Rfl:     Misc  Devices (ILLUSIONS C BREAST PROSTHESIS) MISC, 1 breast prosthesis, Disp: 1 each, Rfl: 0    Misc   Devices (REFLECTIONS C BREAST PROSTHES) MISC, Dispense 1 breast prosthesis, Disp: 1 each, Rfl: 0    Neratinib Maleate 40 MG TABS, Take 240 mg by mouth daily Start Week 1 take 3 tabs (120 mg) daily Week 2 take 4 tabs ( 160 Mg) daily Week 3 take 6 tabs (240) mg daily, Disp: 180 tablet, Rfl: 11    ondansetron (ZOFRAN) 8 mg tablet, Take 1 tablet (8 mg total) by mouth every 8 (eight) hours as needed for nausea or vomiting, Disp: 20 tablet, Rfl: 3    escitalopram (LEXAPRO) 5 mg tablet, Take 1 tablet (5 mg total) by mouth daily (Patient not taking: Reported on 12/8/2021 ), Disp: 30 tablet, Rfl: 5    esomeprazole (NexIUM) 40 MG capsule, TAKE 1 CAPSULE BY MOUTH ONCE DAILY (Patient not taking: Reported on 12/23/2021), Disp: 90 capsule, Rfl: 0    gabapentin (NEURONTIN) 300 mg capsule, Take 3 capsules (900 mg total) by mouth daily at bedtime (Patient not taking: Reported on 12/8/2021 ), Disp: 90 capsule, Rfl: 2    indapamide (LOZOL) 1 25 mg tablet, Take 1 tablet (1 25 mg total) by mouth every morning (Patient not taking: Reported on 12/23/2021 ), Disp: 30 tablet, Rfl: 1    lidocaine (LIDODERM) 5 %, Apply 1 patch topically daily Remove & Discard patch within 12 hours or as directed by MD (Patient not taking: Reported on 12/8/2021 ), Disp: 30 patch, Rfl: 1    lisinopril (ZESTRIL) 10 mg tablet, Take 1 tablet (10 mg total) by mouth daily (Patient not taking: Reported on 12/23/2021 ), Disp: 90 tablet, Rfl: 3    megestrol (MEGACE) 400 mg/10 mL, Take 400 mg by mouth daily (Patient not taking: Reported on 12/8/2021 ), Disp: 600 mL, Rfl: 0    nebivolol (Bystolic) 5 mg tablet, Take 1 tablet (5 mg total) by mouth daily (Patient not taking: Reported on 12/23/2021 ), Disp: 90 tablet, Rfl: 3    ondansetron (Zofran) 4 mg tablet, Every 12 hours (Patient not taking: Reported on 12/23/2021 ), Disp: , Rfl:     pantoprazole (PROTONIX) 40 mg tablet, TAKE ONE TABLET BY MOUTH EVERY DAY (Patient not taking: Reported on 12/23/2021), Disp: 90 tablet, Rfl: 0      Physical Exam:  /88 (BP Location: Left arm, Patient Position: Sitting, Cuff Size: Adult)   Pulse 90   Temp 97 5 °F (36 4 °C) (Tympanic)   Resp 16   Ht 5' 2" (1 575 m)   Wt 58 2 kg (128 lb 6 4 oz)   SpO2 99%   BMI 23 48 kg/m²     Physical Exam  Constitutional:       General: She is not in acute distress  Appearance: She is well-developed  She is not diaphoretic  HENT:      Head: Normocephalic and atraumatic  Eyes:      General: No scleral icterus  Right eye: No discharge  Left eye: No discharge  Conjunctiva/sclera: Conjunctivae normal       Pupils: Pupils are equal, round, and reactive to light  Neck:      Thyroid: No thyromegaly  Vascular: No JVD  Trachea: No tracheal deviation  Cardiovascular:      Rate and Rhythm: Normal rate and regular rhythm  Heart sounds: Normal heart sounds  No murmur heard  No friction rub  Pulmonary:      Effort: Pulmonary effort is normal  No respiratory distress  Breath sounds: Normal breath sounds  No stridor  No wheezing or rales  Chest:      Chest wall: No tenderness  Abdominal:      General: There is no distension  Palpations: Abdomen is soft  There is no hepatomegaly or splenomegaly  Tenderness: There is no abdominal tenderness  There is no guarding or rebound  Musculoskeletal:         General: No tenderness or deformity  Normal range of motion  Cervical back: Normal range of motion and neck supple  Lymphadenopathy:      Cervical: No cervical adenopathy  Skin:     General: Skin is warm and dry  Coloration: Skin is not pale  Findings: No erythema or rash  Neurological:      Mental Status: She is alert and oriented to person, place, and time  Cranial Nerves: No cranial nerve deficit  Coordination: Coordination normal       Deep Tendon Reflexes: Reflexes are normal and symmetric  Psychiatric:         Behavior: Behavior normal          Thought Content: Thought content normal          Judgment: Judgment normal            Labs:  Lab Results   Component Value Date    WBC 5 06 01/18/2022    HGB 13 1 01/18/2022    HCT 39 0 01/18/2022    MCV 99 (H) 01/18/2022     01/18/2022     Lab Results   Component Value Date     06/18/2018    K 4 0 01/18/2022     01/18/2022    CO2 28 01/18/2022    ANIONGAP 8 06/18/2018    BUN 12 01/18/2022    CREATININE 0 90 01/18/2022    GLUF 87 01/18/2022    CALCIUM 8 8 01/18/2022    CORRECTEDCA 9 3 01/18/2022    AST 17 01/18/2022    ALT 15 01/18/2022    ALKPHOS 41 (L) 01/18/2022    PROT 7 1 06/18/2018    BILITOT 0 3 06/18/2018    EGFR 62 01/18/2022     No results found for: TSH    Patient voiced understanding and agreement in the above discussion  Aware to contact our office with questions/symptoms in the interim

## 2022-01-21 NOTE — TELEPHONE ENCOUNTER
Phoned pts sister Sakshi Nael to inform her to  Rx for [facility] for UTI at [facility]

## 2022-01-25 ENCOUNTER — OFFICE VISIT (OUTPATIENT)
Dept: CARDIOLOGY CLINIC | Facility: CLINIC | Age: 77
End: 2022-01-25
Payer: MEDICARE

## 2022-01-25 VITALS
HEIGHT: 62 IN | BODY MASS INDEX: 23.77 KG/M2 | DIASTOLIC BLOOD PRESSURE: 90 MMHG | SYSTOLIC BLOOD PRESSURE: 122 MMHG | HEART RATE: 76 BPM | WEIGHT: 129.2 LBS

## 2022-01-25 DIAGNOSIS — I50.9 HEART FAILURE, UNSPECIFIED HF CHRONICITY, UNSPECIFIED HEART FAILURE TYPE (HCC): ICD-10-CM

## 2022-01-25 DIAGNOSIS — I10 ESSENTIAL HYPERTENSION: ICD-10-CM

## 2022-01-25 DIAGNOSIS — N18.31 STAGE 3A CHRONIC KIDNEY DISEASE (HCC): ICD-10-CM

## 2022-01-25 DIAGNOSIS — I50.22 CHRONIC SYSTOLIC HEART FAILURE (HCC): ICD-10-CM

## 2022-01-25 DIAGNOSIS — I42.8 NONISCHEMIC CARDIOMYOPATHY (HCC): Primary | ICD-10-CM

## 2022-01-25 DIAGNOSIS — Z79.811 USE OF AROMATASE INHIBITORS: ICD-10-CM

## 2022-01-25 PROCEDURE — 99215 OFFICE O/P EST HI 40 MIN: CPT | Performed by: INTERNAL MEDICINE

## 2022-01-25 PROCEDURE — 93000 ELECTROCARDIOGRAM COMPLETE: CPT | Performed by: INTERNAL MEDICINE

## 2022-01-25 RX ORDER — LISINOPRIL 2.5 MG/1
2.5 TABLET ORAL DAILY
Qty: 30 TABLET | Refills: 6 | Status: SHIPPED | OUTPATIENT
Start: 2022-01-25 | End: 2022-03-28

## 2022-01-25 NOTE — PATIENT INSTRUCTIONS
CARDIOLOGY ASSESSMENT & PLAN     1  Nonischemic cardiomyopathy (HCC)  lisinopril (ZESTRIL) 2 5 mg tablet   2  Heart failure, unspecified HF chronicity, unspecified heart failure type [facility])  Ambulatory referral to Cardiology    POCT ECG   3  Stage 3a chronic kidney disease ([facility] [address] )     4  [facility] ([facility]     5  Essential hypertension     6  Use of aromatase inhibitors       Nonischemic cardiomyopathy [facility])  Ms Micah Breaux appears to be overall feeling well from cardiac perspective with no recent symptoms that suggest heart failure  On examination there is evidence of pulmonary or peripheral vascular congestion or signs of significant valvular heart disease  Her last echocardiogram in November demonstrated severely reduced left ventricular systolic function  Had previously noted pulmonary hypertension was not noted  She is on chemotherapy for metastatic breast cancer  Today we reviewed her medications it is noted that she is not taking ACE-inhibitor and beta-blocker or any cardiac specific medication due to symptoms from low blood pressure  She is not on any chemotherapy agent that has significant cardiac dysfunction profile  She is on anastrozole which can contribute to increase in blood pressure  Today I discussed with her and her sister the importance of taking cardioprotective medications including ACE-inhibitor and ARB therapy  After discussion she is agreeable to go back on these medications  -- I am Represcribing lisinopril at a dose of 2 5 mg twice daily  -- I am advising her to follow-up with her primary physician in next 2-4 weeks  If her blood pressure is stable then would add bisoprolol 2 5 mg once daily to her regimen  Goal would be to ultimately put on maximally tolerated medications  -- today we also reviewed signs of decompensated heart failure  She she is advised to continue to monitor her weight regularly and for signs and symptoms    Is she is advised to report any concerning symptoms such as chest pain, shortness of breath, decline in exercise tolerance or presyncope/syncope  -- she will continue to follow with her hematologist oncologist and undergo echocardiogram as per the chemotherapy protocol  The following routine measures are being advised to prevent exacerbation of congestive heart failure:     - Daily or atleast weekly checking of weight  Notify your clinicians if greater than 2 lb is gained in 1 day or greater than 5 lb is gained in 1 wk  - Checking for lower extremity swelling  Examine legs for swelling (new or increase in existing swelling) and describe if swelling reaches ankle, shin, or knee  - Monitoring for decrease in exercise tolerance  Check your self for unusual shortness of breath at rest, or with mild exertion, moderate exertion, or none at all     - Monitoring for worsening shortness of breath on lying down  Check for increase in number of pillows used at night and for increase in waking at night with shortness of breath  - Salt/sodium restriction to less than 2 g a day  Calculate total sodium intake in a day from nutrition labels and food charts  Understand hidden sources of salt intake   Eliminate the salt shaker  (Remember, One teaspoon of table salt = 2,300 mg of sodium)    Avoid using garlic salt, onion salt, MSG, meat tenderizers, broth mixes, [place] food, soy sauce, teriyaki sauce, barbeque sauce, sauerkraut, olives, pickles, pickle relish, rivera bits, and croutons    Use fresh ingredients and/or foods with no added salt   Try orange, lemon, lime, pineapple juice, or vinegar as a base for meat marinades or to add tart flavor   Avoid convenience foods such as canned soups, entrees, vegetables, pasta and rice mixes, frozen dinners, instant cereal and puddings, and gravy sauce mixes    Select frozen meals that contain around 600 mg sodium or less     Use fresh, frozen, no-added-salt canned vegetables, low-sodium soups, and low-sodium lunchmeats   Look for seasoning or spice blends with no salt, or try fresh herbs, onions, or garlic  You are advised to inform us for any concerns regarding above measures

## 2022-01-25 NOTE — ASSESSMENT & PLAN NOTE
Ms Sandra Mccollum appears to be overall feeling well from cardiac perspective with no recent symptoms that suggest heart failure  On examination there is evidence of pulmonary or peripheral vascular congestion or signs of significant valvular heart disease  Her last echocardiogram in November demonstrated severely reduced left ventricular systolic function  Had previously noted pulmonary hypertension was not noted  She is on chemotherapy for metastatic breast cancer  Today we reviewed her medications it is noted that she is not taking ACE-inhibitor and beta-blocker or any cardiac specific medication due to symptoms from low blood pressure  She is not on any chemotherapy agent that has significant cardiac dysfunction profile  She is on anastrozole which can contribute to increase in blood pressure  Today I discussed with her and her sister the importance of taking cardioprotective medications including ACE-inhibitor and ARB therapy  After discussion she is agreeable to go back on these medications  -- I am Represcribing lisinopril at a dose of 2 5 mg twice daily  -- I am advising her to follow-up with her primary physician in next 2-4 weeks  If her blood pressure is stable then would add bisoprolol 2 5 mg once daily to her regimen  Goal would be to ultimately put on maximally tolerated medications  -- today we also reviewed signs of decompensated heart failure  She she is advised to continue to monitor her weight regularly and for signs and symptoms  Is she is advised to report any concerning symptoms such as chest pain, shortness of breath, decline in exercise tolerance or presyncope/syncope  -- she will continue to follow with her hematologist oncologist and undergo echocardiogram as per the chemotherapy protocol  The following routine measures are being advised to prevent exacerbation of congestive heart failure:     - Daily or atleast weekly checking of weight      Notify your clinicians if greater than 2 lb is gained in 1 day or greater than 5 lb is gained in 1 wk  - Checking for lower extremity swelling  Examine legs for swelling (new or increase in existing swelling) and describe if swelling reaches ankle, shin, or knee  - Monitoring for decrease in exercise tolerance  Check your self for unusual shortness of breath at rest, or with mild exertion, moderate exertion, or none at all     - Monitoring for worsening shortness of breath on lying down  Check for increase in number of pillows used at night and for increase in waking at night with shortness of breath  - Salt/sodium restriction to less than 2 g a day  Calculate total sodium intake in a day from nutrition labels and food charts  Understand hidden sources of salt intake   Eliminate the salt shaker  (Remember, One teaspoon of table salt = 2,300 mg of sodium)    Avoid using garlic salt, onion salt, MSG, meat tenderizers, broth mixes, [place] food, soy sauce, teriyaki sauce, barbeque sauce, sauerkraut, olives, pickles, pickle relish, rivera bits, and croutons    Use fresh ingredients and/or foods with no added salt   Try orange, lemon, lime, pineapple juice, or vinegar as a base for meat marinades or to add tart flavor   Avoid convenience foods such as canned soups, entrees, vegetables, pasta and rice mixes, frozen dinners, instant cereal and puddings, and gravy sauce mixes    Select frozen meals that contain around 600 mg sodium or less   Use fresh, frozen, no-added-salt canned vegetables, low-sodium soups, and low-sodium lunchmeats   Look for seasoning or spice blends with no salt, or try fresh herbs, onions, or garlic  You are advised to inform us for any concerns regarding above measures

## 2022-01-25 NOTE — PROGRESS NOTES
[facility]  [id number] [facility], [place] [address]  Phone#  [phone]  Fax#  [phone]  *-*-*-*-*-*-*-*-*-*-*-*-*-*-*-*-*-*-*-*-*-*-*-*-*-*-*-*-*-*-*-*-*-*-*-*-*-*-*-*-*-*-*-*-*-*-*-*-*-*-*-*-*-*  ENCOUNTER DATE: 01/25/22 2:42 PM  PATIENT NAME: Raghav Vasquez   1945    [id number]  AGE:77 y o  SEX: female  Alex Dumas MD     PRIMARY CARE PHYSICIAN: David Cook MD    DIAGNOSES:  1  History of right breast cancer initially diagnosed in [place] in 2005 as infiltrating ductal carcinoma grade 2 status post right total mastectomy and left prophylactic total mastectomy  Status post 6 cycles of 5 fluorouracil, Adriamycin and cyclophosphamide followed by tamoxifen followed by Armidex  2  Recurrence of breast cancer with local invasion to right anterior chest wall, right axilla, pectoralis muscle and left axilla in 2015 treated with Herceptin and pertuzumab 6 cycles  3  Recurrence of cancer in right axilla in December/January 2020 with re-initiation of chemotherapy with Herceptin  4  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% in 2017 leading to holding of chemotherapy for a while followed by restarting Taxol at every other week basis  5  New metastasis of breast cancer to brain, started on palliative radiation treatment  6  Pulmonary hypertension     ECHOCARDIOGRAM November 17, 2021: Normal left ventricular cavity size, normal wall thickness, severely reduced left ventricular systolic function with global hypokinesis, EF 31%, markedly decreased global longitudinal strain,-10%  Grade 1 diastolic dysfunction   Normal right ventricular size, mildly reduced right ventricular systolic function, normal left and right atrial size, moderate mitral valve regurgitation, thickened aortic valve with mild aortic valve regurgitation, no aortic valve stenosis, mild tricuspid and pulmonic valve regurgitation, no obvious pulmonary hypertension, no pericardial effusion  LIMITED ECHOCARDIOGRAM April 9, 2020: Moderately reduced left ventricular systolic function with global hypokinesis, EF of 94%, grade 1 diastolic dysfunction, mild mitral and aortic valve regurgitation, trace pulmonic valve regurgitation, no pericardial effusion  ECHOCARDIOGRAM March 13, 2020: Moderately reduced left ventricular systolic function with EF of around 87%, grade 1 diastolic dysfunction, mild tricuspid valve regurgitation, mild-to-moderate pulmonic valve regurgitation, trace mitral valve regurgitation, trace aortic valve regurgitation, no obvious pulmonary hypertension and no pericardial effusion  EF by MUGA scan in June 2017 was 36% with global hypokinesis  Echocardiogram in February 2017 showed severely reduced left ventricular systolic function with global hypokinesis with EF of 46%, grade 2 diastolic dysfunction, mild mitral valve regurgitation, mild-to-moderate tricuspid valve regurgitation with mild approaching moderate pulmonary hypertension  CURRENT ECG     Results for orders placed or performed in visit on 01/25/22   POCT ECG    Narrative    Sinus rhythm with no significant ST-T wave abnormalities  No evidence of prior infarction, or chamber enlargement or hypertrophy  HR 76 ppm   Normal axis and intervals  CARDIOLOGY ASSESSMENT & PLAN     1  Nonischemic cardiomyopathy (HCC)  lisinopril (ZESTRIL) 2 5 mg tablet   2  Heart failure, unspecified HF chronicity, unspecified heart failure type [facility])  Ambulatory referral to Cardiology    POCT ECG   3  Stage 3a chronic kidney disease ([address] )     4  Chronic systolic heart failure ([facility] [address] )     5  Essential hypertension     6  Use of aromatase inhibitors       Nonischemic cardiomyopathy [facility])  Ms Ulises Loza appears to be overall feeling well from cardiac perspective with no recent symptoms that suggest heart failure    On examination there is evidence of pulmonary or peripheral vascular congestion or signs of significant valvular heart disease  Her last echocardiogram in November demonstrated severely reduced left ventricular systolic function  Had previously noted pulmonary hypertension was not noted  She is on chemotherapy for metastatic breast cancer  Today we reviewed her medications it is noted that she is not taking ACE-inhibitor and beta-blocker or any cardiac specific medication due to symptoms from low blood pressure  She is not on any chemotherapy agent that has significant cardiac dysfunction profile  She is on anastrozole which can contribute to increase in blood pressure  Today I discussed with her and her sister the importance of taking cardioprotective medications including ACE-inhibitor and ARB therapy  After discussion she is agreeable to go back on these medications  -- I am Represcribing lisinopril at a dose of 2 5 mg twice daily  -- I am advising her to follow-up with her primary physician in next 2-4 weeks  If her blood pressure is stable then would add bisoprolol 2 5 mg once daily to her regimen  Goal would be to ultimately put on maximally tolerated medications  -- today we also reviewed signs of decompensated heart failure  She she is advised to continue to monitor her weight regularly and for signs and symptoms  Is she is advised to report any concerning symptoms such as chest pain, shortness of breath, decline in exercise tolerance or presyncope/syncope  -- she will continue to follow with her hematologist oncologist and undergo echocardiogram as per the chemotherapy protocol  The following routine measures are being advised to prevent exacerbation of congestive heart failure:     - Daily or atleast weekly checking of weight  Notify your clinicians if greater than 2 lb is gained in 1 day or greater than 5 lb is gained in 1 wk  - Checking for lower extremity swelling      Examine legs for swelling (new or increase in existing swelling) and describe if swelling reaches ankle, shin, or knee  - Monitoring for decrease in exercise tolerance  Check your self for unusual shortness of breath at rest, or with mild exertion, moderate exertion, or none at all     - Monitoring for worsening shortness of breath on lying down  Check for increase in number of pillows used at night and for increase in waking at night with shortness of breath  - Salt/sodium restriction to less than 2 g a day  Calculate total sodium intake in a day from nutrition labels and food charts  Understand hidden sources of salt intake   Eliminate the salt shaker  (Remember, One teaspoon of table salt = 2,300 mg of sodium)    Avoid using garlic salt, onion salt, MSG, meat tenderizers, broth mixes, [place] food, soy sauce, teriyaki sauce, barbeque sauce, sauerkraut, olives, pickles, pickle relish, rivera bits, and croutons    Use fresh ingredients and/or foods with no added salt   Try orange, lemon, lime, pineapple juice, or vinegar as a base for meat marinades or to add tart flavor   Avoid convenience foods such as canned soups, entrees, vegetables, pasta and rice mixes, frozen dinners, instant cereal and puddings, and gravy sauce mixes    Select frozen meals that contain around 600 mg sodium or less   Use fresh, frozen, no-added-salt canned vegetables, low-sodium soups, and low-sodium lunchmeats   Look for seasoning or spice blends with no salt, or try fresh herbs, onions, or garlic  You are advised to inform us for any concerns regarding above measures  INTERVAL HISTORY & HISTORY OF PRESENT ILLNESS     Rocio Juares is here for follow-up regarding her cardiac comorbidities which include:  History of chemotherapy related cardiomyopathy following treatment with Herceptin and pertuzumab in 2015  She is here for visit accompanied with her sister Leatha Coburn  She was last seen by me in February 2020 for virtual visit     She has metastatic cancer of the right breast with mets to brain for which have been treated with Stereotactic radiosurgery [facility]) in December 2021  She is currently on chemotherapy with Xeloda and neratinib and is also on aromatase inhibitors  Patient reports that overall she has been feeling all right from cardiac perspective  She has had no recent chest pain, unusual shortness of breath or palpitations or dizziness or lightheadedness  She does report abnormal gait and balance issues  Denies any pedal edema, orthopnea, PND, presyncope/syncope  She reports that she remains in stable condition and is on 2 chemotherapy agents, Xeloda and narrow at in a [facility] She is also on anastrozole and on Prolia  She has not had any recent hospitalizations  Functional capacity status: Moderately decreased   (Excellent- >10 METs; Good: (7-10 METs); Moderate (4-7 METs); Poor (<= 4 METs)    Any chronic stressors:  None   (feeling of poor health, financial problems, and social isolation etc)  Tobacco or alcohol dependence:  None    Current cardiac medications: She was supposed to be on Lisinopril 10 mg daily, indapamide 1 25 mg daily and nebivolol 5 mg daily however she reports that she is not taking any of these medications for a while as she feels better occasion was causing her dizziness  Her last blood work is from January 18, 2022  Her renal function and electrolytes were normal, LFTs were normal, bilirubin was mildly increased, her last cholesterol was normal   Her TSH was normal   PTH was increased at her and 14 4  REVIEW OF SYSTEMS   Positive for:  As noted above in HPI  Negative for: All remaining as reviewed below and in HPI      SYSTEM SYMPTOMS REVIEWED:  General--weight change, fever, night sweats  Respiratory--cough, wheezing, shortness of breath, sputum production  Cardiovascular--chest pain, syncope, dyspnea on exertion, edema, decline in exercise tolerance, claudication   Gastrointestinal--persistent vomiting, diarrhea, abdominal distention, blood in stool Muscular or skeletal--joint pain or swelling   Neurologic--headaches, syncope, abnormal movement  Hematologic--history of easy bruising and bleeding   Endocrine--thyroid enlargement, heat or cold intolerance, polyuria   Psychiatric--anxiety, depression     *-*-*-*-*-*-*-*-*-*-*-*-*-*-*-*-*-*-*-*-*-*-*-*-*-*-*-*-*-*-*-*-*-*-*-*-*-*-*-*-*-*-*-*-*-*-*-*-*-*-*-*-*-*-  VITAL SIGNS     CURRENT VITAL SIGNS:   Vitals:    01/25/22 1358   BP: 122/90   Pulse: 76   Weight: 58 6 kg (129 lb 3 2 oz)   Height: 5' 2" (1 575 m)       BMI: Body mass index is 23 63 kg/m²  WEIGHTS:   Wt Readings from Last 25 Encounters:   01/25/22 58 6 kg (129 lb 3 2 oz)   01/21/22 58 2 kg (128 lb 6 4 oz)   12/29/21 59 7 kg (131 lb 9 6 oz)   12/23/21 60 8 kg (134 lb)   12/09/21 59 6 kg (131 lb 6 3 oz)   12/08/21 59 6 kg (131 lb 6 4 oz)   12/08/21 59 6 kg (131 lb 6 4 oz)   11/29/21 61 4 kg (135 lb 6 4 oz)   11/17/21 60 8 kg (134 lb)   10/29/21 61 1 kg (134 lb 12 8 oz)   03/15/21 61 7 kg (136 lb)   03/10/21 63 2 kg (139 lb 6 4 oz)   03/03/21 59 9 kg (132 lb)   02/15/21 61 4 kg (135 lb 6 4 oz)   02/10/21 60 8 kg (134 lb)   02/10/21 60 8 kg (134 lb)   01/11/21 62 7 kg (138 lb 3 2 oz)   12/14/20 62 4 kg (137 lb 9 6 oz)   12/09/20 61 2 kg (135 lb)   12/02/20 62 1 kg (136 lb 12 8 oz)   12/02/20 62 1 kg (136 lb 12 8 oz)   11/25/20 61 9 kg (136 lb 6 4 oz)   11/16/20 61 7 kg (136 lb)   11/02/20 62 6 kg (138 lb)   10/19/20 63 kg (138 lb 12 8 oz)        *-*-*-*-*-*-*-*-*-*-*-*-*-*-*-*-*-*-*-*-*-*-*-*-*-*-*-*-*-*-*-*-*-*-*-*-*-*-*-*-*-*-*-*-*-*-*-*-*-*-*-*-*-*-  PHYSICAL EXAM     General Appearance:    Alert, cooperative, no distress, appears stated age, thinly built   Head, Eyes, ENT:    No obvious abnormality, moist mucous mebranes  Neck:   Supple, no carotid bruit or JVD   Back:     Symmetric, no curvature  Lungs:     Respirations unlabored   Clear to auscultation bilaterally,    Chest wall:    No tenderness or deformity   Heart:    Regular rate and rhythm, S1 and S2 normal, no murmur, rub  or gallop  Abdomen:     Soft, non-tender, No obvious masses, or organomegaly   Extremities:   Extremities warm, no cyanosis or edema    Skin:   no venostatic changes in lower extremities  Normal skin color, texture, and turgor   No rashes or lesions     *-*-*-*-*-*-*-*-*-*-*-*-*-*-*-*-*-*-*-*-*-*-*-*-*-*-*-*-*-*-*-*-*-*-*-*-*-*-*-*-*-*-*-*-*-*-*-*-*-*-*-*-*-*-  CURRENT MEDICATIONS LIST     Current Outpatient Medications:     acetaminophen (TYLENOL) 500 mg tablet, Take 2 tablets (1,000 mg total) by mouth every 8 (eight) hours as needed for mild pain, Disp: 30 tablet, Rfl: 0    albuterol (PROVENTIL HFA,VENTOLIN HFA) 90 mcg/act inhaler, Inhale 2 puffs every 6 (six) hours as needed for wheezing or shortness of breath, Disp: 18 g, Rfl: 2    anastrozole (ARIMIDEX) 1 mg tablet, Take 1 tablet (1 mg total) by mouth daily, Disp: 90 tablet, Rfl: 3    capecitabine (XELODA) 500 MG tablet, Take 3 tabs(1500 mg) in AM and 2 tabs (1000 mg) in PM for 14 days, then 7 days rest, Disp: 70 tablet, Rfl: 11    cholecalciferol (VITAMIN D3) 1,000 units tablet, Take 1 tablet (1,000 Units total) by mouth daily, Disp: 30 tablet, Rfl: 0    Cyanocobalamin 1000 MCG/ML KIT, Inject as directed every 6 (six) months, Disp: , Rfl:     doxycycline hyclate (VIBRAMYCIN) 100 mg capsule, Take 1 capsule (100 mg total) by mouth in the morning for 7 days, Disp: 7 capsule, Rfl: 0    famotidine (PEPCID) 10 mg tablet, Take 10 mg by mouth 2 (two) times a day as needed for heartburn, Disp: , Rfl:     Neratinib Maleate 40 MG TABS, Take 240 mg by mouth daily Start Week 1 take 3 tabs (120 mg) daily Week 2 take 4 tabs ( 160 Mg) daily Week 3 take 6 tabs (240) mg daily, Disp: 180 tablet, Rfl: 11    ondansetron (ZOFRAN) 8 mg tablet, Take 1 tablet (8 mg total) by mouth every 8 (eight) hours as needed for nausea or vomiting, Disp: 20 tablet, Rfl: 3    Elastic Bandages & Supports (Prolite Lumbar Support) MISC, Use daily as needed (back pain), Disp: 1 each, Rfl: 0    escitalopram (LEXAPRO) 5 mg tablet, Take 1 tablet (5 mg total) by mouth daily (Patient not taking: Reported on 12/8/2021 ), Disp: 30 tablet, Rfl: 5    esomeprazole (NexIUM) 40 MG capsule, TAKE 1 CAPSULE BY MOUTH ONCE DAILY (Patient not taking: Reported on 12/23/2021), Disp: 90 capsule, Rfl: 0    gabapentin (NEURONTIN) 300 mg capsule, Take 3 capsules (900 mg total) by mouth daily at bedtime (Patient not taking: Reported on 12/8/2021 ), Disp: 90 capsule, Rfl: 2    indapamide (LOZOL) 1 25 mg tablet, Take 1 tablet (1 25 mg total) by mouth every morning (Patient not taking: Reported on 12/23/2021 ), Disp: 30 tablet, Rfl: 1    lidocaine (LIDODERM) 5 %, Apply 1 patch topically daily Remove & Discard patch within 12 hours or as directed by MD (Patient not taking: Reported on 12/8/2021 ), Disp: 30 patch, Rfl: 1    lisinopril (ZESTRIL) 2 5 mg tablet, Take 1 tablet (2 5 mg total) by mouth daily, Disp: 30 tablet, Rfl: 6    megestrol (MEGACE) 400 mg/10 mL, Take 400 mg by mouth daily (Patient not taking: Reported on 12/8/2021 ), Disp: 600 mL, Rfl: 0    Misc  Devices (ILLUSIONS C BREAST PROSTHESIS) MISC, 1 breast prosthesis, Disp: 1 each, Rfl: 0    Misc   Devices (REFLECTIONS C BREAST PROSTHES) MISC, Dispense 1 breast prosthesis, Disp: 1 each, Rfl: 0    nebivolol (Bystolic) 5 mg tablet, Take 1 tablet (5 mg total) by mouth daily (Patient not taking: Reported on 12/23/2021 ), Disp: 90 tablet, Rfl: 3    ondansetron (Zofran) 4 mg tablet, Every 12 hours (Patient not taking: Reported on 12/23/2021 ), Disp: , Rfl:     pantoprazole (PROTONIX) 40 mg tablet, TAKE ONE TABLET BY MOUTH EVERY DAY (Patient not taking: Reported on 12/23/2021), Disp: 90 tablet, Rfl: 0       ALLERGIES     Allergies   Allergen Reactions    Penicillins Rash       *-*-*-*-*-*-*-*-*-*-*-*-*-*-*-*-*-*-*-*-*-*-*-*-*-*-*-*-*-*-*-*-*-*-*-*-*-*-*-*-*-*-*-*-*-*-*-*-*-*-*-*-*-*-  LABORATORY DATA     Sodium   Date Value Ref Range Status   06/18/2018 141 137 - 147 MEQ/L Final   05/04/2018 143 137 - 147 MEQ/L Final     Sodium   Date Value Ref Range Status   06/18/2018 141 137 - 147 MEQ/L Final   05/04/2018 143 137 - 147 MEQ/L Final     Potassium   Date Value Ref Range Status   01/18/2022 4 0 3 5 - 5 3 mmol/L Final   12/27/2021 4 5 3 5 - 5 3 mmol/L Final   11/22/2021 4 9 3 5 - 5 3 mmol/L Final   06/18/2018 5 4 (H) 3 6 - 5 0 MEQ/L Final   05/04/2018 4 3 3 6 - 5 0 MEQ/L Final     Chloride   Date Value Ref Range Status   01/18/2022 104 100 - 108 mmol/L Final   12/27/2021 105 100 - 108 mmol/L Final   11/22/2021 104 100 - 108 mmol/L Final   06/18/2018 105 97 - 108 MEQ/L Final   05/04/2018 106 97 - 108 MEQ/L Final     CO2   Date Value Ref Range Status   01/18/2022 28 21 - 32 mmol/L Final   12/27/2021 28 21 - 32 mmol/L Final   11/22/2021 30 21 - 32 mmol/L Final   06/18/2018 28 22 - 30 MMOL/L Final   05/04/2018 27 22 - 30 MMOL/L Final     Anion Gap   Date Value Ref Range Status   06/18/2018 8 5 - 14 MMOL/L Final   05/04/2018 10 5 - 14 MMOL/L Final     BUN   Date Value Ref Range Status   01/18/2022 12 5 - 25 mg/dL Final   12/27/2021 17 5 - 25 mg/dL Final   11/22/2021 19 5 - 25 mg/dL Final   06/18/2018 24 5 - 25 MG/DL Final   05/04/2018 28 (H) 5 - 25 MG/DL Final     Creatinine   Date Value Ref Range Status   01/18/2022 0 90 0 60 - 1 30 mg/dL Final     Comment:     Standardized to IDMS reference method   12/27/2021 0 94 0 60 - 1 30 mg/dL Final     Comment:     Standardized to IDMS reference method   11/22/2021 0 97 0 60 - 1 30 mg/dL Final     Comment:     Standardized to IDMS reference method     eGFR   Date Value Ref Range Status   01/18/2022 62 ml/min/1 73sq m Final   12/27/2021 59 ml/min/1 73sq m Final   11/22/2021 57 ml/min/1 73sq m Final     Calcium   Date Value Ref Range Status   01/18/2022 8 8 8 3 - 10 1 mg/dL Final   12/27/2021 8 5 8 3 - 10 1 mg/dL Final   11/22/2021 9 3 8 3 - 10 1 mg/dL Final   06/18/2018 8 4 8 4 - 10 2 MG/DL Final   05/04/2018 9 3 8 4 - 10 2 MG/DL Final     AST   Date Value Ref Range Status   01/18/2022 17 5 - 45 U/L Final     Comment:     Specimen collection should occur prior to Sulfasalazine administration due to the potential for falsely depressed results  12/27/2021 18 5 - 45 U/L Final     Comment:     Specimen collection should occur prior to Sulfasalazine administration due to the potential for falsely depressed results  11/22/2021 21 5 - 45 U/L Final     Comment:     Specimen collection should occur prior to Sulfasalazine administration due to the potential for falsely depressed results  06/18/2018 23 14 - 36 U/L Final   05/04/2018 21 14 - 36 U/L Final     ALT   Date Value Ref Range Status   01/18/2022 15 12 - 78 U/L Final     Comment:     Specimen collection should occur prior to Sulfasalazine and/or Sulfapyridine administration due to the potential for falsely depressed results  12/27/2021 17 12 - 78 U/L Final     Comment:     Specimen collection should occur prior to Sulfasalazine and/or Sulfapyridine administration due to the potential for falsely depressed results  11/22/2021 22 12 - 78 U/L Final     Comment:     Specimen collection should occur prior to Sulfasalazine and/or Sulfapyridine administration due to the potential for falsely depressed results      06/18/2018 20 9 - 52 U/L Final   05/04/2018 22 9 - 52 U/L Final     Alkaline Phosphatase   Date Value Ref Range Status   01/18/2022 41 (L) 46 - 116 U/L Final   12/27/2021 47 46 - 116 U/L Final   11/22/2021 52 46 - 116 U/L Final   06/18/2018 74 43 - 122 U/L Final   05/04/2018 59 43 - 122 U/L Final     Total Protein   Date Value Ref Range Status   06/18/2018 7 1 5 9 - 8 4 G/DL Final   05/04/2018 6 8 5 9 - 8 4 G/DL Final     Total Bilirubin   Date Value Ref Range Status   06/18/2018 0 3 <1 3 mg/dL Final   05/04/2018 0 4 <1 3 mg/dL Final     Magnesium   Date Value Ref Range Status   01/18/2022 2 0 1 6 - 2 6 mg/dL Final   12/27/2021 2 2 1 6 - 2 6 mg/dL Final   11/22/2021 2 0 1 6 - 2 6 mg/dL Final   05/04/2018 1 6 1 6 - 2 3 mg/dL Final     WBC   Date Value Ref Range Status   01/18/2022 5 06 4 31 - 10 16 Thousand/uL Final   12/27/2021 5 74 4 31 - 10 16 Thousand/uL Final   11/22/2021 6 58 4 31 - 10 16 Thousand/uL Final   06/18/2018 4 8 4 5 - 11 0 K/MCL Final   05/04/2018 6 1 4 5 - 11 0 K/MCL Final     Hemoglobin   Date Value Ref Range Status   01/18/2022 13 1 11 5 - 15 4 g/dL Final   12/27/2021 13 2 11 5 - 15 4 g/dL Final   11/22/2021 13 9 11 5 - 15 4 g/dL Final   06/18/2018 11 2 (L) 12 0 - 16 0 G/DL Final   05/04/2018 11 6 (L) 12 0 - 16 0 G/DL Final     Platelets   Date Value Ref Range Status   01/18/2022 260 149 - 390 Thousands/uL Final   12/27/2021 243 149 - 390 Thousands/uL Final   11/22/2021 321 149 - 390 Thousands/uL Final   06/18/2018 324 150 - 450 K/MCL Final   05/04/2018 291 150 - 450 K/MCL Final     No results found for: PT, PTT, INR  No results found for: CKMB, DIGOXIN  No results found for: TSH  No results found for: CHOL   No results found for: HGBA1C  Urine Culture   Date Value Ref Range Status   12/27/2021 >100,000 cfu/ml Escherichia coli (A)  Final   06/03/2020 >100,000 cfu/ml Enterobacter cloacae complex (A)  Final   03/07/2020 4045-7072 cfu/ml   Final     Comment:     Mixed Contaminants X2   01/27/2020 >100,000 cfu/ml Escherichia coli ESBL (A)  Final     Comment:     An Extended-Spectrum Beta-Lactamase is being produced by this organism (requires contact precautions)  Cephalosporins are NOT effective for treating these organisms  For SEVERE infections (i e  bacteremia, septic shock, etc ), Carbapenems are the drug of choice  For MILD infections (i e  isolated urinary or biliary infection), high-dose Zosyn may be used     07/06/2018 <10,000 cfu/ml   Final     Comment:     Mixed Contaminants X3       *-*-*-*-*-*-*-*-*-*-*-*-*-*-*-*-*-*-*-*-*-*-*-*-*-*-*-*-*-*-*-*-*-*-*-*-*-*-*-*-*-*-*-*-*-*-*-*-*-*-*-*-*-*-  PREVIOUS CARDIOLOGY & RADIOLOGY TEST RESULTS   I have personally reviewed pertinent results of cardiovascular tests noted below  Results for orders placed during the hospital encounter of 21    Echo complete with contrast if indicated    Narrative  [address] [place] [address]    Transthoracic Echocardiogram  2D, M-mode, Doppler, and Color Doppler    Study date:  2021    Patient: Stuart Womack  MR number: [id number]  Account number: [de-identified]  : 1945  Age: 77 years  Gender: Female  Status: Outpatient  Location: [facility]  Height: 62 in  Weight: 134 6 lb  BP: 140/ 92 mmHg    Indications: Breast cancer    Diagnoses: C50 919 - Malignant neoplasm of unspecified site of unspecified female breast    Sonographer:  ANNETTE Aguilar  Interpreting Physician:  Maritza Varela MD  Primary Physician:  Lana Otero MD  Referring Physician:  Jesús Grigsby MD  Group:  [facility]    IMPRESSIONS:  Technical quality: Good  Cardiac rhythm: Sinus    1  Borderline dilated left ventricular cavity, moderate to markedly reduced left ventricular systolic function  EF around 35%  Global hypokinesis  Grade 1 diastolic dysfunction  2  Normal right ventricular size and systolic function  3  Borderline left atrial cavity enlargement  4  Aortic valve sclerosis, mild aortic valve regurgitation  5  Mitral valve sclerosis, mild, 2+ mitral valve regurgitation  6  Mild, 1+ tricuspid valve regurgitation  7  Mild pulmonary hypertension  Estimated RVSP/PASP is 36 mmHg  8  No pericardial effusion  Compared report of previous echocardiogram 2020 there is overall no significant change  SUMMARY    LEFT VENTRICLE:  Borderline dilated left ventricular cavity, normal wall thickness, moderately reduced left ventricular systolic function with global hypokinesis  Ejection fraction is estimated as around 35%  Grade 1 diastolic dysfunction   Normal estimated  left ventricular filling pressure  Severely abnormal global longitudinal strain,-12 6%  RIGHT VENTRICLE:  Normal right ventricular size and systolic  Estimated right ventricular systolic pressure is mildly increased, 36 mmHg  Mild pulmonary hypertension  LEFT ATRIUM:  Borderline left atrial cavity enlargement  Intact interatrial septum  RIGHT ATRIUM:  Normal right atrial cavity size  MITRAL VALVE:  Mild mitral valve leaflet thickening and sclerosis, adequate leaflet mobility  Mild, 2+ mitral valve regurgitation  AORTIC VALVE:  Tricuspid aortic valve with mild sclerosis, good cuspal separation  No aortic valve stenosis  Mild aortic valve regurgitation is noted  TRICUSPID VALVE:  Mild tricuspid valve regurgitation  PULMONIC VALVE:  No significant pulmonic valve regurgitation  IVC, HEPATIC VEINS:  Inferior vena cava is normal in size and demonstrates appropriate respiratory phasic changes in diameter  PERICARDIUM:  No pericardial effusion  HISTORY: PRIOR HISTORY: Cancer, hypertension    PROCEDURE: The study was performed in the [facility]  This was a routine study  The transthoracic approach was used  The study included complete 2D imaging, M-mode, complete spectral Doppler, and color Doppler  The heart rate was 78  bpm, at the start of the study  Images were obtained from the parasternal, apical, subcostal, and suprasternal notch acoustic windows  Image quality was adequate  LEFT VENTRICLE: Borderline dilated left ventricular cavity, normal wall thickness, moderately reduced left ventricular systolic function with global hypokinesis  Ejection fraction is estimated as around 35%  Grade 1 diastolic dysfunction  Normal estimated left ventricular filling pressure  Severely abnormal global longitudinal strain,-12 6%  RIGHT VENTRICLE: Normal right ventricular size and systolic  Estimated right ventricular systolic pressure is mildly increased, 36 mmHg   Mild pulmonary hypertension  LEFT ATRIUM: Borderline left atrial cavity enlargement  Intact interatrial septum  RIGHT ATRIUM: Normal right atrial cavity size  MITRAL VALVE: Mild mitral valve leaflet thickening and sclerosis, adequate leaflet mobility  Mild, 2+ mitral valve regurgitation  AORTIC VALVE: Tricuspid aortic valve with mild sclerosis, good cuspal separation  No aortic valve stenosis  Mild aortic valve regurgitation is noted  TRICUSPID VALVE: Mild tricuspid valve regurgitation  PULMONIC VALVE: No significant pulmonic valve regurgitation  PERICARDIUM: No pericardial effusion  SYSTEMIC VEINS: IVC: Inferior vena cava is normal in size and demonstrates appropriate respiratory phasic changes in diameter      SYSTEM MEASUREMENT TABLES    2D  %FS: 14 88 %  AA PSSL Full: -6 69 %  AAS PSSL Full: -16 54 %  AI PSSL Full: -16 57 %  AL PSSL Full: -4 99 %  AP PSSL Full: -9 87 %  AS PSSL Full: -14 19 %  AVC: 399 4 ms  Ao Diam: 2 9 cm  BA PSSL Full: -10 17 %  BAS PSSL Full: -5 72 %  BI PSSL Full: -15 65 %  BL PSSL Full: -21 85 %  BP PSSL Full: -9 23 %  BS PSSL Full: -14 37 %  EDV(Teich): 105 17 ml  EF(Teich): 31 56 %  ESV(Teich): 71 97 ml  G peak SL Full(A2C): -12 57 %  G peak SL Full(A4C): -13 63 %  G peak SL Full(APLAX): -11 46 %  G peak SL Full(Avg): -12 55 %  IVSd: 0 85 cm  LA Area: 12 48 cm2  LA Diam: 3 62 cm  LVEDV MOD A4C: 123 85 ml  LVEF MOD A4C: 36 17 %  LVESV MOD A4C: 79 06 ml  LVIDd: 4 75 cm  LVIDs: 4 05 cm  LVLd A4C: 8 32 cm  LVLs A4C: 7 18 cm  LVPWd: 0 71 cm  MA PSSL Full: -11 56 %  MAS PSSL Full: -16 13 %  MI PSSL Full: -17 81 %  ML PSSL Full: -7 11 %  MP PSSL Full: -17 92 %  MS PSSL Full: -16 59 %  Peak SL Dispersion Full: 44 01 ms  RA Area: 10 34 cm2  RVIDd: 2 87 cm  SV MOD A4C: 44 79 ml  SV(Teich): 33 19 ml    CW  TR Vmax: 2 55 m/s  TR maxP 91 mmHg    MM  TAPSE: 2 11 cm    PW  E' Sept: 0 05 m/s  E/E' Sept: 9 76  MV A Sadiq: 0 83 m/s  MV Dec [place]: 1 81 m/s2  MV DecT: 262 64 ms  MV E Sadiq: 0 48 m/s  MV E/A Ratio: 0 57  MV PHT: 76 17 ms  MVA By PHT: 2 89 cm2    [employer] Accredited Echocardiography Laboratory    Prepared and electronically signed by    Kvng Durham MD  Signed 17-Feb-2021 17:21:52    No results found for this or any previous visit  No results found for this or any previous visit  No results found for this or any previous visit  DXA bone density spine hip and pelvis  Narrative: DXA SCAN    CLINICAL HISTORY:  23-year-old postmenopausal female  OTHER RISK FACTORS:  Takes PPIs and Arimidex  PHARMACOLOGIC THERAPY FOR OSTEOPOROSIS:  None currently  TECHNIQUE: Bone densitometry was performed using a [employer]   bone densitometer  Regions of interest appear properly placed  COMPARISON: 3/25/2019  RESULTS:     LUMBAR SPINE L1-L3 (L4 vertebra excluded from analysis due to local structural abnormalities or artifact): BMD  0 866  gm/cm2   T-score -1 4     LEFT  TOTAL HIP:   BMD:  0 816  gm/cm2   T-score:  -1 0    LEFT  FEMORAL NECK:   BMD:  0 634  gm/cm2   T score: -1 9   Impression: 1  Low bone mass (osteopenia)  2   Since a DXA study from 3/25/2019, there has been:  A  STATISTICALLY SIGNIFICANT DECREASE in bone mineral density of  0 056 g/cm2 (6 0)% in the lumbar spine  3   The 10 year risk of hip fracture is 3 4% with the 10 year risk of major osteoporotic fracture being 13% as calculated by the [facility]/WHO fracture risk assessment tool (FRAX)  4   The current NOF guidelines recommend treating patients with a T-score of -2 5 or less in the lumbar spine or hips, or in post-menopausal women and men over the age of 48 with low bone mass (osteopenia) and a FRAX 10 year risk score of >3% for hip   fracture and/or >20% for major osteoporotic fracture  5   The NOF recommends follow-up DXA in 1-2 years after initiating therapy for osteoporosis and every 2 years thereafter   More frequent evaluation is appropriate for patients with conditions associated with rapid bone loss, such as glucocorticoid   therapy  The interval between DXA screenings may be longer for individuals without major risk factors and initial T-score in the normal or upper low bone mass range  The FRAX algorithm has certain limitations:  -FRAX has not been validated in patients currently or previously treated with pharmacotherapy for osteoporosis  In such patients, clinical judgment must be exercised in interpreting FRAX scores  -Prior hip, vertebral and humeral fragility fractures appear to confer greater risk of subsequent fracture than fractures at other sites (this is especially true for individuals with severe vertebral fractures), but quantification of this incremental   risk is not possible with FRAX  -FRAX underestimates fracture risk in patients with history of multiple fragility fractures  -FRAX may underestimate fracture risk in patients with history of frequent falls   -It is not appropriate to use FRAX to monitor treatment response  WHO CLASSIFICATION:  Normal (a T-score of -1 0 or higher)  Low bone mineral density (a T-score of less than -1 0 but higher than -2 5)  Osteoporosis (a T-score of -2 5 or less)  Severe osteoporosis (a T-score of -2 5 or less with a fragility fracture)    LEAST SIGNIFICANT CHANGE (AT 95% C  I):  Lumbar spine: 0 020 g/cm2 (2 2%)  Total hip: 0 033 g/cm2 (3 8%)  Forearm: 0 021 g/cm2 (3 3%)    Workstation performed: [id number]        *-*-*-*-*-*-*-*-*-*-*-*-*-*-*-*-*-*-*-*-*-*-*-*-*-*-*-*-*-*-*-*-*-*-*-*-*-*-*-*-*-*-*-*-*-*-*-*-*-*-*-*-*-*-  SIGNATURES:   [unfilled]   Gonzalo Carver MD; SURENDRA    *-*-*-*-*-*-*-*-*-*-*-*-*-*-*-*-*-*-*-*-*-*-*-*-*-*-*-*-*-*-*-*-*-*-*-*-*-*-*-*-*-*-*-*-*-*-*-*-*-*-*-*-*-*-  PAST MEDICAL HISTORY:  Past Medical History:   Diagnosis Date    Back pain     Brain cancer ([facility] [address] )     Breast cancer (HCC)     Chronic pain     Hypertension     Knee pain     Lymphedema of right arm     Vitamin B12 deficiency     PAST SURGICAL HISTORY:  Past Surgical History:   Procedure Laterality Date    ANKLE SURGERY      APPENDECTOMY      BREAST SURGERY      bilat mastectomy-right total mastectomy and prophylactic left total mastectomy-2005    CATARACT EXTRACTION      FRACTURE SURGERY      INSERTION CENTRAL VENOUS ACCESS DEVICE W/ SUBCUTANEOUS PORT      KNEE SURGERY      right knee replacement 2014 in [address] [place] [address] Bilateral     ORTHOPEDIC SURGERY           FAMILY HISTORY:  Family History   Problem Relation Age of Onset    Asthma Mother     Osteoarthritis Father     Bone cancer Paternal Aunt         bone cancer    SOCIAL HISTORY:  Social History     Tobacco Use   Smoking Status Never Smoker   Smokeless Tobacco Never Used   Tobacco Comment    no passive smoke exposure      Social History     Substance and Sexual Activity   Alcohol Use Never     Social History     Substance and Sexual Activity   Drug Use No    [id number]     *-*-*-*-*-*-*-*-*-*-*-*-*-*-*-*-*-*-*-*-*-*-*-*-*-*-*-*-*-*-*-*-*-*-*-*-*-*-*-*-*-*-*-*-*-*-*-*-*-*-*-*-*-*  ALLERGIES:  Allergies   Allergen Reactions    Penicillins Rash    CURRENT SCHEDULED MEDICATIONS:    Current Outpatient Medications:     acetaminophen (TYLENOL) 500 mg tablet, Take 2 tablets (1,000 mg total) by mouth every 8 (eight) hours as needed for mild pain, Disp: 30 tablet, Rfl: 0    albuterol (PROVENTIL HFA,VENTOLIN HFA) 90 mcg/act inhaler, Inhale 2 puffs every 6 (six) hours as needed for wheezing or shortness of breath, Disp: 18 g, Rfl: 2    anastrozole (ARIMIDEX) 1 mg tablet, Take 1 tablet (1 mg total) by mouth daily, Disp: 90 tablet, Rfl: 3    capecitabine (XELODA) 500 MG tablet, Take 3 tabs(1500 mg) in AM and 2 tabs (1000 mg) in PM for 14 days, then 7 days rest, Disp: 70 tablet, Rfl: 11    cholecalciferol (VITAMIN D3) 1,000 units tablet, Take 1 tablet (1,000 Units total) by mouth daily, Disp: 30 tablet, Rfl: 0    Cyanocobalamin 1000 MCG/ML KIT, Inject as directed every 6 (six) months, Disp: , Rfl:     doxycycline hyclate (VIBRAMYCIN) 100 mg capsule, Take 1 capsule (100 mg total) by mouth in the morning for 7 days, Disp: 7 capsule, Rfl: 0    famotidine (PEPCID) 10 mg tablet, Take 10 mg by mouth 2 (two) times a day as needed for heartburn, Disp: , Rfl:     Neratinib Maleate 40 MG TABS, Take 240 mg by mouth daily Start Week 1 take 3 tabs (120 mg) daily Week 2 take 4 tabs ( 160 Mg) daily Week 3 take 6 tabs (240) mg daily, Disp: 180 tablet, Rfl: 11    ondansetron (ZOFRAN) 8 mg tablet, Take 1 tablet (8 mg total) by mouth every 8 (eight) hours as needed for nausea or vomiting, Disp: 20 tablet, Rfl: 3    Elastic Bandages & Supports (Prolite Lumbar Support) MISC, Use daily as needed (back pain), Disp: 1 each, Rfl: 0    escitalopram (LEXAPRO) 5 mg tablet, Take 1 tablet (5 mg total) by mouth daily (Patient not taking: Reported on 12/8/2021 ), Disp: 30 tablet, Rfl: 5    esomeprazole (NexIUM) 40 MG capsule, TAKE 1 CAPSULE BY MOUTH ONCE DAILY (Patient not taking: Reported on 12/23/2021), Disp: 90 capsule, Rfl: 0    gabapentin (NEURONTIN) 300 mg capsule, Take 3 capsules (900 mg total) by mouth daily at bedtime (Patient not taking: Reported on 12/8/2021 ), Disp: 90 capsule, Rfl: 2    indapamide (LOZOL) 1 25 mg tablet, Take 1 tablet (1 25 mg total) by mouth every morning (Patient not taking: Reported on 12/23/2021 ), Disp: 30 tablet, Rfl: 1    lidocaine (LIDODERM) 5 %, Apply 1 patch topically daily Remove & Discard patch within 12 hours or as directed by MD (Patient not taking: Reported on 12/8/2021 ), Disp: 30 patch, Rfl: 1    lisinopril (ZESTRIL) 2 5 mg tablet, Take 1 tablet (2 5 mg total) by mouth daily, Disp: 30 tablet, Rfl: 6    megestrol (MEGACE) 400 mg/10 mL, Take 400 mg by mouth daily (Patient not taking: Reported on 12/8/2021 ), Disp: 600 mL, Rfl: 0    Misc  Devices (ILLUSIONS C BREAST PROSTHESIS) MISC, 1 breast prosthesis, Disp: 1 each, Rfl: 0    Misc   Devices (REFLECTIONS C BREAST PROSTHES) MISC, Dispense 1 breast prosthesis, Disp: 1 each, Rfl: 0    nebivolol (Bystolic) 5 mg tablet, Take 1 tablet (5 mg total) by mouth daily (Patient not taking: Reported on 12/23/2021 ), Disp: 90 tablet, Rfl: 3    ondansetron (Zofran) 4 mg tablet, Every 12 hours (Patient not taking: Reported on 12/23/2021 ), Disp: , Rfl:     pantoprazole (PROTONIX) 40 mg tablet, TAKE ONE TABLET BY MOUTH EVERY DAY (Patient not taking: Reported on 12/23/2021), Disp: 90 tablet, Rfl: 0     *-*-*-*-*-*-*-*-*-*-*-*-*-*-*-*-*-*-*-*-*-*-*-*-*-*-*-*-*-*-*-*-*-*-*-*-*-*-*-*-*-*-*-*-*-*-*-*-*-*-*-*-*-*

## 2022-02-08 ENCOUNTER — APPOINTMENT (OUTPATIENT)
Dept: LAB | Age: 77
End: 2022-02-08
Payer: MEDICARE

## 2022-02-08 DIAGNOSIS — Z17.0 MALIGNANT NEOPLASM OF OVERLAPPING SITES OF RIGHT BREAST IN FEMALE, ESTROGEN RECEPTOR POSITIVE (HCC): ICD-10-CM

## 2022-02-08 DIAGNOSIS — C50.811 MALIGNANT NEOPLASM OF OVERLAPPING SITES OF RIGHT BREAST IN FEMALE, ESTROGEN RECEPTOR POSITIVE (HCC): ICD-10-CM

## 2022-02-08 LAB
ALBUMIN SERPL BCP-MCNC: 3.2 G/DL (ref 3.5–5)
ALP SERPL-CCNC: 35 U/L (ref 46–116)
ALT SERPL W P-5'-P-CCNC: 14 U/L (ref 12–78)
ANION GAP SERPL CALCULATED.3IONS-SCNC: 4 MMOL/L (ref 4–13)
AST SERPL W P-5'-P-CCNC: 15 U/L (ref 5–45)
BASOPHILS # BLD AUTO: 0.02 THOUSANDS/ΜL (ref 0–0.1)
BASOPHILS NFR BLD AUTO: 0 % (ref 0–1)
BILIRUB SERPL-MCNC: 1.23 MG/DL (ref 0.2–1)
BUN SERPL-MCNC: 17 MG/DL (ref 5–25)
CALCIUM ALBUM COR SERPL-MCNC: 9.5 MG/DL (ref 8.3–10.1)
CALCIUM SERPL-MCNC: 8.9 MG/DL (ref 8.3–10.1)
CANCER AG27-29 SERPL-ACNC: 48.7 U/ML (ref 0–42.3)
CHLORIDE SERPL-SCNC: 106 MMOL/L (ref 100–108)
CO2 SERPL-SCNC: 28 MMOL/L (ref 21–32)
CREAT SERPL-MCNC: 0.93 MG/DL (ref 0.6–1.3)
EOSINOPHIL # BLD AUTO: 0.14 THOUSAND/ΜL (ref 0–0.61)
EOSINOPHIL NFR BLD AUTO: 3 % (ref 0–6)
ERYTHROCYTE [DISTWIDTH] IN BLOOD BY AUTOMATED COUNT: 20.4 % (ref 11.6–15.1)
GFR SERPL CREATININE-BSD FRML MDRD: 59 ML/MIN/1.73SQ M
GLUCOSE P FAST SERPL-MCNC: 95 MG/DL (ref 65–99)
HCT VFR BLD AUTO: 36.4 % (ref 34.8–46.1)
HGB BLD-MCNC: 12.2 G/DL (ref 11.5–15.4)
IMM GRANULOCYTES # BLD AUTO: 0.02 THOUSAND/UL (ref 0–0.2)
IMM GRANULOCYTES NFR BLD AUTO: 0 % (ref 0–2)
LDH SERPL-CCNC: 183 U/L (ref 81–234)
LYMPHOCYTES # BLD AUTO: 1.37 THOUSANDS/ΜL (ref 0.6–4.47)
LYMPHOCYTES NFR BLD AUTO: 29 % (ref 14–44)
MAGNESIUM SERPL-MCNC: 1.9 MG/DL (ref 1.6–2.6)
MCH RBC QN AUTO: 34.6 PG (ref 26.8–34.3)
MCHC RBC AUTO-ENTMCNC: 33.5 G/DL (ref 31.4–37.4)
MCV RBC AUTO: 103 FL (ref 82–98)
MONOCYTES # BLD AUTO: 0.39 THOUSAND/ΜL (ref 0.17–1.22)
MONOCYTES NFR BLD AUTO: 8 % (ref 4–12)
NEUTROPHILS # BLD AUTO: 2.84 THOUSANDS/ΜL (ref 1.85–7.62)
NEUTS SEG NFR BLD AUTO: 60 % (ref 43–75)
NRBC BLD AUTO-RTO: 0 /100 WBCS
PLATELET # BLD AUTO: 215 THOUSANDS/UL (ref 149–390)
PMV BLD AUTO: 9.5 FL (ref 8.9–12.7)
POTASSIUM SERPL-SCNC: 4.2 MMOL/L (ref 3.5–5.3)
PROT SERPL-MCNC: 7.2 G/DL (ref 6.4–8.2)
RBC # BLD AUTO: 3.53 MILLION/UL (ref 3.81–5.12)
SODIUM SERPL-SCNC: 138 MMOL/L (ref 136–145)
WBC # BLD AUTO: 4.78 THOUSAND/UL (ref 4.31–10.16)

## 2022-02-08 PROCEDURE — 86300 IMMUNOASSAY TUMOR CA 15-3: CPT

## 2022-02-08 PROCEDURE — 85025 COMPLETE CBC W/AUTO DIFF WBC: CPT

## 2022-02-08 PROCEDURE — 36415 COLL VENOUS BLD VENIPUNCTURE: CPT

## 2022-02-08 PROCEDURE — 80053 COMPREHEN METABOLIC PANEL: CPT

## 2022-02-08 PROCEDURE — 83735 ASSAY OF MAGNESIUM: CPT

## 2022-02-08 PROCEDURE — 83615 LACTATE (LD) (LDH) ENZYME: CPT

## 2022-02-09 LAB — CANCER AG15-3 SERPL-ACNC: 39 U/ML (ref 0–25)

## 2022-02-11 ENCOUNTER — OFFICE VISIT (OUTPATIENT)
Dept: HEMATOLOGY ONCOLOGY | Facility: CLINIC | Age: 77
End: 2022-02-11
Payer: MEDICARE

## 2022-02-11 VITALS
RESPIRATION RATE: 16 BRPM | BODY MASS INDEX: 23.92 KG/M2 | DIASTOLIC BLOOD PRESSURE: 74 MMHG | WEIGHT: 130 LBS | SYSTOLIC BLOOD PRESSURE: 114 MMHG | OXYGEN SATURATION: 99 % | HEIGHT: 62 IN | HEART RATE: 88 BPM | TEMPERATURE: 96.7 F

## 2022-02-11 DIAGNOSIS — Z79.811 USE OF AROMATASE INHIBITORS: ICD-10-CM

## 2022-02-11 DIAGNOSIS — D75.89 MACROCYTOSIS: ICD-10-CM

## 2022-02-11 DIAGNOSIS — C79.31 BRAIN METASTASIS (HCC): ICD-10-CM

## 2022-02-11 DIAGNOSIS — D53.9 NUTRITIONAL ANEMIA: ICD-10-CM

## 2022-02-11 DIAGNOSIS — C50.811 MALIGNANT NEOPLASM OF OVERLAPPING SITES OF RIGHT BREAST IN FEMALE, ESTROGEN RECEPTOR POSITIVE (HCC): Primary | ICD-10-CM

## 2022-02-11 DIAGNOSIS — Z17.0 MALIGNANT NEOPLASM OF OVERLAPPING SITES OF RIGHT BREAST IN FEMALE, ESTROGEN RECEPTOR POSITIVE (HCC): Primary | ICD-10-CM

## 2022-02-11 PROCEDURE — 99214 OFFICE O/P EST MOD 30 MIN: CPT | Performed by: NURSE PRACTITIONER

## 2022-02-11 NOTE — PATIENT INSTRUCTIONS
Take one extra week of a break to recover  Neratinib decrease to 5 pills a day (200mg)  Xeloda decrease to 2 pills (1000mg) 2x per day- 1 week on and 1 week off

## 2022-02-11 NOTE — PROGRESS NOTES
Hematology/Oncology Outpatient Follow-up  Angel Bradford 69 y o  female 1945 [id number]    Date:  2/11/2022      Assessment and Plan:  1  Malignant neoplasm of overlapping 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, estrogen receptor positive ([facility] )  Patient reports that she has not been tolerating her current regimen well with reports of weakness, stomach aches and diarrhea  She is currently in her week off from her Xeloda and states she felt significantly better after holding her neratinib for a few days  Did discuss above with Dr Annetta Fisher who recommended giving patient an extra week of a break to completely recover  She will resume her treatment next week with dose reduction  Neratinib will be decreased to 5 pills daily (200 mg) and her Xeloda will be decreased to 1000 mg twice a day 7 days on with 7 days of a break  The patient and her sister verbalized understanding of the new dose instructions  Did encourage them to contact us if she continues to do poorly on the new dosing schedule  We will arrange another follow-up appointment with repeat labs in about 3 weeks from now  She will continue to take her anastrozole on a daily basis  I did encourage her to hydrate herself well and try to consume small amounts of nutrition throughout the day every few hours  Did offer to resume additional IV hydration in the infusion center which she agreed to  She would like to start with twice a week hydration which can be adjusted accordingly     - CBC and differential; Future  - Comprehensive metabolic panel; Future  - Magnesium; Future    2  Brain metastasis ([facility] )  Patient was treated with SRS to her recurrent/new lesions 12/16/2021  She will continue to follow-up with neurosurgery and radiation oncology  Is scheduled for repeat MRI of the brain next week  3  Use of aromatase inhibitors  Patient's most recent DEXA scan continues to show osteopenia  She will continue her Prolia injections on an every six-month basis  Her next DEXA scan will be due January 2024     4  Macrocytosis  Could be treatment related effect with Xeloda  Will order additional workup before her next office visit to rule out other etiologies  - CBC and differential; Future  - Comprehensive metabolic panel; Future  - Vitamin B12; Future  - Folate; Future  - Iron Panel (Includes Ferritin, Iron Sat%, Iron, and TIBC); Future  - Ferritin; Future  - TSH, 3rd generation with Free T4 reflex; Future    HPI:  Patient presents today for a follow-up visit accompanied by her sister who assited with translation  She reports that she has not been tolerating her new treatment  Has been having diarrhea, stomach aches and significant weakness which is affecting her quality of life  Her appetite continues to be poor and she is eating small amounts of nutrition throughout the day  Has been taking Xeloda 1500 mg in the a m  and 1000 mg in the p m  2 weeks on with 1 week of a break along with the neratinib 240 mg daily  She is currently in her week off from her Xeloda and she placed her neratinib on hold on her own earlier this week Sunday or Monday  Reports that she started to feel significantly better after stopping the neratinib with improvement of her fatigue and resolution of her GI issues  Her most recent laboratory studies from 02/08/2022 showed normal white cells and platelets, she is not anemic H&H 12 2/36 4, MCV is elevated 103  Albumin continues to be decreased 3 2, total bili slightly above average 1 23 remaining metabolic panel is appropriate for patient    No significant change in her tumor markers CA 15 3 is just slightly increased      Component      Latest Ref Rng & Units 3/11/2021 10/25/2021 1/18/2022 2/8/2022   CA 15-3      0 0 - 25 0 U/mL 24 7 36 7 (H) 35 9 (H) 39 0 (H)     Component      Latest Ref Rng & Units 3/11/2021 10/25/2021 1/18/2022 2/8/2022   CA 27 29      0 0 - 42 3 U/mL 38 2 51 6 (H) 48 5 (H) 48 7 (H)     Oncology History Overview Note   The patient has a remote history of right breast cancer diagnosed in [facility] in February of 2005 with infiltrating ductal carcinoma grade 2 status post right total mastectomy and left prophylactic total mastectomy as well at that time  Her pathology revealed 8/17 positive lymph node for metastatic disease from the right axilla  ER status positive at 5-10%, progesterone negative, her 2 Gregory positive by FISH  She was then treated with 6 cycles of adjuvant chemotherapy with 5 fluorouracil, Adriamycin, and cyclophosphamide followed by tamoxifen and adjuvant radiation to the right axilla and chest wall  The patient was then switched from tamoxifen to Arimidex which was then stopped in 2012  She was then diagnosed with recurrence of her breast cancer with metastatic disease mainly involving the right anterior chest wall, skin, right axilla pectoralis muscle, left axilla and other areas of the body in May 2015  The biopsy from the scan showed triple positive malignant process compatible with recurrence of her breast cancer  She was then started on palliative chemotherapy and received 6 cycles of Taxotere along with trastuzumab and pertuzumab  After 6 cycles she was continued mainly on trastuzumab and pertuzumab alone  The patient then went to [facility] which she continued her oncological care with trastuzumab, pertuzumab and anastrozole  That treatment had to be put on hold after she developed decline of her ejection fraction to about 23% around June 2017  A PET scan in July of 2017 is showed mild progression of her disease and for that reason low-dose weekly Taxol was started on the 18th July 2017  The frequency of Taxol was decreased to 1 treatment every other week  She then received the rest of her treatment and live unknown from September 2017 until March 2018  Her echocardiogram in May 2018 showed ejection fraction of 43%    The patient then went to live alone again in July of 2018 where she continued her Taxol treatment on every other week basis until the middle of January 2019  She had CT scan of the head on March 18, 2020 an MRI of the brain March 20, 2020 which confirmed 4 discrete intracranial metastasis with the largest measuring 24 mm in size in the left occipital lobe with some associated edema  She had no neurologic symptoms  We discussed that she has multiple brain metastasis and we recommended whole-brain radiation therapy  She completed whole brain radiation therapy in April 10, 2020  She had SRS to left occipital region of the brain on 12/9/2020     Repeat MRI of the brain on May 22, 2020 showed a good response with reduction in all 4 for brain metastasis and no new lesions  She had a repeat PET-CT study March 27, 2020 that revealed Increasing size of metastatic right axillary node and nonspecific increased activity in the lower spinal canal at the T12-L1 level  There was no CT correlate on the PET CT scan images and she was having no lower back pain at the time of her appointment on May 28, 2020  She has developed lumbar spine pain and had MRI lumbar spine August 24, 2020 that revealed degenerative changes with severe central and bilateral lateral recess stenosis with moderate bilateral foraminal narrowing  She saw neurosurgery/Dr Shayy Hewitt September 11, 2020 who recommended referral to pain management and no surgical intervention  After about 6 month interruption care  patient followed up with repeat imaging 11/2021-  The PET scan on 11/09/2021 showed;  IMPRESSION:  1  Findings concerning for disease progression  2   More intensely FDG avid enlarging right axillary lymph node suspicious for progression  3   More prominent subcutaneous focus at the left anterior chest suspicious for metastasis and progression  4  Stable focus of FDG uptake at the left adrenal gland of uncertain significance  Again no obvious findings on low-dose CT      She also had an MRI of the brain on 11/17/2021 which showed:  IMPRESSION:  1   Stable size with interval change in enhancement characteristics of SRS treated lateral left occipital metastatic lesion, now with peripheral enhancement and central necrosis  There is new 3 mm enhancing nodule along the superior margin of treated   lesion concerning for recurrent /worsening tumor  Slight worsening perilesional edema  2   Interval enlargement of right parietal and anteroinferior right temporal lobe lesions with slight worsening of perilesional edema  Her echocardiogram from 11/17/2021 showed:    Left Ventricle: Left ventricular cavity size is normal  Wall thickness is normal  The left ventricular ejection fraction is 31% by single dimension measurement  Systolic function is severely reduced  Global longitudinal strain is reduced at -10%  There is severe global hypokinesis  Diastolic function is mildly abnormal, consistent with grade I (abnormal) relaxation  Left atrial filling pressure is elevated    Right Ventricle: Systolic function is mildly reduced    Aortic Valve: The aortic valve is trileaflet  The leaflets are moderately thickened  There is mild regurgitation    Mitral Valve: There is moderate regurgitation    Tricuspid Valve: There is mild regurgitation    Pulmonic Valve: There is mild regurgitation    Pulmonary Artery: The estimated pulmonary artery systolic pressure is 88 2 mmHg  The pulmonary artery systolic pressure is normal      Malignant neoplasm of overlapping 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, estrogen receptor positive ([facility] )   2015 -  Hormone Therapy    Anastrozole     5/28/2015 Initial Diagnosis    Metastatic breast cancer (HCC)  (recurrence)     6/30/2015 -  Chemotherapy    1-Taxotere, Herceptin, Perjeta started in [place] 2015 x6 cycles  2-Herceptin and Perjeta alone were continued since the patient was not interested in continue the Taxotere    3-Herceptin and Perjeta were put on hold due to decline of her ejection fraction around May 2017    4-low dose weekly Taxol was started in July 2017  5-Taxol was switched to every other week basis     4/14/2019 - 6/9/2019 Chemotherapy    PACLitaxel (TAXOL) 144 6 mg in sodium chloride 0 9 % 250 mL chemo IVPB, 80 mg/m2, Intravenous, Once, 2 of 6 cycles     12/4/2019 - 12/31/2019 Chemotherapy    PACLitaxel (TAXOL) 142 2 mg in sodium chloride 0 9 % 250 mL chemo IVPB, 80 mg/m2 = 142 2 mg, Intravenous, Once, 1 of 6 cycles  Administration: 142 2 mg (12/4/2019), 142 2 mg (12/18/2019)     1/8/2020 - 3/10/2020 Chemotherapy    PACLitaxel (TAXOL) 141 6 mg in sodium chloride 0 9 % 250 mL chemo IVPB, 80 mg/m2 = 141 6 mg, Intravenous, Once, 4 of 6 cycles  Administration: 141 6 mg (1/8/2020), 141 6 mg (1/29/2020), 141 6 mg (2/19/2020)  trastuzumab (HERCEPTIN) 600 mg in sodium chloride 0 9 % 250 mL chemo infusion, 609 mg, Intravenous, Once, 4 of 6 cycles  Administration: 600 mg (1/8/2020), 450 mg (1/29/2020), 450 mg (2/19/2020)      Chemotherapy    PACLitaxel (TAXOL) 144 6 mg in sodium chloride 0 9 % 250 mL chemo IVPB, 80 mg/m2, Intravenous, Once, 2 of 6 cycles    PACLitaxel (TAXOL) 142 2 mg in sodium chloride 0 9 % 250 mL chemo IVPB, 80 mg/m2 = 142 2 mg, Intravenous, Once, 1 of 6 cycles    Administration: 142 2 mg (12/4/2019), 142 2 mg (12/18/2019)    PACLitaxel (TAXOL) 141 6 mg in sodium chloride 0 9 % 250 mL chemo IVPB, 80 mg/m2, Intravenous, Once, 0 of 12 cycles        trastuzumab (HERCEPTIN) 304 mg in sodium chloride 0 9 % 250 mL chemo infusion, 4 mg/kg, Intravenous, Once, 0 of 26 cycles    PACLitaxel (TAXOL) 141 6 mg in sodium chloride 0 9 % 250 mL chemo IVPB, 80 mg/m2 = 141 6 mg, Intravenous, Once, 4 of 6 cycles    Administration: 141 6 mg (1/8/2020), 141 6 mg (1/29/2020), 141 6 mg (2/19/2020)        trastuzumab (HERCEPTIN) 600 mg in sodium chloride 0 9 % 250 mL chemo infusion, 609 mg, Intravenous, Once, 4 of 6 cycles    Administration: 600 mg (1/8/2020), 450 mg (1/29/2020), 450 mg (2/19/2020) Chemotherapy    PACLitaxel (TAXOL) 144 6 mg in sodium chloride 0 9 % 250 mL chemo IVPB, 80 mg/m2, Intravenous, Once, 2 of 6 cycles    PACLitaxel (TAXOL) 142 2 mg in sodium chloride 0 9 % 250 mL chemo IVPB, 80 mg/m2 = 142 2 mg, Intravenous, Once, 1 of 6 cycles    Administration: 142 2 mg (12/4/2019), 142 2 mg (12/18/2019)    PACLitaxel (TAXOL) 141 6 mg in sodium chloride 0 9 % 250 mL chemo IVPB, 80 mg/m2, Intravenous, Once, 0 of 12 cycles        trastuzumab (HERCEPTIN) 304 mg in sodium chloride 0 9 % 250 mL chemo infusion, 4 mg/kg, Intravenous, Once, 0 of 26 cycles    PACLitaxel (TAXOL) 141 6 mg in sodium chloride 0 9 % 250 mL chemo IVPB, 80 mg/m2 = 141 6 mg, Intravenous, Once, 4 of 6 cycles    Administration: 141 6 mg (1/8/2020), 141 6 mg (1/29/2020), 141 6 mg (2/19/2020)        trastuzumab (HERCEPTIN) 600 mg in sodium chloride 0 9 % 250 mL chemo infusion, 609 mg, Intravenous, Once, 4 of 6 cycles    Administration: 600 mg (1/8/2020), 450 mg (1/29/2020), 450 mg (2/19/2020)      Lapatinib 1500 mg once a day was started on 04/20/2020 after she was found to have metastatic disease to the brain, status post whole brain radiation  12/2021 -  Chemotherapy    Started Xeloda (750 mg/m2 b I d  2 weeks on with 1 week of a break) patient's dose is 1500 mg a med and 1000 mg p m  + Neratinib with dose escalation weekly to goal of 240 mg daily (starting week 3)       Brain metastases ([facility] )   3/30/2020 - 4/10/2020 Radiation    Plan ID Energy Fractions Dose per Fraction (cGy) Dose Correction (cGy) Total Dose Delivered (cGy) Elapsed Days   Whole Brai # 6X 10 / 10 300 0 3,000 11          4/9/2020 Initial Diagnosis    Brain metastasis ([facility] )     12/9/2020 - 12/9/2020 Radiation    Plan ID Energy Fractions Dose per Fraction (cGy) Dose Correction (cGy) Total Dose Delivered (cGy) Elapsed Days   [id number] 6X 1 / 1 1,800 0 1,800 0      Treatment Dates:  12/9/2020 - 12/9/2020              Interval history:    ROS: Review of Systems Constitutional: Positive for appetite change and fatigue  Negative for activity change, chills, fever and unexpected weight change  HENT: Negative for congestion, mouth sores, nosebleeds, sore throat and trouble swallowing  Eyes: Negative  Respiratory: Negative for cough, chest tightness and shortness of breath  Cardiovascular: Negative for chest pain, palpitations and leg swelling  Gastrointestinal: Positive for diarrhea  Negative for abdominal distention, abdominal pain, blood in stool, constipation, nausea and vomiting  Genitourinary: Negative for difficulty urinating, dysuria, frequency, hematuria and urgency  Musculoskeletal: Positive for back pain and gait problem  Negative for arthralgias, joint swelling and myalgias  Skin: Negative for color change, pallor and rash  Neurological: Positive for weakness, numbness and headaches  Negative for dizziness and light-headedness  Hematological: Negative for adenopathy  Does not bruise/bleed easily  Psychiatric/Behavioral: Negative for dysphoric mood and sleep disturbance  The patient is not nervous/anxious          Past Medical History:   Diagnosis Date    Back pain     [facility]     Breast cancer ([facility] )     Chronic pain     Hypertension     Knee pain     Lymphedema of right arm     Vitamin B12 deficiency        Past Surgical History:   Procedure Laterality Date    ANKLE SURGERY      APPENDECTOMY      BREAST SURGERY      bilat mastectomy-right total mastectomy and prophylactic left total mastectomy-2005    CATARACT EXTRACTION      FRACTURE SURGERY      INSERTION CENTRAL VENOUS ACCESS DEVICE W/ SUBCUTANEOUS PORT      KNEE SURGERY      right knee replacement 2014 [facility]    MASTECTOMY Bilateral     ORTHOPEDIC SURGERY         Social History     Socioeconomic History    Marital status: Single     Spouse name: None    Number of children: 0    Years of education: None    Highest education level: None   Occupational History    None   Tobacco Use    Smoking status: Never Smoker    Smokeless tobacco: Never Used    Tobacco comment: no passive smoke exposure   Vaping Use    Vaping Use: Never used   Substance and Sexual Activity    Alcohol use: Never    Drug use: No    Sexual activity: Not Currently   Other Topics Concern    None   Social History Narrative    None     Social Determinants of Health     Financial Resource Strain: Not on file   Food Insecurity: Not on file   Transportation Needs: Not on file   Physical Activity: Not on file   Stress: Not on file   Social Connections: Not on file   Intimate Partner Violence: Not on file   Housing Stability: Not on file       Family History   Problem Relation Age of Onset    Asthma Mother     Osteoarthritis Father     Bone cancer Paternal Aunt         bone cancer       Allergies   Allergen Reactions    Penicillins Rash         Current Outpatient Medications:     acetaminophen (TYLENOL) 500 mg tablet, Take 2 tablets (1,000 mg total) by mouth every 8 (eight) hours as needed for mild pain, Disp: 30 tablet, Rfl: 0    albuterol (PROVENTIL HFA,VENTOLIN HFA) 90 mcg/act inhaler, Inhale 2 puffs every 6 (six) hours as needed for wheezing or shortness of breath, Disp: 18 g, Rfl: 2    anastrozole (ARIMIDEX) 1 mg tablet, Take 1 tablet (1 mg total) by mouth daily, Disp: 90 tablet, Rfl: 3    capecitabine (XELODA) 500 MG tablet, Take 3 tabs(1500 mg) in AM and 2 tabs (1000 mg) in PM for 14 days, then 7 days rest, Disp: 70 tablet, Rfl: 11    cholecalciferol (VITAMIN D3) 1,000 units tablet, Take 1 tablet (1,000 Units total) by mouth daily, Disp: 30 tablet, Rfl: 0    Cyanocobalamin 1000 MCG/ML KIT, Inject as directed every 6 (six) months, Disp: , Rfl:     Elastic Bandages & Supports (Prolite Lumbar Support) MISC, Use daily as needed (back pain), Disp: 1 each, Rfl: 0    famotidine (PEPCID) 10 mg tablet, Take 10 mg by mouth 2 (two) times a day as needed for heartburn, Disp: , Rfl:    lisinopril (ZESTRIL) 2 5 mg tablet, Take 1 tablet (2 5 mg total) by mouth daily, Disp: 30 tablet, Rfl: 6    Misc  Devices (ILLUSIONS C BREAST PROSTHESIS) MISC, 1 breast prosthesis, Disp: 1 each, Rfl: 0    Misc   Devices (REFLECTIONS C BREAST PROSTHES) MISC, Dispense 1 breast prosthesis, Disp: 1 each, Rfl: 0    Neratinib Maleate 40 MG TABS, Take 240 mg by mouth daily Start Week 1 take 3 tabs (120 mg) daily Week 2 take 4 tabs ( 160 Mg) daily Week 3 take 6 tabs (240) mg daily, Disp: 180 tablet, Rfl: 11    ondansetron (ZOFRAN) 8 mg tablet, Take 1 tablet (8 mg total) by mouth every 8 (eight) hours as needed for nausea or vomiting, Disp: 20 tablet, Rfl: 3    escitalopram (LEXAPRO) 5 mg tablet, Take 1 tablet (5 mg total) by mouth daily (Patient not taking: Reported on 12/8/2021 ), Disp: 30 tablet, Rfl: 5    esomeprazole (NexIUM) 40 MG capsule, TAKE 1 CAPSULE BY MOUTH ONCE DAILY (Patient not taking: Reported on 12/23/2021), Disp: 90 capsule, Rfl: 0    gabapentin (NEURONTIN) 300 mg capsule, Take 3 capsules (900 mg total) by mouth daily at bedtime (Patient not taking: Reported on 12/8/2021 ), Disp: 90 capsule, Rfl: 2    indapamide (LOZOL) 1 25 mg tablet, Take 1 tablet (1 25 mg total) by mouth every morning (Patient not taking: Reported on 12/23/2021 ), Disp: 30 tablet, Rfl: 1    lidocaine (LIDODERM) 5 %, Apply 1 patch topically daily Remove & Discard patch within 12 hours or as directed by MD (Patient not taking: Reported on 12/8/2021 ), Disp: 30 patch, Rfl: 1    megestrol (MEGACE) 400 mg/10 mL, Take 400 mg by mouth daily (Patient not taking: Reported on 12/8/2021 ), Disp: 600 mL, Rfl: 0    nebivolol (Bystolic) 5 mg tablet, Take 1 tablet (5 mg total) by mouth daily (Patient not taking: Reported on 12/23/2021 ), Disp: 90 tablet, Rfl: 3    ondansetron (Zofran) 4 mg tablet, Every 12 hours (Patient not taking: Reported on 12/23/2021 ), Disp: , Rfl:     pantoprazole (PROTONIX) 40 mg tablet, TAKE ONE TABLET BY MOUTH EVERY DAY (Patient not taking: Reported on 12/23/2021), Disp: 90 tablet, Rfl: 0      Physical Exam:  /74 (BP Location: Left arm, Patient Position: Sitting, Cuff Size: Adult)   Pulse 88   Temp (!) 96 7 °F (35 9 °C) (Tympanic)   Resp 16   Ht 5' 2" (1 575 m)   Wt 59 kg (130 lb)   SpO2 99%   BMI 23 78 kg/m²     Physical Exam  Vitals reviewed  Constitutional:       General: She is not in acute distress  Appearance: She is well-developed  She is ill-appearing  She is not diaphoretic  HENT:      Head: Normocephalic and atraumatic  Eyes:      General: No scleral icterus  Conjunctiva/sclera: Conjunctivae normal       Pupils: Pupils are equal, round, and reactive to light  Neck:      Thyroid: No thyromegaly  Cardiovascular:      Rate and Rhythm: Normal rate and regular rhythm  Heart sounds: Normal heart sounds  No murmur heard  Pulmonary:      Effort: Pulmonary effort is normal  No respiratory distress  Breath sounds: Normal breath sounds  Chest:   Breasts:      Right: No axillary adenopathy  Left: No axillary adenopathy  Comments:  Bilateral total mastectomy  Abdominal:      General: There is no distension  Palpations: Abdomen is soft  There is no hepatomegaly or splenomegaly  Tenderness: There is no abdominal tenderness  Musculoskeletal:         General: Swelling ( lymphedema right upper extremity) present  Normal range of motion  Cervical back: Normal range of motion and neck supple  Lymphadenopathy:      Cervical: No cervical adenopathy  Upper Body:      Right upper body: No axillary adenopathy  Left upper body: No axillary adenopathy  Skin:     General: Skin is warm and dry  Coloration: Skin is pale  Findings: No erythema or rash  Neurological:      General: No focal deficit present  Mental Status: She is alert and oriented to person, place, and time     Psychiatric:         Mood and Affect: Mood and affect normal  Behavior: Behavior normal  Behavior is cooperative  Thought Content: Thought content normal          Judgment: Judgment normal            Labs:  Lab Results   Component Value Date    WBC 4 78 02/08/2022    HGB 12 2 02/08/2022    HCT 36 4 02/08/2022     (H) 02/08/2022     02/08/2022     Lab Results   Component Value Date     06/18/2018    K 4 2 02/08/2022     02/08/2022    CO2 28 02/08/2022    ANIONGAP 8 06/18/2018    BUN 17 02/08/2022    CREATININE 0 93 02/08/2022    GLUF 95 02/08/2022    CALCIUM 8 9 02/08/2022    CORRECTEDCA 9 5 02/08/2022    AST 15 02/08/2022    ALT 14 02/08/2022    ALKPHOS 35 (L) 02/08/2022    PROT 7 1 06/18/2018    BILITOT 0 3 06/18/2018    EGFR 59 02/08/2022       Patient voiced understanding and agreement in the above discussion  Aware to contact our office with questions/symptoms in the interim  This note has been generated by voice recognition software system  Therefore, there may be spelling, grammar, and or syntax errors  Please contact if questions arise

## 2022-02-15 ENCOUNTER — HOSPITAL ENCOUNTER (OUTPATIENT)
Dept: INFUSION CENTER | Facility: HOSPITAL | Age: 77
Discharge: HOME | End: 2022-02-15
Payer: MEDICARE

## 2022-02-15 ENCOUNTER — HOSPITAL ENCOUNTER (OUTPATIENT)
Dept: MRI IMAGING | Facility: HOSPITAL | Age: 77
Discharge: HOME | End: 2022-02-15
Payer: MEDICARE

## 2022-02-15 VITALS
RESPIRATION RATE: 18 BRPM | SYSTOLIC BLOOD PRESSURE: 136 MMHG | HEART RATE: 77 BPM | TEMPERATURE: 98.2 F | DIASTOLIC BLOOD PRESSURE: 75 MMHG

## 2022-02-15 DIAGNOSIS — E86.0 DEHYDRATION: Primary | ICD-10-CM

## 2022-02-15 DIAGNOSIS — C79.31 BRAIN METASTASIS (HCC): ICD-10-CM

## 2022-02-15 PROCEDURE — 96360 HYDRATION IV INFUSION INIT: CPT

## 2022-02-15 PROCEDURE — A9585 GADOBUTROL INJECTION: HCPCS | Performed by: RADIOLOGY

## 2022-02-15 PROCEDURE — G1004 CDSM NDSC: HCPCS

## 2022-02-15 PROCEDURE — 70553 MRI BRAIN STEM W/O & W/DYE: CPT

## 2022-02-15 RX ADMIN — SODIUM CHLORIDE 1000 ML: 0.9 INJECTION, SOLUTION INTRAVENOUS at 12:22

## 2022-02-15 RX ADMIN — GADOBUTROL 6 ML: 604.72 INJECTION INTRAVENOUS at 11:10

## 2022-02-16 ENCOUNTER — OFFICE VISIT (OUTPATIENT)
Dept: NEUROSURGERY | Facility: CLINIC | Age: 77
End: 2022-02-16

## 2022-02-16 ENCOUNTER — RADIATION ONCOLOGY FOLLOW-UP (OUTPATIENT)
Dept: RADIATION ONCOLOGY | Facility: HOSPITAL | Age: 77
End: 2022-02-16
Attending: RADIOLOGY
Payer: MEDICARE

## 2022-02-16 VITALS
BODY MASS INDEX: 23.89 KG/M2 | TEMPERATURE: 98.1 F | BODY MASS INDEX: 23.89 KG/M2 | OXYGEN SATURATION: 100 % | OXYGEN SATURATION: 100 % | HEIGHT: 62 IN | WEIGHT: 129.8 LBS | HEART RATE: 87 BPM | DIASTOLIC BLOOD PRESSURE: 86 MMHG | WEIGHT: 129.8 LBS | HEART RATE: 87 BPM | RESPIRATION RATE: 18 BRPM | SYSTOLIC BLOOD PRESSURE: 142 MMHG | TEMPERATURE: 98.1 F | RESPIRATION RATE: 18 BRPM | HEIGHT: 62 IN | SYSTOLIC BLOOD PRESSURE: 142 MMHG | DIASTOLIC BLOOD PRESSURE: 86 MMHG

## 2022-02-16 DIAGNOSIS — C50.919 METASTATIC BREAST CANCER (HCC): ICD-10-CM

## 2022-02-16 DIAGNOSIS — C79.31 BRAIN METASTASES (HCC): Primary | ICD-10-CM

## 2022-02-16 PROCEDURE — 99024 POSTOP FOLLOW-UP VISIT: CPT | Performed by: NEUROLOGICAL SURGERY

## 2022-02-16 PROCEDURE — 99024 POSTOP FOLLOW-UP VISIT: CPT | Performed by: RADIOLOGY

## 2022-02-16 PROCEDURE — 99211 OFF/OP EST MAY X REQ PHY/QHP: CPT | Performed by: RADIOLOGY

## 2022-02-16 NOTE — PROGRESS NOTES
Follow-up - Radiation Oncology   Kobe Yu 1945 69 y o  female [id number]      History of Present Illness   Cancer Staging  No matching staging information was found for the patient  Interval History:  Kobe Yu is a 77 year old female with a history of a right breast infiltrating ductal carcinoma grade 2 diagnosed in February of 2005 status post right mastectomy with left prophylactic mastectomy  Pathology confirmed 8/17 positive axillary lymph nodes from the right axilla   Estrogen receptors positive with negative progesterone receptors and Her 2 Gregory was also positive   She was treated with 6 cycles chemotherapy followed by tamoxifen and adjuvant radiation therapy to the right axilla and chest wall regions in [place] [id number] tamoxifen was switched to Arimidex that she continued up until 2012      She was diagnosed with local chest wall recurrence on the right side with metastatic disease involving the right axilla, pectoralis muscle, left axilla that was confirmed with biopsy admitted 2015   She was started on palliative chemotherapy and had a good response and then has been on maintenance treatment since that time  Lachokey Delong has had to have a hold on her therapy at times due to low ejection fraction   Her PET-CT scan December 16, 2019 showed increase uptake in an enlarging right axillary lymph node with some mild increased uptake in the right perihilar region and a new area in the left adrenal gland that was suspicious   She had right axillary biopsy December 26, 2019 that confirmed invasive ductal carcinoma   Her treatment was changed to Herceptin and Taxol every 3 weeks  Christian Mendenhallher treatment had to be held on March 16, 2020 due to reduced ejection fraction          She had CT scan of the head on March 18, 2020 an MRI of the brain March 20, 2020 which confirmed 4 discrete intracranial metastasis with the largest measuring 24 mm in size in the left occipital lobe with some associated edema  Xavi Kilgore neurologic symptoms   We discussed that she has multiple brain metastasis and we recommended whole-brain radiation therapy   She completed whole brain radiation therapy in April 10, 2020  She had SRS to left occipital region of the brain on 12/9/2020     Repeat MRI of the brain on May 22, 2020 showed a good response with reduction in all 4 for brain metastasis and no new lesions   She had a repeat PET-CT study March 27, 2020 that revealed Increasing size of metastatic right axillary node and nonspecific increased activity in the lower spinal canal at the T12-L1 level   There was no CT correlate on the PET CT scan images and she was having no lower back pain at the time of her appointment on May 28, 2020  She has developed lumbar spine pain and had MRI lumbar spine August 24, 2020 that revealed degenerative changes with severe central and bilateral lateral recess stenosis with moderate bilateral foraminal narrowing   She saw neurosurgery/Dr Alethea Seals September 11, 2020 who recommended referral to pain management and no surgical intervention   Repeat MRI of the brain August 24, 2020 revealed the 2 lesions on the right side had decreased in size since May   There is a stable left occipital lobe mass with increased FLAIR abnormality and a new 4 mm lesion in the left occipital lobe immediately superior to the dominant occipital lobe mass with increasing FLAIR  Increasing FLAIR signal a left occipital lobe lesion could be due to post treatment changes but it is unclear if there is any residual viable tumor at this site     She was last seen in neuro-oncology clinic on 12/8/21  MRI images  on 11/17/21 reveals two lesions in the right parietal and right temporal region which have increased in size causing local edema   She is no significant symptoms related to this  She completed SRS to right parietal and right temporal area on 12/15/21   She returns today for follow up       1/21/22 Hem/Onc - Dr Henry Pete 1500 mg in am and 1000 mg in pm - 2 weeks on and 1 week off along with Neratinib 240 mg daily  Advised imodium on regular basis for diarrhea  Offer IV hydration as needed to avoid dehydration  Continue anastrozole 1 mg daily  Treated for UTI with doxycycline 100 mg daily for 7 days     2/11/22 Hem/Onc - Dr Niya Madrigal  Difficulty tolerating treatment related to weakness, stomach aches and diarrhea  Feeling better after holding Neratinib for a few days  Recommendation is to resume treatment with dose reduction  Neratinib decreased to 200 mg daily along with Xeloda to be decreased to 1000 mg twice a day 7 days on  with 7 day break  Continue anastrozole             Component CA 27 29   Latest Ref Rng & Units 0 0 - 42 3 U/mL   10/25/2021 51 6 (H)   1/18/2022 48 5 (H)   2/8/2022 48 7 (H)      Component CA 15-3   Latest Ref Rng & Units 0 0 - 25 0 U/mL   3/11/2021 24 7   10/25/2021 36 7 (H)   1/18/2022 35 9 (H)   2/8/2022 39 0 (H)         2/15/22 MRI brain w wo contrast  IMPRESSION:   No acute abnormality   No new parenchymal enhancement    Findings suggestive of treatment response with interval decrease in the size of the right parietal and anterior temporal lobe lesions   The left occipital lobe lesion and the previously described 3 mm enhancing nodule along its superior border remains stable    Interval resolution of the FLAIR signal abnormality in the right anterior temporal lobe   Stable left occipital FLAIR signal abnormality    Stable posttreatment white matter changes in the periventricular and deep cerebral white matter         Patient is accompanied by her sister, Iwona Figueroa for today's visit         Upcoming:  3/4/22 Hem/Onc - Dr Fitz Carrera  6/7/22 Infusion        Historical Information   Oncology History   Malignant neoplasm of overlapping 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, estrogen receptor positive ([facility] [address] )   2015 -  Hormone Therapy    Anastrozole     5/28/2015 Initial Diagnosis    Metastatic breast cancer [facility])  (recurrence)     6/30/2015 -  Chemotherapy    1-Taxotere, Herceptin, Perjeta started in [place] 2015 x6 cycles  2-Herceptin and Perjeta alone were continued since the patient was not interested in continue the Taxotere  3-Herceptin and Perjeta were put on hold due to decline of her ejection fraction around May 2017    4-low dose weekly Taxol was started in July 2017  5-Taxol was switched to every other week basis     4/14/2019 - 6/9/2019 Chemotherapy    PACLitaxel (TAXOL) 144 6 mg in sodium chloride 0 9 % 250 mL chemo IVPB, 80 mg/m2, Intravenous, Once, 2 of 6 cycles     12/4/2019 - 12/31/2019 Chemotherapy    PACLitaxel (TAXOL) 142 2 mg in sodium chloride 0 9 % 250 mL chemo IVPB, 80 mg/m2 = 142 2 mg, Intravenous, Once, 1 of 6 cycles  Administration: 142 2 mg (12/4/2019), 142 2 mg (12/18/2019)     1/8/2020 - 3/10/2020 Chemotherapy    PACLitaxel (TAXOL) 141 6 mg in sodium chloride 0 9 % 250 mL chemo IVPB, 80 mg/m2 = 141 6 mg, Intravenous, Once, 4 of 6 cycles  Administration: 141 6 mg (1/8/2020), 141 6 mg (1/29/2020), 141 6 mg (2/19/2020)  trastuzumab (HERCEPTIN) 600 mg in sodium chloride 0 9 % 250 mL chemo infusion, 609 mg, Intravenous, Once, 4 of 6 cycles  Administration: 600 mg (1/8/2020), 450 mg (1/29/2020), 450 mg (2/19/2020)      Chemotherapy    PACLitaxel (TAXOL) 144 6 mg in sodium chloride 0 9 % 250 mL chemo IVPB, 80 mg/m2, Intravenous, Once, 2 of 6 cycles    PACLitaxel (TAXOL) 142 2 mg in sodium chloride 0 9 % 250 mL chemo IVPB, 80 mg/m2 = 142 2 mg, Intravenous, Once, 1 of 6 cycles    Administration: 142 2 mg (12/4/2019), 142 2 mg (12/18/2019)    PACLitaxel (TAXOL) 141 6 mg in sodium chloride 0 9 % 250 mL chemo IVPB, 80 mg/m2, Intravenous, Once, 0 of 12 cycles        trastuzumab (HERCEPTIN) 304 mg in sodium chloride 0 9 % 250 mL chemo infusion, 4 mg/kg, Intravenous, Once, 0 of 26 cycles    PACLitaxel (TAXOL) 141 6 mg in sodium chloride 0 9 % 250 mL chemo IVPB, 80 mg/m2 = 141 6 mg, Intravenous, Once, 4 of 6 cycles    Administration: 141 6 mg (1/8/2020), 141 6 mg (1/29/2020), 141 6 mg (2/19/2020)        trastuzumab (HERCEPTIN) 600 mg in sodium chloride 0 9 % 250 mL chemo infusion, 609 mg, Intravenous, Once, 4 of 6 cycles    Administration: 600 mg (1/8/2020), 450 mg (1/29/2020), 450 mg (2/19/2020)        Chemotherapy    PACLitaxel (TAXOL) 144 6 mg in sodium chloride 0 9 % 250 mL chemo IVPB, 80 mg/m2, Intravenous, Once, 2 of 6 cycles    PACLitaxel (TAXOL) 142 2 mg in sodium chloride 0 9 % 250 mL chemo IVPB, 80 mg/m2 = 142 2 mg, Intravenous, Once, 1 of 6 cycles    Administration: 142 2 mg (12/4/2019), 142 2 mg (12/18/2019)    PACLitaxel (TAXOL) 141 6 mg in sodium chloride 0 9 % 250 mL chemo IVPB, 80 mg/m2, Intravenous, Once, 0 of 12 cycles        trastuzumab (HERCEPTIN) 304 mg in sodium chloride 0 9 % 250 mL chemo infusion, 4 mg/kg, Intravenous, Once, 0 of 26 cycles    PACLitaxel (TAXOL) 141 6 mg in sodium chloride 0 9 % 250 mL chemo IVPB, 80 mg/m2 = 141 6 mg, Intravenous, Once, 4 of 6 cycles    Administration: 141 6 mg (1/8/2020), 141 6 mg (1/29/2020), 141 6 mg (2/19/2020)        trastuzumab (HERCEPTIN) 600 mg in sodium chloride 0 9 % 250 mL chemo infusion, 609 mg, Intravenous, Once, 4 of 6 cycles    Administration: 600 mg (1/8/2020), 450 mg (1/29/2020), 450 mg (2/19/2020)      Lapatinib 1500 mg once a day was started on 04/20/2020 after she was found to have metastatic disease to the brain, status post whole brain radiation       12/2021 -  Chemotherapy    Started Xeloda (750 mg/m2 b I d  2 weeks on with 1 week of a break) patient's dose is 1500 mg a med and 1000 mg p m  + Neratinib with dose escalation weekly to goal of 240 mg daily (starting week 3)       Brain metastases ([facility] [address] )   3/30/2020 - 4/10/2020 Radiation    Plan ID Energy Fractions Dose per Fraction (cGy) Dose Correction (cGy) Total Dose Delivered (cGy) Elapsed Days   Whole Brai # 6X 10 / 10 300 0 3,000 11          4/9/2020 Initial Diagnosis    Brain metastasis ([facility] [address] )     12/9/2020 - 12/9/2020 Radiation    Plan ID Energy Fractions Dose per Fraction (cGy) Dose Correction (cGy) Total Dose Delivered (cGy) Elapsed Days   [id number] 6X 1 / 1 1,800 0 1,800 0      Treatment Dates:  12/9/2020 - 12/9/2020  Cancer Staged    Cancer Staging  No matching staging information was found for the patient             Past Medical History:   Diagnosis Date    Back pain     Brain cancer ([facility] [address] )     Breast cancer ([facility] [address] )     Chronic pain     Hypertension     Knee pain     Lymphedema of right arm     Vitamin B12 deficiency      Past Surgical History:   Procedure Laterality Date    ANKLE SURGERY      APPENDECTOMY      BREAST SURGERY      bilat mastectomy-right total mastectomy and prophylactic left total mastectomy-2005    CATARACT EXTRACTION      FRACTURE SURGERY      INSERTION CENTRAL VENOUS ACCESS DEVICE W/ SUBCUTANEOUS PORT      KNEE SURGERY      right knee replacement 2014 in [address] [place] [address] Bilateral     ORTHOPEDIC SURGERY         Social History   Social History     Substance and Sexual Activity   Alcohol Use Never     Social History     Substance and Sexual Activity   Drug Use No     Social History     Tobacco Use   Smoking Status Never Smoker   Smokeless Tobacco Never Used   Tobacco Comment    no passive smoke exposure         Meds/Allergies     Current Outpatient Medications:     acetaminophen (TYLENOL) 500 mg tablet, Take 2 tablets (1,000 mg total) by mouth every 8 (eight) hours as needed for mild pain, Disp: 30 tablet, Rfl: 0    albuterol (PROVENTIL HFA,VENTOLIN HFA) 90 mcg/act inhaler, Inhale 2 puffs every 6 (six) hours as needed for wheezing or shortness of breath, Disp: 18 g, Rfl: 2    anastrozole (ARIMIDEX) 1 mg tablet, Take 1 tablet (1 mg total) by mouth daily, Disp: 90 tablet, Rfl: 3    capecitabine (XELODA) 500 MG tablet, Take 3 tabs(1500 mg) in AM and 2 tabs (1000 mg) in PM for 14 days, then 7 days rest, Disp: 70 tablet, Rfl: 11    cholecalciferol (VITAMIN D3) 1,000 units tablet, Take 1 tablet (1,000 Units total) by mouth daily, Disp: 30 tablet, Rfl: 0    Cyanocobalamin 1000 MCG/ML KIT, Inject as directed every 6 (six) months, Disp: , Rfl:     Elastic Bandages & Supports (Prolite Lumbar Support) MISC, Use daily as needed (back pain), Disp: 1 each, Rfl: 0    famotidine (PEPCID) 10 mg tablet, Take 10 mg by mouth 2 (two) times a day as needed for heartburn, Disp: , Rfl:     lisinopril (ZESTRIL) 2 5 mg tablet, Take 1 tablet (2 5 mg total) by mouth daily, Disp: 30 tablet, Rfl: 6    Neratinib Maleate 40 MG TABS, Take 240 mg by mouth daily Start Week 1 take 3 tabs (120 mg) daily Week 2 take 4 tabs ( 160 Mg) daily Week 3 take 6 tabs (240) mg daily, Disp: 180 tablet, Rfl: 11    ondansetron (ZOFRAN) 8 mg tablet, Take 1 tablet (8 mg total) by mouth every 8 (eight) hours as needed for nausea or vomiting, Disp: 20 tablet, Rfl: 3    escitalopram (LEXAPRO) 5 mg tablet, Take 1 tablet (5 mg total) by mouth daily (Patient not taking: Reported on 12/8/2021 ), Disp: 30 tablet, Rfl: 5    esomeprazole (NexIUM) 40 MG capsule, TAKE 1 CAPSULE BY MOUTH ONCE DAILY (Patient not taking: Reported on 12/23/2021), Disp: 90 capsule, Rfl: 0    gabapentin (NEURONTIN) 300 mg capsule, Take 3 capsules (900 mg total) by mouth daily at bedtime (Patient not taking: Reported on 12/8/2021 ), Disp: 90 capsule, Rfl: 2    indapamide (LOZOL) 1 25 mg tablet, Take 1 tablet (1 25 mg total) by mouth every morning (Patient not taking: Reported on 2/16/2022 ), Disp: 30 tablet, Rfl: 1    lidocaine (LIDODERM) 5 %, Apply 1 patch topically daily Remove & Discard patch within 12 hours or as directed by MD (Patient not taking: Reported on 12/8/2021 ), Disp: 30 patch, Rfl: 1    megestrol (MEGACE) 400 mg/10 mL, Take 400 mg by mouth daily (Patient not taking: Reported on 12/8/2021 ), Disp: 600 mL, Rfl: 0    Misc   Devices (ILLUSIONS C BREAST PROSTHESIS) MISC, 1 breast prosthesis, Disp: 1 each, Rfl: 0   Misc  Devices (REFLECTIONS C BREAST PROSTHES) MISC, Dispense 1 breast prosthesis, Disp: 1 each, Rfl: 0    nebivolol (Bystolic) 5 mg tablet, Take 1 tablet (5 mg total) by mouth daily (Patient not taking: Reported on 12/23/2021 ), Disp: 90 tablet, Rfl: 3    ondansetron (Zofran) 4 mg tablet, Every 12 hours (Patient not taking: Reported on 12/23/2021 ), Disp: , Rfl:     pantoprazole (PROTONIX) 40 mg tablet, TAKE ONE TABLET BY MOUTH EVERY DAY (Patient not taking: Reported on 12/23/2021), Disp: 90 tablet, Rfl: 0  Allergies   Allergen Reactions    Penicillins Rash         Review of Systems   Constitutional: Negative for activity change (decreased due to unsteadiness), appetite change, fatigue and fever  HENT: Negative  Eyes: Negative  Respiratory: Negative  Negative for cough and shortness of breath  Cardiovascular: Negative  Negative for chest pain and leg swelling  Gastrointestinal: Positive for diarrhea (sometimes)  Negative for constipation, nausea and vomiting  Genitourinary: Negative for difficulty urinating and frequency  Musculoskeletal: Positive for back pain (constant lower back pain) and gait problem (unsteady, unchanged, holds onto things when walking, sometimes using walker )  Skin: Negative  Neurological: Positive for weakness (decreased strength in legs, unchanged) and numbness (fingertips, feet)  Negative for dizziness, tremors, seizures, speech difficulty and headaches  Hematological: Positive for adenopathy (left arm lymphedema, stable, wears sleeve sometimes)  Psychiatric/Behavioral: Negative for confusion, dysphoric mood and sleep disturbance  The patient is nervous/anxious         OBJECTIVE:   /86 (BP Location: Left arm, Patient Position: Sitting)   Pulse 87   Temp 98 1 °F (36 7 °C) (Temporal)   Resp 18   Ht 5' 2" (1 575 m)   Wt 58 9 kg (129 lb 12 8 oz)   SpO2 100%   BMI 23 74 kg/m²   Pain Assessment:  0  ECOG/[facility]/WHO: 1 - Symptomatic but completely ambulatory    Physical Exam   She is conversing appropriately  Her breathing is unlabored    Ambulating independently        RESULTS    Lab Results:   Recent Results (from the past 672 hour(s))   CBC and differential    Collection Time: 02/08/22  9:02 AM   Result Value Ref Range    WBC 4 78 4 31 - 10 16 Thousand/uL    RBC 3 53 (L) 3 81 - 5 12 Million/uL    Hemoglobin 12 2 11 5 - 15 4 g/dL    Hematocrit 36 4 34 8 - 46 1 %     (H) 82 - 98 fL    MCH 34 6 (H) 26 8 - 34 3 pg    MCHC 33 5 31 4 - 37 4 g/dL    RDW 20 4 (H) 11 6 - 15 1 %    MPV 9 5 8 9 - 12 7 fL    Platelets 609 966 - 088 Thousands/uL    nRBC 0 /100 WBCs    Neutrophils Relative 60 43 - 75 %    Immat GRANS % 0 0 - 2 %    Lymphocytes Relative 29 14 - 44 %    Monocytes Relative 8 4 - 12 %    Eosinophils Relative 3 0 - 6 %    Basophils Relative 0 0 - 1 %    Neutrophils Absolute 2 84 1 85 - 7 62 Thousands/µL    Immature Grans Absolute 0 02 0 00 - 0 20 Thousand/uL    Lymphocytes Absolute 1 37 0 60 - 4 47 Thousands/µL    Monocytes Absolute 0 39 0 17 - 1 22 Thousand/µL    Eosinophils Absolute 0 14 0 00 - 0 61 Thousand/µL    Basophils Absolute 0 02 0 00 - 0 10 Thousands/µL   Comprehensive metabolic panel    Collection Time: 02/08/22  9:02 AM   Result Value Ref Range    Sodium 138 136 - 145 mmol/L    Potassium 4 2 3 5 - 5 3 mmol/L    Chloride 106 100 - 108 mmol/L    CO2 28 21 - 32 mmol/L    ANION GAP 4 4 - 13 mmol/L    BUN 17 5 - 25 mg/dL    Creatinine 0 93 0 60 - 1 30 mg/dL    Glucose, Fasting 95 65 - 99 mg/dL    Calcium 8 9 8 3 - 10 1 mg/dL    Corrected Calcium 9 5 8 3 - 10 1 mg/dL    AST 15 5 - 45 U/L    ALT 14 12 - 78 U/L    Alkaline Phosphatase 35 (L) 46 - 116 U/L    Total Protein 7 2 6 4 - 8 2 g/dL    Albumin 3 2 (L) 3 5 - 5 0 g/dL    Total Bilirubin 1 23 (H) 0 20 - 1 00 mg/dL    eGFR 59 ml/min/1 73sq m   Magnesium    Collection Time: 02/08/22  9:02 AM   Result Value Ref Range    Magnesium 1 9 1 6 - 2 6 mg/dL   LD,Blood    Collection Time: 02/08/22  9:02 AM   Result Value Ref Range     81 - 234 U/L   Cancer antigen 15-3    Collection Time: 02/08/22  9:02 AM   Result Value Ref Range    CA 15-3 39 0 (H) 0 0 - 25 0 U/mL   Cancer antigen 27 29    Collection Time: 02/08/22  9:02 AM   Result Value Ref Range    CA 27 29 48 7 (H) 0 0 - 42 3 U/mL       Imaging Studies:MRI brain w wo contrast    Result Date: 2/15/2022  Narrative: MRI BRAIN WITH AND WITHOUT CONTRAST INDICATION: Recurrent breast cancer diagnosed in 2015  Following treatment, patient had subsequent MR imaging in 2020 which revealed intracranial metastasis for which patient received whole brain radiation therapy as well as stereotactic radiosurgery to  the left occipital lesion in December 2020  Follow-up MRI in November 2021 revealed a concerning nodule along the superior border of the treated left occipital lesion and interval enlargement of the right parietal and anterior inferior temporal lobe lesions which are concerning for disease progression  Patient was started on chemotherapy in December 2021  This is a follow-up evaluation  COMPARISON:  MRIs of the brain from February 8, 2021, November 17, 2021  TECHNIQUE: Sagittal T1, axial T2, axial FLAIR, axial T1, axial T2*gradient, axial diffusion  Sagittal, axial T1 postcontrast   Axial bravo postcontrast with coronal reconstructions  Imaging performed on 1 5T MRI  IV Contrast:  6 mL of Gadobutrol injection (SINGLE-DOSE)  IMAGE QUALITY:   Diagnostic  FINDINGS: BRAIN PARENCHYMA:  The most recent MRI revealed four metastatic lesions  The largest was identified in the left occipital lobe with a new concerning 3 mm nodule along its superior border  The other 2  lesions were noted in the right parietal and anterior-inferior temporal lobes  There is stable FLAIR hyperintense signal in the left occipital lobe surrounding the largest lesion    Otherwise, periventricular and deep cerebral white matter FLAIR hyperintense signal likely represents chronic ischemic and posttreatment change  No new enhancing lesions are identified  The left occipital lobe lesion with the 3 mm enhancing nodule along its superior border is stable  The right parietal lobe lesion is smaller, measuring 9 mm in its long axis and demonstrating less enhancement than noted on the prior study  The right anterior inferior temporal lobe lesion is also smaller, measuring 8 mm in its long axis  The right anterior temporal lobe periventricular FLAIR hyperintense signal has resolved  VENTRICLES:  Ventricles and extra-axial CSF spaces are prominent commensurate with the degree of volume loss  No hydrocephalus  No intraventricular hemorrhage  SELLA AND PITUITARY GLAND:  Normal  ORBITS:  Normal  PARANASAL SINUSES:  Bilateral maxillary, anterior ethmoid sinus with subtotal opacification  Near-total left and total right sphenoid sinus opacification  VASCULATURE:  Evaluation of the major intracranial vasculature demonstrates appropriate flow voids  CALVARIUM AND SKULL BASE:  Normal  EXTRACRANIAL SOFT TISSUES:  Normal      Impression: No acute abnormality  No new parenchymal enhancement  Findings suggestive of treatment response with interval decrease in the size of the right parietal and anterior temporal lobe lesions  The left occipital lobe lesion and the previously described 3 mm enhancing nodule along its superior border remains stable  Interval resolution of the FLAIR signal abnormality in the right anterior temporal lobe  Stable left occipital FLAIR signal abnormality  Stable posttreatment white matter changes in the periventricular and deep cerebral white matter  Workstation performed: [id number]     DXA bone density spine hip and pelvis    Result Date: 1/18/2022  Narrative: DXA SCAN CLINICAL HISTORY:  60-year-old postmenopausal female  OTHER RISK FACTORS:  Takes PPIs and Arimidex  PHARMACOLOGIC THERAPY FOR OSTEOPOROSIS:  None currently   TECHNIQUE: Bone densitometry was performed using a [email or web address] C   bone densitometer  Regions of interest appear properly placed  COMPARISON: 3/25/2019  RESULTS: LUMBAR SPINE L1-L3 (L4 vertebra excluded from analysis due to local structural abnormalities or artifact): BMD  0 866  gm/cm2 T-score -1 4 LEFT  TOTAL HIP: BMD:  0 816  gm/cm2 T-score:  -1 0 LEFT  FEMORAL NECK: BMD:  0 634  gm/cm2 T score: -1 9     Impression: 1  Low bone mass (osteopenia)  2   Since a DXA study from 3/25/2019, there has been: A  STATISTICALLY SIGNIFICANT DECREASE in bone mineral density of  0 056 g/cm2 (6 0)% in the lumbar spine  3   The 10 year risk of hip fracture is 3 4% with the 10 year risk of major osteoporotic fracture being 13% as calculated by the [facility]/WHO fracture risk assessment tool (FRAX)  4   The current NOF guidelines recommend treating patients with a T-score of -2 5 or less in the lumbar spine or hips, or in post-menopausal women and men over the age of 48 with low bone mass (osteopenia) and a FRAX 10 year risk score of >3% for hip fracture and/or >20% for major osteoporotic fracture  5   The NOF recommends follow-up DXA in 1-2 years after initiating therapy for osteoporosis and every 2 years thereafter  More frequent evaluation is appropriate for patients with conditions associated with rapid bone loss, such as glucocorticoid therapy  The interval between DXA screenings may be longer for individuals without major risk factors and initial T-score in the normal or upper low bone mass range  The FRAX algorithm has certain limitations: -FRAX has not been validated in patients currently or previously treated with pharmacotherapy for osteoporosis  In such patients, clinical judgment must be exercised in interpreting FRAX scores    -Prior hip, vertebral and humeral fragility fractures appear to confer greater risk of subsequent fracture than fractures at other sites (this is especially true for individuals with severe vertebral fractures), but quantification of this incremental risk is not possible with FRAX  -FRAX underestimates fracture risk in patients with history of multiple fragility fractures  -FRAX may underestimate fracture risk in patients with history of frequent falls  -It is not appropriate to use FRAX to monitor treatment response  WHO CLASSIFICATION: Normal (a T-score of -1 0 or higher) Low bone mineral density (a T-score of less than -1 0 but higher than -2 5) Osteoporosis (a T-score of -2 5 or less) Severe osteoporosis (a T-score of -2 5 or less with a fragility fracture) LEAST SIGNIFICANT CHANGE (AT 95% C  I): Lumbar spine: 0 020 g/cm2 (2 2%) Total hip: 0 033 g/cm2 (3 8%) Forearm: 0 021 g/cm2 (3 3%) Workstation performed: [id number]           Assessment/Plan:  No orders of the defined types were placed in this encounter  Joyce Wilson is a 69y o  year old female with stage IV breast carcinoma  She was seen in [facility] along with Dr Jaspal Meza  She is 2 months status post her most recent [address] therapy for brain metastasis  She tolerated this well without any significant side effects  We discussed her most recent MRI of the brain from yesterday  This reveals response to treatment  No new lesions identified  She will undergo follow-up MRI of the brain in 3 months and return to Neuro-Oncology clinic thereafter  Glenys Sandhu MD  6/62/9534,4:38 PM    Portions of the record may have been created with voice recognition software   Occasional wrong word or "sound a like" substitutions may have occurred due to the inherent limitations of voice recognition software   Read the chart carefully and recognize, using context, where substitutions have occurred

## 2022-02-16 NOTE — PROGRESS NOTES
Carmel Torres 1945 is a 69 y o  female     Follow up visit     Carmel Torres is a 77 year old female with a history of a right breast infiltrating ductal carcinoma grade 2 diagnosed in February of 2005 status post right mastectomy with left prophylactic mastectomy  Pathology confirmed 8/17 positive axillary lymph nodes from the right axilla  Estrogen receptors positive with negative progesterone receptors and Her 2 Gregory was also positive  She was treated with 6 cycles chemotherapy followed by tamoxifen and adjuvant radiation therapy to the right axilla and chest wall regions in [place]  Her tamoxifen was switched to Arimidex that she continued up until 2012  She was diagnosed with local chest wall recurrence on the right side with metastatic disease involving the right axilla, pectoralis muscle, left axilla that was confirmed with biopsy admitted 2015  She was started on palliative chemotherapy and had a good response and then has been on maintenance treatment since that time  She has had to have a hold on her therapy at times due to low ejection fraction  Her PET-CT scan December 16, 2019 showed increase uptake in an enlarging right axillary lymph node with some mild increased uptake in the right perihilar region and a new area in the left adrenal gland that was suspicious  She had right axillary biopsy December 26, 2019 that confirmed invasive ductal carcinoma  Her treatment was changed to Herceptin and Taxol every 3 weeks  Her treatment had to be held on March 16, 2020 due to reduced ejection fraction  She had CT scan of the head on March 18, 2020 an MRI of the brain March 20, 2020 which confirmed 4 discrete intracranial metastasis with the largest measuring 24 mm in size in the left occipital lobe with some associated edema  She had no neurologic symptoms  We discussed that she has multiple brain metastasis and we recommended whole-brain radiation therapy    She completed whole brain radiation therapy in April 10, 2020  She had SRS to left occipital region of the brain on 12/9/2020     Repeat MRI of the brain on May 22, 2020 showed a good response with reduction in all 4 for brain metastasis and no new lesions  She had a repeat PET-CT study March 27, 2020 that revealed Increasing size of metastatic right axillary node and nonspecific increased activity in the lower spinal canal at the T12-L1 level  There was no CT correlate on the PET CT scan images and she was having no lower back pain at the time of her appointment on May 28, 2020  She has developed lumbar spine pain and had MRI lumbar spine August 24, 2020 that revealed degenerative changes with severe central and bilateral lateral recess stenosis with moderate bilateral foraminal narrowing  She saw neurosurgery/Dr Tam Ramachandran September 11, 2020 who recommended referral to pain management and no surgical intervention  Repeat MRI of the brain August 24, 2020 revealed the 2 lesions on the right side had decreased in size since May  There is a stable left occipital lobe mass with increased FLAIR abnormality and a new 4 mm lesion in the left occipital lobe immediately superior to the dominant occipital lobe mass with increasing FLAIR  Increasing FLAIR signal a left occipital lobe lesion could be due to post treatment changes but it is unclear if there is any residual viable tumor at this site  She was last seen in neuro-oncology clinic on 12/8/21  MRI images  on 11/17/21 reveals two lesions in the right parietal and right temporal region which have increased in size causing local edema  She is no significant symptoms related to this  She completed SRS to right parietal and right temporal area on 12/15/21  She returns today for follow up      1/21/22 Hem/Onc - Dr Dayanara Corral 1500 mg in am and 1000 mg in pm - 2 weeks on and 1 week off along with Neratinib 240 mg daily  Advised imodium on regular basis for diarrhea   Offer IV hydration as needed to avoid dehydration  Continue anastrozole 1 mg daily  Treated for UTI with doxycycline 100 mg daily for 7 days  2/11/22 Hem/Onc - Dr Trae Callaway  Difficulty tolerating treatment related to weakness, stomach aches and diarrhea  Feeling better after holding Neratinib for a few days  Recommendation is to resume treatment with dose reduction  Neratinib decreased to 200 mg daily along with Xeloda to be decreased to 1000 mg twice a day 7 days on  with 7 day break  Continue anastrozole  Component CA 27 29   Latest Ref Rng & Units 0 0 - 42 3 U/mL   10/25/2021 51 6 (H)   1/18/2022 48 5 (H)   2/8/2022 48 7 (H)     Component CA 15-3   Latest Ref Rng & Units 0 0 - 25 0 U/mL   3/11/2021 24 7   10/25/2021 36 7 (H)   1/18/2022 35 9 (H)   2/8/2022 39 0 (H)       2/15/22 MRI brain w wo contrast  IMPRESSION:   No acute abnormality  No new parenchymal enhancement    Findings suggestive of treatment response with interval decrease in the size of the right parietal and anterior temporal lobe lesions  The left occipital lobe lesion and the previously described 3 mm enhancing nodule along its superior border remains stable    Interval resolution of the FLAIR signal abnormality in the right anterior temporal lobe  Stable left occipital FLAIR signal abnormality    Stable posttreatment white matter changes in the periventricular and deep cerebral white matter  Patient is accompanied by her sister, Beverley Cook for today's visit        Upcoming:  3/4/22 Hem/Onc - Dr Shine Wells  6/7/22 Infusion      Oncology History   Malignant neoplasm of overlapping 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, estrogen receptor positive ([facility] [address] )   2015 -  Hormone Therapy    Anastrozole     5/28/2015 Initial Diagnosis    Metastatic breast cancer ([address] )  (recurrence)     6/30/2015 -  Chemotherapy    1-Taxotere, Herceptin, Perjeta started in [place] 2015 x6 cycles  2-Herceptin and Perjeta alone were continued since the patient was not interested in continue the Taxotere  3-Herceptin and Perjeta were put on hold due to decline of her ejection fraction around May 2017    4-low dose weekly Taxol was started in July 2017  5-Taxol was switched to every other week basis     4/14/2019 - 6/9/2019 Chemotherapy    PACLitaxel (TAXOL) 144 6 mg in sodium chloride 0 9 % 250 mL chemo IVPB, 80 mg/m2, Intravenous, Once, 2 of 6 cycles     12/4/2019 - 12/31/2019 Chemotherapy    PACLitaxel (TAXOL) 142 2 mg in sodium chloride 0 9 % 250 mL chemo IVPB, 80 mg/m2 = 142 2 mg, Intravenous, Once, 1 of 6 cycles  Administration: 142 2 mg (12/4/2019), 142 2 mg (12/18/2019)     1/8/2020 - 3/10/2020 Chemotherapy    PACLitaxel (TAXOL) 141 6 mg in sodium chloride 0 9 % 250 mL chemo IVPB, 80 mg/m2 = 141 6 mg, Intravenous, Once, 4 of 6 cycles  Administration: 141 6 mg (1/8/2020), 141 6 mg (1/29/2020), 141 6 mg (2/19/2020)  trastuzumab (HERCEPTIN) 600 mg in sodium chloride 0 9 % 250 mL chemo infusion, 609 mg, Intravenous, Once, 4 of 6 cycles  Administration: 600 mg (1/8/2020), 450 mg (1/29/2020), 450 mg (2/19/2020)      Chemotherapy    PACLitaxel (TAXOL) 144 6 mg in sodium chloride 0 9 % 250 mL chemo IVPB, 80 mg/m2, Intravenous, Once, 2 of 6 cycles    PACLitaxel (TAXOL) 142 2 mg in sodium chloride 0 9 % 250 mL chemo IVPB, 80 mg/m2 = 142 2 mg, Intravenous, Once, 1 of 6 cycles    Administration: 142 2 mg (12/4/2019), 142 2 mg (12/18/2019)    PACLitaxel (TAXOL) 141 6 mg in sodium chloride 0 9 % 250 mL chemo IVPB, 80 mg/m2, Intravenous, Once, 0 of 12 cycles        trastuzumab (HERCEPTIN) 304 mg in sodium chloride 0 9 % 250 mL chemo infusion, 4 mg/kg, Intravenous, Once, 0 of 26 cycles    PACLitaxel (TAXOL) 141 6 mg in sodium chloride 0 9 % 250 mL chemo IVPB, 80 mg/m2 = 141 6 mg, Intravenous, Once, 4 of 6 cycles    Administration: 141 6 mg (1/8/2020), 141 6 mg (1/29/2020), 141 6 mg (2/19/2020)        trastuzumab (HERCEPTIN) 600 mg in sodium chloride 0 9 % 250 mL chemo infusion, 609 mg, Intravenous, Once, 4 of 6 cycles    Administration: 600 mg (1/8/2020), 450 mg (1/29/2020), 450 mg (2/19/2020)        Chemotherapy    PACLitaxel (TAXOL) 144 6 mg in sodium chloride 0 9 % 250 mL chemo IVPB, 80 mg/m2, Intravenous, Once, 2 of 6 cycles    PACLitaxel (TAXOL) 142 2 mg in sodium chloride 0 9 % 250 mL chemo IVPB, 80 mg/m2 = 142 2 mg, Intravenous, Once, 1 of 6 cycles    Administration: 142 2 mg (12/4/2019), 142 2 mg (12/18/2019)    PACLitaxel (TAXOL) 141 6 mg in sodium chloride 0 9 % 250 mL chemo IVPB, 80 mg/m2, Intravenous, Once, 0 of 12 cycles        trastuzumab (HERCEPTIN) 304 mg in sodium chloride 0 9 % 250 mL chemo infusion, 4 mg/kg, Intravenous, Once, 0 of 26 cycles    PACLitaxel (TAXOL) 141 6 mg in sodium chloride 0 9 % 250 mL chemo IVPB, 80 mg/m2 = 141 6 mg, Intravenous, Once, 4 of 6 cycles    Administration: 141 6 mg (1/8/2020), 141 6 mg (1/29/2020), 141 6 mg (2/19/2020)        trastuzumab (HERCEPTIN) 600 mg in sodium chloride 0 9 % 250 mL chemo infusion, 609 mg, Intravenous, Once, 4 of 6 cycles    Administration: 600 mg (1/8/2020), 450 mg (1/29/2020), 450 mg (2/19/2020)      Lapatinib 1500 mg once a day was started on 04/20/2020 after she was found to have metastatic disease to the brain, status post whole brain radiation       12/2021 -  Chemotherapy    Started Xeloda (750 mg/m2 b I d  2 weeks on with 1 week of a break) patient's dose is 1500 mg a med and 1000 mg p m  + Neratinib with dose escalation weekly to goal of 240 mg daily (starting week 3)       Brain metastases ([facility] [address] )   3/30/2020 - 4/10/2020 Radiation    Plan ID Energy Fractions Dose per Fraction (cGy) Dose Correction (cGy) Total Dose Delivered (cGy) Elapsed Days   Whole Brai # 6X 10 / 10 300 0 3,000 11          4/9/2020 Initial Diagnosis    Brain metastasis ([address] )     12/9/2020 - 12/9/2020 Radiation    Plan ID Energy Fractions Dose per Fraction (cGy) Dose Correction (cGy) Total Dose Delivered (cGy) Elapsed Days   [id number] 6X 1 / 1 1,800 0 1,800 0 Treatment Dates:  12/9/2020 - 12/9/2020  Cancer Staged    Cancer Staging  No matching staging information was found for the patient  Review of Systems:  Review of Systems   Constitutional: Negative for activity change (decreased due to unsteadiness), appetite change, fatigue and fever  HENT: Negative  Eyes: Negative  Respiratory: Negative  Negative for cough and shortness of breath  Cardiovascular: Negative  Negative for chest pain and leg swelling  Gastrointestinal: Positive for diarrhea (sometimes)  Negative for constipation, nausea and vomiting  Genitourinary: Negative for difficulty urinating and frequency  Musculoskeletal: Positive for back pain (constant lower back pain) and gait problem (unsteady, unchanged, holds onto things when walking, sometimes using walker )  Skin: Negative  Neurological: Positive for weakness (decreased strength in legs, unchanged) and numbness (fingertips, feet)  Negative for dizziness, tremors, seizures, speech difficulty and headaches  Hematological: Positive for adenopathy (left arm lymphedema, stable, wears sleeve sometimes)  Psychiatric/Behavioral: Negative for confusion, dysphoric mood and sleep disturbance  The patient is nervous/anxious  Clinical Trial: no    Imaging:MRI brain w wo contrast    Result Date: 2/15/2022  Narrative: MRI BRAIN WITH AND WITHOUT CONTRAST INDICATION: Recurrent breast cancer diagnosed in 2015  Following treatment, patient had subsequent MR imaging in 2020 which revealed intracranial metastasis for which patient received whole brain radiation therapy as well as stereotactic radiosurgery to  the left occipital lesion in December 2020  Follow-up MRI in November 2021 revealed a concerning nodule along the superior border of the treated left occipital lesion and interval enlargement of the right parietal and anterior inferior temporal lobe lesions which are concerning for disease progression    Patient was started on chemotherapy in December 2021  This is a follow-up evaluation  COMPARISON:  MRIs of the brain from February 8, 2021, November 17, 2021  TECHNIQUE: Sagittal T1, axial T2, axial FLAIR, axial T1, axial T2*gradient, axial diffusion  Sagittal, axial T1 postcontrast   Axial bravo postcontrast with coronal reconstructions  Imaging performed on 1 5T MRI  IV Contrast:  6 mL of Gadobutrol injection (SINGLE-DOSE)  IMAGE QUALITY:   Diagnostic  FINDINGS: BRAIN PARENCHYMA:  The most recent MRI revealed four metastatic lesions  The largest was identified in the left occipital lobe with a new concerning 3 mm nodule along its superior border  The other 2  lesions were noted in the right parietal and anterior-inferior temporal lobes  There is stable FLAIR hyperintense signal in the left occipital lobe surrounding the largest lesion  Otherwise, periventricular and deep cerebral white matter FLAIR hyperintense signal likely represents chronic ischemic and posttreatment change  No new enhancing lesions are identified  The left occipital lobe lesion with the 3 mm enhancing nodule along its superior border is stable  The right parietal lobe lesion is smaller, measuring 9 mm in its long axis and demonstrating less enhancement than noted on the prior study  The right anterior inferior temporal lobe lesion is also smaller, measuring 8 mm in its long axis  The right anterior temporal lobe periventricular FLAIR hyperintense signal has resolved  VENTRICLES:  Ventricles and extra-axial CSF spaces are prominent commensurate with the degree of volume loss  No hydrocephalus  No intraventricular hemorrhage  SELLA AND PITUITARY GLAND:  Normal  ORBITS:  Normal  PARANASAL SINUSES:  Bilateral maxillary, anterior ethmoid sinus with subtotal opacification  Near-total left and total right sphenoid sinus opacification  VASCULATURE:  Evaluation of the major intracranial vasculature demonstrates appropriate flow voids   CALVARIUM AND SKULL BASE:  Normal  EXTRACRANIAL SOFT TISSUES:  Normal      Impression: No acute abnormality  No new parenchymal enhancement  Findings suggestive of treatment response with interval decrease in the size of the right parietal and anterior temporal lobe lesions  The left occipital lobe lesion and the previously described 3 mm enhancing nodule along its superior border remains stable  Interval resolution of the FLAIR signal abnormality in the right anterior temporal lobe  Stable left occipital FLAIR signal abnormality  Stable posttreatment white matter changes in the periventricular and deep cerebral white matter  Workstation performed: [id number]     DXA bone density spine hip and pelvis    Result Date: 1/18/2022  Narrative: DXA SCAN CLINICAL HISTORY:  23-year-old postmenopausal female  OTHER RISK FACTORS:  Takes PPIs and Arimidex  PHARMACOLOGIC THERAPY FOR OSTEOPOROSIS:  None currently  TECHNIQUE: Bone densitometry was performed using a [employer] C   bone densitometer  Regions of interest appear properly placed  COMPARISON: 3/25/2019  RESULTS: LUMBAR SPINE L1-L3 (L4 vertebra excluded from analysis due to local structural abnormalities or artifact): BMD  0 866  gm/cm2 T-score -1 4 LEFT  TOTAL HIP: BMD:  0 816  gm/cm2 T-score:  -1 0 LEFT  FEMORAL NECK: BMD:  0 634  gm/cm2 T score: -1 9     Impression: 1  Low bone mass (osteopenia)  2   Since a DXA study from 3/25/2019, there has been: A  STATISTICALLY SIGNIFICANT DECREASE in bone mineral density of  0 056 g/cm2 (6 0)% in the lumbar spine  3   The 10 year risk of hip fracture is 3 4% with the 10 year risk of major osteoporotic fracture being 13% as calculated by the [facility]/WHO fracture risk assessment tool (FRAX)   4   The current NOF guidelines recommend treating patients with a T-score of -2 5 or less in the lumbar spine or hips, or in post-menopausal women and men over the age of 48 with low bone mass (osteopenia) and a FRAX 10 year risk score of >3% for hip fracture and/or >20% for major osteoporotic fracture  5   The NOF recommends follow-up DXA in 1-2 years after initiating therapy for osteoporosis and every 2 years thereafter  More frequent evaluation is appropriate for patients with conditions associated with rapid bone loss, such as glucocorticoid therapy  The interval between DXA screenings may be longer for individuals without major risk factors and initial T-score in the normal or upper low bone mass range  The FRAX algorithm has certain limitations: -FRAX has not been validated in patients currently or previously treated with pharmacotherapy for osteoporosis  In such patients, clinical judgment must be exercised in interpreting FRAX scores  -Prior hip, vertebral and humeral fragility fractures appear to confer greater risk of subsequent fracture than fractures at other sites (this is especially true for individuals with severe vertebral fractures), but quantification of this incremental risk is not possible with FRAX  -FRAX underestimates fracture risk in patients with history of multiple fragility fractures  -FRAX may underestimate fracture risk in patients with history of frequent falls  -It is not appropriate to use FRAX to monitor treatment response  WHO CLASSIFICATION: Normal (a T-score of -1 0 or higher) Low bone mineral density (a T-score of less than -1 0 but higher than -2 5) Osteoporosis (a T-score of -2 5 or less) Severe osteoporosis (a T-score of -2 5 or less with a fragility fracture) LEAST SIGNIFICANT CHANGE (AT 95% C  I): Lumbar spine: 0 020 g/cm2 (2 2%) Total hip: 0 033 g/cm2 (3 8%) Forearm: 0 021 g/cm2 (3 3%) Workstation performed: [id number]       Teaching    Covid Vaccine Status:  Moderna x3    Health Maintenance   Topic Date Due    Pneumococcal Vaccine: 65+ Years (1 of 4 - PCV13) Never done    DTaP,Tdap,and Td Vaccines (1 - Tdap) Never done    COVID-19 Vaccine (3 - Moderna risk 4-dose series) 03/19/2021    Influenza Vaccine (1) Never done    Fall Risk  03/10/2022    Medicare Annual Wellness Visit (AWV)  03/10/2022    Depression Screening  12/08/2022    BMI: Adult  02/11/2023    Hepatitis C Screening  Completed    Osteoporosis Screening  Completed    HIB Vaccine  Aged Out    Hepatitis B Vaccine  Aged Out    IPV Vaccine  Aged Out    Hepatitis A Vaccine  Aged Out    Meningococcal ACWY Vaccine  Aged Out    HPV Vaccine  Aged Out     Patient Active Problem List   Diagnosis    Malignant neoplasm of overlapping 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, estrogen receptor positive ([facility] )    Use of aromatase inhibitors    Vitamin D deficiency    Benign essential hypertension    Abnormal echocardiography    Chronic systolic heart failure ([facility] )    Metastatic breast cancer ([facility] )    Colonoscopy refused    Immunization not carried out because of patient decision    Heart burn    Abnormal gastrointestinal PET scan    Osteopenia    Essential hypertension    Brain metastases ([facility] )    Nonischemic cardiomyopathy ([facility] )    S/P chemotherapy, time since 4-12 weeks    Status post chemoradiation    Chemotherapy induced nausea and vomiting    Acute cystitis without hematuria    Severe protein-calorie malnutrition Rudy Peters: less than 60% of standard weight) ([facility] )    Hypokalemia    Dehydration    Pain    Bilateral stenosis of lateral recess of lumbar spine    Vitamin B 12 deficiency    Lumbar radiculitis    Other spondylosis with radiculopathy, lumbar region    Cerebral edema (HCC)    Spondylolisthesis of lumbar region    Acute bronchitis    Abnormal thyroid function test    Pure hypertriglyceridemia    Screening for colon cancer    Encounter for central line care    Stage 3a chronic kidney disease ([facility] )    Immunization not carried out because of patient refusal    Increased PTH level    Abnormal urinalysis    Asthmatic bronchitis     Past Medical History:   Diagnosis Date    Back pain     [facility]     Breast cancer ([facility]     Chronic pain     Hypertension     Knee pain     Lymphedema of right arm     Vitamin B12 deficiency      Past Surgical History:   Procedure Laterality Date    ANKLE SURGERY      APPENDECTOMY      BREAST SURGERY      bilat mastectomy-right total mastectomy and prophylactic left total mastectomy-2005    CATARACT EXTRACTION      FRACTURE SURGERY      INSERTION CENTRAL VENOUS ACCESS DEVICE W/ SUBCUTANEOUS PORT      KNEE SURGERY      right knee replacement 2014 in [address] [place] [address] Bilateral     ORTHOPEDIC SURGERY       Family History   Problem Relation Age of Onset    Asthma Mother     Osteoarthritis Father     Bone cancer Paternal Aunt         bone cancer     Social History     Socioeconomic History    Marital status: Single     Spouse name: Not on file    Number of children: 0    Years of education: Not on file    Highest education level: Not on file   Occupational History    Not on file   Tobacco Use    Smoking status: Never Smoker    Smokeless tobacco: Never Used    Tobacco comment: no passive smoke exposure   Vaping Use    Vaping Use: Never used   Substance and Sexual Activity    Alcohol use: Never    Drug use: No    Sexual activity: Not Currently   Other Topics Concern    Not on file   Social History Narrative    Not on file     Social Determinants of Health     Financial Resource Strain: Not on file   Food Insecurity: Not on file   Transportation Needs: Not on file   Physical Activity: Not on file   Stress: Not on file   Social Connections: Not on file   Intimate Partner Violence: Not on file   Housing Stability: Not on file       Current Outpatient Medications:     acetaminophen (TYLENOL) 500 mg tablet, Take 2 tablets (1,000 mg total) by mouth every 8 (eight) hours as needed for mild pain, Disp: 30 tablet, Rfl: 0    albuterol (PROVENTIL HFA,VENTOLIN HFA) 90 mcg/act inhaler, Inhale 2 puffs every 6 (six) hours as needed for wheezing or shortness of breath, Disp: 18 g, Rfl: 2    anastrozole (ARIMIDEX) 1 mg tablet, Take 1 tablet (1 mg total) by mouth daily, Disp: 90 tablet, Rfl: 3    capecitabine (XELODA) 500 MG tablet, Take 3 tabs(1500 mg) in AM and 2 tabs (1000 mg) in PM for 14 days, then 7 days rest, Disp: 70 tablet, Rfl: 11    cholecalciferol (VITAMIN D3) 1,000 units tablet, Take 1 tablet (1,000 Units total) by mouth daily, Disp: 30 tablet, Rfl: 0    Cyanocobalamin 1000 MCG/ML KIT, Inject as directed every 6 (six) months, Disp: , Rfl:     Elastic Bandages & Supports (Prolite Lumbar Support) MISC, Use daily as needed (back pain), Disp: 1 each, Rfl: 0    famotidine (PEPCID) 10 mg tablet, Take 10 mg by mouth 2 (two) times a day as needed for heartburn, Disp: , Rfl:     lisinopril (ZESTRIL) 2 5 mg tablet, Take 1 tablet (2 5 mg total) by mouth daily, Disp: 30 tablet, Rfl: 6    Neratinib Maleate 40 MG TABS, Take 240 mg by mouth daily Start Week 1 take 3 tabs (120 mg) daily Week 2 take 4 tabs ( 160 Mg) daily Week 3 take 6 tabs (240) mg daily, Disp: 180 tablet, Rfl: 11    ondansetron (ZOFRAN) 8 mg tablet, Take 1 tablet (8 mg total) by mouth every 8 (eight) hours as needed for nausea or vomiting, Disp: 20 tablet, Rfl: 3    escitalopram (LEXAPRO) 5 mg tablet, Take 1 tablet (5 mg total) by mouth daily (Patient not taking: Reported on 12/8/2021 ), Disp: 30 tablet, Rfl: 5    esomeprazole (NexIUM) 40 MG capsule, TAKE 1 CAPSULE BY MOUTH ONCE DAILY (Patient not taking: Reported on 12/23/2021), Disp: 90 capsule, Rfl: 0    gabapentin (NEURONTIN) 300 mg capsule, Take 3 capsules (900 mg total) by mouth daily at bedtime (Patient not taking: Reported on 12/8/2021 ), Disp: 90 capsule, Rfl: 2    indapamide (LOZOL) 1 25 mg tablet, Take 1 tablet (1 25 mg total) by mouth every morning (Patient not taking: Reported on 2/16/2022 ), Disp: 30 tablet, Rfl: 1    lidocaine (LIDODERM) 5 %, Apply 1 patch topically daily Remove & Discard patch within 12 hours or as directed by MD (Patient not taking: Reported on 12/8/2021 ), Disp: 30 patch, Rfl: 1    megestrol (MEGACE) 400 mg/10 mL, Take 400 mg by mouth daily (Patient not taking: Reported on 12/8/2021 ), Disp: 600 mL, Rfl: 0    Misc  Devices (ILLUSIONS C BREAST PROSTHESIS) MISC, 1 breast prosthesis, Disp: 1 each, Rfl: 0    Misc   Devices (REFLECTIONS C BREAST PROSTHES) MISC, Dispense 1 breast prosthesis, Disp: 1 each, Rfl: 0    nebivolol (Bystolic) 5 mg tablet, Take 1 tablet (5 mg total) by mouth daily (Patient not taking: Reported on 12/23/2021 ), Disp: 90 tablet, Rfl: 3    ondansetron (Zofran) 4 mg tablet, Every 12 hours (Patient not taking: Reported on 12/23/2021 ), Disp: , Rfl:     pantoprazole (PROTONIX) 40 mg tablet, TAKE ONE TABLET BY MOUTH EVERY DAY (Patient not taking: Reported on 12/23/2021), Disp: 90 tablet, Rfl: 0  Allergies   Allergen Reactions    Penicillins Rash     Vitals:    02/16/22 1440   BP: 142/86   BP Location: Left arm   Patient Position: Sitting   Pulse: 87   Resp: 18   Temp: 98 1 °F (36 7 °C)   TempSrc: Temporal   SpO2: 100%   Weight: 58 9 kg (129 lb 12 8 oz)   Height: 5' 2" (1 575 m)      Pain Score:   6

## 2022-02-16 NOTE — PROGRESS NOTES
Neurosurgery Office Note  Pamela Tate 69 y o  female MRN: [id number]      Assessment/Plan      Diagnoses and all orders for this visit:    Brain metastases [facility])    Metastatic breast cancer [facility])          Discussion:    Pain Score:   6 (Low back pain)    To right temporal and right parietal lesions 12/15/21  S/p SRS to left occipital lesions, 12/9/20  History of WBRT before that in 3/2020  Tolerated radiation well  Treated areas controlled, other previously treated areas stable  Patient seen in conjunction with Dr Katie Hanson  We recommended a follow-up MRI scan in 3 months, which Dr Katie Hanson will order  12/02/20 Metrics: EQ5D5L 28917=9 826; VAS 50; ECOG 1; KPS 90    02/10/21 Metrics: ; VAS 50; ECOG 1; KPS 80    12/08/21 Metrics: EQ5D5L 65840=4 687; VAS 60; ECOG 2; KPS 70  MOCA ND (language barrier)  02/16/22 Metrics: EQ5D5L 93529=6 712; VAS 70; ECOG 2; KPS 70      CHIEF COMPLAINT    Chief Complaint   Patient presents with    Post-op     2 MO POST SRS  MRI SL 2/15/22       HISTORY    History of Present Illness     69y o  year old female     HPI    See Discussion    REVIEW OF SYSTEMS    Review of Systems   Constitutional: Positive for activity change (decrease due to her gait/balance)  Negative for appetite change (improving since last visit, doesn't eat alot, get fluids by IV) and unexpected weight change (improving since last visit, lose of weight, get fluids by IV)  HENT: Negative  Eyes: Negative  Respiratory: Negative  Cardiovascular: Negative  Improved since last visit, Right arm swelling since surgery, no changes, activity increases swelling   Gastrointestinal: Positive for diarrhea (due to medication)  Endocrine: Negative  Genitourinary: Negative      Musculoskeletal: Positive for back pain (centered LBP radiates to b/l legs at times ) and gait problem (unchanged since last visit, still using walker at home, recent fall last week, missed a step, takes her time when walking)  Skin: Negative  Allergic/Immunologic: Negative  Neurological: Positive for weakness (unchanged since last visit, sometimes not constant, bilateral legs causes difficulty walking  ) and numbness (unchanged since last visit, numbness and tingling in bilateral feet and bilateral hands/fingers)  Negative for dizziness (improved since last visit, sometimes), tremors, seizures, syncope, speech difficulty, light-headedness and headaches  Hematological: Negative  Psychiatric/Behavioral: Negative for decreased concentration  The patient is nervous/anxious (due to today's visit and test results)  Meds/Allergies     Current Outpatient Medications   Medication Sig Dispense Refill    acetaminophen (TYLENOL) 500 mg tablet Take 2 tablets (1,000 mg total) by mouth every 8 (eight) hours as needed for mild pain 30 tablet 0    albuterol (PROVENTIL HFA,VENTOLIN HFA) 90 mcg/act inhaler Inhale 2 puffs every 6 (six) hours as needed for wheezing or shortness of breath 18 g 2    anastrozole (ARIMIDEX) 1 mg tablet Take 1 tablet (1 mg total) by mouth daily 90 tablet 3    capecitabine (XELODA) 500 MG tablet Take 3 tabs(1500 mg) in AM and 2 tabs (1000 mg) in PM for 14 days, then 7 days rest 70 tablet 11    cholecalciferol (VITAMIN D3) 1,000 units tablet Take 1 tablet (1,000 Units total) by mouth daily 30 tablet 0    Cyanocobalamin 1000 MCG/ML KIT Inject as directed every 6 (six) months      Elastic Bandages & Supports (Prolite Lumbar Support) MISC Use daily as needed (back pain) 1 each 0    famotidine (PEPCID) 10 mg tablet Take 10 mg by mouth 2 (two) times a day as needed for heartburn      lisinopril (ZESTRIL) 2 5 mg tablet Take 1 tablet (2 5 mg total) by mouth daily 30 tablet 6    Misc  Devices (ILLUSIONS C BREAST PROSTHESIS) MISC 1 breast prosthesis 1 each 0    Misc   Devices (REFLECTIONS C BREAST PROSTHES) MISC Dispense 1 breast prosthesis 1 each 0    Neratinib Maleate 40 MG TABS Take 240 mg by mouth daily Start Week 1 take 3 tabs (120 mg) daily Week 2 take 4 tabs ( 160 Mg) daily Week 3 take 6 tabs (240) mg daily 180 tablet 11    ondansetron (ZOFRAN) 8 mg tablet Take 1 tablet (8 mg total) by mouth every 8 (eight) hours as needed for nausea or vomiting 20 tablet 3    escitalopram (LEXAPRO) 5 mg tablet Take 1 tablet (5 mg total) by mouth daily (Patient not taking: Reported on 12/8/2021 ) 30 tablet 5    esomeprazole (NexIUM) 40 MG capsule TAKE 1 CAPSULE BY MOUTH ONCE DAILY (Patient not taking: Reported on 12/23/2021) 90 capsule 0    gabapentin (NEURONTIN) 300 mg capsule Take 3 capsules (900 mg total) by mouth daily at bedtime (Patient not taking: Reported on 12/8/2021 ) 90 capsule 2    indapamide (LOZOL) 1 25 mg tablet Take 1 tablet (1 25 mg total) by mouth every morning (Patient not taking: Reported on 2/16/2022 ) 30 tablet 1    lidocaine (LIDODERM) 5 % Apply 1 patch topically daily Remove & Discard patch within 12 hours or as directed by MD (Patient not taking: Reported on 12/8/2021 ) 30 patch 1    megestrol (MEGACE) 400 mg/10 mL Take 400 mg by mouth daily (Patient not taking: Reported on 12/8/2021 ) 600 mL 0    nebivolol (Bystolic) 5 mg tablet Take 1 tablet (5 mg total) by mouth daily (Patient not taking: Reported on 12/23/2021 ) 90 tablet 3    ondansetron (Zofran) 4 mg tablet Every 12 hours (Patient not taking: Reported on 12/23/2021 )      pantoprazole (PROTONIX) 40 mg tablet TAKE ONE TABLET BY MOUTH EVERY DAY (Patient not taking: Reported on 12/23/2021) 90 tablet 0     No current facility-administered medications for this visit         Allergies   Allergen Reactions    Penicillins Rash       PAST HISTORY    Past Medical History:   Diagnosis Date    Back pain     [facility] [address] [facility]     [facility] [address] [facility]     Chronic pain     Hypertension     Knee pain     Lymphedema of right arm     Vitamin B12 deficiency        Past Surgical History:   Procedure Laterality Date    ANKLE SURGERY      APPENDECTOMY      BREAST SURGERY      bilat mastectomy-right total mastectomy and prophylactic left total mastectomy-2005    CATARACT EXTRACTION      FRACTURE SURGERY      INSERTION CENTRAL VENOUS ACCESS DEVICE W/ SUBCUTANEOUS PORT      KNEE SURGERY      right knee replacement 2014 in [address] [place] [address] Bilateral     ORTHOPEDIC SURGERY         Social History     Tobacco Use    Smoking status: Never Smoker    Smokeless tobacco: Never Used    Tobacco comment: no passive smoke exposure   Vaping Use    Vaping Use: Never used   Substance Use Topics    Alcohol use: Never    Drug use: No       Family History   Problem Relation Age of Onset    Asthma Mother     Osteoarthritis Father     Bone cancer Paternal Aunt         bone cancer         The following portions of the patient's history were reviewed in this encounter and updated as appropriate: Past medical, surgical, family, and social history, as well as medications, allergies, and review of systems  EXAM    Vitals:Blood pressure 142/86, pulse 87, temperature 98 1 °F (36 7 °C), resp  rate 18, height 5' 2" (1 575 m), weight 58 9 kg (129 lb 12 8 oz), SpO2 100 %, not currently breastfeeding  ,Body mass index is 23 74 kg/m²  Physical Exam    Neurologic Exam      MEDICAL DECISION MAKING    Imaging Studies:     MRI brain w wo contrast    Result Date: 2/15/2022  Narrative: MRI BRAIN WITH AND WITHOUT CONTRAST INDICATION: Recurrent breast cancer diagnosed in 2015  Following treatment, patient had subsequent MR imaging in 2020 which revealed intracranial metastasis for which patient received whole brain radiation therapy as well as stereotactic radiosurgery to  the left occipital lesion in December 2020    Follow-up MRI in November 2021 revealed a concerning nodule along the superior border of the treated left occipital lesion and interval enlargement of the right parietal and anterior inferior temporal lobe lesions which are concerning for disease progression  Patient was started on chemotherapy in December 2021  This is a follow-up evaluation  COMPARISON:  MRIs of the brain from February 8, 2021, November 17, 2021  TECHNIQUE: Sagittal T1, axial T2, axial FLAIR, axial T1, axial T2*gradient, axial diffusion  Sagittal, axial T1 postcontrast   Axial bravo postcontrast with coronal reconstructions  Imaging performed on 1 5T MRI  IV Contrast:  6 mL of Gadobutrol injection (SINGLE-DOSE)  IMAGE QUALITY:   Diagnostic  FINDINGS: BRAIN PARENCHYMA:  The most recent MRI revealed four metastatic lesions  The largest was identified in the left occipital lobe with a new concerning 3 mm nodule along its superior border  The other 2  lesions were noted in the right parietal and anterior-inferior temporal lobes  There is stable FLAIR hyperintense signal in the left occipital lobe surrounding the largest lesion  Otherwise, periventricular and deep cerebral white matter FLAIR hyperintense signal likely represents chronic ischemic and posttreatment change  No new enhancing lesions are identified  The left occipital lobe lesion with the 3 mm enhancing nodule along its superior border is stable  The right parietal lobe lesion is smaller, measuring 9 mm in its long axis and demonstrating less enhancement than noted on the prior study  The right anterior inferior temporal lobe lesion is also smaller, measuring 8 mm in its long axis  The right anterior temporal lobe periventricular FLAIR hyperintense signal has resolved  VENTRICLES:  Ventricles and extra-axial CSF spaces are prominent commensurate with the degree of volume loss  No hydrocephalus  No intraventricular hemorrhage  SELLA AND PITUITARY GLAND:  Normal  ORBITS:  Normal  PARANASAL SINUSES:  Bilateral maxillary, anterior ethmoid sinus with subtotal opacification  Near-total left and total right sphenoid sinus opacification   VASCULATURE:  Evaluation of the major intracranial vasculature demonstrates appropriate flow voids  CALVARIUM AND SKULL BASE:  Normal  EXTRACRANIAL SOFT TISSUES:  Normal      Impression: No acute abnormality  No new parenchymal enhancement  Findings suggestive of treatment response with interval decrease in the size of the right parietal and anterior temporal lobe lesions  The left occipital lobe lesion and the previously described 3 mm enhancing nodule along its superior border remains stable  Interval resolution of the FLAIR signal abnormality in the right anterior temporal lobe  Stable left occipital FLAIR signal abnormality  Stable posttreatment white matter changes in the periventricular and deep cerebral white matter  Workstation performed: [id number]     I have personally reviewed pertinent reports  PLEASE NOTE:  This encounter may have been completed utilizing the [employer] Direct Speech Voice Recognition Software  Grammatical errors, random word insertions, pronoun errors and incomplete sentences are occasional consequences of the system due to software limitations, ambient noise and hardware issues  These may be missed by proof reading prior to affixing electronic signature  Any questions or concerns about the content, text or information contained within the body of this dictation should be directly addressed to the advanced practitioner or physician for clarification

## 2022-02-17 ENCOUNTER — HOSPITAL ENCOUNTER (OUTPATIENT)
Dept: INFUSION CENTER | Facility: HOSPITAL | Age: 77
Discharge: HOME | End: 2022-02-17
Payer: MEDICARE

## 2022-02-17 VITALS
RESPIRATION RATE: 18 BRPM | SYSTOLIC BLOOD PRESSURE: 116 MMHG | DIASTOLIC BLOOD PRESSURE: 75 MMHG | TEMPERATURE: 98.7 F | HEART RATE: 83 BPM

## 2022-02-17 DIAGNOSIS — E86.0 DEHYDRATION: Primary | ICD-10-CM

## 2022-02-17 PROCEDURE — 96360 HYDRATION IV INFUSION INIT: CPT

## 2022-02-17 RX ADMIN — SODIUM CHLORIDE 1000 ML: 0.9 INJECTION, SOLUTION INTRAVENOUS at 10:05

## 2022-02-22 ENCOUNTER — HOSPITAL ENCOUNTER (OUTPATIENT)
Dept: INFUSION CENTER | Facility: HOSPITAL | Age: 77
End: 2022-02-22

## 2022-02-24 ENCOUNTER — HOSPITAL ENCOUNTER (OUTPATIENT)
Dept: INFUSION CENTER | Facility: HOSPITAL | Age: 77
Discharge: HOME | End: 2022-02-24
Payer: MEDICARE

## 2022-02-24 VITALS
RESPIRATION RATE: 20 BRPM | DIASTOLIC BLOOD PRESSURE: 84 MMHG | TEMPERATURE: 97.7 F | HEART RATE: 85 BPM | SYSTOLIC BLOOD PRESSURE: 136 MMHG

## 2022-02-24 DIAGNOSIS — E86.0 DEHYDRATION: Primary | ICD-10-CM

## 2022-02-24 PROCEDURE — 96360 HYDRATION IV INFUSION INIT: CPT

## 2022-02-24 RX ADMIN — SODIUM CHLORIDE 1000 ML: 0.9 INJECTION, SOLUTION INTRAVENOUS at 09:47

## 2022-02-24 NOTE — PROGRESS NOTES
Pt tolerated 1L NSS hydration without difficulty  Port flushed and deaccessed per protocol  Next appt scheduled  AVS provided  Escorted to lobby in w/c

## 2022-02-28 ENCOUNTER — HOSPITAL ENCOUNTER (OUTPATIENT)
Dept: INFUSION CENTER | Facility: HOSPITAL | Age: 77
Discharge: HOME | End: 2022-02-28
Attending: INTERNAL MEDICINE
Payer: MEDICARE

## 2022-02-28 VITALS
SYSTOLIC BLOOD PRESSURE: 141 MMHG | RESPIRATION RATE: 18 BRPM | HEART RATE: 86 BPM | DIASTOLIC BLOOD PRESSURE: 62 MMHG | OXYGEN SATURATION: 100 % | TEMPERATURE: 97.1 F

## 2022-02-28 DIAGNOSIS — Z17.0 MALIGNANT NEOPLASM OF OVERLAPPING SITES OF RIGHT BREAST IN FEMALE, ESTROGEN RECEPTOR POSITIVE (HCC): ICD-10-CM

## 2022-02-28 DIAGNOSIS — D75.89 MACROCYTOSIS: ICD-10-CM

## 2022-02-28 DIAGNOSIS — C50.811 MALIGNANT NEOPLASM OF OVERLAPPING SITES OF RIGHT BREAST IN FEMALE, ESTROGEN RECEPTOR POSITIVE (HCC): ICD-10-CM

## 2022-02-28 DIAGNOSIS — D53.9 NUTRITIONAL ANEMIA: ICD-10-CM

## 2022-02-28 DIAGNOSIS — E86.0 DEHYDRATION: Primary | ICD-10-CM

## 2022-02-28 LAB
ALBUMIN SERPL BCP-MCNC: 3.5 G/DL (ref 3–5.2)
ALP SERPL-CCNC: 38 U/L (ref 43–122)
ALT SERPL W P-5'-P-CCNC: 11 U/L
ANION GAP SERPL CALCULATED.3IONS-SCNC: 4 MMOL/L (ref 5–14)
ANISOCYTOSIS BLD QL SMEAR: PRESENT
AST SERPL W P-5'-P-CCNC: 22 U/L (ref 14–36)
BASOPHILS # BLD AUTO: 0 THOUSANDS/ΜL (ref 0–0.1)
BASOPHILS NFR BLD AUTO: 0 % (ref 0–1)
BILIRUB SERPL-MCNC: 0.94 MG/DL
BUN SERPL-MCNC: 20 MG/DL (ref 5–25)
CALCIUM SERPL-MCNC: 8.2 MG/DL (ref 8.4–10.2)
CHLORIDE SERPL-SCNC: 106 MMOL/L (ref 97–108)
CO2 SERPL-SCNC: 27 MMOL/L (ref 22–30)
CREAT SERPL-MCNC: 0.78 MG/DL (ref 0.6–1.2)
EOSINOPHIL # BLD AUTO: 0.1 THOUSAND/ΜL (ref 0–0.4)
EOSINOPHIL NFR BLD AUTO: 1 % (ref 0–6)
ERYTHROCYTE [DISTWIDTH] IN BLOOD BY AUTOMATED COUNT: 21 %
FERRITIN SERPL-MCNC: 164 NG/ML (ref 8–388)
FOLATE SERPL-MCNC: 10.3 NG/ML (ref 3.1–17.5)
GFR SERPL CREATININE-BSD FRML MDRD: 74 ML/MIN/1.73SQ M
GLUCOSE SERPL-MCNC: 103 MG/DL (ref 70–99)
HCT VFR BLD AUTO: 32.5 % (ref 36–46)
HGB BLD-MCNC: 11.3 G/DL (ref 12–16)
IRON SATN MFR SERPL: 8 % (ref 15–50)
IRON SERPL-MCNC: 24 UG/DL (ref 50–170)
LYMPHOCYTES # BLD AUTO: 1 THOUSANDS/ΜL (ref 0.5–4)
LYMPHOCYTES NFR BLD AUTO: 12 % (ref 25–45)
MACROCYTES BLD QL AUTO: PRESENT
MAGNESIUM SERPL-MCNC: 1.6 MG/DL (ref 1.6–2.3)
MCH RBC QN AUTO: 35.8 PG (ref 26–34)
MCHC RBC AUTO-ENTMCNC: 34.6 G/DL (ref 31–36)
MCV RBC AUTO: 103 FL (ref 80–100)
MONOCYTES # BLD AUTO: 0.8 THOUSAND/ΜL (ref 0.2–0.9)
MONOCYTES NFR BLD AUTO: 10 % (ref 1–10)
NEUTROPHILS # BLD AUTO: 6.4 THOUSANDS/ΜL (ref 1.8–7.8)
NEUTS SEG NFR BLD AUTO: 77 % (ref 45–65)
PLATELET # BLD AUTO: 213 THOUSANDS/UL (ref 150–450)
PLATELET BLD QL SMEAR: ADEQUATE
PMV BLD AUTO: 7.2 FL (ref 8.9–12.7)
POTASSIUM SERPL-SCNC: 3.6 MMOL/L (ref 3.6–5)
PROT SERPL-MCNC: 6.9 G/DL (ref 5.9–8.4)
RBC # BLD AUTO: 3.14 MILLION/UL (ref 4–5.2)
RBC MORPH BLD: NORMAL
SODIUM SERPL-SCNC: 137 MMOL/L (ref 137–147)
TIBC SERPL-MCNC: 301 UG/DL (ref 250–450)
TSH SERPL DL<=0.05 MIU/L-ACNC: 2.1 UIU/ML (ref 0.47–4.68)
VIT B12 SERPL-MCNC: 516 PG/ML (ref 100–900)
WBC # BLD AUTO: 8.4 THOUSAND/UL (ref 4.5–11)

## 2022-02-28 PROCEDURE — 82607 VITAMIN B-12: CPT

## 2022-02-28 PROCEDURE — 96360 HYDRATION IV INFUSION INIT: CPT

## 2022-02-28 PROCEDURE — 82746 ASSAY OF FOLIC ACID SERUM: CPT

## 2022-02-28 PROCEDURE — 85025 COMPLETE CBC W/AUTO DIFF WBC: CPT

## 2022-02-28 PROCEDURE — 82728 ASSAY OF FERRITIN: CPT

## 2022-02-28 PROCEDURE — 84443 ASSAY THYROID STIM HORMONE: CPT

## 2022-02-28 PROCEDURE — 80053 COMPREHEN METABOLIC PANEL: CPT

## 2022-02-28 PROCEDURE — 83735 ASSAY OF MAGNESIUM: CPT

## 2022-02-28 PROCEDURE — 83550 IRON BINDING TEST: CPT

## 2022-02-28 PROCEDURE — 83540 ASSAY OF IRON: CPT

## 2022-02-28 RX ADMIN — SODIUM CHLORIDE 1000 ML: 0.9 INJECTION, SOLUTION INTRAVENOUS at 11:50

## 2022-02-28 NOTE — PLAN OF CARE
Problem: Potential for Falls  Goal: Patient will remain free of falls  Description: INTERVENTIONS:  - Educate patient/family on patient safety including physical limitations  - Instruct patient to call for assistance with activity   - Consult OT/PT to assist with strengthening/mobility   - Keep Call bell within reach    Outcome: Progressing     Problem: METABOLIC, FLUID AND ELECTROLYTES - ADULT  Goal: Fluid balance maintained  Description: INTERVENTIONS:  - Monitor labs   - Monitor I/O and WT  - Instruct patient on fluid and nutrition as appropriate  - Assess for signs & symptoms of volume excess or deficit  Outcome: Progressing Topical Retinoid Pregnancy And Lactation Text: This medication is Pregnancy Category C. It is unknown if this medication is excreted in breast milk.

## 2022-02-28 NOTE — PROGRESS NOTES
Patient tolerated hydration fluids well with no adverse effects  Offers no complaints  Port left accessed for tomorrows tx  Next appointment verified, AVS declined

## 2022-03-01 ENCOUNTER — HOSPITAL ENCOUNTER (OUTPATIENT)
Dept: INFUSION CENTER | Facility: HOSPITAL | Age: 77
Discharge: HOME | End: 2022-03-01
Attending: INTERNAL MEDICINE
Payer: MEDICARE

## 2022-03-01 VITALS
DIASTOLIC BLOOD PRESSURE: 84 MMHG | SYSTOLIC BLOOD PRESSURE: 135 MMHG | HEART RATE: 84 BPM | OXYGEN SATURATION: 100 % | RESPIRATION RATE: 18 BRPM | TEMPERATURE: 96.9 F

## 2022-03-01 DIAGNOSIS — E86.0 DEHYDRATION: Primary | ICD-10-CM

## 2022-03-01 PROCEDURE — 96360 HYDRATION IV INFUSION INIT: CPT

## 2022-03-01 RX ADMIN — SODIUM CHLORIDE 1000 ML: 0.9 INJECTION, SOLUTION INTRAVENOUS at 11:27

## 2022-03-04 ENCOUNTER — OFFICE VISIT (OUTPATIENT)
Dept: HEMATOLOGY ONCOLOGY | Facility: CLINIC | Age: 77
End: 2022-03-04
Payer: MEDICARE

## 2022-03-04 VITALS
WEIGHT: 129.2 LBS | OXYGEN SATURATION: 100 % | HEART RATE: 92 BPM | SYSTOLIC BLOOD PRESSURE: 126 MMHG | DIASTOLIC BLOOD PRESSURE: 68 MMHG | BODY MASS INDEX: 23.77 KG/M2 | TEMPERATURE: 97.5 F | RESPIRATION RATE: 16 BRPM | HEIGHT: 62 IN

## 2022-03-04 DIAGNOSIS — D50.9 IRON DEFICIENCY ANEMIA, UNSPECIFIED IRON DEFICIENCY ANEMIA TYPE: ICD-10-CM

## 2022-03-04 DIAGNOSIS — Z17.0 MALIGNANT NEOPLASM OF OVERLAPPING SITES OF RIGHT BREAST IN FEMALE, ESTROGEN RECEPTOR POSITIVE (HCC): Primary | ICD-10-CM

## 2022-03-04 DIAGNOSIS — C50.811 MALIGNANT NEOPLASM OF OVERLAPPING SITES OF RIGHT BREAST IN FEMALE, ESTROGEN RECEPTOR POSITIVE (HCC): Primary | ICD-10-CM

## 2022-03-04 DIAGNOSIS — C79.31 BRAIN METASTASIS (HCC): ICD-10-CM

## 2022-03-04 PROCEDURE — 99214 OFFICE O/P EST MOD 30 MIN: CPT | Performed by: INTERNAL MEDICINE

## 2022-03-04 RX ORDER — SODIUM CHLORIDE 9 MG/ML
20 INJECTION, SOLUTION INTRAVENOUS ONCE
Status: CANCELLED
Start: 2022-03-04 | End: 2022-03-04

## 2022-03-04 NOTE — PROGRESS NOTES
Hematology/Oncology Outpatient Follow-up  Marcos Collier 69 y o  female 1945 [id number]    Date:  3/4/2022        Assessment and Plan:  1  Malignant neoplasm of overlapping 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, estrogen receptor positive ([facility] [address] )  The patient seems to be tolerating the current combination of Xeloda and neratinib after the recent dose adjustment  She will be continue on the Xeloda at the 1000 mg twice a day 1 week on and 1 week off  She is also going to stay on the neratinib 200 mg once a day which is equal to 5 pills daily  I did ask her to put the anastrozole on hold for the time being  We will continue to monitor her breast tumor markers monthly   - CBC and differential; Future  - Comprehensive metabolic panel; Future  - Magnesium; Future  - LD,Blood; Future  - Cancer antigen 15-3; Future  - Cancer antigen 27 29; Future    2  Brain metastasis ([facility] )  Recent brain MRI showed stable disease post SRS on 12/16/2021     3  Iron deficiency anemia, unspecified iron deficiency anemia type  She seems to have anemia with iron deficiency  We will treat her with 2 doses of Venofer IV which hopefully will improve her hemoglobin level back to normal         HPI:  The patient came today for follow-up visit accompanied by her sister  She had an MRI of the brain on 02/15/2022 which was negative for any new metastatic lesion  She does she came to have a treatment response with interval decrease in the size of the right parietal and anterior temporal lobe lesions  The left occipital lobe lesion seems to be stable  There is also interval resolution of the FLAIR signal abnormality in the right anterior temporal lobe  Recent blood work on 02/28/2022 showed hemoglobin of 11 3 with normal white cells and platelets  Creatinine 0 7 with normal liver enzymes  Calcium was 8 2  Folate was normal with normal magnesium  TSH was 2 1 which is normal   Vitamin B12 516    Iron showed ferritin of 164 and saturation of 8%   She denied obvious bleeding from any sites  Oncology History   Malignant neoplasm of overlapping 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, estrogen receptor positive ([facility] [address] )   2015 -  Hormone Therapy    Anastrozole     5/28/2015 Initial Diagnosis    Metastatic breast cancer ([facility] [address] )  (recurrence)     6/30/2015 -  Chemotherapy    1-Taxotere, Herceptin, Perjeta started in Shay[place] 2015 x6 cycles  2-Herceptin and Perjeta alone were continued since the patient was not interested in continue the Taxotere  3-Herceptin and Perjeta were put on hold due to decline of her ejection fraction around May 2017    4-low dose weekly Taxol was started in July 2017  5-Taxol was switched to every other week basis     4/14/2019 - 6/9/2019 Chemotherapy    PACLitaxel (TAXOL) 144 6 mg in sodium chloride 0 9 % 250 mL chemo IVPB, 80 mg/m2, Intravenous, Once, 2 of 6 cycles     12/4/2019 - 12/31/2019 Chemotherapy    PACLitaxel (TAXOL) 142 2 mg in sodium chloride 0 9 % 250 mL chemo IVPB, 80 mg/m2 = 142 2 mg, Intravenous, Once, 1 of 6 cycles  Administration: 142 2 mg (12/4/2019), 142 2 mg (12/18/2019)     1/8/2020 - 3/10/2020 Chemotherapy    PACLitaxel (TAXOL) 141 6 mg in sodium chloride 0 9 % 250 mL chemo IVPB, 80 mg/m2 = 141 6 mg, Intravenous, Once, 4 of 6 cycles  Administration: 141 6 mg (1/8/2020), 141 6 mg (1/29/2020), 141 6 mg (2/19/2020)  trastuzumab (HERCEPTIN) 600 mg in sodium chloride 0 9 % 250 mL chemo infusion, 609 mg, Intravenous, Once, 4 of 6 cycles  Administration: 600 mg (1/8/2020), 450 mg (1/29/2020), 450 mg (2/19/2020)      Chemotherapy    PACLitaxel (TAXOL) 144 6 mg in sodium chloride 0 9 % 250 mL chemo IVPB, 80 mg/m2, Intravenous, Once, 2 of 6 cycles    PACLitaxel (TAXOL) 142 2 mg in sodium chloride 0 9 % 250 mL chemo IVPB, 80 mg/m2 = 142 2 mg, Intravenous, Once, 1 of 6 cycles    Administration: 142 2 mg (12/4/2019), 142 2 mg (12/18/2019)    PACLitaxel (TAXOL) 141 6 mg in sodium chloride 0 9 % 250 mL chemo IVPB, 80 mg/m2, Intravenous, Once, 0 of 12 cycles        trastuzumab (HERCEPTIN) 304 mg in sodium chloride 0 9 % 250 mL chemo infusion, 4 mg/kg, Intravenous, Once, 0 of 26 cycles    PACLitaxel (TAXOL) 141 6 mg in sodium chloride 0 9 % 250 mL chemo IVPB, 80 mg/m2 = 141 6 mg, Intravenous, Once, 4 of 6 cycles    Administration: 141 6 mg (1/8/2020), 141 6 mg (1/29/2020), 141 6 mg (2/19/2020)        trastuzumab (HERCEPTIN) 600 mg in sodium chloride 0 9 % 250 mL chemo infusion, 609 mg, Intravenous, Once, 4 of 6 cycles    Administration: 600 mg (1/8/2020), 450 mg (1/29/2020), 450 mg (2/19/2020)        Chemotherapy    PACLitaxel (TAXOL) 144 6 mg in sodium chloride 0 9 % 250 mL chemo IVPB, 80 mg/m2, Intravenous, Once, 2 of 6 cycles    PACLitaxel (TAXOL) 142 2 mg in sodium chloride 0 9 % 250 mL chemo IVPB, 80 mg/m2 = 142 2 mg, Intravenous, Once, 1 of 6 cycles    Administration: 142 2 mg (12/4/2019), 142 2 mg (12/18/2019)    PACLitaxel (TAXOL) 141 6 mg in sodium chloride 0 9 % 250 mL chemo IVPB, 80 mg/m2, Intravenous, Once, 0 of 12 cycles        trastuzumab (HERCEPTIN) 304 mg in sodium chloride 0 9 % 250 mL chemo infusion, 4 mg/kg, Intravenous, Once, 0 of 26 cycles    PACLitaxel (TAXOL) 141 6 mg in sodium chloride 0 9 % 250 mL chemo IVPB, 80 mg/m2 = 141 6 mg, Intravenous, Once, 4 of 6 cycles    Administration: 141 6 mg (1/8/2020), 141 6 mg (1/29/2020), 141 6 mg (2/19/2020)        trastuzumab (HERCEPTIN) 600 mg in sodium chloride 0 9 % 250 mL chemo infusion, 609 mg, Intravenous, Once, 4 of 6 cycles    Administration: 600 mg (1/8/2020), 450 mg (1/29/2020), 450 mg (2/19/2020)      Lapatinib 1500 mg once a day was started on 04/20/2020 after she was found to have metastatic disease to the brain, status post whole brain radiation       12/2021 -  Chemotherapy    Started Xeloda (750 mg/m2 b I d  2 weeks on with 1 week of a break) patient's dose is 1500 mg a med and 1000 mg p m  + Neratinib with dose escalation weekly to goal of 240 mg daily (starting week 3) Brain metastases ([address] )   3/30/2020 - 4/10/2020 Radiation    Plan ID Energy Fractions Dose per Fraction (cGy) Dose Correction (cGy) Total Dose Delivered (cGy) Elapsed Days   Whole Brai # 6X 10 / 10 300 0 3,000 11          4/9/2020 Initial Diagnosis    Brain metastasis ([address] )     12/9/2020 - 12/9/2020 Radiation    Plan ID Energy Fractions Dose per Fraction (cGy) Dose Correction (cGy) Total Dose Delivered (cGy) Elapsed Days   [id number] 6X 1 / 1 1,800 0 1,800 0      Treatment Dates:  12/9/2020 - 12/9/2020  Cancer Staged    Cancer Staging  No matching staging information was found for the patient  Interval history:    ROS: Review of Systems   Constitutional: Positive for fatigue  Negative for chills and fever  HENT: Negative for ear pain and sore throat  Eyes: Negative for pain and visual disturbance  Respiratory: Negative for cough and shortness of breath  Cardiovascular: Negative for chest pain and palpitations  Gastrointestinal: Negative for abdominal pain and vomiting  Genitourinary: Negative for dysuria and hematuria  Musculoskeletal: Negative for arthralgias and back pain  Skin: Negative for color change and rash  Neurological: Negative for seizures and syncope  All other systems reviewed and are negative        Past Medical History:   Diagnosis Date    Back pain     Brain cancer ([facility] [address] )     Breast cancer ([address] )     Chronic pain     Hypertension     Knee pain     Lymphedema of right arm     Vitamin B12 deficiency        Past Surgical History:   Procedure Laterality Date    ANKLE SURGERY      APPENDECTOMY      BREAST SURGERY      bilat mastectomy-right total mastectomy and prophylactic left total mastectomy-2005    CATARACT EXTRACTION      FRACTURE SURGERY      INSERTION CENTRAL VENOUS ACCESS DEVICE W/ SUBCUTANEOUS PORT      KNEE SURGERY      right knee replacement 2014 in [facility] Bilateral    [facility] History Socioeconomic History    Marital status: Single     Spouse name: None    Number of children: 0    Years of education: None    Highest education level: None   Occupational History    None   Tobacco Use    Smoking status: Never Smoker    Smokeless tobacco: Never Used    Tobacco comment: no passive smoke exposure   Vaping Use    Vaping Use: Never used   Substance and Sexual Activity    Alcohol use: Never    Drug use: No    Sexual activity: Not Currently   Other Topics Concern    None   Social History Narrative    None     Social Determinants of Health     Financial Resource Strain: Not on file   Food Insecurity: Not on file   Transportation Needs: Not on file   Physical Activity: Not on file   Stress: Not on file   Social Connections: Not on file   Intimate Partner Violence: Not on file   Housing Stability: Not on file       Family History   Problem Relation Age of Onset    Asthma Mother     Osteoarthritis Father     Bone cancer Paternal Aunt         bone cancer       Allergies   Allergen Reactions    Penicillins Rash         Current Outpatient Medications:     acetaminophen (TYLENOL) 500 mg tablet, Take 2 tablets (1,000 mg total) by mouth every 8 (eight) hours as needed for mild pain, Disp: 30 tablet, Rfl: 0    albuterol (PROVENTIL HFA,VENTOLIN HFA) 90 mcg/act inhaler, Inhale 2 puffs every 6 (six) hours as needed for wheezing or shortness of breath, Disp: 18 g, Rfl: 2    anastrozole (ARIMIDEX) 1 mg tablet, Take 1 tablet (1 mg total) by mouth daily, Disp: 90 tablet, Rfl: 3    capecitabine (XELODA) 500 MG tablet, Take 3 tabs(1500 mg) in AM and 2 tabs (1000 mg) in PM for 14 days, then 7 days rest, Disp: 70 tablet, Rfl: 11    cholecalciferol (VITAMIN D3) 1,000 units tablet, Take 1 tablet (1,000 Units total) by mouth daily, Disp: 30 tablet, Rfl: 0    Cyanocobalamin 1000 MCG/ML KIT, Inject as directed every 6 (six) months, Disp: , Rfl:     Elastic Bandages & Supports (Prolite Lumbar Support) MISC, Use daily as needed (back pain), Disp: 1 each, Rfl: 0    famotidine (PEPCID) 10 mg tablet, Take 10 mg by mouth 2 (two) times a day as needed for heartburn, Disp: , Rfl:     gabapentin (NEURONTIN) 300 mg capsule, Take 3 capsules (900 mg total) by mouth daily at bedtime, Disp: 90 capsule, Rfl: 2    lisinopril (ZESTRIL) 2 5 mg tablet, Take 1 tablet (2 5 mg total) by mouth daily, Disp: 30 tablet, Rfl: 6    Misc  Devices (ILLUSIONS C BREAST PROSTHESIS) MISC, 1 breast prosthesis, Disp: 1 each, Rfl: 0    Misc   Devices (REFLECTIONS C BREAST PROSTHES) MISC, Dispense 1 breast prosthesis, Disp: 1 each, Rfl: 0    Neratinib Maleate 40 MG TABS, Take 240 mg by mouth daily Start Week 1 take 3 tabs (120 mg) daily Week 2 take 4 tabs ( 160 Mg) daily Week 3 take 6 tabs (240) mg daily, Disp: 180 tablet, Rfl: 11    ondansetron (ZOFRAN) 8 mg tablet, Take 1 tablet (8 mg total) by mouth every 8 (eight) hours as needed for nausea or vomiting, Disp: 20 tablet, Rfl: 3    escitalopram (LEXAPRO) 5 mg tablet, Take 1 tablet (5 mg total) by mouth daily (Patient not taking: Reported on 12/8/2021 ), Disp: 30 tablet, Rfl: 5    esomeprazole (NexIUM) 40 MG capsule, TAKE 1 CAPSULE BY MOUTH ONCE DAILY (Patient not taking: Reported on 12/23/2021), Disp: 90 capsule, Rfl: 0    indapamide (LOZOL) 1 25 mg tablet, Take 1 tablet (1 25 mg total) by mouth every morning (Patient not taking: Reported on 2/16/2022 ), Disp: 30 tablet, Rfl: 1    lidocaine (LIDODERM) 5 %, Apply 1 patch topically daily Remove & Discard patch within 12 hours or as directed by MD (Patient not taking: Reported on 12/8/2021 ), Disp: 30 patch, Rfl: 1    megestrol (MEGACE) 400 mg/10 mL, Take 400 mg by mouth daily (Patient not taking: Reported on 12/8/2021 ), Disp: 600 mL, Rfl: 0    nebivolol (Bystolic) 5 mg tablet, Take 1 tablet (5 mg total) by mouth daily (Patient not taking: Reported on 12/23/2021 ), Disp: 90 tablet, Rfl: 3    ondansetron (Zofran) 4 mg tablet, Every 12 hours (Patient not taking: Reported on 12/23/2021 ), Disp: , Rfl:     pantoprazole (PROTONIX) 40 mg tablet, TAKE ONE TABLET BY MOUTH EVERY DAY (Patient not taking: Reported on 12/23/2021), Disp: 90 tablet, Rfl: 0      Physical Exam:  /68 (BP Location: Left arm, Patient Position: Sitting, Cuff Size: Adult)   Pulse 92   Temp 97 5 °F (36 4 °C) (Tympanic)   Resp 16   Ht 5' 2" (1 575 m)   Wt 58 6 kg (129 lb 3 2 oz)   SpO2 100%   BMI 23 63 kg/m²     Physical Exam  Constitutional:       General: She is not in acute distress  Appearance: She is well-developed  She is not diaphoretic  HENT:      Head: Normocephalic and atraumatic  Nose: Nose normal    Eyes:      General: No scleral icterus  Right eye: No discharge  Left eye: No discharge  Conjunctiva/sclera: Conjunctivae normal       Pupils: Pupils are equal, round, and reactive to light  Neck:      Thyroid: No thyromegaly  Vascular: No JVD  Trachea: No tracheal deviation  Cardiovascular:      Rate and Rhythm: Regular rhythm  Tachycardia present  Heart sounds: Normal heart sounds  No murmur heard  No friction rub  Pulmonary:      Effort: Pulmonary effort is normal  No respiratory distress  Breath sounds: Normal breath sounds  No stridor  No wheezing or rales  Chest:      Chest wall: No tenderness  Abdominal:      General: Bowel sounds are normal  There is no distension  Palpations: Abdomen is soft  There is no hepatomegaly, splenomegaly or mass  Tenderness: There is no abdominal tenderness  There is no guarding or rebound  Musculoskeletal:         General: No tenderness or deformity  Cervical back: Normal range of motion and neck supple  Comments: Ambulatory dysfunction   Lymphadenopathy:      Cervical: No cervical adenopathy  Skin:     General: Skin is warm and dry  Coloration: Skin is not pale  Findings: No erythema or rash     Neurological:      Mental Status: She is alert and oriented to person, place, and time  Cranial Nerves: No cranial nerve deficit  Coordination: Coordination normal       Deep Tendon Reflexes: Reflexes are normal and symmetric  Psychiatric:         Behavior: Behavior normal          Thought Content: Thought content normal          Judgment: Judgment normal            Labs:  Lab Results   Component Value Date    WBC 8 40 02/28/2022    HGB 11 3 (L) 02/28/2022    HCT 32 5 (L) 02/28/2022     (H) 02/28/2022     02/28/2022     Lab Results   Component Value Date     06/18/2018    K 3 6 02/28/2022     02/28/2022    CO2 27 02/28/2022    ANIONGAP 8 06/18/2018    BUN 20 02/28/2022    CREATININE 0 78 02/28/2022    GLUF 95 02/08/2022    CALCIUM 8 2 (L) 02/28/2022    CORRECTEDCA 9 5 02/08/2022    AST 22 02/28/2022    ALT 11 02/28/2022    ALKPHOS 38 (L) 02/28/2022    PROT 7 1 06/18/2018    BILITOT 0 3 06/18/2018    EGFR 74 02/28/2022     No results found for: TSH    Patient voiced understanding and agreement in the above discussion  Aware to contact our office with questions/symptoms in the interim

## 2022-03-05 ENCOUNTER — APPOINTMENT (EMERGENCY)
Dept: CT IMAGING | Facility: HOSPITAL | Age: 77
DRG: 871 | End: 2022-03-05
Payer: MEDICARE

## 2022-03-05 ENCOUNTER — HOSPITAL ENCOUNTER (INPATIENT)
Facility: HOSPITAL | Age: 77
LOS: 3 days | Discharge: HOME HEALTH | DRG: 871 | End: 2022-03-08
Attending: EMERGENCY MEDICINE | Admitting: INTERNAL MEDICINE
Payer: MEDICARE
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
## 2022-03-05 DIAGNOSIS — A41.9 SEPSIS (HCC): Primary | ICD-10-CM

## 2022-03-05 DIAGNOSIS — N39.0 URINARY TRACT INFECTION: ICD-10-CM

## 2022-03-05 PROBLEM — G93.41 METABOLIC ENCEPHALOPATHY: Status: ACTIVE | Noted: 2022-03-05

## 2022-03-05 PROBLEM — R65.20 SEVERE SEPSIS (HCC): Status: ACTIVE | Noted: 2022-03-05

## 2022-03-05 PROBLEM — Z02.9 DISCHARGE PLANNING ISSUES: Status: ACTIVE | Noted: 2022-03-05

## 2022-03-05 LAB
ALBUMIN SERPL BCP-MCNC: 3.9 G/DL (ref 3–5.2)
ALP SERPL-CCNC: 58 U/L (ref 43–122)
ALT SERPL W P-5'-P-CCNC: 15 U/L
ANION GAP SERPL CALCULATED.3IONS-SCNC: 6 MMOL/L (ref 5–14)
APTT PPP: 35 SECONDS (ref 23–37)
AST SERPL W P-5'-P-CCNC: 28 U/L (ref 14–36)
ATRIAL RATE: 128 BPM
BACTERIA UR QL AUTO: ABNORMAL /HPF
BILIRUB SERPL-MCNC: 1.52 MG/DL
BILIRUB UR QL STRIP: NEGATIVE
BUN SERPL-MCNC: 20 MG/DL (ref 5–25)
CALCIUM SERPL-MCNC: 8.9 MG/DL (ref 8.4–10.2)
CARDIAC TROPONIN I PNL SERPL HS: 12 NG/L (ref 8–18)
CHLORIDE SERPL-SCNC: 101 MMOL/L (ref 97–108)
CLARITY UR: ABNORMAL
CO2 SERPL-SCNC: 28 MMOL/L (ref 22–30)
COLOR UR: ABNORMAL
CREAT SERPL-MCNC: 0.95 MG/DL (ref 0.6–1.2)
ERYTHROCYTE [DISTWIDTH] IN BLOOD BY AUTOMATED COUNT: 20.4 %
FLUAV RNA RESP QL NAA+PROBE: NEGATIVE
FLUBV RNA RESP QL NAA+PROBE: NEGATIVE
GFR SERPL CREATININE-BSD FRML MDRD: 58 ML/MIN/1.73SQ M
GLUCOSE SERPL-MCNC: 103 MG/DL (ref 70–99)
GLUCOSE UR STRIP-MCNC: NEGATIVE MG/DL
HCT VFR BLD AUTO: 36.7 % (ref 36–46)
HGB BLD-MCNC: 12.5 G/DL (ref 12–16)
HGB UR QL STRIP.AUTO: 150
INR PPP: 1.16 (ref 0.84–1.19)
KETONES UR STRIP-MCNC: NEGATIVE MG/DL
LACTATE SERPL-SCNC: 1 MMOL/L (ref 0.7–2)
LACTATE SERPL-SCNC: 3.1 MMOL/L (ref 0.7–2)
LEUKOCYTE ESTERASE UR QL STRIP: 500
LIPASE SERPL-CCNC: 81 U/L (ref 23–300)
LYMPHOCYTES # BLD AUTO: 0.21 THOUSAND/UL (ref 0.5–4)
LYMPHOCYTES # BLD AUTO: 2 % (ref 25–45)
MACROCYTES BLD QL AUTO: PRESENT
MCH RBC QN AUTO: 35.3 PG (ref 26–34)
MCHC RBC AUTO-ENTMCNC: 34.1 G/DL (ref 31–36)
MCV RBC AUTO: 104 FL (ref 80–100)
MONOCYTES # BLD AUTO: 0.32 THOUSAND/UL (ref 0.2–0.9)
MONOCYTES NFR BLD AUTO: 3 % (ref 1–10)
NEUTS BAND NFR BLD MANUAL: 8 % (ref 0–8)
NEUTS SEG # BLD: 10.07 THOUSAND/UL (ref 1.8–7.8)
NEUTS SEG NFR BLD AUTO: 87 %
NITRITE UR QL STRIP: POSITIVE
NON-SQ EPI CELLS URNS QL MICRO: ABNORMAL /HPF
NT-PROBNP SERPL-MCNC: 893 PG/ML (ref 0–299)
P AXIS: 42 DEGREES
PH UR STRIP.AUTO: 6.5 [PH]
PLATELET # BLD AUTO: 203 THOUSANDS/UL (ref 150–450)
PLATELET BLD QL SMEAR: ADEQUATE
PMV BLD AUTO: 7.2 FL (ref 8.9–12.7)
POTASSIUM SERPL-SCNC: 4.3 MMOL/L (ref 3.6–5)
PR INTERVAL: 142 MS
PROCALCITONIN SERPL-MCNC: 25.31 NG/ML
PROT SERPL-MCNC: 8 G/DL (ref 5.9–8.4)
PROT UR STRIP-MCNC: ABNORMAL MG/DL
PROTHROMBIN TIME: 14.3 SECONDS (ref 11.6–14.5)
QRS AXIS: 26 DEGREES
QRSD INTERVAL: 70 MS
QT INTERVAL: 298 MS
QTC INTERVAL: 435 MS
RBC # BLD AUTO: 3.55 MILLION/UL (ref 4–5.2)
RBC #/AREA URNS AUTO: ABNORMAL /HPF
RBC MORPH BLD: ABNORMAL
RSV RNA RESP QL NAA+PROBE: NEGATIVE
SARS-COV-2 RNA RESP QL NAA+PROBE: NEGATIVE
SODIUM SERPL-SCNC: 135 MMOL/L (ref 137–147)
SP GR UR STRIP.AUTO: 1.01 (ref 1–1.04)
T WAVE AXIS: 67 DEGREES
TOTAL CELLS COUNTED SPEC: 100
UROBILINOGEN UA: NEGATIVE MG/DL
VENTRICULAR RATE: 128 BPM
WBC # BLD AUTO: 10.6 THOUSAND/UL (ref 4.5–11)
WBC #/AREA URNS AUTO: ABNORMAL /HPF

## 2022-03-05 PROCEDURE — 85007 BL SMEAR W/DIFF WBC COUNT: CPT | Performed by: EMERGENCY MEDICINE

## 2022-03-05 PROCEDURE — 87186 SC STD MICRODIL/AGAR DIL: CPT | Performed by: EMERGENCY MEDICINE

## 2022-03-05 PROCEDURE — 83880 ASSAY OF NATRIURETIC PEPTIDE: CPT | Performed by: EMERGENCY MEDICINE

## 2022-03-05 PROCEDURE — 87040 BLOOD CULTURE FOR BACTERIA: CPT | Performed by: EMERGENCY MEDICINE

## 2022-03-05 PROCEDURE — G1004 CDSM NDSC: HCPCS

## 2022-03-05 PROCEDURE — 87077 CULTURE AEROBIC IDENTIFY: CPT | Performed by: EMERGENCY MEDICINE

## 2022-03-05 PROCEDURE — 84484 ASSAY OF TROPONIN QUANT: CPT | Performed by: EMERGENCY MEDICINE

## 2022-03-05 PROCEDURE — 85027 COMPLETE CBC AUTOMATED: CPT | Performed by: EMERGENCY MEDICINE

## 2022-03-05 PROCEDURE — 81001 URINALYSIS AUTO W/SCOPE: CPT | Performed by: EMERGENCY MEDICINE

## 2022-03-05 PROCEDURE — 85610 PROTHROMBIN TIME: CPT | Performed by: EMERGENCY MEDICINE

## 2022-03-05 PROCEDURE — 81003 URINALYSIS AUTO W/O SCOPE: CPT | Performed by: EMERGENCY MEDICINE

## 2022-03-05 PROCEDURE — 0241U HB NFCT DS VIR RESP RNA 4 TRGT: CPT | Performed by: EMERGENCY MEDICINE

## 2022-03-05 PROCEDURE — 83605 ASSAY OF LACTIC ACID: CPT | Performed by: EMERGENCY MEDICINE

## 2022-03-05 PROCEDURE — 93005 ELECTROCARDIOGRAM TRACING: CPT

## 2022-03-05 PROCEDURE — 99285 EMERGENCY DEPT VISIT HI MDM: CPT

## 2022-03-05 PROCEDURE — 74177 CT ABD & PELVIS W/CONTRAST: CPT

## 2022-03-05 PROCEDURE — 85730 THROMBOPLASTIN TIME PARTIAL: CPT | Performed by: EMERGENCY MEDICINE

## 2022-03-05 PROCEDURE — 71260 CT THORAX DX C+: CPT

## 2022-03-05 PROCEDURE — 99222 1ST HOSP IP/OBS MODERATE 55: CPT | Performed by: INTERNAL MEDICINE

## 2022-03-05 PROCEDURE — 99285 EMERGENCY DEPT VISIT HI MDM: CPT | Performed by: EMERGENCY MEDICINE

## 2022-03-05 PROCEDURE — 36415 COLL VENOUS BLD VENIPUNCTURE: CPT | Performed by: EMERGENCY MEDICINE

## 2022-03-05 PROCEDURE — 70450 CT HEAD/BRAIN W/O DYE: CPT

## 2022-03-05 PROCEDURE — 80053 COMPREHEN METABOLIC PANEL: CPT | Performed by: EMERGENCY MEDICINE

## 2022-03-05 PROCEDURE — 83690 ASSAY OF LIPASE: CPT | Performed by: EMERGENCY MEDICINE

## 2022-03-05 PROCEDURE — 87086 URINE CULTURE/COLONY COUNT: CPT | Performed by: EMERGENCY MEDICINE

## 2022-03-05 PROCEDURE — 96360 HYDRATION IV INFUSION INIT: CPT

## 2022-03-05 PROCEDURE — 93010 ELECTROCARDIOGRAM REPORT: CPT | Performed by: INTERNAL MEDICINE

## 2022-03-05 PROCEDURE — 84145 PROCALCITONIN (PCT): CPT | Performed by: EMERGENCY MEDICINE

## 2022-03-05 RX ORDER — SODIUM CHLORIDE, SODIUM GLUCONATE, SODIUM ACETATE, POTASSIUM CHLORIDE, MAGNESIUM CHLORIDE, SODIUM PHOSPHATE, DIBASIC, AND POTASSIUM PHOSPHATE .53; .5; .37; .037; .03; .012; .00082 G/100ML; G/100ML; G/100ML; G/100ML; G/100ML; G/100ML; G/100ML
100 INJECTION, SOLUTION INTRAVENOUS CONTINUOUS
Status: DISCONTINUED | OUTPATIENT
Start: 2022-03-05 | End: 2022-03-06

## 2022-03-05 RX ORDER — ACETAMINOPHEN 325 MG/1
975 TABLET ORAL ONCE
Status: DISCONTINUED | OUTPATIENT
Start: 2022-03-05 | End: 2022-03-08 | Stop reason: HOSPADM

## 2022-03-05 RX ORDER — ALBUTEROL SULFATE 90 UG/1
2 AEROSOL, METERED RESPIRATORY (INHALATION) EVERY 6 HOURS PRN
Status: DISCONTINUED | OUTPATIENT
Start: 2022-03-05 | End: 2022-03-08 | Stop reason: HOSPADM

## 2022-03-05 RX ORDER — CEFTRIAXONE 2 G/50ML
2000 INJECTION, SOLUTION INTRAVENOUS ONCE
Status: COMPLETED | OUTPATIENT
Start: 2022-03-05 | End: 2022-03-05

## 2022-03-05 RX ORDER — MAGNESIUM HYDROXIDE/ALUMINUM HYDROXICE/SIMETHICONE 120; 1200; 1200 MG/30ML; MG/30ML; MG/30ML
30 SUSPENSION ORAL EVERY 6 HOURS PRN
Status: DISCONTINUED | OUTPATIENT
Start: 2022-03-05 | End: 2022-03-08 | Stop reason: HOSPADM

## 2022-03-05 RX ORDER — ACETAMINOPHEN 325 MG/1
650 TABLET ORAL EVERY 6 HOURS PRN
Status: DISCONTINUED | OUTPATIENT
Start: 2022-03-05 | End: 2022-03-08 | Stop reason: HOSPADM

## 2022-03-05 RX ORDER — LISINOPRIL 10 MG/1
5 TABLET ORAL DAILY
Status: DISCONTINUED | OUTPATIENT
Start: 2022-03-06 | End: 2022-03-05

## 2022-03-05 RX ORDER — ONDANSETRON 2 MG/ML
4 INJECTION INTRAMUSCULAR; INTRAVENOUS EVERY 6 HOURS PRN
Status: DISCONTINUED | OUTPATIENT
Start: 2022-03-05 | End: 2022-03-08 | Stop reason: HOSPADM

## 2022-03-05 RX ORDER — CEFEPIME HYDROCHLORIDE 2 G/50ML
2000 INJECTION, SOLUTION INTRAVENOUS EVERY 12 HOURS
Status: DISCONTINUED | OUTPATIENT
Start: 2022-03-05 | End: 2022-03-08 | Stop reason: HOSPADM

## 2022-03-05 RX ORDER — POLYETHYLENE GLYCOL 3350 17 G/17G
17 POWDER, FOR SOLUTION ORAL DAILY PRN
Status: DISCONTINUED | OUTPATIENT
Start: 2022-03-05 | End: 2022-03-08 | Stop reason: HOSPADM

## 2022-03-05 RX ORDER — LISINOPRIL 5 MG/1
2.5 TABLET ORAL DAILY
Status: DISCONTINUED | OUTPATIENT
Start: 2022-03-06 | End: 2022-03-07

## 2022-03-05 RX ORDER — LISINOPRIL 5 MG/1
2.5 TABLET ORAL DAILY
Status: DISCONTINUED | OUTPATIENT
Start: 2022-03-06 | End: 2022-03-05

## 2022-03-05 RX ADMIN — CEFEPIME HYDROCHLORIDE 2000 MG: 2 INJECTION, SOLUTION INTRAVENOUS at 18:31

## 2022-03-05 RX ADMIN — VANCOMYCIN HYDROCHLORIDE 1000 MG: 1 INJECTION, POWDER, LYOPHILIZED, FOR SOLUTION INTRAVENOUS at 19:40

## 2022-03-05 RX ADMIN — IOHEXOL 100 ML: 350 INJECTION, SOLUTION INTRAVENOUS at 17:01

## 2022-03-05 RX ADMIN — SODIUM CHLORIDE, SODIUM GLUCONATE, SODIUM ACETATE, POTASSIUM CHLORIDE, MAGNESIUM CHLORIDE, SODIUM PHOSPHATE, DIBASIC, AND POTASSIUM PHOSPHATE 100 ML/HR: .53; .5; .37; .037; .03; .012; .00082 INJECTION, SOLUTION INTRAVENOUS at 19:39

## 2022-03-05 RX ADMIN — SODIUM CHLORIDE 1000 ML: 9 INJECTION, SOLUTION INTRAVENOUS at 16:25

## 2022-03-05 RX ADMIN — CEFTRIAXONE 2000 MG: 2 INJECTION, SOLUTION INTRAVENOUS at 17:57

## 2022-03-05 NOTE — ASSESSMENT & PLAN NOTE
Lab Results   Component Value Date    EGFR 58 03/05/2022    EGFR 74 02/28/2022    EGFR 59 02/08/2022    CREATININE 0 95 03/05/2022    CREATININE 0 78 02/28/2022    CREATININE 0 93 02/08/2022     · Creatinine seems to be around baseline  · Avoid nephrotoxic agent  · Monitor BMP

## 2022-03-05 NOTE — ASSESSMENT & PLAN NOTE
· Echo in 11/21 showed HFrEF, grade 1 diastolic dysfunction  · No clinical evidence of fluid overload, lungs are grossly clear on CT chest  · Patient is currently treated with gentle IV hydration for severe sepsis  · Watch for fluid overload

## 2022-03-05 NOTE — ED PROVIDER NOTES
History  Chief Complaint   Patient presents with    Altered Mental Status     per pt's sister pt fell last night twice with new incontinence and confusion  Patient is a 60-year-old female who presents for concerns of 2 falls  Most recently last night  She also has new onset incontinence and confusion per family  Patient unable to provide information beyond name and date of birth  Family and round for further help with history  Medical records reviewed, patient has known metastatic breast cancer, on chemotherapy, had visit on 2022 where she appeared to be at her baseline per medical records  History limited due to patient confusion and language barrier  History provided by:  EMS personnel and relative  History limited by:  Acuity of condition   used: Yes (Patient unable to communicate with  service )    Altered Mental Status      Prior to Admission Medications   Prescriptions Last Dose Informant Patient Reported? Taking? Cyanocobalamin 1000 MCG/ML KIT  Family Member Yes No   Sig: Inject as directed every 6 (six) months   Elastic Bandages & Supports (Prolite Lumbar Support) MISC  Family Member No No   Sig: Use daily as needed (back pain)   Misc  Devices (ILLUSIONS C BREAST PROSTHESIS) MISC  Family Member No No   Si breast prosthesis   Misc   Devices (REFLECTIONS C BREAST PROSTHES) MISC  Family Member No No   Sig: Dispense 1 breast prosthesis   Neratinib Maleate 40 MG TABS  Family Member No No   Sig: Take 240 mg by mouth daily Start Week 1 take 3 tabs (120 mg) daily Week 2 take 4 tabs ( 160 Mg) daily Week 3 take 6 tabs (240) mg daily   acetaminophen (TYLENOL) 500 mg tablet  Family Member No No   Sig: Take 2 tablets (1,000 mg total) by mouth every 8 (eight) hours as needed for mild pain   albuterol (PROVENTIL HFA,VENTOLIN HFA) 90 mcg/act inhaler  Family Member No No   Sig: Inhale 2 puffs every 6 (six) hours as needed for wheezing or shortness of breath anastrozole (ARIMIDEX) 1 mg tablet  Family Member No No   Sig: Take 1 tablet (1 mg total) by mouth daily   capecitabine (XELODA) 500 MG tablet  Family Member No No   Sig: Take 3 tabs(1500 mg) in AM and 2 tabs (1000 mg) in PM for 14 days, then 7 days rest   cholecalciferol (VITAMIN D3) 1,000 units tablet  Family Member No No   Sig: Take 1 tablet (1,000 Units total) by mouth daily   escitalopram (LEXAPRO) 5 mg tablet  Family Member No No   Sig: Take 1 tablet (5 mg total) by mouth daily   Patient not taking: Reported on 12/8/2021    esomeprazole (NexIUM) 40 MG capsule  Family Member No No   Sig: TAKE 1 CAPSULE BY MOUTH ONCE DAILY   Patient not taking: Reported on 12/23/2021   famotidine (PEPCID) 10 mg tablet  Family Member Yes No   Sig: Take 10 mg by mouth 2 (two) times a day as needed for heartburn   gabapentin (NEURONTIN) 300 mg capsule  Family Member No No   Sig: Take 3 capsules (900 mg total) by mouth daily at bedtime   indapamide (LOZOL) 1 25 mg tablet  Family Member No No   Sig: Take 1 tablet (1 25 mg total) by mouth every morning   Patient not taking: Reported on 2/16/2022    lidocaine (LIDODERM) 5 %  Family Member No No   Sig: Apply 1 patch topically daily Remove & Discard patch within 12 hours or as directed by MD   Patient not taking: Reported on 12/8/2021    lisinopril (ZESTRIL) 2 5 mg tablet  Family Member No No   Sig: Take 1 tablet (2 5 mg total) by mouth daily   megestrol (MEGACE) 400 mg/10 mL  Family Member No No   Sig: Take 400 mg by mouth daily   Patient not taking: Reported on 12/8/2021    nebivolol (Bystolic) 5 mg tablet  Family Member No No   Sig: Take 1 tablet (5 mg total) by mouth daily   Patient not taking: Reported on 12/23/2021    ondansetron (ZOFRAN) 8 mg tablet  Family Member No No   Sig: Take 1 tablet (8 mg total) by mouth every 8 (eight) hours as needed for nausea or vomiting   ondansetron (Zofran) 4 mg tablet  Family Member Yes No   Sig: Every 12 hours   Patient not taking: Reported on 12/23/2021    pantoprazole (PROTONIX) 40 mg tablet  Family Member No No   Sig: TAKE ONE TABLET BY MOUTH EVERY DAY   Patient not taking: Reported on 12/23/2021      Facility-Administered Medications: None       Past Medical History:   Diagnosis Date    Back pain     [facility]     Breast cancer ([facility]     Chronic pain     Hypertension     Knee pain     Lymphedema of right arm     Vitamin B12 deficiency        Past Surgical History:   Procedure Laterality Date    ANKLE SURGERY      APPENDECTOMY      BREAST SURGERY      bilat mastectomy-right total mastectomy and prophylactic left total mastectomy-2005    CATARACT EXTRACTION      FRACTURE SURGERY      INSERTION CENTRAL VENOUS ACCESS DEVICE W/ SUBCUTANEOUS PORT      KNEE SURGERY      right knee replacement 2014 in [address] [place] [address] Bilateral     ORTHOPEDIC SURGERY         Family History   Problem Relation Age of Onset    Asthma Mother     Osteoarthritis Father     Bone cancer Paternal Aunt         bone cancer     I have reviewed and agree with the history as documented  E-Cigarette/Vaping    E-Cigarette Use Never User      E-Cigarette/Vaping Substances    Nicotine No     THC No     CBD No     Flavoring No     Other No     Unknown No      Social History     Tobacco Use    Smoking status: Never Smoker    Smokeless tobacco: Never Used    Tobacco comment: no passive smoke exposure   Vaping Use    Vaping Use: Never used   Substance Use Topics    Alcohol use: Never    Drug use: No       Review of Systems       Physical Exam  Physical Exam  Vitals and nursing note reviewed  Constitutional:       General: She is not in acute distress  Appearance: She is well-developed  She is ill-appearing  HENT:      Head: Normocephalic and atraumatic  Eyes:      Conjunctiva/sclera: Conjunctivae normal       Pupils: Pupils are equal, round, and reactive to light  Neck:      Trachea: No tracheal deviation     Cardiovascular:      Rate and Rhythm: Regular rhythm  Tachycardia present  Pulmonary:      Effort: Pulmonary effort is normal  No respiratory distress  Breath sounds: Normal breath sounds  No wheezing or rales  Abdominal:      General: Bowel sounds are normal  There is no distension  Palpations: Abdomen is soft  Tenderness: There is no abdominal tenderness  There is no guarding or rebound  Musculoskeletal:         General: No tenderness or deformity  Normal range of motion  Cervical back: Normal range of motion and neck supple  Skin:     General: Skin is warm and dry  Capillary Refill: Capillary refill takes less than 2 seconds  Findings: No rash  Neurological:      General: No focal deficit present  Mental Status: She is oriented to person, place, and time  GCS: GCS eye subscore is 4  GCS verbal subscore is 4  GCS motor subscore is 6  Cranial Nerves: Cranial nerves are intact     Psychiatric:         Behavior: Behavior normal          Vital Signs  ED Triage Vitals   Temperature Pulse Respirations Blood Pressure SpO2   03/05/22 1606 03/05/22 1619 03/05/22 1606 03/05/22 1606 03/05/22 1619   (!) 103 °F (39 4 °C) (!) 121 20 (!) 194/117 98 %      Temp Source Heart Rate Source Patient Position - Orthostatic VS BP Location FiO2 (%)   03/05/22 1606 03/05/22 1606 03/05/22 1606 03/05/22 1606 --   Oral Monitor Lying Left arm       Pain Score       03/05/22 1606       No Pain           Vitals:    03/05/22 1619 03/05/22 1659 03/05/22 1730 03/05/22 1930   BP:  151/70  108/66   Pulse: (!) 121 (!) 117 (!) 117 (!) 120   Patient Position - Orthostatic VS:  Lying  Lying         Visual Acuity      ED Medications  Medications   acetaminophen (TYLENOL) tablet 975 mg (0 mg Oral Hold 3/5/22 1632)   multi-electrolyte (PLASMALYTE-A/ISOLYTE-S PH 7 4) IV solution (100 mL/hr Intravenous New Bag 3/5/22 1939)   acetaminophen (TYLENOL) tablet 650 mg (has no administration in time range)   polyethylene glycol (MIRALAX) packet 17 g (has no administration in time range)   ondansetron (ZOFRAN) injection 4 mg (has no administration in time range)   aluminum-magnesium hydroxide-simethicone (MYLANTA) oral suspension 30 mL (has no administration in time range)   enoxaparin (LOVENOX) subcutaneous injection 40 mg (has no administration in time range)   vancomycin (VANCOCIN) 1,000 mg in sodium chloride 0 9 % 250 mL IVPB (1,000 mg Intravenous New Bag 3/5/22 1940)   cefepime (MAXIPIME) IVPB (premix in dextrose) 2,000 mg 50 mL (2,000 mg Intravenous New Bag 3/5/22 1831)   albuterol (PROVENTIL HFA,VENTOLIN HFA) inhaler 2 puff (has no administration in time range)   lisinopril (ZESTRIL) tablet 2 5 mg (has no administration in time range)   sodium chloride 0 9 % bolus 1,000 mL (0 mL Intravenous Stopped 3/5/22 1757)   iohexol (OMNIPAQUE) 350 MG/ML injection (SINGLE-DOSE) 100 mL (100 mL Intravenous Given 3/5/22 1701)   cefTRIAXone (ROCEPHIN) IVPB (premix in dextrose) 2,000 mg 50 mL (0 mg Intravenous Stopped 3/5/22 1827)       Diagnostic Studies  Results Reviewed     Procedure Component Value Units Date/Time    Lactic acid 2 Hours [[id number]]  (Normal) Collected: 03/05/22 2000    Lab Status: Final result Specimen: Blood from Hand, Left Updated: 03/05/22 2021     LACTIC ACID 1 0 mmol/L     Narrative:      Result may be elevated if tourniquet was used during collection  Platelet count [[id number]]     Lab Status: No result Specimen: Blood     Urine Microscopic [[id number]]  (Abnormal) Collected: 03/05/22 1712    Lab Status: Final result Specimen: Urine, Clean Catch Updated: 03/05/22 1724     RBC, UA 2-4 /hpf      WBC, UA 20-30 /hpf      Epithelial Cells Occasional /hpf      Bacteria, UA Moderate /hpf     Urine culture [[id number]] Collected: 03/05/22 1712    Lab Status:  In process Specimen: Urine, Clean Catch Updated: 03/05/22 1724    UA w Reflex to Microscopic w Reflex to Culture [[id number]]  (Abnormal) Collected: 03/05/22 1712    Lab Status: Final result Specimen: Urine, Clean Catch Updated: 03/05/22 1720     Color, UA Straw     Clarity, UA Slightly Cloudy     Specific Robinson, UA 1 010     pH, UA 6 5     Leukocytes,  0     Nitrite, UA Positive     Protein, UA 30 (1+) mg/dl      Glucose, UA Negative mg/dl      Ketones, UA Negative mg/dl      Bilirubin, UA Negative     Blood,  0     UROBILINOGEN UA Negative mg/dL     COVID/FLU/RSV - 2 hour TAT [[id number]]  (Normal) Collected: 03/05/22 1620    Lab Status: Final result Specimen: Nares from Nose Updated: 03/05/22 1707     SARS-CoV-2 Negative     INFLUENZA A PCR Negative     INFLUENZA B PCR Negative     RSV PCR Negative    Narrative:      FOR PEDIATRIC PATIENTS - copy/paste COVID Guidelines URL to browser: [email or web address]    SARS-CoV-2 assay is a Nucleic Acid Amplification assay intended for the  qualitative detection of nucleic acid from SARS-CoV-2 in nasopharyngeal  swabs  Results are for the presumptive identification of SARS-CoV-2 RNA  Positive results are indicative of infection with SARS-CoV-2, the virus  causing COVID-19, but do not rule out bacterial infection or co-infection  with other viruses  Laboratories within the [place] and its  territories are required to report all positive results to the appropriate  public health authorities  Negative results do not preclude SARS-CoV-2  infection and should not be used as the sole basis for treatment or other  patient management decisions  Negative results must be combined with  clinical observations, patient history, and epidemiological information  This test has not been FDA cleared or approved  This test has been authorized by FDA under an Emergency Use Authorization  (EUA)   This test is only authorized for the duration of time the  declaration that circumstances exist justifying the authorization of the  emergency use of an in vitro diagnostic tests for detection of SARS-CoV-2  virus and/or diagnosis of COVID-19 infection under section 564(b)(1) of  the Act, 21 U  S C  404UNY-5(X)(7), unless the authorization is terminated  or revoked sooner  The test has been validated but independent review by FDA  and CLIA is pending  Test performed using [employer] GeneXpert: This RT-PCR assay targets N2,  a region unique to SARS-CoV-2  A conserved region in the E-gene was chosen  for pan-Sarbecovirus detection which includes SARS-CoV-2  Manual Differential (Non Wam) [[id number]]  (Abnormal) Collected: 03/05/22 1620    Lab Status: Final result Specimen: Blood from Arm, Left Updated: 03/05/22 1657     Segmented % 87 %      Bands % 8 %      Lymphocytes % 2 %      Monocytes % 3 %      Neutrophils Absolute 10 07 Thousand/uL      Lymphocytes Absolute 0 21 Thousand/uL      Monocytes Absolute 0 32 Thousand/uL      Total Counted 100     RBC Morphology abnormal     Macrocytes Present     Platelet Estimate Adequate    Procalcitonin with AM Reflex [[id number]]  (Abnormal) Collected: 03/05/22 1620    Lab Status: Final result Specimen: Blood from Arm, Left Updated: 03/05/22 1655     Procalcitonin 25 31 ng/ml     Procalcitonin Reflex [[id number]]     Lab Status: No result Specimen: Blood     High Sensitivity Troponin I Random [[id number]]  (Normal) Collected: 03/05/22 1621    Lab Status: Final result Specimen: Blood from Arm, Left Updated: 03/05/22 1654     HS TnI random 12 ng/L     NT-BNP PRO [[id number]]  (Abnormal) Collected: 03/05/22 1620    Lab Status: Final result Specimen: Blood from Arm, Left Updated: 03/05/22 1651     NT-proBNP 893 pg/mL     Lactic acid [[id number]]  (Abnormal) Collected: 03/05/22 1620    Lab Status: Final result Specimen: Blood from Arm, Left Updated: 03/05/22 1648     LACTIC ACID 3 1 mmol/L     Narrative:      Result may be elevated if tourniquet was used during collection      Comprehensive metabolic panel [[id number]]  (Abnormal) Collected: 03/05/22 1620    Lab Status: Final result Specimen: Blood from Arm, Left Updated: 03/05/22 1643     Sodium 135 mmol/L      Potassium 4 3 mmol/L      Chloride 101 mmol/L      CO2 28 mmol/L      ANION GAP 6 mmol/L      BUN 20 mg/dL      Creatinine 0 95 mg/dL      Glucose 103 mg/dL      Calcium 8 9 mg/dL      AST 28 U/L      ALT 15 U/L      Alkaline Phosphatase 58 U/L      Total Protein 8 0 g/dL      Albumin 3 9 g/dL      Total Bilirubin 1 52 mg/dL      eGFR 58 ml/min/1 73sq m     Narrative:      [place] guidelines for Chronic Kidney Disease (CKD):     Stage 1 with normal or high GFR (GFR > 90 mL/min/1 73 square meters)    Stage 2 Mild CKD (GFR = 60-89 mL/min/1 73 square meters)    Stage 3A Moderate CKD (GFR = 45-59 mL/min/1 73 square meters)    Stage 3B Moderate CKD (GFR = 30-44 mL/min/1 73 square meters)    Stage 4 Severe CKD (GFR = 15-29 mL/min/1 73 square meters)    Stage 5 End Stage CKD (GFR <15 mL/min/1 73 square meters)  Note: GFR calculation is accurate only with a steady state creatinine    Lipase [[id number]]  (Normal) Collected: 03/05/22 1620    Lab Status: Final result Specimen: Blood from Arm, Left Updated: 03/05/22 1643     Lipase 81 u/L     Protime-INR [[id number]]  (Normal) Collected: 03/05/22 1620    Lab Status: Final result Specimen: Blood from Arm, Left Updated: 03/05/22 1641     Protime 14 3 seconds      INR 1 16    APTT [[id number]]  (Normal) Collected: 03/05/22 1620    Lab Status: Final result Specimen: Blood from Arm, Left Updated: 03/05/22 1641     PTT 35 seconds     Blood culture #2 [[id number]] Collected: 03/05/22 1628    Lab Status:  In process Specimen: Blood from Hand, Left Updated: 03/05/22 1636    CBC and differential [[id number]]  (Abnormal) Collected: 03/05/22 1620    Lab Status: Final result Specimen: Blood from Arm, Left Updated: 03/05/22 1632     WBC 10 60 Thousand/uL      RBC 3 55 Million/uL      Hemoglobin 12 5 g/dL      Hematocrit 36 7 %       fL      MCH 35 3 pg      MCHC 34 1 g/dL      RDW 20 4 %      MPV 7 2 fL      Platelets 485 Thousands/uL     Blood culture #1 [[id number]] Collected: 03/05/22 1620    Lab Status: In process Specimen: Blood from Arm, Left Updated: 03/05/22 1626                 CT head without contrast   Final Result by Izaiah Reyes MD (03/05 1728)      No acute intracranial abnormality  Workstation performed: [id number]         CT chest abdomen pelvis w contrast   Final Result by Izaiah Reyes MD (03/05 1736)      Limited study of the chest due to respiratory motion artifact  No definite evidence of acute abnormality  Bilateral pyelonephritis and ureteritis compatible with ascending urinary tract infection  No evidence of abscess or hydronephrosis  Workstation performed: [id number]                    Procedures  Procedures         ED Course  ED Course as of 03/05/22 2039   Sat Mar 05, 2022   1618 Procedure Note: EKG  Date/Time: 03/05/22 4:19 PM   Performed by: Justine Dunaway  Authorized by: Justine Dunaway  ECG interpreted by me, the ED Provider: yes   The EKG demonstrates:  Rate 128  Rhythm sinus  QTc 435  No ST elevations/depressions  Interpretation limited due to patient being tremulous  SBIRT 22yo+      Most Recent Value   SBIRT (24 yo +)    In order to provide better care to our patients, we are screening all of our patients for alcohol and drug use  Would it be okay to ask you these screening questions? Unable to answer at this time Filed at: 03/05/2022 1705                    MDM  Number of Diagnoses or Management Options  Sepsis [facility])  Urinary tract infection  Diagnosis management comments: Patient admitted for sepsis  Found to have UTI for source of infection  No evidence of end-organ dysfunction, lactic acid less than 3  Started on broad-spectrum antibiotics  Family updated, agreeable with admission           Amount and/or Complexity of Data Reviewed  Clinical lab tests: ordered and reviewed  Tests in the radiology section of CPT®: ordered and reviewed  Tests in the medicine section of CPT®: reviewed and ordered  Independent visualization of images, tracings, or specimens: yes        Disposition  Final diagnoses:   Sepsis ([facility] )   Urinary tract infection     Time reflects when diagnosis was documented in both MDM as applicable and the Disposition within this note     Time User Action Codes Description Comment    3/5/2022  5:51 PM Camille Jewel Add [A41 9] Sepsis ([facility] )     3/5/2022  5:51 PM Camille Jewel Add [N39 0] Urinary tract infection       ED Disposition     ED Disposition Condition Date/Time Comment    Admit Stable Sat Mar 5, 2022  5:51 PM Case was discussed with FABIÁN and the patient's admission status was agreed to be Admission Status: inpatient status to the service of Dr Sam Zarco   Follow-up Information    None         Current Discharge Medication List      CONTINUE these medications which have NOT CHANGED    Details   acetaminophen (TYLENOL) 500 mg tablet Take 2 tablets (1,000 mg total) by mouth every 8 (eight) hours as needed for mild pain  Qty: 30 tablet, Refills: 0    Associated Diagnoses: Bilateral stenosis of lateral recess of lumbar spine      albuterol (PROVENTIL HFA,VENTOLIN HFA) 90 mcg/act inhaler Inhale 2 puffs every 6 (six) hours as needed for wheezing or shortness of breath  Qty: 18 g, Refills: 2    Comments: Substitution to a formulary equivalent within the same pharmaceutical class is authorized    Associated Diagnoses: Mild persistent asthmatic bronchitis without complication      anastrozole (ARIMIDEX) 1 mg tablet Take 1 tablet (1 mg total) by mouth daily  Qty: 90 tablet, Refills: 3    Associated Diagnoses: Metastatic breast cancer (HCC)      capecitabine (XELODA) 500 MG tablet Take 3 tabs(1500 mg) in AM and 2 tabs (1000 mg) in PM for 14 days, then 7 days rest  Qty: 70 tablet, Refills: 11    Associated Diagnoses: Malignant neoplasm of overlapping 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, estrogen receptor positive ([facility] )      cholecalciferol (VITAMIN D3) 1,000 units tablet Take 1 tablet (1,000 Units total) by mouth daily  Qty: 30 tablet, Refills: 0    Associated Diagnoses: Vitamin D deficiency      Cyanocobalamin 1000 MCG/ML KIT Inject as directed every 6 (six) months      Elastic Bandages & Supports (Prolite Lumbar Support) MISC Use daily as needed (back pain)  Qty: 1 each, Refills: 0    Associated Diagnoses: Spondylolisthesis of lumbar region; Bilateral stenosis of lateral recess of lumbar spine      escitalopram (LEXAPRO) 5 mg tablet Take 1 tablet (5 mg total) by mouth daily  Qty: 30 tablet, Refills: 5    Associated Diagnoses: Anxiety      esomeprazole (NexIUM) 40 MG capsule TAKE 1 CAPSULE BY MOUTH ONCE DAILY  Qty: 90 capsule, Refills: 0    Associated Diagnoses: Malignant neoplasm of overlapping 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, estrogen receptor positive (HCC)      famotidine (PEPCID) 10 mg tablet Take 10 mg by mouth 2 (two) times a day as needed for heartburn      gabapentin (NEURONTIN) 300 mg capsule Take 3 capsules (900 mg total) by mouth daily at bedtime  Qty: 90 capsule, Refills: 2    Associated Diagnoses: Bilateral stenosis of lateral recess of lumbar spine      indapamide (LOZOL) 1 25 mg tablet Take 1 tablet (1 25 mg total) by mouth every morning  Qty: 30 tablet, Refills: 1    Associated Diagnoses: Essential hypertension      lidocaine (LIDODERM) 5 % Apply 1 patch topically daily Remove & Discard patch within 12 hours or as directed by MD  Qty: 30 patch, Refills: 1    Associated Diagnoses: Other spondylosis with radiculopathy, lumbar region; Spondylolisthesis of lumbar region; Myofascial pain syndrome      lisinopril (ZESTRIL) 2 5 mg tablet Take 1 tablet (2 5 mg total) by mouth daily  Qty: 30 tablet, Refills: 6    Associated Diagnoses: Nonischemic cardiomyopathy (HCC)      megestrol (MEGACE) 400 mg/10 mL Take 400 mg by mouth daily  Qty: 600 mL, Refills: 0 Associated Diagnoses: Malignant neoplasm of overlapping 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, estrogen receptor positive ([facility] [address] )      ! ! Misc  Devices (ILLUSIONS C BREAST PROSTHESIS) MISC 1 breast prosthesis  Qty: 1 each, Refills: 0    Associated Diagnoses: Malignant neoplasm of overlapping 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, estrogen receptor positive ([address] )      ! ! Misc  Devices (REFLECTIONS C BREAST PROSTHES) MISC Dispense 1 breast prosthesis  Qty: 1 each, Refills: 0    Associated Diagnoses: Malignant neoplasm of overlapping 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, estrogen receptor positive (HCC)      nebivolol (Bystolic) 5 mg tablet Take 1 tablet (5 mg total) by mouth daily  Qty: 90 tablet, Refills: 3    Associated Diagnoses: Essential hypertension      Neratinib Maleate 40 MG TABS Take 240 mg by mouth daily Start Week 1 take 3 tabs (120 mg) daily Week 2 take 4 tabs ( 160 Mg) daily Week 3 take 6 tabs (240) mg daily  Qty: 180 tablet, Refills: 11    Associated Diagnoses: Malignant neoplasm of overlapping 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, estrogen receptor positive ([facility] )      ! ! ondansetron (Zofran) 4 mg tablet Every 12 hours      !! ondansetron (ZOFRAN) 8 mg tablet Take 1 tablet (8 mg total) by mouth every 8 (eight) hours as needed for nausea or vomiting  Qty: 20 tablet, Refills: 3    Associated Diagnoses: Malignant neoplasm of overlapping 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, estrogen receptor positive (HCC)      pantoprazole (PROTONIX) 40 mg tablet TAKE ONE TABLET BY MOUTH EVERY DAY  Qty: 90 tablet, Refills: 0    Associated Diagnoses: Malignant neoplasm of overlapping 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, estrogen receptor positive ([facility] )       ! ! - Potential duplicate medications found  Please discuss with provider  No discharge procedures on file      PDMP Review     None          ED Provider  Electronically Signed by           Bethany Denton DO  03/05/22 2039

## 2022-03-05 NOTE — ASSESSMENT & PLAN NOTE
· The sister said patient is very weak and she cannot take care of the patient at home anymore  · PT/OT consult placed  · Patient sister in interested in rehab

## 2022-03-05 NOTE — ASSESSMENT & PLAN NOTE
· Metastatic breast cancer to brain  · Following Dr Greg Abrams as an outpatient  · Completed chemotherapy last week  · Continue outpatient follow-up

## 2022-03-05 NOTE — ASSESSMENT & PLAN NOTE
· Patient met severe septic criteria with a source of UTI, lactic acid 3 1  · s/p IV ceftriaxone and 1 L bolus in ED in ED  · CT chest abdomen pelvis with contrast showed bilateral pyelonephritis and ureteritis  · Patient has history of known h/o metastatic breast cancer chemotherapy, visit on 03/01/2022  · Will give IV vancomycin and cefepime as patient is immunocompromised  · Patient had echocardiogram on 11/21 which showed LVEF 31 percent, grade 1 diastolic dysfunction  · Received 1 liter bolus in ED  · Currently breathing on room air, no clinical evidence of fluid overload  · BNP of 893, CT chest abdomen pelvis with contrast showed lungs are grossly clear  · Will only give IV isolaye at 75 cc for now and closely monitor for fluid overload  · If the patient develops shortness of breath, will obtain chest x-ray and might need IV LasixFollow cultures  · Patient is not on diuretic at home

## 2022-03-05 NOTE — H&P
[facility]  H&P- Rachael Ag 1945, 69 y o  female MRN: [id number]  Unit/Bed#: ED 06 Encounter: [id number]  Primary Care Provider: Yoanna Carolina MD   Date and time admitted to hospital: 3/5/2022  4:05 PM    * Severe sepsis [facility])  Assessment & Plan  · Patient met severe septic criteria with a source of UTI, lactic acid 3 1  · s/p IV ceftriaxone and 1 L bolus in ED in ED  · CT chest abdomen pelvis with contrast showed bilateral pyelonephritis and ureteritis  · Patient has history of known h/o metastatic breast cancer chemotherapy, visit on 03/01/2022  · Will give IV vancomycin and cefepime as patient is immunocompromised  · Patient had echocardiogram on 11/21 which showed LVEF 31 percent, grade 1 diastolic dysfunction  · Received 1 liter bolus in ED  · Currently breathing on room air, no clinical evidence of fluid overload  · BNP of 893, CT chest abdomen pelvis with contrast showed lungs are grossly clear  · Will only give IV isolaye at 75 cc for now and closely monitor for fluid overload  · If the patient develops shortness of breath, will obtain chest x-ray and might need IV LasixFollow cultures  · Patient is not on diuretic at home      Metabolic encephalopathy  Assessment & Plan  · Most likely due to infection  · See above for detailed treatment plan of  severe sepsis    Fall  Assessment & Plan  · The sister said patient is very weak and she cannot take care of the patient at home anymore  · PT/OT consult placed  · Patient sister in interested in rehab    Stage 3a chronic kidney disease [facility])  Assessment & Plan  Lab Results   Component Value Date    EGFR 58 03/05/2022    EGFR 74 02/28/2022    EGFR 59 02/08/2022    CREATININE 0 95 03/05/2022    CREATININE 0 78 02/28/2022    CREATININE 0 93 02/08/2022     · Creatinine seems to be around baseline  · Avoid nephrotoxic agent  · Monitor BMP    Nonischemic cardiomyopathy ([address] )  Assessment & Plan  · Echo in 11/21 showed HFrEF, grade 1 diastolic dysfunction  · No clinical evidence of fluid overload, lungs are grossly clear on CT chest  · Patient is currently treated with gentle IV hydration for severe sepsis  · Watch for fluid overload    Metastatic breast cancer ([facility] [address] )  Assessment & Plan  · Metastatic breast cancer to brain  · Following Dr Fitz Carrera as an outpatient  · Completed chemotherapy last week  · Continue outpatient follow-up    VTE Prophylaxis: Enoxaparin (Lovenox)  / sequential compression device   Code Status:  Full code  POLST: There is no POLST form on file for this patient (pre-hospital)  Discussion with family:  Discussed with sister at the bedside    Anticipated Length of Stay:  Patient will be admitted on an Inpatient basis with an anticipated length of stay of  more than 2 midnights  Justification for Hospital Stay:  Severe sepsis, fall    Total Time for Visit, including Counseling / Coordination of Care: 45 minutes  Greater than 50% of this total time spent on direct patient counseling and coordination of care  Chief Complaint:   fall, confusion    History of Present Illness:    Bessy Mcbride is a 69 y o  female with PMH of metastatic breast cancer currently on chemotherapy, HTN, HFrEF secondary to chemotherapy who presents with confusion  According to the sister, the patient seemed to unwitnessed fall in the bathroom and she was sleeping on the floor  Next morning, patient could not remember the last night event  Patient does not have dementia at the baseline  Patient had a right breast cancer which was diagnosed 11/09/2005  Patient received 6 cycles of 5 FU, Adriamycin cyclophosphamide followed by tamoxifen followed by Armidex as patient HFrEF is most likely due to chemotherapy  Patient had recurrence of breast cancer to right anterior chest wall axilla and treated with chemotherapy again  She also had new metastasis to brain started on palliative radiation therapy    Patient admitted having urinary frequency  Review of Systems:    Review of Systems Patient was seen and examined at bedside  The patient has urinary frequency, weak but denies any chest pain, palpitation, shortness of breath, N/V, abd pain  Past Medical and Surgical History:     Past Medical History:   Diagnosis Date    Back pain     [facility] cancer [facility] [address] )     Breast cancer ([facility] [address] )     Chronic pain     Hypertension     Knee pain     Lymphedema of right arm     Vitamin B12 deficiency        Past Surgical History:   Procedure Laterality Date    ANKLE SURGERY      APPENDECTOMY      BREAST SURGERY      bilat mastectomy-right total mastectomy and prophylactic left total mastectomy-2005    CATARACT EXTRACTION      FRACTURE SURGERY      INSERTION CENTRAL VENOUS ACCESS DEVICE W/ SUBCUTANEOUS PORT      KNEE SURGERY      right knee replacement 2014 in [address] [place] [address] Bilateral     ORTHOPEDIC SURGERY         Meds/Allergies:    Prior to Admission medications    Medication Sig Start Date End Date Taking?  Authorizing Provider   acetaminophen (TYLENOL) 500 mg tablet Take 2 tablets (1,000 mg total) by mouth every 8 (eight) hours as needed for mild pain 12/23/21   Tahira Marquez MD   albuterol (PROVENTIL HFA,VENTOLIN HFA) 90 mcg/act inhaler Inhale 2 puffs every 6 (six) hours as needed for wheezing or shortness of breath 12/23/21   Tahira Marquez MD   anastrozole (ARIMIDEX) 1 mg tablet Take 1 tablet (1 mg total) by mouth daily 1/21/22   Ann Henry MD   capecitabine (XELODA) 500 MG tablet Take 3 tabs(1500 mg) in AM and 2 tabs (1000 mg) in PM for 14 days, then 7 days rest 12/3/21   Ann Henry MD   cholecalciferol (VITAMIN D3) 1,000 units tablet Take 1 tablet (1,000 Units total) by mouth daily 3/26/20   Tahira Marquez MD   Cyanocobalamin 1000 MCG/ML KIT Inject as directed every 6 (six) months    Historical Provider, MD   Elastic Bandages & Supports (Prolite Lumbar Support) MISC Use daily as needed (back pain) 12/9/20   MALA Muir escitalopram (LEXAPRO) 5 mg tablet Take 1 tablet (5 mg total) by mouth daily  Patient not taking: Reported on 12/8/2021 4/16/20   David Cook MD   esomeprazole (NexIUM) 40 MG capsule TAKE 1 CAPSULE BY MOUTH ONCE DAILY  Patient not taking: Reported on 12/23/2021 7/22/20   William White MD   famotidine (PEPCID) 10 mg tablet Take 10 mg by mouth 2 (two) times a day as needed for heartburn    Historical Provider, MD   gabapentin (NEURONTIN) 300 mg capsule Take 3 capsules (900 mg total) by mouth daily at bedtime 3/3/21   MALA Nielson   indapamide (LOZOL) 1 25 mg tablet Take 1 tablet (1 25 mg total) by mouth every morning  Patient not taking: Reported on 2/16/2022  3/11/20   David Cook MD   lidocaine (LIDODERM) 5 % Apply 1 patch topically daily Remove & Discard patch within 12 hours or as directed by MD  Patient not taking: Reported on 12/8/2021  3/3/21   MALA Nielson   lisinopril (ZESTRIL) 2 5 mg tablet Take 1 tablet (2 5 mg total) by mouth daily 1/25/22   Liam Rajput MD   megestrol (MEGACE) 400 mg/10 mL Take 400 mg by mouth daily  Patient not taking: Reported on 12/8/2021 6/6/20   MD Serenity Tucker  Devices (ILLUSIONS C BREAST PROSTHESIS) MISC 1 breast prosthesis 2/11/20   MD Serenity Toth   Devices (REFLECTIONS C BREAST PROSTHES) MISC Dispense 1 breast prosthesis 2/11/20   William White MD   nebivolol (Bystolic) 5 mg tablet Take 1 tablet (5 mg total) by mouth daily  Patient not taking: Reported on 12/23/2021 4/9/20   Liam Rajput MD   Neratinib Maleate 40 MG TABS Take 240 mg by mouth daily Start Week 1 take 3 tabs (120 mg) daily Week 2 take 4 tabs ( 160 Mg) daily Week 3 take 6 tabs (240) mg daily 12/3/21   William White MD   ondansetron (Zofran) 4 mg tablet Every 12 hours  Patient not taking: Reported on 12/23/2021     Historical Provider, MD   ondansetron (ZOFRAN) 8 mg tablet Take 1 tablet (8 mg total) by mouth every 8 (eight) hours as needed for nausea or vomiting 11/29/21 Nico Mcelroy MD   pantoprazole (PROTONIX) 40 mg tablet TAKE ONE TABLET BY MOUTH EVERY DAY  Patient not taking: Reported on 12/23/2021 2/12/21   Nico Mcelroy MD     I have reviewed home medications with patient personally  Allergies: Allergies   Allergen Reactions    Penicillins Rash       Social History:     Marital Status: Single   Occupation:  Retired  Patient Pre-hospital Living Situation:  Lives with sister at home  Patient Pre-hospital Diet Restrictions:  No restrictions  Substance Use History:   Social History     Substance and Sexual Activity   Alcohol Use Never     Social History     Tobacco Use   Smoking Status Never Smoker   Smokeless Tobacco Never Used   Tobacco Comment    no passive smoke exposure     Social History     Substance and Sexual Activity   Drug Use No       Family History:    non-contributory    Physical Exam:     Vitals:   Blood Pressure: 151/70 (03/05/22 1659)  Pulse: (!) 117 (03/05/22 1730)  Temperature: (!) 103 °F (39 4 °C) (03/05/22 1606)  Temp Source: Oral (03/05/22 1606)  Respirations: 17 (03/05/22 1659)  Weight - Scale: 58 9 kg (129 lb 13 6 oz) (03/05/22 1606)  SpO2: 98 % (03/05/22 1730)    Physical Exam    General: breathing well on room air, no acute distress  HEENT: NC/AT, PERRL, EOM - normal  Neck: Supple, JVP cannot be appreciated  Pulm/Chest: Normal chest wall expansion, clear breath sounds on both side, no wheezing/rhonchi or crackles appreciated  CVS: RRR, normal S1&S2, no murmur appreciated, capillary refill <2s  Abd: soft, non tender, non distended, bowel sounds +  MSK: move all 4 extremities spontaneously, no pedal edema  Skin: warm  CNS: no acute focal neuro deficit      Additional Data:     Lab Results: I have personally reviewed pertinent reports        Results from last 7 days   Lab Units 03/05/22  1620 02/28/22  1140 02/28/22  1140   WBC Thousand/uL 10 60   < > 8 40   HEMOGLOBIN g/dL 12 5   < > 11 3*   HEMATOCRIT % 36 7   < > 32 5*   PLATELETS Thousands/uL 203   < > 213   BANDS PCT % 8  --   --    NEUTROS PCT %  --   --  77*   LYMPHS PCT %  --   --  12*   LYMPHO PCT % 2*  --   --    MONOS PCT %  --   --  10   MONO PCT % 3  --   --    EOS PCT %  --   --  1    < > = values in this interval not displayed  Results from last 7 days   Lab Units 03/05/22  1620   SODIUM mmol/L 135*   POTASSIUM mmol/L 4 3   CHLORIDE mmol/L 101   CO2 mmol/L 28   BUN mg/dL 20   CREATININE mg/dL 0 95   ANION GAP mmol/L 6   CALCIUM mg/dL 8 9   ALBUMIN g/dL 3 9   TOTAL BILIRUBIN mg/dL 1 52*   ALK PHOS U/L 58   ALT U/L 15   AST U/L 28   GLUCOSE RANDOM mg/dL 103*     Results from last 7 days   Lab Units 03/05/22  1620   INR  1 16             Results from last 7 days   Lab Units 03/05/22  1620   LACTIC ACID mmol/L 3 1*   PROCALCITONIN ng/ml 25 31*       Imaging: I have personally reviewed pertinent reports  CT head without contrast   Final Result by Tylor Jarrett MD (03/05 1728)      No acute intracranial abnormality  Workstation performed: [id number]         CT chest abdomen pelvis w contrast   Final Result by Tylor Jarrett MD (03/05 1736)      Limited study of the chest due to respiratory motion artifact  No definite evidence of acute abnormality  Bilateral pyelonephritis and ureteritis compatible with ascending urinary tract infection  No evidence of abscess or hydronephrosis  Workstation performed: [id number]             EKG, Pathology, and Other Studies Reviewed on Admission:   · EKG:  Sinus tachycardia on telemetry    Allscripts / [facility] Records Reviewed: Yes     ** Please Note: This note has been constructed using a voice recognition system   **

## 2022-03-06 LAB
ANION GAP SERPL CALCULATED.3IONS-SCNC: 2 MMOL/L (ref 5–14)
BASOPHILS # BLD AUTO: 0 THOUSANDS/ΜL (ref 0–0.1)
BASOPHILS NFR BLD AUTO: 0 % (ref 0–1)
BUN SERPL-MCNC: 18 MG/DL (ref 5–25)
CALCIUM SERPL-MCNC: 7.4 MG/DL (ref 8.4–10.2)
CHLORIDE SERPL-SCNC: 108 MMOL/L (ref 97–108)
CO2 SERPL-SCNC: 27 MMOL/L (ref 22–30)
CREAT SERPL-MCNC: 0.94 MG/DL (ref 0.6–1.2)
EOSINOPHIL # BLD AUTO: 0 THOUSAND/ΜL (ref 0–0.4)
EOSINOPHIL NFR BLD AUTO: 0 % (ref 0–6)
ERYTHROCYTE [DISTWIDTH] IN BLOOD BY AUTOMATED COUNT: 20.1 %
GFR SERPL CREATININE-BSD FRML MDRD: 59 ML/MIN/1.73SQ M
GLUCOSE SERPL-MCNC: 110 MG/DL (ref 70–99)
HCT VFR BLD AUTO: 29.4 % (ref 36–46)
HGB BLD-MCNC: 10.1 G/DL (ref 12–16)
LYMPHOCYTES # BLD AUTO: 1.1 THOUSANDS/ΜL (ref 0.5–4)
LYMPHOCYTES NFR BLD AUTO: 10 % (ref 25–45)
MCH RBC QN AUTO: 35.5 PG (ref 26–34)
MCHC RBC AUTO-ENTMCNC: 34.2 G/DL (ref 31–36)
MCV RBC AUTO: 104 FL (ref 80–100)
MONOCYTES # BLD AUTO: 1 THOUSAND/ΜL (ref 0.2–0.9)
MONOCYTES NFR BLD AUTO: 9 % (ref 1–10)
NEUTROPHILS # BLD AUTO: 9.5 THOUSANDS/ΜL (ref 1.8–7.8)
NEUTS SEG NFR BLD AUTO: 82 % (ref 45–65)
PLATELET # BLD AUTO: 176 THOUSANDS/UL (ref 150–450)
PMV BLD AUTO: 7.5 FL (ref 8.9–12.7)
POTASSIUM SERPL-SCNC: 4.1 MMOL/L (ref 3.6–5)
PROCALCITONIN SERPL-MCNC: 43.79 NG/ML
RBC # BLD AUTO: 2.84 MILLION/UL (ref 4–5.2)
SODIUM SERPL-SCNC: 137 MMOL/L (ref 137–147)
WBC # BLD AUTO: 11.7 THOUSAND/UL (ref 4.5–11)

## 2022-03-06 PROCEDURE — 85025 COMPLETE CBC W/AUTO DIFF WBC: CPT | Performed by: INTERNAL MEDICINE

## 2022-03-06 PROCEDURE — 36415 COLL VENOUS BLD VENIPUNCTURE: CPT | Performed by: EMERGENCY MEDICINE

## 2022-03-06 PROCEDURE — 80048 BASIC METABOLIC PNL TOTAL CA: CPT | Performed by: INTERNAL MEDICINE

## 2022-03-06 PROCEDURE — 84145 PROCALCITONIN (PCT): CPT | Performed by: EMERGENCY MEDICINE

## 2022-03-06 PROCEDURE — 87040 BLOOD CULTURE FOR BACTERIA: CPT | Performed by: INTERNAL MEDICINE

## 2022-03-06 PROCEDURE — 87081 CULTURE SCREEN ONLY: CPT | Performed by: INTERNAL MEDICINE

## 2022-03-06 PROCEDURE — 99232 SBSQ HOSP IP/OBS MODERATE 35: CPT | Performed by: INTERNAL MEDICINE

## 2022-03-06 RX ORDER — VANCOMYCIN HYDROCHLORIDE 500 MG/100ML
10 INJECTION, SOLUTION INTRAVENOUS EVERY 12 HOURS
Status: DISCONTINUED | OUTPATIENT
Start: 2022-03-06 | End: 2022-03-06 | Stop reason: SDUPTHER

## 2022-03-06 RX ADMIN — ACETAMINOPHEN 650 MG: 325 TABLET ORAL at 08:25

## 2022-03-06 RX ADMIN — ENOXAPARIN SODIUM 40 MG: 100 INJECTION SUBCUTANEOUS at 08:25

## 2022-03-06 RX ADMIN — CEFEPIME HYDROCHLORIDE 2000 MG: 2 INJECTION, SOLUTION INTRAVENOUS at 06:00

## 2022-03-06 RX ADMIN — CEFEPIME HYDROCHLORIDE 2000 MG: 2 INJECTION, SOLUTION INTRAVENOUS at 17:10

## 2022-03-06 RX ADMIN — SODIUM CHLORIDE 1000 ML: 0.9 INJECTION, SOLUTION INTRAVENOUS at 01:34

## 2022-03-06 RX ADMIN — ACETAMINOPHEN 650 MG: 325 TABLET ORAL at 19:33

## 2022-03-06 RX ADMIN — VANCOMYCIN HYDROCHLORIDE 1000 MG: 1 INJECTION, POWDER, LYOPHILIZED, FOR SOLUTION INTRAVENOUS at 07:14

## 2022-03-06 RX ADMIN — VANCOMYCIN HYDROCHLORIDE 500 MG: 500 INJECTION, POWDER, LYOPHILIZED, FOR SOLUTION INTRAVENOUS at 19:25

## 2022-03-06 NOTE — ASSESSMENT & PLAN NOTE
· Metastatic breast cancer to brain  · Following Dr Uday Rodriguez as an outpatient  · Completed chemotherapy last week  · Continue outpatient follow-up

## 2022-03-06 NOTE — ASSESSMENT & PLAN NOTE
Lab Results   Component Value Date    EGFR 59 03/06/2022    EGFR 58 03/05/2022    EGFR 74 02/28/2022    CREATININE 0 94 03/06/2022    CREATININE 0 95 03/05/2022    CREATININE 0 78 02/28/2022     · Creatinine seems to be around baseline  · Avoid nephrotoxic agent  · Monitor BMP

## 2022-03-06 NOTE — PROGRESS NOTES
[facility]  Progress Note Brie Salmon 1945, 69 y o  female MRN: [id number]  Unit/Bed#: 7T [facility] 705-02 Encounter: [id number]  Primary Care Provider: Bandar Baker MD   Date and time admitted to hospital: 3/5/2022  4:05 PM    * Severe sepsis [facility])  Assessment & Plan  · Patient met severe septic criteria with a source of UTI, lactic acid 3 1  · Lactic acid normalized  · Procalcitonin 25 31, 43 7  · UA show UTI, CT chest abdomen pelvis with contrast showed bilateral pyelonephritis and ureteritis  · Patient has history of known h/o metastatic breast cancer chemotherapy, visit on 03/01/2022  · Continue IV vancomycin and cefepime as patient is immunocompromised  · Patient had echocardiogram on 11/21 which showed LVEF 31 percent, grade 1 diastolic dysfunction  · Received 2 liter bolus in ED  · Currently breathing on room air, no clinical evidence of fluid overload  · BNP of 893, CT chest abdomen pelvis with contrast showed lungs are grossly clear  · Started on IV isolyte at 100 cc/hr , as patient is clinically improved, with discontinue IV fluid  · Follow cultures      Metabolic encephalopathy  Assessment & Plan  · Most likely due to infection  · See above for detailed treatment plan of  severe sepsis  · Delirium precautions  · Continue supportive care    Fall  Assessment & Plan  · The sister said patient is very weak and she cannot take care of the patient at home anymore  · PT/OT consult placed  · Patient sister in interested in rehab    Stage 3a chronic kidney disease [facility])  Assessment & Plan  Lab Results   Component Value Date    EGFR 59 03/06/2022    EGFR 58 03/05/2022    EGFR 74 02/28/2022    CREATININE 0 94 03/06/2022    CREATININE 0 95 03/05/2022    CREATININE 0 78 02/28/2022     · Creatinine seems to be around baseline  · Avoid nephrotoxic agent  · Monitor BMP    Nonischemic cardiomyopathy ([address] )  Assessment & Plan  · Echo in 11/21 showed HFrEF, grade 1 diastolic dysfunction  · No clinical evidence of fluid overload, lungs are grossly clear on CT chest  · Watch for fluid overload    Metastatic breast cancer ([facility] [address] )  Assessment & Plan  · Metastatic breast cancer to brain  · Following Dr Romana Lizama as an outpatient  · Completed chemotherapy last week  · Continue outpatient follow-up    VTE Pharmacologic Prophylaxis:   Pharmacologic: Enoxaparin (Lovenox)  Mechanical VTE Prophylaxis in Place: Yes    Patient Centered Rounds: I have performed bedside rounds with nursing staff today  Discussions with Specialists or Other Care Team Provider:  Discussed with nurse    Education and Discussions with Family / Patient:  Discussed with sister    Time Spent for Care: 45 minutes  More than 50% of total time spent on counseling and coordination of care as described above  Current Length of Stay: 1 day(s)    Current Patient Status: Inpatient   Certification Statement: The patient will continue to require additional inpatient hospital stay due to Severe sepsis    Discharge Plan / Estimated Discharge Date:  Pending      Code Status: Level 1 - Full Code      Subjective:   Patient was seen and examined at bedside  The patient seems to be significantly improved  No acute overnight changes    Objective:     Vitals:   Temp (24hrs), Av 7 °F (37 1 °C), Min:96 5 °F (35 8 °C), Max:103 °F (39 4 °C)    Temp:  [96 5 °F (35 8 °C)-103 °F (39 4 °C)] 96 5 °F (35 8 °C)  HR:  [] 88  Resp:  [17-20] 18  BP: ()/() 101/65  SpO2:  [90 %-99 %] 98 %  Body mass index is 21 61 kg/m²  Input and Output Summary (last 24 hours):        Intake/Output Summary (Last 24 hours) at 3/6/2022 0736  Last data filed at 3/6/2022 0732  Gross per 24 hour   Intake 2250 ml   Output 300 ml   Net 1950 ml       Physical Exam:     Physical Exam  General: breathing well on room air, no acute distress  HEENT: NC/AT, PERRL, EOM - normal  Neck: Supple, JVP cannot be appreciated  Pulm/Chest: Normal chest wall expansion, clear breath sounds on both side, no wheezing/rhonchi or crackles appreciated  CVS: RRR, normal S1&S2, no murmur appreciated, capillary refill <2s  Abd: soft, non tender, non distended, bowel sounds +  MSK: move all 4 extremities spontaneously, no pedal edema  Skin: warm  CNS:  AAO x3      Additional Data:     Labs:    Results from last 7 days   Lab Units 03/06/22  0529   WBC Thousand/uL 11 70*   HEMOGLOBIN g/dL 10 1*   HEMATOCRIT % 29 4*   PLATELETS Thousands/uL 176   NEUTROS PCT % 82*   LYMPHS PCT % 10*   MONOS PCT % 9   EOS PCT % 0     Results from last 7 days   Lab Units 03/06/22  0529 03/05/22  1620 03/05/22  1620   POTASSIUM mmol/L 4 1   < > 4 3   CHLORIDE mmol/L 108   < > 101   CO2 mmol/L 27   < > 28   BUN mg/dL 18   < > 20   CREATININE mg/dL 0 94   < > 0 95   CALCIUM mg/dL 7 4*   < > 8 9   ALK PHOS U/L  --   --  58   ALT U/L  --   --  15   AST U/L  --   --  28    < > = values in this interval not displayed  Results from last 7 days   Lab Units 03/05/22  1620   INR  1 16       * I Have Reviewed All Lab Data Listed Above  * Additional Pertinent Lab Tests Reviewed: [address] Admission Reviewed    Imaging:    Imaging Reports Reviewed Today Include: None  Imaging Personally Reviewed by Myself Includes:  None      Recent Cultures (last 7 days):     Results from last 7 days   Lab Units 03/05/22  1628 03/05/22  1620   BLOOD CULTURE  Received in Microbiology Lab  Culture in Progress  Received in Microbiology Lab  Culture in Progress         Last 24 Hours Medication List:   Current Facility-Administered Medications   Medication Dose Route Frequency Provider Last Rate    acetaminophen  650 mg Oral Q6H PRN Prabhu Parada MD      acetaminophen  975 mg Oral Once Laragustin Hardins,       albuterol  2 puff Inhalation Q6H PRN Prabhu Parada MD      aluminum-magnesium hydroxide-simethicone  30 mL Oral Q6H PRN Prabhu Parada MD      cefepime  2,000 mg Intravenous Q12H Prabhu Parada MD 2,000 mg (03/06/22 0600)  enoxaparin  40 mg Subcutaneous Daily Xiao Rey MD      lisinopril  2 5 mg Oral Daily Xiao Rey MD      ondansetron  4 mg Intravenous Q6H PRN Xiao Rey MD      polyethylene glycol  17 g Oral Daily PRN Xiao Rey MD      vancomycin  15 mg/kg Intravenous Q12H Xiao Rey MD 1,000 mg (03/06/22 5661)        Today, Patient Was Seen By: Xiao Rey MD    ** Please Note: Dragon 360 Dictation voice to text software may have been used in the creation of this document   **

## 2022-03-06 NOTE — PROGRESS NOTES
Vancomycin Assessment    Micah Breaux is a 69 y o  female who is currently receiving vancomycin 500 mg q12h for other sepsis   Relevant clinical data and objective history reviewed:  Creatinine   Date Value Ref Range Status   03/06/2022 0 94 0 60 - 1 20 mg/dL Final     Comment:     Standardized to IDMS reference method   03/05/2022 0 95 0 60 - 1 20 mg/dL Final     Comment:     Standardized to IDMS reference method   02/28/2022 0 78 0 60 - 1 20 mg/dL Final     Comment:     Standardized to IDMS reference method     /65 (BP Location: Left arm)   Pulse 88   Temp (!) 96 5 °F (35 8 °C) (Temporal)   Resp 18   Ht 5' 5" (1 651 m)   Wt 58 9 kg (129 lb 13 6 oz)   SpO2 98%   BMI 21 61 kg/m²   I/O last 3 completed shifts: In: 1050 [IV Piggyback:1050]  Out: 300 [Urine:300]  Lab Results   Component Value Date/Time    BUN 18 03/06/2022 05:29 AM    BUN 24 06/18/2018 09:31 AM    WBC 11 70 (H) 03/06/2022 05:29 AM    WBC 4 8 06/18/2018 09:31 AM    HGB 10 1 (L) 03/06/2022 05:29 AM    HGB 11 2 (L) 06/18/2018 09:31 AM    HCT 29 4 (L) 03/06/2022 05:29 AM    HCT 34 1 (L) 06/18/2018 09:31 AM     (H) 03/06/2022 05:29 AM    MCV 93 06/18/2018 09:31 AM     03/06/2022 05:29 AM     06/18/2018 09:31 AM     Temp Readings from Last 3 Encounters:   03/06/22 (!) 96 5 °F (35 8 °C) (Temporal)   03/04/22 97 5 °F (36 4 °C) (Tympanic)   03/01/22 (!) 96 9 °F (36 1 °C) (Temporal)     Vancomycin Days of Therapy: 2    Assessment/Plan  The patient is currently on vancomycin utilizing scheduled dosing based on actual body weight  Baseline risks associated with therapy include: pre-existing renal impairment and advanced age  The patient is currently receiving 500 mg q12h and is clinically appropriate and dose will be continued  Pharmacy will also follow closely for s/sx of nephrotoxicity, infusion reactions, and appropriateness of therapy  BMP and CBC will be ordered per protocol    Plan for trough as patient approaches steady state, prior to the 4th  dose at approximately 06:30 on 3/7/22  Due to infection severity, will target a trough of 15-20 (appropriate for most indications)   Pharmacy will continue to follow the patients culture results and clinical progress daily      Torsten Rincon

## 2022-03-06 NOTE — ASSESSMENT & PLAN NOTE
· Most likely due to infection  · See above for detailed treatment plan of  severe sepsis  · Delirium precautions  · Continue supportive care

## 2022-03-06 NOTE — ASSESSMENT & PLAN NOTE
· Echo in 11/21 showed HFrEF, grade 1 diastolic dysfunction  · No clinical evidence of fluid overload, lungs are grossly clear on CT chest  · Watch for fluid overload

## 2022-03-06 NOTE — ASSESSMENT & PLAN NOTE
· Patient met severe septic criteria with a source of UTI, lactic acid 3 1  · Lactic acid normalized  · Procalcitonin 25 31, 43 7  · UA show UTI, CT chest abdomen pelvis with contrast showed bilateral pyelonephritis and ureteritis  · Patient has history of known h/o metastatic breast cancer chemotherapy, visit on 03/01/2022  · Continue IV vancomycin and cefepime as patient is immunocompromised  · Patient had echocardiogram on 11/21 which showed LVEF 31 percent, grade 1 diastolic dysfunction  · Received 2 liter bolus in ED  · Currently breathing on room air, no clinical evidence of fluid overload  · BNP of 893, CT chest abdomen pelvis with contrast showed lungs are grossly clear  · Started on IV isolyte at 100 cc/hr , as patient is clinically improved, with discontinue IV fluid  · BCx 1/2 showed GNR  The other set is in process  · Patient is currently on cefepime    Continue cefepime for now

## 2022-03-07 ENCOUNTER — HOSPITAL ENCOUNTER (OUTPATIENT)
Dept: INFUSION CENTER | Facility: HOSPITAL | Age: 77
Discharge: HOME | End: 2022-03-07
Attending: INTERNAL MEDICINE

## 2022-03-07 LAB
ANION GAP SERPL CALCULATED.3IONS-SCNC: 3 MMOL/L (ref 5–14)
ANISOCYTOSIS BLD QL SMEAR: PRESENT
BUN SERPL-MCNC: 15 MG/DL (ref 5–25)
CALCIUM SERPL-MCNC: 7.1 MG/DL (ref 8.4–10.2)
CHLORIDE SERPL-SCNC: 108 MMOL/L (ref 97–108)
CO2 SERPL-SCNC: 25 MMOL/L (ref 22–30)
CREAT SERPL-MCNC: 0.8 MG/DL (ref 0.6–1.2)
EOSINOPHIL # BLD AUTO: 0.09 THOUSAND/UL (ref 0–0.4)
EOSINOPHIL NFR BLD MANUAL: 1 % (ref 0–6)
ERYTHROCYTE [DISTWIDTH] IN BLOOD BY AUTOMATED COUNT: 19.6 %
GFR SERPL CREATININE-BSD FRML MDRD: 71 ML/MIN/1.73SQ M
GLUCOSE SERPL-MCNC: 93 MG/DL (ref 70–99)
HCT VFR BLD AUTO: 28.2 % (ref 36–46)
HGB BLD-MCNC: 9.9 G/DL (ref 12–16)
LYMPHOCYTES # BLD AUTO: 0.7 THOUSAND/UL (ref 0.5–4)
LYMPHOCYTES # BLD AUTO: 8 % (ref 25–45)
MACROCYTES BLD QL AUTO: PRESENT
MCH RBC QN AUTO: 35.8 PG (ref 26–34)
MCHC RBC AUTO-ENTMCNC: 35.1 G/DL (ref 31–36)
MCV RBC AUTO: 102 FL (ref 80–100)
MONOCYTES # BLD AUTO: 0.7 THOUSAND/UL (ref 0.2–0.9)
MONOCYTES NFR BLD AUTO: 8 % (ref 1–10)
MRSA NOSE QL CULT: NORMAL
NEUTS BAND NFR BLD MANUAL: 4 % (ref 0–8)
NEUTS SEG # BLD: 7.22 THOUSAND/UL (ref 1.8–7.8)
NEUTS SEG NFR BLD AUTO: 79 %
PLATELET # BLD AUTO: 171 THOUSANDS/UL (ref 150–450)
PLATELET BLD QL SMEAR: ADEQUATE
PMV BLD AUTO: 7.6 FL (ref 8.9–12.7)
POTASSIUM SERPL-SCNC: 5.2 MMOL/L (ref 3.6–5)
RBC # BLD AUTO: 2.76 MILLION/UL (ref 4–5.2)
RBC MORPH BLD: ABNORMAL
SODIUM SERPL-SCNC: 136 MMOL/L (ref 137–147)
TOTAL CELLS COUNTED SPEC: 100
VANCOMYCIN TROUGH SERPL-MCNC: 11.8 UG/ML (ref 10–20)
WBC # BLD AUTO: 8.7 THOUSAND/UL (ref 4.5–11)

## 2022-03-07 PROCEDURE — 80202 ASSAY OF VANCOMYCIN: CPT | Performed by: INTERNAL MEDICINE

## 2022-03-07 PROCEDURE — 80048 BASIC METABOLIC PNL TOTAL CA: CPT | Performed by: INTERNAL MEDICINE

## 2022-03-07 PROCEDURE — 97163 PT EVAL HIGH COMPLEX 45 MIN: CPT

## 2022-03-07 PROCEDURE — 85027 COMPLETE CBC AUTOMATED: CPT | Performed by: INTERNAL MEDICINE

## 2022-03-07 PROCEDURE — 97535 SELF CARE MNGMENT TRAINING: CPT

## 2022-03-07 PROCEDURE — 99232 SBSQ HOSP IP/OBS MODERATE 35: CPT | Performed by: INTERNAL MEDICINE

## 2022-03-07 PROCEDURE — 97167 OT EVAL HIGH COMPLEX 60 MIN: CPT

## 2022-03-07 PROCEDURE — 85007 BL SMEAR W/DIFF WBC COUNT: CPT | Performed by: INTERNAL MEDICINE

## 2022-03-07 RX ADMIN — CEFEPIME HYDROCHLORIDE 2000 MG: 2 INJECTION, SOLUTION INTRAVENOUS at 06:08

## 2022-03-07 RX ADMIN — CEFEPIME HYDROCHLORIDE 2000 MG: 2 INJECTION, SOLUTION INTRAVENOUS at 17:20

## 2022-03-07 RX ADMIN — ACETAMINOPHEN 650 MG: 325 TABLET ORAL at 09:43

## 2022-03-07 RX ADMIN — VANCOMYCIN HYDROCHLORIDE 500 MG: 500 INJECTION, POWDER, LYOPHILIZED, FOR SOLUTION INTRAVENOUS at 06:57

## 2022-03-07 RX ADMIN — ENOXAPARIN SODIUM 40 MG: 100 INJECTION SUBCUTANEOUS at 09:40

## 2022-03-07 NOTE — PLAN OF CARE
Problem: PHYSICAL THERAPY ADULT  Goal: Performs mobility at highest level of function for planned discharge setting  See evaluation for individualized goals  Description: Treatment/Interventions: ADL retraining,Functional transfer training,LE strengthening/ROM,Elevations,Therapeutic exercise,Endurance training,Patient/family training,Equipment eval/education,Bed mobility,Gait training,Spoke to nursing,Spoke to case management,OT  Equipment Recommended: Josué Garcia       See flowsheet documentation for full assessment, interventions and recommendations  Outcome: Progressing  Note: Prognosis: Fair  Problem List: Decreased strength,Decreased endurance,Impaired balance,Decreased mobility,Decreased coordination,Pain     Barriers to Discharge: Inaccessible home environment,Decreased caregiver support (sister is reporting decreased ability to assist)        PT Discharge Recommendation: Home with home health rehabilitation          See flowsheet documentation for full assessment

## 2022-03-07 NOTE — ASSESSMENT & PLAN NOTE
· Metastatic breast cancer to brain  · Following Dr Ruiz Calvo as an outpatient  · Completed chemotherapy last week  · Continue outpatient follow-up

## 2022-03-07 NOTE — PROGRESS NOTES
Vancomycin Assessment    Ana Rosa Minor is a 69 y o  female who is currently receiving vancomycin 500 mg q12h for severe sepsis  Relevant clinical data and objective history reviewed:  Creatinine   Date Value Ref Range Status   03/07/2022 0 80 0 60 - 1 20 mg/dL Final     Comment:     Standardized to IDMS reference method   03/06/2022 0 94 0 60 - 1 20 mg/dL Final     Comment:     Standardized to IDMS reference method   03/05/2022 0 95 0 60 - 1 20 mg/dL Final     Comment:     Standardized to IDMS reference method     /88 (BP Location: Left arm)   Pulse 82   Temp (!) 96 °F (35 6 °C) (Temporal)   Resp 20   Ht 5' 5" (1 651 m)   Wt 58 9 kg (129 lb 13 6 oz)   SpO2 97%   BMI 21 61 kg/m²   I/O last 3 completed shifts: In: 1680 [P O :480; I V :1200]  Out: 400 [Urine:400]  Lab Results   Component Value Date/Time    BUN 15 03/07/2022 06:30 AM    BUN 24 06/18/2018 09:31 AM    WBC 8 70 03/07/2022 06:30 AM    WBC 4 8 06/18/2018 09:31 AM    HGB 9 9 (L) 03/07/2022 06:30 AM    HGB 11 2 (L) 06/18/2018 09:31 AM    HCT 28 2 (L) 03/07/2022 06:30 AM    HCT 34 1 (L) 06/18/2018 09:31 AM     (H) 03/07/2022 06:30 AM    MCV 93 06/18/2018 09:31 AM     03/07/2022 06:30 AM     06/18/2018 09:31 AM     Temp Readings from Last 3 Encounters:   03/07/22 (!) 96 °F (35 6 °C) (Temporal)   03/04/22 97 5 °F (36 4 °C) (Tympanic)   03/01/22 (!) 96 9 °F (36 1 °C) (Temporal)     Vancomycin Days of Therapy: 3    Assessment/Plan  The patient is currently on vancomycin utilizing scheduled dosing  Baseline risks associated with therapy include: pre-existing renal impairment and advanced age    The patient is receiving 500 mg IV every 12 hours with the most recent vancomycin level being at steady-state and sub-therapeutic based on a goal of 15-20 (appropriate for most indications) ; therefore, after clinical evaluation will be changed to 750 mg IV every 12 hours  Pharmacy will continue to follow closely for s/sx of nephrotoxicity, infusion reactions, and appropriateness of therapy  BMP and CBC will be ordered per protocol  Plan for trough as patient approaches steady state, prior to the 5th  dose at approximately 16:30 on Wednesday 3/9/22  Pharmacy will continue to follow the patients culture results and clinical progress daily      Wendie Hdz, Pharmacist

## 2022-03-07 NOTE — PROGRESS NOTES
[facility]  Progress Note Lazarus Reyes 1945, 69 y o  female MRN: [id number]  Unit/Bed#: 7T [facility] 705-02 Encounter: [id number]  Primary Care Provider: Esther Cline MD   Date and time admitted to hospital: 3/5/2022  4:05 PM    * Severe sepsis [facility])  Assessment & Plan  · Patient met severe septic criteria with a source of UTI, lactic acid 3 1  · Lactic acid normalized  · Procalcitonin 25 31, 43 7  · UA show UTI, CT chest abdomen pelvis with contrast showed bilateral pyelonephritis and ureteritis  · Patient has history of known h/o metastatic breast cancer chemotherapy, visit on 03/01/2022  · Continue IV vancomycin and cefepime as patient is immunocompromised  · Patient had echocardiogram on 11/21 which showed LVEF 31 percent, grade 1 diastolic dysfunction  · Received 2 liter bolus in ED  · Currently breathing on room air, no clinical evidence of fluid overload  · BNP of 893, CT chest abdomen pelvis with contrast showed lungs are grossly clear  · Started on IV isolyte at 100 cc/hr , as patient is clinically improved, with discontinue IV fluid  · BCx 1/2 showed GNR  The other set is in process  · Patient is currently on cefepime    Continue cefepime for now      [address]  · The sister said patient is very weak and she cannot take care of the patient at home anymore  · PT/OT consult placed  · Patient sister in interested in rehab    Metastatic breast cancer [facility])  Assessment & Plan  · Metastatic breast cancer to brain  · Following Dr Rajani Boss as an outpatient  · Completed chemotherapy last week  · Continue outpatient follow-up    Stage 3a chronic kidney disease [facility])  Assessment & Plan  Lab Results   Component Value Date    EGFR 71 03/07/2022    EGFR 59 03/06/2022    EGFR 58 03/05/2022    CREATININE 0 80 03/07/2022    CREATININE 0 94 03/06/2022    CREATININE 0 95 03/05/2022     · Creatinine seems to be around baseline  · Avoid nephrotoxic agent  · Monitor BMP    Metabolic encephalopathy  Assessment & Plan  · Most likely due to infection  · See above for detailed treatment plan of  severe sepsis  · Delirium precautions  · Continue supportive care    Nonischemic cardiomyopathy [facility])  Assessment & Plan  · Echo in  showed HFrEF, grade 1 diastolic dysfunction  · No clinical evidence of fluid overload, lungs are grossly clear on CT chest  · Watch for fluid overload      VTE Pharmacologic Prophylaxis:  Lovenox    Patient Centered Rounds:  Patient care rounds were performed with nursing    Discussions with Specialists or Other Care Team Provider:  Case Management, nursing    Education and Discussions with Family / Patient: Will speak with patient's sister later today    Time Spent for Care: 30  More than 50% of total time spent on counseling and coordination of care as described above  Current Length of Stay: 2 day(s)    Current Patient Status: Inpatient     Certification Statement: The patient will continue to require additional inpatient hospital stay due to severe sepsis and ambulatory dysfunction with likely discharge to short-term rehab    Discharge Plan:  Pending PT/OT evaluation and treatment    Code Status: Level 1 - Full Code      Subjective:   Patient seen and examined at bedside  No acute events overnight  Denies chest pain, SOB, diaphoresis, nausea/vomiting/diarrhea, fevers/chills  Objective:     Vitals:   Temp (24hrs), Av 8 °F (36 6 °C), Min:96 °F (35 6 °C), Max:99 9 °F (37 7 °C)    Temp:  [96 °F (35 6 °C)-99 9 °F (37 7 °C)] 96 °F (35 6 °C)  HR:  [82] 82  Resp:  [16-20] 20  BP: ()/(63-88) 143/88  SpO2:  [97 %-99 %] 97 %  Body mass index is 21 61 kg/m²  Input and Output Summary (last 24 hours): Intake/Output Summary (Last 24 hours) at 3/7/2022 0802  Last data filed at 3/6/2022 1500  Gross per 24 hour   Intake 480 ml   Output 100 ml   Net 380 ml       Physical Exam:     Physical Exam  Vitals and nursing note reviewed  Constitutional:       General: She is not in acute distress  Appearance: She is well-developed  HENT:      Head: Normocephalic and atraumatic  Eyes:      Conjunctiva/sclera: Conjunctivae normal    Cardiovascular:      Rate and Rhythm: Normal rate and regular rhythm  Heart sounds: No murmur heard  Pulmonary:      Effort: Pulmonary effort is normal  No respiratory distress  Breath sounds: Normal breath sounds  Abdominal:      Palpations: Abdomen is soft  Tenderness: There is no abdominal tenderness  Musculoskeletal:      Cervical back: Neck supple  Skin:     General: Skin is warm and dry  Neurological:      Mental Status: She is alert  Additional Data:     Labs: I have reviewed pertinent results     Results from last 7 days   Lab Units 03/07/22  0630 03/06/22  0529 03/06/22  0529 03/05/22  1620 03/05/22  1620   WBC Thousand/uL 8 70   < > 11 70*   < > 10 60   HEMOGLOBIN g/dL 9 9*   < > 10 1*   < > 12 5   HEMATOCRIT % 28 2*   < > 29 4*   < > 36 7   PLATELETS Thousands/uL 171   < > 176   < > 203   BANDS PCT %  --   --   --   --  8   NEUTROS PCT %  --   --  82*  --   --    LYMPHS PCT %  --   --  10*  --   --    LYMPHO PCT %  --   --   --   --  2*   MONOS PCT %  --   --  9  --   --    MONO PCT %  --   --   --   --  3   EOS PCT %  --   --  0  --   --     < > = values in this interval not displayed  Results from last 7 days   Lab Units 03/07/22  0630 03/06/22  0529 03/05/22  1620   SODIUM mmol/L 136*   < > 135*   POTASSIUM mmol/L 5 2*   < > 4 3   CHLORIDE mmol/L 108   < > 101   CO2 mmol/L 25   < > 28   BUN mg/dL 15   < > 20   CREATININE mg/dL 0 80   < > 0 95   ANION GAP mmol/L 3*   < > 6   CALCIUM mg/dL 7 1*   < > 8 9   ALBUMIN g/dL  --   --  3 9   TOTAL BILIRUBIN mg/dL  --   --  1 52*   ALK PHOS U/L  --   --  58   ALT U/L  --   --  15   AST U/L  --   --  28   GLUCOSE RANDOM mg/dL 93   < > 103*    < > = values in this interval not displayed       Results from last 7 days   Lab Units 03/05/22  1620   INR  1 16             Results from last 7 days   Lab Units 03/06/22  0529 03/05/22 2000 03/05/22  1620   LACTIC ACID mmol/L  --  1 0 3 1*   PROCALCITONIN ng/ml 43 79*  --  25 31*         Imaging: I have reviewed pertinent imaging       Recent Cultures (last 7 days):     Results from last 7 days   Lab Units 03/06/22  0948 03/06/22  0947 03/05/22  1628 03/05/22  1620   BLOOD CULTURE  Received in Microbiology Lab  Culture in Progress  Received in Microbiology Lab  Culture in Progress  --   --    GRAM STAIN RESULT   --   --  Gram negative rods* Gram negative rods*       Last 24 Hours Medication List:   Current Facility-Administered Medications   Medication Dose Route Frequency Provider Last Rate    acetaminophen  650 mg Oral Q6H PRN Efrain Arreguin MD      acetaminophen  975 mg Oral Once Luis Armandonaresh Roberts DO      albuterol  2 puff Inhalation Q6H PRN Efrain Arreguin MD      aluminum-magnesium hydroxide-simethicone  30 mL Oral Q6H PRN Efrain Arreguin MD      cefepime  2,000 mg Intravenous Q12H Efrain Arreguin MD 2,000 mg (03/07/22 3222)    enoxaparin  40 mg Subcutaneous Daily Efrain Arreguin MD      lisinopril  2 5 mg Oral Daily Efrain Arreguin MD      ondansetron  4 mg Intravenous Q6H PRN Efrain Arreguin MD      polyethylene glycol  17 g Oral Daily PRN Efrain Arreguin MD      vancomycin  10 mg/kg Intravenous Q12H Efrain Arreguin  mg (03/07/22 1939)        Today, Patient Was Seen By: Emelina Kendall DO    ** Please Note: Dictation voice to text software may have been used in the creation of this document   **

## 2022-03-07 NOTE — PLAN OF CARE
Problem: OCCUPATIONAL THERAPY ADULT  Goal: Performs self-care activities at highest level of function for planned discharge setting  See evaluation for individualized goals  Description: Treatment Interventions: ADL retraining,Functional transfer training,UE strengthening/ROM,Endurance training,Activityengagement          See flowsheet documentation for full assessment, interventions and recommendations  Note: Limitation: Decreased ADL status,Decreased high-level ADLs  Prognosis: Good  Assessment: Pt is a 69 y o  female seen for OT evaluation s/p admit to [facility] on 3/5/2022 w/ Severe sepsis ([facility] [address] )  See above for extensive list of comorbidities affecting Pt's functional performance at time of assessment  Personal factors affecting Pt at time of IE include:limited home support, difficulty performing IADLS  and health management   Prior to admission, Pt was living at home with her sister  Pt reports being [place] with ADLs, ambulates with a walker, her sister assists with IADLs as needed  Upon evaluation: Pt able to complete transfers and ambulation with supervision and a rolling walker, note fair balance with bathroom mobility and navigating turns and small spaces  Supervision to complete LB ADls and dynamic standing tasks  The following deficits impact occupational performance: decreased strength, decreased balance, decreased tolerance and decreased safety awareness  Pt to benefit from continued skilled OT services while in the hospital to address deficits as defined above and maximize level of functional independence w ADL's and functional mobility  Occupational performance areas to address include: grooming, bathing/shower, toilet hygiene, dressing and functional mobility  From OT standpoint, recommendation at time of d/c would be home with social support and home health         OT Discharge Recommendation: Home with home health rehabilitation

## 2022-03-07 NOTE — PHYSICAL THERAPY NOTE
Physical Therapy Evaluation    Patient's Name: Regino Donovan    Admitting Diagnosis  Altered mental status [R41 82]  Urinary tract infection [N39 0]  Sepsis ([facility] ) [A41 9]    Problem List  Patient Active Problem List   Diagnosis    Malignant neoplasm of overlapping 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, estrogen receptor positive ([facility] )    Use of aromatase inhibitors    Vitamin D deficiency    Benign essential hypertension    Abnormal echocardiography    Chronic systolic heart failure ([place] [address] )    Metastatic breast cancer ([place] [address] )    Colonoscopy refused    Immunization not carried out because of patient decision    Heart burn    Abnormal gastrointestinal PET scan    Osteopenia    Essential hypertension    Brain metastases ([place] [address] )    Nonischemic cardiomyopathy ([place] [address] )    S/P chemotherapy, time since 4-12 weeks    Status post chemoradiation    Chemotherapy induced nausea and vomiting    Acute cystitis without hematuria    Severe protein-calorie malnutrition Silverio Clark: less than 60% of standard weight) (HCC)    Hypokalemia    Dehydration    Pain    Bilateral stenosis of lateral recess of lumbar spine    Vitamin B 12 deficiency    Lumbar radiculitis    Other spondylosis with radiculopathy, lumbar region    Cerebral edema (HCC)    Spondylolisthesis of lumbar region    Acute bronchitis    Abnormal thyroid function test    Pure hypertriglyceridemia    Screening for colon cancer    Encounter for central line care    Stage 3a chronic kidney disease ([place] [address] )    Immunization not carried out because of patient refusal    Increased PTH level    Abnormal urinalysis    Asthmatic bronchitis    Severe sepsis ([facility] )    Fall    Metabolic encephalopathy       Past Medical History  Past Medical History:   Diagnosis Date    Back pain     Brain cancer ([facility] )     Breast cancer ([place] [address] )     Chronic pain     Hypertension     Knee pain     Lymphedema of right arm     Vitamin B12 deficiency        Past Surgical History  Past Surgical History:   Procedure Laterality Date    ANKLE SURGERY      APPENDECTOMY      BREAST SURGERY      bilat mastectomy-right total mastectomy and prophylactic left total mastectomy-2005    CATARACT EXTRACTION      FRACTURE SURGERY      INSERTION CENTRAL VENOUS ACCESS DEVICE W/ SUBCUTANEOUS PORT      KNEE SURGERY      right knee replacement 2014 in [address] [place] [address] Bilateral     ORTHOPEDIC SURGERY         Recent Imaging  CT head without contrast   Final Result by Paula Dejesus MD (03/05 1728)      No acute intracranial abnormality  Workstation performed: [id number]         CT chest abdomen pelvis w contrast   Final Result by Paula Dejesus MD (03/05 1736)      Limited study of the chest due to respiratory motion artifact  No definite evidence of acute abnormality  Bilateral pyelonephritis and ureteritis compatible with ascending urinary tract infection  No evidence of abscess or hydronephrosis  Workstation performed: [id number]             Recent Vital Signs  Vitals:    03/06/22 1451 03/06/22 1933 03/06/22 2248 03/07/22 0709   BP: 122/73  97/63 143/88   BP Location: Left arm  Left arm Left arm   Pulse: 82  82 82   Resp: 18  16 20   Temp: 97 6 °F (36 4 °C) 99 9 °F (37 7 °C) 97 5 °F (36 4 °C) (!) 96 °F (35 6 °C)   TempSrc: Temporal Temporal Temporal Temporal   SpO2: 99%  97% 97%   Weight:       Height:            03/07/22 1110   PT Last Visit   PT Visit Date 03/07/22   Note Type   Note type Evaluation   Pain Assessment   Pain Assessment Tool 0-10   Pain Score No Pain   Restrictions/Precautions   Weight Bearing Precautions Per Order No   Other Precautions Fall Risk;Multiple lines; Chair Alarm; Bed Alarm   Home Living   Type of [address] [place] [address] Two level;Performs ADLs on one level; Able to live on main level with bedroom/bathroom   Bathroom Shower/Tub Tub/shower unit   Bathroom Toilet Standard   Bathroom Accessibility Accessible via walker   Home Equipment Walker   Prior Function   Level of [place] Independent with ADLs and functional mobility   Lives With [employer] Help From Family   ADL Assistance Independent   IADLs Needs assistance   Falls in the last 6 months 0   Comments per pt report, information should be confirmed with sister who lives with pt   General   Family/Caregiver Present No   Cognition   Arousal/Participation Alert   Orientation Level Oriented X4   Memory Within functional limits   Following Commands Follows one step commands without difficulty   Comments pt reports she is comfortable completing evaluation in english   Subjective   Subjective "I feel like I am doing good and can go back home"   RLE Assessment   RLE Assessment   (3+/5)   LLE Assessment   LLE Assessment   (3+/5)   Coordination   Movements are Fluid and Coordinated 0   Coordination and Movement Description slow movements, minor unsteadiness with gait   Sensation WFL   Light Touch   RLE Light Touch Grossly intact   LLE Light Touch Grossly intact   Bed Mobility   Supine to Sit 5  Supervision   Additional items Bedrails; Increased time required;Verbal cues   Sit to Supine 5  Supervision   Additional items Bedrails; Increased time required;Verbal cues   Transfers   Sit to Stand 5  Supervision   Additional items Armrests; Increased time required;Verbal cues   Stand to Sit 5  Supervision   Additional items Armrests; Increased time required;Verbal cues   Toilet transfer 5  Supervision   Additional items Armrests; Increased time required;Verbal cues;Standard toilet   Additional Comments with no AD and with RW   Ambulation/Elevation   Gait pattern Step through pattern;Decreased toe off;Decreased heel strike; Excessively slow; Short stride; Foward flexed;Decreased foot clearance   Gait Assistance 5  Supervision   Additional items Verbal cues   Assistive Device Rolling walker   Distance 50ft with RW, 25ft with no AD   Ambulation/Elevation Additional Comments pt with mildly unsteady gait with no AD as well as decreased endurance; both improved with use of RW which pt owns    Balance   Static Sitting Fair +   Dynamic Sitting Fair +   Static Standing Fair   Dynamic Standing Fair -   Ambulatory Fair -   Endurance Deficit   Endurance Deficit Yes   Endurance Deficit Description fatigue and deconditioning   Activity Tolerance   Activity Tolerance Patient limited by fatigue   Medical Staff Made Aware spoke to CM   Nurse Made Aware spoke to RN   Assessment   Prognosis Fair   Problem List Decreased strength;Decreased endurance; Impaired balance;Decreased mobility; Decreased coordination;Pain   Barriers to Discharge Inaccessible home environment;Decreased caregiver support  (sister is reporting decreased ability to assist)   Goals   Patient Goals to return home   STG Expiration Date 03/17/22   Short Term Goal #1 see eval note   Plan   Treatment/Interventions ADL retraining;Functional transfer training;LE strengthening/ROM; Elevations; Therapeutic exercise; Endurance training;Patient/family training;Equipment eval/education; Bed mobility;Gait training;Spoke to nursing;Spoke to case management;OT   PT Frequency 2-3x/wk   Recommendation   PT Discharge Recommendation Home with home health rehabilitation   Equipment Recommended Jeni [phone]   Turning in Bed Without Bedrails 4   Lying on Back to Sitting on Edge of Flat Bed 4   Moving Bed to Chair 3   Standing Up From Chair 3   Walk in Room 3   Climb 3-5 Stairs 3   Basic Mobility Inpatient Raw Score 20   Basic Mobility Standardized Score 43 99   Highest Level Of Mobility   JH-HLM Goal 6: Walk 10 steps or more   JH-HLM Highest Level of Mobility 7: Walk 25 feet or more   JH-HLM Goal Achieved Yes   End of Consult   Patient Position at End of Consult Supine; All needs within reach;Bed/Chair alarm activated         ASSESSMENT Musa Pearce is a 69 y o  female admitted to [facility] on 3/5/2022 for Severe sepsis ([facility] [address] )  Pt  has a past medical history of Back pain, Brain cancer ([facility] ), Breast cancer ([facility] ), Chronic pain, Hypertension, Knee pain, Lymphedema of right arm, and Vitamin B12 deficiency  PT was consulted and pt was seen on 3/7/2022 for mobility assessment and d/c planning  Pt presents supine in bed alert and agreeable to therapy  She is reporting no pain at this time  Strength is limited due to fatigue and deconditioning  Endurance is limited by same  Balance is fair with use of RW for household distances  No LOB noted during treatment  She reports independence with ADLs at home however sister reports increased difficulty caring for pt so info may be inaccurate  Impairments limiting pt at this time include weakness, impaired balance, decreased endurance, decreased coordination, decreased IADLS and decreased activity tolerance  Pt is currently functioning at a supervision assistance x1 level for bed mobility, supervision assistance x1 level for transfers, supervision assistance x1 level for ambulation with Rolling Walker  The patient's AM-PAC Basic Mobility Inpatient Short Form Raw Score is 20  A Raw score of greater than 16 suggests the patient may benefit from discharge to home  Please also refer to the recommendation of the Physical Therapist for safe discharge planning  Goals                                                                                                                                    1) Bed mobility skills with modified independent assistance to facilitate safe return to previous living environment 2) Functional transfers with modified independent assistance to facilitate safe return to previous living environment  3) Ambulation with least restrictive AD modified independent assistance without LOB and stable vitals for safe ambulation home/ community distances   4) Improve balance grades to fair + to reduce risk of falls  6)Improve LE strength grades by 1 to increase independence w/ transfers and gait  7) PT for ongoing pt and family education; DME needs and D/C planning to promote highest level of function in least restrictive environment  Recommendations                                                                                                              Pt will benefit from continued skilled IP PT to address the above mentioned impairments in order to maximize recovery and increase functional independence when completing mobility and ADLs  See flow sheet for goals and POC       DME: [facility]    Discharge Disposition:  Home with [address] [facility] PT, DPT

## 2022-03-07 NOTE — ASSESSMENT & PLAN NOTE
Lab Results   Component Value Date    EGFR 71 03/07/2022    EGFR 59 03/06/2022    EGFR 58 03/05/2022    CREATININE 0 80 03/07/2022    CREATININE 0 94 03/06/2022    CREATININE 0 95 03/05/2022     · Creatinine seems to be around baseline  · Avoid nephrotoxic agent  · Monitor BMP

## 2022-03-07 NOTE — PLAN OF CARE
Problem: Potential for Falls  Goal: Patient will remain free of falls  Description: INTERVENTIONS:  - Educate patient/family on patient safety including physical limitations  - Instruct patient to call for assistance with activity   - Consult OT/PT to assist with strengthening/mobility   - Keep Call bell within reach  - Keep bed low and locked with side rails adjusted as appropriate  - Keep care items and personal belongings within reach  - Initiate and maintain comfort rounds  - Make Fall Risk Sign visible to staff  - Initiate/Maintain bed alarm  - Obtain necessary fall risk management equipment:   - Apply yellow socks and bracelet for high fall risk patients  - Consider moving patient to room near nurses station  Outcome: Progressing     Problem: PAIN - ADULT  Goal: Verbalizes/displays adequate comfort level or baseline comfort level  Description: Interventions:  - Encourage patient to monitor pain and request assistance  - Assess pain using appropriate pain scale  - Administer analgesics based on type and severity of pain and evaluate response  - Implement non-pharmacological measures as appropriate and evaluate response  - Consider cultural and social influences on pain and pain management  - Notify physician/advanced practitioner if interventions unsuccessful or patient reports new pain  Outcome: Progressing     Problem: INFECTION - ADULT  Goal: Absence or prevention of progression during hospitalization  Description: INTERVENTIONS:  - Assess and monitor for signs and symptoms of infection  - Monitor lab/diagnostic results  - Monitor all insertion sites, i e  indwelling lines, tubes, and drains  - Monitor endotracheal if appropriate and nasal secretions for changes in amount and color  - [place] appropriate cooling/warming therapies per order  - Administer medications as ordered  - Instruct and encourage patient and family to use good hand hygiene technique  - Identify and instruct in appropriate isolation precautions for identified infection/condition  Outcome: Progressing     Problem: INFECTION - ADULT  Goal: Absence of fever/infection during neutropenic period  Description: INTERVENTIONS:  - Monitor WBC    Outcome: Progressing     Problem: DISCHARGE PLANNING  Goal: Discharge to home or other facility with appropriate resources  Description: INTERVENTIONS:  - Identify barriers to discharge w/patient and caregiver  - Arrange for needed discharge resources and transportation as appropriate  - Identify discharge learning needs (meds, wound care, etc )  - Arrange for interpretive services to assist at discharge as needed  - Refer to Case Management Department for coordinating discharge planning if the patient needs post-hospital services based on physician/advanced practitioner order or complex needs related to functional status, cognitive ability, or social support system  Outcome: Progressing     Problem: Knowledge Deficit  Goal: Patient/family/caregiver demonstrates understanding of disease process, treatment plan, medications, and discharge instructions  Description: Complete learning assessment and assess knowledge base    Interventions:  - Provide teaching at level of understanding  - Provide teaching via preferred learning methods  Outcome: Progressing

## 2022-03-07 NOTE — OCCUPATIONAL THERAPY NOTE
Occupational Therapy Evaluation & Treatment Note     Patient Name: Bessy Mcbride  PLAVL'U Date: 3/7/2022  Problem List  Principal Problem:    Severe sepsis [facility])  Active Problems:    Metastatic breast cancer ([facility] [address] [address] )    Nonischemic cardiomyopathy ([facility] )    Stage 3a chronic kidney disease ([facility] [address] [facility] )    Fall    Metabolic encephalopathy    Past Medical History  Past Medical History:   Diagnosis Date    Back pain     Brain cancer ([facility] )     Breast cancer ([facility] [address] [facility] )     Chronic pain     Hypertension     Knee pain     Lymphedema of right arm     Vitamin B12 deficiency      Past Surgical History  Past Surgical History:   Procedure Laterality Date    ANKLE SURGERY      APPENDECTOMY      BREAST SURGERY      bilat mastectomy-right total mastectomy and prophylactic left total mastectomy-2005    CATARACT EXTRACTION      FRACTURE SURGERY      INSERTION CENTRAL VENOUS ACCESS DEVICE W/ SUBCUTANEOUS PORT      KNEE SURGERY      right knee replacement 2014 in [facility] Bilateral    [facility]               03/07/22 1336   OT Last Visit   OT Visit Date 03/07/22   Note Type   Note type Evaluation   Restrictions/Precautions   Weight Bearing Precautions Per Order No   Other Precautions Fall Risk   Pain Assessment   Pain Assessment Tool 0-10   Pain Score No Pain   Home Living   Type of [address] [place] [address] One level; Able to live on main level with bedroom/bathroom   Bathroom Shower/Tub Tub/shower unit   [place]   Prior Function   Level of [place] Independent with ADLs and functional mobility   Lives With Family  (sister)   ADL Assistance Independent   IADLs Needs assistance   Psychosocial   Psychosocial (WDL) WDL   ADL   Grooming Assistance 6  Modified Independent   Grooming Deficit Wash/dry hands; Wash/dry face   UB Bathing Assistance 6  Modified Independent   LB Bathing Assistance 5  Supervision/Setup   UB Dressing Assistance 6  Modified independent   LB Dressing Assistance 5  Supervision/Setup   Toileting Assistance  5  Supervision/Setup   Toileting Deficit Clothing management up;Clothing management down;Perineal hygiene   Bed Mobility   Supine to Sit 5  Supervision   Sit to Supine 5  Supervision   Transfers   Sit to Stand 5  Supervision   Stand to Sit 5  Supervision   Toilet transfer 5  Supervision   Additional items Increased time required   Functional Mobility   Functional Mobility 5  Supervision   Additional Comments short distance ambulation    Additional items Rolling walker   Balance   Static Sitting Good   Dynamic Sitting Fair +   Static Standing Fair   Dynamic Standing Fair -   Ambulatory Fair -   Activity Tolerance   Activity Tolerance Patient limited by fatigue   RUE Assessment   RUE Assessment WFL   LUE Assessment   LUE Assessment WFL   Hand Function   Gross Motor Coordination Functional   Sensation   Light Touch No apparent deficits   Proprioception   Proprioception No apparent deficits   Cognition   Arousal/Participation Alert; Cooperative   Attention Within functional limits   Orientation Level Oriented X4   Memory Within functional limits   Following Commands Follows all commands and directions without difficulty   Assessment   Limitation Decreased ADL status; Decreased high-level ADLs   Prognosis Good   Assessment   Pt is a 69 y o  female seen for OT evaluation s/p admit to [facility] on 3/5/2022 w/ Severe sepsis ([address] )  See above for extensive list of comorbidities affecting Pt's functional performance at time of assessment  Personal factors affecting Pt at time of IE include:limited home support, difficulty performing IADLS  and health management   Prior to admission, Pt was living at home with her sister  Pt reports being [place] with ADLs, ambulates with a walker, her sister assists with IADLs as needed   Upon evaluation: Pt able to complete transfers and ambulation with supervision and a rolling walker, note fair balance with bathroom mobility and navigating turns and small spaces  Supervision to complete LB ADls and dynamic standing tasks  The following deficits impact occupational performance: decreased strength, decreased balance, decreased tolerance and decreased safety awareness  Pt to benefit from continued skilled OT services while in the hospital to address deficits as defined above and maximize level of functional independence w ADL's and functional mobility  Occupational performance areas to address include: grooming, bathing/shower, toilet hygiene, dressing and functional mobility  From OT standpoint, recommendation at time of d/c would be home with social support and home health  Goals   STG Time Frame   (1 week)   Short Term Goals 1  Pt will complete LB dressing [place]  2  Pt will complete functional ambulation [place]  3  Pt will complete toilet hygiene [place]  Plan   Treatment Interventions ADL retraining;Functional transfer training;UE strengthening/ROM; Endurance training; Activityengagement   Goal Expiration Date 03/14/22   OT Treatment Day 1   OT Frequency 2-3x/wk   Additional Treatment Session   Start Time 2840   End Time 1430   Treatment Assessment Pt seen for OT txt following initial evaluation  Pt assisted with toilet hygiene following a bowel movement  Pt able to manage backside hygiene with supervision and cues for safety  Pt completed functional ambulation in room x 2 trials  Pt would benefit from continued OT services to further address deficits and maximize functional independence      Recommendation   OT Discharge Recommendation Home with home health rehabilitation   AM-PAC Daily Activity Inpatient   Lower Body Dressing 3   Bathing 3   Toileting 3   Upper Body Dressing 4   Grooming 4   Eating 4   Daily Activity Raw Score 21   Daily Activity Standardized Score (Calc for Raw Score >=11) 44 27

## 2022-03-07 NOTE — PLAN OF CARE
Problem: PAIN - ADULT  Goal: Verbalizes/displays adequate comfort level or baseline comfort level  Description: Interventions:  - Encourage patient to monitor pain and request assistance  - Assess pain using appropriate pain scale  - Administer analgesics based on type and severity of pain and evaluate response  - Implement non-pharmacological measures as appropriate and evaluate response  - Consider cultural and social influences on pain and pain management  - Notify physician/advanced practitioner if interventions unsuccessful or patient reports new pain  Outcome: Progressing     Problem: INFECTION - ADULT  Goal: Absence or prevention of progression during hospitalization  Description: INTERVENTIONS:  - Assess and monitor for signs and symptoms of infection  - Monitor lab/diagnostic results  - Monitor all insertion sites, i e  indwelling lines, tubes, and drains  - Monitor endotracheal if appropriate and nasal secretions for changes in amount and color  - [place] appropriate cooling/warming therapies per order  - Administer medications as ordered  - Instruct and encourage patient and family to use good hand hygiene technique  - Identify and instruct in appropriate isolation precautions for identified infection/condition  Outcome: Progressing     Problem: Knowledge Deficit  Goal: Patient/family/caregiver demonstrates understanding of disease process, treatment plan, medications, and discharge instructions  Description: Complete learning assessment and assess knowledge base    Interventions:  - Provide teaching at level of understanding  - Provide teaching via preferred learning methods  Outcome: Progressing     Problem: SAFETY ADULT  Goal: Patient will remain free of falls  Description: INTERVENTIONS:  - Educate patient/family on patient safety including physical limitations  - Instruct patient to call for assistance with activity   - Consult OT/PT to assist with strengthening/mobility   - Keep Call bell within reach  - Keep bed low and locked with side rails adjusted as appropriate  - Keep care items and personal belongings within reach  - Initiate and maintain comfort rounds  - Make Fall Risk Sign visible to staff  -   - Apply yellow socks and bracelet for high fall risk patients  - Consider moving patient to room near nurses station  Outcome: Progressing

## 2022-03-08 ENCOUNTER — TELEPHONE (OUTPATIENT)
Dept: FAMILY MEDICINE CLINIC | Facility: CLINIC | Age: 77
End: 2022-03-08

## 2022-03-08 VITALS
BODY MASS INDEX: 21.63 KG/M2 | RESPIRATION RATE: 18 BRPM | HEIGHT: 65 IN | OXYGEN SATURATION: 100 % | WEIGHT: 129.85 LBS | SYSTOLIC BLOOD PRESSURE: 143 MMHG | TEMPERATURE: 97.8 F | HEART RATE: 76 BPM | DIASTOLIC BLOOD PRESSURE: 80 MMHG

## 2022-03-08 LAB
ANION GAP SERPL CALCULATED.3IONS-SCNC: 5 MMOL/L (ref 5–14)
BACTERIA BLD CULT: ABNORMAL
BACTERIA BLD CULT: ABNORMAL
BUN SERPL-MCNC: 11 MG/DL (ref 5–25)
CALCIUM SERPL-MCNC: 7.8 MG/DL (ref 8.4–10.2)
CHLORIDE SERPL-SCNC: 105 MMOL/L (ref 97–108)
CO2 SERPL-SCNC: 29 MMOL/L (ref 22–30)
CREAT SERPL-MCNC: 0.83 MG/DL (ref 0.6–1.2)
ERYTHROCYTE [DISTWIDTH] IN BLOOD BY AUTOMATED COUNT: 20.3 %
GFR SERPL CREATININE-BSD FRML MDRD: 68 ML/MIN/1.73SQ M
GLUCOSE SERPL-MCNC: 83 MG/DL (ref 70–99)
GRAM STN SPEC: ABNORMAL
GRAM STN SPEC: ABNORMAL
HCT VFR BLD AUTO: 32.4 % (ref 36–46)
HGB BLD-MCNC: 11.2 G/DL (ref 12–16)
MAGNESIUM SERPL-MCNC: 1.9 MG/DL (ref 1.6–2.3)
MCH RBC QN AUTO: 35.3 PG (ref 26–34)
MCHC RBC AUTO-ENTMCNC: 34.7 G/DL (ref 31–36)
MCV RBC AUTO: 102 FL (ref 80–100)
PHOSPHATE SERPL-MCNC: 2.4 MG/DL (ref 2.5–4.8)
PLATELET # BLD AUTO: 220 THOUSANDS/UL (ref 150–450)
PMV BLD AUTO: 7.7 FL (ref 8.9–12.7)
POTASSIUM SERPL-SCNC: 3.4 MMOL/L (ref 3.6–5)
PROCALCITONIN SERPL-MCNC: 14.35 NG/ML
RBC # BLD AUTO: 3.18 MILLION/UL (ref 4–5.2)
SODIUM SERPL-SCNC: 139 MMOL/L (ref 137–147)
WBC # BLD AUTO: 6.7 THOUSAND/UL (ref 4.5–11)

## 2022-03-08 PROCEDURE — 83735 ASSAY OF MAGNESIUM: CPT | Performed by: INTERNAL MEDICINE

## 2022-03-08 PROCEDURE — 84100 ASSAY OF PHOSPHORUS: CPT | Performed by: INTERNAL MEDICINE

## 2022-03-08 PROCEDURE — 99239 HOSP IP/OBS DSCHRG MGMT >30: CPT | Performed by: INTERNAL MEDICINE

## 2022-03-08 PROCEDURE — 97116 GAIT TRAINING THERAPY: CPT

## 2022-03-08 PROCEDURE — 80048 BASIC METABOLIC PNL TOTAL CA: CPT | Performed by: INTERNAL MEDICINE

## 2022-03-08 PROCEDURE — 84145 PROCALCITONIN (PCT): CPT | Performed by: INTERNAL MEDICINE

## 2022-03-08 RX ORDER — DOXYCYCLINE HYCLATE 100 MG/1
100 CAPSULE ORAL EVERY 12 HOURS SCHEDULED
Qty: 14 CAPSULE | Refills: 0 | Status: SHIPPED | OUTPATIENT
Start: 2022-03-08 | End: 2022-03-15

## 2022-03-08 RX ORDER — POTASSIUM CHLORIDE 20 MEQ/1
40 TABLET, EXTENDED RELEASE ORAL ONCE
Status: COMPLETED | OUTPATIENT
Start: 2022-03-08 | End: 2022-03-08

## 2022-03-08 RX ADMIN — CEFEPIME HYDROCHLORIDE 2000 MG: 2 INJECTION, SOLUTION INTRAVENOUS at 05:30

## 2022-03-08 RX ADMIN — ENOXAPARIN SODIUM 40 MG: 100 INJECTION SUBCUTANEOUS at 08:21

## 2022-03-08 RX ADMIN — POTASSIUM CHLORIDE 40 MEQ: 1500 TABLET, EXTENDED RELEASE ORAL at 08:21

## 2022-03-08 NOTE — PLAN OF CARE
Problem: Potential for Falls  Goal: Patient will remain free of falls  Description: INTERVENTIONS:  - Educate patient/family on patient safety including physical limitations  - Instruct patient to call for assistance with activity   - Consult OT/PT to assist with strengthening/mobility   - Keep Call bell within reach  - Keep bed low and locked with side rails adjusted as appropriate  - Keep care items and personal belongings within reach  - Initiate and maintain comfort rounds  - Make Fall Risk Sign visible to staff  - Apply yellow socks and bracelet for high fall risk patients  - Consider moving patient to room near nurses station  Outcome: Progressing     Problem: PAIN - ADULT  Goal: Verbalizes/displays adequate comfort level or baseline comfort level  Description: Interventions:  - Encourage patient to monitor pain and request assistance  - Assess pain using appropriate pain scale  - Administer analgesics based on type and severity of pain and evaluate response  - Implement non-pharmacological measures as appropriate and evaluate response  - Consider cultural and social influences on pain and pain management  - Notify physician/advanced practitioner if interventions unsuccessful or patient reports new pain  Outcome: Progressing     Problem: INFECTION - ADULT  Goal: Absence or prevention of progression during hospitalization  Description: INTERVENTIONS:  - Assess and monitor for signs and symptoms of infection  - Monitor lab/diagnostic results  - Monitor all insertion sites, i e  indwelling lines, tubes, and drains  - Monitor endotracheal if appropriate and nasal secretions for changes in amount and color  - [place] appropriate cooling/warming therapies per order  - Administer medications as ordered  - Instruct and encourage patient and family to use good hand hygiene technique  - Identify and instruct in appropriate isolation precautions for identified infection/condition  Outcome: Progressing  Goal: Absence of fever/infection during neutropenic period  Description: INTERVENTIONS:  - Monitor WBC    Outcome: Progressing     Problem: SAFETY ADULT  Goal: Patient will remain free of falls  Description: INTERVENTIONS:  - Educate patient/family on patient safety including physical limitations  - Instruct patient to call for assistance with activity   - Consult OT/PT to assist with strengthening/mobility   - Keep Call bell within reach  - Keep bed low and locked with side rails adjusted as appropriate  - Keep care items and personal belongings within reach  - Initiate and maintain comfort rounds  - Make Fall Risk Sign visible to staff  - Apply yellow socks and bracelet for high fall risk patients  - Consider moving patient to room near nurses station  Outcome: Progressing  Goal: Maintain or return to baseline ADL function  Description: INTERVENTIONS:  -  Assess patient's ability to carry out ADLs; assess patient's baseline for ADL function and identify physical deficits which impact ability to perform ADLs (bathing, care of mouth/teeth, toileting, grooming, dressing, etc )  - Assess/evaluate cause of self-care deficits   - Assess range of motion  - Assess patient's mobility; develop plan if impaired  - Assess patient's need for assistive devices and provide as appropriate  - Encourage maximum independence but intervene and supervise when necessary  - Involve family in performance of ADLs  - Assess for home care needs following discharge   - Consider OT consult to assist with ADL evaluation and planning for discharge  - Provide patient education as appropriate  Outcome: Progressing  Goal: Maintains/Returns to pre admission functional level  Description: INTERVENTIONS:  - Perform BMAT or MOVE assessment daily    - Set and communicate daily mobility goal to care team and patient/family/caregiver     - Collaborate with rehabilitation services on mobility goals if consulted  - Out of bed for toileting  - Record patient progress and toleration of activity level   Outcome: Progressing     Problem: DISCHARGE PLANNING  Goal: Discharge to home or other facility with appropriate resources  Description: INTERVENTIONS:  - Identify barriers to discharge w/patient and caregiver  - Arrange for needed discharge resources and transportation as appropriate  - Identify discharge learning needs (meds, wound care, etc )  - Arrange for interpretive services to assist at discharge as needed  - Refer to Case Management Department for coordinating discharge planning if the patient needs post-hospital services based on physician/advanced practitioner order or complex needs related to functional status, cognitive ability, or social support system  Outcome: Progressing     Problem: Knowledge Deficit  Goal: Patient/family/caregiver demonstrates understanding of disease process, treatment plan, medications, and discharge instructions  Description: Complete learning assessment and assess knowledge base    Interventions:  - Provide teaching at level of understanding  - Provide teaching via preferred learning methods  Outcome: Progressing     Problem: Prexisting or High Potential for Compromised Skin Integrity  Goal: Skin integrity is maintained or improved  Description: INTERVENTIONS:  - Identify patients at risk for skin breakdown  - Assess and monitor skin integrity  - Assess and monitor nutrition and hydration status  - Monitor labs   - Assess for incontinence   - Turn and reposition patient  - Assist with mobility/ambulation  - Relieve pressure over bony prominences  - Avoid friction and shearing  - Provide appropriate hygiene as needed including keeping skin clean and dry  - Evaluate need for skin moisturizer/barrier cream  - Collaborate with interdisciplinary team   - Patient/family teaching  - Consider wound care consult   Outcome: Progressing     Problem: MOBILITY - ADULT  Goal: Maintain or return to baseline ADL function  Description: INTERVENTIONS:  -  Assess patient's ability to carry out ADLs; assess patient's baseline for ADL function and identify physical deficits which impact ability to perform ADLs (bathing, care of mouth/teeth, toileting, grooming, dressing, etc )  - Assess/evaluate cause of self-care deficits   - Assess range of motion  - Assess patient's mobility; develop plan if impaired  - Assess patient's need for assistive devices and provide as appropriate  - Encourage maximum independence but intervene and supervise when necessary  - Involve family in performance of ADLs  - Assess for home care needs following discharge   - Consider OT consult to assist with ADL evaluation and planning for discharge  - Provide patient education as appropriate  Outcome: Progressing  Goal: Maintains/Returns to pre admission functional level  Description: INTERVENTIONS:  - Perform BMAT or MOVE assessment daily    - Set and communicate daily mobility goal to care team and patient/family/caregiver     - Collaborate with rehabilitation services on mobility goals if consulted  - Out of bed for toileting  - Record patient progress and toleration of activity level   Outcome: Progressing

## 2022-03-08 NOTE — ASSESSMENT & PLAN NOTE
· The sister said patient is very weak and she cannot take care of the patient at home anymore  · PT/OT consult placed  · Patient sister in interested in rehab  · PT/OT recommends home health care

## 2022-03-08 NOTE — ASSESSMENT & PLAN NOTE
· Metastatic breast cancer to brain  · Following Dr Sarah Mathis as an outpatient  · Completed chemotherapy last week  · Continue outpatient follow-up

## 2022-03-08 NOTE — PLAN OF CARE
Problem: PAIN - ADULT  Goal: Verbalizes/displays adequate comfort level or baseline comfort level  Description: Interventions:  - Encourage patient to monitor pain and request assistance  - Assess pain using appropriate pain scale  - Administer analgesics based on type and severity of pain and evaluate response  - Implement non-pharmacological measures as appropriate and evaluate response  - Consider cultural and social influences on pain and pain management  - Notify physician/advanced practitioner if interventions unsuccessful or patient reports new pain  Outcome: Progressing     Problem: INFECTION - ADULT  Goal: Absence or prevention of progression during hospitalization  Description: INTERVENTIONS:  - Assess and monitor for signs and symptoms of infection  - Monitor lab/diagnostic results  - Monitor all insertion sites, i e  indwelling lines, tubes, and drains  - Monitor endotracheal if appropriate and nasal secretions for changes in amount and color  - [place] appropriate cooling/warming therapies per order  - Administer medications as ordered  - Instruct and encourage patient and family to use good hand hygiene technique  - Identify and instruct in appropriate isolation precautions for identified infection/condition  Outcome: Progressing     Problem: MOBILITY - ADULT  Goal: Maintain or return to baseline ADL function  Description: INTERVENTIONS:  -  Assess patient's ability to carry out ADLs; assess patient's baseline for ADL function and identify physical deficits which impact ability to perform ADLs (bathing, care of mouth/teeth, toileting, grooming, dressing, etc )  - Assess/evaluate cause of self-care deficits   - Assess range of motion  - Assess patient's mobility; develop plan if impaired  - Assess patient's need for assistive devices and provide as appropriate  - Encourage maximum independence but intervene and supervise when necessary  - Involve family in performance of ADLs  - Assess for home care needs following discharge   - Consider OT consult to assist with ADL evaluation and planning for discharge  - Provide patient education as appropriate  Outcome: Progressing

## 2022-03-08 NOTE — PLAN OF CARE
Problem: PHYSICAL THERAPY ADULT  Goal: Performs mobility at highest level of function for planned discharge setting  See evaluation for individualized goals  Description: Treatment/Interventions: ADL retraining,Functional transfer training,LE strengthening/ROM,Elevations,Endurance training,Therapeutic exercise,Patient/family training,Equipment eval/education,Bed mobility,Gait training,Spoke to nursing,Spoke to case management,OT  Equipment Recommended: Walker (bed side commode)       See flowsheet documentation for full assessment, interventions and recommendations  Outcome: Progressing  Note: Prognosis: Fair  Problem List: Decreased strength,Decreased endurance,Impaired balance,Decreased mobility,Decreased coordination,Pain  Assessment: Pt is doing well today continues at supervision level for transfers and ambulation  She and sister report they will prefer rollator which is appropriate to allow her to take seated rest which may be needed due to fatigue and deconditioning  Also will benefit from use of bed side commode to increase ease of toilet transfers  Recommendation remains home with home PT at this time  Barriers to Discharge: Inaccessible home environment        PT Discharge Recommendation: Home with home health rehabilitation          See flowsheet documentation for full assessment

## 2022-03-08 NOTE — PHYSICAL THERAPY NOTE
Physical TherapyTreatment Note    Patient's Name: Basia Putnam    Admitting Diagnosis  Altered mental status [R41 82]  Urinary tract infection [N39 0]  Sepsis ([place] [address] ) [A41 9]    Problem List  Patient Active Problem List   Diagnosis    Malignant neoplasm of overlapping 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, estrogen receptor positive ([facility] )    Use of aromatase inhibitors    Vitamin D deficiency    Benign essential hypertension    Abnormal echocardiography    Chronic systolic heart failure ([place] [address] )    Metastatic breast cancer ([place] [address] )    Colonoscopy refused    Immunization not carried out because of patient decision    Heart burn    Abnormal gastrointestinal PET scan    Osteopenia    Essential hypertension    Brain metastases ([place] [address] )    Nonischemic cardiomyopathy ([place] [address] )    S/P chemotherapy, time since 4-12 weeks    Status post chemoradiation    Chemotherapy induced nausea and vomiting    Acute cystitis without hematuria    Severe protein-calorie malnutrition Consuello Morgan: less than 60% of standard weight) (HCC)    Hypokalemia    Dehydration    Pain    Bilateral stenosis of lateral recess of lumbar spine    Vitamin B 12 deficiency    Lumbar radiculitis    Other spondylosis with radiculopathy, lumbar region    Cerebral edema (HCC)    Spondylolisthesis of lumbar region    Acute bronchitis    Abnormal thyroid function test    Pure hypertriglyceridemia    Screening for colon cancer    Encounter for central line care    Stage 3a chronic kidney disease ([place] [address] )    Immunization not carried out because of patient refusal    Increased PTH level    Abnormal urinalysis    Asthmatic bronchitis    Severe sepsis ([facility] )    Fall    Metabolic encephalopathy       Past Medical History  Past Medical History:   Diagnosis Date    Back pain     Brain cancer ([facility] )     Breast cancer ([place] [address] )     Chronic pain     Hypertension     Knee pain     Lymphedema of right arm     Vitamin B12 deficiency        Past Surgical History  Past Surgical History:   Procedure Laterality Date    ANKLE SURGERY      APPENDECTOMY      BREAST SURGERY      bilat mastectomy-right total mastectomy and prophylactic left total mastectomy-2005    CATARACT EXTRACTION      FRACTURE SURGERY      INSERTION CENTRAL VENOUS ACCESS DEVICE W/ SUBCUTANEOUS PORT      KNEE SURGERY      right knee replacement 2014 in [facility] Bilateral     ORTHOPEDIC SURGERY         Recent Imaging  CT head without contrast   Final Result by Ant Lovell MD (03/05 1728)      No acute intracranial abnormality  Workstation performed: [id number]         CT chest abdomen pelvis w contrast   Final Result by Ant Lovell MD (03/05 1736)      Limited study of the chest due to respiratory motion artifact  No definite evidence of acute abnormality  Bilateral pyelonephritis and ureteritis compatible with ascending urinary tract infection  No evidence of abscess or hydronephrosis  Workstation performed: [id number]             Recent Vital Signs  Vitals:    03/07/22 0709 03/07/22 1458 03/07/22 2224 03/08/22 0719   BP: 143/88 138/89 141/82 143/80   BP Location: Left arm Right arm Right arm Left arm   Pulse: 82 76 75 76   Resp: 20 20 19 18   Temp: (!) 96 °F (35 6 °C) (!) 97 °F (36 1 °C) (!) 97 2 °F (36 2 °C) 97 8 °F (36 6 °C)   TempSrc: Temporal Temporal Temporal Temporal   SpO2: 97% 98% 99% 100%   Weight:       Height:           PT Treatment Time: 1220       03/08/22 1110   PT Last Visit   PT Visit Date 03/08/22   Note Type   Note Type Treatment   Pain Assessment   Pain Assessment Tool 0-10   Pain Score No Pain   Restrictions/Precautions   Weight Bearing Precautions Per Order No   Other Precautions Fall Risk   General   Chart Reviewed Yes   Response to Previous Treatment Patient with no complaints from previous session  Family/Caregiver Present No   Cognition   Arousal/Participation Alert; Cooperative   Attention Within functional limits Orientation Level Oriented X4   Memory Within functional limits   Following Commands Follows all commands and directions without difficulty   Bed Mobility   Supine to Sit 5  Supervision   Additional items Increased time required   Sit to Supine 5  Supervision   Additional items Increased time required   Transfers   Sit to Stand 5  Supervision   Additional items Increased time required   Stand to Sit 5  Supervision   Additional items Increased time required   Ambulation/Elevation   Gait pattern Step through pattern;Decreased toe off;Decreased heel strike; Shuffling;Decreased foot clearance; Improper Weight shift   Gait Assistance 5  Supervision   Additional items Verbal cues   Assistive Device Rolling walker   Distance 50ft x2   Balance   Static Sitting Good   Dynamic Sitting Fair +   Static Standing Fair   Dynamic Standing Fair -   Ambulatory Fair -   Endurance Deficit   Endurance Deficit Yes   Endurance Deficit Description fatigue and deconditioning   Activity Tolerance   Activity Tolerance Patient limited by fatigue   Medical Staff Made Aware spoke to CM   Nurse Made Aware spoke to RN   Assessment   Prognosis Fair   Problem List Decreased strength;Decreased endurance; Impaired balance;Decreased mobility; Decreased coordination;Pain   Assessment Pt is doing well today continues at supervision level for transfers and ambulation  She and sister report they will prefer rollator which is appropriate to allow her to take seated rest which may be needed due to fatigue and deconditioning  Also will benefit from use of bed side commode to increase ease of toilet transfers  Recommendation remains home with home PT at this time  Barriers to Discharge Inaccessible home environment   Goals   Patient Goals go home soon   STG Expiration Date 03/17/22   Short Term Goal #1 see eval note   PT Treatment Day 1   Plan   Treatment/Interventions ADL retraining;Functional transfer training;LE strengthening/ROM; Elevations; Endurance training; Therapeutic exercise;Patient/family training;Equipment eval/education; Bed mobility;Gait training;Spoke to nursing;Spoke to case management;OT   Progress Progressing toward goals   PT Frequency 2-3x/wk   Recommendation   PT Discharge Recommendation Home with home health rehabilitation   Equipment Recommended Walker  (bed side commode)   [facility] Mobility Inpatient   Turning in Bed Without Bedrails 4   Lying on Back to Sitting on Edge of Flat Bed 4   Moving Bed to Chair 3   Standing Up From Chair 3   Walk in Room 3   Climb 3-5 Stairs 3   Basic Mobility Inpatient Raw Score 20   Basic Mobility Standardized Score 43 99   Highest Level Of Mobility   JH-HLM Goal 6: Walk 10 steps or more   JH-HLM Highest Level of Mobility 7: Walk 25 feet or more   JH-HLM Goal Achieved Yes   Education   Education Provided Mobility training;Home exercise program;Assistive device   Patient Explanation/teachback used;Demonstrates verbal understanding;Reinforcement needed   End of Consult   Patient Position at End of Consult Supine; All needs within reach       [facility] PT, DPT

## 2022-03-08 NOTE — CASE MANAGEMENT
Case Management Discharge Planning Note    Patient name Joyce Backer  Location 7T /7T U [id number] MRN [id number]  : 1945 Date 3/8/2022       Current Admission Date: 3/5/2022  Current Admission Diagnosis:Severe sepsis [facility])   Patient Active Problem List    Diagnosis Date Noted    Severe sepsis ([facility] ) 2022    Fall     Metabolic encephalopathy     Immunization not carried out because of patient refusal 2021    Increased PTH level 2021    Abnormal urinalysis 2021    Asthmatic bronchitis 2021    Stage 3a chronic kidney disease ([facility] ) 2021    Encounter for central line care 2021    Acute bronchitis 2021    Abnormal thyroid function test 2021    Pure hypertriglyceridemia 2021    Screening for colon cancer 2021    Other spondylosis with radiculopathy, lumbar region 2020    Cerebral edema ([facility] ) 2020    Spondylolisthesis of lumbar region 2020    Lumbar radiculitis     Vitamin B 12 deficiency 2020    Bilateral stenosis of lateral recess of lumbar spine 2020    Pain 2020    Dehydration 06/10/2020    Severe protein-calorie malnutrition Kaylah Octave: less than 60% of standard weight) ([facility] ) 2020    Hypokalemia 2020    Acute cystitis without hematuria 2020    Chemotherapy induced nausea and vomiting 2020    Brain metastases ([facility] ) 2020    Nonischemic cardiomyopathy ([facility] ) 2020    S/P chemotherapy, time since 4-12 weeks 2020    Status post chemoradiation 2020    Colonoscopy refused 2020    Immunization not carried out because of patient decision 2020    Heart burn 2020    Abnormal gastrointestinal PET scan 2020    Osteopenia 2020    Essential hypertension 2020    Vitamin D deficiency 2019    Use of aromatase inhibitors 2019    Malignant neoplasm of overlapping 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, estrogen receptor positive ([facility] ) 01/31/2019    Abnormal echocardiography 02/16/2017    Chronic systolic heart failure ([facility] ) 02/16/2017    Metastatic breast cancer ([facility] ) 02/16/2017    Benign essential hypertension 03/04/2013      LOS (days): 3  Geometric Mean LOS (GMLOS) (days): 4 80  Days to GMLOS:2     OBJECTIVE:  Risk of Unplanned Readmission Score: 22         Current admission status: Inpatient   Preferred Pharmacy:   [address] [place] [address],[address], [address]  [place] [address] [facility]  Phone: [phone] Fax: [address], [address]  [facility] [address]  [place] [address]  Phone: [phone] Fax: [phone] [facility], [address]  [address] [place] [address]  [address]  [facility] [address]  Phone: [phone] Fax: [phone]    [id number] - [place] [address]  Phone: [phone] Fax: [phone]    [address] [place] [address], [address] [facility]  [address] [place] [address]  Phone: [phone] Fax: [phone]    Primary Care Provider: Melvin Blas MD    Primary Insurance: MEDICARE  Secondary Insurance: [address] [place] [address],[address] DETAILS: CM was notified at morning rounds that pt is medically cleared to be discharged today  Pt will be returning back to her previous environment with [facility]  CM discussed [place] of choice and pt has been accepted  with Muriel ANTON for visiting PT/OT  CM was notified that pt will need rollator and beside commode  CM sent referral through [employer] and pt medical equipment will be delivered to pt's home     CM updated the [facility] face and follow up providers for the [facility]  CM department will continue to follow through pt's D/C

## 2022-03-08 NOTE — DISCHARGE INSTRUCTIONS
Acinetobacter baumannii Infection   WHAT YOU NEED TO KNOW:   An Acinetobacter baumannii infection is caused by the Acinetobacter baumannii bacteria (germ)  Acinetobacter baumannii can cause serious infections in the lungs, blood, and brain  It may also cause urinary tract and wound infections  This germ may be found on skin or in food, water, or soil  It may also be found in hospitals  Acinetobacter baumannii is highly contagious  DISCHARGE INSTRUCTIONS:   Medicines:   · Antibiotics: This medicine is given to fight or prevent an infection caused by bacteria  Always take your antibiotics exactly as ordered by your healthcare provider  Do not stop taking your medicine unless directed by your healthcare provider  Never save antibiotics or take leftover antibiotics that were given to you for another illness  · Take your medicine as directed  Contact your healthcare provider if you think your medicine is not helping or if you have side effects  Tell him or her if you are allergic to any medicine  Keep a list of the medicines, vitamins, and herbs you take  Include the amounts, and when and why you take them  Bring the list or the pill bottles to follow-up visits  Carry your medicine list with you in case of an emergency  Follow up with your healthcare provider as directed:  Write down your questions so you remember to ask them during your visits  Prevent Acinetobacter baumannii from spreading:   · Keep wounds clean and covered:  Do not touch any wounds or items that were used to clean wounds  Place used bandages in a sealed plastic bag when you throw them away  · Wash your hands:  Use soap and water to wash your hands after you use the toilet or change a child's diaper  Wash your hands before you touch your face, nose, and mouth and before you prepare or serve food  Germ-killing hand lotion or gel may be used to clean your hands if you do not have water      · Cover your mouth when you cough:  Cough into a tissue or your shirtsleeve, rather than into your hand  · Avoid sharing personal items: This includes eating and drinking utensils, towels, or other personal items  Contact your healthcare provider if:   · You have a fever  · You have chills or a cough or feel weak and achy  · You have new or more pain, redness, or swelling in the area of a wound  · Your urine is dark in color, or you are urinating less or less often than usual      · You have questions or concerns about your infection, treatment, or care  Seek care immediately or call 911 if:   · You have sudden or new chest pain or a fast heartbeat  · You have sudden trouble breathing  · Your lips and fingernails turn blue  · You are sleepy, confused, and have trouble answering simple questions  · You have sudden numbness or weakness in your arms or legs  © 2017 [address]  Information is for End User's use only and may not be sold, redistributed or otherwise used for commercial purposes  All illustrations and images included in CareNotes® are the copyrighted property of A D A M , Inc  or Anton Jacobsen  The above information is an  only  It is not intended as medical advice for individual conditions or treatments  Talk to your doctor, nurse or pharmacist before following any medical regimen to see if it is safe and effective for you

## 2022-03-08 NOTE — TELEPHONE ENCOUNTER
----- Message from Gregorio Moreno DO sent at 3/8/2022  7:55 AM EST -----  Thank you for allowing us to participate in the care of your patient, Otilia Riojas, who was hospitalized from 3/5/2022 through 3/8/2022 with the admitting diagnosis of pyelonephritis  If you have any additional questions or would like to discuss further, please feel free to contact me      Leonardo Roberts  Internal Medicine, Hospitalist  [phone]

## 2022-03-08 NOTE — ASSESSMENT & PLAN NOTE
Lab Results   Component Value Date    EGFR 68 03/08/2022    EGFR 71 03/07/2022    EGFR 59 03/06/2022    CREATININE 0 83 03/08/2022    CREATININE 0 80 03/07/2022    CREATININE 0 94 03/06/2022     · Creatinine seems to be around baseline  · Avoid nephrotoxic agent  · Monitor BMP

## 2022-03-08 NOTE — NURSING NOTE
Went over d/c instructions with patient  Meds were delivered to bedside  Pt had no additional questions  Pt taken to lobby by PCA

## 2022-03-08 NOTE — ASSESSMENT & PLAN NOTE
· Resolved  · Patient met severe septic criteria with a source of UTI, lactic acid 3 1  · Lactic acid normalized  · Procalcitonin 25 31, 43 7  · UA show UTI, CT chest abdomen pelvis with contrast showed bilateral pyelonephritis and ureteritis  · Patient has history of known h/o metastatic breast cancer chemotherapy, visit on 03/01/2022  · Continue IV vancomycin and cefepime as patient is immunocompromised  · Patient had echocardiogram on 11/21 which showed LVEF 31 percent, grade 1 diastolic dysfunction  · Received 2 liter bolus in ED  · Currently breathing on room air, no clinical evidence of fluid overload  · BNP of 893, CT chest abdomen pelvis with contrast showed lungs are grossly clear  · Started on IV isolyte at 100 cc/hr , as patient is clinically improved, with discontinue IV fluid  · BCx 1/2 showed GNR  The other set is in process  · Patient is currently on cefepime    Continue cefepime for now

## 2022-03-08 NOTE — DISCHARGE SUMMARY
[facility]  Discharge- Genaro De La Rosa 1945, 69 y o  female MRN: [id number]  Unit/Bed#: 7T [facility] 705-02 Encounter: [id number]  Primary Care Provider: Esther Cline MD   Date and time admitted to hospital: 3/5/2022  4:05 PM    * Severe sepsis [facility])  Assessment & Plan  · Resolved  · Patient met severe septic criteria with a source of UTI, lactic acid 3 1  · Lactic acid normalized  · Procalcitonin 25 31, 43 7  · UA show UTI, CT chest abdomen pelvis with contrast showed bilateral pyelonephritis and ureteritis  · Patient has history of known h/o metastatic breast cancer chemotherapy, visit on 03/01/2022  · Continue IV vancomycin and cefepime as patient is immunocompromised  · Patient had echocardiogram on 11/21 which showed LVEF 31 percent, grade 1 diastolic dysfunction  · Received 2 liter bolus in ED  · Currently breathing on room air, no clinical evidence of fluid overload  · BNP of 893, CT chest abdomen pelvis with contrast showed lungs are grossly clear  · Started on IV isolyte at 100 cc/hr , as patient is clinically improved, with discontinue IV fluid  · BCx 1/2 showed GNR  The other set is in process  · Patient is currently on cefepime    Continue cefepime for now      [address]  · The sister said patient is very weak and she cannot take care of the patient at home anymore  · PT/OT consult placed  · Patient sister in interested in rehab  · PT/OT recommends home health care    Metastatic breast cancer [facility])  Assessment & Plan  · Metastatic breast cancer to brain  · Following Dr Rajani Boss as an outpatient  · Completed chemotherapy last week  · Continue outpatient follow-up    Stage 3a chronic kidney disease [facility])  Assessment & Plan  Lab Results   Component Value Date    EGFR 68 03/08/2022    EGFR 71 03/07/2022    EGFR 59 03/06/2022    CREATININE 0 83 03/08/2022    CREATININE 0 80 03/07/2022    CREATININE 0 94 03/06/2022     · Creatinine seems to be around baseline  · Avoid nephrotoxic agent  · Monitor BMP    Metabolic encephalopathy  Assessment & Plan  · Most likely due to infection  · See above for detailed treatment plan of  severe sepsis  · Delirium precautions  · Continue supportive care    Nonischemic cardiomyopathy [facility])  Assessment & Plan  · Echo in 11/21 showed HFrEF, grade 1 diastolic dysfunction  · No clinical evidence of fluid overload, lungs are grossly clear on CT chest  · Watch for fluid overload      Discharging Physician / Practitioner: Jayda Lloyd DO  PCP: Kiana Colon MD  Admission Date:   Admission Orders (From admission, onward)     Ordered        03/05/22 1751  Inpatient Admission  Once                      Discharge Date: 03/08/22    Medical Problems             Resolved Problems  Date Reviewed: 3/8/2022    None                Consultations During Hospital Stay:  Not applicable    Procedures Performed:  Not applicable    Significant Findings / Test Results:     CT head without contrast    Result Date: 3/5/2022  Impression: No acute intracranial abnormality  Workstation performed: [id number]     CT chest abdomen pelvis w contrast    Result Date: 3/5/2022  Impression: Limited study of the chest due to respiratory motion artifact  No definite evidence of acute abnormality  Bilateral pyelonephritis and ureteritis compatible with ascending urinary tract infection  No evidence of abscess or hydronephrosis  Workstation performed: [id number]       Incidental Findings:  Not applicable    Test Results Pending at Discharge (will require follow up): Urine cultures sensitivity     Outpatient Tests Requested:  Not applicable    Reason for Admission:  Confusion    Hospital Course:     Elsie Goodell is a 69 y o  female with PMH of metastatic breast cancer currently on chemotherapy, HTN, HFrEF secondary to chemotherapy who presents with confusion  According to the sister, the patient seemed to unwitnessed fall in the bathroom and she was sleeping on the floor    Next morning, patient could not remember the last night event  Patient does not have dementia at the baseline  Patient had a right breast cancer which was diagnosed 11/09/2005  Patient received 6 cycles of 5 FU, Adriamycin cyclophosphamide followed by tamoxifen followed by Armidex as patient HFrEF is most likely due to chemotherapy  Patient had recurrence of breast cancer to right anterior chest wall axilla and treated with chemotherapy again  She also had new metastasis to brain started on palliative radiation therapy  Patient admitted having urinary frequency  The patient improved with vancomycin and cefepime  Urine cultures remarkable for E coli  Procalcitonin down trended  PT/OT recommended home health care  The patient's family was agreeable with plan for discharge to home with home healthcare  A prescription for doxycycline x7 days was sent to the patient's pharmacy in the setting of bilateral pyelonephritis  Patient does have an allergy to penicillins  She was strongly encouraged to follow-up with her PCP within 7-14 days  She was also encouraged to follow-up with Oncology in the outpatient setting in the setting of metastatic breast cancer  She was discharged in stable condition on 03/08/2022  Condition at Discharge: stable     Discharge Day Visit / Exam:     Subjective:  Patient seen and examined at bedside  No acute events overnight  Denies chest pain, SOB, diaphoresis, nausea/vomiting/diarrhea, fevers/chills  Vitals: Blood Pressure: 143/80 (03/08/22 0719)  Pulse: 76 (03/08/22 0719)  Temperature: 97 8 °F (36 6 °C) (03/08/22 0719)  Temp Source: Temporal (03/08/22 0719)  Respirations: 18 (03/08/22 0719)  Height: 5' 5" (165 1 cm) (03/05/22 1930)  Weight - Scale: 58 9 kg (129 lb 13 6 oz) (03/05/22 1606)  SpO2: 100 % (03/08/22 0719)     Exam:   Physical Exam  Vitals and nursing note reviewed  Constitutional:       General: She is not in acute distress  Appearance: She is well-developed  HENT:      Head: Normocephalic and atraumatic  Eyes:      Conjunctiva/sclera: Conjunctivae normal    Cardiovascular:      Rate and Rhythm: Normal rate and regular rhythm  Heart sounds: No murmur heard  Pulmonary:      Effort: Pulmonary effort is normal  No respiratory distress  Breath sounds: Normal breath sounds  Abdominal:      Palpations: Abdomen is soft  Tenderness: There is no abdominal tenderness  Musculoskeletal:      Cervical back: Neck supple  Skin:     General: Skin is warm and dry  Neurological:      Mental Status: She is alert  Discharge instructions/Information to patient and family:   See after visit summary for information provided to patient and family  Provisions for Follow-Up Care:  See after visit summary for information related to follow-up care and any pertinent home health orders  Disposition:     Home with home health care     Discharge Statement:  I spent 60 minutes discharging the patient  This time was spent on the day of discharge  I had direct contact with the patient on the day of discharge  Greater than 50% of the total time was spent examining patient, answering all patient questions, arranging and discussing plan of care with patient as well as directly providing post-discharge instructions  Additional time then spent on discharge activities  Discharge Medications:  See after visit summary for reconciled discharge medications provided to patient and family        ** Please Note: This note has been constructed using a voice recognition system **

## 2022-03-09 ENCOUNTER — TRANSITIONAL CARE MANAGEMENT (OUTPATIENT)
Dept: FAMILY MEDICINE CLINIC | Facility: CLINIC | Age: 77
End: 2022-03-09

## 2022-03-10 LAB
DME PARACHUTE DELIVERY DATE ACTUAL: NORMAL
DME PARACHUTE DELIVERY DATE EXPECTED: NORMAL
DME PARACHUTE DELIVERY DATE REQUESTED: NORMAL
DME PARACHUTE ITEM DESCRIPTION: NORMAL
DME PARACHUTE ORDER STATUS: NORMAL
DME PARACHUTE SUPPLIER NAME: NORMAL
DME PARACHUTE SUPPLIER PHONE: NORMAL

## 2022-03-11 LAB
BACTERIA BLD CULT: NORMAL
BACTERIA BLD CULT: NORMAL
BACTERIA UR CULT: ABNORMAL
BACTERIA UR CULT: ABNORMAL

## 2022-03-14 ENCOUNTER — TELEPHONE (OUTPATIENT)
Dept: FAMILY MEDICINE CLINIC | Facility: CLINIC | Age: 77
End: 2022-03-14

## 2022-03-14 ENCOUNTER — HOSPITAL ENCOUNTER (OUTPATIENT)
Dept: INFUSION CENTER | Facility: HOSPITAL | Age: 77
Discharge: HOME | End: 2022-03-14
Attending: INTERNAL MEDICINE

## 2022-03-14 NOTE — TELEPHONE ENCOUNTER
Nurse called she states that she wanted to inform you that the medication anatrozole is not being given to the patient   Just fyi they needed it stopped on the medication list

## 2022-03-16 ENCOUNTER — TELEPHONE (OUTPATIENT)
Dept: HEMATOLOGY ONCOLOGY | Facility: CLINIC | Age: 77
End: 2022-03-16

## 2022-03-21 ENCOUNTER — HOSPITAL ENCOUNTER (OUTPATIENT)
Dept: INFUSION CENTER | Facility: HOSPITAL | Age: 77
Discharge: HOME | End: 2022-03-21
Attending: INTERNAL MEDICINE
Payer: MEDICARE

## 2022-03-21 VITALS
DIASTOLIC BLOOD PRESSURE: 79 MMHG | RESPIRATION RATE: 16 BRPM | HEART RATE: 80 BPM | SYSTOLIC BLOOD PRESSURE: 111 MMHG | TEMPERATURE: 97.4 F

## 2022-03-21 DIAGNOSIS — E86.0 DEHYDRATION: Primary | ICD-10-CM

## 2022-03-21 PROCEDURE — 96365 THER/PROPH/DIAG IV INF INIT: CPT

## 2022-03-21 RX ORDER — SODIUM CHLORIDE 9 MG/ML
20 INJECTION, SOLUTION INTRAVENOUS ONCE
Status: CANCELLED
Start: 2022-03-25 | End: 2022-03-25

## 2022-03-21 RX ORDER — SODIUM CHLORIDE 9 MG/ML
20 INJECTION, SOLUTION INTRAVENOUS ONCE
Status: COMPLETED | OUTPATIENT
Start: 2022-03-21 | End: 2022-03-21

## 2022-03-21 RX ADMIN — SODIUM CHLORIDE 1000 ML: 9 INJECTION, SOLUTION INTRAVENOUS at 10:22

## 2022-03-21 RX ADMIN — IRON SUCROSE 200 MG: 20 INJECTION, SOLUTION INTRAVENOUS at 10:22

## 2022-03-21 RX ADMIN — SODIUM CHLORIDE 20 ML/HR: 9 INJECTION, SOLUTION INTRAVENOUS at 10:22

## 2022-03-21 NOTE — PROGRESS NOTES
Patient tolerated IV hydration and IV venofer without issues  Patient port to remained accessed for tomorrow's visit  Dressed with chlorhexidine dressing and transparent dressing  AVS given

## 2022-03-22 ENCOUNTER — HOSPITAL ENCOUNTER (OUTPATIENT)
Dept: INFUSION CENTER | Facility: HOSPITAL | Age: 77
Discharge: HOME | End: 2022-03-22
Attending: INTERNAL MEDICINE
Payer: MEDICARE

## 2022-03-22 VITALS
SYSTOLIC BLOOD PRESSURE: 140 MMHG | OXYGEN SATURATION: 99 % | HEART RATE: 84 BPM | RESPIRATION RATE: 18 BRPM | TEMPERATURE: 97.2 F | DIASTOLIC BLOOD PRESSURE: 77 MMHG

## 2022-03-22 DIAGNOSIS — E86.0 DEHYDRATION: Primary | ICD-10-CM

## 2022-03-22 DIAGNOSIS — Z17.0 MALIGNANT NEOPLASM OF OVERLAPPING SITES OF RIGHT BREAST IN FEMALE, ESTROGEN RECEPTOR POSITIVE (HCC): ICD-10-CM

## 2022-03-22 DIAGNOSIS — C50.811 MALIGNANT NEOPLASM OF OVERLAPPING SITES OF RIGHT BREAST IN FEMALE, ESTROGEN RECEPTOR POSITIVE (HCC): ICD-10-CM

## 2022-03-22 LAB
ALBUMIN SERPL BCP-MCNC: 3.4 G/DL (ref 3–5.2)
ALP SERPL-CCNC: 45 U/L (ref 43–122)
ALT SERPL W P-5'-P-CCNC: 12 U/L
ANION GAP SERPL CALCULATED.3IONS-SCNC: 2 MMOL/L (ref 5–14)
ANISOCYTOSIS BLD QL SMEAR: PRESENT
AST SERPL W P-5'-P-CCNC: 26 U/L (ref 14–36)
BASOPHILS # BLD AUTO: 0 THOUSANDS/ΜL (ref 0–0.1)
BASOPHILS NFR BLD AUTO: 1 % (ref 0–1)
BILIRUB SERPL-MCNC: 0.65 MG/DL
BUN SERPL-MCNC: 17 MG/DL (ref 5–25)
CALCIUM ALBUM COR SERPL-MCNC: 8.7 MG/DL (ref 8.3–10.1)
CALCIUM SERPL-MCNC: 8.2 MG/DL (ref 8.4–10.2)
CANCER AG27-29 SERPL-ACNC: 36.7 U/ML (ref 0–42.3)
CHLORIDE SERPL-SCNC: 107 MMOL/L (ref 97–108)
CO2 SERPL-SCNC: 28 MMOL/L (ref 22–30)
CREAT SERPL-MCNC: 0.8 MG/DL (ref 0.6–1.2)
EOSINOPHIL # BLD AUTO: 0.1 THOUSAND/ΜL (ref 0–0.4)
EOSINOPHIL NFR BLD AUTO: 1 % (ref 0–6)
ERYTHROCYTE [DISTWIDTH] IN BLOOD BY AUTOMATED COUNT: 17.4 %
GFR SERPL CREATININE-BSD FRML MDRD: 71 ML/MIN/1.73SQ M
GLUCOSE SERPL-MCNC: 127 MG/DL (ref 70–99)
HCT VFR BLD AUTO: 30.7 % (ref 36–46)
HGB BLD-MCNC: 10.5 G/DL (ref 12–16)
LDH SERPL-CCNC: 356 U/L (ref 313–618)
LYMPHOCYTES # BLD AUTO: 0.9 THOUSANDS/ΜL (ref 0.5–4)
LYMPHOCYTES NFR BLD AUTO: 18 % (ref 25–45)
MACROCYTES BLD QL AUTO: PRESENT
MAGNESIUM SERPL-MCNC: 1.7 MG/DL (ref 1.6–2.3)
MCH RBC QN AUTO: 35.3 PG (ref 26–34)
MCHC RBC AUTO-ENTMCNC: 34.2 G/DL (ref 31–36)
MCV RBC AUTO: 103 FL (ref 80–100)
MONOCYTES # BLD AUTO: 0.4 THOUSAND/ΜL (ref 0.2–0.9)
MONOCYTES NFR BLD AUTO: 9 % (ref 1–10)
NEUTROPHILS # BLD AUTO: 3.6 THOUSANDS/ΜL (ref 1.8–7.8)
NEUTS SEG NFR BLD AUTO: 72 % (ref 45–65)
PLATELET # BLD AUTO: 341 THOUSANDS/UL (ref 150–450)
PLATELET BLD QL SMEAR: ADEQUATE
PMV BLD AUTO: 7.4 FL (ref 8.9–12.7)
POTASSIUM SERPL-SCNC: 4 MMOL/L (ref 3.6–5)
PROT SERPL-MCNC: 6.9 G/DL (ref 5.9–8.4)
RBC # BLD AUTO: 2.97 MILLION/UL (ref 4–5.2)
RBC MORPH BLD: PRESENT
SODIUM SERPL-SCNC: 137 MMOL/L (ref 137–147)
WBC # BLD AUTO: 5 THOUSAND/UL (ref 4.5–11)

## 2022-03-22 PROCEDURE — 86300 IMMUNOASSAY TUMOR CA 15-3: CPT

## 2022-03-22 PROCEDURE — 96360 HYDRATION IV INFUSION INIT: CPT

## 2022-03-22 PROCEDURE — 83615 LACTATE (LD) (LDH) ENZYME: CPT

## 2022-03-22 PROCEDURE — 80053 COMPREHEN METABOLIC PANEL: CPT

## 2022-03-22 PROCEDURE — 83735 ASSAY OF MAGNESIUM: CPT

## 2022-03-22 PROCEDURE — 85025 COMPLETE CBC W/AUTO DIFF WBC: CPT

## 2022-03-22 RX ORDER — SODIUM CHLORIDE 9 MG/ML
20 INJECTION, SOLUTION INTRAVENOUS ONCE
Status: CANCELLED
Start: 2022-03-29 | End: 2022-03-28

## 2022-03-22 RX ADMIN — SODIUM CHLORIDE 1000 ML: 0.9 INJECTION, SOLUTION INTRAVENOUS at 10:24

## 2022-03-22 NOTE — PLAN OF CARE
Problem: Potential for Falls  Goal: Patient will remain free of falls  Description: INTERVENTIONS:  - Educate patient/family on patient safety including physical limitations  - Instruct patient to call for assistance with activity   - Consult OT/PT to assist with strengthening/mobility   - Keep Call bell within reach       Problem: METABOLIC, FLUID AND ELECTROLYTES - ADULT  Goal: Fluid balance maintained  Description: INTERVENTIONS:  - Monitor labs   - Monitor I/O and WT  - Instruct patient on fluid and nutrition as appropriate  - Assess for signs & symptoms of volume excess or deficit  Outcome: Progressing

## 2022-03-22 NOTE — PROGRESS NOTES
Labs drawn via port  Patient tolerated hydration infusion well with no adverse effects  Offers no complaints  Next appointment verified, AVS provided

## 2022-03-23 LAB — CANCER AG15-3 SERPL-ACNC: 34.4 U/ML (ref 0–25)

## 2022-03-28 ENCOUNTER — TELEMEDICINE (OUTPATIENT)
Dept: FAMILY MEDICINE CLINIC | Facility: CLINIC | Age: 77
End: 2022-03-28
Payer: MEDICARE

## 2022-03-28 DIAGNOSIS — C79.31 BRAIN METASTASES (HCC): ICD-10-CM

## 2022-03-28 DIAGNOSIS — J45.20 MILD INTERMITTENT ASTHMA WITHOUT COMPLICATION: ICD-10-CM

## 2022-03-28 DIAGNOSIS — N18.31 STAGE 3A CHRONIC KIDNEY DISEASE (HCC): ICD-10-CM

## 2022-03-28 DIAGNOSIS — R79.0 CALCIUM BLOOD DECREASED: ICD-10-CM

## 2022-03-28 DIAGNOSIS — M48.061 BILATERAL STENOSIS OF LATERAL RECESS OF LUMBAR SPINE: ICD-10-CM

## 2022-03-28 DIAGNOSIS — Z12.11 SCREENING FOR COLON CANCER: ICD-10-CM

## 2022-03-28 DIAGNOSIS — A41.9 SEVERE SEPSIS (HCC): Primary | ICD-10-CM

## 2022-03-28 DIAGNOSIS — C50.811 MALIGNANT NEOPLASM OF OVERLAPPING SITES OF RIGHT BREAST IN FEMALE, ESTROGEN RECEPTOR POSITIVE (HCC): ICD-10-CM

## 2022-03-28 DIAGNOSIS — R65.20 SEVERE SEPSIS (HCC): Primary | ICD-10-CM
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
## 2022-03-28 DIAGNOSIS — Z17.0 MALIGNANT NEOPLASM OF OVERLAPPING SITES OF RIGHT BREAST IN FEMALE, ESTROGEN RECEPTOR POSITIVE (HCC): ICD-10-CM

## 2022-03-28 DIAGNOSIS — N10 ACUTE PYELONEPHRITIS: ICD-10-CM

## 2022-03-28 DIAGNOSIS — I42.8 NONISCHEMIC CARDIOMYOPATHY (HCC): ICD-10-CM

## 2022-03-28 DIAGNOSIS — I10 BENIGN ESSENTIAL HYPERTENSION: ICD-10-CM

## 2022-03-28 PROCEDURE — G0439 PPPS, SUBSEQ VISIT: HCPCS | Performed by: FAMILY MEDICINE

## 2022-03-28 PROCEDURE — 99214 OFFICE O/P EST MOD 30 MIN: CPT | Performed by: FAMILY MEDICINE

## 2022-03-28 RX ORDER — ACETAMINOPHEN 500 MG
1000 TABLET ORAL EVERY 8 HOURS PRN
Qty: 30 TABLET | Refills: 0 | COMMUNITY
Start: 2022-03-28

## 2022-03-28 RX ORDER — LISINOPRIL 2.5 MG/1
TABLET ORAL
Qty: 30 TABLET | Refills: 6 | Status: SHIPPED | OUTPATIENT
Start: 2022-03-28

## 2022-03-28 RX ORDER — ALBUTEROL SULFATE 90 UG/1
2 AEROSOL, METERED RESPIRATORY (INHALATION) EVERY 6 HOURS PRN
Qty: 18 G | Refills: 2 | Status: SHIPPED | OUTPATIENT
Start: 2022-03-28

## 2022-03-28 NOTE — PROGRESS NOTES
Virtual Regular Visit    Verification of patient location:    Patient is located in the following state in which I hold an active license [place]      Assessment/Plan:    Problem List Items Addressed This Visit        Respiratory    Asthmatic bronchitis    Relevant Medications    albuterol (PROVENTIL HFA,VENTOLIN HFA) 90 mcg/act inhaler       Cardiovascular and Mediastinum    Benign essential hypertension    Relevant Medications    lisinopril (ZESTRIL) 2 5 mg tablet    Other Relevant Orders    Basic metabolic panel    Nonischemic cardiomyopathy (HCC) (Chronic)    Relevant Medications    lisinopril (ZESTRIL) 2 5 mg tablet       Nervous and Auditory    Brain metastases (HCC)       Genitourinary    Stage 3a chronic kidney disease ([facility] )    Relevant Orders    Basic metabolic panel       Other    Bilateral stenosis of lateral recess of lumbar spine    Relevant Medications    acetaminophen (TYLENOL) 500 mg tablet    Fall    Malignant neoplasm of overlapping 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, estrogen receptor positive ([facility] )    Screening for colon cancer    Severe sepsis ([facility] ) - Primary      Other Visit Diagnoses     Acute pyelonephritis        Symptoms resolved    Relevant Orders    Urine culture    UA (URINE) with reflex to Scope    Calcium blood decreased        Relevant Orders    Basic metabolic panel               Reason for visit is   Chief Complaint   Patient presents with    Transition of Care Management    Virtual Regular Visit        Encounter provider Piedad Mckinney MD    Provider located at [address] [place] [address]  [address] [place] [address]  [address] [address] [address] [phone]      Recent Visits  Date Type Provider Dept   03/28/22 Telemedicine Piedad Mckinney MD [facility]   Showing recent visits within past 7 days and meeting all other requirements  Future Appointments  No visits were found meeting these conditions    Showing future appointments within next 150 days and meeting all other requirements       The patient was identified by name and date of birth  Regino Donovan was informed that this is a telemedicine visit and that the visit is being conducted through [address] [employer] and patient was informed that this is a secure, HIPAA-compliant platform  She agrees to proceed     My office door was closed  No one else was in the room  She acknowledged consent and understanding of privacy and security of the video platform  The patient has agreed to participate and understands they can discontinue the visit at any time  Patient is aware this is a billable service  Subjective  [place] Donovan is a 69 y o  female       HPI   Patient was admitted to the hospital on March 5, 2022   Her sister home her on the floor of the bathroom with some confusion  Patient stated she did not feel well that day, she said she felt weak  Denied headache, dizziness  Patient with known breast cancer and metastasis to the brain  Patient feels well now  Patient was diagnosed with sepsis secondary to urinary tract infection  Abdominal CT scan was positive for pyelonephritis  Chronic renal failure  Denied edema  Hypertension  She is taking lisinopril 2 5 mg daily  Asthma  Well controlled she request refill on her albuterol inhaler to keep it on hand she does not use it very often maybe once every couple months  Discharge summary noted  A done at the hospital noted discussed result with patient  Labs done on March 22, 2022 discussed result with patient      Past Medical History:   Diagnosis Date    Back pain     Brain cancer ([address] )     Breast cancer ([address] )     Chronic pain     Hypertension     Knee pain     Lymphedema of right arm     Vitamin B12 deficiency        Past Surgical History:   Procedure Laterality Date    ANKLE SURGERY      APPENDECTOMY      BREAST SURGERY      bilat mastectomy-right total mastectomy and prophylactic left total mastectomy-2005    CATARACT EXTRACTION      FRACTURE SURGERY      INSERTION CENTRAL VENOUS ACCESS DEVICE W/ SUBCUTANEOUS PORT      KNEE SURGERY      right knee replacement 2014 in [facility] Bilateral     ORTHOPEDIC SURGERY         Current Outpatient Medications   Medication Sig Dispense Refill    acetaminophen (TYLENOL) 500 mg tablet Take 2 tablets (1,000 mg total) by mouth every 8 (eight) hours as needed for mild pain 30 tablet 0    albuterol (PROVENTIL HFA,VENTOLIN HFA) 90 mcg/act inhaler Inhale 2 puffs every 6 (six) hours as needed for wheezing or shortness of breath 18 g 2    capecitabine (XELODA) 500 MG tablet Take 3 tabs(1500 mg) in AM and 2 tabs (1000 mg) in PM for 14 days, then 7 days rest 70 tablet 11    cholecalciferol (VITAMIN D3) 1,000 units tablet Take 1 tablet (1,000 Units total) by mouth daily 30 tablet 0    Cyanocobalamin 1000 MCG/ML KIT Inject as directed every 6 (six) months      famotidine (PEPCID) 10 mg tablet Take 10 mg by mouth 2 (two) times a day as needed for heartburn      lisinopril (ZESTRIL) 2 5 mg tablet Take 2 tabs, daily 30 tablet 6    Neratinib Maleate 40 MG TABS Take 240 mg by mouth daily Start Week 1 take 3 tabs (120 mg) daily Week 2 take 4 tabs ( 160 Mg) daily Week 3 take 6 tabs (240) mg daily 180 tablet 11    Elastic Bandages & Supports (Prolite Lumbar Support) MISC Use daily as needed (back pain) 1 each 0    gabapentin (NEURONTIN) 300 mg capsule Take 3 capsules (900 mg total) by mouth daily at bedtime 90 capsule 2    Misc  Devices (ILLUSIONS C BREAST PROSTHESIS) MISC 1 breast prosthesis 1 each 0    Misc  Devices (REFLECTIONS C BREAST PROSTHES) MISC Dispense 1 breast prosthesis 1 each 0    ondansetron (ZOFRAN) 8 mg tablet Take 1 tablet (8 mg total) by mouth every 8 (eight) hours as needed for nausea or vomiting (Patient not taking: Reported on 3/28/2022 ) 20 tablet 3     No current facility-administered medications for this visit          Allergies   Allergen Reactions    Penicillins Rash Review of Systems   Constitutional: Negative for activity change, appetite change, chills, fatigue, fever and unexpected weight change  HENT: Negative for congestion, ear discharge, ear pain, hearing loss, nosebleeds, rhinorrhea, sinus pressure, sore throat, tinnitus, trouble swallowing and voice change  Eyes: Negative for photophobia, pain and visual disturbance  Respiratory: Negative for cough, chest tightness, shortness of breath and wheezing  Cardiovascular: Negative for chest pain, palpitations and leg swelling  Gastrointestinal: Negative for abdominal pain, anal bleeding, blood in stool, constipation, diarrhea, nausea and vomiting  Endocrine: Negative for cold intolerance, heat intolerance, polydipsia and polyuria  Genitourinary: Negative for decreased urine volume, dysuria, frequency, hematuria, urgency, vaginal bleeding and vaginal discharge  Musculoskeletal: Negative for arthralgias, back pain, gait problem, joint swelling, myalgias and neck pain  Skin: Negative for rash  Neurological: Negative for dizziness, tremors, seizures, syncope, facial asymmetry, speech difficulty, weakness, light-headedness and headaches  Hematological: Negative for adenopathy  Does not bruise/bleed easily  Psychiatric/Behavioral: Negative for agitation, behavioral problems, confusion, dysphoric mood, hallucinations and sleep disturbance  The patient is not nervous/anxious  Video Exam    Vitals:    03/28/22 1044 03/29/22 1356   BP: (!) 175/100 150/90   BP Location: Left arm    Pulse: 90      Patient has recheck blood pressure by the end of the virtual office visit it was 150/90  Physical Exam  Constitutional:       General: She is not in acute distress  Appearance: Normal appearance  She is well-developed  She is not ill-appearing or diaphoretic  HENT:      Head: Normocephalic  Eyes:      General: No scleral icterus  Neck:      Thyroid: No thyromegaly     Cardiovascular:      Heart sounds: No murmur heard  Pulmonary:      Effort: Pulmonary effort is normal       Comments: No abnormal audible breath sounds  Abdominal:      Palpations: There is no mass  Musculoskeletal:      Cervical back: Normal range of motion and neck supple  Neurological:      Mental Status: She is alert and oriented to person, place, and time  Cranial Nerves: No cranial nerve deficit  Psychiatric:         Mood and Affect: Mood normal          Behavior: Behavior normal          Thought Content: Thought content normal          Judgment: Judgment normal           I spent 24 minutes directly with the patient during this visit    [address] verbally agrees to participate in [employer]  Pt is aware that [employer] could be limited without vital signs or the ability to perform a full hands-on physical Ly [employer]ian understands she or the provider may request at any time to terminate the video visit and request the patient to seek care or treatment in person

## 2022-03-28 NOTE — PROGRESS NOTES
Assessment and Plan:     Problem List Items Addressed This Visit        Respiratory    Asthmatic bronchitis    Relevant Medications    albuterol (PROVENTIL HFA,VENTOLIN HFA) 90 mcg/act inhaler       Cardiovascular and Mediastinum    Benign essential hypertension    Relevant Medications    lisinopril (ZESTRIL) 2 5 mg tablet    Other Relevant Orders    Basic metabolic panel    Nonischemic cardiomyopathy (HCC) (Chronic)    Relevant Medications    lisinopril (ZESTRIL) 2 5 mg tablet       Nervous and Auditory    Brain metastases (HCC)       Genitourinary    Stage 3a chronic kidney disease (HCC)       Other    Bilateral stenosis of lateral recess of lumbar spine    Relevant Medications    acetaminophen (TYLENOL) 500 mg tablet    Fall    Malignant neoplasm of overlapping 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, estrogen receptor positive ([facility] )    Screening for colon cancer    Severe sepsis ([facility] [facility] ) - Primary      Other Visit Diagnoses     Acute pyelonephritis        Symptoms resolved    Relevant Orders    Urine culture    UA (URINE) with reflex to Scope    Calcium blood decreased            Diagnoses and all orders for this visit:    Severe sepsis ([facility] )  Comments:  Symptoms resolved    Acute pyelonephritis  Comments:  Symptoms resolved  Orders:  -     Urine culture; Future  -     UA (URINE) with reflex to Scope    Calcium blood decreased    Stage 3a chronic kidney disease (HCC)    Brain metastases (HCC)    Benign essential hypertension  -     Basic metabolic panel; Future    Nonischemic cardiomyopathy (HCC)  -     lisinopril (ZESTRIL) 2 5 mg tablet; Take 2 tabs, daily    Malignant neoplasm of overlapping 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, estrogen receptor positive (HCC)    Mild persistent asthmatic bronchitis without complication  -     albuterol (PROVENTIL HFA,VENTOLIN HFA) 90 mcg/act inhaler;  Inhale 2 puffs every 6 (six) hours as needed for wheezing or shortness of breath    Fall, initial encounter    Mild persistent asthmatic bronchitis without complication  Comments:  Asthma well controlled  Orders:  -     albuterol (PROVENTIL HFA,VENTOLIN HFA) 90 mcg/act inhaler; Inhale 2 puffs every 6 (six) hours as needed for wheezing or shortness of breath    Bilateral stenosis of lateral recess of lumbar spine  Comments:  Advised to avoid NSAID because of chronic renal failure and take Tylenol as needed  Orders:  -     acetaminophen (TYLENOL) 500 mg tablet; Take 2 tablets (1,000 mg total) by mouth every 8 (eight) hours as needed for mild pain    Screening for colon cancer  Comments:  pt declined   24      Preve  assntive health issues were discussed with patient, and age appropriate screening tests were ordered as noted in patient's After Visit Summary  Personalized health advice and appropriate referrals for health education or preventive services given if needed, as noted in patient's After Visit Summary       History of Present Illness:     Patient presents for Medicare Annual Wellness visit    Patient Care Team:  Yane Polanco MD as PCP - General (Family Medicine)  Emmanuel Chaves MD (Hematology and Oncology)  Cain Goldsmith MD (Radiation Oncology)  Tomy Alex MD (Radiation Oncology)  Juan Alberto Casey RD (Nutrition)  Liam Rajput MD (Cardiology)     Problem List:     Patient Active Problem List   Diagnosis    Malignant neoplasm of overlapping 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, estrogen receptor positive ([facility]    Use of aromatase inhibitors    Vitamin D deficiency    Benign essential hypertension    Abnormal echocardiography    Chronic systolic heart failure ([address]    Metastatic breast cancer ([address]    Colonoscopy refused    Immunization not carried out because of patient decision    Heart burn    Abnormal gastrointestinal PET scan    Osteopenia    Essential hypertension    Brain metastases ([address]    Nonischemic cardiomyopathy ([address]    S/P chemotherapy, time since 4-12 weeks    Status post chemoradiation    Chemotherapy induced nausea and vomiting    Acute cystitis without hematuria    Severe protein-calorie malnutrition Ruthellen Setter: less than 60% of standard weight) (HCC)    Hypokalemia    Dehydration    Pain    Bilateral stenosis of lateral recess of lumbar spine    Vitamin B 12 deficiency    Lumbar radiculitis    Other spondylosis with radiculopathy, lumbar region    Cerebral edema (HCC)    Spondylolisthesis of lumbar region    Acute bronchitis    Abnormal thyroid function test    Pure hypertriglyceridemia    Screening for colon cancer    Encounter for central line care    Stage [facility]    Immunization not carried out because of patient refusal    Increased PTH level    Abnormal urinalysis    Asthmatic bronchitis    Severe sepsis (HCC)    Fall    Metabolic encephalopathy      Past Medical and Surgical History:     Past Medical History:   Diagnosis Date    Back pain     [facility]     Breast cancer (HCC)     Chronic pain     Hypertension     Knee pain     Lymphedema of right arm     Vitamin B12 deficiency      Past Surgical History:   Procedure Laterality Date    ANKLE SURGERY      APPENDECTOMY      BREAST SURGERY      bilat mastectomy-right total mastectomy and prophylactic left total mastectomy-2005    CATARACT EXTRACTION      FRACTURE SURGERY      INSERTION CENTRAL VENOUS ACCESS DEVICE W/ SUBCUTANEOUS PORT      KNEE SURGERY      right knee replacement 2014 in [address] [place] [address] Bilateral     ORTHOPEDIC SURGERY        Family History:     Family History   Problem Relation Age of Onset    Asthma Mother     Osteoarthritis Father     Bone cancer Paternal Aunt         bone cancer      Social History:     Social History     Socioeconomic History    Marital status: Single     Spouse name: None    Number of children: 0    Years of education: None    Highest education level: None   Occupational History    None   Tobacco Use    Smoking status: Never Smoker  Smokeless tobacco: Never Used    Tobacco comment: no passive smoke exposure   Vaping Use    Vaping Use: Never used   Substance and Sexual Activity    Alcohol use: Never    Drug use: No    Sexual activity: Not Currently   Other Topics Concern    None   Social History Narrative    None     Social Determinants of Health     Financial Resource Strain: Not on file   Food Insecurity: Not on file   Transportation Needs: Not on file   Physical Activity: Not on file   Stress: Not on file   Social Connections: Not on file   Intimate Partner Violence: Not on file   Housing Stability: Not on file      Medications and Allergies:     Current Outpatient Medications   Medication Sig Dispense Refill    acetaminophen (TYLENOL) 500 mg tablet Take 2 tablets (1,000 mg total) by mouth every 8 (eight) hours as needed for mild pain 30 tablet 0    albuterol (PROVENTIL HFA,VENTOLIN HFA) 90 mcg/act inhaler Inhale 2 puffs every 6 (six) hours as needed for wheezing or shortness of breath 18 g 2    capecitabine (XELODA) 500 MG tablet Take 3 tabs(1500 mg) in AM and 2 tabs (1000 mg) in PM for 14 days, then 7 days rest 70 tablet 11    cholecalciferol (VITAMIN D3) 1,000 units tablet Take 1 tablet (1,000 Units total) by mouth daily 30 tablet 0    Cyanocobalamin 1000 MCG/ML KIT Inject as directed every 6 (six) months      famotidine (PEPCID) 10 mg tablet Take 10 mg by mouth 2 (two) times a day as needed for heartburn      lisinopril (ZESTRIL) 2 5 mg tablet Take 2 tabs, daily 30 tablet 6    Neratinib Maleate 40 MG TABS Take 240 mg by mouth daily Start Week 1 take 3 tabs (120 mg) daily Week 2 take 4 tabs ( 160 Mg) daily Week 3 take 6 tabs (240) mg daily 180 tablet 11    Elastic Bandages & Supports (Prolite Lumbar Support) MISC Use daily as needed (back pain) 1 each 0    gabapentin (NEURONTIN) 300 mg capsule Take 3 capsules (900 mg total) by mouth daily at bedtime 90 capsule 2    Misc   Devices (ILLUSIONS C BREAST PROSTHESIS) MISC 1 breast prosthesis 1 each 0    Misc  Devices (REFLECTIONS C BREAST PROSTHES) MISC Dispense 1 breast prosthesis 1 each 0    ondansetron (ZOFRAN) 8 mg tablet Take 1 tablet (8 mg total) by mouth every 8 (eight) hours as needed for nausea or vomiting (Patient not taking: Reported on 3/28/2022 ) 20 tablet 3     No current facility-administered medications for this visit  Allergies   Allergen Reactions    Penicillins Rash      Immunizations:     Immunization History   Administered Date(s) Administered    COVID-19 MODERNA VACC 0 5 ML IM 01/20/2021, 02/19/2021      Health Maintenance:         Topic Date Due    Hepatitis C Screening  Completed         Topic Date Due    Pneumococcal Vaccine: 65+ Years (1 of 4 - PCV13) Never done    DTaP,Tdap,and Td Vaccines (1 - Tdap) Never done    COVID-19 Vaccine (3 - Moderna risk 4-dose series) 03/19/2021    Influenza Vaccine (1) Never done      Medicare Health Risk Assessment:     BP (!) 175/100 (BP Location: Left arm)   Pulse 90          Health Risk Assessment:   Patient rates overall health as fair  Patient feels that their physical health rating is same  Patient is satisfied with their life  Eyesight was rated as same  Hearing was rated as same  Patient feels that their emotional and mental health rating is same  Patients states they are never, rarely angry  Patient states they are sometimes unusually tired/fatigued  Pain experienced in the last 7 days has been some  Patient's pain rating has been 2/10  Patient states that she has experienced no weight loss or gain in last 6 months  Chronic low back pain    Depression Screening:   PHQ-2 Score: 0      Fall Risk Screening: In the past year, patient has experienced: history of falling in past year    Number of falls: 1    Urinary Incontinence Screening:   Patient has not leaked urine accidently in the last six months  Home Safety:  Patient does not have trouble with stairs inside or outside of their home   Patient has working smoke alarms and has no working carbon monoxide detector  Home safety hazards include: none  Nutrition:   Current diet is Regular  Medications:   Patient is currently taking over-the-counter supplements  OTC medications include: see medication list  Patient is able to manage medications  Activities of Daily Living (ADLs)/Instrumental Activities of Daily Living (IADLs):   Walk and transfer into and out of bed and chair?: Yes  Dress and groom yourself?: Yes    Bathe or shower yourself?: Yes    Feed yourself?  Yes  Do your laundry/housekeeping?: Yes  Manage your money, pay your bills and track your expenses?: Yes  Make your own meals?: Yes    Do your own shopping?: Yes    Previous Hospitalizations:   Any hospitalizations or ED visits within the last 12 months?: Yes    How many hospitalizations have you had in the last year?: 1-2    Advance Care Planning:   Living will: No    Advanced directive: No    Advanced directive counseling given: Yes      Cognitive Screening:   Provider or family/friend/caregiver concerned regarding cognition?: No    PREVENTIVE SCREENINGS      Cardiovascular Screening:    General: History Lipid Disorder and Screening Current      Diabetes Screening:     General: Screening Current      Colorectal Cancer Screening:     General: Risks and Benefits Discussed and Patient Declines      Breast Cancer Screening:     General: History Breast Cancer and Screening Current      Cervical Cancer Screening:    General: Screening Not Indicated      Osteoporosis Screening:    General: Screening Current      Abdominal Aortic Aneurysm (AAA) Screening:        General: Screening Current and Screening Not Indicated      Lung Cancer Screening:     General: Screening Not Indicated      Hepatitis C Screening:    General: Screening Current    Screening, Brief Intervention, and Referral to Treatment (SBIRT)    Screening  Typical number of drinks in a day: 0  Typical number of drinks in a week: 0  Interpretation: Low risk drinking behavior  Single Item Drug Screening:  How often have you used an illegal drug (including marijuana) or a prescription medication for non-medical reasons in the past year? never    Single Item Drug Screen Score: 0  Interpretation: Negative screen for possible drug use disorder    Other Counseling Topics:   Car/seat belt/driving safety, skin self-exam, sunscreen and regular weightbearing exercise and calcium and vitamin D intake         Scarlett Connors MD

## 2022-03-29 ENCOUNTER — HOSPITAL ENCOUNTER (OUTPATIENT)
Dept: INFUSION CENTER | Facility: HOSPITAL | Age: 77
Discharge: HOME | End: 2022-03-29
Payer: MEDICARE

## 2022-03-29 VITALS — SYSTOLIC BLOOD PRESSURE: 150 MMHG | DIASTOLIC BLOOD PRESSURE: 90 MMHG | HEART RATE: 90 BPM

## 2022-03-29 VITALS
RESPIRATION RATE: 15 BRPM | HEART RATE: 89 BPM | TEMPERATURE: 97.9 F | DIASTOLIC BLOOD PRESSURE: 63 MMHG | SYSTOLIC BLOOD PRESSURE: 127 MMHG

## 2022-03-29 DIAGNOSIS — E86.0 DEHYDRATION: Primary | ICD-10-CM

## 2022-03-29 PROCEDURE — 96365 THER/PROPH/DIAG IV INF INIT: CPT

## 2022-03-29 RX ORDER — SODIUM CHLORIDE 9 MG/ML
20 INJECTION, SOLUTION INTRAVENOUS ONCE
Status: CANCELLED
Start: 2022-04-04 | End: 2022-04-04

## 2022-03-29 RX ORDER — SODIUM CHLORIDE 9 MG/ML
20 INJECTION, SOLUTION INTRAVENOUS ONCE
Status: COMPLETED | OUTPATIENT
Start: 2022-03-29 | End: 2022-03-29

## 2022-03-29 RX ADMIN — SODIUM CHLORIDE 1000 ML: 9 INJECTION, SOLUTION INTRAVENOUS at 10:51

## 2022-03-29 RX ADMIN — SODIUM CHLORIDE 20 ML/HR: 9 INJECTION, SOLUTION INTRAVENOUS at 10:51

## 2022-03-29 RX ADMIN — IRON SUCROSE 200 MG: 20 INJECTION, SOLUTION INTRAVENOUS at 10:51

## 2022-03-29 NOTE — PROGRESS NOTES
Patient tolerated IV hydration and venofer without issues  Next appt scheduled, AVS given  Escorted out by [facility]

## 2022-04-01 ENCOUNTER — OFFICE VISIT (OUTPATIENT)
Dept: HEMATOLOGY ONCOLOGY | Facility: CLINIC | Age: 77
End: 2022-04-01
Payer: MEDICARE

## 2022-04-01 VITALS
BODY MASS INDEX: 23.77 KG/M2 | DIASTOLIC BLOOD PRESSURE: 78 MMHG | HEIGHT: 62 IN | RESPIRATION RATE: 16 BRPM | HEART RATE: 94 BPM | OXYGEN SATURATION: 99 % | WEIGHT: 129.2 LBS | TEMPERATURE: 97 F | SYSTOLIC BLOOD PRESSURE: 136 MMHG

## 2022-04-01 DIAGNOSIS — D53.9 MACROCYTIC ANEMIA: ICD-10-CM

## 2022-04-01 DIAGNOSIS — I89.0 LYMPHEDEMA: ICD-10-CM

## 2022-04-01 DIAGNOSIS — Z79.811 USE OF AROMATASE INHIBITORS: ICD-10-CM

## 2022-04-01 DIAGNOSIS — C79.31 BRAIN METASTASIS (HCC): ICD-10-CM

## 2022-04-01 DIAGNOSIS — D50.8 OTHER IRON DEFICIENCY ANEMIA: ICD-10-CM

## 2022-04-01 DIAGNOSIS — Z17.0 MALIGNANT NEOPLASM OF OVERLAPPING SITES OF RIGHT BREAST IN FEMALE, ESTROGEN RECEPTOR POSITIVE (HCC): Primary | ICD-10-CM

## 2022-04-01 DIAGNOSIS — C50.811 MALIGNANT NEOPLASM OF OVERLAPPING SITES OF RIGHT BREAST IN FEMALE, ESTROGEN RECEPTOR POSITIVE (HCC): Primary | ICD-10-CM

## 2022-04-01 PROCEDURE — 99215 OFFICE O/P EST HI 40 MIN: CPT | Performed by: NURSE PRACTITIONER

## 2022-04-01 NOTE — PROGRESS NOTES
Hematology/Oncology Outpatient Follow-up  Muriel Delacruz 69 y o  female 1945 [id number]    Date:  4/1/2022      Assessment and Plan:  1  Malignant neoplasm of overlapping 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, estrogen receptor positive ([facility] [address] )  Patient was recently admitted to the hospital 3/5 through 3/8 due to sepsis from UTI/pyelonephritis  She will be getting repeat UA C&S ordered by her primary care team in the very near future  Is feeling much better at this point and back to her baseline  We did discuss considering consultation with either Urology or infectious Disease regarding recommendations for her recurrent UTIs  She would like to further discuss this with her primary care physician after her repeat urinalysis  She will be continued on her current palliative treatment with Xeloda 1000 mg twice a day 1 week on with 1 week of a break along with the neratinib 200 mg daily  She is tolerating her regimen much better after her recent dose adjustment  Is currently in her week off from the Xeloda  We are starting to see decline of her tumor markers suggesting she is responding  She was encouraged to continue to get her additional p r n  IV hydration as needed  Request she follow up again with repeat labs in 4 weeks or sooner should the need arise     - CBC and differential; Future  - Comprehensive metabolic panel; Future  - Magnesium; Future  - Cancer antigen 15-3; Future  - Cancer antigen 27 29; Future  - Durable Medical Equipment    2  Brain metastasis ([facility] [address] )  Patient was treated with SRS to her recurrent/new lesions 12/16/2021  She will continue to follow-up with neurosurgery and radiation oncology  Is scheduled for repeat MRI of the brain  05/19/2022     3  Macrocytic anemia  Likely multifactorial including treatment effect, iron deficiency, anemia of chronic disease and recent infection  Will continue to monitor closely      - Iron Panel (Includes Ferritin, Iron Sat%, Iron, and TIBC); Future    4  Other iron deficiency anemia  Patient recently completed a course of IV iron Venofer x2 treatments  Will re-evaluate her iron panel before her next office visit to see if she would benefit from additional IV treatment  - Iron Panel (Includes Ferritin, Iron Sat%, Iron, and TIBC); Future    5  Use of aromatase inhibitors  Patient's anastrozole was placed on hold  She continues to get Prolia injections on an every six-month basis for her cancer treatment induced bone loss /osteopenia  Next DEXA scan will be due January 2024  6  Lymphedema  Patient is asking for a script for another right upper extremity lymphedema sleeve which was provided to her today  - Durable Medical Equipment    HPI:  Patient presents today for a follow-up visit accompanied by her sister who kindly assisted with translation per patient's request  She states that she is feeling much better today however had to be admitted to the hospital earlier this month 3/5 through 3/8 after she was confused and her sister found her sleeping on the bathroom floor  She was found to have sepsis due to UTI/bilateral pyelonephritis  Which was treated appropriately  Is getting physical therapy at present in the home  She continues to take her neratinib 200 mg daily along with the Xeloda 1000 mg twice a day 1 week on with 1 week of a break  She is currently in her week off  Continues to get additional IV hydration as needed and has been going on a weekly basis  She is no longer having significant diarrhea as she was in the past   Has occasional episodes which are well controlled with Imodium  She did recently complete 2 doses of IV Venofer in the infusion center after she was found to have low iron stores  She mentions that her palpable mass in the right axilla area seems to be smaller      Her most recent laboratory studies from 03/02/2022 showed normal white cells and platelets, she has slightly worsening macrocytic anemia H&H 10  5/30 7,   Glucose 127, remaining metabolic panel is normal     Her tumor markers are starting to decline  Component      Latest Ref Rng & Units 10/25/2021 1/18/2022 2/8/2022 3/22/2022   CA 27 29      0 0 - 42 3 U/mL 51 6 (H) 48 5 (H) 48 7 (H) 36 7     Component      Latest Ref Rng & Units 10/25/2021 1/18/2022 2/8/2022 3/22/2022   CA 15-3      0 0 - 25 0 U/mL 36 7 (H) 35 9 (H) 39 0 (H) 34 4 (H)     She did have a CT scan of the chest abdomen and pelvis with contrast while she was in the hospital 03/05/2022 which showed:  IMPRESSION:  Limited study of the chest due to respiratory motion artifact  No definite evidence of acute abnormality      Bilateral pyelonephritis and ureteritis compatible with ascending urinary tract infection  No evidence of abscess or hydronephrosis  Oncology History Overview Note   The patient has a remote history of right breast cancer diagnosed in [place] in February of 2005 with infiltrating ductal carcinoma grade 2 status post right total mastectomy and left prophylactic total mastectomy as well at that time  Her pathology revealed 8/17 positive lymph node for metastatic disease from the right axilla  ER status positive at 5-10%, progesterone negative, her 2 Gregory positive by FISH  She was then treated with 6 cycles of adjuvant chemotherapy with 5 fluorouracil, Adriamycin, and cyclophosphamide followed by tamoxifen and adjuvant radiation to the right axilla and chest wall  The patient was then switched from tamoxifen to Arimidex which was then stopped in 2012  She was then diagnosed with recurrence of her breast cancer with metastatic disease mainly involving the right anterior chest wall, skin, right axilla pectoralis muscle, left axilla and other areas of the body in May 2015  The biopsy from the scan showed triple positive malignant process compatible with recurrence of her breast cancer    She was then started on palliative chemotherapy and received 6 cycles of Taxotere along with trastuzumab and pertuzumab  After 6 cycles she was continued mainly on trastuzumab and pertuzumab alone  The patient then went to [facility] which she continued her oncological care with trastuzumab, pertuzumab and anastrozole  That treatment had to be put on hold after she developed decline of her ejection fraction to about 23% around June 2017  A PET scan in July of 2017 is showed mild progression of her disease and for that reason low-dose weekly Taxol was started on the 18th July 2017  The frequency of Taxol was decreased to 1 treatment every other week  She then received the rest of her treatment and live unknown from September 2017 until March 2018  Her echocardiogram in May 2018 showed ejection fraction of 43%  The patient then went to live alone again in July of 2018 where she continued her Taxol treatment on every other week basis until the middle of January 2019  She had CT scan of the head on March 18, 2020 an MRI of the brain March 20, 2020 which confirmed 4 discrete intracranial metastasis with the largest measuring 24 mm in size in the left occipital lobe with some associated edema  She had no neurologic symptoms  We discussed that she has multiple brain metastasis and we recommended whole-brain radiation therapy  She completed whole brain radiation therapy in April 10, 2020  She had SRS to left occipital region of the brain on 12/9/2020     Repeat MRI of the brain on May 22, 2020 showed a good response with reduction in all 4 for brain metastasis and no new lesions  She had a repeat PET-CT study March 27, 2020 that revealed Increasing size of metastatic right axillary node and nonspecific increased activity in the lower spinal canal at the T12-L1 level  There was no CT correlate on the PET CT scan images and she was having no lower back pain at the time of her appointment on May 28, 2020   She has developed lumbar spine pain and had MRI lumbar spine August 24, 2020 that revealed degenerative changes with severe central and bilateral lateral recess stenosis with moderate bilateral foraminal narrowing  She saw neurosurgery/Dr Vania Jc September 11, 2020 who recommended referral to pain management and no surgical intervention  After about 6 month interruption care  patient followed up with repeat imaging 11/2021-  The PET scan on 11/09/2021 showed;  IMPRESSION:  1  Findings concerning for disease progression  2   More intensely FDG avid enlarging right axillary lymph node suspicious for progression  3   More prominent subcutaneous focus at the left anterior chest suspicious for metastasis and progression  4  Stable focus of FDG uptake at the left adrenal gland of uncertain significance  Again no obvious findings on low-dose CT  She also had an MRI of the brain on 11/17/2021 which showed:  IMPRESSION:  1   Stable size with interval change in enhancement characteristics of SRS treated lateral left occipital metastatic lesion, now with peripheral enhancement and central necrosis  There is new 3 mm enhancing nodule along the superior margin of treated   lesion concerning for recurrent /worsening tumor  Slight worsening perilesional edema  2   Interval enlargement of right parietal and anteroinferior right temporal lobe lesions with slight worsening of perilesional edema  Her echocardiogram from 11/17/2021 showed:    Left Ventricle: Left ventricular cavity size is normal  Wall thickness is normal  The left ventricular ejection fraction is 31% by single dimension measurement  Systolic function is severely reduced  Global longitudinal strain is reduced at -10%  There is severe global hypokinesis  Diastolic function is mildly abnormal, consistent with grade I (abnormal) relaxation  Left atrial filling pressure is elevated    Right Ventricle: Systolic function is mildly reduced    Aortic Valve: The aortic valve is trileaflet   The leaflets are moderately thickened  There is mild regurgitation    Mitral Valve: There is moderate regurgitation    Tricuspid Valve: There is mild regurgitation    Pulmonic Valve: There is mild regurgitation    Pulmonary Artery: The estimated pulmonary artery systolic pressure is 60 1 mmHg  The pulmonary artery systolic pressure is normal      Malignant neoplasm of overlapping 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, estrogen receptor positive ([facility] [address] )   2015 -  Hormone Therapy    Anastrozole     5/28/2015 Initial Diagnosis    Metastatic breast cancer (HCC)  (recurrence)     6/30/2015 -  Chemotherapy    1-Taxotere, Herceptin, Perjeta started in [place] 2015 x6 cycles  2-Herceptin and Perjeta alone were continued since the patient was not interested in continue the Taxotere  3-Herceptin and Perjeta were put on hold due to decline of her ejection fraction around May 2017    4-low dose weekly Taxol was started in July 2017  5-Taxol was switched to every other week basis     4/14/2019 - 6/9/2019 Chemotherapy    PACLitaxel (TAXOL) 144 6 mg in sodium chloride 0 9 % 250 mL chemo IVPB, 80 mg/m2, Intravenous, Once, 2 of 6 cycles     12/4/2019 - 12/31/2019 Chemotherapy    PACLitaxel (TAXOL) 142 2 mg in sodium chloride 0 9 % 250 mL chemo IVPB, 80 mg/m2 = 142 2 mg, Intravenous, Once, 1 of 6 cycles  Administration: 142 2 mg (12/4/2019), 142 2 mg (12/18/2019)     1/8/2020 - 3/10/2020 Chemotherapy    PACLitaxel (TAXOL) 141 6 mg in sodium chloride 0 9 % 250 mL chemo IVPB, 80 mg/m2 = 141 6 mg, Intravenous, Once, 4 of 6 cycles  Administration: 141 6 mg (1/8/2020), 141 6 mg (1/29/2020), 141 6 mg (2/19/2020)  trastuzumab (HERCEPTIN) 600 mg in sodium chloride 0 9 % 250 mL chemo infusion, 609 mg, Intravenous, Once, 4 of 6 cycles  Administration: 600 mg (1/8/2020), 450 mg (1/29/2020), 450 mg (2/19/2020)      Chemotherapy    PACLitaxel (TAXOL) 144 6 mg in sodium chloride 0 9 % 250 mL chemo IVPB, 80 mg/m2, Intravenous, Once, 2 of 6 cycles    PACLitaxel (TAXOL) 142 2 mg in sodium chloride 0 9 % 250 mL chemo IVPB, 80 mg/m2 = 142 2 mg, Intravenous, Once, 1 of 6 cycles    Administration: 142 2 mg (12/4/2019), 142 2 mg (12/18/2019)    PACLitaxel (TAXOL) 141 6 mg in sodium chloride 0 9 % 250 mL chemo IVPB, 80 mg/m2, Intravenous, Once, 0 of 12 cycles        trastuzumab (HERCEPTIN) 304 mg in sodium chloride 0 9 % 250 mL chemo infusion, 4 mg/kg, Intravenous, Once, 0 of 26 cycles    PACLitaxel (TAXOL) 141 6 mg in sodium chloride 0 9 % 250 mL chemo IVPB, 80 mg/m2 = 141 6 mg, Intravenous, Once, 4 of 6 cycles    Administration: 141 6 mg (1/8/2020), 141 6 mg (1/29/2020), 141 6 mg (2/19/2020)        trastuzumab (HERCEPTIN) 600 mg in sodium chloride 0 9 % 250 mL chemo infusion, 609 mg, Intravenous, Once, 4 of 6 cycles    Administration: 600 mg (1/8/2020), 450 mg (1/29/2020), 450 mg (2/19/2020)        Chemotherapy    PACLitaxel (TAXOL) 144 6 mg in sodium chloride 0 9 % 250 mL chemo IVPB, 80 mg/m2, Intravenous, Once, 2 of 6 cycles    PACLitaxel (TAXOL) 142 2 mg in sodium chloride 0 9 % 250 mL chemo IVPB, 80 mg/m2 = 142 2 mg, Intravenous, Once, 1 of 6 cycles    Administration: 142 2 mg (12/4/2019), 142 2 mg (12/18/2019)    PACLitaxel (TAXOL) 141 6 mg in sodium chloride 0 9 % 250 mL chemo IVPB, 80 mg/m2, Intravenous, Once, 0 of 12 cycles        trastuzumab (HERCEPTIN) 304 mg in sodium chloride 0 9 % 250 mL chemo infusion, 4 mg/kg, Intravenous, Once, 0 of 26 cycles    PACLitaxel (TAXOL) 141 6 mg in sodium chloride 0 9 % 250 mL chemo IVPB, 80 mg/m2 = 141 6 mg, Intravenous, Once, 4 of 6 cycles    Administration: 141 6 mg (1/8/2020), 141 6 mg (1/29/2020), 141 6 mg (2/19/2020)        trastuzumab (HERCEPTIN) 600 mg in sodium chloride 0 9 % 250 mL chemo infusion, 609 mg, Intravenous, Once, 4 of 6 cycles    Administration: 600 mg (1/8/2020), 450 mg (1/29/2020), 450 mg (2/19/2020)      Lapatinib 1500 mg once a day was started on 04/20/2020 after she was found to have metastatic disease to the brain, status post whole brain radiation  12/2021 -  Chemotherapy    Started Xeloda (750 mg/m2 b I d  2 weeks on with 1 week of a break) patient's dose is 1500 mg a med and 1000 mg p m  + Neratinib with dose escalation weekly to goal of 240 mg daily (starting week 3)       Brain metastases ([facility] [address] )   3/30/2020 - 4/10/2020 Radiation    Plan ID Energy Fractions Dose per Fraction (cGy) Dose Correction (cGy) Total Dose Delivered (cGy) Elapsed Days   Whole Brai # 6X 10 / 10 300 0 3,000 11          4/9/2020 Initial Diagnosis    Brain metastasis ([facility] [address] )     12/9/2020 - 12/9/2020 Radiation    Plan ID Energy Fractions Dose per Fraction (cGy) Dose Correction (cGy) Total Dose Delivered (cGy) Elapsed Days   [id number] 6X 1 / 1 1,800 0 1,800 0      Treatment Dates:  12/9/2020 - 12/9/2020  Cancer Staged    Cancer Staging  No matching staging information was found for the patient  Interval history:    ROS: Review of Systems   Constitutional: Negative for activity change, appetite change, chills, fatigue, fever and unexpected weight change  HENT: Negative for congestion, mouth sores, nosebleeds, sore throat and trouble swallowing  Eyes: Negative  Respiratory: Negative for cough, chest tightness and shortness of breath  Cardiovascular: Negative for chest pain, palpitations and leg swelling  Gastrointestinal: Negative for abdominal distention, abdominal pain, blood in stool, constipation, diarrhea, nausea and vomiting  Genitourinary: Negative for difficulty urinating, dysuria, frequency, hematuria and urgency  Musculoskeletal: Positive for arthralgias, back pain (chronic), gait problem and myalgias  Negative for joint swelling  Skin: Negative for color change, pallor and rash  Neurological: Negative for dizziness, weakness, light-headedness, numbness and headaches  Hematological: Negative for adenopathy  Does not bruise/bleed easily     Psychiatric/Behavioral: Negative for dysphoric mood and sleep disturbance  The patient is not nervous/anxious          Past Medical History:   Diagnosis Date    Back pain     Brain cancer ([address] )     Breast cancer ([address] )     Chronic pain     Hypertension     Knee pain     Lymphedema of right arm     Vitamin B12 deficiency        Past Surgical History:   Procedure Laterality Date    ANKLE SURGERY      APPENDECTOMY      BREAST SURGERY      bilat mastectomy-right total mastectomy and prophylactic left total mastectomy-2005    CATARACT EXTRACTION      FRACTURE SURGERY      INSERTION CENTRAL VENOUS ACCESS DEVICE W/ SUBCUTANEOUS PORT      KNEE SURGERY      right knee replacement 2014 [facility]    MASTECTOMY Bilateral     ORTHOPEDIC SURGERY         Social History     Socioeconomic History    Marital status: Single     Spouse name: None    Number of children: 0    Years of education: None    Highest education level: None   Occupational History    None   Tobacco Use    Smoking status: Never Smoker    Smokeless tobacco: Never Used    Tobacco comment: no passive smoke exposure   Vaping Use    Vaping Use: Never used   Substance and Sexual Activity    Alcohol use: Never    Drug use: No    Sexual activity: Not Currently   Other Topics Concern    None   Social History Narrative    None     Social Determinants of Health     Financial Resource Strain: Not on file   Food Insecurity: Not on file   Transportation Needs: Not on file   Physical Activity: Not on file   Stress: Not on file   Social Connections: Not on file   Intimate Partner Violence: Not on file   Housing Stability: Not on file       Family History   Problem Relation Age of Onset    Asthma Mother     Osteoarthritis Father     Bone cancer Paternal Aunt         bone cancer       Allergies   Allergen Reactions    Penicillins Rash         Current Outpatient Medications:     acetaminophen (TYLENOL) 500 mg tablet, Take 2 tablets (1,000 mg total) by mouth every 8 (eight) hours as needed for mild pain, Disp: 30 tablet, Rfl: 0    albuterol (PROVENTIL HFA,VENTOLIN HFA) 90 mcg/act inhaler, Inhale 2 puffs every 6 (six) hours as needed for wheezing or shortness of breath, Disp: 18 g, Rfl: 2    capecitabine (XELODA) 500 MG tablet, Take 3 tabs(1500 mg) in AM and 2 tabs (1000 mg) in PM for 14 days, then 7 days rest, Disp: 70 tablet, Rfl: 11    cholecalciferol (VITAMIN D3) 1,000 units tablet, Take 1 tablet (1,000 Units total) by mouth daily, Disp: 30 tablet, Rfl: 0    Cyanocobalamin 1000 MCG/ML KIT, Inject as directed every 6 (six) months, Disp: , Rfl:     Elastic Bandages & Supports (Prolite Lumbar Support) MISC, Use daily as needed (back pain), Disp: 1 each, Rfl: 0    famotidine (PEPCID) 10 mg tablet, Take 10 mg by mouth 2 (two) times a day as needed for heartburn, Disp: , Rfl:     gabapentin (NEURONTIN) 300 mg capsule, Take 3 capsules (900 mg total) by mouth daily at bedtime, Disp: 90 capsule, Rfl: 2    lisinopril (ZESTRIL) 2 5 mg tablet, Take 2 tabs, daily, Disp: 30 tablet, Rfl: 6    Misc  Devices (ILLUSIONS C BREAST PROSTHESIS) MISC, 1 breast prosthesis, Disp: 1 each, Rfl: 0    Misc  Devices (REFLECTIONS C BREAST PROSTHES) MISC, Dispense 1 breast prosthesis, Disp: 1 each, Rfl: 0    Neratinib Maleate 40 MG TABS, Take 240 mg by mouth daily Start Week 1 take 3 tabs (120 mg) daily Week 2 take 4 tabs ( 160 Mg) daily Week 3 take 6 tabs (240) mg daily, Disp: 180 tablet, Rfl: 11    ondansetron (ZOFRAN) 8 mg tablet, Take 1 tablet (8 mg total) by mouth every 8 (eight) hours as needed for nausea or vomiting, Disp: 20 tablet, Rfl: 3      Physical Exam:  /78 (BP Location: Left arm, Patient Position: Sitting, Cuff Size: Adult)   Pulse 94   Temp (!) 97 °F (36 1 °C) (Tympanic)   Resp 16   Ht 5' 2" (1 575 m)   Wt 58 6 kg (129 lb 3 2 oz)   SpO2 99%   BMI 23 63 kg/m²     Physical Exam  Vitals reviewed  Constitutional:       General: She is not in acute distress  Appearance: She is well-developed   She is ill-appearing  She is not diaphoretic  HENT:      Head: Normocephalic and atraumatic  Eyes:      General: No scleral icterus  Conjunctiva/sclera: Conjunctivae normal       Pupils: Pupils are equal, round, and reactive to light  Neck:      Thyroid: No thyromegaly  Cardiovascular:      Rate and Rhythm: Normal rate and regular rhythm  Heart sounds: Normal heart sounds  No murmur heard  Pulmonary:      Effort: Pulmonary effort is normal  No respiratory distress  Breath sounds: Normal breath sounds  Chest:   Breasts:      Right: No axillary adenopathy  Left: No axillary adenopathy  Comments:  Bilateral total mastectomy  Abdominal:      General: There is no distension  Palpations: Abdomen is soft  There is no hepatomegaly or splenomegaly  Tenderness: There is no abdominal tenderness  Musculoskeletal:         General: Swelling ( lymphedema right upper extremity) present  Normal range of motion  Cervical back: Normal range of motion and neck supple  Lymphadenopathy:      Cervical: No cervical adenopathy  Upper Body:      Right upper body: No axillary adenopathy  Left upper body: No axillary adenopathy  Skin:     General: Skin is warm and dry  Coloration: Skin is pale  Findings: No erythema or rash  Neurological:      General: No focal deficit present  Mental Status: She is alert and oriented to person, place, and time  Psychiatric:         Mood and Affect: Mood is depressed  Affect is blunt  Behavior: Behavior normal  Behavior is cooperative  Thought Content:  Thought content normal          Judgment: Judgment normal            Labs:  Lab Results   Component Value Date    WBC 5 00 03/22/2022    HGB 10 5 (L) 03/22/2022    HCT 30 7 (L) 03/22/2022     (H) 03/22/2022     03/22/2022     Lab Results   Component Value Date     06/18/2018    K 4 0 03/22/2022     03/22/2022    CO2 28 03/22/2022 ANIONGAP 8 06/18/2018    BUN 17 03/22/2022    CREATININE 0 80 03/22/2022    GLUF 95 02/08/2022    CALCIUM 8 2 (L) 03/22/2022    CORRECTEDCA 8 7 03/22/2022    AST 26 03/22/2022    ALT 12 03/22/2022    ALKPHOS 45 03/22/2022    PROT 7 1 06/18/2018    BILITOT 0 3 06/18/2018    EGFR 71 03/22/2022       Patient voiced understanding and agreement in the above discussion  Aware to contact our office with questions/symptoms in the interim  This note has been generated by voice recognition software system  Therefore, there may be spelling, grammar, and or syntax errors  Please contact if questions arise

## 2022-04-04 ENCOUNTER — TELEPHONE (OUTPATIENT)
Dept: HEMATOLOGY ONCOLOGY | Facility: CLINIC | Age: 77
End: 2022-04-04

## 2022-04-04 ENCOUNTER — HOSPITAL ENCOUNTER (OUTPATIENT)
Dept: INFUSION CENTER | Facility: HOSPITAL | Age: 77
End: 2022-04-04
Attending: INTERNAL MEDICINE

## 2022-04-04 ENCOUNTER — APPOINTMENT (OUTPATIENT)
Dept: LAB | Age: 77
End: 2022-04-04
Payer: MEDICARE

## 2022-04-04 DIAGNOSIS — N10 ACUTE PYELONEPHRITIS: ICD-10-CM

## 2022-04-04 LAB

## 2022-04-04 PROCEDURE — 87086 URINE CULTURE/COLONY COUNT: CPT

## 2022-04-04 PROCEDURE — 87077 CULTURE AEROBIC IDENTIFY: CPT

## 2022-04-04 PROCEDURE — 81001 URINALYSIS AUTO W/SCOPE: CPT | Performed by: FAMILY MEDICINE

## 2022-04-04 PROCEDURE — 87186 SC STD MICRODIL/AGAR DIL: CPT

## 2022-04-04 NOTE — TELEPHONE ENCOUNTER
Returned a phone call to Dylon and asked which Rx she needed help with  Dylon took the Rx for lymphedema sleeve to [facility] and they are not contracted with pt's secondary Ins  Rodrigo Madsen know that  I would phone [facility] to see if they can fill the Rx and then get back to her

## 2022-04-04 NOTE — TELEPHONE ENCOUNTER
Yaneth Oh was calling on behalf of her sister  Asked if you could give her a call  [facility] wrote her a script for medication but they have questions regarding it (had trouble getting it filled)  Asked if you could please give them a call at [phone]

## 2022-04-05 ENCOUNTER — HOSPITAL ENCOUNTER (OUTPATIENT)
Dept: INFUSION CENTER | Facility: HOSPITAL | Age: 77
Discharge: HOME | End: 2022-04-05
Attending: INTERNAL MEDICINE
Payer: MEDICARE

## 2022-04-05 VITALS
DIASTOLIC BLOOD PRESSURE: 95 MMHG | HEART RATE: 85 BPM | OXYGEN SATURATION: 100 % | SYSTOLIC BLOOD PRESSURE: 136 MMHG | TEMPERATURE: 98 F | RESPIRATION RATE: 14 BRPM

## 2022-04-05 DIAGNOSIS — E86.0 DEHYDRATION: Primary | ICD-10-CM

## 2022-04-05 PROCEDURE — 96360 HYDRATION IV INFUSION INIT: CPT

## 2022-04-05 RX ADMIN — SODIUM CHLORIDE 1000 ML: 0.9 INJECTION, SOLUTION INTRAVENOUS at 12:36

## 2022-04-05 NOTE — PROGRESS NOTES
Patient tolerated IV hydration without issues  Next appt confirmed, AVS given  Escorted to lobby with wheelchair

## 2022-04-05 NOTE — TELEPHONE ENCOUNTER
Phoned Yaneth Oh and left a voice message informing her that [facility] does not deal with Lymphedema sleeves any longer  I did some research and found some organizations that can provide for free  I asked Estephanie Sethi to phone me back with her email address so I can finish filling out the application and we will need the measurements of pts arm for this application

## 2022-04-06 ENCOUNTER — TELEPHONE (OUTPATIENT)
Dept: FAMILY MEDICINE CLINIC | Facility: CLINIC | Age: 77
End: 2022-04-06

## 2022-04-06 DIAGNOSIS — N10 ACUTE PYELONEPHRITIS: Primary | ICD-10-CM

## 2022-04-06 RX ORDER — CEPHALEXIN 500 MG/1
500 CAPSULE ORAL EVERY 12 HOURS SCHEDULED
Qty: 14 CAPSULE | Refills: 0 | Status: SHIPPED | OUTPATIENT
Start: 2022-04-06 | End: 2022-04-13

## 2022-04-08 LAB
BACTERIA UR CULT: ABNORMAL
BACTERIA UR CULT: ABNORMAL

## 2022-05-10 DIAGNOSIS — C50.811 MALIGNANT NEOPLASM OF OVERLAPPING SITES OF RIGHT BREAST IN FEMALE, ESTROGEN RECEPTOR POSITIVE (HCC): ICD-10-CM

## 2022-05-10 DIAGNOSIS — Z17.0 MALIGNANT NEOPLASM OF OVERLAPPING SITES OF RIGHT BREAST IN FEMALE, ESTROGEN RECEPTOR POSITIVE (HCC): ICD-10-CM

## 2022-05-16 ENCOUNTER — TELEPHONE (OUTPATIENT)
Dept: HEMATOLOGY ONCOLOGY | Facility: CLINIC | Age: 77
End: 2022-05-16

## 2022-05-16 NOTE — TELEPHONE ENCOUNTER
CALL RETURN FORM   Reason for patient call? Eli from [facility], calliing in regarding a Medication Clarification  Neratinib Maleate 40 MG TABS   Patient's primary oncologist? Dieudonne Canela     Name of person the patient was calling for? Habib    Any additional information to add, if applicable? Best call back number [phone]     Informed patient that the message will be forwarded to the team and someone will get back to them as soon as possible    Did you relay this information to the patient?

## 2022-05-16 NOTE — TELEPHONE ENCOUNTER
Returned a call to Pharmacy and clarified that the Rx should be for 240 mg po daily and not starting titration over  The Rx has 7 refills so it will be kept and the other Rx with titration will be removed

## 2022-05-19 ENCOUNTER — TELEPHONE (OUTPATIENT)
Dept: NEUROSURGERY | Facility: CLINIC | Age: 77
End: 2022-05-19

## 2022-05-19 NOTE — TELEPHONE ENCOUNTER
Reached out and spoke to patient's sister Joe Miranda about [facility] appt on 6/1 regarding changing time  Joe Miranda stated patient is currently out of the country and won't be back until July  She stated she will call the office in July to reschedule

## 2022-05-27 ENCOUNTER — TELEPHONE (OUTPATIENT)
Dept: HEMATOLOGY ONCOLOGY | Facility: CLINIC | Age: 77
End: 2022-05-27

## 2022-05-27 NOTE — TELEPHONE ENCOUNTER
Phoned pts sister Brenda Bolden to ask her to phone pharmacy regarding Rx Neratinib  [place] is out [place] for a [place] and Brenda Leydasena will phone pharmacy when pt is back in [address]

## 2022-10-11 PROBLEM — A41.9 SEVERE SEPSIS (HCC): Status: RESOLVED | Noted: 2022-03-05 | Resolved: 2022-10-11

## 2022-10-11 PROBLEM — R65.20 SEVERE SEPSIS (HCC): Status: RESOLVED | Noted: 2022-03-05 | Resolved: 2022-10-11

## 2022-10-12 PROBLEM — Z12.11 SCREENING FOR COLON CANCER: Status: RESOLVED | Noted: 2021-03-11 | Resolved: 2022-10-12

## 2023-06-30 ENCOUNTER — TELEPHONE (OUTPATIENT)
Age: 78
End: 2023-06-30

## 2023-07-17 ENCOUNTER — TELEPHONE (OUTPATIENT)
Age: 78
End: 2023-07-17

## 2023-07-17 NOTE — LETTER
Dear Cande Cooper    Records from Insurance indicate that you are a patient of Dr. Simon Duane, with Katt Santos, [facility]. Please call the office to establish care and schedule your annual physical today at [phone]. If you are seeing a primary care provider other than the one assigned to you by your insurance, you can simply call the number on the back of your insurance card to change your primary care physician with your insurance company. We thank you for choosing [address] [place] [address] for your healthcare needs.     Sincerely,      Katt Santos